# Patient Record
Sex: FEMALE | Race: WHITE | NOT HISPANIC OR LATINO | Employment: OTHER | URBAN - METROPOLITAN AREA
[De-identification: names, ages, dates, MRNs, and addresses within clinical notes are randomized per-mention and may not be internally consistent; named-entity substitution may affect disease eponyms.]

---

## 2017-01-03 ENCOUNTER — TELEPHONE (OUTPATIENT)
Dept: NEUROLOGY | Facility: CLINIC | Age: 72
End: 2017-01-03

## 2017-01-03 NOTE — TELEPHONE ENCOUNTER
Patient notified that the Botox was approved.  Given number for billing to find out her out of pocket.

## 2017-01-03 NOTE — TELEPHONE ENCOUNTER
----- Message from Taylor Munoz sent at 1/3/2017  9:49 AM CST -----  Contact: pt [phone]  Patient called and asked if you have approval for the Botox and the cost.

## 2017-01-05 ENCOUNTER — PROCEDURE VISIT (OUTPATIENT)
Dept: NEUROLOGY | Facility: CLINIC | Age: 72
End: 2017-01-05
Payer: MEDICARE

## 2017-01-05 ENCOUNTER — TELEPHONE (OUTPATIENT)
Dept: NEUROLOGY | Facility: CLINIC | Age: 72
End: 2017-01-05

## 2017-01-05 VITALS
BODY MASS INDEX: 25.47 KG/M2 | RESPIRATION RATE: 18 BRPM | SYSTOLIC BLOOD PRESSURE: 130 MMHG | WEIGHT: 171.94 LBS | TEMPERATURE: 98 F | DIASTOLIC BLOOD PRESSURE: 70 MMHG | HEART RATE: 70 BPM | HEIGHT: 69 IN

## 2017-01-05 DIAGNOSIS — G24.3 CERVICAL DYSTONIA: Primary | ICD-10-CM

## 2017-01-05 DIAGNOSIS — G24.5 BLEPHAROSPASM: ICD-10-CM

## 2017-01-05 PROCEDURE — 64615 CHEMODENERV MUSC MIGRAINE: CPT | Mod: S$GLB,,, | Performed by: PSYCHIATRY & NEUROLOGY

## 2017-01-05 NOTE — TELEPHONE ENCOUNTER
----- Message from Newton FÁTIMA Frisard sent at 1/5/2017 11:26 AM CST -----  Contact: same  Patient called in and stated she needs to reschedule the future appt on 4/5/17 at 11am to a Tuesday or Thursday as she is off of work on those days.    Patient call back number is [phone]

## 2017-01-05 NOTE — MR AVS SNAPSHOT
[facility]  [address]  [place] [address]  Phone: [phone]  Fax: [phone]                  Marleen Samano   2017 10:45 AM   Procedure visit    Description:  Female : 1945   Provider:  Nancy Marino MD   Department:  [facility]           Reason for Visit     Botulinum Toxin Injection           Diagnoses this Visit        Comments    Cervical dystonia    -  Primary     Blepharospasm                To Do List           Future Appointments        Provider Department Dept Phone    2017 11:00 AM Nancy Marino MD [facility] [phone]      Goals (5 Years of Data)     None      [facility]     [facility] Care [facility] -  Assistance  Registered nurses in the [facility] provide clinical advisement, health education, appointment booking, and other advisory services.  Call for this free service at [phone].             Medications           Message regarding Medications     Verify the changes and/or additions to your medication regime listed below are the same as discussed with your clinician today.  If any of these changes or additions are incorrect, please notify your healthcare provider.        These medications were administered today        Dose Freq    onabotulinumtoxina injection 200 Units 200 Units Once    Sig: Inject 200 Units into the muscle once.    Class: Normal    Route: Intramuscular           Verify that the below list of medications is an accurate representation of the medications you are currently taking.  If none reported, the list may be blank. If incorrect, please contact your healthcare provider. Carry this list with you in case of emergency.           Current Medications     baclofen (LIORESAL) 20 MG tablet Take 1 tablet (20 mg total) by mouth 3 (three) times daily.    benazepril (LOTENSIN) 10 MG tablet     gabapentin (NEURONTIN) 600 MG tablet Take 1 tablet (600 mg total) by mouth  "4 (four) times daily. Take 1 tablet PO in AM, noon and then 2 tablets in the evening    magnesium oxide (MAG-OX) 400 mg tablet Take 1 tablet (400 mg total) by mouth 2 (two) times daily.    multivitamin capsule Take 1 capsule by mouth once daily.    omeprazole (PRILOSEC) 20 MG capsule     trazodone (DESYREL) 100 MG tablet Take 100 mg by mouth every evening.      venlafaxine (EFFEXOR-XR) 150 MG 24 hr capsule Take 150 mg by mouth 2 (two) times daily.             Clinical Reference Information           Vital Signs - Last Recorded  Most recent update: 1/5/2017 10:41 AM by Jocelyn Colby MA    BP Pulse Temp Resp Ht Wt    130/70 (BP Location: Left arm, Patient Position: Sitting, BP Method: Automatic) 70 98 °F (36.7 °C) (Oral) 18 5' 9" (1.753 m) 78 kg (171 lb 15.3 oz)    BMI                25.39 kg/m2          Blood Pressure          Most Recent Value    BP  130/70      Allergies as of 1/5/2017     Nancy Marino-1    Adhesive Tape-silicones    Latex, Natural Rubber    Morphine      Immunizations Administered on Date of Encounter - 1/5/2017     None      Administrations This Visit     onabotulinumtoxina injection 200 Units     Admin Date Action Dose Route Administered By             01/05/2017 Given 200 Units Intramuscular Nancy Marino MD                      "

## 2017-01-05 NOTE — PROCEDURES
Procedures   BOTOX PROCEDURE    PROCEDURE PERFORMED: Botulinum toxin injection (73953)    CLINICAL INDICATION: G43.719    A time out was conducted just before the start of the procedure to verify the correct patient and procedure, procedure location, and all relevant critical information.     This is the first Botox injections and I am aiming for at least 50%  improvement in the patient's symptoms. Frequency of treatment is every 3 months unless no response to the treatments, at which time we will discontinue the injections.     DESCRIPTION OF PROCEDURE: After obtaining informed consent and under aseptic technique, a total of 200 units of botulinum toxin type A were injected in the following muscles: Procerus 5 units,  2.5 units bilaterally, frontalis 20 units, temporalis 20 units bilaterally, occipitalis 15 units, upper cervical paraspinals 20 units bilaterally and trapezius 30 units bilaterally. The patient was given a total of 200 units in 33 sites.The patient tolerated the procedure well. There were no complications. The patient was given a prescription for repeat treatment in 3 months.         Trell Marino M.D  Medical Director, Headache and Facial Pain  [facility]

## 2017-01-13 RX ORDER — BACLOFEN 20 MG/1
20 TABLET ORAL 3 TIMES DAILY
Qty: 90 TABLET | Refills: 11 | Status: SHIPPED | OUTPATIENT
Start: 2017-01-13 | End: 2017-01-17 | Stop reason: SDUPTHER

## 2017-01-13 RX ORDER — GABAPENTIN 600 MG/1
600 TABLET ORAL 4 TIMES DAILY
Qty: 120 TABLET | Refills: 11 | Status: SHIPPED | OUTPATIENT
Start: 2017-01-13 | End: 2017-01-17 | Stop reason: SDUPTHER

## 2017-01-13 NOTE — TELEPHONE ENCOUNTER
----- Message from Mariaa Paez sent at 1/13/2017 10:30 AM CST -----  Contact: Patient  Patient needs refills on Gabapentin and Baclofen sent to [facility] on [address]. Any questions call patient at [phone].

## 2017-01-17 NOTE — TELEPHONE ENCOUNTER
----- Message from Nadeen Manzano sent at 1/17/2017  1:41 PM CST -----  Patient needs refill of medications: Gabapentin and Baclofen/ please call into [facility] on [address] in [place]/or if any questions call back at [phone]

## 2017-01-18 RX ORDER — BACLOFEN 20 MG/1
20 TABLET ORAL 3 TIMES DAILY
Qty: 90 TABLET | Refills: 11 | Status: SHIPPED | OUTPATIENT
Start: 2017-01-18 | End: 2017-07-27 | Stop reason: SDUPTHER

## 2017-01-18 RX ORDER — GABAPENTIN 600 MG/1
TABLET ORAL
Qty: 120 TABLET | Refills: 11 | Status: ON HOLD | OUTPATIENT
Start: 2017-01-18 | End: 2020-02-09 | Stop reason: HOSPADM

## 2017-03-03 ENCOUNTER — TELEPHONE (OUTPATIENT)
Dept: NEUROLOGY | Facility: CLINIC | Age: 72
End: 2017-03-03

## 2017-03-10 ENCOUNTER — HOSPITAL ENCOUNTER (OUTPATIENT)
Dept: RADIOLOGY | Facility: HOSPITAL | Age: 72
Discharge: HOME | End: 2017-03-10
Attending: INTERNAL MEDICINE
Payer: MEDICARE

## 2017-03-10 DIAGNOSIS — R05.9 COUGH: ICD-10-CM

## 2017-03-10 DIAGNOSIS — R06.02 SHORTNESS OF BREATH: ICD-10-CM

## 2017-03-10 PROCEDURE — 71020 XR CHEST PA AND LATERAL: CPT | Mod: 26,,, | Performed by: RADIOLOGY

## 2017-03-10 PROCEDURE — 71020 XR CHEST PA AND LATERAL: CPT | Mod: TC

## 2017-03-12 ENCOUNTER — HOSPITAL ENCOUNTER (EMERGENCY)
Facility: HOSPITAL | Age: 72
Discharge: HOME | End: 2017-03-12
Attending: EMERGENCY MEDICINE
Payer: MEDICARE

## 2017-03-12 VITALS
HEIGHT: 69 IN | TEMPERATURE: 98 F | WEIGHT: 171 LBS | HEART RATE: 86 BPM | BODY MASS INDEX: 25.33 KG/M2 | OXYGEN SATURATION: 95 % | DIASTOLIC BLOOD PRESSURE: 52 MMHG | RESPIRATION RATE: 16 BRPM | SYSTOLIC BLOOD PRESSURE: 112 MMHG

## 2017-03-12 DIAGNOSIS — J18.9 PNEUMONIA: ICD-10-CM

## 2017-03-12 LAB
ALBUMIN SERPL BCP-MCNC: 3.4 G/DL
ALP SERPL-CCNC: 119 U/L
ALT SERPL W/O P-5'-P-CCNC: 26 U/L
ANION GAP SERPL CALC-SCNC: 10 MMOL/L
AST SERPL-CCNC: 25 U/L
BASOPHILS # BLD AUTO: 0 K/UL
BASOPHILS NFR BLD: 0.2 %
BILIRUB SERPL-MCNC: 0.4 MG/DL
BUN SERPL-MCNC: 29 MG/DL
CALCIUM SERPL-MCNC: 9.3 MG/DL
CHLORIDE SERPL-SCNC: 100 MMOL/L
CO2 SERPL-SCNC: 26 MMOL/L
CREAT SERPL-MCNC: 1.1 MG/DL
DIFFERENTIAL METHOD: ABNORMAL
EOSINOPHIL # BLD AUTO: 0.1 K/UL
EOSINOPHIL NFR BLD: 0.4 %
ERYTHROCYTE [DISTWIDTH] IN BLOOD BY AUTOMATED COUNT: 14.6 %
EST. GFR  (AFRICAN AMERICAN): 58 ML/MIN/1.73 M^2
EST. GFR  (NON AFRICAN AMERICAN): 50 ML/MIN/1.73 M^2
FLUAV AG SPEC QL IA: NEGATIVE
FLUBV AG SPEC QL IA: NEGATIVE
GLUCOSE SERPL-MCNC: 132 MG/DL
HCT VFR BLD AUTO: 34.4 %
HGB BLD-MCNC: 11.3 G/DL
LYMPHOCYTES # BLD AUTO: 1.1 K/UL
LYMPHOCYTES NFR BLD: 8 %
MCH RBC QN AUTO: 28.9 PG
MCHC RBC AUTO-ENTMCNC: 33 %
MCV RBC AUTO: 88 FL
MONOCYTES # BLD AUTO: 1.2 K/UL
MONOCYTES NFR BLD: 9 %
NEUTROPHILS # BLD AUTO: 11 K/UL
NEUTROPHILS NFR BLD: 82.4 %
PLATELET # BLD AUTO: 240 K/UL
PMV BLD AUTO: 7.9 FL
POTASSIUM SERPL-SCNC: 5.1 MMOL/L
PROT SERPL-MCNC: 7.5 G/DL
RBC # BLD AUTO: 3.92 M/UL
SODIUM SERPL-SCNC: 136 MMOL/L
SPECIMEN SOURCE: NORMAL
WBC # BLD AUTO: 13.3 K/UL

## 2017-03-12 PROCEDURE — 87205 SMEAR GRAM STAIN: CPT

## 2017-03-12 PROCEDURE — 99284 EMERGENCY DEPT VISIT MOD MDM: CPT | Mod: 25

## 2017-03-12 PROCEDURE — 80053 COMPREHEN METABOLIC PANEL: CPT

## 2017-03-12 PROCEDURE — 36415 COLL VENOUS BLD VENIPUNCTURE: CPT

## 2017-03-12 PROCEDURE — 87070 CULTURE OTHR SPECIMN AEROBIC: CPT

## 2017-03-12 PROCEDURE — 87400 INFLUENZA A/B EACH AG IA: CPT

## 2017-03-12 PROCEDURE — 25000003 PHARM REV CODE 250: Performed by: EMERGENCY MEDICINE

## 2017-03-12 PROCEDURE — 63600175 PHARM REV CODE 636 W HCPCS: Performed by: EMERGENCY MEDICINE

## 2017-03-12 PROCEDURE — 25000242 PHARM REV CODE 250 ALT 637 W/ HCPCS: Performed by: EMERGENCY MEDICINE

## 2017-03-12 PROCEDURE — 96361 HYDRATE IV INFUSION ADD-ON: CPT

## 2017-03-12 PROCEDURE — 94640 AIRWAY INHALATION TREATMENT: CPT

## 2017-03-12 PROCEDURE — 96365 THER/PROPH/DIAG IV INF INIT: CPT

## 2017-03-12 PROCEDURE — 85025 COMPLETE CBC W/AUTO DIFF WBC: CPT

## 2017-03-12 RX ORDER — ALBUTEROL SULFATE 2.5 MG/.5ML
2.5 SOLUTION RESPIRATORY (INHALATION)
Status: COMPLETED | OUTPATIENT
Start: 2017-03-12 | End: 2017-03-12

## 2017-03-12 RX ORDER — ALBUTEROL SULFATE 90 UG/1
1-2 AEROSOL, METERED RESPIRATORY (INHALATION) EVERY 6 HOURS PRN
Qty: 1 INHALER | Refills: 0 | Status: SHIPPED | OUTPATIENT
Start: 2017-03-12 | End: 2019-07-09 | Stop reason: SDUPTHER

## 2017-03-12 RX ORDER — ACETAMINOPHEN 500 MG
1000 TABLET ORAL
Status: COMPLETED | OUTPATIENT
Start: 2017-03-12 | End: 2017-03-12

## 2017-03-12 RX ORDER — LEVOFLOXACIN 750 MG/1
750 TABLET ORAL DAILY
Qty: 7 TABLET | Refills: 0 | Status: SHIPPED | OUTPATIENT
Start: 2017-03-12 | End: 2017-03-19

## 2017-03-12 RX ORDER — MOXIFLOXACIN HYDROCHLORIDE 400 MG/250ML
400 INJECTION, SOLUTION INTRAVENOUS
Status: COMPLETED | OUTPATIENT
Start: 2017-03-12 | End: 2017-03-12

## 2017-03-12 RX ADMIN — ACETAMINOPHEN 1000 MG: 500 TABLET, FILM COATED ORAL at 04:03

## 2017-03-12 RX ADMIN — SODIUM CHLORIDE 500 ML: 0.9 INJECTION, SOLUTION INTRAVENOUS at 04:03

## 2017-03-12 RX ADMIN — MOXIFLOXACIN HYDROCHLORIDE 400 MG: 400 INJECTION, SOLUTION INTRAVENOUS at 03:03

## 2017-03-12 RX ADMIN — SODIUM CHLORIDE 500 ML: 0.9 INJECTION, SOLUTION INTRAVENOUS at 01:03

## 2017-03-12 RX ADMIN — ALBUTEROL SULFATE 2.5 MG: 2.5 SOLUTION RESPIRATORY (INHALATION) at 02:03

## 2017-03-12 NOTE — ED NOTES
Presents to the ER with c/o productive cough with green sputum and otalgia to right ear. Patient reports that her cough has been persistent since March, but has been constant for the past 8 days. Associated complaints are wheezing and bilateral rib pain when coughing excessively.Mucous membranes are pink and moist. Skin is warm, dry and intact. Rales to left lung, respirations are regular and unlabored. Denies congestion, rhinorrhea or SOB. BS active x4, no tenderness with palpation, abd is soft and not distended. Denies any appetite or activity change. S1S2, capillary refill is < 2 seconds. Denies dysuria, difficulty urinating, frequency, numbness, tingling or weakness. OSCAR CHURCHILL

## 2017-03-12 NOTE — ED AVS SNAPSHOT
[facility] [address]  [place] [address]               Marleen Samano   3/12/2017 12:52 PM   ED    Description:  Female : 1945   Department:  [facility]           Your Care was Coordinated By:     Provider Role From To    Stefan Samuel MD Attending Provider 17 1300 --      Reason for Visit     Cough     Headache     Otalgia           Diagnoses this Visit        Comments    Pneumonia           ED Disposition     None           To Do List           Follow-up Information     Follow up with De Gomes MD. Call in 1 day.    Specialty:  Internal Medicine    Contact information:    [address] [facility] [address]  [place] [address]  [phone]         These Medications        Disp Refills Start End    albuterol 90 mcg/actuation inhaler 1 Inhaler 0 3/12/2017     Inhale 1-2 puffs into the lungs every 6 (six) hours as needed for Wheezing. Rescue - Inhalation    Pharmacy: [id number] - [place], [place] - [address] [facility] Ph #: [phone]       levoFLOXacin (LEVAQUIN) 750 MG tablet 7 tablet 0 3/12/2017 3/19/2017    Take 1 tablet (750 mg total) by mouth once daily. - Oral    Pharmacy: [id number] - [place] - [address] [facility] Ph #: [phone]         [facility]     [facility] Nurse Care [facility] - 24/7 Assistance  Registered nurses in the [facility] provide clinical advisement, health education, appointment booking, and other advisory services.  Call for this free service at [phone].             Medications           Message regarding Medications     Verify the changes and/or additions to your medication regime listed below are the same as discussed with your clinician today.  If any of these changes or additions are incorrect, please notify your healthcare provider.        START taking these NEW medications        Refills    albuterol 90 mcg/actuation inhaler 0    Sig: Inhale 1-2 puffs into the lungs  every 6 (six) hours as needed for Wheezing. Rescue    Class: Print    Route: Inhalation    levoFLOXacin (LEVAQUIN) 750 MG tablet 0    Sig: Take 1 tablet (750 mg total) by mouth once daily.    Class: Print    Route: Oral      These medications were administered today        Dose Freq    sodium chloride 0.9% bolus 500 mL 500 mL ED 1 Time    Sig: Inject 500 mLs into the vein ED 1 Time.    Class: Normal    Route: Intravenous    albuterol sulfate nebulizer solution 2.5 mg 2.5 mg ED 1 Time    Sig: Take 2.5 mg by nebulization ED 1 Time.    Class: Normal    Route: Nebulization    moxifloxacin 400 mg/250 mL IVPB 400 mg 400 mg ED 1 Time    Sig: Inject 250 mLs (400 mg total) into the vein ED 1 Time.    Class: Normal    Route: Intravenous    sodium chloride 0.9% bolus 500 mL 500 mL ED 1 Time    Sig: Inject 500 mLs into the vein ED 1 Time.    Class: Normal    Route: Intravenous    acetaminophen tablet 1,000 mg 1,000 mg ED 1 Time    Sig: Take 2 tablets (1,000 mg total) by mouth ED 1 Time.    Class: Normal    Route: Oral           Verify that the below list of medications is an accurate representation of the medications you are currently taking.  If none reported, the list may be blank. If incorrect, please contact your healthcare provider. Carry this list with you in case of emergency.           Current Medications     acetaminophen tablet 1,000 mg Take 2 tablets (1,000 mg total) by mouth ED 1 Time.    albuterol 90 mcg/actuation inhaler Inhale 1-2 puffs into the lungs every 6 (six) hours as needed for Wheezing. Rescue    albuterol sulfate nebulizer solution 2.5 mg Take 2.5 mg by nebulization ED 1 Time.    baclofen (LIORESAL) 20 MG tablet Take 1 tablet (20 mg total) by mouth 3 (three) times daily.    benazepril (LOTENSIN) 10 MG tablet     gabapentin (NEURONTIN) 600 MG tablet Take 1 tablet PO in AM, noon and then 2 tablets in the evening    levoFLOXacin (LEVAQUIN) 750 MG tablet Take 1 tablet (750 mg total) by mouth once daily.     "magnesium oxide (MAG-OX) 400 mg tablet Take 1 tablet (400 mg total) by mouth 2 (two) times daily.    multivitamin capsule Take 1 capsule by mouth once daily.    omeprazole (PRILOSEC) 20 MG capsule     trazodone (DESYREL) 100 MG tablet Take 100 mg by mouth every evening.      venlafaxine (EFFEXOR-XR) 150 MG 24 hr capsule Take 150 mg by mouth 2 (two) times daily.             Clinical Reference Information           Your Vitals Were     BP Pulse Temp Resp Height Weight    109/55 96 98.1 °F (36.7 °C) (Oral) 16 5' 9" (1.753 m) 77.6 kg (171 lb)    SpO2 BMI             95% 25.25 kg/m2         Allergies as of 3/12/2017        Reactions    Artane     Swelling    Ned-1 Swelling    Oragel caused tongue to swell    Adhesive Tape-silicones Rash    Blisters after on for several hours    Latex, Natural Rubber Hives, Rash    Localized    Morphine Itching      Immunizations Administered on Date of Encounter - 3/12/2017     None      ED Micro, Lab, POCT     Start Ordered       Status Ordering Provider    03/12/17 1321 03/12/17 1320  Culture, Respiratory  STAT      In process     03/12/17 1319 03/12/17 1319  Comprehensive metabolic panel  STAT      Final result     03/12/17 1319 03/12/17 1319  Influenza antigen Nasopharyngeal Swab  STAT      Final result     03/12/17 1318 03/12/17 1319  CBC auto differential  STAT      Final result       ED Imaging Orders     Start Ordered       Status Ordering Provider    03/12/17 1319 03/12/17 1319  X-Ray Chest PA And Lateral  1 time imaging      Final result         Discharge Instructions         Pneumonia (Adult)  Pneumonia is an infection deep within the lungs. It is in the small air sacs (alveoli). Pneumonia may be caused by a virus or bacteria. Pneumonia caused by bacteria is usually treated with an antibiotic. Severe cases may need to be treated in the hospital. Milder cases can be treated at home. Symptoms usually start to get better during the first 2 days of treatment.    Home " care  Follow these guidelines when caring for yourself at home:  · Rest at home for the first 2 to 3 days, or until you feel stronger. Dont let yourself get overly tired when you go back to your activities.  · Stay away from cigarette smoke - yours or other peoples.  · You may use acetaminophen or ibuprofen to control fever or pain, unless another medicine was prescribed. If you have chronic liver or kidney disease, talk with your health care provider before using these medicines. Also talk with your provider if youve had a stomach ulcer or GI bleeding. Dont give aspirin to anyone younger than 18 years of age who is ill with a fever. It may cause severe liver damage.  · Your appetite may be poor, so a light diet is fine.  · Drink 6 to 8 glasses of fluids every day to make sure you are getting enough fluids. Beverages can include water, sport drinks, sodas without caffeine, juices, tea, or soup. Fluids will help loosen secretions in the lung. This will make it easier for you to cough up the phlegm (sputum). If you also have heart or kidney disease, check with your health care provider before you drink extra fluids.  · Take antibiotic medicine prescribed until it is all gone, even if you are feeling better after a few days.  Follow-up care  Follow up with your health care provider in the next 2 to 3 days, or as advised. This is to be sure the medicine is helping you get better.  If you are 65 or older, you should get a pneumococcal vaccine and a yearly flu (influenza) shot. You should also get these vaccines if you have chronic lung disease like asthma, emphysema, or COPD. Ask your provider about this.  When to seek medical advice  Call your health care provider right away if any of these occur:  · You dont get better within the first 48 hours of treatment  · Shortness of breath gets worse  · Rapid breathing (more than 25 breaths per minute)  · Coughing up blood  · Chest pain gets worse with breathing  · Fever  of 102°F (38°C) or higher that doesnt get better with fever medicine  · Weakness, dizziness, or fainting that gets worse  · Thirst or dry mouth that gets worse  · Sinus pain, headache, or a stiff neck  · Chest pain not caused by coughing  Date Last Reviewed: 12/23/2014  © 0938-0590 [employer]. [address] [place] [address], [place] [address]. All rights reserved. This information is not intended as a substitute for professional medical care. Always follow your healthcare professional's instructions.          Your Scheduled Appointments     Mar 30, 2017  1:00 PM CDT   Botox Injection with Nancy Marino MD   [facility]    [address] [facility] [place] [address]   [phone]               [facility] complies with applicable Federal civil rights laws and does not discriminate on the basis of race, color, national origin, age, disability, or sex.        Language Assistance Services     ATTENTION: Language assistance services are available, free of charge. Please call [phone].      ATENCIÓN: Si habla español, tiene a starks disposición servicios gratuitos de asistencia lingüística. Llame al [phone].     ZAY Ý: N?u b?n nói Ti?ng Vi?t, có các d?ch v? h? tr? ngôn ng? mi?n phí dành cho b?n. G?i s? [phone].

## 2017-03-12 NOTE — ED NOTES
Upon discharge, patient is AAOx4, no cardiac or respiratory complications. Follow up care and  Medications have been reviewed with patient and has been instructed to return to the ER if needed. Patient verbalized understanding and ambulated to the lobby without difficulty. ZHAO MOORE.

## 2017-03-12 NOTE — ED PROVIDER NOTES
Encounter Date: 3/12/2017    SCRIBE #1 NOTE: Radha ALBARRAN, eveline scribing for, and in the presence of, Dr. Samuel.       History     Chief Complaint   Patient presents with    Cough    Headache    Otalgia     Review of patient's allergies indicates:   Allergen Reactions    Artane      Swelling    Ned-1 Swelling     Oragel caused tongue to swell    Adhesive tape-silicones Rash     Blisters after on for several hours    Latex, natural rubber Hives and Rash     Localized    Morphine Itching     HPI Comments: 3/12/2017  1:04 PM     Chief Complaint: Cough    The patient is a 71 y.o. female who presents with gradual onset of productive cough with green sputum that has been constant for 8 days. Associated with wheezing and tightness to bilateral ribs during coughing spells. No hemoptysis, sob, fevers, chills or leg swelling. The patient had a CXR 2 days ago which resulted in pneumonia to the left medial lung base and pulmonary emphysema. Patient also complains of mild headache to the back of the head and intermittent right ear pain for the last few days. No dysuria. Pt did receive pneumonia shot. 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 Not a smoker.    The history is provided by the patient.     Past Medical History:   Diagnosis Date    Arthritis     Bell's palsy     Bladder incontinence     Blepharospasm     Cervical dystonia     Depression     Hormone deficiency     Myofacial pain syndrome      Past Surgical History:   Procedure Laterality Date    APPENDECTOMY      BREAST LUMPECTOMY      EYE SURGERY      HYSTERECTOMY      NECK SURGERY      TONSILLECTOMY       Family History   Problem Relation Age of Onset    Diabetes Neg Hx      Social History   Substance Use Topics    Smoking status: Never Smoker    Smokeless tobacco: Never Used    Alcohol use No     Review of Systems   Constitutional: Negative for appetite  change, chills and fever.   HENT: Positive for ear pain. Negative for congestion, rhinorrhea and sore throat.    Respiratory: Positive for cough and wheezing. Negative for shortness of breath.         + Rib pain    No Hemoptysis.   Cardiovascular: Negative for chest pain.   Gastrointestinal: Negative for abdominal pain, diarrhea, nausea and vomiting.   Genitourinary: Negative for dysuria.   Musculoskeletal: Negative for back pain and myalgias.   Skin: Negative for rash.   Neurological: Positive for headaches. Negative for weakness and numbness.   Hematological: Does not bruise/bleed easily.   All other systems reviewed and are negative.      Physical Exam   Initial Vitals   BP Pulse Resp Temp SpO2   03/12/17 1250 03/12/17 1250 03/12/17 1250 03/12/17 1250 03/12/17 1250   117/63 100 18 98.1 °F (36.7 °C) 95 %     Physical Exam    Nursing note and vitals reviewed.  Constitutional: No distress.   HENT:   Head: Normocephalic and atraumatic.   Right Ear: External ear normal.   Left Ear: Hearing, tympanic membrane, external ear and ear canal normal.   Nose: Nose normal.   Mouth/Throat: Oropharynx is clear and moist and mucous membranes are normal.   Mild serous effusion to the right TM. No erythema to the TM.    Aphthous ulcers to the bottom of the lip.   Eyes: EOM are normal. Pupils are equal, round, and reactive to light.   Neck: Normal range of motion. No JVD present.   Cardiovascular: Normal rate, regular rhythm, normal heart sounds and intact distal pulses. Exam reveals no gallop and no friction rub.    No murmur heard.  Pulmonary/Chest: She has rales (to the left posterior lobe and left anterior lower lobe).   Abdominal: Soft. She exhibits no distension. There is no tenderness.   Musculoskeletal: Normal range of motion. She exhibits no edema or tenderness.   Neurological: She is alert and oriented to person, place, and time.   Skin: No rash noted. No erythema.   Psychiatric: She has a normal mood and affect.          ED Course   Procedures  Labs Reviewed - No data to display          Medical Decision Making:   Patient appears to have a left lower lobe pneumonia.  Her curb 65 score is 2 secondary to her age and her BUN.  She was given fluids and we had her walk around the emergency department twice without oxygen her sats stayed between 93 and 95%.  I feel that she is stable for discharge and will prescribe her a course of Avelox with albuterol and give her very specific return precautions.  She also needs to follow-up with Dr. Summerlin.  Sputum cultures have been ordered.            Scribe Attestation:   Scribe #1: I performed the above scribed service and the documentation accurately describes the services I performed. I attest to the accuracy of the note.    Attending Attestation:           Physician Attestation for Scribe:  Physician Attestation Statement for Scribe #1: I, Dr. Samuel, reviewed documentation, as scribed by Radha Cameron in my presence, and it is both accurate and complete.                 ED Course     Clinical Impression:   The encounter diagnosis was Pneumonia.          Stefan Samuel MD  03/12/17 0537

## 2017-03-12 NOTE — DISCHARGE INSTRUCTIONS
Pneumonia (Adult)  Pneumonia is an infection deep within the lungs. It is in the small air sacs (alveoli). Pneumonia may be caused by a virus or bacteria. Pneumonia caused by bacteria is usually treated with an antibiotic. Severe cases may need to be treated in the hospital. Milder cases can be treated at home. Symptoms usually start to get better during the first 2 days of treatment.    Home care  Follow these guidelines when caring for yourself at home:  · Rest at home for the first 2 to 3 days, or until you feel stronger. Dont let yourself get overly tired when you go back to your activities.  · Stay away from cigarette smoke - yours or other peoples.  · You may use acetaminophen or ibuprofen to control fever or pain, unless another medicine was prescribed. If you have chronic liver or kidney disease, talk with your health care provider before using these medicines. Also talk with your provider if youve had a stomach ulcer or GI bleeding. Dont give aspirin to anyone younger than 18 years of age who is ill with a fever. It may cause severe liver damage.  · Your appetite may be poor, so a light diet is fine.  · Drink 6 to 8 glasses of fluids every day to make sure you are getting enough fluids. Beverages can include water, sport drinks, sodas without caffeine, juices, tea, or soup. Fluids will help loosen secretions in the lung. This will make it easier for you to cough up the phlegm (sputum). If you also have heart or kidney disease, check with your health care provider before you drink extra fluids.  · Take antibiotic medicine prescribed until it is all gone, even if you are feeling better after a few days.  Follow-up care  Follow up with your health care provider in the next 2 to 3 days, or as advised. This is to be sure the medicine is helping you get better.  If you are 65 or older, you should get a pneumococcal vaccine and a yearly flu (influenza) shot. You should also get these vaccines if you have  chronic lung disease like asthma, emphysema, or COPD. Ask your provider about this.  When to seek medical advice  Call your health care provider right away if any of these occur:  · You dont get better within the first 48 hours of treatment  · Shortness of breath gets worse  · Rapid breathing (more than 25 breaths per minute)  · Coughing up blood  · Chest pain gets worse with breathing  · Fever of 102°F (38°C) or higher that doesnt get better with fever medicine  · Weakness, dizziness, or fainting that gets worse  · Thirst or dry mouth that gets worse  · Sinus pain, headache, or a stiff neck  · Chest pain not caused by coughing  Date Last Reviewed: 12/23/2014  © 4340-3449 [employer]. [address] [place] [address], [place] [address]. All rights reserved. This information is not intended as a substitute for professional medical care. Always follow your healthcare professional's instructions.

## 2017-03-14 ENCOUNTER — TELEPHONE (OUTPATIENT)
Dept: NEUROLOGY | Facility: CLINIC | Age: 72
End: 2017-03-14

## 2017-03-14 NOTE — TELEPHONE ENCOUNTER
Patient states she has the flu and pneumonia. She would like to cancel her upcoming Botox appointment at this time. She will call and reschedule when she feels better. Appointment canceled.

## 2017-03-14 NOTE — TELEPHONE ENCOUNTER
----- Message from July Warner sent at 3/14/2017 12:59 PM CDT -----  Contact: self  Needs to discuss Botox shots.  Please call back at .

## 2017-03-15 LAB
BACTERIA SPEC AEROBE CULT: NORMAL
GRAM STN SPEC: NORMAL

## 2017-03-27 ENCOUNTER — TELEPHONE (OUTPATIENT)
Dept: NEUROLOGY | Facility: CLINIC | Age: 72
End: 2017-03-27

## 2017-03-27 NOTE — TELEPHONE ENCOUNTER
Patient would like to speak with Dr. Marino about Botox before rescheduling appointment. She states last time one eye and eyebrow was raised way up and was black and blue. The other eye was almost shut. She had a headache in the back of her head for several weeks after. She wants Dr. Marino's input on if she should try it again before she comes in and pays. Message sent to Dr. Marino.

## 2017-03-27 NOTE — TELEPHONE ENCOUNTER
----- Message from Rolo Adams sent at 3/27/2017 11:48 AM CDT -----  Contact: Marleen   Please call back regarding Botox. Call on [phone]. Thank you!

## 2017-03-28 NOTE — TELEPHONE ENCOUNTER
Informed patient that Dr. Marino would avoid the forehead but could complete the rest of the protocol to give her some relief since the Botox seems to be helping. Patient verbalized understanding. Appointment scheduled.

## 2017-03-29 ENCOUNTER — HOSPITAL ENCOUNTER (OUTPATIENT)
Dept: RADIOLOGY | Facility: HOSPITAL | Age: 72
Discharge: HOME | End: 2017-03-29
Attending: INTERNAL MEDICINE
Payer: MEDICARE

## 2017-03-29 DIAGNOSIS — J18.8 OTHER PNEUMONIA, UNSPECIFIED ORGANISM: ICD-10-CM

## 2017-03-29 PROCEDURE — 71020 XR CHEST PA AND LATERAL: CPT | Mod: 26,,, | Performed by: RADIOLOGY

## 2017-03-29 PROCEDURE — 71020 XR CHEST PA AND LATERAL: CPT | Mod: TC

## 2017-04-24 ENCOUNTER — TELEPHONE (OUTPATIENT)
Dept: NEUROLOGY | Facility: CLINIC | Age: 72
End: 2017-04-24

## 2017-04-24 NOTE — TELEPHONE ENCOUNTER
----- Message from Nadeen Manzano sent at 4/24/2017  7:59 AM CDT -----  Patient requesting to schedule Botox appointment/please call patient back at [phone] to schedule or advise.

## 2017-05-09 ENCOUNTER — PROCEDURE VISIT (OUTPATIENT)
Dept: NEUROLOGY | Facility: CLINIC | Age: 72
End: 2017-05-09
Payer: MEDICARE

## 2017-05-09 VITALS
TEMPERATURE: 97 F | RESPIRATION RATE: 18 BRPM | SYSTOLIC BLOOD PRESSURE: 140 MMHG | HEIGHT: 69 IN | BODY MASS INDEX: 25.47 KG/M2 | WEIGHT: 171.94 LBS | DIASTOLIC BLOOD PRESSURE: 86 MMHG | HEART RATE: 86 BPM

## 2017-05-09 DIAGNOSIS — G43.719 CHRONIC MIGRAINE WITHOUT AURA, WITH INTRACTABLE MIGRAINE, SO STATED, WITHOUT MENTION OF STATUS MIGRAINOSUS: Primary | ICD-10-CM

## 2017-05-09 PROCEDURE — 64615 CHEMODENERV MUSC MIGRAINE: CPT | Mod: S$GLB,,, | Performed by: PSYCHIATRY & NEUROLOGY

## 2017-05-09 NOTE — MR AVS SNAPSHOT
[facility]  [address] [facility] [address]  [place] [address]  Phone: [phone]  Fax: [phone]                  Marleen Samano   2017 11:45 AM   Appointment    Description:  Female : 1945   Provider:  Nancy Marino MD   Department:  [facility]                To Do List           Future Appointments        Provider Department Dept Phone    2017 11:45 AM Nancy Marino MD [facility] [phone]      Goals (5 Years of Data)     None      [facility]     [facility] -  Assistance  Unless otherwise directed by your provider, please contact [facility], our nurse care line that is available for  assistance.     Registered nurses in the [facility] Center provide: appointment scheduling, clinical advisement, health education, and other advisory services.  Call: [phone] (toll free)               Medications           Message regarding Medications     Verify the changes and/or additions to your medication regime listed below are the same as discussed with your clinician today.  If any of these changes or additions are incorrect, please notify your healthcare provider.             Verify that the below list of medications is an accurate representation of the medications you are currently taking.  If none reported, the list may be blank. If incorrect, please contact your healthcare provider. Carry this list with you in case of emergency.           Current Medications     albuterol 90 mcg/actuation inhaler Inhale 1-2 puffs into the lungs every 6 (six) hours as needed for Wheezing. Rescue    baclofen (LIORESAL) 20 MG tablet Take 1 tablet (20 mg total) by mouth 3 (three) times daily.    benazepril (LOTENSIN) 10 MG tablet     gabapentin (NEURONTIN) 600 MG tablet Take 1 tablet PO in AM, noon and then 2 tablets in the evening    magnesium oxide (MAG-OX) 400 mg tablet Take 1 tablet (400 mg total) by mouth 2 (two) times  daily.    multivitamin capsule Take 1 capsule by mouth once daily.    omeprazole (PRILOSEC) 20 MG capsule     trazodone (DESYREL) 100 MG tablet Take 100 mg by mouth every evening.      venlafaxine (EFFEXOR-XR) 150 MG 24 hr capsule Take 150 mg by mouth 2 (two) times daily.             Clinical Reference Information           Allergies as of 5/9/2017     Nancy Marino-1    Adhesive Tape-silicones    Latex, Natural Rubber    Morphine      Immunizations Administered on Date of Encounter - 5/9/2017     None      Language Assistance Services     ATTENTION: Language assistance services are available, free of charge. Please call [phone].      ATENCIÓN: Si marikala pilo, tiene a starks disposición servicios gratuitos de asistencia lingüística. Llame al [phone].     ZAY Ý: N?u b?n nói Ti?ng Vi?t, có các d?ch v? h? tr? ngôn ng? mi?n phí dành cho b?n. G?i s? [phone].         [facility] complies with applicable Federal civil rights laws and does not discriminate on the basis of race, color, national origin, age, disability, or sex.

## 2017-05-09 NOTE — PROCEDURES
Procedures     BOTOX PROCEDURE    PROCEDURE PERFORMED: Botulinum toxin injection (78828)    CLINICAL INDICATION: G43.719    A time out was conducted just before the start of the procedure to verify the correct patient and procedure, procedure location, and all relevant critical information.     This is the second Botox injections and I am aiming for at least 50%  improvement in the patient's symptoms. Frequency of treatment is every 3 months unless no response to the treatments, at which time we will discontinue the injections.     DESCRIPTION OF PROCEDURE: After obtaining informed consent and under aseptic technique, a total of 200 units of botulinum toxin type A were injected in the following muscles: Procerus 5 units,  2.5 units bilaterally, frontalis 20 units, temporalis 20 units bilaterally, occipitalis 15 units, upper cervical paraspinals 20 units bilaterally and trapezius 30 units bilaterally. The patient was given a total of 200 units in 33 sites.The patient tolerated the procedure well. There were no complications. The patient was given a prescription for repeat treatment in 3 months.         Trell Marino M.D  Medical Director, Headache and Facial Pain  [facility]

## 2017-05-12 ENCOUNTER — TELEPHONE (OUTPATIENT)
Dept: NEUROLOGY | Facility: CLINIC | Age: 72
End: 2017-05-12

## 2017-05-12 NOTE — TELEPHONE ENCOUNTER
----- Message from Dolores Garica sent at 5/12/2017 12:40 PM CDT -----  Contact: self @ [phone]  Pt has a referral in the system from dr karol amaya for cervical dystonia.  Would like to schedule a cons at the [place] office.  pls call pt with an appt.

## 2017-05-16 ENCOUNTER — TELEPHONE (OUTPATIENT)
Dept: NEUROLOGY | Facility: CLINIC | Age: 72
End: 2017-05-16

## 2017-05-16 NOTE — TELEPHONE ENCOUNTER
----- Message from Janna Serrano sent at 5/16/2017 11:48 AM CDT -----  Contact: Patient [phone]  Patient is calling to get a new patient appt at the [facility] for a second opinion on spams per  Dr. Marino.

## 2017-05-22 ENCOUNTER — TELEPHONE (OUTPATIENT)
Dept: NEUROLOGY | Facility: CLINIC | Age: 72
End: 2017-05-22

## 2017-05-22 NOTE — TELEPHONE ENCOUNTER
----- Message from Fidelina Bautista sent at 5/19/2017  9:30 AM CDT -----  Contact: Patient  Marleen, patient [phone] or [phone] cell, Calling about, Dr durán , who does not have a return date for the [place] office, yet. How did you want to the patient to handle that with a conference between the doctors, if patient goes to [facility] [place]? And what days would be the best to guarantee both doctors will be available?  Please advise. Thanks.

## 2017-06-01 ENCOUNTER — TELEPHONE (OUTPATIENT)
Dept: NEUROLOGY | Facility: CLINIC | Age: 72
End: 2017-06-01

## 2017-06-01 NOTE — TELEPHONE ENCOUNTER
----- Message from Dolores Garcia sent at 6/1/2017 11:21 AM CDT -----  Contact: self @ [phone]  Pt is being referred to dr durán by dr karol amaya for a 2 nd opinion for dystonia.  Pt would like to schedule on the [facility] but depending on the appt date may be willing to travel to the [facility].  Pt say she is off on Tuesday's and would like to come in before 2:00 due to her eye issue.  Pt is requesting to speak with rafy.

## 2017-06-05 ENCOUNTER — TELEPHONE (OUTPATIENT)
Dept: NEUROLOGY | Facility: CLINIC | Age: 72
End: 2017-06-05

## 2017-06-05 NOTE — TELEPHONE ENCOUNTER
----- Message from Kiko Schaeffer sent at 6/5/2017  4:29 PM CDT -----  Contact: pt  Pt is requesting a callback(pt has pain in head and neck that she cannot get rid of)  Call Back#[phone]  Thanks

## 2017-06-05 NOTE — TELEPHONE ENCOUNTER
----- Message from Chad Delgadillo sent at 6/5/2017  8:18 AM CDT -----  Contact: Self [phone]  Patient is calling schedule a NP appt with Dr Dee, she prefers the [facility] but if not she's available for [facility] [place],  pls call

## 2017-06-05 NOTE — TELEPHONE ENCOUNTER
Spoke with patient. She stated that her neck and right shoulder is hurting for about 4 days now. She is really stiff. She has tried the TENS unit, Voltaren gel, and 6 low dose ASA with no relief.     She is wanting to know if there is anything you can recommend her doing or a test that she can do?

## 2017-06-06 ENCOUNTER — HOSPITAL ENCOUNTER (OUTPATIENT)
Dept: RADIOLOGY | Facility: HOSPITAL | Age: 72
Discharge: HOME | End: 2017-06-06
Attending: INTERNAL MEDICINE
Payer: MEDICARE

## 2017-06-06 DIAGNOSIS — J44.0 OBSTRUCTIVE CHRONIC BRONCHITIS WITH ACUTE BRONCHITIS: Primary | ICD-10-CM

## 2017-06-06 DIAGNOSIS — J20.9 OBSTRUCTIVE CHRONIC BRONCHITIS WITH ACUTE BRONCHITIS: Primary | ICD-10-CM

## 2017-06-06 DIAGNOSIS — J20.9 OBSTRUCTIVE CHRONIC BRONCHITIS WITH ACUTE BRONCHITIS: ICD-10-CM

## 2017-06-06 DIAGNOSIS — R05.9 COUGH: ICD-10-CM

## 2017-06-06 DIAGNOSIS — J44.0 OBSTRUCTIVE CHRONIC BRONCHITIS WITH ACUTE BRONCHITIS: ICD-10-CM

## 2017-06-06 PROCEDURE — 70220 X-RAY EXAM OF SINUSES: CPT | Mod: TC

## 2017-06-06 PROCEDURE — 71020 XR CHEST PA AND LATERAL: CPT | Mod: TC

## 2017-06-06 PROCEDURE — 71020 XR CHEST PA AND LATERAL: CPT | Mod: 26,,, | Performed by: RADIOLOGY

## 2017-06-06 PROCEDURE — 70220 X-RAY EXAM OF SINUSES: CPT | Mod: 26,,, | Performed by: RADIOLOGY

## 2017-06-08 ENCOUNTER — TELEPHONE (OUTPATIENT)
Dept: NEUROLOGY | Facility: CLINIC | Age: 72
End: 2017-06-08

## 2017-06-08 NOTE — TELEPHONE ENCOUNTER
Spoke with patient. She stated she was calling to let us know that her appointment has been scheduled with Dr. Dee.

## 2017-06-08 NOTE — TELEPHONE ENCOUNTER
----- Message from Nadeen You sent at 6/8/2017  8:44 AM CDT -----  Patient is requesting a return call from Jocelyn, she states this is a follow up call, contact patient at [phone].    Thank you

## 2017-06-09 ENCOUNTER — TELEPHONE (OUTPATIENT)
Dept: NEUROLOGY | Facility: CLINIC | Age: 72
End: 2017-06-09

## 2017-06-09 NOTE — TELEPHONE ENCOUNTER
----- Message from Giulia Tran sent at 6/9/2017  2:39 PM CDT -----  Contact: [phone]  Patient is returning nurse's phone call.  Please call patient back at [phone].

## 2017-06-12 ENCOUNTER — TELEPHONE (OUTPATIENT)
Dept: INTERVENTIONAL RADIOLOGY/VASCULAR | Facility: HOSPITAL | Age: 72
End: 2017-06-12

## 2017-06-12 NOTE — CARE UPDATE
Called Ms. Bedoyaey back at her request to discuss recent radiographic findings.  All her questions were answered and she reported satisfaction.

## 2017-07-19 ENCOUNTER — TELEPHONE (OUTPATIENT)
Dept: NEUROLOGY | Facility: CLINIC | Age: 72
End: 2017-07-19

## 2017-07-20 ENCOUNTER — TELEPHONE (OUTPATIENT)
Dept: NEUROLOGY | Facility: CLINIC | Age: 72
End: 2017-07-20

## 2017-07-20 NOTE — TELEPHONE ENCOUNTER
Spoke with patient. botox appointment rescheduled.     Patient is on baclofen for the muscle spasms which is helping a little bit but not all the way. Patient wants to know if the dosage can be increased? She is currently taking baclofen 20mg TID.

## 2017-07-20 NOTE — TELEPHONE ENCOUNTER
----- Message from Mitra Logan sent at 7/20/2017  1:06 PM CDT -----  Call [phone]... Returning Alisha's call / to reschedule .. Asking to have baclofen increased

## 2017-07-25 ENCOUNTER — TELEPHONE (OUTPATIENT)
Dept: NEUROLOGY | Facility: CLINIC | Age: 72
End: 2017-07-25

## 2017-07-25 NOTE — TELEPHONE ENCOUNTER
Returned call. No answer. Left message asking patient to call back.     Message sent to patient through [facility].

## 2017-07-25 NOTE — TELEPHONE ENCOUNTER
----- Message from Fidelia Gaytan sent at 7/25/2017 11:22 AM CDT -----  Contact: pt  Pt returning phone call from 7/24....[phone]

## 2017-07-27 ENCOUNTER — TELEPHONE (OUTPATIENT)
Dept: NEUROLOGY | Facility: CLINIC | Age: 72
End: 2017-07-27

## 2017-07-27 RX ORDER — BACLOFEN 20 MG/1
20 TABLET ORAL 4 TIMES DAILY
Qty: 120 TABLET | Refills: 11 | Status: SHIPPED | OUTPATIENT
Start: 2017-07-27 | End: 2017-08-31 | Stop reason: SDUPTHER

## 2017-07-27 NOTE — TELEPHONE ENCOUNTER
Patient notified that you do not want to increase the Baclofen.  She is complaining of muscle spasms in both legs that wake her up at night.  She also gets them in her hands.  Please advise.    The patient cannot access her [facility].  Please her cell.

## 2017-07-27 NOTE — TELEPHONE ENCOUNTER
----- Message from Newton Oswald sent at 7/27/2017  8:32 AM CDT -----  Contact: same  Patient called in and requested a message be sent over regarding leaving 2 messages for increasing the Baclofen.  Patient stated that messages were left last week and again this week but has not received any call back.  Patient would like a call back.    Patient call back number is [phone]

## 2017-08-14 ENCOUNTER — OFFICE VISIT (OUTPATIENT)
Dept: NEUROLOGY | Facility: CLINIC | Age: 72
End: 2017-08-14
Payer: MEDICARE

## 2017-08-14 VITALS
RESPIRATION RATE: 20 BRPM | DIASTOLIC BLOOD PRESSURE: 78 MMHG | HEIGHT: 69 IN | WEIGHT: 169.31 LBS | SYSTOLIC BLOOD PRESSURE: 150 MMHG | BODY MASS INDEX: 25.08 KG/M2 | HEART RATE: 78 BPM

## 2017-08-14 DIAGNOSIS — G24.3 CERVICAL DYSTONIA: Primary | ICD-10-CM

## 2017-08-14 DIAGNOSIS — G24.3 CERVICAL DYSTONIA: ICD-10-CM

## 2017-08-14 DIAGNOSIS — G24.5 BLEPHAROSPASM: Primary | ICD-10-CM

## 2017-08-14 PROCEDURE — 3008F BODY MASS INDEX DOCD: CPT | Mod: S$GLB,,, | Performed by: PSYCHIATRY & NEUROLOGY

## 2017-08-14 PROCEDURE — 1125F AMNT PAIN NOTED PAIN PRSNT: CPT | Mod: S$GLB,,, | Performed by: PSYCHIATRY & NEUROLOGY

## 2017-08-14 PROCEDURE — 99213 OFFICE O/P EST LOW 20 MIN: CPT | Mod: 25,S$GLB,, | Performed by: PSYCHIATRY & NEUROLOGY

## 2017-08-14 PROCEDURE — 1159F MED LIST DOCD IN RCRD: CPT | Mod: S$GLB,,, | Performed by: PSYCHIATRY & NEUROLOGY

## 2017-08-14 PROCEDURE — 64612 DESTROY NERVE FACE MUSCLE: CPT | Mod: 50,S$GLB,, | Performed by: PSYCHIATRY & NEUROLOGY

## 2017-08-14 PROCEDURE — 99999 PR PBB SHADOW E&M-EST. PATIENT-LVL III: CPT | Mod: PBBFAC,,, | Performed by: PSYCHIATRY & NEUROLOGY

## 2017-08-14 PROCEDURE — 64616 CHEMODENERV MUSC NECK DYSTON: CPT | Mod: 51,50,S$GLB, | Performed by: PSYCHIATRY & NEUROLOGY

## 2017-08-14 RX ORDER — DICLOFENAC SODIUM 10 MG/G
GEL TOPICAL
COMMUNITY
Start: 2017-05-30 | End: 2019-01-15
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
## 2017-08-14 NOTE — LETTER
August 14, 2017      Nancy Marino MD  [address] [place] [address] [address] [place] [address]           [facility]  [address] [facility] [address] [place] [address]  Phone: [phone]  Fax: [phone]          Patient: Marleen Samano   MR Number: [id number]   YOB: 1945   Date of Visit: 8/14/2017       Dear Dr. Nancy Marino:    Thank you for referring Marleen Samano to me for evaluation. Attached you will find relevant portions of my assessment and plan of care.    If you have questions, please do not hesitate to call me. I look forward to following Marleen Samano along with you.    Sincerely,    Cinthia Dee MD    Enclosure  CC:  No Recipients    If you would like to receive this communication electronically, please contact [email or web address] or [phone] to request more information on [employer] Link access.    For providers and/or their staff who would like to refer a patient to [facility], please contact us through our one-stop-shop provider referral line, [facility], at [phone].    If you feel you have received this communication in error or would no longer like to receive these types of communications, please e-mail [email or web address]

## 2017-08-31 DIAGNOSIS — G43.719 CHRONIC MIGRAINE WITHOUT AURA, WITH INTRACTABLE MIGRAINE, SO STATED, WITHOUT MENTION OF STATUS MIGRAINOSUS: Primary | ICD-10-CM

## 2017-08-31 NOTE — TELEPHONE ENCOUNTER
Pt states she has been taking 3 tablets daily of Baclofen other than one occurrence when she did take 4.  She said she will remain at three times daily at this point.   Rcvd a letter from insurance stating that she could have 90 day supply at no cost.  Can we provide her with the 90 day supply rather than a monthly supply.  Please advise.

## 2017-08-31 NOTE — TELEPHONE ENCOUNTER
----- Message from Lili Hull sent at 8/31/2017  4:10 PM CDT -----  Please call patient in reference to a medication.  Call [phone].

## 2017-09-01 RX ORDER — BACLOFEN 20 MG/1
20 TABLET ORAL 3 TIMES DAILY
Qty: 270 TABLET | Refills: 3 | Status: ON HOLD | OUTPATIENT
Start: 2017-09-01 | End: 2020-02-09 | Stop reason: HOSPADM

## 2017-10-24 ENCOUNTER — TELEPHONE (OUTPATIENT)
Dept: NEUROLOGY | Facility: CLINIC | Age: 72
End: 2017-10-24

## 2017-10-24 NOTE — TELEPHONE ENCOUNTER
Pt explained that from a financial standpoint, she would be unable to attend the 11/17 appt with Dr. Dee due to co-pay and potential cost of Botox co-pay. She is currently on a payment plan with [facility] for her outstanding balance. She stated she would contact our office again when ready to r/s the appt.

## 2017-10-24 NOTE — TELEPHONE ENCOUNTER
----- Message from Bushra Fletcher sent at 10/24/2017 10:53 AM CDT -----  Contact: Self [phone]  Pt is requesting a call back from the nurse to discuss the appt on 11.17.17. Pt has some general questions regarding the appointment.    Pt may be reached at [phone].    Thank you.  LC

## 2018-01-09 ENCOUNTER — HOSPITAL ENCOUNTER (OUTPATIENT)
Dept: RADIOLOGY | Facility: HOSPITAL | Age: 73
Discharge: HOME | End: 2018-01-09
Attending: INTERNAL MEDICINE
Payer: MEDICARE

## 2018-01-09 DIAGNOSIS — J44.0 ACUTE BRONCHITIS WITH COPD: ICD-10-CM

## 2018-01-09 DIAGNOSIS — J20.9 ACUTE BRONCHITIS WITH COPD: ICD-10-CM

## 2018-01-09 DIAGNOSIS — J44.0 ACUTE BRONCHITIS WITH COPD: Primary | ICD-10-CM

## 2018-01-09 DIAGNOSIS — J20.9 ACUTE BRONCHITIS WITH COPD: Primary | ICD-10-CM

## 2018-01-09 PROCEDURE — 71046 X-RAY EXAM CHEST 2 VIEWS: CPT | Mod: 26,,, | Performed by: RADIOLOGY

## 2018-01-09 PROCEDURE — 71046 X-RAY EXAM CHEST 2 VIEWS: CPT | Mod: TC,FY

## 2018-05-11 ENCOUNTER — LAB VISIT (OUTPATIENT)
Dept: LAB | Facility: HOSPITAL | Age: 73
End: 2018-05-11
Attending: INTERNAL MEDICINE
Payer: MEDICARE

## 2018-05-11 DIAGNOSIS — R10.10 UPPER ABDOMINAL PAIN: Primary | ICD-10-CM

## 2018-05-11 LAB
BASOPHILS # BLD AUTO: 0 K/UL
BASOPHILS NFR BLD: 0.3 %
DIFFERENTIAL METHOD: ABNORMAL
EOSINOPHIL # BLD AUTO: 0.1 K/UL
EOSINOPHIL NFR BLD: 2.1 %
ERYTHROCYTE [DISTWIDTH] IN BLOOD BY AUTOMATED COUNT: 13.8 %
HCT VFR BLD AUTO: 31.8 %
HGB BLD-MCNC: 10.5 G/DL
LYMPHOCYTES # BLD AUTO: 1 K/UL
LYMPHOCYTES NFR BLD: 26.1 %
MCH RBC QN AUTO: 27.7 PG
MCHC RBC AUTO-ENTMCNC: 33.1 G/DL
MCV RBC AUTO: 84 FL
MONOCYTES # BLD AUTO: 0.4 K/UL
MONOCYTES NFR BLD: 9 %
NEUTROPHILS # BLD AUTO: 2.5 K/UL
NEUTROPHILS NFR BLD: 62.5 %
PLATELET # BLD AUTO: 222 K/UL
PMV BLD AUTO: 7.9 FL
RBC # BLD AUTO: 3.79 M/UL
WBC # BLD AUTO: 4 K/UL

## 2018-05-11 PROCEDURE — 36415 COLL VENOUS BLD VENIPUNCTURE: CPT

## 2018-05-11 PROCEDURE — 85025 COMPLETE CBC W/AUTO DIFF WBC: CPT

## 2018-11-21 ENCOUNTER — TELEPHONE (OUTPATIENT)
Dept: NEUROLOGY | Facility: CLINIC | Age: 73
End: 2018-11-21

## 2018-11-21 NOTE — TELEPHONE ENCOUNTER
----- Message from Blane Hansen sent at 11/21/2018  8:10 AM CST -----  Contact: pt  The Pt is requesting a call back to discuss if Dr Echols is the right doctor to see for dystonia. Please call to advise.     Call Back#: [phone]  Thanks

## 2018-11-23 ENCOUNTER — HOSPITAL ENCOUNTER (EMERGENCY)
Facility: HOSPITAL | Age: 73
Discharge: HOME | End: 2018-11-24
Attending: EMERGENCY MEDICINE
Payer: MEDICARE

## 2018-11-23 DIAGNOSIS — I10 HYPERTENSION, UNSPECIFIED TYPE: Primary | ICD-10-CM

## 2018-11-23 DIAGNOSIS — R51.9 NONINTRACTABLE HEADACHE, UNSPECIFIED CHRONICITY PATTERN, UNSPECIFIED HEADACHE TYPE: ICD-10-CM

## 2018-11-23 PROCEDURE — 99283 EMERGENCY DEPT VISIT LOW MDM: CPT

## 2018-11-23 PROCEDURE — 25000003 PHARM REV CODE 250: Performed by: PHYSICIAN ASSISTANT

## 2018-11-23 RX ORDER — ACETAMINOPHEN 500 MG
1000 TABLET ORAL
Status: COMPLETED | OUTPATIENT
Start: 2018-11-23 | End: 2018-11-23

## 2018-11-23 RX ADMIN — ACETAMINOPHEN 1000 MG: 500 TABLET ORAL at 11:11

## 2018-11-24 VITALS
HEART RATE: 60 BPM | HEIGHT: 70 IN | RESPIRATION RATE: 16 BRPM | BODY MASS INDEX: 22.9 KG/M2 | WEIGHT: 160 LBS | DIASTOLIC BLOOD PRESSURE: 73 MMHG | SYSTOLIC BLOOD PRESSURE: 151 MMHG | OXYGEN SATURATION: 99 % | TEMPERATURE: 98 F

## 2018-11-24 NOTE — ED NOTES
Pt in room 8 for evaluation of elevated blood pressure.  Pt is awake, alert and oriented. Resp even and unlabored.  Skin warm and dry. Abd soft and non-tender.  Pt reports headache and chronic back pain.  Pt denies chest pain or sob.

## 2018-11-24 NOTE — ED PROVIDER NOTES
"Encounter Date: 11/23/2018       History     Chief Complaint   Patient presents with    Hypertension     Marleen Samano is a 72 year old  female with a PMH of HTN and COPD who presents to the ED complaining of high blood pressure. She states that around 5 pm today she felt a "fluttering" in her head, which she usually experiences when her BP is high. Home BP measurements showed results of 168/104 and 197/115. Patient reports she usually takes Benazapril at night to manage her HTN and states that she has not missed any doses. Endorses a 7/10 "buzzing" headache. Denies CP, SOB, dizziness, syncope, sensory deficits, gait disturbance, weakness, or visual disturbance.          The history is provided by the patient.     Review of patient's allergies indicates:   Allergen Reactions    Artane      Swelling    Ned-1 Swelling     Oragel caused tongue to swell    Adhesive tape-silicones Rash     Blisters after on for several hours    Latex, natural rubber Hives and Rash     Localized    Morphine Itching     Past Medical History:   Diagnosis Date    Arthritis     Bell's palsy     Bladder incontinence     Blepharospasm     Cervical dystonia     Depression     Hormone deficiency     Myofacial pain syndrome      Past Surgical History:   Procedure Laterality Date    APPENDECTOMY      ARTHROPLASTY-KNEE Right 7/15/2014    [facility]formed by [facility] at [facility] OR    BREAST LUMPECTOMY      COLONOSCOPY N/A 8/16/2012    Performed by Abeba Dowling MD at [facility] ENDO    EYE SURGERY      HYSTERECTOMY      NECK SURGERY      TONSILLECTOMY       Family History   Problem Relation Age of Onset    Diabetes Neg Hx      Social History     Tobacco Use    Smoking status: Never Smoker    Smokeless tobacco: Never Used   Substance Use Topics    Alcohol use: No    Drug use: No     Review of Systems   Constitutional: Negative for chills and fever.   HENT: Positive for congestion. Negative for facial swelling " and trouble swallowing.    Eyes: Negative for discharge and visual disturbance.   Respiratory: Positive for cough. Negative for chest tightness, shortness of breath and wheezing.         Hx COPD     Cardiovascular: Negative for chest pain and palpitations.   Gastrointestinal: Negative for abdominal pain, diarrhea, nausea and vomiting.   Genitourinary: Negative for dysuria and hematuria.   Musculoskeletal: Negative for arthralgias, back pain, joint swelling, myalgias, neck pain and neck stiffness.   Skin: Negative for color change, pallor, rash and wound.   Neurological: Positive for headaches. Negative for dizziness, syncope, facial asymmetry, weakness, light-headedness and numbness.   Hematological: Does not bruise/bleed easily.   Psychiatric/Behavioral: The patient is not nervous/anxious.        Physical Exam     Initial Vitals [11/23/18 2213]   BP Pulse Resp Temp SpO2   (!) 191/89 81 16 98 °F (36.7 °C) 99 %      MAP       --         Physical Exam    Nursing note and vitals reviewed.  Constitutional: She appears well-developed and well-nourished. She is not diaphoretic. No distress.   HENT:   Head: Normocephalic and atraumatic.   Mouth/Throat: Oropharynx is clear and moist.   Eyes: Conjunctivae and EOM are normal. Pupils are equal, round, and reactive to light.   Neck: Normal range of motion. Neck supple.   Cardiovascular: Normal rate, regular rhythm, normal heart sounds and intact distal pulses. Exam reveals no gallop and no friction rub.    No murmur heard.  Pulmonary/Chest: Breath sounds normal. No respiratory distress.   Abdominal: Soft. She exhibits no distension and no mass. There is no tenderness.   Musculoskeletal: Normal range of motion. She exhibits no edema or tenderness.   Lymphadenopathy:     She has no cervical adenopathy.   Neurological: She is alert and oriented to person, place, and time. She has normal strength. No cranial nerve deficit or sensory deficit.   No focal neurological deficits noted.   Cranial nerves III-XII grossly intact.  Equal, rapid alternating movements noted to bilateral upper and lower extremities.      Skin: Skin is warm and dry. No rash and no abscess noted. No erythema.   Psychiatric: She has a normal mood and affect.         ED Course   Procedures  Labs Reviewed - No data to display       Imaging Results    None                APC / Resident Notes:   Patient is well-appearing and blood pressure has improved here in the emergency department.  Low suspicion for acute intracranial process or any end-organ damage at this time and no need for imaging or testing today.  Her headache has improved with Tylenol.  We feel comfortable discharging her home to follow up with her primary care provider for re-evaluation in the next couple of days.  She voices understanding and is agreeable to the plan.  She is given specific return precautions.                 Clinical Impression:   The primary encounter diagnosis was Hypertension, unspecified type. A diagnosis of Nonintractable headache, unspecified chronicity pattern, unspecified headache type was also pertinent to this visit.                             Mily Leyva PA-C  11/24/18 0133

## 2018-11-29 ENCOUNTER — HOSPITAL ENCOUNTER (OUTPATIENT)
Dept: RADIOLOGY | Facility: HOSPITAL | Age: 73
Discharge: HOME | End: 2018-11-29
Attending: INTERNAL MEDICINE
Payer: MEDICARE

## 2018-11-29 DIAGNOSIS — R25.2 CRAMP AND SPASM: ICD-10-CM

## 2018-11-29 DIAGNOSIS — R25.2 SPASM: Primary | ICD-10-CM

## 2018-11-29 DIAGNOSIS — R25.2 CRAMP AND SPASM: Primary | ICD-10-CM

## 2018-11-29 DIAGNOSIS — R25.2 SPASM: ICD-10-CM

## 2018-11-29 PROCEDURE — 72070 X-RAY EXAM THORAC SPINE 2VWS: CPT | Mod: 26,,, | Performed by: RADIOLOGY

## 2018-11-29 PROCEDURE — 72070 X-RAY EXAM THORAC SPINE 2VWS: CPT | Mod: TC,FY

## 2018-11-29 PROCEDURE — 72100 X-RAY EXAM L-S SPINE 2/3 VWS: CPT | Mod: 26,,, | Performed by: RADIOLOGY

## 2018-11-29 PROCEDURE — 72100 X-RAY EXAM L-S SPINE 2/3 VWS: CPT | Mod: TC,FY

## 2018-11-30 DIAGNOSIS — G44.52 NEW DAILY PERSISTENT HEADACHE: Primary | ICD-10-CM

## 2018-12-04 ENCOUNTER — HOSPITAL ENCOUNTER (OUTPATIENT)
Dept: RADIOLOGY | Facility: HOSPITAL | Age: 73
Discharge: HOME | End: 2018-12-04
Attending: INTERNAL MEDICINE
Payer: MEDICARE

## 2018-12-04 DIAGNOSIS — G44.52 NEW DAILY PERSISTENT HEADACHE: ICD-10-CM

## 2018-12-04 PROCEDURE — 72141 MRI NECK SPINE W/O DYE: CPT | Mod: 26,,, | Performed by: RADIOLOGY

## 2018-12-04 PROCEDURE — 70551 MRI BRAIN STEM W/O DYE: CPT | Mod: 26,,, | Performed by: RADIOLOGY

## 2018-12-04 PROCEDURE — 70551 MRI BRAIN STEM W/O DYE: CPT | Mod: TC

## 2018-12-04 PROCEDURE — 72141 MRI NECK SPINE W/O DYE: CPT | Mod: TC

## 2019-01-15 ENCOUNTER — OFFICE VISIT (OUTPATIENT)
Dept: FAMILY MEDICINE | Facility: CLINIC | Age: 74
End: 2019-01-15
Payer: MEDICARE

## 2019-01-15 VITALS
BODY MASS INDEX: 22.34 KG/M2 | OXYGEN SATURATION: 99 % | HEIGHT: 70 IN | WEIGHT: 156.06 LBS | TEMPERATURE: 98 F | SYSTOLIC BLOOD PRESSURE: 133 MMHG | HEART RATE: 87 BPM | DIASTOLIC BLOOD PRESSURE: 75 MMHG

## 2019-01-15 DIAGNOSIS — Z12.39 BREAST CANCER SCREENING: ICD-10-CM

## 2019-01-15 DIAGNOSIS — Z53.29 CARE REFUSED BY PATIENT: Primary | ICD-10-CM

## 2019-01-15 DIAGNOSIS — Z78.0 ASYMPTOMATIC MENOPAUSAL STATE: ICD-10-CM

## 2019-01-15 DIAGNOSIS — Z53.20 REFUSED ASSESSMENT OF PHYSICAL HEALTH: ICD-10-CM

## 2019-01-15 PROCEDURE — 3075F SYST BP GE 130 - 139MM HG: CPT | Mod: CPTII,S$GLB,, | Performed by: NURSE PRACTITIONER

## 2019-01-15 PROCEDURE — 99204 PR OFFICE/OUTPT VISIT, NEW, LEVL IV, 45-59 MIN: ICD-10-PCS | Mod: S$GLB,,, | Performed by: NURSE PRACTITIONER

## 2019-01-15 PROCEDURE — 99204 OFFICE O/P NEW MOD 45 MIN: CPT | Mod: S$GLB,,, | Performed by: NURSE PRACTITIONER

## 2019-01-15 PROCEDURE — 99999 PR PBB SHADOW E&M-EST. PATIENT-LVL III: ICD-10-PCS | Mod: PBBFAC,,, | Performed by: NURSE PRACTITIONER

## 2019-01-15 PROCEDURE — 1101F PR PT FALLS ASSESS DOC 0-1 FALLS W/OUT INJ PAST YR: ICD-10-PCS | Mod: CPTII,S$GLB,, | Performed by: NURSE PRACTITIONER

## 2019-01-15 PROCEDURE — 1101F PT FALLS ASSESS-DOCD LE1/YR: CPT | Mod: CPTII,S$GLB,, | Performed by: NURSE PRACTITIONER

## 2019-01-15 PROCEDURE — 3075F PR MOST RECENT SYSTOLIC BLOOD PRESS GE 130-139MM HG: ICD-10-PCS | Mod: CPTII,S$GLB,, | Performed by: NURSE PRACTITIONER

## 2019-01-15 PROCEDURE — 3078F PR MOST RECENT DIASTOLIC BLOOD PRESSURE < 80 MM HG: ICD-10-PCS | Mod: CPTII,S$GLB,, | Performed by: NURSE PRACTITIONER

## 2019-01-15 PROCEDURE — 3078F DIAST BP <80 MM HG: CPT | Mod: CPTII,S$GLB,, | Performed by: NURSE PRACTITIONER

## 2019-01-15 PROCEDURE — 99999 PR PBB SHADOW E&M-EST. PATIENT-LVL III: CPT | Mod: PBBFAC,,, | Performed by: NURSE PRACTITIONER

## 2019-01-15 NOTE — PROGRESS NOTES
"Subjective:       Patient ID: Marleen Samano is a 73 y.o. female.    Chief Complaint: Annual Exam    Patient is a 74 year old female who presents today for an Annual Exam to establish a new PCP.  She report seeing Dr.Pedro Gomes, with [facility] on [facility], since 2010. Patient presents today with Medical Information documents form [employer].that needed to be completed. She show me the same documents that were filled out by  on 12/12/2018, which showed restrictions( No bending,and no Climbing). Patient verbalizes that she want me to lift those restriction so she can return to her position as a  at [employer]. I informed patient that I could not lift  restriction because this is her first time seeing a [facility] provider. I informed her that I would have to request her medical records from . Patient verbalizes " I don't want him knowing I'm visiting other providers". I spent 30 minutes with patient explaining in detail the medical risk and lability involved, if I lift Dr. Gomes restrictions. Patient verbalizes understanding, She refused physical exam, overdue health manintanance and labs.      Review of Systems   Unable to perform ROS: Other (Patient refused)       Objective:      /75 (BP Location: Right arm, Patient Position: Sitting, BP Method: Medium (Automatic))   Pulse 87   Temp 98.3 °F (36.8 °C) (Oral)   Ht 5' 9.5" (1.765 m)   Wt 70.8 kg (156 lb 1.4 oz)   SpO2 99%   BMI 22.72 kg/m²   Physical Exam    Assessment:       1. Care refused by patient    2. Refused assessment of physical health    3. Breast cancer screening    4. Asymptomatic menopausal state        Plan:       Care refused by patient    Refused assessment of physical health    Breast cancer screening    Asymptomatic menopausal state    Other orders  -     Cancel: DXA Bone Density Spine And Hip; Future; Expected date: 07/15/2019  -     Cancel: Mammo Digital Screening " Bilateral With CAD; Future; Expected date: 07/15/2019  -     Cancel: CBC W/ AUTO DIFFERENTIAL; Future; Expected date: 01/15/2019  -     Cancel: Comprehensive metabolic panel; Future; Expected date: 01/15/2019  -     Cancel: Iron and TIBC; Future; Expected date: 01/15/2019  -     Cancel: Ferritin; Future; Expected date: 01/15/2019  -     Cancel: TSH; Future; Expected date: 01/15/2019      Patient readiness: nonacceptance and barriers:readiness    During the course of the visit the patient was educated and counseled about the following:     Hypertension:   Dietary sodium restriction.  Regular aerobic exercise.    Goals: Obesity: Reduce calorie intake and BMI    Did patient meet goals/outcomes: Yes    The following self management tools provided: declined    Patient Instructions (the written plan) was given to the patient/family.     Time spent with patient: 30 minutes    Barriers to medications present (yes )    Adverse reactions to current medications (no)    Over the counter medications reviewed (Yes)

## 2019-03-19 ENCOUNTER — OFFICE VISIT (OUTPATIENT)
Dept: DERMATOLOGY | Facility: CLINIC | Age: 74
End: 2019-03-19
Payer: MEDICARE

## 2019-03-19 DIAGNOSIS — L73.8 SEBACEOUS HYPERPLASIA OF FACE: ICD-10-CM

## 2019-03-19 DIAGNOSIS — L81.4 SOLAR LENTIGO: ICD-10-CM

## 2019-03-19 DIAGNOSIS — Z12.83 SKIN CANCER SCREENING: ICD-10-CM

## 2019-03-19 DIAGNOSIS — L82.0 INFLAMED SEBORRHEIC KERATOSIS: Primary | ICD-10-CM

## 2019-03-19 DIAGNOSIS — L82.1 SEBORRHEIC KERATOSES: ICD-10-CM

## 2019-03-19 PROCEDURE — 17110 PR DESTRUCTION BENIGN LESIONS UP TO 14: ICD-10-PCS | Mod: S$GLB,,, | Performed by: DERMATOLOGY

## 2019-03-19 PROCEDURE — 99999 PR PBB SHADOW E&M-EST. PATIENT-LVL II: CPT | Mod: PBBFAC,,, | Performed by: DERMATOLOGY

## 2019-03-19 PROCEDURE — 99203 OFFICE O/P NEW LOW 30 MIN: CPT | Mod: 25,S$GLB,, | Performed by: DERMATOLOGY

## 2019-03-19 PROCEDURE — 1100F PTFALLS ASSESS-DOCD GE2>/YR: CPT | Mod: CPTII,S$GLB,, | Performed by: DERMATOLOGY

## 2019-03-19 PROCEDURE — 3288F FALL RISK ASSESSMENT DOCD: CPT | Mod: CPTII,S$GLB,, | Performed by: DERMATOLOGY

## 2019-03-19 PROCEDURE — 99999 PR PBB SHADOW E&M-EST. PATIENT-LVL II: ICD-10-PCS | Mod: PBBFAC,,, | Performed by: DERMATOLOGY

## 2019-03-19 PROCEDURE — 99203 PR OFFICE/OUTPT VISIT, NEW, LEVL III, 30-44 MIN: ICD-10-PCS | Mod: 25,S$GLB,, | Performed by: DERMATOLOGY

## 2019-03-19 PROCEDURE — 17110 DESTRUCTION B9 LES UP TO 14: CPT | Mod: S$GLB,,, | Performed by: DERMATOLOGY

## 2019-03-19 PROCEDURE — 3288F PR FALLS RISK ASSESSMENT DOCUMENTED: ICD-10-PCS | Mod: CPTII,S$GLB,, | Performed by: DERMATOLOGY

## 2019-03-19 PROCEDURE — 1100F PR PT FALLS ASSESS DOC 2+ FALLS/FALL W/INJURY/YR: ICD-10-PCS | Mod: CPTII,S$GLB,, | Performed by: DERMATOLOGY

## 2019-03-19 NOTE — PATIENT INSTRUCTIONS

## 2019-03-19 NOTE — PROGRESS NOTES
Subjective:       Patient ID:  Marleen Samano is a 73 y.o. female who presents for   Chief Complaint   Patient presents with    Spot     Forehead    Skin Check     TBSE     Initial visit with me  Last seen by Dr Santiago 12/2014 with SKs, varicoses (h/o vein stripping, Dr cancino) and other benign lesions  No h/o skin cancer  Requests TBSE for cancer screening          Spot  - Initial  Affected locations: face and scalp  Duration: 1 year  Signs / symptoms: itching (Flaky)  Severity: mild  Timing: constant  Aggravated by: nothing  Relieving factors/Treatments tried: nothing  Improvement on treatment: no relief        Review of Systems   Constitutional: Negative for fever, chills and fatigue.   Skin: Positive for daily sunscreen use, activity-related sunscreen use and wears hat.   Hematologic/Lymphatic: Bruises/bleeds easily.        Objective:    Physical Exam   Constitutional: She appears well-developed and well-nourished.   Eyes: Lids are normal.  No conjunctival no injection.   Cardiovascular: There is dependent edema (trace edema BLE R>L).     Neurological: She is alert and oriented to person, place, and time.   Psychiatric: She has a normal mood and affect.   Skin:   Areas Examined (abnormalities noted in diagram):   Head / Face Inspection Performed  Neck Inspection Performed  Chest / Axilla Inspection Performed  Abdomen Inspection Performed  Genitals / Buttocks / Groin Inspection Performed  Back Inspection Performed  RUE Inspected  LUE Inspection Performed  RLE Inspected  LLE Inspection Performed                       Diagram Legend     Erythematous scaling macule/papule c/w actinic keratosis       Vascular papule c/w angioma      Pigmented verrucoid papule/plaque c/w seborrheic keratosis      Yellow umbilicated papule c/w sebaceous hyperplasia      Irregularly shaped tan macule c/w lentigo     1-2 mm smooth white papules consistent with Milia      Movable subcutaneous cyst with punctum c/w epidermal  inclusion cyst      Subcutaneous movable cyst c/w pilar cyst      Firm pink to brown papule c/w dermatofibroma      Pedunculated fleshy papule(s) c/w skin tag(s)      Evenly pigmented macule c/w junctional nevus     Mildly variegated pigmented, slightly irregular-bordered macule c/w mildly atypical nevus      Flesh colored to evenly pigmented papule c/w intradermal nevus       Pink pearly papule/plaque c/w basal cell carcinoma      Erythematous hyperkeratotic cursted plaque c/w SCC      Surgical scar with no sign of skin cancer recurrence      Open and closed comedones      Inflammatory papules and pustules      Verrucoid papule consistent consistent with wart     Erythematous eczematous patches and plaques     Dystrophic onycholytic nail with subungual debris c/w onychomycosis     Umbilicated papule    Erythematous-base heme-crusted tan verrucoid plaque consistent with inflamed seborrheic keratosis     Erythematous Silvery Scaling Plaque c/w Psoriasis     See annotation      Assessment / Plan:        Inflamed seborrheic keratosis, back, lateral chest, itchy, requests Tx  Cryosurgery procedure note:    Verbal consent from the patient is obtained. Liquid nitrogen cryosurgery is applied to 3 lesions to produce a freeze injury. The patient is aware that blisters may form and is instructed on wound care with gentle cleansing and use of vaseline ointment to keep moist until healed. The patient is supplied a handout on cryosurgery and is instructed to call if lesions do not completely resolve.    Seborrheic keratoses  These are benign inherited growths without a malignant potential. Reassurance given to patient. No treatment is necessary.     Solar lentigo  This is a benign hyperpigmented sun induced lesion. Daily sun protection will reduce the number of new lesions. Treatment of these benign lesions are considered cosmetic.    Sebaceous hyperplasia of face  This is a common condition representing benign enlargement of  the sebaceous lobule. It typically occurs in adulthood. Reassurance given to patient.     Skin cancer screening  Total body skin examination performed today including at least 12 points as noted in physical examination. No lesions suspicious for malignancy noted.    Patient instructed in importance in daily sun protection of at least spf 30. Mineral sunscreen ingredients preferred (Zinc +/- Titanium).   Recommend Elta MD for daily use on face and neck.  Patient encouraged to wear hat for all outdoor exposure.   Also discussed sun avoidance and use of protective clothing.           Follow-up in about 1 year (around 3/19/2020), or if symptoms worsen or fail to improve.

## 2019-03-26 ENCOUNTER — LAB VISIT (OUTPATIENT)
Dept: LAB | Facility: HOSPITAL | Age: 74
End: 2019-03-26
Attending: INTERNAL MEDICINE
Payer: MEDICARE

## 2019-03-26 DIAGNOSIS — G44.52 NEW DAILY PERSISTENT HEADACHE: ICD-10-CM

## 2019-03-26 DIAGNOSIS — R73.9 BLOOD GLUCOSE ELEVATED: Primary | ICD-10-CM

## 2019-03-26 LAB
ALBUMIN SERPL BCP-MCNC: 4 G/DL (ref 3.5–5.2)
ALP SERPL-CCNC: 117 U/L (ref 55–135)
ALT SERPL W/O P-5'-P-CCNC: 11 U/L (ref 10–44)
ANION GAP SERPL CALC-SCNC: 7 MMOL/L (ref 8–16)
AST SERPL-CCNC: 17 U/L (ref 10–40)
BILIRUB SERPL-MCNC: 0.3 MG/DL (ref 0.1–1)
BUN SERPL-MCNC: 20 MG/DL (ref 8–23)
CALCIUM SERPL-MCNC: 9.4 MG/DL (ref 8.7–10.5)
CHLORIDE SERPL-SCNC: 102 MMOL/L (ref 95–110)
CO2 SERPL-SCNC: 29 MMOL/L (ref 23–29)
CREAT SERPL-MCNC: 0.8 MG/DL (ref 0.5–1.4)
EST. GFR  (AFRICAN AMERICAN): >60 ML/MIN/1.73 M^2
EST. GFR  (NON AFRICAN AMERICAN): >60 ML/MIN/1.73 M^2
ESTIMATED AVG GLUCOSE: 131 MG/DL (ref 68–131)
GLUCOSE SERPL-MCNC: 96 MG/DL (ref 70–110)
HBA1C MFR BLD HPLC: 6.2 % (ref 4–5.6)
POTASSIUM SERPL-SCNC: 4.1 MMOL/L (ref 3.5–5.1)
PROT SERPL-MCNC: 6.9 G/DL (ref 6–8.4)
SODIUM SERPL-SCNC: 138 MMOL/L (ref 136–145)

## 2019-03-26 PROCEDURE — 83036 HEMOGLOBIN GLYCOSYLATED A1C: CPT

## 2019-03-26 PROCEDURE — 80053 COMPREHEN METABOLIC PANEL: CPT

## 2019-03-26 PROCEDURE — 36415 COLL VENOUS BLD VENIPUNCTURE: CPT

## 2019-05-07 ENCOUNTER — HOSPITAL ENCOUNTER (EMERGENCY)
Facility: HOSPITAL | Age: 74
Discharge: HOME | End: 2019-05-07
Attending: EMERGENCY MEDICINE
Payer: MEDICARE

## 2019-05-07 VITALS
WEIGHT: 156 LBS | HEIGHT: 69 IN | SYSTOLIC BLOOD PRESSURE: 140 MMHG | OXYGEN SATURATION: 98 % | HEART RATE: 72 BPM | TEMPERATURE: 98 F | RESPIRATION RATE: 18 BRPM | DIASTOLIC BLOOD PRESSURE: 69 MMHG | BODY MASS INDEX: 23.11 KG/M2

## 2019-05-07 DIAGNOSIS — R53.83 FATIGUE: Primary | ICD-10-CM

## 2019-05-07 DIAGNOSIS — R06.02 SOB (SHORTNESS OF BREATH): ICD-10-CM

## 2019-05-07 LAB
ALBUMIN SERPL BCP-MCNC: 4.2 G/DL (ref 3.5–5.2)
ALP SERPL-CCNC: 100 U/L (ref 55–135)
ALT SERPL W/O P-5'-P-CCNC: 14 U/L (ref 10–44)
ANION GAP SERPL CALC-SCNC: 8 MMOL/L (ref 8–16)
AST SERPL-CCNC: 17 U/L (ref 10–40)
BASOPHILS # BLD AUTO: 0 K/UL (ref 0–0.2)
BASOPHILS NFR BLD: 0.2 % (ref 0–1.9)
BILIRUB SERPL-MCNC: 0.3 MG/DL (ref 0.1–1)
BILIRUB UR QL STRIP: NEGATIVE
BUN SERPL-MCNC: 25 MG/DL (ref 8–23)
CALCIUM SERPL-MCNC: 9.6 MG/DL (ref 8.7–10.5)
CHLORIDE SERPL-SCNC: 106 MMOL/L (ref 95–110)
CLARITY UR: CLEAR
CO2 SERPL-SCNC: 27 MMOL/L (ref 23–29)
COLOR UR: YELLOW
CREAT SERPL-MCNC: 0.8 MG/DL (ref 0.5–1.4)
DIFFERENTIAL METHOD: ABNORMAL
EOSINOPHIL # BLD AUTO: 0.1 K/UL (ref 0–0.5)
EOSINOPHIL NFR BLD: 1.2 % (ref 0–8)
ERYTHROCYTE [DISTWIDTH] IN BLOOD BY AUTOMATED COUNT: 18.8 % (ref 11.5–14.5)
EST. GFR  (AFRICAN AMERICAN): >60 ML/MIN/1.73 M^2
EST. GFR  (NON AFRICAN AMERICAN): >60 ML/MIN/1.73 M^2
GLUCOSE SERPL-MCNC: 98 MG/DL (ref 70–110)
GLUCOSE UR QL STRIP: NEGATIVE
HCT VFR BLD AUTO: 37 % (ref 37–48.5)
HGB BLD-MCNC: 11.7 G/DL (ref 12–16)
HGB UR QL STRIP: NEGATIVE
KETONES UR QL STRIP: NEGATIVE
LEUKOCYTE ESTERASE UR QL STRIP: NEGATIVE
LIPASE SERPL-CCNC: 36 U/L (ref 4–60)
LYMPHOCYTES # BLD AUTO: 1 K/UL (ref 1–4.8)
LYMPHOCYTES NFR BLD: 20.3 % (ref 18–48)
MCH RBC QN AUTO: 26 PG (ref 27–31)
MCHC RBC AUTO-ENTMCNC: 31.7 G/DL (ref 32–36)
MCV RBC AUTO: 82 FL (ref 82–98)
MONOCYTES # BLD AUTO: 0.4 K/UL (ref 0.3–1)
MONOCYTES NFR BLD: 9.2 % (ref 4–15)
NEUTROPHILS # BLD AUTO: 3.4 K/UL (ref 1.8–7.7)
NEUTROPHILS NFR BLD: 69.1 % (ref 38–73)
NITRITE UR QL STRIP: NEGATIVE
PH UR STRIP: 6 [PH] (ref 5–8)
PLATELET # BLD AUTO: 223 K/UL (ref 150–350)
PMV BLD AUTO: 7.4 FL (ref 9.2–12.9)
POCT GLUCOSE: 102 MG/DL (ref 70–110)
POTASSIUM SERPL-SCNC: 4 MMOL/L (ref 3.5–5.1)
PROT SERPL-MCNC: 7 G/DL (ref 6–8.4)
PROT UR QL STRIP: NEGATIVE
RBC # BLD AUTO: 4.51 M/UL (ref 4–5.4)
SODIUM SERPL-SCNC: 141 MMOL/L (ref 136–145)
SP GR UR STRIP: >=1.03 (ref 1–1.03)
URN SPEC COLLECT METH UR: ABNORMAL
UROBILINOGEN UR STRIP-ACNC: NEGATIVE EU/DL
WBC # BLD AUTO: 4.9 K/UL (ref 3.9–12.7)

## 2019-05-07 PROCEDURE — 83690 ASSAY OF LIPASE: CPT

## 2019-05-07 PROCEDURE — 85025 COMPLETE CBC W/AUTO DIFF WBC: CPT

## 2019-05-07 PROCEDURE — 81003 URINALYSIS AUTO W/O SCOPE: CPT

## 2019-05-07 PROCEDURE — 36415 COLL VENOUS BLD VENIPUNCTURE: CPT

## 2019-05-07 PROCEDURE — 99284 EMERGENCY DEPT VISIT MOD MDM: CPT | Mod: 25

## 2019-05-07 PROCEDURE — 93005 ELECTROCARDIOGRAM TRACING: CPT

## 2019-05-07 PROCEDURE — 80053 COMPREHEN METABOLIC PANEL: CPT

## 2019-05-07 PROCEDURE — 82962 GLUCOSE BLOOD TEST: CPT

## 2019-05-08 ENCOUNTER — LAB VISIT (OUTPATIENT)
Dept: LAB | Facility: HOSPITAL | Age: 74
End: 2019-05-08
Attending: INTERNAL MEDICINE
Payer: MEDICARE

## 2019-05-08 DIAGNOSIS — I10 ESSENTIAL HYPERTENSION, MALIGNANT: ICD-10-CM

## 2019-05-08 DIAGNOSIS — G24.8 DYSTONIA LENTICULARIS: Primary | ICD-10-CM

## 2019-05-08 LAB
ALBUMIN SERPL BCP-MCNC: 4.1 G/DL (ref 3.5–5.2)
ALP SERPL-CCNC: 99 U/L (ref 55–135)
ALT SERPL W/O P-5'-P-CCNC: 12 U/L (ref 10–44)
ANION GAP SERPL CALC-SCNC: 6 MMOL/L (ref 8–16)
AST SERPL-CCNC: 16 U/L (ref 10–40)
BASOPHILS # BLD AUTO: 0 K/UL (ref 0–0.2)
BASOPHILS NFR BLD: 0.2 % (ref 0–1.9)
BILIRUB SERPL-MCNC: 0.3 MG/DL (ref 0.1–1)
BUN SERPL-MCNC: 23 MG/DL (ref 8–23)
CALCIUM SERPL-MCNC: 9.2 MG/DL (ref 8.7–10.5)
CHLORIDE SERPL-SCNC: 107 MMOL/L (ref 95–110)
CO2 SERPL-SCNC: 29 MMOL/L (ref 23–29)
CREAT SERPL-MCNC: 0.8 MG/DL (ref 0.5–1.4)
DIFFERENTIAL METHOD: ABNORMAL
EOSINOPHIL # BLD AUTO: 0.1 K/UL (ref 0–0.5)
EOSINOPHIL NFR BLD: 2.2 % (ref 0–8)
ERYTHROCYTE [DISTWIDTH] IN BLOOD BY AUTOMATED COUNT: 19.1 % (ref 11.5–14.5)
EST. GFR  (AFRICAN AMERICAN): >60 ML/MIN/1.73 M^2
EST. GFR  (NON AFRICAN AMERICAN): >60 ML/MIN/1.73 M^2
GLUCOSE SERPL-MCNC: 108 MG/DL (ref 70–110)
GLUCOSE SERPL-MCNC: 109 MG/DL (ref 70–110)
GLUCOSE SERPL-MCNC: 193 MG/DL
GLUCOSE SERPL-MCNC: 83 MG/DL
GLUCOSE SERPL-MCNC: 85 MG/DL
HCT VFR BLD AUTO: 36 % (ref 37–48.5)
HGB BLD-MCNC: 11.4 G/DL (ref 12–16)
LYMPHOCYTES # BLD AUTO: 1 K/UL (ref 1–4.8)
LYMPHOCYTES NFR BLD: 23.7 % (ref 18–48)
MAGNESIUM SERPL-MCNC: 2.3 MG/DL (ref 1.6–2.6)
MCH RBC QN AUTO: 26 PG (ref 27–31)
MCHC RBC AUTO-ENTMCNC: 31.8 G/DL (ref 32–36)
MCV RBC AUTO: 82 FL (ref 82–98)
MONOCYTES # BLD AUTO: 0.4 K/UL (ref 0.3–1)
MONOCYTES NFR BLD: 8.8 % (ref 4–15)
NEUTROPHILS # BLD AUTO: 2.8 K/UL (ref 1.8–7.7)
NEUTROPHILS NFR BLD: 65.1 % (ref 38–73)
PLATELET # BLD AUTO: 210 K/UL (ref 150–350)
PMV BLD AUTO: 7.9 FL (ref 9.2–12.9)
POTASSIUM SERPL-SCNC: 4 MMOL/L (ref 3.5–5.1)
PROT SERPL-MCNC: 6.7 G/DL (ref 6–8.4)
RBC # BLD AUTO: 4.4 M/UL (ref 4–5.4)
SODIUM SERPL-SCNC: 142 MMOL/L (ref 136–145)
WBC # BLD AUTO: 4.4 K/UL (ref 3.9–12.7)

## 2019-05-08 PROCEDURE — 85025 COMPLETE CBC W/AUTO DIFF WBC: CPT

## 2019-05-08 PROCEDURE — 80053 COMPREHEN METABOLIC PANEL: CPT

## 2019-05-08 PROCEDURE — 83735 ASSAY OF MAGNESIUM: CPT

## 2019-05-08 PROCEDURE — 36415 COLL VENOUS BLD VENIPUNCTURE: CPT

## 2019-05-08 PROCEDURE — 82952 GTT-ADDED SAMPLES: CPT

## 2019-05-08 PROCEDURE — 82951 GLUCOSE TOLERANCE TEST (GTT): CPT

## 2019-05-14 ENCOUNTER — LAB VISIT (OUTPATIENT)
Dept: LAB | Facility: HOSPITAL | Age: 74
End: 2019-05-14
Attending: INTERNAL MEDICINE
Payer: MEDICARE

## 2019-05-14 DIAGNOSIS — R53.83 FATIGUE: Primary | ICD-10-CM

## 2019-05-14 LAB
ERYTHROCYTE [SEDIMENTATION RATE] IN BLOOD BY WESTERGREN METHOD: 5 MM/HR (ref 0–20)
FERRITIN SERPL-MCNC: 26 NG/ML (ref 20–300)
FOLATE SERPL-MCNC: 13.5 NG/ML (ref 4–24)
T4 FREE SERPL-MCNC: 0.83 NG/DL (ref 0.71–1.51)
TSH SERPL DL<=0.005 MIU/L-ACNC: 1.15 UIU/ML (ref 0.4–4)
VIT B12 SERPL-MCNC: >2000 PG/ML (ref 210–950)

## 2019-05-14 PROCEDURE — 85651 RBC SED RATE NONAUTOMATED: CPT

## 2019-05-14 PROCEDURE — 84439 ASSAY OF FREE THYROXINE: CPT

## 2019-05-14 PROCEDURE — 84443 ASSAY THYROID STIM HORMONE: CPT

## 2019-05-14 PROCEDURE — 82728 ASSAY OF FERRITIN: CPT

## 2019-05-14 PROCEDURE — 36415 COLL VENOUS BLD VENIPUNCTURE: CPT

## 2019-05-14 PROCEDURE — 86376 MICROSOMAL ANTIBODY EACH: CPT

## 2019-05-14 PROCEDURE — 84481 FREE ASSAY (FT-3): CPT

## 2019-05-14 PROCEDURE — 82607 VITAMIN B-12: CPT

## 2019-05-14 PROCEDURE — 83540 ASSAY OF IRON: CPT

## 2019-05-14 PROCEDURE — 82746 ASSAY OF FOLIC ACID SERUM: CPT

## 2019-05-15 LAB
IRON SERPL-MCNC: 59 UG/DL (ref 30–160)
SATURATED IRON: 13 % (ref 20–50)
T3FREE SERPL-MCNC: 2.3 PG/ML (ref 2.3–4.2)
THYROPEROXIDASE IGG SERPL-ACNC: <6 IU/ML
TOTAL IRON BINDING CAPACITY: 457 UG/DL (ref 250–450)
TRANSFERRIN SERPL-MCNC: 309 MG/DL (ref 200–375)

## 2019-05-29 ENCOUNTER — TELEPHONE (OUTPATIENT)
Dept: PULMONOLOGY | Facility: CLINIC | Age: 74
End: 2019-05-29

## 2019-05-29 NOTE — TELEPHONE ENCOUNTER
Patient is in need of a mask for the nebulizer . She states that  prescribed her a nebulizer 1 yr ago mariella neb tube and a nebulizer . Frederic driver wants us to send a order or prescription to [facility] on [address] for a cru-a adult mask .

## 2019-06-03 ENCOUNTER — TELEPHONE (OUTPATIENT)
Dept: PLASTIC SURGERY | Facility: CLINIC | Age: 74
End: 2019-06-03

## 2019-06-12 ENCOUNTER — TELEPHONE (OUTPATIENT)
Dept: PULMONOLOGY | Facility: CLINIC | Age: 74
End: 2019-06-12

## 2019-07-09 ENCOUNTER — OFFICE VISIT (OUTPATIENT)
Dept: PULMONOLOGY | Facility: CLINIC | Age: 74
End: 2019-07-09
Payer: MEDICARE

## 2019-07-09 ENCOUNTER — TELEPHONE (OUTPATIENT)
Dept: PULMONOLOGY | Facility: CLINIC | Age: 74
End: 2019-07-09

## 2019-07-09 VITALS
HEIGHT: 69 IN | BODY MASS INDEX: 25.22 KG/M2 | HEART RATE: 84 BPM | SYSTOLIC BLOOD PRESSURE: 150 MMHG | OXYGEN SATURATION: 98 % | DIASTOLIC BLOOD PRESSURE: 73 MMHG | WEIGHT: 170.31 LBS

## 2019-07-09 DIAGNOSIS — J45.30 MILD PERSISTENT ASTHMA WITHOUT COMPLICATION: Primary | ICD-10-CM

## 2019-07-09 DIAGNOSIS — J41.1 BRONCHITIS, CHRONIC, MUCOPURULENT: ICD-10-CM

## 2019-07-09 DIAGNOSIS — J47.9 BRONCHIECTASIS WITHOUT COMPLICATION: ICD-10-CM

## 2019-07-09 PROCEDURE — 3288F FALL RISK ASSESSMENT DOCD: CPT | Mod: CPTII,S$GLB,, | Performed by: INTERNAL MEDICINE

## 2019-07-09 PROCEDURE — 3078F PR MOST RECENT DIASTOLIC BLOOD PRESSURE < 80 MM HG: ICD-10-PCS | Mod: CPTII,S$GLB,, | Performed by: INTERNAL MEDICINE

## 2019-07-09 PROCEDURE — 1100F PR PT FALLS ASSESS DOC 2+ FALLS/FALL W/INJURY/YR: ICD-10-PCS | Mod: CPTII,S$GLB,, | Performed by: INTERNAL MEDICINE

## 2019-07-09 PROCEDURE — 3078F DIAST BP <80 MM HG: CPT | Mod: CPTII,S$GLB,, | Performed by: INTERNAL MEDICINE

## 2019-07-09 PROCEDURE — 99999 PR PBB SHADOW E&M-EST. PATIENT-LVL IV: ICD-10-PCS | Mod: PBBFAC,,, | Performed by: INTERNAL MEDICINE

## 2019-07-09 PROCEDURE — 3077F SYST BP >= 140 MM HG: CPT | Mod: CPTII,S$GLB,, | Performed by: INTERNAL MEDICINE

## 2019-07-09 PROCEDURE — 3288F PR FALLS RISK ASSESSMENT DOCUMENTED: ICD-10-PCS | Mod: CPTII,S$GLB,, | Performed by: INTERNAL MEDICINE

## 2019-07-09 PROCEDURE — 3077F PR MOST RECENT SYSTOLIC BLOOD PRESSURE >= 140 MM HG: ICD-10-PCS | Mod: CPTII,S$GLB,, | Performed by: INTERNAL MEDICINE

## 2019-07-09 PROCEDURE — 99205 PR OFFICE/OUTPT VISIT, NEW, LEVL V, 60-74 MIN: ICD-10-PCS | Mod: S$GLB,,, | Performed by: INTERNAL MEDICINE

## 2019-07-09 PROCEDURE — 99205 OFFICE O/P NEW HI 60 MIN: CPT | Mod: S$GLB,,, | Performed by: INTERNAL MEDICINE

## 2019-07-09 PROCEDURE — 1100F PTFALLS ASSESS-DOCD GE2>/YR: CPT | Mod: CPTII,S$GLB,, | Performed by: INTERNAL MEDICINE

## 2019-07-09 PROCEDURE — 99999 PR PBB SHADOW E&M-EST. PATIENT-LVL IV: CPT | Mod: PBBFAC,,, | Performed by: INTERNAL MEDICINE

## 2019-07-09 RX ORDER — MONTELUKAST SODIUM 10 MG/1
10 TABLET ORAL NIGHTLY
Qty: 30 TABLET | Refills: 11 | Status: SHIPPED | OUTPATIENT
Start: 2019-07-09 | End: 2019-10-08 | Stop reason: SDUPTHER

## 2019-07-09 RX ORDER — AMLODIPINE BESYLATE 2.5 MG/1
TABLET ORAL NIGHTLY
Refills: 4 | COMMUNITY
Start: 2019-05-14 | End: 2021-05-17 | Stop reason: SDUPTHER

## 2019-07-09 RX ORDER — FLUTICASONE FUROATE AND VILANTEROL 100; 25 UG/1; UG/1
1 POWDER RESPIRATORY (INHALATION) DAILY
Qty: 60 EACH | Refills: 11 | Status: SHIPPED | OUTPATIENT
Start: 2019-07-09 | End: 2019-10-08

## 2019-07-09 RX ORDER — AMOXICILLIN AND CLAVULANATE POTASSIUM 875; 125 MG/1; MG/1
1 TABLET, FILM COATED ORAL 2 TIMES DAILY
Qty: 28 TABLET | Refills: 3 | Status: SHIPPED | OUTPATIENT
Start: 2019-07-09 | End: 2020-02-07 | Stop reason: CLARIF

## 2019-07-09 RX ORDER — ALBUTEROL SULFATE 90 UG/1
1-2 AEROSOL, METERED RESPIRATORY (INHALATION) EVERY 6 HOURS PRN
Qty: 1 INHALER | Refills: 0 | Status: SHIPPED | OUTPATIENT
Start: 2019-07-09 | End: 2019-10-08 | Stop reason: SDUPTHER

## 2019-07-09 RX ORDER — PREDNISONE 20 MG/1
TABLET ORAL
Qty: 12 TABLET | Refills: 0 | Status: SHIPPED | OUTPATIENT
Start: 2019-07-09 | End: 2020-02-07 | Stop reason: CLARIF

## 2019-07-09 NOTE — PATIENT INSTRUCTIONS
"Ct chest April 2018 suggest/shows mild bronchiectasis- causes recurrent pneumonia and bronchitis.  Need for albuterol suggest mild asthma - tendency.    Trial preventive therapy for asthma- breo daily, singulair daily-- note if more stable    Use albuterol as needed.    Take augmentin twice daily for 7 to 14 days for flare up  Take prednisone if cough/wheezes    Culture yg mucous if sick    Try to turn in culture for afb if easy/able.    Check immune system - you had pneumonia vaccines in past.      "woody nose" or face mask or "respirator" - may be used.  Hard to recommend?  Preventive therapy may work to keep stable.   "

## 2019-07-09 NOTE — TELEPHONE ENCOUNTER
Gave verbal for 20mg change to 10 mg prednisone to kevin   ----- Message from Blanca Ames sent at 7/9/2019 10:57 AM CDT -----  Contact: [facility]   Call placed to Pod     Pharmacy need to speak to nurse regarding medication predniSONE (DELTASONE) 20 MG tablet     Pharmacy will like to substitute mg from 20mg to 10mg on medication predniSONE (DELTASONE) 20 MG tablet      due to national back order per Bill at [facility]         [facility] [address] [place] [id number] [facility]  [address] [facility] [address]  Phone: [phone] Fax: [phone]
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
## 2019-07-23 ENCOUNTER — TELEPHONE (OUTPATIENT)
Dept: PULMONOLOGY | Facility: CLINIC | Age: 74
End: 2019-07-23

## 2019-07-23 ENCOUNTER — LAB VISIT (OUTPATIENT)
Dept: LAB | Facility: HOSPITAL | Age: 74
End: 2019-07-23
Attending: INTERNAL MEDICINE
Payer: MEDICARE

## 2019-07-23 DIAGNOSIS — J47.9 BRONCHIECTASIS WITHOUT COMPLICATION: ICD-10-CM

## 2019-07-23 DIAGNOSIS — J45.30 MILD PERSISTENT ASTHMA WITHOUT COMPLICATION: ICD-10-CM

## 2019-07-23 LAB
IGE SERPL-ACNC: <35 IU/ML (ref 0–100)
IGG SERPL-MCNC: 860 MG/DL (ref 650–1600)
IGM SERPL-MCNC: 97 MG/DL (ref 50–300)

## 2019-07-23 PROCEDURE — 82785 ASSAY OF IGE: CPT

## 2019-07-23 PROCEDURE — 82784 ASSAY IGA/IGD/IGG/IGM EACH: CPT

## 2019-07-23 PROCEDURE — 86774 TETANUS ANTIBODY: CPT

## 2019-07-23 PROCEDURE — 36415 COLL VENOUS BLD VENIPUNCTURE: CPT

## 2019-07-23 PROCEDURE — 82784 ASSAY IGA/IGD/IGG/IGM EACH: CPT | Mod: 59

## 2019-07-23 NOTE — TELEPHONE ENCOUNTER
Called pt several times and it goes straight to voice mail.   ----- Message from Lima Aiken sent at 7/23/2019  9:05 AM CDT -----    Pt  Is calling to  Speak to the nurse about   Picking  Up  A set  Of  Orders // pt  Needs a  Additional  Copy // would  Like to  Pickup today //[phone]

## 2019-07-24 ENCOUNTER — PATIENT MESSAGE (OUTPATIENT)
Dept: PULMONOLOGY | Facility: CLINIC | Age: 74
End: 2019-07-24

## 2019-08-01 ENCOUNTER — PATIENT MESSAGE (OUTPATIENT)
Dept: PULMONOLOGY | Facility: CLINIC | Age: 74
End: 2019-08-01

## 2019-08-02 LAB
C DIPHTHERIAE AB SER IA-ACNC: 0.06 IU/ML
C TETANI AB SER-ACNC: 3.85 IU/ML
DEPRECATED S PNEUM 1 IGG SER-MCNC: 1.9 MCG/ML
DEPRECATED S PNEUM12 IGG SER-MCNC: 23.7 MCG/ML
DEPRECATED S PNEUM14 IGG SER-MCNC: 57.8 MCG/ML
DEPRECATED S PNEUM19 IGG SER-MCNC: 120.3 MCG/ML
DEPRECATED S PNEUM23 IGG SER-MCNC: 0.6 MCG/ML
DEPRECATED S PNEUM3 IGG SER-MCNC: 6.4 MCG/ML
DEPRECATED S PNEUM4 IGG SER-MCNC: <0.3 MCG/ML
DEPRECATED S PNEUM5 IGG SER-MCNC: 1.2 MCG/ML
DEPRECATED S PNEUM8 IGG SER-MCNC: 2.8 MCG/ML
DEPRECATED S PNEUM9 IGG SER-MCNC: 1.4 MCG/ML
HAEM INFLU B IGG SER-MCNC: 0.73 MCG/ML
S PNEUM DA 18C IGG SER-MCNC: 5.9 MCG/ML
S PNEUM DA 6B IGG SER-MCNC: 0.8 MCG/ML
S PNEUM DA 7F IGG SER-MCNC: 53.9 MCG/ML
S PNEUM DA 9V IGG SER-MCNC: <0.3 MCG/ML

## 2019-08-07 ENCOUNTER — PATIENT MESSAGE (OUTPATIENT)
Dept: PULMONOLOGY | Facility: CLINIC | Age: 74
End: 2019-08-07

## 2019-08-13 ENCOUNTER — HOSPITAL ENCOUNTER (OUTPATIENT)
Dept: RESPIRATORY THERAPY | Facility: HOSPITAL | Age: 74
Discharge: HOME | End: 2019-08-13
Attending: INTERNAL MEDICINE
Payer: MEDICARE

## 2019-08-13 DIAGNOSIS — J47.9 BRONCHIECTASIS WITHOUT COMPLICATION: ICD-10-CM

## 2019-08-13 DIAGNOSIS — J45.30 MILD PERSISTENT ASTHMA WITHOUT COMPLICATION: ICD-10-CM

## 2019-08-13 LAB
BR6MWT: NORMAL
BRPFT: ABNORMAL
DLCO ADJ PRE: 15.4 ML/(MIN*MMHG) (ref 18–29.47)
DLCO SINGLE BREATH LLN: 18
DLCO SINGLE BREATH PRE REF: 64.9 %
DLCO SINGLE BREATH REF: 23.74
DLCOC SBVA LLN: 2.9
DLCOC SBVA PRE REF: 80 %
DLCOC SBVA REF: 4.12
DLCOC SINGLE BREATH LLN: 18
DLCOC SINGLE BREATH PRE REF: 64.9 %
DLCOC SINGLE BREATH REF: 23.74
DLCOVA LLN: 2.9
DLCOVA PRE REF: 80 %
DLCOVA PRE: 3.3 ML/(MIN*MMHG*L) (ref 2.9–5.34)
DLCOVA REF: 4.12
DLVAADJ PRE: 3.3 ML/(MIN*MMHG*L) (ref 2.9–5.34)
ERVN2 LLN: 0.64
ERVN2 PRE REF: 109.1 %
ERVN2 PRE: 0.7 L (ref 0.64–0.64)
ERVN2 REF: 0.64
FEF 25 75 CHG: -20 %
FEF 25 75 LLN: 0.84
FEF 25 75 POST REF: 75.8 %
FEF 25 75 PRE REF: 94.7 %
FEF 25 75 REF: 1.9
FET100 CHG: 43.4 %
FEV1 CHG: -9.3 %
FEV1 FVC CHG: -2.5 %
FEV1 FVC LLN: 63
FEV1 FVC POST REF: 93.7 %
FEV1 FVC PRE REF: 96.1 %
FEV1 FVC REF: 77
FEV1 LLN: 1.73
FEV1 POST REF: 86 %
FEV1 PRE REF: 94.9 %
FEV1 REF: 2.43
FRCN2 LLN: 2.17
FRCN2 PRE REF: 79.7 %
FRCN2 REF: 2.99
FVC CHG: -7 %
FVC LLN: 2.29
FVC POST REF: 90.7 %
FVC PRE REF: 97.5 %
FVC REF: 3.19
IVC PRE: 2.67 L (ref 2.29–4.1)
IVC SINGLE BREATH LLN: 2.29
IVC SINGLE BREATH PRE REF: 83.6 %
IVC SINGLE BREATH REF: 3.19
MVV LLN: 84
MVV PRE REF: 64 %
MVV REF: 99
PEF CHG: -14 %
PEF LLN: 4.13
PEF POST REF: 89.1 %
PEF PRE REF: 103.5 %
PEF REF: 6.11
POST FEF 25 75: 1.44 L/S (ref 0.84–2.96)
POST FET 100: 9.89 SEC
POST FEV1 FVC: 72.14 % (ref 63.15–90.77)
POST FEV1: 2.09 L (ref 1.73–3.12)
POST FVC: 2.89 L (ref 2.29–4.1)
POST PEF: 5.44 L/S (ref 4.13–8.09)
PRE DLCO: 15.4 ML/(MIN*MMHG) (ref 18–29.47)
PRE FEF 25 75: 1.8 L/S (ref 0.84–2.96)
PRE FET 100: 6.9 SEC
PRE FEV1 FVC: 74 % (ref 63.15–90.77)
PRE FEV1: 2.3 L (ref 1.73–3.12)
PRE FRC N2: 2.38 L
PRE FVC: 3.11 L (ref 2.29–4.1)
PRE MVV: 63 L/MIN (ref 83.72–113.28)
PRE PEF: 6.33 L/S (ref 4.13–8.09)
RVN2 LLN: 1.78
RVN2 PRE REF: 66.4 %
RVN2 PRE: 1.56 L (ref 1.78–2.93)
RVN2 REF: 2.35
RVN2TLCN2 LLN: 34.53
RVN2TLCN2 PRE REF: 75.8 %
RVN2TLCN2 PRE: 33.43 % (ref 34.53–53.71)
RVN2TLCN2 REF: 44.12
TLCN2 LLN: 4.77
TLCN2 PRE REF: 81.1 %
TLCN2 PRE: 4.67 L (ref 4.77–6.75)
TLCN2 REF: 5.76
VA PRE: 4.67 L (ref 5.61–5.61)
VA SINGLE BREATH LLN: 5.61
VA SINGLE BREATH PRE REF: 83.3 %
VA SINGLE BREATH REF: 5.61
VCMAXN2 LLN: 2.29
VCMAXN2 PRE REF: 97.5 %
VCMAXN2 PRE: 3.11 L (ref 2.29–4.1)
VCMAXN2 REF: 3.19

## 2019-08-13 PROCEDURE — 94060 PR EVAL OF BRONCHOSPASM: ICD-10-PCS | Mod: 26,59,, | Performed by: INTERNAL MEDICINE

## 2019-08-13 PROCEDURE — 94618 PULMONARY STRESS TESTING: CPT

## 2019-08-13 PROCEDURE — 94060 EVALUATION OF WHEEZING: CPT | Mod: 26,59,, | Performed by: INTERNAL MEDICINE

## 2019-08-13 PROCEDURE — 94727 GAS DIL/WSHOT DETER LNG VOL: CPT | Mod: 26,,, | Performed by: INTERNAL MEDICINE

## 2019-08-13 PROCEDURE — 94060 EVALUATION OF WHEEZING: CPT

## 2019-08-13 PROCEDURE — 94729 DIFFUSING CAPACITY: CPT | Mod: 26,,, | Performed by: INTERNAL MEDICINE

## 2019-08-13 PROCEDURE — 94729 DIFFUSING CAPACITY: CPT

## 2019-08-13 PROCEDURE — 94618 PULMONARY STRESS TESTING: CPT | Mod: 26,,, | Performed by: INTERNAL MEDICINE

## 2019-08-13 PROCEDURE — 94727 GAS DIL/WSHOT DETER LNG VOL: CPT

## 2019-08-13 PROCEDURE — 94729 PR C02/MEMBANE DIFFUSE CAPACITY: ICD-10-PCS | Mod: 26,,, | Performed by: INTERNAL MEDICINE

## 2019-08-13 PROCEDURE — 94727 PR PULM FUNCTION TEST BY GAS: ICD-10-PCS | Mod: 26,,, | Performed by: INTERNAL MEDICINE

## 2019-08-13 PROCEDURE — 94618 PULMONARY STRESS TESTING: ICD-10-PCS | Mod: 26,,, | Performed by: INTERNAL MEDICINE

## 2019-08-14 ENCOUNTER — PATIENT MESSAGE (OUTPATIENT)
Dept: PULMONOLOGY | Facility: CLINIC | Age: 74
End: 2019-08-14

## 2019-08-16 ENCOUNTER — OFFICE VISIT (OUTPATIENT)
Dept: PLASTIC SURGERY | Facility: CLINIC | Age: 74
End: 2019-08-16
Payer: MEDICARE

## 2019-08-16 VITALS
BODY MASS INDEX: 25.17 KG/M2 | HEART RATE: 70 BPM | SYSTOLIC BLOOD PRESSURE: 117 MMHG | DIASTOLIC BLOOD PRESSURE: 71 MMHG | WEIGHT: 170.44 LBS

## 2019-08-16 DIAGNOSIS — G24.5 BLEPHAROSPASM: Primary | ICD-10-CM

## 2019-08-16 PROCEDURE — 3078F PR MOST RECENT DIASTOLIC BLOOD PRESSURE < 80 MM HG: ICD-10-PCS | Mod: CPTII,S$GLB,, | Performed by: SURGERY

## 2019-08-16 PROCEDURE — 3074F PR MOST RECENT SYSTOLIC BLOOD PRESSURE < 130 MM HG: ICD-10-PCS | Mod: CPTII,S$GLB,, | Performed by: SURGERY

## 2019-08-16 PROCEDURE — 1101F PT FALLS ASSESS-DOCD LE1/YR: CPT | Mod: CPTII,S$GLB,, | Performed by: SURGERY

## 2019-08-16 PROCEDURE — 99999 PR PBB SHADOW E&M-EST. PATIENT-LVL III: CPT | Mod: PBBFAC,,, | Performed by: SURGERY

## 2019-08-16 PROCEDURE — 99202 PR OFFICE/OUTPT VISIT, NEW, LEVL II, 15-29 MIN: ICD-10-PCS | Mod: S$GLB,,, | Performed by: SURGERY

## 2019-08-16 PROCEDURE — 3074F SYST BP LT 130 MM HG: CPT | Mod: CPTII,S$GLB,, | Performed by: SURGERY

## 2019-08-16 PROCEDURE — 3078F DIAST BP <80 MM HG: CPT | Mod: CPTII,S$GLB,, | Performed by: SURGERY

## 2019-08-16 PROCEDURE — 99202 OFFICE O/P NEW SF 15 MIN: CPT | Mod: S$GLB,,, | Performed by: SURGERY

## 2019-08-16 PROCEDURE — 1101F PR PT FALLS ASSESS DOC 0-1 FALLS W/OUT INJ PAST YR: ICD-10-PCS | Mod: CPTII,S$GLB,, | Performed by: SURGERY

## 2019-08-16 PROCEDURE — 99999 PR PBB SHADOW E&M-EST. PATIENT-LVL III: ICD-10-PCS | Mod: PBBFAC,,, | Performed by: SURGERY

## 2019-08-16 NOTE — PROGRESS NOTES
[facility] with [facility] history of chronic   blepharospasm.  She states she has been treated by her neurologist with   injections of Botox for many years.  She presents here upon referral for a   possible upper lid blepharoplasty.  She states that the excess skin on her upper   eyelids causes an issue when she has severe blepharospasm makes it difficult   for her to open her eyes.  Physical examination shows she has a fair amount of   excess upper eyelid skin bilaterally.  She has a lack of supratarsal crease.    She will need a complete evaluation by her ophthalmologist prior to any upper   lid blepharoplasty.  She will need visual field examination to see if she has   lost vision secondary to the excess skin in the lateral quadrant.  We will await   the results of these tests and schedule her for an upper lid blepharoplasty.      ELY/JAME  dd: 08/16/2019 11:14:45 (CDT)  td: 08/17/2019 11:17:10 (CDT)  Doc ID   #[id number]  Job ID #[id number]    CC:

## 2019-08-23 DIAGNOSIS — G24.5 BLEPHAROSPASM: Primary | ICD-10-CM

## 2019-09-08 ENCOUNTER — HOSPITAL ENCOUNTER (EMERGENCY)
Facility: HOSPITAL | Age: 74
Discharge: HOME | End: 2019-09-08
Attending: EMERGENCY MEDICINE
Payer: MEDICARE

## 2019-09-08 VITALS
WEIGHT: 170.44 LBS | RESPIRATION RATE: 14 BRPM | BODY MASS INDEX: 25.24 KG/M2 | OXYGEN SATURATION: 98 % | HEART RATE: 82 BPM | DIASTOLIC BLOOD PRESSURE: 62 MMHG | TEMPERATURE: 99 F | HEIGHT: 69 IN | SYSTOLIC BLOOD PRESSURE: 127 MMHG

## 2019-09-08 DIAGNOSIS — E86.0 DEHYDRATION: ICD-10-CM

## 2019-09-08 DIAGNOSIS — R19.7 DIARRHEA, UNSPECIFIED TYPE: Primary | ICD-10-CM

## 2019-09-08 LAB
ALBUMIN SERPL BCP-MCNC: 4.3 G/DL (ref 3.5–5.2)
ALP SERPL-CCNC: 103 U/L (ref 55–135)
ALT SERPL W/O P-5'-P-CCNC: 17 U/L (ref 10–44)
ANION GAP SERPL CALC-SCNC: 12 MMOL/L (ref 8–16)
AST SERPL-CCNC: 22 U/L (ref 10–40)
BASOPHILS # BLD AUTO: 0.01 K/UL (ref 0–0.2)
BASOPHILS NFR BLD: 0.1 % (ref 0–1.9)
BILIRUB SERPL-MCNC: 0.4 MG/DL (ref 0.1–1)
BUN SERPL-MCNC: 54 MG/DL (ref 8–23)
CALCIUM SERPL-MCNC: 9.2 MG/DL (ref 8.7–10.5)
CHLORIDE SERPL-SCNC: 105 MMOL/L (ref 95–110)
CK SERPL-CCNC: 332 U/L (ref 20–180)
CO2 SERPL-SCNC: 23 MMOL/L (ref 23–29)
CREAT SERPL-MCNC: 1.2 MG/DL (ref 0.5–1.4)
DIFFERENTIAL METHOD: ABNORMAL
EOSINOPHIL # BLD AUTO: 0 K/UL (ref 0–0.5)
EOSINOPHIL NFR BLD: 0.3 % (ref 0–8)
ERYTHROCYTE [DISTWIDTH] IN BLOOD BY AUTOMATED COUNT: 13.5 % (ref 11.5–14.5)
EST. GFR  (AFRICAN AMERICAN): 51 ML/MIN/1.73 M^2
EST. GFR  (NON AFRICAN AMERICAN): 45 ML/MIN/1.73 M^2
GLUCOSE SERPL-MCNC: 124 MG/DL (ref 70–110)
HCT VFR BLD AUTO: 34.3 % (ref 37–48.5)
HGB BLD-MCNC: 10.8 G/DL (ref 12–16)
IMM GRANULOCYTES # BLD AUTO: 0.02 K/UL (ref 0–0.04)
LYMPHOCYTES # BLD AUTO: 0.9 K/UL (ref 1–4.8)
LYMPHOCYTES NFR BLD: 8.3 % (ref 18–48)
MCH RBC QN AUTO: 28.3 PG (ref 27–31)
MCHC RBC AUTO-ENTMCNC: 31.5 G/DL (ref 32–36)
MCV RBC AUTO: 90 FL (ref 82–98)
MONOCYTES # BLD AUTO: 1.3 K/UL (ref 0.3–1)
MONOCYTES NFR BLD: 11.9 % (ref 4–15)
NEUTROPHILS # BLD AUTO: 8.3 K/UL (ref 1.8–7.7)
NEUTROPHILS NFR BLD: 79.2 % (ref 38–73)
NRBC BLD-RTO: 0 /100 WBC
PLATELET # BLD AUTO: 196 K/UL (ref 150–350)
PMV BLD AUTO: 10 FL (ref 9.2–12.9)
POTASSIUM SERPL-SCNC: 4.4 MMOL/L (ref 3.5–5.1)
PROT SERPL-MCNC: 7.1 G/DL (ref 6–8.4)
RBC # BLD AUTO: 3.81 M/UL (ref 4–5.4)
SODIUM SERPL-SCNC: 140 MMOL/L (ref 136–145)
WBC # BLD AUTO: 10.51 K/UL (ref 3.9–12.7)

## 2019-09-08 PROCEDURE — 80053 COMPREHEN METABOLIC PANEL: CPT

## 2019-09-08 PROCEDURE — 63600175 PHARM REV CODE 636 W HCPCS: Performed by: EMERGENCY MEDICINE

## 2019-09-08 PROCEDURE — 25000003 PHARM REV CODE 250: Performed by: EMERGENCY MEDICINE

## 2019-09-08 PROCEDURE — 82550 ASSAY OF CK (CPK): CPT

## 2019-09-08 PROCEDURE — 96360 HYDRATION IV INFUSION INIT: CPT

## 2019-09-08 PROCEDURE — 85025 COMPLETE CBC W/AUTO DIFF WBC: CPT

## 2019-09-08 PROCEDURE — 36415 COLL VENOUS BLD VENIPUNCTURE: CPT

## 2019-09-08 PROCEDURE — 96361 HYDRATE IV INFUSION ADD-ON: CPT

## 2019-09-08 PROCEDURE — 99284 EMERGENCY DEPT VISIT MOD MDM: CPT | Mod: 25

## 2019-09-08 RX ORDER — ONDANSETRON 4 MG/1
4 TABLET, ORALLY DISINTEGRATING ORAL EVERY 8 HOURS PRN
Qty: 12 TABLET | Refills: 0 | Status: SHIPPED | OUTPATIENT
Start: 2019-09-08 | End: 2020-02-07 | Stop reason: CLARIF

## 2019-09-08 RX ORDER — DIPHENOXYLATE HYDROCHLORIDE AND ATROPINE SULFATE 2.5; .025 MG/1; MG/1
1 TABLET ORAL 3 TIMES DAILY PRN
Qty: 15 TABLET | Refills: 0 | Status: SHIPPED | OUTPATIENT
Start: 2019-09-08 | End: 2019-09-13

## 2019-09-08 RX ORDER — DIPHENOXYLATE HYDROCHLORIDE AND ATROPINE SULFATE 2.5; .025 MG/1; MG/1
1 TABLET ORAL
Status: COMPLETED | OUTPATIENT
Start: 2019-09-08 | End: 2019-09-08

## 2019-09-08 RX ADMIN — SODIUM CHLORIDE 1000 ML: 0.9 INJECTION, SOLUTION INTRAVENOUS at 09:09

## 2019-09-08 RX ADMIN — SODIUM CHLORIDE 1000 ML: 0.9 INJECTION, SOLUTION INTRAVENOUS at 08:09

## 2019-09-08 RX ADMIN — DIPHENOXYLATE HYDROCHLORIDE AND ATROPINE SULFATE 1 TABLET: 2.5; .025 TABLET ORAL at 09:09

## 2019-09-08 NOTE — ED NOTES
Denies c/o - resting under blankets (rt-side lying); has not yet used provided BSC (no diarrheal stools this visit). Will continue to monitor

## 2019-09-08 NOTE — ED PROVIDER NOTES
Encounter Date: 9/8/2019       History     Chief Complaint   Patient presents with    Diarrhea     Since 8pm yesterday     Patient is a 73 year old female who presents with a chief complaint of diarrhea. Began to feel like she was going to have diarrhea since 8PM yesterday. Sensation lasted until 3PM. She also mentions that she has woken up with sore throat, rhinorrhea and sneezing and has had these symptoms for the last 3 days.  Also endorses some muscle spasms and cramping, as well as some shortness of breath as well this morning. Cramps and muscle pain are believed to be related to gardening activity. Used a rescue inhaler to alleviate the shortness of breath. Medications include baclofen and gabapentin.     The history is provided by the patient.     Review of patient's allergies indicates:   Allergen Reactions    Artane Other (See Comments)     Swelling    Ned-1 Swelling     Oragel caused tongue to swell    Adhesive tape-silicones Rash     Blisters after on for several hours    Latex, natural rubber Hives and Rash     Localized    Latex     Morphine Itching and Hives     Past Medical History:   Diagnosis Date    Arthritis     Bell's palsy     Bladder incontinence     Blepharospasm     Cervical dystonia     Depression     Hormone deficiency     Myofacial pain syndrome      Past Surgical History:   Procedure Laterality Date    APPENDECTOMY      ARTHROPLASTY-KNEE Right 7/15/2014    Performed by [facility]pkins [facility] at [facility] OR    BREAST LUMPECTOMY      COLONOSCOPY N/A 8/16/2012    Performed by Abeba Dowling MD at [facility] ENDO    EYE SURGERY      HYSTERECTOMY      NECK SURGERY      TONSILLECTOMY       Family History   Problem Relation Age of Onset    Diabetes Neg Hx     Melanoma Neg Hx     Psoriasis Neg Hx     Lupus Neg Hx     Eczema Neg Hx      Social History     Tobacco Use    Smoking status: Never Smoker    Smokeless tobacco: Never Used   Substance Use Topics    Alcohol use:  No    Drug use: No     Review of Systems   Constitutional: Negative for chills and fever.   HENT: Positive for congestion, rhinorrhea and sneezing.    Respiratory: Positive for cough (white mucous) and shortness of breath.    Gastrointestinal: Positive for abdominal pain (cramping) and diarrhea.   Genitourinary: Negative for dysuria and hematuria.   Musculoskeletal: Negative for back pain and neck pain.   Neurological: Negative for light-headedness and headaches.   Psychiatric/Behavioral: Negative for behavioral problems and confusion.       Physical Exam     Initial Vitals [09/08/19 0747]   BP Pulse Resp Temp SpO2   132/67 90 14 98.8 °F (37.1 °C) 99 %      MAP       --         Physical Exam    Nursing note and vitals reviewed.  Constitutional: She appears well-developed.   HENT:   Head: Normocephalic and atraumatic.   Tongue is slightly dry.    Eyes: EOM are normal. Pupils are equal, round, and reactive to light.   Neck: Neck supple.   Cardiovascular: Normal rate, regular rhythm, normal heart sounds and intact distal pulses. Exam reveals no gallop and no friction rub.    No murmur heard.  Pulmonary/Chest: Breath sounds normal. No respiratory distress. She has no decreased breath sounds. She has no wheezes. She has no rhonchi. She has no rales.   Abdominal: Soft. Bowel sounds are normal. She exhibits no distension. There is no tenderness. There is no rebound and no guarding.   Musculoskeletal: Normal range of motion.   Neurological: She is alert and oriented to person, place, and time.   Skin: Skin is warm and dry.   Psychiatric: She has a normal mood and affect.         ED Course   Procedures  Labs Reviewed - No data to display       Imaging Results    None          Medical Decision Making:   History:   Old Medical Records: I decided to obtain old medical records.  Clinical Tests:   Lab Tests: Ordered and Reviewed            Scribe Attestation:   Scribe #1: I performed the above scribed service and the  documentation accurately describes the services I performed. I attest to the accuracy of the note.        I, Dr. Stefan Samuel personally performed the services described in this documentation. All medical record entries made by the scribe were at my direction and in my presence.  I have reviewed the chart and agree that the record reflects my personal performance and is accurate and complete. Stefan Samuel MD.  4:34 PM 09/08/2019    DISCLAIMER: This note was prepared with Dragon NaturallySpeaking voice recognition transcription software. Garbled syntax, mangled pronouns, and other bizarre constructions may be attributed to that software system **     ED Course as of Sep 08 1634   Sun Sep 08, 2019   0948 Patient's symptoms are concerning for dehydration.  She has a KI with azotemia.  She feels better after IV fluids.  I believe she had heat related illness with dehydration and complicated by diarrhea.  Her abdomen is soft.  She is not significantly tender.  She has no rebound or guarding. She has no fevers.  I do not think she has acute diverticulitis perforated viscus abscess acute abdomen at this time.  This is likely viral in nature given that she had a runny nose and sore throat for the past few days.  I gave her dose of Lomotil and her symptoms improved.  She can follow up with primary care in the next few days and drink plenty of fluids.  She needs repeat labs in 2-3 days.    [JS]      ED Course User Index  [JS] Stefan Samuel MD     Clinical Impression:       ICD-10-CM ICD-9-CM   1. Diarrhea, unspecified type R19.7 787.91   2. Dehydration E86.0 276.51                                Stefan Samuel MD  09/08/19 6281

## 2019-10-08 ENCOUNTER — OFFICE VISIT (OUTPATIENT)
Dept: PULMONOLOGY | Facility: CLINIC | Age: 74
End: 2019-10-08
Payer: MEDICARE

## 2019-10-08 VITALS
HEIGHT: 69 IN | BODY MASS INDEX: 24.95 KG/M2 | HEART RATE: 80 BPM | WEIGHT: 168.44 LBS | OXYGEN SATURATION: 97 % | DIASTOLIC BLOOD PRESSURE: 68 MMHG | SYSTOLIC BLOOD PRESSURE: 124 MMHG

## 2019-10-08 DIAGNOSIS — J45.30 MILD PERSISTENT ASTHMA WITHOUT COMPLICATION: ICD-10-CM

## 2019-10-08 DIAGNOSIS — T17.908A ASPIRATION INTO AIRWAY, INITIAL ENCOUNTER: Primary | ICD-10-CM

## 2019-10-08 DIAGNOSIS — D84.9 IMMUNE DEFICIENCY DISORDER: ICD-10-CM

## 2019-10-08 DIAGNOSIS — J47.9 BRONCHIECTASIS WITHOUT COMPLICATION: ICD-10-CM

## 2019-10-08 PROCEDURE — 1101F PT FALLS ASSESS-DOCD LE1/YR: CPT | Mod: CPTII,S$GLB,, | Performed by: INTERNAL MEDICINE

## 2019-10-08 PROCEDURE — 3078F PR MOST RECENT DIASTOLIC BLOOD PRESSURE < 80 MM HG: ICD-10-PCS | Mod: CPTII,S$GLB,, | Performed by: INTERNAL MEDICINE

## 2019-10-08 PROCEDURE — 99214 OFFICE O/P EST MOD 30 MIN: CPT | Mod: S$GLB,,, | Performed by: INTERNAL MEDICINE

## 2019-10-08 PROCEDURE — 99999 PR PBB SHADOW E&M-EST. PATIENT-LVL V: CPT | Mod: PBBFAC,,, | Performed by: INTERNAL MEDICINE

## 2019-10-08 PROCEDURE — 3074F SYST BP LT 130 MM HG: CPT | Mod: CPTII,S$GLB,, | Performed by: INTERNAL MEDICINE

## 2019-10-08 PROCEDURE — 1101F PR PT FALLS ASSESS DOC 0-1 FALLS W/OUT INJ PAST YR: ICD-10-PCS | Mod: CPTII,S$GLB,, | Performed by: INTERNAL MEDICINE

## 2019-10-08 PROCEDURE — 3074F PR MOST RECENT SYSTOLIC BLOOD PRESSURE < 130 MM HG: ICD-10-PCS | Mod: CPTII,S$GLB,, | Performed by: INTERNAL MEDICINE

## 2019-10-08 PROCEDURE — 99999 PR PBB SHADOW E&M-EST. PATIENT-LVL V: ICD-10-PCS | Mod: PBBFAC,,, | Performed by: INTERNAL MEDICINE

## 2019-10-08 PROCEDURE — 99214 PR OFFICE/OUTPT VISIT, EST, LEVL IV, 30-39 MIN: ICD-10-PCS | Mod: S$GLB,,, | Performed by: INTERNAL MEDICINE

## 2019-10-08 PROCEDURE — 3078F DIAST BP <80 MM HG: CPT | Mod: CPTII,S$GLB,, | Performed by: INTERNAL MEDICINE

## 2019-10-08 RX ORDER — DICLOFENAC SODIUM 10 MG/G
GEL TOPICAL
COMMUNITY
Start: 2019-08-02 | End: 2022-09-01 | Stop reason: SDUPTHER

## 2019-10-08 RX ORDER — ALBUTEROL SULFATE 0.83 MG/ML
2.5 SOLUTION RESPIRATORY (INHALATION) EVERY 6 HOURS PRN
Qty: 360 EACH | Refills: 3 | Status: SHIPPED | OUTPATIENT
Start: 2019-10-08 | End: 2019-10-14 | Stop reason: ALTCHOICE

## 2019-10-08 RX ORDER — MONTELUKAST SODIUM 10 MG/1
10 TABLET ORAL NIGHTLY
Qty: 90 TABLET | Refills: 3 | Status: SHIPPED | OUTPATIENT
Start: 2019-10-08 | End: 2019-10-24 | Stop reason: SDUPTHER

## 2019-10-08 RX ORDER — BUDESONIDE AND FORMOTEROL FUMARATE DIHYDRATE 160; 4.5 UG/1; UG/1
2 AEROSOL RESPIRATORY (INHALATION) EVERY 12 HOURS
Qty: 3 INHALER | Refills: 3 | Status: SHIPPED | OUTPATIENT
Start: 2019-10-08 | End: 2020-02-07 | Stop reason: CLARIF

## 2019-10-08 RX ORDER — ALBUTEROL SULFATE 90 UG/1
1-2 AEROSOL, METERED RESPIRATORY (INHALATION) EVERY 6 HOURS PRN
Qty: 3 INHALER | Refills: 3 | Status: ON HOLD | OUTPATIENT
Start: 2019-10-08 | End: 2020-02-09 | Stop reason: HOSPADM

## 2019-10-08 NOTE — PATIENT INSTRUCTIONS
marianoonia germ protection only 8 of 14 germs- another pneumonia vaccine would be good if able.  Use Augmentin 7 to 14 days if any infection, may need more so call if sickly?       Try symbicort 2 bedtime - increase to 2 twice daily if any problem-- or use breo once daily    Nebulizer therapy most cost effective - albuterol inhaler same.    Prednisone will stabilize bronchial tubes. Mild problem use one daily for 3 days. Use if albuterol not working nor lasting.    You have aspiration problem after neck operation with  Choking weekly.  Need to see speech.
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
## 2019-10-09 ENCOUNTER — TELEPHONE (OUTPATIENT)
Dept: PULMONOLOGY | Facility: CLINIC | Age: 74
End: 2019-10-09

## 2019-10-09 NOTE — TELEPHONE ENCOUNTER
lvm to return my call. No answer    ----- Message from Keiko Jay sent at 10/9/2019  9:39 AM CDT -----  Contact: self   Patient want to speak with a nurse regarding medications office was refilling from yesterday appointment have some questions please call back at [phone]     Normal rate, regular rhythm.  Heart sounds S1, S2.  No murmurs, rubs or gallops.

## 2019-10-11 ENCOUNTER — PATIENT MESSAGE (OUTPATIENT)
Dept: PULMONOLOGY | Facility: CLINIC | Age: 74
End: 2019-10-11

## 2019-10-11 DIAGNOSIS — J47.9 BRONCHIECTASIS WITHOUT COMPLICATION: Primary | ICD-10-CM

## 2019-10-11 DIAGNOSIS — J45.30 MILD PERSISTENT ASTHMA WITHOUT COMPLICATION: ICD-10-CM

## 2019-10-14 ENCOUNTER — PATIENT MESSAGE (OUTPATIENT)
Dept: PULMONOLOGY | Facility: CLINIC | Age: 74
End: 2019-10-14

## 2019-10-14 RX ORDER — LEVALBUTEROL TARTRATE 45 UG/1
2 AEROSOL, METERED ORAL EVERY 6 HOURS PRN
Qty: 1 INHALER | Refills: 11 | Status: SHIPPED | OUTPATIENT
Start: 2019-10-14 | End: 2021-03-15 | Stop reason: ALTCHOICE

## 2019-10-15 ENCOUNTER — PATIENT MESSAGE (OUTPATIENT)
Dept: PULMONOLOGY | Facility: CLINIC | Age: 74
End: 2019-10-15

## 2019-10-15 ENCOUNTER — TELEPHONE (OUTPATIENT)
Dept: PULMONOLOGY | Facility: CLINIC | Age: 74
End: 2019-10-15

## 2019-10-15 NOTE — TELEPHONE ENCOUNTER
Clarified instructions   ----- Message from Nadeen You sent at 10/15/2019 10:14 AM CDT -----  Type:  Pharmacy Calling to Clarify an RX    Name of Caller: Olivier with [facility]  Pharmacy Name:   [facility] - [place], [place] - [address] AT [facility]  [address]  [place], [place] [address]  Phone: [phone] Fax: [phone]  Prescription Name:  albuterol (PROVENTIL/VENTOLIN HFA) 90 mcg/actuation inhaler  What do they need to clarify?:  Needs additional  Information to fill  Best Call Back Number:  [phone]   Delayed physician  response line  Additional Information:  Call did not know what information is needed, contact delayed physician response line

## 2019-10-16 ENCOUNTER — PATIENT MESSAGE (OUTPATIENT)
Dept: PULMONOLOGY | Facility: CLINIC | Age: 74
End: 2019-10-16

## 2019-10-17 ENCOUNTER — OFFICE VISIT (OUTPATIENT)
Dept: PULMONOLOGY | Facility: CLINIC | Age: 74
End: 2019-10-17
Payer: MEDICARE

## 2019-10-17 ENCOUNTER — PATIENT MESSAGE (OUTPATIENT)
Dept: PULMONOLOGY | Facility: CLINIC | Age: 74
End: 2019-10-17

## 2019-10-17 VITALS
OXYGEN SATURATION: 97 % | SYSTOLIC BLOOD PRESSURE: 128 MMHG | HEART RATE: 86 BPM | WEIGHT: 170.88 LBS | DIASTOLIC BLOOD PRESSURE: 77 MMHG | BODY MASS INDEX: 25.31 KG/M2 | HEIGHT: 69 IN

## 2019-10-17 DIAGNOSIS — J47.9 BRONCHIECTASIS WITHOUT COMPLICATION: Primary | ICD-10-CM

## 2019-10-17 DIAGNOSIS — B37.0 ORAL CANDIDA: ICD-10-CM

## 2019-10-17 PROCEDURE — 1101F PR PT FALLS ASSESS DOC 0-1 FALLS W/OUT INJ PAST YR: ICD-10-PCS | Mod: CPTII,S$GLB,, | Performed by: NURSE PRACTITIONER

## 2019-10-17 PROCEDURE — 3074F SYST BP LT 130 MM HG: CPT | Mod: CPTII,S$GLB,, | Performed by: NURSE PRACTITIONER

## 2019-10-17 PROCEDURE — 99213 PR OFFICE/OUTPT VISIT, EST, LEVL III, 20-29 MIN: ICD-10-PCS | Mod: S$GLB,,, | Performed by: NURSE PRACTITIONER

## 2019-10-17 PROCEDURE — 3074F PR MOST RECENT SYSTOLIC BLOOD PRESSURE < 130 MM HG: ICD-10-PCS | Mod: CPTII,S$GLB,, | Performed by: NURSE PRACTITIONER

## 2019-10-17 PROCEDURE — 99213 OFFICE O/P EST LOW 20 MIN: CPT | Mod: S$GLB,,, | Performed by: NURSE PRACTITIONER

## 2019-10-17 PROCEDURE — 3078F PR MOST RECENT DIASTOLIC BLOOD PRESSURE < 80 MM HG: ICD-10-PCS | Mod: CPTII,S$GLB,, | Performed by: NURSE PRACTITIONER

## 2019-10-17 PROCEDURE — 99999 PR PBB SHADOW E&M-EST. PATIENT-LVL IV: ICD-10-PCS | Mod: PBBFAC,,, | Performed by: NURSE PRACTITIONER

## 2019-10-17 PROCEDURE — 1101F PT FALLS ASSESS-DOCD LE1/YR: CPT | Mod: CPTII,S$GLB,, | Performed by: NURSE PRACTITIONER

## 2019-10-17 PROCEDURE — 3078F DIAST BP <80 MM HG: CPT | Mod: CPTII,S$GLB,, | Performed by: NURSE PRACTITIONER

## 2019-10-17 PROCEDURE — 99999 PR PBB SHADOW E&M-EST. PATIENT-LVL IV: CPT | Mod: PBBFAC,,, | Performed by: NURSE PRACTITIONER

## 2019-10-17 RX ORDER — BENAZEPRIL HYDROCHLORIDE 40 MG/1
40 TABLET ORAL DAILY
Refills: 2 | Status: ON HOLD | COMMUNITY
Start: 2019-10-07 | End: 2020-08-04 | Stop reason: HOSPADM

## 2019-10-17 RX ORDER — NYSTATIN 100000 [USP'U]/ML
4 SUSPENSION ORAL 4 TIMES DAILY
Qty: 160 ML | Refills: 0 | Status: SHIPPED | OUTPATIENT
Start: 2019-10-17 | End: 2019-10-27
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
## 2019-10-23 ENCOUNTER — PATIENT MESSAGE (OUTPATIENT)
Dept: PULMONOLOGY | Facility: CLINIC | Age: 74
End: 2019-10-23

## 2019-10-24 ENCOUNTER — PATIENT MESSAGE (OUTPATIENT)
Dept: PULMONOLOGY | Facility: CLINIC | Age: 74
End: 2019-10-24

## 2019-10-24 DIAGNOSIS — B37.0 CANDIDA INFECTION, ORAL: Primary | ICD-10-CM

## 2019-10-24 DIAGNOSIS — J47.9 BRONCHIECTASIS WITHOUT COMPLICATION: ICD-10-CM

## 2019-10-24 DIAGNOSIS — J45.30 MILD PERSISTENT ASTHMA WITHOUT COMPLICATION: ICD-10-CM

## 2019-10-24 RX ORDER — FLUCONAZOLE 200 MG/1
200 TABLET ORAL DAILY
Qty: 7 TABLET | Refills: 0 | Status: SHIPPED | OUTPATIENT
Start: 2019-10-24 | End: 2019-11-27 | Stop reason: SDUPTHER

## 2019-10-24 RX ORDER — MONTELUKAST SODIUM 10 MG/1
10 TABLET ORAL NIGHTLY
Qty: 90 TABLET | Refills: 3 | Status: SHIPPED | OUTPATIENT
Start: 2019-10-24 | End: 2019-11-01

## 2019-10-31 ENCOUNTER — PATIENT MESSAGE (OUTPATIENT)
Dept: PULMONOLOGY | Facility: CLINIC | Age: 74
End: 2019-10-31

## 2019-11-01 ENCOUNTER — PATIENT MESSAGE (OUTPATIENT)
Dept: PULMONOLOGY | Facility: CLINIC | Age: 74
End: 2019-11-01

## 2019-11-01 DIAGNOSIS — J47.9 BRONCHIECTASIS WITHOUT COMPLICATION: ICD-10-CM

## 2019-11-01 DIAGNOSIS — J45.30 MILD PERSISTENT ASTHMA WITHOUT COMPLICATION: ICD-10-CM

## 2019-11-01 NOTE — TELEPHONE ENCOUNTER
I just spoke with the pharmacist with [facility]. He said it was ready to be filled for a 30day supply. Pt notified. Pt stated it is cheaper to be filled at [facility]. Request sent to NP

## 2019-11-04 RX ORDER — MONTELUKAST SODIUM 10 MG/1
10 TABLET ORAL NIGHTLY
Qty: 90 TABLET | Refills: 3 | Status: SHIPPED | OUTPATIENT
Start: 2019-11-04 | End: 2020-04-09 | Stop reason: SDUPTHER

## 2019-11-06 ENCOUNTER — LAB VISIT (OUTPATIENT)
Dept: LAB | Facility: HOSPITAL | Age: 74
End: 2019-11-06
Attending: INTERNAL MEDICINE
Payer: MEDICARE

## 2019-11-06 DIAGNOSIS — I10 ESSENTIAL HYPERTENSION, MALIGNANT: Primary | ICD-10-CM

## 2019-11-06 DIAGNOSIS — R71.8 OTHER ABNORMALITY OF RED BLOOD CELLS: ICD-10-CM

## 2019-11-06 LAB
ALBUMIN SERPL BCP-MCNC: 3.7 G/DL (ref 3.5–5.2)
ALP SERPL-CCNC: 98 U/L (ref 55–135)
ALT SERPL W/O P-5'-P-CCNC: 15 U/L (ref 10–44)
ANION GAP SERPL CALC-SCNC: 7 MMOL/L (ref 8–16)
AST SERPL-CCNC: 18 U/L (ref 10–40)
BASOPHILS # BLD AUTO: 0.01 K/UL (ref 0–0.2)
BASOPHILS NFR BLD: 0.2 % (ref 0–1.9)
BILIRUB SERPL-MCNC: 0.3 MG/DL (ref 0.1–1)
BILIRUB UR QL STRIP: NEGATIVE
BUN SERPL-MCNC: 19 MG/DL (ref 8–23)
CALCIUM SERPL-MCNC: 9.1 MG/DL (ref 8.7–10.5)
CHLORIDE SERPL-SCNC: 104 MMOL/L (ref 95–110)
CHOLEST SERPL-MCNC: 198 MG/DL (ref 120–199)
CHOLEST/HDLC SERPL: 2.2 {RATIO} (ref 2–5)
CLARITY UR: CLEAR
CO2 SERPL-SCNC: 31 MMOL/L (ref 23–29)
COLOR UR: YELLOW
CREAT SERPL-MCNC: 0.9 MG/DL (ref 0.5–1.4)
DIFFERENTIAL METHOD: ABNORMAL
EOSINOPHIL # BLD AUTO: 0.1 K/UL (ref 0–0.5)
EOSINOPHIL NFR BLD: 3.2 % (ref 0–8)
ERYTHROCYTE [DISTWIDTH] IN BLOOD BY AUTOMATED COUNT: 14.2 % (ref 11.5–14.5)
EST. GFR  (AFRICAN AMERICAN): >60 ML/MIN/1.73 M^2
EST. GFR  (NON AFRICAN AMERICAN): >60 ML/MIN/1.73 M^2
GLUCOSE SERPL-MCNC: 107 MG/DL (ref 70–110)
GLUCOSE UR QL STRIP: NEGATIVE
HCT VFR BLD AUTO: 32.4 % (ref 37–48.5)
HDLC SERPL-MCNC: 91 MG/DL (ref 40–75)
HDLC SERPL: 46 % (ref 20–50)
HGB BLD-MCNC: 9.7 G/DL (ref 12–16)
HGB UR QL STRIP: NEGATIVE
IMM GRANULOCYTES # BLD AUTO: 0.01 K/UL (ref 0–0.04)
KETONES UR QL STRIP: NEGATIVE
LDLC SERPL CALC-MCNC: 100.6 MG/DL (ref 63–159)
LEUKOCYTE ESTERASE UR QL STRIP: NEGATIVE
LYMPHOCYTES # BLD AUTO: 0.8 K/UL (ref 1–4.8)
LYMPHOCYTES NFR BLD: 19.7 % (ref 18–48)
MCH RBC QN AUTO: 26.2 PG (ref 27–31)
MCHC RBC AUTO-ENTMCNC: 29.9 G/DL (ref 32–36)
MCV RBC AUTO: 88 FL (ref 82–98)
MONOCYTES # BLD AUTO: 0.6 K/UL (ref 0.3–1)
MONOCYTES NFR BLD: 14.9 % (ref 4–15)
NEUTROPHILS # BLD AUTO: 2.5 K/UL (ref 1.8–7.7)
NEUTROPHILS NFR BLD: 61.8 % (ref 38–73)
NITRITE UR QL STRIP: NEGATIVE
NONHDLC SERPL-MCNC: 107 MG/DL
NRBC BLD-RTO: 0 /100 WBC
PH UR STRIP: 7 [PH] (ref 5–8)
PLATELET # BLD AUTO: 253 K/UL (ref 150–350)
PMV BLD AUTO: 9.4 FL (ref 9.2–12.9)
POTASSIUM SERPL-SCNC: 4.2 MMOL/L (ref 3.5–5.1)
PROT SERPL-MCNC: 6.9 G/DL (ref 6–8.4)
PROT UR QL STRIP: NEGATIVE
RBC # BLD AUTO: 3.7 M/UL (ref 4–5.4)
SODIUM SERPL-SCNC: 142 MMOL/L (ref 136–145)
SP GR UR STRIP: 1.01 (ref 1–1.03)
TRIGL SERPL-MCNC: 32 MG/DL (ref 30–150)
URN SPEC COLLECT METH UR: NORMAL
UROBILINOGEN UR STRIP-ACNC: NEGATIVE EU/DL
WBC # BLD AUTO: 4.02 K/UL (ref 3.9–12.7)

## 2019-11-06 PROCEDURE — 81003 URINALYSIS AUTO W/O SCOPE: CPT

## 2019-11-06 PROCEDURE — 80061 LIPID PANEL: CPT

## 2019-11-06 PROCEDURE — 85025 COMPLETE CBC W/AUTO DIFF WBC: CPT

## 2019-11-06 PROCEDURE — 36415 COLL VENOUS BLD VENIPUNCTURE: CPT

## 2019-11-06 PROCEDURE — 80053 COMPREHEN METABOLIC PANEL: CPT

## 2019-11-07 ENCOUNTER — PATIENT MESSAGE (OUTPATIENT)
Dept: PULMONOLOGY | Facility: CLINIC | Age: 74
End: 2019-11-07

## 2019-11-26 ENCOUNTER — TELEPHONE (OUTPATIENT)
Dept: HEMATOLOGY/ONCOLOGY | Facility: CLINIC | Age: 74
End: 2019-11-26

## 2019-11-26 NOTE — TELEPHONE ENCOUNTER
Called patient to schedule a new patient appointment per referral from Dr. Gomes (former pt of Dr. Mart LOV 06/25/2013) and she informed me that at this time she did not know if the appointment was necessary until she has and upper GI and lower GI scope to find out if she has any bleeding. If she does need to see Dr. Mart after those tests she will call the office to schedule an appointment. AE

## 2019-11-27 DIAGNOSIS — B37.0 CANDIDA INFECTION, ORAL: ICD-10-CM

## 2019-11-27 RX ORDER — FLUCONAZOLE 200 MG/1
200 TABLET ORAL DAILY
Qty: 7 TABLET | Refills: 0 | Status: SHIPPED | OUTPATIENT
Start: 2019-11-27 | End: 2019-12-04

## 2019-12-05 DIAGNOSIS — Z12.31 VISIT FOR SCREENING MAMMOGRAM: Primary | ICD-10-CM

## 2019-12-06 DIAGNOSIS — M85.88 MASS OF HARD PALATE: Primary | ICD-10-CM

## 2019-12-06 DIAGNOSIS — M85.88 OTHER SPECIFIED DISORDERS OF BONE DENSITY AND STRUCTURE, OTHER SITE: ICD-10-CM

## 2019-12-16 ENCOUNTER — PATIENT MESSAGE (OUTPATIENT)
Dept: PULMONOLOGY | Facility: CLINIC | Age: 74
End: 2019-12-16

## 2019-12-17 ENCOUNTER — HOSPITAL ENCOUNTER (OUTPATIENT)
Dept: RADIOLOGY | Facility: HOSPITAL | Age: 74
Discharge: HOME | End: 2019-12-17
Attending: OBSTETRICS & GYNECOLOGY
Payer: MEDICARE

## 2019-12-17 DIAGNOSIS — M85.88 OTHER SPECIFIED DISORDERS OF BONE DENSITY AND STRUCTURE, OTHER SITE: ICD-10-CM

## 2019-12-17 DIAGNOSIS — Z12.31 VISIT FOR SCREENING MAMMOGRAM: ICD-10-CM

## 2019-12-17 PROCEDURE — 77080 DXA BONE DENSITY AXIAL: CPT | Mod: 26,,, | Performed by: RADIOLOGY

## 2019-12-17 PROCEDURE — 77080 DXA BONE DENSITY AXIAL: CPT | Mod: TC

## 2019-12-17 PROCEDURE — 77063 BREAST TOMOSYNTHESIS BI: CPT | Mod: 26,,, | Performed by: RADIOLOGY

## 2019-12-17 PROCEDURE — 77067 SCR MAMMO BI INCL CAD: CPT | Mod: 26,,, | Performed by: RADIOLOGY

## 2019-12-17 PROCEDURE — 77067 MAMMO DIGITAL SCREENING BILAT WITH TOMOSYNTHESIS_CAD: ICD-10-PCS | Mod: 26,,, | Performed by: RADIOLOGY

## 2019-12-17 PROCEDURE — 77063 MAMMO DIGITAL SCREENING BILAT WITH TOMOSYNTHESIS_CAD: ICD-10-PCS | Mod: 26,,, | Performed by: RADIOLOGY

## 2019-12-17 PROCEDURE — 77067 SCR MAMMO BI INCL CAD: CPT | Mod: TC

## 2019-12-17 PROCEDURE — 77080 DEXA BONE DENSITY SPINE HIP: ICD-10-PCS | Mod: 26,,, | Performed by: RADIOLOGY

## 2019-12-18 ENCOUNTER — PATIENT MESSAGE (OUTPATIENT)
Dept: PULMONOLOGY | Facility: CLINIC | Age: 74
End: 2019-12-18

## 2020-02-07 ENCOUNTER — HOSPITAL ENCOUNTER (INPATIENT)
Facility: HOSPITAL | Age: 75
LOS: 2 days | Discharge: HOME HEALTH | DRG: 092 | End: 2020-02-09
Attending: EMERGENCY MEDICINE | Admitting: INTERNAL MEDICINE
Payer: MEDICARE

## 2020-02-07 DIAGNOSIS — G24.3 CERVICAL DYSTONIA: ICD-10-CM

## 2020-02-07 DIAGNOSIS — K52.9 ENTEROCOLITIS: ICD-10-CM

## 2020-02-07 DIAGNOSIS — R41.82 ALTERED MENTAL STATUS: ICD-10-CM

## 2020-02-07 DIAGNOSIS — G93.40 ACUTE ENCEPHALOPATHY: Primary | ICD-10-CM

## 2020-02-07 PROBLEM — I10 ESSENTIAL HYPERTENSION: Status: ACTIVE | Noted: 2017-10-18

## 2020-02-07 PROBLEM — J44.9 CHRONIC OBSTRUCTIVE LUNG DISEASE: Status: ACTIVE | Noted: 2017-10-18

## 2020-02-07 PROBLEM — F30.9 BIPOLAR I DISORDER, SINGLE MANIC EPISODE: Status: ACTIVE | Noted: 2020-02-07

## 2020-02-07 PROBLEM — G93.41 ENCEPHALOPATHY, METABOLIC: Status: ACTIVE | Noted: 2020-02-07

## 2020-02-07 PROBLEM — N18.4 CKD (CHRONIC KIDNEY DISEASE), STAGE IV: Status: ACTIVE | Noted: 2020-02-07

## 2020-02-07 PROBLEM — A60.00 GENITAL HERPES SIMPLEX: Status: ACTIVE | Noted: 2020-02-07

## 2020-02-07 PROBLEM — J43.9 PULMONARY EMPHYSEMA: Status: ACTIVE | Noted: 2017-10-18

## 2020-02-07 LAB
ALBUMIN SERPL BCP-MCNC: 4.2 G/DL (ref 3.5–5.2)
ALP SERPL-CCNC: 112 U/L (ref 55–135)
ALT SERPL W/O P-5'-P-CCNC: 19 U/L (ref 10–44)
AMMONIA PLAS-SCNC: 24 UMOL/L (ref 10–50)
AMPHET+METHAMPHET UR QL: NEGATIVE
ANION GAP SERPL CALC-SCNC: 13 MMOL/L (ref 8–16)
APAP SERPL-MCNC: <3 UG/ML (ref 10–20)
AST SERPL-CCNC: 23 U/L (ref 10–40)
BARBITURATES UR QL SCN>200 NG/ML: NEGATIVE
BASOPHILS # BLD AUTO: 0.01 K/UL (ref 0–0.2)
BASOPHILS NFR BLD: 0.1 % (ref 0–1.9)
BENZODIAZ UR QL SCN>200 NG/ML: NEGATIVE
BILIRUB SERPL-MCNC: 0.4 MG/DL (ref 0.1–1)
BILIRUB UR QL STRIP: NEGATIVE
BUN SERPL-MCNC: 34 MG/DL (ref 8–23)
BZE UR QL SCN: NEGATIVE
CALCIUM SERPL-MCNC: 9.4 MG/DL (ref 8.7–10.5)
CANNABINOIDS UR QL SCN: NEGATIVE
CHLORIDE SERPL-SCNC: 106 MMOL/L (ref 95–110)
CK SERPL-CCNC: 396 U/L (ref 20–180)
CLARITY CSF: CLEAR
CLARITY UR: CLEAR
CO2 SERPL-SCNC: 25 MMOL/L (ref 23–29)
COLOR CSF: COLORLESS
COLOR UR: YELLOW
CREAT SERPL-MCNC: 1.5 MG/DL (ref 0.5–1.4)
CREAT UR-MCNC: 60.9 MG/DL (ref 15–325)
DIFFERENTIAL METHOD: ABNORMAL
EOSINOPHIL # BLD AUTO: 0 K/UL (ref 0–0.5)
EOSINOPHIL NFR BLD: 0.2 % (ref 0–8)
ERYTHROCYTE [DISTWIDTH] IN BLOOD BY AUTOMATED COUNT: 16.6 % (ref 11.5–14.5)
EST. GFR  (AFRICAN AMERICAN): 39 ML/MIN/1.73 M^2
EST. GFR  (NON AFRICAN AMERICAN): 34 ML/MIN/1.73 M^2
GLUCOSE CSF-MCNC: 90 MG/DL (ref 40–70)
GLUCOSE SERPL-MCNC: 138 MG/DL (ref 70–110)
GLUCOSE UR QL STRIP: NEGATIVE
HCT VFR BLD AUTO: 38.4 % (ref 37–48.5)
HGB BLD-MCNC: 11.7 G/DL (ref 12–16)
HGB UR QL STRIP: NEGATIVE
IMM GRANULOCYTES # BLD AUTO: 0.05 K/UL (ref 0–0.04)
INR PPP: 1 (ref 0.8–1.2)
KETONES UR QL STRIP: NEGATIVE
LEUKOCYTE ESTERASE UR QL STRIP: NEGATIVE
LYMPHOCYTES # BLD AUTO: 0.7 K/UL (ref 1–4.8)
LYMPHOCYTES NFR BLD: 5.1 % (ref 18–48)
MCH RBC QN AUTO: 25.3 PG (ref 27–31)
MCHC RBC AUTO-ENTMCNC: 30.5 G/DL (ref 32–36)
MCV RBC AUTO: 83 FL (ref 82–98)
METHADONE UR QL SCN>300 NG/ML: NEGATIVE
MONOCYTES # BLD AUTO: 1.5 K/UL (ref 0.3–1)
MONOCYTES NFR BLD: 10.3 % (ref 4–15)
NEUTROPHILS # BLD AUTO: 12.1 K/UL (ref 1.8–7.7)
NEUTROPHILS NFR BLD: 84 % (ref 38–73)
NITRITE UR QL STRIP: NEGATIVE
NRBC BLD-RTO: 0 /100 WBC
OPIATES UR QL SCN: NEGATIVE
PCP UR QL SCN>25 NG/ML: NEGATIVE
PH UR STRIP: 6 [PH] (ref 5–8)
PLATELET # BLD AUTO: 204 K/UL (ref 150–350)
PMV BLD AUTO: 9.8 FL (ref 9.2–12.9)
POCT GLUCOSE: 112 MG/DL (ref 70–110)
POTASSIUM SERPL-SCNC: 4.4 MMOL/L (ref 3.5–5.1)
PROT CSF-MCNC: 32 MG/DL (ref 15–40)
PROT SERPL-MCNC: 7.4 G/DL (ref 6–8.4)
PROT UR QL STRIP: NEGATIVE
PROTHROMBIN TIME: 10.6 SEC (ref 9–12.5)
RBC # BLD AUTO: 4.63 M/UL (ref 4–5.4)
RBC # CSF: 1.5 /CU MM
SODIUM SERPL-SCNC: 144 MMOL/L (ref 136–145)
SP GR UR STRIP: 1.01 (ref 1–1.03)
SPECIMEN VOL CSF: 5 ML
TOXICOLOGY INFORMATION: NORMAL
URN SPEC COLLECT METH UR: NORMAL
UROBILINOGEN UR STRIP-ACNC: NEGATIVE EU/DL
WBC # BLD AUTO: 14.4 K/UL (ref 3.9–12.7)
WBC # CSF: 0 /CU MM (ref 0–5)

## 2020-02-07 PROCEDURE — 96372 THER/PROPH/DIAG INJ SC/IM: CPT | Mod: 59

## 2020-02-07 PROCEDURE — 82550 ASSAY OF CK (CPK): CPT

## 2020-02-07 PROCEDURE — 80307 DRUG TEST PRSMV CHEM ANLYZR: CPT

## 2020-02-07 PROCEDURE — 80329 ANALGESICS NON-OPIOID 1 OR 2: CPT

## 2020-02-07 PROCEDURE — 63600175 PHARM REV CODE 636 W HCPCS: Performed by: EMERGENCY MEDICINE

## 2020-02-07 PROCEDURE — 93005 ELECTROCARDIOGRAM TRACING: CPT

## 2020-02-07 PROCEDURE — 20000000 HC ICU ROOM

## 2020-02-07 PROCEDURE — 96375 TX/PRO/DX INJ NEW DRUG ADDON: CPT

## 2020-02-07 PROCEDURE — 85610 PROTHROMBIN TIME: CPT

## 2020-02-07 PROCEDURE — 63600175 PHARM REV CODE 636 W HCPCS: Performed by: INTERNAL MEDICINE

## 2020-02-07 PROCEDURE — G0378 HOSPITAL OBSERVATION PER HR: HCPCS

## 2020-02-07 PROCEDURE — 85025 COMPLETE CBC W/AUTO DIFF WBC: CPT

## 2020-02-07 PROCEDURE — 82140 ASSAY OF AMMONIA: CPT

## 2020-02-07 PROCEDURE — 87205 SMEAR GRAM STAIN: CPT

## 2020-02-07 PROCEDURE — 87070 CULTURE OTHR SPECIMN AEROBIC: CPT

## 2020-02-07 PROCEDURE — 87529 HSV DNA AMP PROBE: CPT

## 2020-02-07 PROCEDURE — 99291 CRITICAL CARE FIRST HOUR: CPT | Mod: 25

## 2020-02-07 PROCEDURE — 36415 COLL VENOUS BLD VENIPUNCTURE: CPT

## 2020-02-07 PROCEDURE — 80053 COMPREHEN METABOLIC PANEL: CPT

## 2020-02-07 PROCEDURE — 89051 BODY FLUID CELL COUNT: CPT

## 2020-02-07 PROCEDURE — 99000 SPECIMEN HANDLING OFFICE-LAB: CPT

## 2020-02-07 PROCEDURE — 62270 DX LMBR SPI PNXR: CPT

## 2020-02-07 PROCEDURE — 86618 LYME DISEASE ANTIBODY: CPT

## 2020-02-07 PROCEDURE — 82945 GLUCOSE OTHER FLUID: CPT

## 2020-02-07 PROCEDURE — 84157 ASSAY OF PROTEIN OTHER: CPT

## 2020-02-07 PROCEDURE — 81003 URINALYSIS AUTO W/O SCOPE: CPT | Mod: 59

## 2020-02-07 PROCEDURE — 96374 THER/PROPH/DIAG INJ IV PUSH: CPT

## 2020-02-07 RX ORDER — IBUPROFEN 200 MG
16 TABLET ORAL
Status: DISCONTINUED | OUTPATIENT
Start: 2020-02-07 | End: 2020-02-09 | Stop reason: HOSPADM

## 2020-02-07 RX ORDER — TALC
9 POWDER (GRAM) TOPICAL NIGHTLY PRN
Status: DISCONTINUED | OUTPATIENT
Start: 2020-02-07 | End: 2020-02-09 | Stop reason: HOSPADM

## 2020-02-07 RX ORDER — POTASSIUM CHLORIDE 20 MEQ/15ML
40 SOLUTION ORAL
Status: DISCONTINUED | OUTPATIENT
Start: 2020-02-07 | End: 2020-02-09 | Stop reason: HOSPADM

## 2020-02-07 RX ORDER — HEPARIN SODIUM 5000 [USP'U]/ML
5000 INJECTION, SOLUTION INTRAVENOUS; SUBCUTANEOUS EVERY 8 HOURS
Status: DISCONTINUED | OUTPATIENT
Start: 2020-02-07 | End: 2020-02-09 | Stop reason: HOSPADM

## 2020-02-07 RX ORDER — IPRATROPIUM BROMIDE AND ALBUTEROL SULFATE 2.5; .5 MG/3ML; MG/3ML
3 SOLUTION RESPIRATORY (INHALATION) EVERY 6 HOURS PRN
Status: DISCONTINUED | OUTPATIENT
Start: 2020-02-07 | End: 2020-02-09 | Stop reason: HOSPADM

## 2020-02-07 RX ORDER — SODIUM CHLORIDE 0.9 % (FLUSH) 0.9 %
10 SYRINGE (ML) INJECTION
Status: DISCONTINUED | OUTPATIENT
Start: 2020-02-07 | End: 2020-02-09 | Stop reason: HOSPADM

## 2020-02-07 RX ORDER — ALBUTEROL SULFATE 0.83 MG/ML
2.5 SOLUTION RESPIRATORY (INHALATION) EVERY 6 HOURS PRN
Status: ON HOLD | COMMUNITY
End: 2020-02-09 | Stop reason: HOSPADM

## 2020-02-07 RX ORDER — POTASSIUM CHLORIDE 20 MEQ/15ML
60 SOLUTION ORAL
Status: DISCONTINUED | OUTPATIENT
Start: 2020-02-07 | End: 2020-02-09 | Stop reason: HOSPADM

## 2020-02-07 RX ORDER — ACETAMINOPHEN 500 MG
1000 TABLET ORAL EVERY 6 HOURS PRN
Status: DISCONTINUED | OUTPATIENT
Start: 2020-02-07 | End: 2020-02-09 | Stop reason: HOSPADM

## 2020-02-07 RX ORDER — IBUPROFEN 200 MG
24 TABLET ORAL
Status: DISCONTINUED | OUTPATIENT
Start: 2020-02-07 | End: 2020-02-09 | Stop reason: HOSPADM

## 2020-02-07 RX ORDER — LEVETIRACETAM 10 MG/ML
1000 INJECTION INTRAVASCULAR ONCE
Status: COMPLETED | OUTPATIENT
Start: 2020-02-08 | End: 2020-02-07

## 2020-02-07 RX ORDER — ONDANSETRON 2 MG/ML
8 INJECTION INTRAMUSCULAR; INTRAVENOUS EVERY 8 HOURS PRN
Status: DISCONTINUED | OUTPATIENT
Start: 2020-02-07 | End: 2020-02-09 | Stop reason: HOSPADM

## 2020-02-07 RX ORDER — LORAZEPAM 2 MG/ML
2 INJECTION INTRAMUSCULAR ONCE
Status: DISCONTINUED | OUTPATIENT
Start: 2020-02-07 | End: 2020-02-09 | Stop reason: HOSPADM

## 2020-02-07 RX ORDER — ZINC GLUCONATE 13.3 MG
200 LOZENGE ORAL NIGHTLY
Status: ON HOLD | COMMUNITY
End: 2020-02-09 | Stop reason: HOSPADM

## 2020-02-07 RX ORDER — LORAZEPAM 2 MG/ML
2 INJECTION INTRAMUSCULAR EVERY 10 MIN PRN
Status: DISCONTINUED | OUTPATIENT
Start: 2020-02-07 | End: 2020-02-09 | Stop reason: HOSPADM

## 2020-02-07 RX ORDER — LANOLIN ALCOHOL/MO/W.PET/CERES
800 CREAM (GRAM) TOPICAL
Status: DISCONTINUED | OUTPATIENT
Start: 2020-02-07 | End: 2020-02-09 | Stop reason: HOSPADM

## 2020-02-07 RX ORDER — GLUCAGON 1 MG
1 KIT INJECTION
Status: DISCONTINUED | OUTPATIENT
Start: 2020-02-07 | End: 2020-02-09 | Stop reason: HOSPADM

## 2020-02-07 RX ORDER — AMOXICILLIN 250 MG
1 CAPSULE ORAL 2 TIMES DAILY
Status: DISCONTINUED | OUTPATIENT
Start: 2020-02-07 | End: 2020-02-09 | Stop reason: HOSPADM

## 2020-02-07 RX ORDER — OMEPRAZOLE 40 MG/1
20 CAPSULE, DELAYED RELEASE ORAL
COMMUNITY
End: 2022-01-03 | Stop reason: SDUPTHER

## 2020-02-07 RX ADMIN — LEVETIRACETAM INJECTION 1000 MG: 10 INJECTION INTRAVENOUS at 11:02

## 2020-02-07 RX ADMIN — PIPERACILLIN AND TAZOBACTAM 4.5 G: 4; .5 INJECTION, POWDER, LYOPHILIZED, FOR SOLUTION INTRAVENOUS; PARENTERAL at 10:02

## 2020-02-07 RX ADMIN — HEPARIN SODIUM 5000 UNITS: 5000 INJECTION, SOLUTION INTRAVENOUS; SUBCUTANEOUS at 11:02

## 2020-02-07 RX ADMIN — SODIUM CHLORIDE 1000 ML: 0.9 INJECTION, SOLUTION INTRAVENOUS at 07:02

## 2020-02-07 NOTE — ED PROVIDER NOTES
Encounter Date: 2/7/2020    SCRIBE #1 NOTE: IRadha, am scribing for, and in the presence of, Dr. Sewell.       History     Chief Complaint   Patient presents with    Altered Mental Status     pt found confused on floor - last 2/5/20 at noon     2/7/2020  3:32 PM     The patient is a 74 y.o. female  who presents with alerted mental status. The patient was found confused laying on the floor a few minutes ago PTA. EMS reports patient is responding yes or no but is not talking. She was last seen normal 2 days ago at noon. No PMHx of stroke. No indication of being on blood thinners. Reported hx of COPD and gastritis. SHx of hysterectomy.      The history is provided by the patient.     Review of patient's allergies indicates:   Allergen Reactions    Artane Other (See Comments)     Swelling    Ned-1 Swelling     Oragel caused tongue to swell    Adhesive tape-silicones Rash     Blisters after on for several hours    Latex, natural rubber Hives and Rash     Localized    Latex     Morphine Itching and Hives     Past Medical History:   Diagnosis Date    Arthritis     Bell's palsy     Bladder incontinence     Blepharospasm     Cervical dystonia     Depression     Hormone deficiency     Myofacial pain syndrome      Past Surgical History:   Procedure Laterality Date    APPENDECTOMY      BREAST BIOPSY      BREAST LUMPECTOMY      EYE SURGERY      HYSTERECTOMY      NECK SURGERY      TONSILLECTOMY       Family History   Problem Relation Age of Onset    Diabetes Neg Hx     Melanoma Neg Hx     Psoriasis Neg Hx     Lupus Neg Hx     Eczema Neg Hx      Social History     Tobacco Use    Smoking status: Never Smoker    Smokeless tobacco: Never Used   Substance Use Topics    Alcohol use: No    Drug use: No     Review of Systems   Unable to perform ROS: Mental status change       Physical Exam     Initial Vitals [02/07/20 1535]   BP Pulse Resp Temp SpO2   (!) 184/80 82 18 97.8 °F (36.6 °C) 98 %       MAP       --         Physical Exam    Nursing note and vitals reviewed.  Constitutional: No distress.   HENT:   Head: Normocephalic and atraumatic. Head is without abrasion, without contusion and without laceration.   Mouth/Throat: Mucous membranes are normal.   Eyes:   Eyes open. Pupils are 5 mm and reactive bilaterally.   Neck: Neck rigidity present.   Cardiovascular: Normal rate, regular rhythm, normal heart sounds and intact distal pulses. Exam reveals no gallop and no friction rub.    No murmur heard.  Pulmonary/Chest: Breath sounds normal. She has no wheezes. She has no rhonchi. She has no rales.   Musculoskeletal: She exhibits no edema.   Arms decorticate. Legs extended equally.   Neurological: She is alert.   Non verbal. Does not follow commands.   Skin: Skin is dry and intact. No abrasion, no bruising, no ecchymosis, no laceration and no rash noted. No erythema.         ED Course   Lumbar Puncture  Date/Time: 2/7/2020 5:25 PM  Location procedure was performed: [facility] EMERGENCY DEPARTMENT  Performed by: Linwood Sewell MD  Authorized by: Linwood Sewell MD   Assisting provider: Carly Mitchell RN  Pre-operative diagnosis:  ALERTED MENTAL STATUS CHANGE  Post-operative diagnosis: ALERTED MENTAL STATUS CHANGE  Consent Done: Emergent Situation  Anesthesia: local infiltration    Anesthesia:  Local Anesthetic: lidocaine 1% without epinephrine  Anesthetic total: 3 mL  Patient sedated: no  Description of findings: clear fluid   Preparation: Patient was prepped and draped in the usual sterile fashion.  Lumbar space: L4-L5 interspace  Patient's position: sitting  Needle gauge: 22  Needle type: spinal needle - Quincke tip  Needle length: 3.5 in  Number of attempts: 2  Fluid appearance: clear  Tubes of fluid: 4  Total volume: 4.5 ml  Post-procedure: site cleaned and adhesive bandage applied  Technical procedures used: landmarked guided LP  Significant surgical tasks conducted by the assistant(s): patient  position  Complications: No  Estimated blood loss (mL): 0  Specimens: No  Implants: No  Patient tolerance: Patient tolerated the procedure well with no immediate complications        Labs Reviewed   CBC W/ AUTO DIFFERENTIAL - Abnormal; Notable for the following components:       Result Value    WBC 14.40 (*)     Hemoglobin 11.7 (*)     Mean Corpuscular Hemoglobin 25.3 (*)     Mean Corpuscular Hemoglobin Conc 30.5 (*)     RDW 16.6 (*)     Gran # (ANC) 12.1 (*)     Immature Grans (Abs) 0.05 (*)     Lymph # 0.7 (*)     Mono # 1.5 (*)     Gran% 84.0 (*)     Lymph% 5.1 (*)     All other components within normal limits   COMPREHENSIVE METABOLIC PANEL - Abnormal; Notable for the following components:    Glucose 138 (*)     BUN, Bld 34 (*)     Creatinine 1.5 (*)     eGFR if  39 (*)     eGFR if non  34 (*)     All other components within normal limits   URINALYSIS, REFLEX TO URINE CULTURE    Narrative:     Preferred Collection Type->Urine, Catheterized   PROTIME-INR   AMMONIA   DRUG SCREEN PANEL, URINE EMERGENCY     EKG Readings: (Independently Interpreted)   Normal sinus rhythm with a rate of 74. Normal axis and intervals. No ST or T wave changes. No STEMI.       Imaging Results          CT Head Without Contrast (Final result)  Result time 02/07/20 16:03:11    Final result by Clovis Rashid MD (02/07/20 16:03:11)                 Impression:      No acute intracranial process.      Electronically signed by: Clovis Rashid MD  Date:    02/07/2020  Time:    16:03             Narrative:    EXAMINATION:  CT HEAD WITHOUT CONTRAST    CLINICAL HISTORY:  Confusion/delirium, altered LOC, unexplained;    TECHNIQUE:  Low dose axial images were obtained through the head.  Coronal and sagittal reformations were also performed. Contrast was not administered.    COMPARISON:  None.    FINDINGS:  Gray-white differentiation is well maintained.  There is no intracranial hemorrhage.  There is no mass or  midline shift.  The ventricles are nondilated.  The calvarium is intact.  There is moderate degenerative change of the right temporomandibular joint.                                 Medical Decision Making:   History:   Old Medical Records: I decided to obtain old medical records.  Independently Interpreted Test(s):   I have ordered and independently interpreted EKG Reading(s) - see prior notes  Clinical Tests:   Lab Tests: Reviewed and Ordered  Radiological Study: Reviewed and Ordered  Medical Tests: Reviewed and Ordered  ED Management:  Patient was emergently received and assessed upon arrival.  Her eyes are open but she is not following any commands or moving extremities, however, vital signs are stable and she is maintaining her airway.  Screening CT scan is found to be unremarkable. Additional labs including urinary drug screen, CSF studies, CMP, ammonia, urinalysis do not further delineate causes for atypical presentation.  During the lumbar puncture the patient did begin to speak and was able to stand and move all extremities although she appeared globally weak and with lack of balance.  She appears more delirious as the course of the ED observation period progresses.  She does warrant admission close monitoring in the case was discussed with and accepted by the on-call hospitalist.  Decision was made to obtain an MRA of the head to assess for any evidence of intracranial vascular catastrophe in the setting of mild acute kidney injury with reduced GFR.  Family updated on the plan of care and they are in agreement with this disposition and she will be transported to unit bed in guarded condition.            Scribe Attestation:   Scribe #1: I performed the above scribed service and the documentation accurately describes the services I performed. I attest to the accuracy of the note.    Attending Attestation:         Attending Critical Care:   Critical Care Times:   Direct Patient Care (initial evaluation,  reassessments, and time considering the case)................................................................25 minutes.   Additional History from reviewing old medical records or taking additional history from the family, EMS, PCP, etc.......................5 minutes.   Ordering, Reviewing, and Interpreting Diagnostic Studies...............................................................................................................5 minutes.   Documentation..................................................................................................................................................................................5 minutes.   Consultation with other Physicians. .................................................................................................................................................5 minutes.   Consultation with the patient's family directly relating to the patient's condition, care, and DNR status (when patient unable)......5 minutes.   Other..................................................................................................................................................................................................5 minutes.   ==============================================================  · Total Critical Care Time - exclusive of procedural time: 55 minutes.  ==============================================================  Critical care was necessary to treat or prevent imminent or life-threatening deterioration of the following conditions: metabolic crisis, stroke and renal failure.   The following critical care procedures were done by me (see procedure notes): pulse oximetry.   Critical care was time spent personally by me on the following activities: obtaining history from patient or relative, examination of patient, review of old charts, ordering lab, x-rays, and/or EKG, development of treatment plan with patient or relative, ordering and performing  treatments and interventions, evaluation of patient's response to treatment, discussions with primary provider, interpretation of cardiac measurements and re-evaluation of patient's conition.   Critical Care Condition: potentially life-threatening       I, Dr. Linwood Sewell, personally performed the services described in this documentation. All medical record entries made by the scribe were at my direction and in my presence.  I have reviewed the chart and agree that the record reflects my personal performance and is accurate and complete. Linwood Sewell MD.  6:40 PM 02/07/2020                        Clinical Impression:       ICD-10-CM ICD-9-CM   1. Altered mental status R41.82 780.97         Disposition:   Disposition: Admitted  Condition: Serious                     Linwood Sewell MD  02/07/20 1849

## 2020-02-07 NOTE — ED NOTES
Upon arriving to CT, patient opens her eyes with the sound of my voice and is able to whisper her name.

## 2020-02-07 NOTE — ED NOTES
Patient tolerated lumbar puncture. She stood at the side of the bed for position adjustment after LP.

## 2020-02-08 PROBLEM — G93.40 ACUTE ENCEPHALOPATHY: Status: ACTIVE | Noted: 2020-02-08

## 2020-02-08 PROBLEM — G93.41 ENCEPHALOPATHY, METABOLIC: Status: RESOLVED | Noted: 2020-02-07 | Resolved: 2020-02-08

## 2020-02-08 LAB
ALBUMIN SERPL BCP-MCNC: 3.5 G/DL (ref 3.5–5.2)
ALP SERPL-CCNC: 94 U/L (ref 55–135)
ALT SERPL W/O P-5'-P-CCNC: 21 U/L (ref 10–44)
ANION GAP SERPL CALC-SCNC: 7 MMOL/L (ref 8–16)
AST SERPL-CCNC: 37 U/L (ref 10–40)
BASOPHILS # BLD AUTO: 0.01 K/UL (ref 0–0.2)
BASOPHILS NFR BLD: 0.1 % (ref 0–1.9)
BILIRUB SERPL-MCNC: 0.5 MG/DL (ref 0.1–1)
BUN SERPL-MCNC: 31 MG/DL (ref 8–23)
CALCIUM SERPL-MCNC: 8.7 MG/DL (ref 8.7–10.5)
CHLORIDE SERPL-SCNC: 109 MMOL/L (ref 95–110)
CO2 SERPL-SCNC: 27 MMOL/L (ref 23–29)
CREAT SERPL-MCNC: 1 MG/DL (ref 0.5–1.4)
DIFFERENTIAL METHOD: ABNORMAL
EOSINOPHIL # BLD AUTO: 0 K/UL (ref 0–0.5)
EOSINOPHIL NFR BLD: 0.5 % (ref 0–8)
ERYTHROCYTE [DISTWIDTH] IN BLOOD BY AUTOMATED COUNT: 16.6 % (ref 11.5–14.5)
EST. GFR  (AFRICAN AMERICAN): >60 ML/MIN/1.73 M^2
EST. GFR  (NON AFRICAN AMERICAN): 55 ML/MIN/1.73 M^2
GLUCOSE SERPL-MCNC: 122 MG/DL (ref 70–110)
HCT VFR BLD AUTO: 33.7 % (ref 37–48.5)
HGB BLD-MCNC: 10 G/DL (ref 12–16)
IMM GRANULOCYTES # BLD AUTO: 0.01 K/UL (ref 0–0.04)
LYMPHOCYTES # BLD AUTO: 1 K/UL (ref 1–4.8)
LYMPHOCYTES NFR BLD: 11.8 % (ref 18–48)
MAGNESIUM SERPL-MCNC: 2.4 MG/DL (ref 1.6–2.6)
MCH RBC QN AUTO: 24.7 PG (ref 27–31)
MCHC RBC AUTO-ENTMCNC: 29.7 G/DL (ref 32–36)
MCV RBC AUTO: 83 FL (ref 82–98)
MONOCYTES # BLD AUTO: 0.9 K/UL (ref 0.3–1)
MONOCYTES NFR BLD: 11.1 % (ref 4–15)
NEUTROPHILS # BLD AUTO: 6.2 K/UL (ref 1.8–7.7)
NEUTROPHILS NFR BLD: 76.4 % (ref 38–73)
NRBC BLD-RTO: 0 /100 WBC
PHOSPHATE SERPL-MCNC: 2.9 MG/DL (ref 2.7–4.5)
PLATELET # BLD AUTO: 183 K/UL (ref 150–350)
PMV BLD AUTO: 9.6 FL (ref 9.2–12.9)
POCT GLUCOSE: 106 MG/DL (ref 70–110)
POCT GLUCOSE: 113 MG/DL (ref 70–110)
POCT GLUCOSE: 127 MG/DL (ref 70–110)
POTASSIUM SERPL-SCNC: 4.4 MMOL/L (ref 3.5–5.1)
PROT SERPL-MCNC: 6.3 G/DL (ref 6–8.4)
RBC # BLD AUTO: 4.05 M/UL (ref 4–5.4)
SODIUM SERPL-SCNC: 143 MMOL/L (ref 136–145)
WBC # BLD AUTO: 8.12 K/UL (ref 3.9–12.7)

## 2020-02-08 PROCEDURE — 96372 THER/PROPH/DIAG INJ SC/IM: CPT

## 2020-02-08 PROCEDURE — 25000003 PHARM REV CODE 250: Performed by: INTERNAL MEDICINE

## 2020-02-08 PROCEDURE — 83735 ASSAY OF MAGNESIUM: CPT

## 2020-02-08 PROCEDURE — 36415 COLL VENOUS BLD VENIPUNCTURE: CPT

## 2020-02-08 PROCEDURE — 63600175 PHARM REV CODE 636 W HCPCS: Performed by: PSYCHIATRY & NEUROLOGY

## 2020-02-08 PROCEDURE — 94761 N-INVAS EAR/PLS OXIMETRY MLT: CPT

## 2020-02-08 PROCEDURE — 85025 COMPLETE CBC W/AUTO DIFF WBC: CPT

## 2020-02-08 PROCEDURE — 63600175 PHARM REV CODE 636 W HCPCS: Performed by: INTERNAL MEDICINE

## 2020-02-08 PROCEDURE — 12000002 HC ACUTE/MED SURGE SEMI-PRIVATE ROOM

## 2020-02-08 PROCEDURE — 84100 ASSAY OF PHOSPHORUS: CPT

## 2020-02-08 PROCEDURE — 63600175 PHARM REV CODE 636 W HCPCS: Performed by: NURSE PRACTITIONER

## 2020-02-08 PROCEDURE — 80053 COMPREHEN METABOLIC PANEL: CPT

## 2020-02-08 PROCEDURE — 96376 TX/PRO/DX INJ SAME DRUG ADON: CPT

## 2020-02-08 PROCEDURE — 99900035 HC TECH TIME PER 15 MIN (STAT)

## 2020-02-08 RX ORDER — BACLOFEN 10 MG/1
20 TABLET ORAL 3 TIMES DAILY
Status: DISCONTINUED | OUTPATIENT
Start: 2020-02-08 | End: 2020-02-09

## 2020-02-08 RX ORDER — AMLODIPINE BESYLATE 2.5 MG/1
2.5 TABLET ORAL DAILY
Status: DISCONTINUED | OUTPATIENT
Start: 2020-02-08 | End: 2020-02-09 | Stop reason: HOSPADM

## 2020-02-08 RX ORDER — GABAPENTIN 300 MG/1
600 CAPSULE ORAL 3 TIMES DAILY
Status: DISCONTINUED | OUTPATIENT
Start: 2020-02-08 | End: 2020-02-09

## 2020-02-08 RX ORDER — MONTELUKAST SODIUM 10 MG/1
10 TABLET ORAL NIGHTLY
Status: DISCONTINUED | OUTPATIENT
Start: 2020-02-08 | End: 2020-02-09 | Stop reason: HOSPADM

## 2020-02-08 RX ORDER — TRAZODONE HYDROCHLORIDE 50 MG/1
100 TABLET ORAL NIGHTLY
Status: DISCONTINUED | OUTPATIENT
Start: 2020-02-08 | End: 2020-02-09 | Stop reason: HOSPADM

## 2020-02-08 RX ORDER — BENAZEPRIL HYDROCHLORIDE 10 MG/1
40 TABLET ORAL DAILY
Status: DISCONTINUED | OUTPATIENT
Start: 2020-02-08 | End: 2020-02-09 | Stop reason: HOSPADM

## 2020-02-08 RX ORDER — DICLOFENAC SODIUM 10 MG/G
4 GEL TOPICAL DAILY
Status: DISCONTINUED | OUTPATIENT
Start: 2020-02-08 | End: 2020-02-09 | Stop reason: HOSPADM

## 2020-02-08 RX ADMIN — PIPERACILLIN AND TAZOBACTAM 4.5 G: 4; .5 INJECTION, POWDER, LYOPHILIZED, FOR SOLUTION INTRAVENOUS; PARENTERAL at 06:02

## 2020-02-08 RX ADMIN — GABAPENTIN 600 MG: 300 CAPSULE ORAL at 03:02

## 2020-02-08 RX ADMIN — HEPARIN SODIUM 5000 UNITS: 5000 INJECTION, SOLUTION INTRAVENOUS; SUBCUTANEOUS at 05:02

## 2020-02-08 RX ADMIN — BENAZEPRIL HYDROCHLORIDE 40 MG: 10 TABLET, COATED ORAL at 02:02

## 2020-02-08 RX ADMIN — HEPARIN SODIUM 5000 UNITS: 5000 INJECTION, SOLUTION INTRAVENOUS; SUBCUTANEOUS at 09:02

## 2020-02-08 RX ADMIN — BACLOFEN 20 MG: 10 TABLET ORAL at 09:02

## 2020-02-08 RX ADMIN — GABAPENTIN 600 MG: 300 CAPSULE ORAL at 09:02

## 2020-02-08 RX ADMIN — DEXTROSE 250 MG: 50 INJECTION, SOLUTION INTRAVENOUS at 09:02

## 2020-02-08 RX ADMIN — ACETAMINOPHEN 1000 MG: 500 TABLET ORAL at 05:02

## 2020-02-08 RX ADMIN — MONTELUKAST 10 MG: 10 TABLET, FILM COATED ORAL at 09:02

## 2020-02-08 RX ADMIN — TRAZODONE HYDROCHLORIDE 100 MG: 50 TABLET ORAL at 09:02

## 2020-02-08 RX ADMIN — HEPARIN SODIUM 5000 UNITS: 5000 INJECTION, SOLUTION INTRAVENOUS; SUBCUTANEOUS at 02:02

## 2020-02-08 RX ADMIN — BACLOFEN 20 MG: 10 TABLET ORAL at 03:02

## 2020-02-08 RX ADMIN — SENNOSIDES AND DOCUSATE SODIUM 1 TABLET: 8.6; 5 TABLET ORAL at 09:02

## 2020-02-08 RX ADMIN — PIPERACILLIN AND TAZOBACTAM 4.5 G: 4; .5 INJECTION, POWDER, LYOPHILIZED, FOR SOLUTION INTRAVENOUS; PARENTERAL at 04:02

## 2020-02-08 RX ADMIN — Medication 9 MG: at 09:02

## 2020-02-08 RX ADMIN — PIPERACILLIN AND TAZOBACTAM 4.5 G: 4; .5 INJECTION, POWDER, LYOPHILIZED, FOR SOLUTION INTRAVENOUS; PARENTERAL at 11:02

## 2020-02-08 NOTE — EICU
eICU Note : New Admit :notified by the [facility]: New Admission Serotonin syndrome  Brief HPI:  74-year-old female with past medical history of COPD and gastritis, past history of hysterectomy ,other history significant for depression, bipolar disorder, immune deficiency, osteoarthritis, myofascial pain syndrome bilateral cataract was brought to the emergency room for EMS , per emergency room doctor, patient was not following commands, she was seen normally 2 days ago at noon.  No past medical history of stroke.  Patient is on baclofen 20 mg 3 times a day, Trazodone, Effexor, Venlafaxine    Vital Signs :  Pulse rate is 82/min, blood pressure 130/55, temperature 97.8, O2 saturation 87%.      Camera Assessment : Patient lying in bed staff at bedside in no apparent distress      Data:  WBC 14.4, hemoglobin 11.7, hematocrit 38.4, platelets 204  Sodium 144, potassium 4.4, chloride 106, CO2 25, anion gap 13, BUN 34, creatinine 1.5, EGFR 34, glucose 138, albumin 4.2, ammonia 24 hour cholesterol 198  Urine analysis negative    Impression and recommendations:  1.  Mental status changes: EEG in progress, per notes from admitting hospitalist patient has hyperreflexia and it is suspected that patient has serotonin syndrome..  Currently started on Ativan when necessary  2.  CKD stage IV.  Creatinine clearance 33.9 mL/min based on serum creatinine of 1.5 mg/dL.  3. Enterocolitis: Started on Zosyn  4. h/o  Hypertension: Current blood pressure is normal, on Amlodipine  2.5 mg daily  5. Bipolar disorder with a single manic episode, Venalafaxine  on hold  6.PUD, DVT Prophylaxis:SCD and PPI      Edie Quigley M.D  eICU Physician

## 2020-02-08 NOTE — H&P
[facility] Hospital Medicine  History & Physical    Patient Name: Marleen Samano  MRN: [id number]  Admission Date: 2/7/2020  Attending Physician: Kenan Guillen MD   Primary Care Provider: De Gomes MD         Patient information was obtained from patient and ER records.     Subjective:     Principal Problem:<principal problem not specified>    Chief Complaint:   Chief Complaint   Patient presents with    Altered Mental Status     pt found confused on floor - last 2/5/20 at noon        HPI: The patient is a 74 y.o. female with past medical history of cervical dystonia, depression, bipolar disorder, immune deficiency, osteoarthritis, myofascial pain syndrome, bilateral cataract was brought to the emergency room   for alerted mental status. The patient was found confused laying on the floor by the daughter.  The friend was at bedside the states that the patient spoke to her the night before and was complaining of a difficulty passing stools.  She is not complaining of any headaches or any other neurological deficit at that point.  In addition the friend states that Patient appeared slightly confused over the phone.  Patient is unable to provide any history as she is confused at this point.  As per the EMS and the ER physician report patient was not responding to commands and responding yes or no but was not talking. She was last seen normal 2 days ago at noon.  No recent new neurological deficits or any symptoms as per the family.  No PMHx of stroke. No indication of being on blood thinners. Reported hx of COPD and gastritis. SHx of hysterectomy.        The history is provided by the patient.      Past Medical History:   Diagnosis Date    Arthritis     Bell's palsy     Bladder incontinence     Blepharospasm     Cervical dystonia     Depression     Hormone deficiency     Myofacial pain syndrome        Past Surgical History:   Procedure Laterality Date    APPENDECTOMY      BREAST  BIOPSY      BREAST LUMPECTOMY      EYE SURGERY      HYSTERECTOMY      NECK SURGERY      TONSILLECTOMY         Review of patient's allergies indicates:   Allergen Reactions    Artane Other (See Comments)     Swelling    Ned-1 Swelling     Oragel caused tongue to swell    Adhesive tape-silicones Rash     Blisters after on for several hours    Latex, natural rubber Hives and Rash     Localized    Latex     Morphine Itching and Hives       Current Facility-Administered Medications on File Prior to Encounter   Medication    onabotulinumtoxina injection 200 Units     Current Outpatient Medications on File Prior to Encounter   Medication Sig    albuterol (PROVENTIL) 2.5 mg /3 mL (0.083 %) nebulizer solution Take 2.5 mg by nebulization every 6 (six) hours as needed for Wheezing. Rescue    albuterol (PROVENTIL/VENTOLIN HFA) 90 mcg/actuation inhaler Inhale 1-2 puffs into the lungs every 6 (six) hours as needed for Wheezing. Rescue    baclofen (LIORESAL) 20 MG tablet Take 1 tablet (20 mg total) by mouth 3 (three) times daily. (Patient taking differently: Take 20 mg by mouth every morning. )    benazepril (LOTENSIN) 40 MG tablet Take 40 mg by mouth once daily.    cimetidine (TAGAMET) 200 MG tablet Take 200 mg by mouth nightly.    diclofenac sodium (VOLTAREN) 1 % Gel Apply topically.     gabapentin (NEURONTIN) 600 MG tablet Take 1 tablet PO in AM, noon and then 2 tablets in the evening    levalbuterol (XOPENEX HFA) 45 mcg/actuation inhaler Inhale 2 puffs into the lungs every 6 (six) hours as needed for Wheezing. Rescue    magnesium oxide (MAG-OX) 400 mg tablet Take 1 tablet (400 mg total) by mouth 2 (two) times daily.    montelukast (SINGULAIR) 10 mg tablet Take 1 tablet (10 mg total) by mouth every evening.    omeprazole (PRILOSEC) 40 MG capsule Take 40 mg by mouth 2 (two) times daily before meals.    trazodone (DESYREL) 100 MG tablet Take 100 mg by mouth every evening.      venlafaxine (EFFEXOR-XR)  150 MG 24 hr capsule Take 150 mg by mouth 2 (two) times daily.      amLODIPine (NORVASC) 2.5 MG tablet     [DISCONTINUED] amoxicillin-clavulanate 875-125mg (AUGMENTIN) 875-125 mg per tablet Take 1 tablet by mouth 2 (two) times daily. (Patient not taking: Reported on 10/17/2019)    [DISCONTINUED] benazepril (LOTENSIN) 10 MG tablet Take 40 mg by mouth once daily.     [DISCONTINUED] budesonide-formoterol 160-4.5 mcg (SYMBICORT) 160-4.5 mcg/actuation HFAA Inhale 2 puffs into the lungs every 12 (twelve) hours. Controller (Patient not taking: Reported on 10/17/2019)    [DISCONTINUED] FLUZONE HIGH-DOSE 2019-20, PF, 180 mcg/0.5 mL Syrg PHARMACIST ADMINISTERED IMMUNIZATION ADMINISTERED AT TIME OF DISPENSING    [DISCONTINUED] omeprazole (PRILOSEC) 20 MG capsule     [DISCONTINUED] onabotulinumtoxinA (BOTOX INJ) Botox   eyes, face & neck q 8 weeks    [DISCONTINUED] ondansetron (ZOFRAN-ODT) 4 MG TbDL Take 1 tablet (4 mg total) by mouth every 8 (eight) hours as needed.    [DISCONTINUED] predniSONE (DELTASONE) 20 MG tablet Take one daily for 3 days and may repeat for shortness of breath. (Patient not taking: Reported on 10/17/2019)     Family History     None        Tobacco Use    Smoking status: Never Smoker    Smokeless tobacco: Never Used   Substance and Sexual Activity    Alcohol use: No    Drug use: No    Sexual activity: Not on file     Review of Systems   Unable to perform ROS: Mental status change     Objective:     Vital Signs (Most Recent):  Temp: 97.8 °F (36.6 °C) (02/07/20 1535)  Pulse: 82 (02/07/20 1932)  Resp: 18 (02/07/20 1535)  BP: (!) 130/55 (02/07/20 1932)  SpO2: 97 % (02/07/20 1932) Vital Signs (24h Range):  Temp:  [97.8 °F (36.6 °C)] 97.8 °F (36.6 °C)  Pulse:  [74-89] 82  Resp:  [18] 18  SpO2:  [96 %-99 %] 97 %  BP: (130-184)/(55-80) 130/55     Weight: 77.1 kg (170 lb)  Body mass index is 25.1 kg/m².    Physical Exam   Constitutional: She appears well-developed and well-nourished. She appears  lethargic. No distress.   HENT:   Head: Normocephalic and atraumatic.   Right Ear: External ear normal.   Left Ear: External ear normal.   Nose: Nose normal.   Mouth/Throat: Oropharynx is clear and moist.   Eyes: Pupils are equal, round, and reactive to light. Conjunctivae and EOM are normal. Right eye exhibits no discharge. Left eye exhibits no discharge. No scleral icterus.   Neck: Normal range of motion. Neck supple. No thyromegaly present.   Cardiovascular: Normal rate, regular rhythm, normal heart sounds and intact distal pulses.   No murmur heard.  Pulmonary/Chest: Effort normal and breath sounds normal. No stridor. No respiratory distress. She has no wheezes. She has no rales.   Abdominal: Soft. Bowel sounds are normal. She exhibits no distension. There is tenderness.   Musculoskeletal: She exhibits no edema or tenderness.   Lymphadenopathy:     She has no cervical adenopathy.   Neurological: She appears lethargic. No cranial nerve deficit. She exhibits abnormal muscle tone. She displays seizure activity. Coordination abnormal.   Reflex Scores:       Bicep reflexes are 3+ on the right side and 3+ on the left side.  Difficult to assess neurological status due to patient's current mental status.  Patient patient appears confused not oriented to time place person.  Recognizes family members intermittently but unable to answer their questions or say their names.   Reflexes appear exaggerated.  Babinski was mute   Skin: Skin is warm and dry. Capillary refill takes less than 2 seconds. No rash noted. She is not diaphoretic. No erythema.   Psychiatric: She has a normal mood and affect. Her behavior is normal. Judgment and thought content normal.         CRANIAL NERVES     CN III, IV, VI   Pupils are equal, round, and reactive to light.  Extraocular motions are normal.        Significant Labs:   BMP:   Recent Labs   Lab 02/07/20  1600   *      K 4.4      CO2 25   BUN 34*   CREATININE 1.5*   CALCIUM  9.4     CBC:   Recent Labs   Lab 02/07/20  1600   WBC 14.40*   HGB 11.7*   HCT 38.4          Significant Imaging: I have reviewed all pertinent imaging results/findings within the past 24 hours.    Assessment/Plan:     Encephalopathy, metabolic  Unclear about the cause  Differential at this point is status epilepticus  Discussed with the Neurology patient will need continues EEG  Will follow-up with MRI MRA  Other differential is serotonin syndrome due to the her symptoms and history of venlafaxine  Due to the myoclonus myoclonus, hyperreflexia, and bilateral Babinski sign noted on the exam will consider of serotonin syndrome  Will give the patient Ativan 2 mg p.r.n. Every 10 min still the symptoms resolved  Discussed with Serotonin syndrome phone line to discuss about the possible serotonin.  We states that it is less likely but if the patient has febrile episodes to empirically treat with cypraheptadine    CKD (chronic kidney disease), stage IV  Creatine stable for now. BMP reviewed- noted Estimated Creatinine Clearance: 33.9 mL/min (A) (based on SCr of 1.5 mg/dL (H)). according to latest data. Monitor UOP and serial BMP and adjust therapy as needed. Renally dose meds.            Enterocolitis  Will start the patient on Zosyn  Will continue to monitor      Chronic obstructive lung disease  Will continue albuterol p.r.n. for wheezing and shortness of breath      Essential hypertension  Currently patient NPO we keep up p.r.n. hydralazine for blood pressure over 180      Bipolar I disorder, single manic episode  Will hold venlafaxine due to high suspicion of serotonin syndrome      Immune deficiency disorder  History noted      Cervical dystonia  History noted      S/P total knee arthroplasty  History noted      Osteoarthritis of knee  History noted      Arthritis of knee  History noted will continue Tylenol for pain control        VTE Risk Mitigation (From admission, onward)    None        Critical care time  spent on the evaluation and treatment of severe organ dysfunction, review of pertinent labs and imaging studies, discussions with consulting providers and discussions with patient/family: 60 minutes.     Kenan Guillen MD  Department of Hospital Medicine   [facility]

## 2020-02-08 NOTE — ASSESSMENT & PLAN NOTE
Will hold venlafaxine due to possible serotonin syndrome  Will get the psych inpatient consult evaluated to evaluate and recommendations

## 2020-02-08 NOTE — CONSULTS
"  [facility]  Adult Nutrition  Consult Note    SUMMARY     Recommendations    1. If diet unable to be advanced within 24 hours recommend to initiate TF rec:   -Isosource 1.5 at 10mL/hr and advance as tolerated to a goal rate of 40 mL/hr.   -This will provide 1440 calories, 65 grams of protein, and 746 mL of free water.   2. If free water flush desired recommend 175 mL QID daily.   -Hold TF for n/v/abd discomfort, residuals >500 ml.   3. RD to monitor progress.   4. SLP consult.   Goals: Pt to recieve nutrition by next RD visit.   Nutrition Goal Status: new  Communication of RD Recs: (plan of care)    Reason for Assessment    Reason For Assessment: consult(NPO)  Diagnosis: (AMS)  Relevant Medical History: enterocolitis, metabolic encephalopathy, bladder incontinence, CKD 4, HTN, COPD  General Information Comments: RD assessing remotely. Pt found unconscious on 2/5. Currently NPO and with confusion but tolerating sips of water. SLP evaluation needed. Will provide TF recs. No wt loss found in chart. NFPE not able to be completed.   Nutrition Discharge Planning: Too soon to determine    Nutrition Risk Screen    Nutrition Risk Screen: no indicators present    Nutrition/Diet History    Spiritual, Cultural Beliefs, Yazidism Practices, Values that Affect Care: no  Factors Affecting Nutritional Intake: NPO    Anthropometrics    Temp: 98.3 °F (36.8 °C)  Height Method: Estimated  Height: 5' 9" (175.3 cm)  Height (inches): 69 in  Weight Method: Bed Scale  Weight: 75.6 kg (166 lb 10.7 oz)  Weight (lb): 166.67 lb  Ideal Body Weight (IBW), Female: 145 lb  % Ideal Body Weight, Female (lb): 117.24 %  BMI (Calculated): 24.6  BMI Grade: 18.5-24.9 - normal    Lab/Procedures/Meds    Pertinent Labs Reviewed: reviewed  Pertinent Labs Comments: BUN 31, GFR 55, Glu 122  Pertinent Medications Reviewed: reviewed  Pertinent Medications Comments: heparin, senna    Estimated/Assessed Needs    Weight Used For Calorie " Calculations: 75.6 kg (166 lb 10.7 oz)  Energy Calorie Requirements (kcal): 1643 kcal  Energy Need Method: Brazos-St Jeor(x 1.25)  Protein Requirements: 61-75 gm daily  Weight Used For Protein Calculations: 75.6 kg (166 lb 10.7 oz)(0.8-1.0 gm/kg)  Fluid Requirements (mL): 1 mL per kcal or per MD  Estimated Fluid Requirement Method: RDA Method  RDA Method (mL): 1643    Nutrition Prescription Ordered    Current Diet Order: NPO    Evaluation of Received Nutrient/Fluid Intake    % Intake of Estimated Energy Needs: 0 - 25 %  % Meal Intake: NPO    Nutrition Risk    Level of Risk/Frequency of Follow-up: high(f/u 2x/weekly)     Assessment and Plan    Nutrition Problem  Inadequate energy intake    Related to (etiology):   Altered mental status    Signs and Symptoms (as evidenced by):   NPO with no alternate means of nutrition in place    Interventions:  Collaboration with other providers  Enteral nutrition infusion    Nutrition Diagnosis Status:   New    Monitor and Evaluation    Food and Nutrient Intake: energy intake  Food and Nutrient Adminstration: enteral and parenteral nutrition administration, diet order  Physical Activity and Function: nutrition-related ADLs and IADLs  Anthropometric Measurements: weight change, weight  Biochemical Data, Medical Tests and Procedures: glucose/endocrine profile, inflammatory profile     Malnutrition Assessment     Covering remotely. Unable to be completed.    Nutrition Follow-Up    RD Follow-up?: Yes

## 2020-02-08 NOTE — PROGRESS NOTES
[facility] Hospital Medicine  Progress Note    Patient Name: Marleen Samano  MRN: [id number]  Patient Class: IP- Inpatient   Admission Date: 2/7/2020  Length of Stay: 0 days  Attending Physician: Kenan Guillen MD  Primary Care Provider: De Gomes MD        Subjective:     Principal Problem:Acute encephalopathy        HPI:  The patient is a 74 y.o. female with past medical history of cervical dystonia, depression, bipolar disorder, immune deficiency, osteoarthritis, myofascial pain syndrome, bilateral cataract was brought to the emergency room   for alerted mental status. The patient was found confused laying on the floor by the daughter.  The friend was at bedside the states that the patient spoke to her the night before and was complaining of a difficulty passing stools.  She is not complaining of any headaches or any other neurological deficit at that point.  In addition the friend states that Patient appeared slightly confused over the phone.  Patient is unable to provide any history as she is confused at this point.  As per the EMS and the ER physician report patient was not responding to commands and responding yes or no but was not talking. She was last seen normal 2 days ago at noon.  No recent new neurological deficits or any symptoms as per the family.  No PMHx of stroke. No indication of being on blood thinners. Reported hx of COPD and gastritis. SHx of hysterectomy.        The history is provided by the patient.      Overview/Hospital Course:  No notes on file    Interval History:  Patient is much more alert continues to be slightly confused.  But orientation is improved significantly.  Patient denies any hand symptoms states that she feels much better    Review of Systems   Unable to perform ROS: Mental status change   Constitutional: Negative for appetite change, chills, fatigue and fever.   HENT: Negative for congestion, hearing loss, rhinorrhea, sore throat, trouble  swallowing and voice change.    Respiratory: Negative for cough, chest tightness, shortness of breath and wheezing.    Cardiovascular: Negative for chest pain, palpitations and leg swelling.   Gastrointestinal: Negative for abdominal pain, blood in stool, diarrhea, nausea and vomiting.   Genitourinary: Negative for difficulty urinating, frequency, hematuria and urgency.   Musculoskeletal: Positive for arthralgias. Negative for back pain, joint swelling and neck stiffness.   Skin: Negative for pallor and rash.   Neurological: Negative for tremors, seizures, syncope, speech difficulty, weakness, numbness and headaches.   Hematological: Negative for adenopathy.   Psychiatric/Behavioral: Positive for confusion. Negative for agitation, behavioral problems and sleep disturbance.     Objective:     Vital Signs (Most Recent):  Temp: 97.6 °F (36.4 °C) (02/08/20 1200)  Pulse: 90 (02/08/20 1330)  Resp: (!) 22 (02/08/20 1330)  BP: 133/60 (02/08/20 1330)  SpO2: 97 % (02/08/20 1330) Vital Signs (24h Range):  Temp:  [97.6 °F (36.4 °C)-98.5 °F (36.9 °C)] 97.6 °F (36.4 °C)  Pulse:  [74-94] 90  Resp:  [7-48] 22  SpO2:  [82 %-99 %] 97 %  BP: ()/(47-87) 133/60     Weight: 75.6 kg (166 lb 10.7 oz)  Body mass index is 24.61 kg/m².    Intake/Output Summary (Last 24 hours) at 2/8/2020 1400  Last data filed at 2/8/2020 1200  Gross per 24 hour   Intake 830 ml   Output 1350 ml   Net -520 ml      Physical Exam   Constitutional: She is oriented to person, place, and time. She appears well-developed and well-nourished. No distress.   HENT:   Head: Normocephalic and atraumatic.   Nose: Nose normal.   Mouth/Throat: Oropharynx is clear and moist.   Eyes: Pupils are equal, round, and reactive to light. Conjunctivae and EOM are normal. Right eye exhibits no discharge. Left eye exhibits no discharge. No scleral icterus.   Neck: Normal range of motion. Neck supple. No thyromegaly present.   Cardiovascular: Normal rate, regular rhythm, normal  heart sounds and intact distal pulses.   No murmur heard.  Pulmonary/Chest: Effort normal and breath sounds normal. No stridor. No respiratory distress. She has no wheezes. She has no rales.   Abdominal: Soft. Bowel sounds are normal. She exhibits no distension. There is tenderness.   Musculoskeletal: Normal range of motion. She exhibits no edema or tenderness.   Lymphadenopathy:     She has no cervical adenopathy.   Neurological: She is alert and oriented to person, place, and time. She displays normal reflexes. No cranial nerve deficit. She exhibits normal muscle tone. She displays seizure activity. Coordination normal.   Reflex Scores:       Bicep reflexes are 3+ on the right side and 3+ on the left side.  Skin: Skin is warm and dry. Capillary refill takes less than 2 seconds. No rash noted. She is not diaphoretic. No erythema.   Psychiatric: She has a normal mood and affect. Her behavior is normal. Judgment and thought content normal.   Nursing note and vitals reviewed.      Significant Labs:   BMP:   Recent Labs   Lab 02/08/20  0403   *      K 4.4      CO2 27   BUN 31*   CREATININE 1.0   CALCIUM 8.7   MG 2.4     CBC:   Recent Labs   Lab 02/07/20  1600 02/08/20  0403   WBC 14.40* 8.12   HGB 11.7* 10.0*   HCT 38.4 33.7*    183       Significant Imaging: I have reviewed all pertinent imaging results/findings within the past 24 hours.      Assessment/Plan:      * Acute encephalopathy  Improved  Patient appears slightly confused but currently oriented time place person  Most likely secondary to baclofen withdrawal vs venlafaxine overdose.  Unclear if there is a component of serotonin syndrome  Consulted psych will follow-up with the recommendation  Will restart baclofen and will hold venlafaxine for now        Enterocolitis  Will start the patient on Zosyn  Will continue to monitor      CKD (chronic kidney disease), stage IV  Creatine stable for now. BMP reviewed- noted Estimated Creatinine  Clearance: 50.8 mL/min (based on SCr of 1 mg/dL). according to latest data. Monitor UOP and serial BMP and adjust therapy as needed. Renally dose meds.            Chronic obstructive lung disease  Will continue albuterol p.r.n. for wheezing and shortness of breath      Essential hypertension  Will restart patient's home medications  P.r.n. hydralazine      Bipolar I disorder, single manic episode  Will hold venlafaxine due to possible serotonin syndrome  Will get the psych inpatient consult evaluated to evaluate and recommendations      Bronchiectasis without complication  History noted      Immune deficiency disorder  History noted      Cervical dystonia  History noted      Blepharospasm  History noted      S/P total knee arthroplasty  History noted      Osteoarthritis of knee  History noted      Arthritis of knee  History noted will continue Tylenol for pain control        VTE Risk Mitigation (From admission, onward)         Ordered     heparin (porcine) injection 5,000 Units  Every 8 hours      02/07/20 2239     IP VTE HIGH RISK PATIENT  Once      02/07/20 2213     Place sequential compression device  Until discontinued      02/07/20 2213                Critical care time spent on the evaluation and treatment of severe organ dysfunction, review of pertinent labs and imaging studies, discussions with consulting providers and discussions with patient/family: 60 minutes.      Kenan Guillen MD  Department of Hospital Medicine   [facility]

## 2020-02-08 NOTE — PLAN OF CARE
Recommendations    1. If diet unable to be advanced within 24 hours recommend to initiate TF rec:   -Isosource 1.5 at 10mL/hr and advance as tolerated to a goal rate of 40 mL/hr.   -This will provide 1440 calories, 65 grams of protein, and 746 mL of free water.   2. If free water flush desired recommend 175 mL QID daily.   -Hold TF for n/v/abd discomfort, residuals >500 ml.   3. RD to monitor progress.   4. SLP consult.   Goals: Pt to recieve nutrition by next RD visit.   Nutrition Goal Status: new

## 2020-02-08 NOTE — NURSING
Patient admitted to ICU. Complains of no pain or discomfort at this time. Afebrile. Alert. [place] to self only. Face symmetrical. Moves extremities X4. Arrived to unit per hospital bed. Family in Lobby. Unaware of patient current medications. Daughter Lexis states she will bring medications in AM for verification. Daughter reports that patient lives alone at home. EEG at bedside in progress at this time. Twitching noted to patient extremities X4. Follows commands intermittently. No apparent distress or discomfort at this time.     1007 PM patient lethargic/difficult to arouse. awakens to repeated vigorous stimuli. Note ativan order held at this time related to lethargy. POOL Dan NP made aware. Continuous EEG stopped per Dr. Collazo/EEG tech. Pupils reactive to light. Only able to keep aroused for short periods. No signs of discomfort. Safety intact.

## 2020-02-08 NOTE — SUBJECTIVE & OBJECTIVE
Interval History:  Patient is much more alert continues to be slightly confused.  But orientation is improved significantly.  Patient denies any hand symptoms states that she feels much better    Review of Systems   Unable to perform ROS: Mental status change   Constitutional: Negative for appetite change, chills, fatigue and fever.   HENT: Negative for congestion, hearing loss, rhinorrhea, sore throat, trouble swallowing and voice change.    Respiratory: Negative for cough, chest tightness, shortness of breath and wheezing.    Cardiovascular: Negative for chest pain, palpitations and leg swelling.   Gastrointestinal: Negative for abdominal pain, blood in stool, diarrhea, nausea and vomiting.   Genitourinary: Negative for difficulty urinating, frequency, hematuria and urgency.   Musculoskeletal: Positive for arthralgias. Negative for back pain, joint swelling and neck stiffness.   Skin: Negative for pallor and rash.   Neurological: Negative for tremors, seizures, syncope, speech difficulty, weakness, numbness and headaches.   Hematological: Negative for adenopathy.   Psychiatric/Behavioral: Positive for confusion. Negative for agitation, behavioral problems and sleep disturbance.     Objective:     Vital Signs (Most Recent):  Temp: 97.6 °F (36.4 °C) (02/08/20 1200)  Pulse: 90 (02/08/20 1330)  Resp: (!) 22 (02/08/20 1330)  BP: 133/60 (02/08/20 1330)  SpO2: 97 % (02/08/20 1330) Vital Signs (24h Range):  Temp:  [97.6 °F (36.4 °C)-98.5 °F (36.9 °C)] 97.6 °F (36.4 °C)  Pulse:  [74-94] 90  Resp:  [7-48] 22  SpO2:  [82 %-99 %] 97 %  BP: ()/(47-87) 133/60     Weight: 75.6 kg (166 lb 10.7 oz)  Body mass index is 24.61 kg/m².    Intake/Output Summary (Last 24 hours) at 2/8/2020 1400  Last data filed at 2/8/2020 1200  Gross per 24 hour   Intake 830 ml   Output 1350 ml   Net -520 ml      Physical Exam   Constitutional: She is oriented to person, place, and time. She appears well-developed and well-nourished. No distress.    HENT:   Head: Normocephalic and atraumatic.   Nose: Nose normal.   Mouth/Throat: Oropharynx is clear and moist.   Eyes: Pupils are equal, round, and reactive to light. Conjunctivae and EOM are normal. Right eye exhibits no discharge. Left eye exhibits no discharge. No scleral icterus.   Neck: Normal range of motion. Neck supple. No thyromegaly present.   Cardiovascular: Normal rate, regular rhythm, normal heart sounds and intact distal pulses.   No murmur heard.  Pulmonary/Chest: Effort normal and breath sounds normal. No stridor. No respiratory distress. She has no wheezes. She has no rales.   Abdominal: Soft. Bowel sounds are normal. She exhibits no distension. There is tenderness.   Musculoskeletal: Normal range of motion. She exhibits no edema or tenderness.   Lymphadenopathy:     She has no cervical adenopathy.   Neurological: She is alert and oriented to person, place, and time. She displays normal reflexes. No cranial nerve deficit. She exhibits normal muscle tone. She displays seizure activity. Coordination normal.   Reflex Scores:       Bicep reflexes are 3+ on the right side and 3+ on the left side.  Skin: Skin is warm and dry. Capillary refill takes less than 2 seconds. No rash noted. She is not diaphoretic. No erythema.   Psychiatric: She has a normal mood and affect. Her behavior is normal. Judgment and thought content normal.   Nursing note and vitals reviewed.      Significant Labs:   BMP:   Recent Labs   Lab 02/08/20  0403   *      K 4.4      CO2 27   BUN 31*   CREATININE 1.0   CALCIUM 8.7   MG 2.4     CBC:   Recent Labs   Lab 02/07/20  1600 02/08/20  0403   WBC 14.40* 8.12   HGB 11.7* 10.0*   HCT 38.4 33.7*    183       Significant Imaging: I have reviewed all pertinent imaging results/findings within the past 24 hours.

## 2020-02-08 NOTE — RESPIRATORY THERAPY
02/08/20 0802   Patient Assessment/Suction   Level of Consciousness (AVPU) alert   All Lung Fields Breath Sounds clear   PRE-TX-O2   O2 Device (Oxygen Therapy) room air   Pulse Oximetry Type Continuous   $ Pulse Oximetry - Multiple Charge Pulse Oximetry - Multiple

## 2020-02-08 NOTE — ED NOTES
"Patient is sitting up in bed awake and alert. Patient is able to tell me her name. Patient states the year is "[birth year]."; she is able to tell me that is she is in the hospital, but unable to tell me where or why.   "

## 2020-02-08 NOTE — CONSULTS
[facility]  Neurology  Consult Note    Patient Name: Marleen Samano  MRN: [id number]  Admission Date: 2/7/2020  Hospital Length of Stay: 0 days  Code Status: Full Code   Attending Provider: Kenan Guillen MD   Consulting Provider: Luz Alberto DNP  Primary Care Physician: De Gomes MD  Principal Problem:<principal problem not specified>    Consults  Subjective:     Chief Complaint:      Altered Mental Status       pt found confused on floor - last 2/5/20 at noon          HPI: The patient is a 74 y.o. female with past medical history of cervical dystonia, depression, bipolar disorder, immune deficiency, osteoarthritis, myofascial pain syndrome, bilateral cataract was brought to the emergency room   for alerted mental status. The patient was found confused laying on the floor by the daughter.  The friend was at bedside the states that the patient spoke to her the night before and was complaining of a difficulty passing stools.  She is not complaining of any headaches or any other neurological deficit at that point.  In addition the friend states that Patient appeared slightly confused over the phone.  Patient is unable to provide any history as she is confused at this point.  As per the EMS and the ER physician report patient was not responding to commands and responding yes or no but was not talking. She was last seen normal 2 days ago at noon.  No recent new neurological deficits or any symptoms as per the family.  No PMHx of stroke. No indication of being on blood thinners. Reported hx of COPD and gastritis. SHx of hysterectomy.    INTERVAL HISTORY: Patient seen and examined with Dr. Salvatore Linares. She is awake, alert and oriented. She appears very anxious on exam. She does not have any focal neurological deficits. She states the last thing she remember is talking on the phone with a friend but does not remember falling down onto the bathroom floor. She does not appear to have any  obvious injury from the fall.     CRT: 1.0    Brain imaging   CTH w/o contrast: negative  MRI brain w/o contrast: negative   MRA brain w/o contrast: Unremarkable MRA of the intracranial circulation.    Neck imaging:  MRA neck: No hemodynamically significance of the neck vessels    EEG pending     UDS: negative       Past Medical History:   Diagnosis Date    Arthritis     Bell's palsy     Bladder incontinence     Blepharospasm     Cervical dystonia     Depression     Hormone deficiency     Myofacial pain syndrome        Past Surgical History:   Procedure Laterality Date    APPENDECTOMY      BREAST BIOPSY      BREAST LUMPECTOMY      EYE SURGERY      HYSTERECTOMY      NECK SURGERY      TONSILLECTOMY         Review of patient's allergies indicates:   Allergen Reactions    Artane Other (See Comments)     Swelling    Ned-1 Swelling     Oragel caused tongue to swell    Adhesive tape-silicones Rash     Blisters after on for several hours    Latex, natural rubber Hives and Rash     Localized    Latex     Morphine Itching and Hives       Current Medications:    No current facility-administered medications on file prior to encounter.      Current Outpatient Medications on File Prior to Encounter   Medication Sig    albuterol (PROVENTIL) 2.5 mg /3 mL (0.083 %) nebulizer solution Take 2.5 mg by nebulization every 6 (six) hours as needed for Wheezing. Rescue    albuterol (PROVENTIL/VENTOLIN HFA) 90 mcg/actuation inhaler Inhale 1-2 puffs into the lungs every 6 (six) hours as needed for Wheezing. Rescue    baclofen (LIORESAL) 20 MG tablet Take 1 tablet (20 mg total) by mouth 3 (three) times daily. (Patient taking differently: Take 20 mg by mouth every morning. )    benazepril (LOTENSIN) 40 MG tablet Take 40 mg by mouth once daily.    cimetidine (TAGAMET) 200 MG tablet Take 200 mg by mouth nightly.    diclofenac sodium (VOLTAREN) 1 % Gel Apply topically.     gabapentin (NEURONTIN) 600 MG tablet Take 1  tablet PO in AM, noon and then 2 tablets in the evening    levalbuterol (XOPENEX HFA) 45 mcg/actuation inhaler Inhale 2 puffs into the lungs every 6 (six) hours as needed for Wheezing. Rescue    magnesium oxide (MAG-OX) 400 mg tablet Take 1 tablet (400 mg total) by mouth 2 (two) times daily.    montelukast (SINGULAIR) 10 mg tablet Take 1 tablet (10 mg total) by mouth every evening.    omeprazole (PRILOSEC) 40 MG capsule Take 40 mg by mouth 2 (two) times daily before meals.    trazodone (DESYREL) 100 MG tablet Take 100 mg by mouth every evening.      venlafaxine (EFFEXOR-XR) 150 MG 24 hr capsule Take 150 mg by mouth 2 (two) times daily.      amLODIPine (NORVASC) 2.5 MG tablet       Family History     None        Tobacco Use    Smoking status: Never Smoker    Smokeless tobacco: Never Used   Substance and Sexual Activity    Alcohol use: No    Drug use: No    Sexual activity: Not on file     Review of Systems   Constitutional: Negative for appetite change, chills, fatigue and fever.   HENT: Negative for congestion, hearing loss, rhinorrhea, sore throat, trouble swallowing and voice change.    Respiratory: Negative for cough, chest tightness, shortness of breath and wheezing.    Cardiovascular: Negative for chest pain, palpitations and leg swelling.   Gastrointestinal: Negative for abdominal pain, blood in stool, diarrhea, nausea and vomiting.   Genitourinary: Negative for difficulty urinating, frequency, hematuria and urgency.   Musculoskeletal: Negative for back pain, joint swelling and neck stiffness.   Skin: Negative for pallor and rash.   Neurological: Negative for tremors, seizures, syncope, speech difficulty, weakness, numbness and headaches.   Hematological: Negative for adenopathy.   Psychiatric/Behavioral:  Negative for agitation, behavioral problems and sleep disturbance.   Objective:     Vital Signs (Most Recent):  Temp: 98.3 °F (36.8 °C) (02/08/20 0730)  Pulse: 84 (02/08/20 0730)  Resp: 19  (02/08/20 0730)  BP: (!) 123/59 (02/08/20 0730)  SpO2: 97 % (02/08/20 0730) Vital Signs (24h Range):  Temp:  [97.8 °F (36.6 °C)-98.5 °F (36.9 °C)] 98.3 °F (36.8 °C)  Pulse:  [74-94] 84  Resp:  [7-40] 19  SpO2:  [94 %-99 %] 97 %  BP: ()/(53-80) 123/59     Weight: 75.6 kg (166 lb 10.7 oz)  Body mass index is 24.61 kg/m².    Physical Exam   Constitutional: She is oriented to person, place, and time.   Eyes: Pupils are equal, round, and reactive to light. EOM are normal.   Neurological: She is oriented to person, place, and time. She has an abnormal Finger-Nose-Finger Test.   Reflex Scores:       Tricep reflexes are 2+ on the right side and 2+ on the left side.       Bicep reflexes are 2+ on the right side and 2+ on the left side.       Brachioradialis reflexes are 2+ on the right side and 2+ on the left side.       Patellar reflexes are 2+ on the right side and 2+ on the left side.       Achilles reflexes are 2+ on the right side and 2+ on the left side.  Psychiatric: Her speech is normal.     Constitutional: She appears well-developed and well-nourished.  No distress. She is alert and oriented.   HENT:   Head: Normocephalic and atraumatic.   Right Ear: External ear normal.   Left Ear: External ear normal.   Nose: Nose normal.   Mouth/Throat: Oropharynx is clear and moist.   Eyes: Pupils are equal, round, and reactive to light. Conjunctivae and EOM are normal. Right eye exhibits no discharge. Left eye exhibits no discharge. No scleral icterus.   Neck: Normal range of motion. Neck supple. No thyromegaly present.   Cardiovascular: Normal rate, regular rhythm, normal heart sounds and intact distal pulses.   No murmur heard.  Pulmonary/Chest: Effort normal and breath sounds normal. No stridor. No respiratory distress. She has no wheezes. She has no rales.   Abdominal: Soft. Bowel sounds are normal. She exhibits no distension.    Musculoskeletal: She exhibits no edema or tenderness.   Lymphadenopathy:     She has no  cervical adenopathy.   Neurological:  No cranial nerve deficit.  Reflex Scores:       Bicep reflexes are 3+ on the right side and 3+ on the left side.  Skin: Skin is warm and dry. Capillary refill takes less than 2 seconds. No rash noted. She is not diaphoretic. No erythema.   Psychiatric: She has a normal mood and affect. Her behavior is normal. Judgment and thought content normal.      NEUROLOGICAL EXAMINATION:     MENTAL STATUS   Oriented to person, place, and time.   Follows 2 step commands.   Attention: normal. Concentration: normal.   Speech: speech is normal   Level of consciousness: alert  Knowledge: good.   Able to repeat. Normal comprehension.     CRANIAL NERVES     CN II   Right visual field deficit: none  Left visual field deficit: none     CN III, IV, VI   Pupils are equal, round, and reactive to light.  Extraocular motions are normal.     CN V   Facial sensation intact.     CN VII   Facial expression full, symmetric.     MOTOR EXAM   Muscle bulk: normal  Overall muscle tone: normal       MS 4/5 throughout      REFLEXES     Reflexes   Right brachioradialis: 2+  Left brachioradialis: 2+  Right biceps: 2+  Left biceps: 2+  Right triceps: 2+  Left triceps: 2+  Right patellar: 2+  Left patellar: 2+  Right achilles: 2+  Left achilles: 2+  Right plantar: normal  Left plantar: normal    SENSORY EXAM   Light touch normal.     GAIT AND COORDINATION      Coordination   Finger to nose coordination: abnormal    Tremor   Resting tremor: present      Significant Labs:   CBC:   Recent Labs   Lab 02/07/20  1600 02/08/20  0403   WBC 14.40* 8.12   HGB 11.7* 10.0*   HCT 38.4 33.7*    183     CMP:   Recent Labs   Lab 02/07/20  1600 02/08/20  0403   * 122*    143   K 4.4 4.4    109   CO2 25 27   BUN 34* 31*   CREATININE 1.5* 1.0   CALCIUM 9.4 8.7   MG  --  2.4   PROT 7.4 6.3   ALBUMIN 4.2 3.5   BILITOT 0.4 0.5   ALKPHOS 112 94   AST 23 37   ALT 19 21   ANIONGAP 13 7*   EGFRNONAA 34* 55*     All  pertinent lab results from the past 24 hours have been reviewed.    Significant Imaging: I have reviewed all pertinent imaging results/findings within the past 24 hours.    Assessment and Plan:  1. Acute Encephalopathy of unknown etiology   -Brain imaging negative for acute process   -UA negative for UTI  -EEG: negative   -Start keppra 250 mg BID     2. Enterocolitis   -IM starting Zosyn and managing     3. CKD, stage IV   -CRT: 1.0  -IM managing     4. Hypertension     5. S/p knee arthroplasty     [phone] within 2 weeks from discharge.            Active Diagnoses:    Diagnosis Date Noted POA    Altered mental status [R41.82] 02/07/2020 Yes    Bipolar I disorder, single manic episode [F30.9] 02/07/2020 Yes    Encephalopathy, metabolic [G93.41] 02/07/2020 Yes    Enterocolitis [K52.9] 02/07/2020 Yes    CKD (chronic kidney disease), stage IV [N18.4] 02/07/2020 Unknown    Immune deficiency disorder [D84.9] 10/08/2019 Yes    Bronchiectasis without complication [J47.9] 10/08/2019 Yes    Essential hypertension [I10] 10/18/2017 Yes    Chronic obstructive lung disease [J44.9] 10/18/2017 Yes    Blepharospasm [G24.5] 08/14/2017 Yes    Cervical dystonia [G24.3] 08/14/2017 Yes    S/P total knee arthroplasty [Z96.659] 08/01/2014 Not Applicable    Osteoarthritis of knee [M17.10] 07/15/2014 Yes    Arthritis of knee [M17.10] 05/08/2014 Yes      Problems Resolved During this Admission:       VTE Risk Mitigation (From admission, onward)         Ordered     heparin (porcine) injection 5,000 Units  Every 8 hours      02/07/20 2239     IP VTE HIGH RISK PATIENT  Once      02/07/20 2213     Place sequential compression device  Until discontinued      02/07/20 2213                Thank you for your consult. I will follow-up with patient. Please contact us if you have any additional questions.    Luz Alberto DNP  Neurology  [facility]  Milagros, have personally seen and examined the patient on 2/8 with my advanced provider and agree with above. I personally did a focused exam, and reviewed all necessary clinical information. I discussed my management plan with my NP and agree with above. I reviewed EEG and managed patient urgently. Appears at baseline. Probable seizures. Continue low does keppra.

## 2020-02-08 NOTE — PLAN OF CARE
Pt progressing.  Transferred to 3rd floor.  Still confused and gets hyperfocused on topics.  Dr Clemons at bedside at this time.  Pt tolerating diet.  Urinating with out difficulty.  Afebrile.  Bed alarm in use.  Needs reinforcement with POC and instructions.  Safety maintained.

## 2020-02-08 NOTE — SUBJECTIVE & OBJECTIVE
Past Medical History:   Diagnosis Date    Arthritis     Bell's palsy     Bladder incontinence     Blepharospasm     Cervical dystonia     Depression     Hormone deficiency     Myofacial pain syndrome        Past Surgical History:   Procedure Laterality Date    APPENDECTOMY      BREAST BIOPSY      BREAST LUMPECTOMY      EYE SURGERY      HYSTERECTOMY      NECK SURGERY      TONSILLECTOMY         Review of patient's allergies indicates:   Allergen Reactions    Artane Other (See Comments)     Swelling    Ned-1 Swelling     Oragel caused tongue to swell    Adhesive tape-silicones Rash     Blisters after on for several hours    Latex, natural rubber Hives and Rash     Localized    Latex     Morphine Itching and Hives       Current Facility-Administered Medications on File Prior to Encounter   Medication    onabotulinumtoxina injection 200 Units     Current Outpatient Medications on File Prior to Encounter   Medication Sig    albuterol (PROVENTIL) 2.5 mg /3 mL (0.083 %) nebulizer solution Take 2.5 mg by nebulization every 6 (six) hours as needed for Wheezing. Rescue    albuterol (PROVENTIL/VENTOLIN HFA) 90 mcg/actuation inhaler Inhale 1-2 puffs into the lungs every 6 (six) hours as needed for Wheezing. Rescue    baclofen (LIORESAL) 20 MG tablet Take 1 tablet (20 mg total) by mouth 3 (three) times daily. (Patient taking differently: Take 20 mg by mouth every morning. )    benazepril (LOTENSIN) 40 MG tablet Take 40 mg by mouth once daily.    cimetidine (TAGAMET) 200 MG tablet Take 200 mg by mouth nightly.    diclofenac sodium (VOLTAREN) 1 % Gel Apply topically.     gabapentin (NEURONTIN) 600 MG tablet Take 1 tablet PO in AM, noon and then 2 tablets in the evening    levalbuterol (XOPENEX HFA) 45 mcg/actuation inhaler Inhale 2 puffs into the lungs every 6 (six) hours as needed for Wheezing. Rescue    magnesium oxide (MAG-OX) 400 mg tablet Take 1 tablet (400 mg total) by mouth 2 (two) times  daily.    montelukast (SINGULAIR) 10 mg tablet Take 1 tablet (10 mg total) by mouth every evening.    omeprazole (PRILOSEC) 40 MG capsule Take 40 mg by mouth 2 (two) times daily before meals.    trazodone (DESYREL) 100 MG tablet Take 100 mg by mouth every evening.      venlafaxine (EFFEXOR-XR) 150 MG 24 hr capsule Take 150 mg by mouth 2 (two) times daily.      amLODIPine (NORVASC) 2.5 MG tablet     [DISCONTINUED] amoxicillin-clavulanate 875-125mg (AUGMENTIN) 875-125 mg per tablet Take 1 tablet by mouth 2 (two) times daily. (Patient not taking: Reported on 10/17/2019)    [DISCONTINUED] benazepril (LOTENSIN) 10 MG tablet Take 40 mg by mouth once daily.     [DISCONTINUED] budesonide-formoterol 160-4.5 mcg (SYMBICORT) 160-4.5 mcg/actuation HFAA Inhale 2 puffs into the lungs every 12 (twelve) hours. Controller (Patient not taking: Reported on 10/17/2019)    [DISCONTINUED] FLUZONE HIGH-DOSE 2019-20, PF, 180 mcg/0.5 mL Syrg PHARMACIST ADMINISTERED IMMUNIZATION ADMINISTERED AT TIME OF DISPENSING    [DISCONTINUED] omeprazole (PRILOSEC) 20 MG capsule     [DISCONTINUED] onabotulinumtoxinA (BOTOX INJ) Botox   eyes, face & neck q 8 weeks    [DISCONTINUED] ondansetron (ZOFRAN-ODT) 4 MG TbDL Take 1 tablet (4 mg total) by mouth every 8 (eight) hours as needed.    [DISCONTINUED] predniSONE (DELTASONE) 20 MG tablet Take one daily for 3 days and may repeat for shortness of breath. (Patient not taking: Reported on 10/17/2019)     Family History     None        Tobacco Use    Smoking status: Never Smoker    Smokeless tobacco: Never Used   Substance and Sexual Activity    Alcohol use: No    Drug use: No    Sexual activity: Not on file     Review of Systems   Unable to perform ROS: Mental status change     Objective:     Vital Signs (Most Recent):  Temp: 97.8 °F (36.6 °C) (02/07/20 1535)  Pulse: 82 (02/07/20 1932)  Resp: 18 (02/07/20 1535)  BP: (!) 130/55 (02/07/20 1932)  SpO2: 97 % (02/07/20 1932) Vital Signs (24h  Range):  Temp:  [97.8 °F (36.6 °C)] 97.8 °F (36.6 °C)  Pulse:  [74-89] 82  Resp:  [18] 18  SpO2:  [96 %-99 %] 97 %  BP: (130-184)/(55-80) 130/55     Weight: 77.1 kg (170 lb)  Body mass index is 25.1 kg/m².    Physical Exam   Constitutional: She appears well-developed and well-nourished. She appears lethargic. No distress.   HENT:   Head: Normocephalic and atraumatic.   Right Ear: External ear normal.   Left Ear: External ear normal.   Nose: Nose normal.   Mouth/Throat: Oropharynx is clear and moist.   Eyes: Pupils are equal, round, and reactive to light. Conjunctivae and EOM are normal. Right eye exhibits no discharge. Left eye exhibits no discharge. No scleral icterus.   Neck: Normal range of motion. Neck supple. No thyromegaly present.   Cardiovascular: Normal rate, regular rhythm, normal heart sounds and intact distal pulses.   No murmur heard.  Pulmonary/Chest: Effort normal and breath sounds normal. No stridor. No respiratory distress. She has no wheezes. She has no rales.   Abdominal: Soft. Bowel sounds are normal. She exhibits no distension. There is tenderness.   Musculoskeletal: She exhibits no edema or tenderness.   Lymphadenopathy:     She has no cervical adenopathy.   Neurological: She appears lethargic. No cranial nerve deficit. She exhibits abnormal muscle tone. She displays seizure activity. Coordination abnormal.   Reflex Scores:       Bicep reflexes are 3+ on the right side and 3+ on the left side.  Difficult to assess neurological status due to patient's current mental status.  Patient patient appears confused not oriented to time place person.  Recognizes family members intermittently but unable to answer their questions or say their names.   Reflexes appear exaggerated.  Babinski was mute   Skin: Skin is warm and dry. Capillary refill takes less than 2 seconds. No rash noted. She is not diaphoretic. No erythema.   Psychiatric: She has a normal mood and affect. Her behavior is normal. Judgment and  thought content normal.         CRANIAL NERVES     CN III, IV, VI   Pupils are equal, round, and reactive to light.  Extraocular motions are normal.        Significant Labs:   BMP:   Recent Labs   Lab 02/07/20  1600   *      K 4.4      CO2 25   BUN 34*   CREATININE 1.5*   CALCIUM 9.4     CBC:   Recent Labs   Lab 02/07/20  1600   WBC 14.40*   HGB 11.7*   HCT 38.4          Significant Imaging: I have reviewed all pertinent imaging results/findings within the past 24 hours.

## 2020-02-08 NOTE — ED NOTES
Dr. Guillen is at the beside of patient with family. Patient is awake and alert; answers correctly her name and where she is. ZHAO MOORE

## 2020-02-08 NOTE — PROGRESS NOTES
Pt awakens to verbal stimuli. Able to state her name then repeat Bobbye Bobbye several times. Able to state her daughters name, month and date of birth and she is from [place]. NORMA. Turns self in bed. TRE. Oral care done. Safety measures in place.

## 2020-02-08 NOTE — NURSING
Pt more awake/alert and cooperative this morning.  Answers appropriately.  Still confused.  Follows commands.  Asking for water.  Swallows water with out difficulty.  Needs constant reminding.      Assisted out of bed to BSC then to chair with out difficulty.  Chair alarm in use and daughter at bedside.  Instructed not to get up with out calling for assistance.  Both verbalized understanding.

## 2020-02-08 NOTE — PROGRESS NOTES
"Report received from Elena. Pt opens eyes to sternal rub. Able to state full name and birth date. Able to state her daughters name but then gives  Inappropriate answers to questions asked. NORMA. Upper extremity spasticity when lifting up. Stated she had to "pee" able to lift hips up for bedpan placement voiding qs dave urine. See assessment flow sheet. Bed alarm on. Safety measures in place  "

## 2020-02-08 NOTE — ASSESSMENT & PLAN NOTE
Improved  Patient appears slightly confused but currently oriented time place person  Most likely secondary to baclofen withdrawal vs venlafaxine overdose.  Unclear if there is a component of serotonin syndrome  Consulted psych will follow-up with the recommendation  Will restart baclofen and will hold venlafaxine for now

## 2020-02-08 NOTE — CONSULTS
[facility]  Psychiatry  Consult Note     Patient Name: Marleen Samano  MRN: [id number]   Code Status: Full Code  Admission Date: 2/7/2020  Hospital Length of Stay: 0 days  Attending Physician: Kenan Guillen MD  Primary Care Provider: De Gomes MD    Current Legal Status: N/A    Patient information was obtained from patient and ER records.   Consults  Subjective:     Principal Problem:Acute encephalopathy    Chief Complaint:  Confusion     HPI: 02/8/20  74-year-old white female admitted to [facility] yesterday around 3:30 p.m. with the working diagnosis of altered mental status.   According to patient the last thing she remembered was having had a conversation with 1 of her friends around 9:00 p.m. she went to bed and was shocked that she was in the emergency room of this hospital.  Patient states that she was found unresponsive by her daughter who perhaps came to see her after people from her work and called patient couple of times and having had no response from her, they called her daughter, who came and found patient in a state as described above.  She states that at best she remembers having taken 3 baclopen 20 mg tablets and took between evening and bedtime (3) 600 mg of Neurontin pills.  She has been complaining of having abdominal pain,diarrhea, urinary incontinence, some forgetfulness and having difficulty finding the right word to express what she wants to.  Patient states that she sleeps well her appetite is okay and weight is stable. She denies any crying spells any hallucinations any major mood swings.  However the abdominal pain, diarrhea and back pain is ever present.  Patient denies having any nightmares.  Patient was found on her floor confused responding to yes and no but not talking.  In the emergency room patient was noted to be having clenched teeth intermittent jerking of body and minimal response from substernal rub.    Past psychiatric history   patient has  been under my care for number of years and has been on venlafaxine for over 2 decades there is no previous suicide attempt, no inpatient treatment however all was compliant with the outpatient treatment    Family history   is positive for mood disorder    Social history   patient lives alone. She has 1 daughter.  The relationship between patient and the daughter is not amicable.  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  There is no alcohol drug or tobacco use.     Medical surgical history  GERD Bell's palsy high blood pressure pain disorder    Allergies  Morphine, artane, tape, latex    Medications  The list of medications that patient has been taking at home on intermittent basis also includes Tagamet.  Tagamet is known to increase the blood levels of of both baclofen, Neurontin, Effexor and some more.  And patient has been on Neurontin 1800 mg a day, Effexor 300 mg a day, Baclofen 60 mg a day     Mental status examination  74-year-old white female who recognized me had significant difficulty in providing coherent information. She is somewhat tremulous but without any myoclonic jerks.  Her mood is anxious affect is restricted.  Her thought processes have a tendency to get disorganized with considerable flight of ideations along with poor comprehension.   She is alert and cooperative however is disoriented to day and date and stated that she knows that this is not 2016 but that, seems imprinted on her mind.  She denies any auditory or visual hallucinations, no active delusions noted. Her serial sevens are poor 100-7= 99.  Her recent memory is 1/4 after 1 min and 0/4 after 5 min and remote memories seems fair.   Her pupils are equal and responding to light.  With no nystagmus.  She is hyperreflexic with negative Babinski.  Patient's insight seems okay but judgment seems impaired.     Impression  1.  Bipolar disorder   2.  Cognitive impairment  etiology unclear, however interaction  between Tagamet and Neurontin, baclofen, Effexor deserves to be considered in differential diagnosis   3.  Serotonin syndrome perhaps may be ruled out because of absence of fever, CPK levels, heart rate, no hyperhidrosis and absence of myoclonus.  Hyperreflexia follow be toxic effects of medications stated above.    Recommendations  1.Discontinue baclofen, gabapentin, (Dc tagement at home)  2.Restart Effexor  mg once a day (holding Effexor has high probability of Effexor withdrawal syndrome)  3.Cranberry juice 8 oz 4 times a day  4. Encourage hydration  5.Repeat CPK and Iso-fractionated the enzyme in the AM      Will follow patient on outpatient basis. Patient has follow-up appointment with my office.  This episode does not seem to be related to any self-destructive behavior.    Thank you    Hospital Course: No notes on file         Patient History           Medical as of 2/8/2020     Past Medical History     Diagnosis Date Comments Source    Arthritis -- -- Provider    Bell's palsy -- -- Provider    Bladder incontinence -- -- Provider    Blepharospasm -- -- Provider    Cervical dystonia -- -- Provider    Depression -- -- Provider    Hormone deficiency -- -- Provider    Myofacial pain syndrome -- -- Provider          Pertinent Negatives     Diagnosis Date Noted Comments Source    Basal cell carcinoma 03/19/2019 -- Provider    Melanoma 03/19/2019 -- Provider    Squamous cell carcinoma 03/19/2019 -- Provider                  Surgical as of 2/8/2020     Past Surgical History     Procedure Laterality Date Comments Source    NECK SURGERY -- -- -- Provider    HYSTERECTOMY -- -- -- Provider    APPENDECTOMY -- -- -- Provider    TONSILLECTOMY -- -- -- Provider    EYE SURGERY -- -- -- Provider    BREAST LUMPECTOMY -- -- -- Provider    BREAST BIOPSY -- -- -- Provider                  Family as of 2/8/2020     Problem Relation Name Age of Onset Comments Source    Diabetes Neg Hx -- -- -- Provider    Melanoma Neg Hx -- --  -- Provider    Psoriasis Neg Hx -- -- -- Provider    Lupus Neg Hx -- -- -- Provider    Eczema Neg Hx -- -- -- Provider            Tobacco Use as of 2/8/2020     Smoking Status Smoking Start Date Smoking Quit Date Packs/Day Years Used    Never Smoker -- -- -- --    Types Comments Smokeless Tobacco Status Smokeless Tobacco Quit Date Source    -- -- Never Used -- Provider            Alcohol Use as of 2/8/2020     Alcohol Use Drinks/Week Alcohol/Week Comments Source    No -- -- -- Provider    Frequency Standard Drinks Binge Drinking        -- -- --              Drug Use as of 2/8/2020     Drug Use Types Frequency Comments Source    No -- -- -- Provider            Sexual Activity as of 2/8/2020     Sexually Active Birth Control Partners Comments Source    Not Asked -- -- -- Provider            Activities of Daily Living as of 2/8/2020     Activities of Daily Living Question Response Comments Source    Are you pregnant or think you may be? Not Asked -- Provider    Breast-feeding Not Asked -- Provider            Social Documentation as of 2/8/2020    None           Occupational as of 2/8/2020    None           Socioeconomic as of 2/8/2020     Marital Status Spouse Name Number of Children Years Education Education Level Preferred Language Ethnicity Race Source     -- -- -- -- English /White White Provider    Financial Resource Strain Food Insecurity: Worry Food Insecurity: Inability Transportation Needs: Medical Transportation Needs: Non-medical    -- -- -- -- --            Pertinent History     Question Response Comments    Lives with -- --    Place in Birth Order -- --    Lives in -- --    Number of Siblings -- --    Raised by -- --    Legal Involvement -- --    Childhood Trauma -- --    Criminal History of -- --    Financial Status -- --    Highest Level of Education -- --    Does patient have access to a firearm? -- --     Service -- --    Primary Leisure Activity -- --    Spirituality -- --         Past Medical History:   Diagnosis Date    Arthritis     Bell's palsy     Bladder incontinence     Blepharospasm     Cervical dystonia     Depression     Hormone deficiency     Myofacial pain syndrome      Past Surgical History:   Procedure Laterality Date    APPENDECTOMY      BREAST BIOPSY      BREAST LUMPECTOMY      EYE SURGERY      HYSTERECTOMY      NECK SURGERY      TONSILLECTOMY       Family History     None        Tobacco Use    Smoking status: Never Smoker    Smokeless tobacco: Never Used   Substance and Sexual Activity    Alcohol use: No    Drug use: No    Sexual activity: Not on file     Review of patient's allergies indicates:   Allergen Reactions    Artane Other (See Comments)     Swelling    Ned-1 Swelling     Oragel caused tongue to swell    Adhesive tape-silicones Rash     Blisters after on for several hours    Latex, natural rubber Hives and Rash     Localized    Latex     Morphine Itching and Hives       No current facility-administered medications on file prior to encounter.      Current Outpatient Medications on File Prior to Encounter   Medication Sig    albuterol (PROVENTIL) 2.5 mg /3 mL (0.083 %) nebulizer solution Take 2.5 mg by nebulization every 6 (six) hours as needed for Wheezing. Rescue    albuterol (PROVENTIL/VENTOLIN HFA) 90 mcg/actuation inhaler Inhale 1-2 puffs into the lungs every 6 (six) hours as needed for Wheezing. Rescue    baclofen (LIORESAL) 20 MG tablet Take 1 tablet (20 mg total) by mouth 3 (three) times daily. (Patient taking differently: Take 20 mg by mouth every morning. )    benazepril (LOTENSIN) 40 MG tablet Take 40 mg by mouth once daily.    cimetidine (TAGAMET) 200 MG tablet Take 200 mg by mouth nightly.    diclofenac sodium (VOLTAREN) 1 % Gel Apply topically.     gabapentin (NEURONTIN) 600 MG tablet Take 1 tablet PO in AM, noon and then 2 tablets in the evening    levalbuterol (XOPENEX HFA) 45 mcg/actuation inhaler Inhale 2 puffs  "into the lungs every 6 (six) hours as needed for Wheezing. Rescue    magnesium oxide (MAG-OX) 400 mg tablet Take 1 tablet (400 mg total) by mouth 2 (two) times daily.    montelukast (SINGULAIR) 10 mg tablet Take 1 tablet (10 mg total) by mouth every evening.    omeprazole (PRILOSEC) 40 MG capsule Take 40 mg by mouth 2 (two) times daily before meals.    trazodone (DESYREL) 100 MG tablet Take 100 mg by mouth every evening.      venlafaxine (EFFEXOR-XR) 150 MG 24 hr capsule Take 150 mg by mouth 2 (two) times daily.      amLODIPine (NORVASC) 2.5 MG tablet every evening.      Psychotherapeutics (From admission, onward)    Start     Stop Route Frequency Ordered    02/08/20 2100  traZODone tablet 100 mg      -- Oral Nightly 02/08/20 1116    02/07/20 2130  lorazepam injection 2 mg      -- IV Once 02/07/20 2026 02/07/20 2125  lorazepam injection 2 mg      -- IV Every 10 min PRN 02/07/20 2026        Review of Systems  Strengths and Liabilities: Strength: Patient is motivated for change.    Objective:     Vital Signs (Most Recent):  Temp: 97.6 °F (36.4 °C) (02/08/20 1200)  Pulse: 82 (02/08/20 1507)  Resp: 18 (02/08/20 1507)  BP: (!) 148/75 (02/08/20 1400)  SpO2: 99 % (02/08/20 1507) Vital Signs (24h Range):  Temp:  [97.6 °F (36.4 °C)-98.5 °F (36.9 °C)] 97.6 °F (36.4 °C)  Pulse:  [74-94] 82  Resp:  [7-48] 18  SpO2:  [82 %-99 %] 99 %  BP: ()/(47-87) 148/75     Height: 5' 9" (175.3 cm)  Weight: 75.6 kg (166 lb 10.7 oz)  Body mass index is 24.61 kg/m².      Intake/Output Summary (Last 24 hours) at 2/8/2020 1621  Last data filed at 2/8/2020 1400  Gross per 24 hour   Intake 1030 ml   Output 1350 ml   Net -320 ml       Physical Exam   Psychiatric: Her mood appears anxious. Her affect is labile. Her speech is tangential. She is hyperactive. Cognition and memory are impaired. She expresses inappropriate judgment. She is inattentive.        Significant Labs: All pertinent labs within the past 24 hours have been " reviewed.    Significant Imaging: I have reviewed all pertinent imaging results/findings within the past 24 hours.    Assessment/Plan:     No notes have been filed under this hospital service.  Service: Psychiatry       Total Time:  60 minutes      Edward Cerrato MD   Psychiatry  [facility]

## 2020-02-08 NOTE — SUBJECTIVE & OBJECTIVE
Patient History           Medical as of 2/8/2020     Past Medical History     Diagnosis Date Comments Source    Arthritis -- -- Provider    Bell's palsy -- -- Provider    Bladder incontinence -- -- Provider    Blepharospasm -- -- Provider    Cervical dystonia -- -- Provider    Depression -- -- Provider    Hormone deficiency -- -- Provider    Myofacial pain syndrome -- -- Provider          Pertinent Negatives     Diagnosis Date Noted Comments Source    Basal cell carcinoma 03/19/2019 -- Provider    Melanoma 03/19/2019 -- Provider    Squamous cell carcinoma 03/19/2019 -- Provider                  Surgical as of 2/8/2020     Past Surgical History     Procedure Laterality Date Comments Source    NECK SURGERY -- -- -- Provider    HYSTERECTOMY -- -- -- Provider    APPENDECTOMY -- -- -- Provider    TONSILLECTOMY -- -- -- Provider    EYE SURGERY -- -- -- Provider    BREAST LUMPECTOMY -- -- -- Provider    BREAST BIOPSY -- -- -- Provider                  Family as of 2/8/2020     Problem Relation Name Age of Onset Comments Source    Diabetes Neg Hx -- -- -- Provider    Melanoma Neg Hx -- -- -- Provider    Psoriasis Neg Hx -- -- -- Provider    Lupus Neg Hx -- -- -- Provider    Eczema Neg Hx -- -- -- Provider            Tobacco Use as of 2/8/2020     Smoking Status Smoking Start Date Smoking Quit Date Packs/Day Years Used    Never Smoker -- -- -- --    Types Comments Smokeless Tobacco Status Smokeless Tobacco Quit Date Source    -- -- Never Used -- Provider            Alcohol Use as of 2/8/2020     Alcohol Use Drinks/Week Alcohol/Week Comments Source    No -- -- -- Provider    Frequency Standard Drinks Binge Drinking        -- -- --              Drug Use as of 2/8/2020     Drug Use Types Frequency Comments Source    No -- -- -- Provider            Sexual Activity as of 2/8/2020     Sexually Active Birth Control Partners Comments Source    Not Asked -- -- -- Provider            Activities of Daily Living as of 2/8/2020      Activities of Daily Living Question Response Comments Source    Are you pregnant or think you may be? Not Asked -- Provider    Breast-feeding Not Asked -- Provider            Social Documentation as of 2/8/2020    None           Occupational as of 2/8/2020    None           Socioeconomic as of 2/8/2020     Marital Status Spouse Name Number of Children Years Education Education Level Preferred Language Ethnicity Race Source     -- -- -- -- English /White White Provider    Financial Resource Strain Food Insecurity: Worry Food Insecurity: Inability Transportation Needs: Medical Transportation Needs: Non-medical    -- -- -- -- --            Pertinent History     Question Response Comments    Lives with -- --    Place in Birth Order -- --    Lives in -- --    Number of Siblings -- --    Raised by -- --    Legal Involvement -- --    Childhood Trauma -- --    Criminal History of -- --    Financial Status -- --    Highest Level of Education -- --    Does patient have access to a firearm? -- --     Service -- --    Primary Leisure Activity -- --    Spirituality -- --        Past Medical History:   Diagnosis Date    Arthritis     Bell's palsy     Bladder incontinence     Blepharospasm     Cervical dystonia     Depression     Hormone deficiency     Myofacial pain syndrome      Past Surgical History:   Procedure Laterality Date    APPENDECTOMY      BREAST BIOPSY      BREAST LUMPECTOMY      EYE SURGERY      HYSTERECTOMY      NECK SURGERY      TONSILLECTOMY       Family History     None        Tobacco Use    Smoking status: Never Smoker    Smokeless tobacco: Never Used   Substance and Sexual Activity    Alcohol use: No    Drug use: No    Sexual activity: Not on file     Review of patient's allergies indicates:   Allergen Reactions    Artane Other (See Comments)     Swelling    Ned-1 Swelling     Oragel caused tongue to swell    Adhesive tape-silicones Rash     Blisters after on  for several hours    Latex, natural rubber Hives and Rash     Localized    Latex     Morphine Itching and Hives       No current facility-administered medications on file prior to encounter.      Current Outpatient Medications on File Prior to Encounter   Medication Sig    albuterol (PROVENTIL) 2.5 mg /3 mL (0.083 %) nebulizer solution Take 2.5 mg by nebulization every 6 (six) hours as needed for Wheezing. Rescue    albuterol (PROVENTIL/VENTOLIN HFA) 90 mcg/actuation inhaler Inhale 1-2 puffs into the lungs every 6 (six) hours as needed for Wheezing. Rescue    baclofen (LIORESAL) 20 MG tablet Take 1 tablet (20 mg total) by mouth 3 (three) times daily. (Patient taking differently: Take 20 mg by mouth every morning. )    benazepril (LOTENSIN) 40 MG tablet Take 40 mg by mouth once daily.    cimetidine (TAGAMET) 200 MG tablet Take 200 mg by mouth nightly.    diclofenac sodium (VOLTAREN) 1 % Gel Apply topically.     gabapentin (NEURONTIN) 600 MG tablet Take 1 tablet PO in AM, noon and then 2 tablets in the evening    levalbuterol (XOPENEX HFA) 45 mcg/actuation inhaler Inhale 2 puffs into the lungs every 6 (six) hours as needed for Wheezing. Rescue    magnesium oxide (MAG-OX) 400 mg tablet Take 1 tablet (400 mg total) by mouth 2 (two) times daily.    montelukast (SINGULAIR) 10 mg tablet Take 1 tablet (10 mg total) by mouth every evening.    omeprazole (PRILOSEC) 40 MG capsule Take 40 mg by mouth 2 (two) times daily before meals.    trazodone (DESYREL) 100 MG tablet Take 100 mg by mouth every evening.      venlafaxine (EFFEXOR-XR) 150 MG 24 hr capsule Take 150 mg by mouth 2 (two) times daily.      amLODIPine (NORVASC) 2.5 MG tablet every evening.      Psychotherapeutics (From admission, onward)    Start     Stop Route Frequency Ordered    02/08/20 2100  traZODone tablet 100 mg      -- Oral Nightly 02/08/20 1116    02/07/20 2130  lorazepam injection 2 mg      -- IV Once 02/07/20 2026 02/07/20 2125   "lorazepam injection 2 mg      -- IV Every 10 min PRN 02/07/20 2026        Review of Systems  Strengths and Liabilities: Strength: Patient is motivated for change.    Objective:     Vital Signs (Most Recent):  Temp: 97.6 °F (36.4 °C) (02/08/20 1200)  Pulse: 82 (02/08/20 1507)  Resp: 18 (02/08/20 1507)  BP: (!) 148/75 (02/08/20 1400)  SpO2: 99 % (02/08/20 1507) Vital Signs (24h Range):  Temp:  [97.6 °F (36.4 °C)-98.5 °F (36.9 °C)] 97.6 °F (36.4 °C)  Pulse:  [74-94] 82  Resp:  [7-48] 18  SpO2:  [82 %-99 %] 99 %  BP: ()/(47-87) 148/75     Height: 5' 9" (175.3 cm)  Weight: 75.6 kg (166 lb 10.7 oz)  Body mass index is 24.61 kg/m².      Intake/Output Summary (Last 24 hours) at 2/8/2020 1621  Last data filed at 2/8/2020 1400  Gross per 24 hour   Intake 1030 ml   Output 1350 ml   Net -320 ml       Physical Exam   Psychiatric: Her mood appears anxious. Her affect is labile. Her speech is tangential. She is hyperactive. Cognition and memory are impaired. She expresses inappropriate judgment. She is inattentive.        Significant Labs: All pertinent labs within the past 24 hours have been reviewed.    Significant Imaging: I have reviewed all pertinent imaging results/findings within the past 24 hours.  "

## 2020-02-08 NOTE — ASSESSMENT & PLAN NOTE
Creatine stable for now. BMP reviewed- noted Estimated Creatinine Clearance: 33.9 mL/min (A) (based on SCr of 1.5 mg/dL (H)). according to latest data. Monitor UOP and serial BMP and adjust therapy as needed. Renally dose meds.

## 2020-02-08 NOTE — ASSESSMENT & PLAN NOTE
Unclear about the cause  Differential at this point is status epilepticus  Discussed with the Neurology patient will need continues EEG  Will follow-up with MRI MRA  Other differential is serotonin syndrome due to the her symptoms and history of venlafaxine  Due to the myoclonus myoclonus, hyperreflexia, and bilateral Babinski sign noted on the exam will consider of serotonin syndrome  Will give the patient Ativan 2 mg p.r.n. Every 10 min still the symptoms resolved  Discussed with Serotonin syndrome phone line to discuss about the possible serotonin.  We states that it is less likely but if the patient has febrile episodes to empirically treat with cypraheptadine

## 2020-02-08 NOTE — ED NOTES
Marleen Samano presents to the ED with c/o altered mental status. Patient was found on the floor by family who reports that patient was seen normal at noon on Wednesday. Patient arrives to ED bed 5 with c collar in place. Patient arouses to sternal rub only; she opens her eyes, closes them immediately and closes them again. Arms are decorticated with bilateral stiff legs. Patient will not unclench teeth and her exhales are snore like. Patient has no history of seizures. Patient has a wet brief in place. No hematomas noted to head, no bruising or skin tears. Patient is a home health nurse. Mucous membranes are pink and moist. Skin is warm, dry and intact. Lungs are clear bilaterally, respirations are regular and unlabored.BS active x4, no tenderness with palpation, abd is soft and not distended. S1S2 capillary refill is < 2 seconds. NAND VSS  Unable to perform ROS secondary to mental status. Pupils are 5 mm, equal, round and reactive.    Patient has intermittent jerking to body. Sternal rub performed with minimal response.

## 2020-02-08 NOTE — PLAN OF CARE
Pt more alert this am. 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 Pt doesn't remember events leading up to this point. Oriented to person, birth date, city, daughters name.  Watching TV this morning. Follows commands. Speech clearer.  Asking for bedpan and voided. SR on monitor. BP stable. On room air. Safety measures in place.

## 2020-02-08 NOTE — ASSESSMENT & PLAN NOTE
Creatine stable for now. BMP reviewed- noted Estimated Creatinine Clearance: 50.8 mL/min (based on SCr of 1 mg/dL). according to latest data. Monitor UOP and serial BMP and adjust therapy as needed. Renally dose meds.

## 2020-02-08 NOTE — HPI
The patient is a 74 y.o. female with past medical history of cervical dystonia, depression, bipolar disorder, immune deficiency, osteoarthritis, myofascial pain syndrome, bilateral cataract was brought to the emergency room   for alerted mental status. The patient was found confused laying on the floor by the daughter.  The friend was at bedside the states that the patient spoke to her the night before and was complaining of a difficulty passing stools.  She is not complaining of any headaches or any other neurological deficit at that point.  In addition the friend states that Patient appeared slightly confused over the phone.  Patient is unable to provide any history as she is confused at this point.  As per the EMS and the ER physician report patient was not responding to commands and responding yes or no but was not talking. She was last seen normal 2 days ago at noon.  No recent new neurological deficits or any symptoms as per the family.  No PMHx of stroke. No indication of being on blood thinners. Reported hx of COPD and gastritis. SHx of hysterectomy.        The history is provided by the patient.

## 2020-02-09 VITALS
TEMPERATURE: 98 F | WEIGHT: 166.69 LBS | HEART RATE: 79 BPM | OXYGEN SATURATION: 96 % | SYSTOLIC BLOOD PRESSURE: 129 MMHG | BODY MASS INDEX: 24.69 KG/M2 | RESPIRATION RATE: 18 BRPM | DIASTOLIC BLOOD PRESSURE: 55 MMHG | HEIGHT: 69 IN

## 2020-02-09 PROBLEM — G93.40 ACUTE ENCEPHALOPATHY: Status: RESOLVED | Noted: 2020-02-08 | Resolved: 2020-02-09

## 2020-02-09 PROBLEM — R41.82 ALTERED MENTAL STATUS: Status: RESOLVED | Noted: 2020-02-07 | Resolved: 2020-02-09

## 2020-02-09 LAB
ALBUMIN SERPL BCP-MCNC: 3.3 G/DL (ref 3.5–5.2)
ALP SERPL-CCNC: 90 U/L (ref 55–135)
ALT SERPL W/O P-5'-P-CCNC: 24 U/L (ref 10–44)
ANION GAP SERPL CALC-SCNC: 6 MMOL/L (ref 8–16)
AST SERPL-CCNC: 34 U/L (ref 10–40)
B BURGDOR AB CSF IA-ACNC: 0.04 LIV
BASOPHILS # BLD AUTO: 0.01 K/UL (ref 0–0.2)
BASOPHILS NFR BLD: 0.2 % (ref 0–1.9)
BILIRUB SERPL-MCNC: 0.4 MG/DL (ref 0.1–1)
BUN SERPL-MCNC: 27 MG/DL (ref 8–23)
CALCIUM SERPL-MCNC: 8.8 MG/DL (ref 8.7–10.5)
CHLORIDE SERPL-SCNC: 109 MMOL/L (ref 95–110)
CO2 SERPL-SCNC: 29 MMOL/L (ref 23–29)
CREAT SERPL-MCNC: 1 MG/DL (ref 0.5–1.4)
DIFFERENTIAL METHOD: ABNORMAL
EOSINOPHIL # BLD AUTO: 0.1 K/UL (ref 0–0.5)
EOSINOPHIL NFR BLD: 2.2 % (ref 0–8)
ERYTHROCYTE [DISTWIDTH] IN BLOOD BY AUTOMATED COUNT: 16.8 % (ref 11.5–14.5)
EST. GFR  (AFRICAN AMERICAN): >60 ML/MIN/1.73 M^2
EST. GFR  (NON AFRICAN AMERICAN): 55 ML/MIN/1.73 M^2
GLUCOSE SERPL-MCNC: 107 MG/DL (ref 70–110)
HCT VFR BLD AUTO: 31.3 % (ref 37–48.5)
HGB BLD-MCNC: 9.2 G/DL (ref 12–16)
IMM GRANULOCYTES # BLD AUTO: 0.01 K/UL (ref 0–0.04)
LYMPHOCYTES # BLD AUTO: 1.1 K/UL (ref 1–4.8)
LYMPHOCYTES NFR BLD: 24.2 % (ref 18–48)
MAGNESIUM SERPL-MCNC: 2.4 MG/DL (ref 1.6–2.6)
MCH RBC QN AUTO: 24.7 PG (ref 27–31)
MCHC RBC AUTO-ENTMCNC: 29.4 G/DL (ref 32–36)
MCV RBC AUTO: 84 FL (ref 82–98)
MONOCYTES # BLD AUTO: 0.4 K/UL (ref 0.3–1)
MONOCYTES NFR BLD: 9.5 % (ref 4–15)
NEUTROPHILS # BLD AUTO: 2.9 K/UL (ref 1.8–7.7)
NEUTROPHILS NFR BLD: 63.7 % (ref 38–73)
NRBC BLD-RTO: 0 /100 WBC
PHOSPHATE SERPL-MCNC: 4 MG/DL (ref 2.7–4.5)
PLATELET # BLD AUTO: 159 K/UL (ref 150–350)
PMV BLD AUTO: 10 FL (ref 9.2–12.9)
POCT GLUCOSE: 102 MG/DL (ref 70–110)
POCT GLUCOSE: 117 MG/DL (ref 70–110)
POTASSIUM SERPL-SCNC: 3.9 MMOL/L (ref 3.5–5.1)
PROT SERPL-MCNC: 5.9 G/DL (ref 6–8.4)
RBC # BLD AUTO: 3.72 M/UL (ref 4–5.4)
SODIUM SERPL-SCNC: 144 MMOL/L (ref 136–145)
WBC # BLD AUTO: 4.51 K/UL (ref 3.9–12.7)

## 2020-02-09 PROCEDURE — 83735 ASSAY OF MAGNESIUM: CPT

## 2020-02-09 PROCEDURE — 63600175 PHARM REV CODE 636 W HCPCS: Performed by: INTERNAL MEDICINE

## 2020-02-09 PROCEDURE — 84100 ASSAY OF PHOSPHORUS: CPT

## 2020-02-09 PROCEDURE — 80053 COMPREHEN METABOLIC PANEL: CPT

## 2020-02-09 PROCEDURE — 85025 COMPLETE CBC W/AUTO DIFF WBC: CPT

## 2020-02-09 PROCEDURE — 97161 PT EVAL LOW COMPLEX 20 MIN: CPT

## 2020-02-09 PROCEDURE — 63600175 PHARM REV CODE 636 W HCPCS: Performed by: PSYCHIATRY & NEUROLOGY

## 2020-02-09 PROCEDURE — 99900035 HC TECH TIME PER 15 MIN (STAT)

## 2020-02-09 PROCEDURE — 97116 GAIT TRAINING THERAPY: CPT

## 2020-02-09 PROCEDURE — 25000003 PHARM REV CODE 250: Performed by: INTERNAL MEDICINE

## 2020-02-09 PROCEDURE — 95819 EEG AWAKE AND ASLEEP: CPT

## 2020-02-09 PROCEDURE — 94761 N-INVAS EAR/PLS OXIMETRY MLT: CPT

## 2020-02-09 PROCEDURE — 36415 COLL VENOUS BLD VENIPUNCTURE: CPT

## 2020-02-09 PROCEDURE — 95816 EEG AWAKE AND DROWSY: CPT | Mod: 26,,, | Performed by: PSYCHIATRY & NEUROLOGY

## 2020-02-09 PROCEDURE — 95816 PR EEG,W/AWAKE & DROWSY RECORD: ICD-10-PCS | Mod: 26,,, | Performed by: PSYCHIATRY & NEUROLOGY

## 2020-02-09 RX ORDER — CIPROFLOXACIN 500 MG/1
500 TABLET ORAL 2 TIMES DAILY
Qty: 8 TABLET | Refills: 0 | Status: SHIPPED | OUTPATIENT
Start: 2020-02-09 | End: 2020-07-08

## 2020-02-09 RX ORDER — VENLAFAXINE HYDROCHLORIDE 150 MG/1
150 CAPSULE, EXTENDED RELEASE ORAL DAILY
Start: 2020-02-09 | End: 2021-04-19 | Stop reason: SDUPTHER

## 2020-02-09 RX ORDER — AMOXICILLIN AND CLAVULANATE POTASSIUM 875; 125 MG/1; MG/1
1 TABLET, FILM COATED ORAL EVERY 12 HOURS
Qty: 8 TABLET | Refills: 0 | Status: SHIPPED | OUTPATIENT
Start: 2020-02-09 | End: 2020-02-09 | Stop reason: HOSPADM

## 2020-02-09 RX ORDER — VENLAFAXINE HYDROCHLORIDE 150 MG/1
150 CAPSULE, EXTENDED RELEASE ORAL 2 TIMES DAILY
Status: DISCONTINUED | OUTPATIENT
Start: 2020-02-09 | End: 2020-02-09 | Stop reason: HOSPADM

## 2020-02-09 RX ADMIN — SENNOSIDES AND DOCUSATE SODIUM 1 TABLET: 8.6; 5 TABLET ORAL at 09:02

## 2020-02-09 RX ADMIN — DICLOFENAC 4 G: 10 GEL TOPICAL at 09:02

## 2020-02-09 RX ADMIN — BENAZEPRIL HYDROCHLORIDE 40 MG: 10 TABLET, COATED ORAL at 09:02

## 2020-02-09 RX ADMIN — AMLODIPINE BESYLATE 2.5 MG: 2.5 TABLET ORAL at 09:02

## 2020-02-09 RX ADMIN — HEPARIN SODIUM 5000 UNITS: 5000 INJECTION, SOLUTION INTRAVENOUS; SUBCUTANEOUS at 06:02

## 2020-02-09 RX ADMIN — DEXTROSE 250 MG: 50 INJECTION, SOLUTION INTRAVENOUS at 09:02

## 2020-02-09 RX ADMIN — PIPERACILLIN AND TAZOBACTAM 4.5 G: 4; .5 INJECTION, POWDER, LYOPHILIZED, FOR SOLUTION INTRAVENOUS; PARENTERAL at 06:02

## 2020-02-09 RX ADMIN — GABAPENTIN 600 MG: 300 CAPSULE ORAL at 09:02

## 2020-02-09 RX ADMIN — BACLOFEN 20 MG: 10 TABLET ORAL at 09:02

## 2020-02-09 NOTE — PT/OT/SLP EVAL
Physical Therapy Evaluation    Patient Name:  Marleen Samano   MRN:  [id number]    Recommendations:     Discharge Recommendations:  home with home health, home health PT   Discharge Equipment Recommendations: none   Barriers to discharge: None    Assessment:     Marleen Samano is a 74 y.o. female admitted with a medical diagnosis of Acute encephalopathy.  She presents with the following impairments/functional limitations:  weakness, impaired functional mobilty, impaired balance, impaired cognition, decreased safety awareness, impaired endurance, gait instability, impaired self care skills, decreased lower extremity function.    Rehab Prognosis: Good; patient would benefit from acute skilled PT services to address these deficits and reach maximum level of function.    Recent Surgery: * No surgery found *      Plan:     During this hospitalization, patient to be seen 6 x/week to address the identified rehab impairments via gait training, therapeutic activities, therapeutic exercises and progress toward the following goals:    · Plan of Care Expires:  02/23/20    Subjective     Chief Complaint: general weakness  Patient/Family Comments/goals: improve overall strength/balance/mobiliy  Pain/Comfort:  · Pain Rating 1: 0/10  · Pain Rating 2: 0/10    Patients cultural, spiritual, Syn[employer] conflicts given the current situation:      Living Environment:  Lives alone in 1-story home (no steps).  Prior to admission, patients level of function was supervision as needed.  Equipment used at home: bath bench, cane, quad, walker, rolling.  DME owned (not currently used): none.  Upon discharge, patient will have assistance from family.    Objective:     Communicated with nurse prior to session.  Patient found supine with peripheral IV  upon PT entry to room.    General Precautions: Standard, contact, fall   Orthopedic Precautions:N/A   Braces: N/A     Exams:  · RLE ROM: WFL  · RLE Strength: Deficits: grossly 4-/5  · LLE ROM:  WFL  · LLE Strength: Deficits: grossly 4-/5    Functional Mobility:  · Bed Mobility:     · Supine to Sit: stand by assistance  · Sit to Supine: stand by assistance  · Transfers:     · Sit to Stand:  contact guard assistance with no AD  · Gait: 150 ft w/ CGA      Therapeutic Activities and Exercises:   n/a    AM-PAC 6 CLICK MOBILITY  Total Score:18     Patient left supine with all lines intact, call button in reach and family present.    GOALS:   Multidisciplinary Problems     Physical Therapy Goals        Problem: Physical Therapy Goal    Goal Priority Disciplines Outcome Goal Variances Interventions   Physical Therapy Goal     PT, PT/OT      Description:  Goals to be met by: 2020     Patient will increase functional independence with mobility by performin. Sit to stand transfer with Stand-by Assistance  2. Gait  x 250 feet with Contact Guard Assistance using Rolling Walker.   3. Lower extremity exercise program x 20 reps, with assistance as needed                      History:     Past Medical History:   Diagnosis Date    Arthritis     Bell's palsy     Bladder incontinence     Blepharospasm     Cervical dystonia     Depression     Hormone deficiency     Myofacial pain syndrome        Past Surgical History:   Procedure Laterality Date    APPENDECTOMY      BREAST BIOPSY      BREAST LUMPECTOMY      EYE SURGERY      HYSTERECTOMY      NECK SURGERY      TONSILLECTOMY         Time Tracking:     PT Received On: 20  PT Start Time: 1105     PT Stop Time: 1130  PT Total Time (min): 25 min     Billable Minutes: Evaluation 15 and Gait Training 10      Scotty Miranda, PT  2020

## 2020-02-09 NOTE — PLAN OF CARE
02/09/20 1506   Final Note   Assessment Type Final Discharge Note   Anticipated Discharge Disposition Home-Health

## 2020-02-09 NOTE — DISCHARGE INSTRUCTIONS
Thank you for choosing [facility] for your medical care. The primary doctor who is taking care of you at the time of your discharge is Kenan Guillen MD.     You were admitted to the hospital with Acute encephalopathy.     Please note your discharge instructions, including diet/activity restrictions, follow-up appointments, and medication changes.  If you have any questions about your medical issues, prescriptions, or any other questions, please feel free to contact the [facility] Medicine Dept at [phone] and we will help.    If you are previously with Home health, outpatient PT/OT or under a therapy program, you are cleared to return to those programs.    Please direct all long term medication refills and follow up to your primary care provider, De Gomes MD. Thank you again for letting us take care of your health care needs.    Please note the following discharge instructions per your discharging physician-    1.Discontinue baclofen, gabapentin, Dc tagement at home  2.Restart Effexor  mg once a day (holding Effexor has high probability of Effexor withdrawal syndrome)  3.Cranberry juice 8 oz 4 times a day  4. Continue hydration  5.Repeat CPK and Iso-fractionated the enzyme in the primary care doctor's office

## 2020-02-09 NOTE — HOSPITAL COURSE
Admitted to [facility]. Working diagnosis of altered mental status. MRI/ MRA brain was done that showed no acute intracranial process. CT abdomen showed signs of colitis and so started on IV antibiotics.     According to patient the last thing she remembered was having had a conversation with 1 of her friends around 9:00 p.m. she went to bed and was shocked that she was in the emergency room of this hospital.  Patient states that she was found unresponsive by her daughter who perhaps came to see her after people from her work and called patient couple of times and having had no response from her, they called her daughter, who came and found patient in a state as described above.  She states that at best she remembers having taken 3 baclopen 20 mg tablets and took between evening and bedtime (3) 600 mg of Neurontin pills.  She has been complaining of having abdominal pain,diarrhea, urinary incontinence, some forgetfulness and having difficulty finding the right word to express what she wants to.  Patient states that she sleeps well her appetite is okay and weight is stable. She denies any crying spells any hallucinations any major mood swings.  However the abdominal pain, diarrhea and back pain is ever present.  Patient denies having any nightmares.  Patient was found on her floor confused responding to yes and no but not talking.  In the emergency room patient was noted to be having clenched teeth intermittent jerking of body and minimal response from substernal rub.     She was evaluated by Psychiatry who suggested that patient's cognitive impairment  may be interaction between Tagamet and Neurontin, baclofen, and Effexor withdrawal. Initially there was suspicion of  Serotonin syndrome but psychiatry  ruled out because of absence of fever, CPK levels, heart rate, no hyperhidrosis and absence of myoclonus.  Hyperreflexia may be  toxic effects of medications stated above. Recommended to discontinue baclofen,  gabapentin, and Dc tagement at home. Recommended restarting Effexor  mg once a day instead of twice daily  Patient improved over the course of hospital stay and cleared for discharge

## 2020-02-09 NOTE — PROGRESS NOTES
[facility]  Neurology  Progress Note    Patient Name: Marleen Samano  MRN: [id number]  Admission Date: 2/7/2020  Hospital Length of Stay: 1 days  Code Status: Full Code   Attending Provider: Kenan Guillen MD  Primary Care Physician: De Gomes MD   Principal Problem:Acute encephalopathy    Subjective:     Interval History: Patient seen and examined in room this morning. She states she is back to baseline. Denies acute events overnight.     CRT: 1.0     Brain imaging   CTH w/o contrast: negative  MRI brain w/o contrast: negative   MRA brain w/o contrast: Unremarkable MRA of the intracranial circulation.     Neck imaging:  MRA neck: No hemodynamically significance of the neck vessels     EEG pending      UDS: negative        Current Medications:     Current Facility-Administered Medications   Medication Dose Route Frequency Provider Last Rate Last Dose    acetaminophen tablet 1,000 mg  1,000 mg Oral Q6H PRN Kenan Guillen MD   1,000 mg at 02/08/20 1712    albuterol-ipratropium 2.5 mg-0.5 mg/3 mL nebulizer solution 3 mL  3 mL Nebulization Q6H PRN Kenan Guillen MD        amLODIPine tablet 2.5 mg  2.5 mg Oral Daily Kenan Guillen MD        baclofen tablet 20 mg  20 mg Oral TID Kenan Guillen MD   20 mg at 02/08/20 2102    benazepriL tablet 40 mg  40 mg Oral Daily Kenan Guillen MD   40 mg at 02/08/20 1413    dextrose 50% injection 12.5 g  12.5 g Intravenous PRN Kenan Guillen MD        dextrose 50% injection 25 g  25 g Intravenous PRN Kenan Guillen MD        diclofenac sodium 1 % gel 4 g  4 g Topical (Top) Daily Kenan Guillen MD        gabapentin capsule 600 mg  600 mg Oral TID Kenan Guillen MD   600 mg at 02/08/20 2101    glucagon (human recombinant) injection 1 mg  1 mg Intramuscular PRN Kenan Guillen MD        glucose chewable tablet 16 g  16 g Oral PRN Kenan Guillen MD        glucose chewable tablet 24 g  24 g Oral  PRN Kenan Guillen MD        heparin (porcine) injection 5,000 Units  5,000 Units Subcutaneous Q8H Kenan Guillen MD   5,000 Units at 02/09/20 0618    levETIRAcetam (KEPPRA) 250 mg in dextrose 5 % 100 mL IVPB  250 mg Intravenous Q12H Dheeraj Linares  mL/hr at 02/08/20 2102 250 mg at 02/08/20 2102    lorazepam injection 2 mg  2 mg Intravenous Q10 Min PRN Kenan Guillen MD        lorazepam injection 2 mg  2 mg Intravenous Once Kenan Guillen MD        magnesium oxide tablet 800 mg  800 mg Oral PRN Kenan Guillen MD        magnesium oxide tablet 800 mg  800 mg Oral PRN Kenan Guillen MD        melatonin tablet 9 mg  9 mg Oral Nightly PRN Kenan Guillen MD   9 mg at 02/08/20 2119    montelukast tablet 10 mg  10 mg Oral QHS Kenan Guillen MD   10 mg at 02/08/20 2101    ondansetron injection 8 mg  8 mg Intravenous Q8H PRN Kenan Guillen MD        piperacillin-tazobactam 4.5 g in dextrose 5 % 100 mL IVPB (ready to mix system)  4.5 g Intravenous Q8H Kenan Guillen MD 25 mL/hr at 02/09/20 0618 4.5 g at 02/09/20 0618    potassium chloride 10% oral solution 40 mEq  40 mEq Oral PRN Kenan Guillen MD        potassium chloride 10% oral solution 60 mEq  60 mEq Oral PRN Kenan Guillen MD        senna-docusate 8.6-50 mg per tablet 1 tablet  1 tablet Oral BID Kenan Guillen MD   1 tablet at 02/08/20 2101    sodium chloride 0.9% flush 10 mL  10 mL Intravenous PRN Kenan Guillen MD        traZODone tablet 100 mg  100 mg Oral QHS Kenan Guillen MD   100 mg at 02/08/20 2101       Review of Systems   As per HPI   Objective:     Vital Signs (Most Recent):  Temp: 97.8 °F (36.6 °C) (02/09/20 0736)  Pulse: (!) 56 (02/09/20 0736)  Resp: 14 (02/09/20 0736)  BP: (!) 110/53 (02/09/20 0736)  SpO2: (!) 93 % (02/09/20 0809) Vital Signs (24h Range):  Temp:  [96.5 °F (35.8 °C)-98.1 °F (36.7 °C)] 97.8 °F (36.6 °C)  Pulse:  [56-93] 56  Resp:  [14-48]  14  SpO2:  [82 %-99 %] 93 %  BP: ()/(47-87) 110/53     Weight: 75.6 kg (166 lb 10.7 oz)  Body mass index is 24.61 kg/m².    Physical Exam  Constitutional: She is oriented to person, place, and time.   Eyes: Pupils are equal, round, and reactive to light. EOM are normal.   Neurological: She is oriented to person, place, and time. She has an abnormal Finger-Nose-Finger Test.   Reflex Scores:       Tricep reflexes are 2+ on the right side and 2+ on the left side.       Bicep reflexes are 2+ on the right side and 2+ on the left side.       Brachioradialis reflexes are 2+ on the right side and 2+ on the left side.       Patellar reflexes are 2+ on the right side and 2+ on the left side.       Achilles reflexes are 2+ on the right side and 2+ on the left side.  Psychiatric: Her speech is normal.      Constitutional: She appears well-developed and well-nourished.  No distress. She is alert and oriented.   HENT:   Head: Normocephalic and atraumatic.   Right Ear: External ear normal.   Left Ear: External ear normal.   Nose: Nose normal.   Mouth/Throat: Oropharynx is clear and moist.   Eyes: Pupils are equal, round, and reactive to light. Conjunctivae and EOM are normal. Right eye exhibits no discharge. Left eye exhibits no discharge. No scleral icterus.   Neck: Normal range of motion. Neck supple. No thyromegaly present.   Cardiovascular: Normal rate, regular rhythm, normal heart sounds and intact distal pulses.   No murmur heard.  Pulmonary/Chest: Effort normal and breath sounds normal. No stridor. No respiratory distress. She has no wheezes. She has no rales.   Abdominal: Soft. Bowel sounds are normal. She exhibits no distension.    Musculoskeletal: She exhibits no edema or tenderness.   Lymphadenopathy:     She has no cervical adenopathy.   Neurological:  No cranial nerve deficit.  Reflex Scores:       Bicep reflexes are 3+ on the right side and 3+ on the left side.  Skin: Skin is warm and dry. Capillary refill  takes less than 2 seconds. No rash noted. She is not diaphoretic. No erythema.   Psychiatric: She has a normal mood and affect. Her behavior is normal. Judgment and thought content normal.      NEUROLOGICAL EXAMINATION:      MENTAL STATUS   Oriented to person, place, and time.   Follows 2 step commands.   Attention: normal. Concentration: normal.   Speech: speech is normal   Level of consciousness: alert  Knowledge: good.   Able to repeat. Normal comprehension.      CRANIAL NERVES      CN II   Right visual field deficit: none  Left visual field deficit: none      CN III, IV, VI   Pupils are equal, round, and reactive to light.  Extraocular motions are normal.      CN V   Facial sensation intact.      CN VII   Facial expression full, symmetric.      MOTOR EXAM   Muscle bulk: normal  Overall muscle tone: normal       MS 4/5 throughout       REFLEXES      Reflexes   Right brachioradialis: 2+  Left brachioradialis: 2+  Right biceps: 2+  Left biceps: 2+  Right triceps: 2+  Left triceps: 2+  Right patellar: 2+  Left patellar: 2+  Right achilles: 2+  Left achilles: 2+  Right plantar: normal  Left plantar: normal     SENSORY EXAM   Light touch normal.      GAIT AND COORDINATION      Coordination   Finger to nose coordination: abnormal     Tremor   Resting tremor: present          Significant Labs:   Hemoglobin A1c: No results for input(s): HGBA1C in the last 720 hours.  CBC:   Recent Labs   Lab 02/07/20  1600 02/08/20  0403 02/09/20  0440   WBC 14.40* 8.12 4.51   HGB 11.7* 10.0* 9.2*   HCT 38.4 33.7* 31.3*    183 159     CMP:   Recent Labs   Lab 02/07/20  1600 02/08/20  0403 02/09/20  0440   * 122* 107    143 144   K 4.4 4.4 3.9    109 109   CO2 25 27 29   BUN 34* 31* 27*   CREATININE 1.5* 1.0 1.0   CALCIUM 9.4 8.7 8.8   MG  --  2.4 2.4   PROT 7.4 6.3 5.9*   ALBUMIN 4.2 3.5 3.3*   BILITOT 0.4 0.5 0.4   ALKPHOS 112 94 90   AST 23 37 34   ALT 19 21 24   ANIONGAP 13 7* 6*   EGFRNONAA 34* 55* 55*      All pertinent lab results from the past 24 hours have been reviewed.    Significant Imaging: I have reviewed all pertinent imaging results/findings within the past 24 hours.    Assessment and Plan:  1. Acute Encephalopathy of unknown etiology   -Brain imaging negative for acute process   -UA negative for UTI  -EEG: negative per Dr. Salvatore Linares        2. Enterocolitis   -IM starting Zosyn and managing      3. CKD, stage IV   -CRT: 1.0  -IM managing      4. Hypertension           Active Diagnoses:    Diagnosis Date Noted POA    PRINCIPAL PROBLEM:  Acute encephalopathy [G93.40] 02/08/2020 Yes    Altered mental status [R41.82] 02/07/2020 Yes    Bipolar I disorder, single manic episode [F30.9] 02/07/2020 Yes    Enterocolitis [K52.9] 02/07/2020 Yes    CKD (chronic kidney disease), stage IV [N18.4] 02/07/2020 Yes    Immune deficiency disorder [D84.9] 10/08/2019 Yes    Bronchiectasis without complication [J47.9] 10/08/2019 Yes    Essential hypertension [I10] 10/18/2017 Yes    Chronic obstructive lung disease [J44.9] 10/18/2017 Yes    Blepharospasm [G24.5] 08/14/2017 Yes    Cervical dystonia [G24.3] 08/14/2017 Yes    S/P total knee arthroplasty [Z96.659] 08/01/2014 Not Applicable    Osteoarthritis of knee [M17.10] 07/15/2014 Yes    Arthritis of knee [M17.10] 05/08/2014 Yes      Problems Resolved During this Admission:    Diagnosis Date Noted Date Resolved POA    Encephalopathy, metabolic [G93.41] 02/07/2020 02/08/2020 Yes       VTE Risk Mitigation (From admission, onward)         Ordered     heparin (porcine) injection 5,000 Units  Every 8 hours      02/07/20 2239     IP VTE HIGH RISK PATIENT  Once      02/07/20 2213     Place sequential compression device  Until discontinued      02/07/20 2213                Luz Alberto DNP  Neurology  [facility]    I, Dr. Salvatore Linares, discussed care with my advanced practitioner and agree with above. I have reviewed patient clinical presentation, work  up, impression and plan. At baseline. Improved. S/E of AEDs explained. No driving.

## 2020-02-09 NOTE — NURSING
Patient to room 307, arrived from ICU via w/c. Patient is awake alert without complaint.Ambulated to bathroom with standby assist. V/S stable, blood sugar 127. Good appetite. Antibiotics IV per schedule.

## 2020-02-09 NOTE — PLAN OF CARE
Patient AAO, VSS. PIV CDI/KVO. Telemetry in place. BG monitored ACHS. Seen by Psychiatry. Tolerating diet. Workup negative for stroke or seizures. Monitor labs. Pt verbalized understanding of POC. Purposeful hourly/q2hr rounding done during shift to promote patient safety. Patient free from falls and injury during shift.  Bed in lowest position, brakes locked, and call light within reach.  Will continue to monitor.

## 2020-02-09 NOTE — PLAN OF CARE
Assigned to [facility].       02/09/20 1640   Post-Acute Status   Post-Acute Authorization Home Health/Hospice   Home Health/Hospice Status Set-up Complete

## 2020-02-09 NOTE — RESPIRATORY THERAPY
02/09/20 0809   PRE-TX-O2   O2 Device (Oxygen Therapy) room air   SpO2 (!) 93 %   Pulse Oximetry Type Intermittent   $ Pulse Oximetry - Multiple Charge Pulse Oximetry - Multiple   Aerosol Therapy   $ Aerosol Therapy Charges PRN treatment not required   Respiratory Treatment Status (SVN) PRN treatment not required

## 2020-02-09 NOTE — PLAN OF CARE
02/09/20 1405   Discharge Assessment   Assessment Type Discharge Planning Assessment   Confirmed/corrected address and phone number on facesheet? Yes   Assessment information obtained from? Patient   Prior to hospitilization cognitive status: Alert/Oriented   Prior to hospitalization functional status: Independent   Current cognitive status: Alert/Oriented   Current Functional Status: Independent   Facility Arrived From: home   Lives With alone   Able to Return to Prior Arrangements yes   Is patient able to care for self after discharge? Yes   Who are your caregiver(s) and their phone number(s)? daughter:  Lexis Hermosillo [phone]   Patient's perception of discharge disposition home or selfcare   Readmission Within the Last 30 Days no previous admission in last 30 days   Patient currently being followed by outpatient case management? No   Patient currently receives any other outside agency services? No   Equipment Currently Used at Home bath bench;shower chair;cane, quad;walker, rolling   Part D Coverage Yes   Is the patient taking medications as prescribed? yes   Does the patient have transportation home? Yes   Transportation Anticipated family or friend will provide   Dialysis Name and Scheduled days N/A   Does the patient receive services at the Coumadin Clinic? No   Discharge Plan A Home   Discharge Plan B Home   DME Needed Upon Discharge  none   Patient/Family in Agreement with Plan yes

## 2020-02-09 NOTE — DISCHARGE SUMMARY
[facility] Hospital Medicine  Discharge Summary      Patient Name: Marleen Samano  MRN: [id number]  Admission Date: 2/7/2020  Hospital Length of Stay: 1 days  Discharge Date and Time:  02/09/2020 1:44 PM  Attending Physician: Kenan Guillen MD   Discharging Provider: Kenan Guillen MD  Primary Care Provider: De Gomes MD      HPI:   The patient is a 74 y.o. female with past medical history of cervical dystonia, depression, bipolar disorder, immune deficiency, osteoarthritis, myofascial pain syndrome, bilateral cataract was brought to the emergency room   for alerted mental status. The patient was found confused laying on the floor by the daughter.  The friend was at bedside the states that the patient spoke to her the night before and was complaining of a difficulty passing stools.  She is not complaining of any headaches or any other neurological deficit at that point.  In addition the friend states that Patient appeared slightly confused over the phone.  Patient is unable to provide any history as she is confused at this point.  As per the EMS and the ER physician report patient was not responding to commands and responding yes or no but was not talking. She was last seen normal 2 days ago at noon.  No recent new neurological deficits or any symptoms as per the family.  No PMHx of stroke. No indication of being on blood thinners. Reported hx of COPD and gastritis. SHx of hysterectomy.        The history is provided by the patient.      * No surgery found *      Hospital Course:   Admitted to [facility]. Working diagnosis of altered mental status. MRI/ MRA brain was done that showed no acute intracranial process. CT abdomen showed signs of colitis and so started on IV antibiotics.     According to patient the last thing she remembered was having had a conversation with 1 of her friends around 9:00 p.m. she went to bed and was shocked that she was in the emergency room of  this [facility].  Patient states that she was found unresponsive by her daughter who perhaps came to see her after people from her work and called patient couple of times and having had no response from her, they called her daughter, who came and found patient in a state as described above.  She states that at best she remembers having taken 3 baclopen 20 mg tablets and took between evening and bedtime (3) 600 mg of Neurontin pills.  She has been complaining of having abdominal pain,diarrhea, urinary incontinence, some forgetfulness and having difficulty finding the right word to express what she wants to.  Patient states that she sleeps well her appetite is okay and weight is stable. She denies any crying spells any hallucinations any major mood swings.  However the abdominal pain, diarrhea and back pain is ever present.  Patient denies having any nightmares.  Patient was found on her floor confused responding to yes and no but not talking.  In the emergency room patient was noted to be having clenched teeth intermittent jerking of body and minimal response from substernal rub.     She was evaluated by Psychiatry who suggested that patient's cognitive impairment   may be interaction between Tagamet and Neurontin, baclofen, and Effexor withdrawal. Initially there was suspicion of  Serotonin syndrome but psychiatry  ruled out because of absence of fever, CPK levels, heart rate, no hyperhidrosis and absence of myoclonus.  Hyperreflexia  may be  toxic effects of medications stated above. Recommended to discontinue baclofen, gabapentin, and Dc tagement at home. Recommended restarting Effexor  mg once a day instead of twice daily  Patient improved over the course of hospital stay and cleared for discharge     Consults:   Consults (From admission, onward)        Status Ordering Provider     Inpatient consult to Psychiatry  Once     Provider:  Edward Cerrato MD    Acknowledged SONIA WREN     Inpatient  consult to Registered Dietitian/Nutritionist  Once     Provider:  (Not yet assigned)    KATIE Schmitt          No new Assessment & Plan notes have been filed under this hospital service since the last note was generated.  Service: Hospital Medicine    Final Active Diagnoses:    Diagnosis Date Noted POA    Enterocolitis [K52.9] 02/07/2020 Yes    Bipolar I disorder, single manic episode [F30.9] 02/07/2020 Yes    CKD (chronic kidney disease), stage IV [N18.4] 02/07/2020 Yes    Immune deficiency disorder [D84.9] 10/08/2019 Yes    Bronchiectasis without complication [J47.9] 10/08/2019 Yes    Essential hypertension [I10] 10/18/2017 Yes    Chronic obstructive lung disease [J44.9] 10/18/2017 Yes    Blepharospasm [G24.5] 08/14/2017 Yes    Cervical dystonia [G24.3] 08/14/2017 Yes    S/P total knee arthroplasty [Z96.659] 08/01/2014 Not Applicable    Osteoarthritis of knee [M17.10] 07/15/2014 Yes    Arthritis of knee [M17.10] 05/08/2014 Yes      Problems Resolved During this Admission:    Diagnosis Date Noted Date Resolved POA    PRINCIPAL PROBLEM:  Acute encephalopathy [G93.40] 02/08/2020 02/09/2020 Yes    Encephalopathy, metabolic [G93.41] 02/07/2020 02/08/2020 Yes    Altered mental status [R41.82] 02/07/2020 02/09/2020 Yes       Discharged Condition: stable    Disposition: Home or Self Care    Follow Up:  Follow-up Information     De Gomes MD In 1 week.    Specialty:  Internal Medicine  Contact information:  [address] [place] [address]  [phone]             Edward Cerrato MD In 1 week.    Specialty:  Psychiatry  Contact information:  [address] [place] [address]  [phone]                 Patient Instructions:      Referral to Home health   Referral Priority: Routine Referral Type: Home Health   Referral Reason: Specialty Services Required   Requested Specialty: Home Health Services   Number of Visits Requested: 1     Notify your health care  provider if you experience any of the following:  temperature >100.4     Notify your health care provider if you experience any of the following:  severe uncontrolled pain     Activity as tolerated       Significant Diagnostic Studies: Labs:   BMP:   Recent Labs   Lab 02/07/20  1600 02/08/20  0403 02/09/20  0440   * 122* 107    143 144   K 4.4 4.4 3.9    109 109   CO2 25 27 29   BUN 34* 31* 27*   CREATININE 1.5* 1.0 1.0   CALCIUM 9.4 8.7 8.8   MG  --  2.4 2.4    and CBC   Recent Labs   Lab 02/07/20  1600 02/08/20  0403 02/09/20  0440   WBC 14.40* 8.12 4.51   HGB 11.7* 10.0* 9.2*   HCT 38.4 33.7* 31.3*    183 159     Microbiology:   Blood Culture No results found for: LABBLOO, Sputum Culture   Lab Results   Component Value Date    GSRESP >10 epithelial cells per low power field 03/12/2017    GSRESP Many WBC's 03/12/2017    GSRESP Many Gram positive cocci 03/12/2017    GSRESP Moderate Gram negative rods 03/12/2017    RESPIRATORYC Normal respiratory jung 03/12/2017    and Urine Culture    Lab Results   Component Value Date    LABURIN NO GROWTH AFTER 48 HOURS. 02/07/2013     Radiology: MRI:   MRI BRAIN WITHOUT CONTRAST    CLINICAL HISTORY:  Neuro deficit(s), subacute;Syncope/fainting;    TECHNIQUE:  Multiplanar multisequence MR imaging of the brain was performed without contrast.    COMPARISON:  CT dated 02/07/2020 and MRI brain dated 12/04/2018    FINDINGS:  The craniocervical junction is within normal limits.  The sellar and parasellar structures are within normal limits.  The midline structures are intact.  The intracranial flow voids are within normal limits.    No diffusion-weighted signal abnormality is present.  There is a stable punctate T2/FLAIR signal hyperintensity in the right frontal white matter.  No additional significant parenchymal signal abnormality is present.  The ventricles and sulci are within normal limits for the patient's age.  There are no extra-axial fluid  collections.  There is no evidence of intracranial hemorrhage.  There is no evidence of mass effect.    There are postoperative changes in the orbits.  The paranasal sinuses are unremarkable.  The mastoid air cells are clear.  The calvarium is intact.   Impression:       No acute intracranial process.       CT scan:  CT ABDOMEN PELVIS WITHOUT CONTRAST:   Results for orders placed or performed during the hospital encounter of 02/07/20   CT Abdomen Pelvis  Without Contrast    Narrative    EXAMINATION:  CT ABDOMEN PELVIS WITHOUT CONTRAST    CLINICAL HISTORY:  Infection, abdomen-pelvis;    TECHNIQUE:  Axial CT scan of the abdomen and pelvis was obtained from the level of the hemidiaphragms to the pubic symphysis without intravenous contrast. Coronal and sagittal reformats were obtained.  There are significant motion limitations to the examination.    COMPARISON:  11/10/2016.    FINDINGS:  There are no pleural effusions.  There is no evidence of a pneumothorax.  No airspace opacity is present.  No discrete pulmonary nodule is identified.    The heart is unremarkable.  There is normal tapering of the abdominal aorta.  There are scattered calcifications along the course of the abdominal aorta and its branch vessels.  There are significant calcifications involving the splenic artery.  There is no evidence of lymphadenopathy in the abdomen or pelvis.    There is unchanged appearance of a large hiatal hernia.  There is soft tissue thickening at the level of the hernia site.  The distal portion of the stomach is unremarkable.  The duodenum is within normal limits.  There is minimal wall thickening involving the small bowel loops.  The appendix is not definitely identified.  There is colonic diverticula without evidence of acute diverticulitis.  There is wall thickening involving the cecum and proximal ascending colon.  There is also wall thickening involving the remainder of the large bowel but to lesser extent.    The liver  is enlarged.  The gallbladder is distended.  The biliary tree appears unremarkable.  The spleen is enlarged.  The pancreas appears grossly unremarkable.    The adrenal glands are unremarkable.  The kidneys appear grossly unremarkable.  The ureters are within normal limits.  There is marked distention of the urinary bladder.  The patient appears status post hysterectomy.    There is no evidence of free fluid in the abdomen or pelvis.  There is no evidence of free air.  There is no evidence of pneumatosis.  No portal venous air is identified.    The psoas margins are unremarkable.  The abdominal wall is within normal limits.  There is associated prominent mild bilateral residual breast tissue.  There are degenerative changes in the osseous structures.  There is levoconvex scoliosis of the thoracolumbar alignment.  There is no evidence of a fracture.      Impression    Unchanged appearance of large hiatal hernia with mild wall thickening at the level of the hernia site.    Mild wall thickening involving the small bowel loops and large bowel loops.  The findings represent some component of enterocolitis.    Nonspecific distention of the urinary bladder.    Hepatosplenomegaly.    Additional findings as above.    Significant motion limitations.  Repeat examination is recommended when the patient is better able to tolerate positioning.      Electronically signed by: Tanmay Maria MD  Date:    02/07/2020  Time:    19:50       Pending Diagnostic Studies:     Procedure Component Value Units Date/Time    Freeze and Hold, [facility] [[id number]] Collected:  02/07/20 1741    Order Status:  Sent Lab Status:  No result     Specimen:  CSF (Spinal Fluid) from Cerebrospinal Fluid     Herpes Simplex (HSV) by PCR, CSF (Tube 3) [[id number]] Collected:  02/07/20 1741    Order Status:  Sent Lab Status:  In process Updated:  02/07/20 1746    Specimen:  CSF (Spinal Fluid) from Cerebrospinal Fluid     Lyme Disease Serology, CSF (Tube 3) [[id number]]  Collected:  02/07/20 1741    Order Status:  Sent Lab Status:  In process Updated:  02/07/20 2158    Specimen:  CSF (Spinal Fluid) from Cerebrospinal Fluid     US Carotid Bilateral [[id number]] Resulted:  02/09/20 1005    Order Status:  Sent Lab Status:  In process Updated:  02/09/20 1005         Medications:  Reconciled Home Medications:      Medication List      START taking these medications    ciprofloxacin HCl 500 MG tablet  Commonly known as:  CIPRO  Take 1 tablet (500 mg total) by mouth 2 (two) times daily.        CHANGE how you take these medications    venlafaxine 150 MG Cp24  Commonly known as:  EFFEXOR-XR  Take 1 capsule (150 mg total) by mouth once daily.  What changed:  when to take this        CONTINUE taking these medications    amLODIPine 2.5 MG tablet  Commonly known as:  NORVASC  every evening.     benazepriL 40 MG tablet  Commonly known as:  LOTENSIN  Take 40 mg by mouth once daily.     diclofenac sodium 1 % Gel  Commonly known as:  VOLTAREN  Apply topically.     levalbuterol 45 mcg/actuation inhaler  Commonly known as:  XOPENEX HFA  Inhale 2 puffs into the lungs every 6 (six) hours as needed for Wheezing. Rescue     magnesium oxide 400 mg (241.3 mg magnesium) tablet  Commonly known as:  MAG-OX  Take 1 tablet (400 mg total) by mouth 2 (two) times daily.     montelukast 10 mg tablet  Commonly known as:  SINGULAIR  Take 1 tablet (10 mg total) by mouth every evening.     omeprazole 40 MG capsule  Commonly known as:  PRILOSEC  Take 40 mg by mouth 2 (two) times daily before meals.     traZODone 100 MG tablet  Commonly known as:  DESYREL  Take 100 mg by mouth every evening.        STOP taking these medications    albuterol 2.5 mg /3 mL (0.083 %) nebulizer solution  Commonly known as:  PROVENTIL     albuterol 90 mcg/actuation inhaler  Commonly known as:  PROVENTIL/VENTOLIN HFA     baclofen 20 MG tablet  Commonly known as:  LIORESAL     cimetidine 200 MG tablet  Commonly known as:  TAGAMET     gabapentin  600 MG tablet  Commonly known as:  NEURONTIN            Indwelling Lines/Drains at time of discharge:   Lines/Drains/Airways     None                 Time spent on the discharge of patient: 60 minutes  Patient was seen and examined on the date of discharge and determined to be suitable for discharge.         Kenan Guillen MD  Department of Hospital Medicine  [facility]

## 2020-02-09 NOTE — NURSING
Discharge instructions given to patient. Patient verbalized understanding. Telemetry monitor removed. PIV removed; pressure and bandage applied. Patient transported off unit via WC.

## 2020-02-09 NOTE — PLAN OF CARE
SW met w/ pt for assessment.  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  Pt has access to DME below from previous knee replacement.  Pt communicated difficulty she has in seeing providers due to the $45 co pay cost.          02/09/20 1405   Discharge Assessment   Assessment Type Discharge Planning Assessment   Confirmed/corrected address and phone number on facesheet? Yes   Assessment information obtained from? Patient   Prior to hospitilization cognitive status: Alert/Oriented   Prior to hospitalization functional status: Independent   Current cognitive status: Alert/Oriented   Current Functional Status: Independent   Facility Arrived From: home   Lives With alone   Able to Return to Prior Arrangements yes   Is patient able to care for self after discharge? Yes   Who are your caregiver(s) and their phone number(s)? daughter:  Lexis Hermosillo [phone]   Patient's perception of discharge disposition home or selfcare   Readmission Within the Last 30 Days no previous admission in last 30 days   Patient currently being followed by outpatient case management? No   Patient currently receives any other outside agency services? No   Equipment Currently Used at Home bath bench;shower chair;cane, quad;walker, rolling   Part D Coverage Yes   Is the patient taking medications as prescribed? yes   Does the patient have transportation home? Yes   Transportation Anticipated family or friend will provide   Dialysis Name and Scheduled days N/A   Does the patient receive services at the Coumadin Clinic? No   Discharge Plan A Home   Discharge Plan B Home   DME Needed Upon Discharge  none   Patient/Family in Agreement with Plan yes

## 2020-02-09 NOTE — CARE UPDATE
02/08/20 2000   Patient Assessment/Suction   Level of Consciousness (AVPU) alert   Respiratory Effort Unlabored   Expansion/Accessory Muscles/Retractions no use of accessory muscles   All Lung Fields Breath Sounds clear   PRE-TX-O2   O2 Device (Oxygen Therapy) room air   SpO2 97 %   Pulse Oximetry Type Intermittent   Pulse 71   Resp 18   Aerosol Therapy   $ Aerosol Therapy Charges PRN treatment not required

## 2020-02-10 ENCOUNTER — TELEPHONE (OUTPATIENT)
Dept: MEDSURG UNIT | Facility: HOSPITAL | Age: 75
End: 2020-02-10

## 2020-02-10 PROBLEM — D64.89 ANEMIA DUE TO MULTIPLE MECHANISMS: Status: ACTIVE | Noted: 2020-02-10

## 2020-02-10 PROBLEM — N18.4 ANEMIA, CHRONIC RENAL FAILURE, STAGE 4 (SEVERE): Status: ACTIVE | Noted: 2020-02-10

## 2020-02-10 PROBLEM — D63.8 ANEMIA, CHRONIC DISEASE: Status: ACTIVE | Noted: 2020-02-10

## 2020-02-10 PROBLEM — D64.9 NORMOCYTIC NORMOCHROMIC ANEMIA: Status: ACTIVE | Noted: 2020-02-10

## 2020-02-10 PROBLEM — D63.1 ANEMIA, CHRONIC RENAL FAILURE, STAGE 4 (SEVERE): Status: ACTIVE | Noted: 2020-02-10

## 2020-02-10 PROBLEM — R79.89 ABNORMAL CBC: Status: ACTIVE | Noted: 2020-02-10

## 2020-02-10 LAB
HSV1, PCR, CSF: NEGATIVE
HSV2, PCR, CSF: NEGATIVE

## 2020-02-11 NOTE — PHYSICIAN QUERY
PT Name: Marleen Samano  MR #: [id number]     Physician Query Form - Etiology of Condition Clarification      CDS/: REGGIE Perea, RN, CCDS               Contact information: [email or web address]  This form is a permanent document in the medical record.     Query Date: February 11, 2020    By submitting this query, we are merely seeking further clarification of documentation.  Please utilize your independent clinical judgment when addressing the question(s) below.     The medical record contains the following:    Findings Supporting Clinical Information Location in Medical Record     Acute Metabolic Encephalopathy   past medical history of cervical dystonia, depression, bipolar disorder, immune deficiency, osteoarthritis, myofascial pain syndrome, bilateral cataract was brought to the emergency room for alerted mental status    In the emergency room patient was noted to be having clenched teeth intermittent jerking of body and minimal response from substernal rub    patient was not responding to commands and responding yes or no but was not talking. She was last seen normal 2 days ago at noon.  No recent new neurological deficits or any symptoms as per the family.  No PMHx of stroke.    MRI/ MRA brain was done that showed no acute intracranial process.     remembers having taken 3 baclopen 20 mg tablets and took between evening and bedtime (3) 600 mg of Neurontin pills.      evaluated by Psychiatry who suggested that patient's cognitive impairment may be interaction between Tagamet and Neurontin, baclofen, and Effexor withdrawal.   Initially there was suspicion of  Serotonin syndrome but psychiatry ruled out     Hyperreflexia  may be  toxic effects of medications stated above. Recommended to discontinue baclofen, gabapentin, and Dc tagement at home    Acute Encephalopathy of unknown etiology---brain imaging negative for acute process   UA negative for infection   EEG negative per Dr. Salvatore Linares      DCS:   Wilmer 2/9                                            Neurology PN: Dr. Salvatore Linares 2/9     Please document your best medical opinion regarding the etiology of _Acute Metabolic Encephalopathy__ for which treatment is/was directed.     Provider use only    [x  ] drug interaction between Tagamet and Neurontin, baclofen, and Effexor withdrawal.   [  ] other: ____________________________________       [  ] Clinically Undetermined     Please document in your progress notes daily for the duration of treatment, until resolved, and include in your discharge summary.

## 2020-02-12 ENCOUNTER — TELEPHONE (OUTPATIENT)
Dept: NEUROLOGY | Facility: CLINIC | Age: 75
End: 2020-02-12

## 2020-02-12 NOTE — PROCEDURES
EEG REPORT      Marleen Samano  [id number]  1945    DATE OF SERVICE: 2/9/2020    ON     METHODOLOGY      Extended electroencephalographic recording is made while the patient is ambulatory and continuing normal daily activities.  Electrodes are placed according to the International 10-20 placement system and included T1 and T2 electrode placement.  Twenty four (24) channels of digital signal (sampling rate of 512/sec) was simultaneously recorded from the scalp including EKG and eye monitors.  Recording band pass was 0.1 to 100 hz and all data was stored digitally on the recorder.  The patient is instructed to press an event button when clinical symptoms occur and write the symptoms into a diary. Activation procedures which include photic stimulation, hyperventilation and instructing patients to perform simple task are done in selected patients.        The EEG is displayed on a monitor screen and can be reformatted into different montages for evaluation.  The entire recoding is submitted for computer assisted analysis to detect spike and electrographic seizure activity.  The entire recording is visually reviewed and the times identified by computer analysis as being spikes or seizures are reviewed again.  Compresses spectral analysis (CSA) is also performed on the activity recorded from each individual channel.  This is displayed as a power display of frequencies from 0 to 30 Hz over time.   The CSA analysis is done and displayed continuously.  This is reviewed for asymmetries in power between homologous areas of the scalp and for presence of changes in power which canbe seen when seizures occur.  Sections of suspected abnormalities on the CSA is then compared with the original EEG recording.  .     [employer] software was also utilized in the review of this study.  This software suite analyzes the EEG recording in multiple domains.  Coherence and rhythmicity is computed to identify EEG sections which may  contain organized seizures.  Each channel undergoes analysis to detect presence of spike and sharp waves which have special and morphological characteristic of epileptic activity.  The routine EEG recording is converted from spacial into frequency domain.  This is then displayed comparing homologous areas to identify areas of significant asymmetry.  Algorithm to identify non-cortically generated artifact is used to separate eye movement, EMG and other artifact from the EEG     Recording Times    A total of 00:33:32 hours of EEG was recorded.      EEG FINDINGS:  Background activity:   The background rhythm was characterized by predominantly theta with some delta and alph with a 5-6Hz posterior dominant alpha rhythm at 30-70 microvolts.  There are occasional brief attenuations.  Symmetry and continuity: the background was continuous and symmetric     Sleep:   There are periods of loss of the posterior dominant rhythm with increased attenuations consistent with state change but no sleep transients are noted.    Activation procedures:   Photic stimulation was performed with no abnormalities seen    Abnormal activity:   No epileptiform discharges, periodic discharges, lateralized rhythmic delta activity or electrographic seizures were seen.    IMPRESSION:   This is an abnormal EEG due to the finding of a mild to moderate, generalized, non-specific cerebral dysfunction.  There are no seizures or indications of seizure tendency.    Ovi King MD  Neurology-Epilepsy.  [facility].

## 2020-02-12 NOTE — TELEPHONE ENCOUNTER
----- Message from Chad Delgadillo sent at 2/12/2020  2:12 PM CST -----  Contact: Patient @ [phone]  Patient calling to schedule a NP appt to consult for ( movement disorder - dystonia ) pls call

## 2020-02-12 NOTE — TELEPHONE ENCOUNTER
Called patient to help her schedule an appointment left a message for the available date and time with Dr. Barba

## 2020-02-13 LAB
BACTERIA CSF CULT: NO GROWTH
GRAM STN SPEC: NORMAL

## 2020-03-17 ENCOUNTER — OFFICE VISIT (OUTPATIENT)
Dept: HEMATOLOGY/ONCOLOGY | Facility: CLINIC | Age: 75
End: 2020-03-17
Payer: MEDICARE

## 2020-03-17 ENCOUNTER — TELEPHONE (OUTPATIENT)
Dept: HEMATOLOGY/ONCOLOGY | Facility: CLINIC | Age: 75
End: 2020-03-17

## 2020-03-17 VITALS
WEIGHT: 165.5 LBS | DIASTOLIC BLOOD PRESSURE: 70 MMHG | BODY MASS INDEX: 27.57 KG/M2 | HEIGHT: 65 IN | HEART RATE: 79 BPM | TEMPERATURE: 99 F | RESPIRATION RATE: 12 BRPM | SYSTOLIC BLOOD PRESSURE: 154 MMHG

## 2020-03-17 DIAGNOSIS — D63.8 ANEMIA, CHRONIC DISEASE: ICD-10-CM

## 2020-03-17 DIAGNOSIS — D64.89 ANEMIA DUE TO MULTIPLE MECHANISMS: ICD-10-CM

## 2020-03-17 DIAGNOSIS — R79.89 ABNORMAL CBC: Primary | ICD-10-CM

## 2020-03-17 DIAGNOSIS — D53.9 NUTRITIONAL ANEMIA, UNSPECIFIED: ICD-10-CM

## 2020-03-17 DIAGNOSIS — D64.9 NORMOCYTIC NORMOCHROMIC ANEMIA: ICD-10-CM

## 2020-03-17 DIAGNOSIS — N18.4 ANEMIA, CHRONIC RENAL FAILURE, STAGE 4 (SEVERE): ICD-10-CM

## 2020-03-17 DIAGNOSIS — D63.1 ANEMIA, CHRONIC RENAL FAILURE, STAGE 4 (SEVERE): ICD-10-CM

## 2020-03-17 PROCEDURE — 1126F AMNT PAIN NOTED NONE PRSNT: CPT | Mod: S$GLB,,, | Performed by: INTERNAL MEDICINE

## 2020-03-17 PROCEDURE — 1101F PR PT FALLS ASSESS DOC 0-1 FALLS W/OUT INJ PAST YR: ICD-10-PCS | Mod: S$GLB,,, | Performed by: INTERNAL MEDICINE

## 2020-03-17 PROCEDURE — 3078F PR MOST RECENT DIASTOLIC BLOOD PRESSURE < 80 MM HG: ICD-10-PCS | Mod: S$GLB,,, | Performed by: INTERNAL MEDICINE

## 2020-03-17 PROCEDURE — 1126F PR PAIN SEVERITY QUANTIFIED, NO PAIN PRESENT: ICD-10-PCS | Mod: S$GLB,,, | Performed by: INTERNAL MEDICINE

## 2020-03-17 PROCEDURE — 1159F PR MEDICATION LIST DOCUMENTED IN MEDICAL RECORD: ICD-10-PCS | Mod: S$GLB,,, | Performed by: INTERNAL MEDICINE

## 2020-03-17 PROCEDURE — 1101F PT FALLS ASSESS-DOCD LE1/YR: CPT | Mod: S$GLB,,, | Performed by: INTERNAL MEDICINE

## 2020-03-17 PROCEDURE — 3077F SYST BP >= 140 MM HG: CPT | Mod: S$GLB,,, | Performed by: INTERNAL MEDICINE

## 2020-03-17 PROCEDURE — 99203 OFFICE O/P NEW LOW 30 MIN: CPT | Mod: S$GLB,,, | Performed by: INTERNAL MEDICINE

## 2020-03-17 PROCEDURE — 99203 PR OFFICE/OUTPT VISIT, NEW, LEVL III, 30-44 MIN: ICD-10-PCS | Mod: S$GLB,,, | Performed by: INTERNAL MEDICINE

## 2020-03-17 PROCEDURE — 3078F DIAST BP <80 MM HG: CPT | Mod: S$GLB,,, | Performed by: INTERNAL MEDICINE

## 2020-03-17 PROCEDURE — 1159F MED LIST DOCD IN RCRD: CPT | Mod: S$GLB,,, | Performed by: INTERNAL MEDICINE

## 2020-03-17 PROCEDURE — 3077F PR MOST RECENT SYSTOLIC BLOOD PRESSURE >= 140 MM HG: ICD-10-PCS | Mod: S$GLB,,, | Performed by: INTERNAL MEDICINE

## 2020-03-17 RX ORDER — FERROUS GLUCONATE 324(37.5)
324 TABLET ORAL 2 TIMES DAILY WITH MEALS
Qty: 60 TABLET | Refills: 6 | Status: SHIPPED | OUTPATIENT
Start: 2020-03-17 | End: 2020-07-08

## 2020-03-17 NOTE — TELEPHONE ENCOUNTER
Called the patient and instructed her that Dr. Mart said that she has to call  Her PCP to get the referral to Dr. Ying.

## 2020-03-17 NOTE — TELEPHONE ENCOUNTER
----- Message from Lexis Jesus, Patient Care Assistant sent at 3/17/2020 10:26 AM CDT -----  Patient called in stating that Dr. Mart was going to give her a coupon for her iron pills and she forgot  to get it  from you, so if you can mail her the  Coupon she will greatly appreciate it . She can be reached at [phone]

## 2020-03-17 NOTE — PROGRESS NOTES
[facility] Hematolgy/Oncology  History & Physical    Subjective:      Patient ID:   NAME: Marleen Samano : 1945     74 y.o. female    Referring Doc: Mireya  Other Physicians: Wendy Fuentes/Kain Gallegos Dugan; Swetha Castañeda (neuro)        Chief Complaint: abnormal cbc/anemia      HPI:  74 y.o. female with diagnosis of abnormal cbc with chronic anemia who has been referred by Dr Gomes for evaluation by medical hematology/oncology. She is here by herself. She is feeling ok overall. She denies any CP, SOB, HA's or N/V. She was hospitalized in 2020 with enterocolitis and acute encephalopathy. She has chronic dystonia issues. She seems to have recovered from the latest hospitalization. She had EGD and colonoscopy with Dr Dowling about 5-6 months ago. No bleeds seen per patient. She denies any bright red blood per rectum or dark stools. No hemoptysis, hematemesis or hematuria. She has been taking OTC iron.               ROS:   GEN: normal without any fever, night sweats or weight loss  HEENT: normal with no HA's, sore throat, stiff neck, changes in vision  CV: normal with no CP, SOB, PND, DE LEÓN or orthopnea  PULM: normal with no SOB, cough, hemoptysis, sputum or pleuritic pain  GI: normal with no abdominal pain, nausea, vomiting, constipation, diarrhea, melanotic stools, BRBPR, or hematemesis  : normal with no hematuria, dysuria  BREAST: normal with no mass, discharge, pain  SKIN: normal with no rash, erythema, bruising, or swelling       Past Medical/Surgical History:  Past Medical History:   Diagnosis Date    Abnormal CBC 2/10/2020    Anemia due to multiple mechanisms 2/10/2020    Anemia, chronic disease 2/10/2020    Anemia, chronic renal failure, stage 4 (severe) 2/10/2020    Arthritis     Bell's palsy     Bladder incontinence     Blepharospasm     Cervical dystonia     Depression     Hormone deficiency     Myofacial pain syndrome     Normocytic normochromic anemia 2/10/2020      Past Surgical History:   Procedure Laterality Date    APPENDECTOMY      BREAST BIOPSY      BREAST LUMPECTOMY      EYE SURGERY      HYSTERECTOMY      NECK SURGERY      TONSILLECTOMY           Allergies:  Review of patient's allergies indicates:   Allergen Reactions    Artane Other (See Comments)     Swelling    Ned-1 Swelling     Oragel caused tongue to swell    Adhesive tape-silicones Rash     Blisters after on for several hours    Latex, natural rubber Hives and Rash     Localized    Latex     Morphine Itching and Hives       Social/Family History:  Social History     Socioeconomic History    Marital status:      Spouse name: Not on file    Number of children: Not on file    Years of education: Not on file    Highest education level: Not on file   Occupational History    Not on file   Social Needs    Financial resource strain: Not on file    Food insecurity:     Worry: Not on file     Inability: Not on file    Transportation needs:     Medical: Not on file     Non-medical: Not on file   Tobacco Use    Smoking status: Never Smoker    Smokeless tobacco: Never Used   Substance and Sexual Activity    Alcohol use: No    Drug use: No    Sexual activity: Not on file   Lifestyle    Physical activity:     Days per week: Not on file     Minutes per session: Not on file    Stress: Not on file   Relationships    Social connections:     Talks on phone: Not on file     Gets together: Not on file     Attends Anabaptist service: Not on file     Active member of club or organization: Not on file     Attends meetings of clubs or organizations: Not on file     Relationship status: Not on file   Other Topics Concern    Are you pregnant or think you may be? Not Asked    Breast-feeding Not Asked   Social History Narrative    Not on file     Family History   Problem Relation Age of Onset    Diabetes Neg Hx     Melanoma Neg Hx     Psoriasis Neg Hx     Lupus Neg Hx     Eczema Neg Hx   "        Medications:    Current Outpatient Medications:     amLODIPine (NORVASC) 2.5 MG tablet, every evening. , Disp: , Rfl: 4    benazepril (LOTENSIN) 40 MG tablet, Take 40 mg by mouth once daily., Disp: , Rfl: 2    diclofenac sodium (VOLTAREN) 1 % Gel, Apply topically. , Disp: , Rfl:     levalbuterol (XOPENEX HFA) 45 mcg/actuation inhaler, Inhale 2 puffs into the lungs every 6 (six) hours as needed for Wheezing. Rescue, Disp: 1 Inhaler, Rfl: 11    magnesium oxide (MAG-OX) 400 mg tablet, Take 1 tablet (400 mg total) by mouth 2 (two) times daily., Disp: 60 tablet, Rfl: 12    omeprazole (PRILOSEC) 40 MG capsule, Take 40 mg by mouth 2 (two) times daily before meals., Disp: , Rfl:     trazodone (DESYREL) 100 MG tablet, Take 100 mg by mouth every evening.  , Disp: , Rfl:     venlafaxine (EFFEXOR-XR) 150 MG Cp24, Take 1 capsule (150 mg total) by mouth once daily., Disp: , Rfl:     ciprofloxacin HCl (CIPRO) 500 MG tablet, Take 1 tablet (500 mg total) by mouth 2 (two) times daily., Disp: 8 tablet, Rfl: 0    montelukast (SINGULAIR) 10 mg tablet, Take 1 tablet (10 mg total) by mouth every evening., Disp: 90 tablet, Rfl: 3    Current Facility-Administered Medications:     onabotulinumtoxina injection 200 Units, 200 Units, Intramuscular, Q90 Days, Cinthia Dee MD      Pathology:  Cancer Staging  No matching staging information was found for the patient.      Objective:   Vitals:  Blood pressure (!) 154/70, pulse 79, temperature 98.6 °F (37 °C), temperature source Oral, resp. rate 12, height 5' 5" (1.651 m), weight 75.1 kg (165 lb 8 oz).    Physical Examination:   GEN: no apparent distress, comfortable; AAOx3  HEAD: atraumatic and normocephalic  EYES: no pallor, no icterus, PERRLA  ENT: OMM, no pharyngeal erythema, external ears WNL; no nasal discharge; no thrush  NECK: no masses, thyroid normal, trachea midline, no LAD/LN's, supple  CV: RRR with no murmur; normal pulse; normal S1 and S2; no pedal edema  CHEST: " Normal respiratory effort; CTAB; normal breath sounds; no wheeze or crackles  ABDOM: nontender and nondistended; soft; normal bowel sounds; no rebound/guarding  MUSC/Skeletal: ROM normal; no crepitus; joints normal; no deformities or arthropathy  EXTREM: no clubbing, cyanosis, inflammation or swelling  SKIN: no rashes, lesions, ulcers, petechiae or subcutaneous nodules; chronic age related skin changes and varicosities in legs; some healing blisters on hands  : no calderon  NEURO: grossly intact; motor/sensory WNL; AAOx3; no tremors  PSYCH: normal mood, affect and behavior  LYMPH: normal cervical, supraclavicular, axillary and groin LN's      Labs:   Lab Results   Component Value Date    WBC 4.51 02/09/2020    HGB 9.2 (L) 02/09/2020    HCT 31.3 (L) 02/09/2020    MCV 84 02/09/2020     02/09/2020    CMP  Sodium   Date Value Ref Range Status   02/09/2020 144 136 - 145 mmol/L Final     Potassium   Date Value Ref Range Status   02/09/2020 3.9 3.5 - 5.1 mmol/L Final     Chloride   Date Value Ref Range Status   02/09/2020 109 95 - 110 mmol/L Final     CO2   Date Value Ref Range Status   02/09/2020 29 23 - 29 mmol/L Final     Glucose   Date Value Ref Range Status   02/09/2020 107 70 - 110 mg/dL Final     BUN, Bld   Date Value Ref Range Status   02/09/2020 27 (H) 8 - 23 mg/dL Final     Creatinine   Date Value Ref Range Status   02/09/2020 1.0 0.5 - 1.4 mg/dL Final     Calcium   Date Value Ref Range Status   02/09/2020 8.8 8.7 - 10.5 mg/dL Final     Total Protein   Date Value Ref Range Status   02/09/2020 5.9 (L) 6.0 - 8.4 g/dL Final     Albumin   Date Value Ref Range Status   02/09/2020 3.3 (L) 3.5 - 5.2 g/dL Final     Total Bilirubin   Date Value Ref Range Status   02/09/2020 0.4 0.1 - 1.0 mg/dL Final     Comment:     For infants and newborns, interpretation of results should be based  on gestational age, weight and in agreement with clinical  observations.  Premature Infant recommended reference ranges:  Up to 24  hours.............<8.0 mg/dL  Up to 48 hours............<12.0 mg/dL  3-5 days..................<15.0 mg/dL  6-29 days.................<15.0 mg/dL       Alkaline Phosphatase   Date Value Ref Range Status   02/09/2020 90 55 - 135 U/L Final     AST   Date Value Ref Range Status   02/09/2020 34 10 - 40 U/L Final     ALT   Date Value Ref Range Status   02/09/2020 24 10 - 44 U/L Final     Anion Gap   Date Value Ref Range Status   02/09/2020 6 (L) 8 - 16 mmol/L Final     eGFR if    Date Value Ref Range Status   02/09/2020 >60 >60 mL/min/1.73 m^2 Final     eGFR if non    Date Value Ref Range Status   02/09/2020 55 (A) >60 mL/min/1.73 m^2 Final     Comment:     Calculation used to obtain the estimated glomerular filtration  rate (eGFR) is the CKD-EPI equation.        5/14/2019  Lab Results   Component Value Date    IRON 59 05/14/2019    TIBC 457 (H) 05/14/2019    FERRITIN 26 05/14/2019     Lab Results   Component Value Date    JQHRBYFF94 >2000 (H) 05/14/2019     Lab Results   Component Value Date    FOLATE 13.5 05/14/2019         Radiology/Diagnostic Studies:          All lab results and imaging results have been reviewed and discussed with the patient    Assessment:   (1) 74 y.o. female with diagnosis of abnormal cbc and chronic anemia issues who has been referred by Dr Gomes  - [facility] parameters; latest hgb at 9.2  - anemia due to multiple mechanisms  - anemia of chronic disease, chronic renal  - total bili is WNL, so I do not suspect any underlying hemolytic process    Labs from May 2019:  - B12 was adequate, folate was adequate  - ferritin was 26, iron was 59; TIBC was 457 and %sats 13    I can not rule out an underlying marrow problem at this point but her wbc and platelet counts are currently WNL.     She has negative EDG and colonoscopy with Dr Dowling about 5 months ago      (2) CRI - stage IV per records but I don't appreciate that on the recent labs    (3) HTN    (4) COPD    (5)  Bipolar/Depression disorder    (6) OA of knee, s/p total knee arthroplasty    (7) Hx/of recent bout of enterocolitis with acute encephalopathy in Feb 2020  - seen by Dr Salvatore Linares with neuro    (8) GERD    (9) Chronic dystonia - followed by Dr Swetha Castañeda (neuro)        VISIT DIAGNOSES:              Abnormal CBC    Anemia due to multiple mechanisms    Anemia, chronic disease    Anemia, chronic renal failure, stage 4 (severe)    Normocytic normochromic anemia            Plan:     PLAN:  1. Check LDH, haptoglobin, SPEP, stool OBS x 3, hgb elecetrophoresis; check repeat iron panel and vit panel; retic  2. Add ferrous gluconate  3. monitor basic labs monthly for now  4. F/u with PCP, Pulm, GI ,etc  5. She may need to see a nephrologist to confirm or clarify the degree of renal insufficiency  RTC in  2 months   Fax note to Wiliam Gomes Dugan, [place]; [place]        I have explained and the patient understands all of  the current recommendation(s). I have answered all of their questions to the best of my ability and to their complete satisfaction.             Thank you for allowing me to participate in this patient's care. Please call with any questions or concerns.    Electronically signed Marc Mart MD

## 2020-03-19 ENCOUNTER — TELEPHONE (OUTPATIENT)
Dept: HEMATOLOGY/ONCOLOGY | Facility: CLINIC | Age: 75
End: 2020-03-19

## 2020-03-19 ENCOUNTER — PATIENT MESSAGE (OUTPATIENT)
Dept: HEMATOLOGY/ONCOLOGY | Facility: CLINIC | Age: 75
End: 2020-03-19

## 2020-03-19 NOTE — TELEPHONE ENCOUNTER
----- Message from Sherice Arceo sent at 3/17/2020  2:37 PM CDT -----  Pt changed her mind on seeing an infectiosus disease doctor with [facility]. She would like to be sent to one with [facility]. Please call the pt to let her know who he decided on.     # [phone]

## 2020-03-20 ENCOUNTER — PATIENT MESSAGE (OUTPATIENT)
Dept: HEMATOLOGY/ONCOLOGY | Facility: CLINIC | Age: 75
End: 2020-03-20

## 2020-03-20 ENCOUNTER — LAB VISIT (OUTPATIENT)
Dept: LAB | Facility: HOSPITAL | Age: 75
End: 2020-03-20
Attending: INTERNAL MEDICINE
Payer: MEDICARE

## 2020-03-20 DIAGNOSIS — D63.1 ANEMIA, CHRONIC RENAL FAILURE, STAGE 4 (SEVERE): ICD-10-CM

## 2020-03-20 DIAGNOSIS — D63.8 ANEMIA, CHRONIC DISEASE: ICD-10-CM

## 2020-03-20 DIAGNOSIS — D53.9 NUTRITIONAL ANEMIA, UNSPECIFIED: ICD-10-CM

## 2020-03-20 DIAGNOSIS — R79.89 ABNORMAL CBC: ICD-10-CM

## 2020-03-20 DIAGNOSIS — N18.4 ANEMIA, CHRONIC RENAL FAILURE, STAGE 4 (SEVERE): ICD-10-CM

## 2020-03-20 DIAGNOSIS — D64.9 NORMOCYTIC NORMOCHROMIC ANEMIA: ICD-10-CM

## 2020-03-20 DIAGNOSIS — D64.89 ANEMIA DUE TO MULTIPLE MECHANISMS: ICD-10-CM

## 2020-03-20 LAB
ALBUMIN SERPL BCP-MCNC: 4 G/DL (ref 3.5–5.2)
ALP SERPL-CCNC: 108 U/L (ref 55–135)
ALT SERPL W/O P-5'-P-CCNC: 17 U/L (ref 10–44)
ANION GAP SERPL CALC-SCNC: 8 MMOL/L (ref 8–16)
AST SERPL-CCNC: 23 U/L (ref 10–40)
BASOPHILS # BLD AUTO: 0.01 K/UL (ref 0–0.2)
BASOPHILS NFR BLD: 0.3 % (ref 0–1.9)
BILIRUB SERPL-MCNC: 0.3 MG/DL (ref 0.1–1)
BUN SERPL-MCNC: 21 MG/DL (ref 8–23)
CALCIUM SERPL-MCNC: 9 MG/DL (ref 8.7–10.5)
CHLORIDE SERPL-SCNC: 107 MMOL/L (ref 95–110)
CO2 SERPL-SCNC: 28 MMOL/L (ref 23–29)
CREAT SERPL-MCNC: 0.8 MG/DL (ref 0.5–1.4)
DIFFERENTIAL METHOD: ABNORMAL
EOSINOPHIL # BLD AUTO: 0.1 K/UL (ref 0–0.5)
EOSINOPHIL NFR BLD: 2.5 % (ref 0–8)
ERYTHROCYTE [DISTWIDTH] IN BLOOD BY AUTOMATED COUNT: 15.6 % (ref 11.5–14.5)
EST. GFR  (AFRICAN AMERICAN): >60 ML/MIN/1.73 M^2
EST. GFR  (NON AFRICAN AMERICAN): >60 ML/MIN/1.73 M^2
FERRITIN SERPL-MCNC: 7 NG/ML (ref 20–300)
FOLATE SERPL-MCNC: 16 NG/ML (ref 4–24)
GLUCOSE SERPL-MCNC: 112 MG/DL (ref 70–110)
HAPTOGLOB SERPL-MCNC: 160 MG/DL (ref 30–250)
HCT VFR BLD AUTO: 33.1 % (ref 37–48.5)
HGB BLD-MCNC: 9.8 G/DL (ref 12–16)
IMM GRANULOCYTES # BLD AUTO: 0.01 K/UL (ref 0–0.04)
IMM GRANULOCYTES NFR BLD AUTO: 0.3 % (ref 0–0.5)
IRON SERPL-MCNC: 30 UG/DL (ref 30–160)
LDH SERPL L TO P-CCNC: 223 U/L (ref 110–260)
LYMPHOCYTES # BLD AUTO: 0.8 K/UL (ref 1–4.8)
LYMPHOCYTES NFR BLD: 21 % (ref 18–48)
MCH RBC QN AUTO: 24.9 PG (ref 27–31)
MCHC RBC AUTO-ENTMCNC: 29.6 G/DL (ref 32–36)
MCV RBC AUTO: 84 FL (ref 82–98)
MONOCYTES # BLD AUTO: 0.4 K/UL (ref 0.3–1)
MONOCYTES NFR BLD: 11.2 % (ref 4–15)
NEUTROPHILS # BLD AUTO: 2.4 K/UL (ref 1.8–7.7)
NEUTROPHILS NFR BLD: 64.7 % (ref 38–73)
NRBC BLD-RTO: 0 /100 WBC
PLATELET # BLD AUTO: 203 K/UL (ref 150–350)
PMV BLD AUTO: 9.9 FL (ref 9.2–12.9)
POTASSIUM SERPL-SCNC: 4.3 MMOL/L (ref 3.5–5.1)
PROT SERPL-MCNC: 6.9 G/DL (ref 6–8.4)
RBC # BLD AUTO: 3.94 M/UL (ref 4–5.4)
RETICS/RBC NFR AUTO: 1 % (ref 0.5–2.5)
SATURATED IRON: 6 % (ref 20–50)
SODIUM SERPL-SCNC: 143 MMOL/L (ref 136–145)
TOTAL IRON BINDING CAPACITY: 465 UG/DL (ref 250–450)
TRANSFERRIN SERPL-MCNC: 314 MG/DL (ref 200–375)
VIT B12 SERPL-MCNC: 600 PG/ML (ref 210–950)
WBC # BLD AUTO: 3.67 K/UL (ref 3.9–12.7)

## 2020-03-20 PROCEDURE — 82668 ASSAY OF ERYTHROPOIETIN: CPT

## 2020-03-20 PROCEDURE — 83540 ASSAY OF IRON: CPT

## 2020-03-20 PROCEDURE — 82728 ASSAY OF FERRITIN: CPT

## 2020-03-20 PROCEDURE — 82607 VITAMIN B-12: CPT

## 2020-03-20 PROCEDURE — 85045 AUTOMATED RETICULOCYTE COUNT: CPT

## 2020-03-20 PROCEDURE — 82746 ASSAY OF FOLIC ACID SERUM: CPT

## 2020-03-20 PROCEDURE — 36415 COLL VENOUS BLD VENIPUNCTURE: CPT

## 2020-03-20 PROCEDURE — 80053 COMPREHEN METABOLIC PANEL: CPT

## 2020-03-20 PROCEDURE — 85025 COMPLETE CBC W/AUTO DIFF WBC: CPT

## 2020-03-20 PROCEDURE — 83615 LACTATE (LD) (LDH) ENZYME: CPT

## 2020-03-20 PROCEDURE — 83010 ASSAY OF HAPTOGLOBIN QUANT: CPT

## 2020-03-20 PROCEDURE — 84165 PATHOLOGIST INTERPRETATION SPE: ICD-10-PCS | Mod: 26,,, | Performed by: PATHOLOGY

## 2020-03-20 PROCEDURE — 83020 HEMOGLOBIN ELECTROPHORESIS: CPT

## 2020-03-20 PROCEDURE — 84165 PROTEIN E-PHORESIS SERUM: CPT

## 2020-03-20 PROCEDURE — 84165 PROTEIN E-PHORESIS SERUM: CPT | Mod: 26,,, | Performed by: PATHOLOGY

## 2020-03-22 ENCOUNTER — PATIENT MESSAGE (OUTPATIENT)
Dept: HEMATOLOGY/ONCOLOGY | Facility: CLINIC | Age: 75
End: 2020-03-22

## 2020-03-23 ENCOUNTER — PATIENT MESSAGE (OUTPATIENT)
Dept: HEMATOLOGY/ONCOLOGY | Facility: CLINIC | Age: 75
End: 2020-03-23

## 2020-03-23 LAB
ALBUMIN SERPL ELPH-MCNC: 3.98 G/DL (ref 3.35–5.55)
ALPHA1 GLOB SERPL ELPH-MCNC: 0.29 G/DL (ref 0.17–0.41)
ALPHA2 GLOB SERPL ELPH-MCNC: 0.72 G/DL (ref 0.43–0.99)
B-GLOBULIN SERPL ELPH-MCNC: 0.73 G/DL (ref 0.5–1.1)
EPO SERPL-ACNC: 38.3 MIU/ML (ref 2.6–18.5)
GAMMA GLOB SERPL ELPH-MCNC: 0.77 G/DL (ref 0.67–1.58)
HGB A2 MFR BLD HPLC: 2.4 % (ref 2.2–3.2)
HGB FRACT BLD ELPH-IMP: NORMAL
HGB FRACT BLD ELPH-IMP: NORMAL
PATHOLOGIST INTERPRETATION SPE: NORMAL
PROT SERPL-MCNC: 6.5 G/DL (ref 6–8.4)

## 2020-03-24 ENCOUNTER — PATIENT MESSAGE (OUTPATIENT)
Dept: HEMATOLOGY/ONCOLOGY | Facility: CLINIC | Age: 75
End: 2020-03-24

## 2020-03-25 ENCOUNTER — TELEPHONE (OUTPATIENT)
Dept: HEMATOLOGY/ONCOLOGY | Facility: CLINIC | Age: 75
End: 2020-03-25

## 2020-03-25 NOTE — TELEPHONE ENCOUNTER
Called the patient and instructed her that ferritin helps the doctor understand how much iron your body stores.   Iron pills should help with this.

## 2020-03-30 ENCOUNTER — TELEPHONE (OUTPATIENT)
Dept: HEMATOLOGY/ONCOLOGY | Facility: CLINIC | Age: 75
End: 2020-03-30

## 2020-03-30 ENCOUNTER — PATIENT MESSAGE (OUTPATIENT)
Dept: HEMATOLOGY/ONCOLOGY | Facility: CLINIC | Age: 75
End: 2020-03-30

## 2020-03-30 NOTE — TELEPHONE ENCOUNTER
Called the patient and instructed her that she can take Imodium OTC for the diarrhea.  She can try Ferrous Sulfate instead of Ferrous Gluconate, or she can try Icar-C.  I instructed her that some insurance companies do not cover th Icar-C.  She will try Ferrous Sulfate.

## 2020-04-08 ENCOUNTER — PATIENT MESSAGE (OUTPATIENT)
Dept: PULMONOLOGY | Facility: CLINIC | Age: 75
End: 2020-04-08

## 2020-04-09 ENCOUNTER — PATIENT MESSAGE (OUTPATIENT)
Dept: PULMONOLOGY | Facility: CLINIC | Age: 75
End: 2020-04-09

## 2020-04-09 DIAGNOSIS — J47.9 BRONCHIECTASIS WITHOUT COMPLICATION: ICD-10-CM

## 2020-04-09 DIAGNOSIS — J45.30 MILD PERSISTENT ASTHMA WITHOUT COMPLICATION: ICD-10-CM

## 2020-04-09 RX ORDER — MONTELUKAST SODIUM 10 MG/1
10 TABLET ORAL NIGHTLY
Qty: 90 TABLET | Refills: 3 | Status: SHIPPED | OUTPATIENT
Start: 2020-04-09 | End: 2020-07-08

## 2020-04-17 ENCOUNTER — TELEPHONE (OUTPATIENT)
Dept: SURGERY | Facility: CLINIC | Age: 75
End: 2020-04-17

## 2020-04-17 NOTE — TELEPHONE ENCOUNTER
----- Message from Rigo Sauceda sent at 4/17/2020  2:51 PM CDT -----  Contact: pt  Type: Needs Medical Advice    Who Called:  pt    Best Call Back Number: [phone]  Additional Information: pt would like to schedule a new pt appointment in clinic. Please call to advise.

## 2020-04-28 ENCOUNTER — PATIENT MESSAGE (OUTPATIENT)
Dept: HEMATOLOGY/ONCOLOGY | Facility: CLINIC | Age: 75
End: 2020-04-28

## 2020-04-28 ENCOUNTER — LAB VISIT (OUTPATIENT)
Dept: LAB | Facility: HOSPITAL | Age: 75
End: 2020-04-28
Attending: INTERNAL MEDICINE
Payer: MEDICARE

## 2020-04-28 DIAGNOSIS — D64.89 ANEMIA DUE TO MULTIPLE MECHANISMS: ICD-10-CM

## 2020-04-28 DIAGNOSIS — D63.8 ANEMIA, CHRONIC DISEASE: ICD-10-CM

## 2020-04-28 DIAGNOSIS — R79.89 ABNORMAL CBC: ICD-10-CM

## 2020-04-28 DIAGNOSIS — D64.9 NORMOCYTIC NORMOCHROMIC ANEMIA: ICD-10-CM

## 2020-04-28 DIAGNOSIS — N18.4 ANEMIA, CHRONIC RENAL FAILURE, STAGE 4 (SEVERE): ICD-10-CM

## 2020-04-28 DIAGNOSIS — D63.1 ANEMIA, CHRONIC RENAL FAILURE, STAGE 4 (SEVERE): ICD-10-CM

## 2020-04-28 LAB
ALBUMIN SERPL BCP-MCNC: 4 G/DL (ref 3.5–5.2)
ALP SERPL-CCNC: 99 U/L (ref 55–135)
ALT SERPL W/O P-5'-P-CCNC: 15 U/L (ref 10–44)
ANION GAP SERPL CALC-SCNC: 8 MMOL/L (ref 8–16)
AST SERPL-CCNC: 16 U/L (ref 10–40)
BASOPHILS # BLD AUTO: 0.01 K/UL (ref 0–0.2)
BASOPHILS NFR BLD: 0.2 % (ref 0–1.9)
BILIRUB SERPL-MCNC: 0.2 MG/DL (ref 0.1–1)
BUN SERPL-MCNC: 20 MG/DL (ref 8–23)
CALCIUM SERPL-MCNC: 9.2 MG/DL (ref 8.7–10.5)
CHLORIDE SERPL-SCNC: 104 MMOL/L (ref 95–110)
CO2 SERPL-SCNC: 30 MMOL/L (ref 23–29)
CREAT SERPL-MCNC: 0.8 MG/DL (ref 0.5–1.4)
DIFFERENTIAL METHOD: ABNORMAL
EOSINOPHIL # BLD AUTO: 0.1 K/UL (ref 0–0.5)
EOSINOPHIL NFR BLD: 1.7 % (ref 0–8)
ERYTHROCYTE [DISTWIDTH] IN BLOOD BY AUTOMATED COUNT: 17.5 % (ref 11.5–14.5)
EST. GFR  (AFRICAN AMERICAN): >60 ML/MIN/1.73 M^2
EST. GFR  (NON AFRICAN AMERICAN): >60 ML/MIN/1.73 M^2
FERRITIN SERPL-MCNC: 25 NG/ML (ref 20–300)
GLUCOSE SERPL-MCNC: 95 MG/DL (ref 70–110)
HCT VFR BLD AUTO: 35.6 % (ref 37–48.5)
HGB BLD-MCNC: 10.4 G/DL (ref 12–16)
IMM GRANULOCYTES # BLD AUTO: 0.02 K/UL (ref 0–0.04)
IMM GRANULOCYTES NFR BLD AUTO: 0.5 % (ref 0–0.5)
IRON SERPL-MCNC: 49 UG/DL (ref 30–160)
LYMPHOCYTES # BLD AUTO: 1.1 K/UL (ref 1–4.8)
LYMPHOCYTES NFR BLD: 26.3 % (ref 18–48)
MCH RBC QN AUTO: 25.5 PG (ref 27–31)
MCHC RBC AUTO-ENTMCNC: 29.2 G/DL (ref 32–36)
MCV RBC AUTO: 87 FL (ref 82–98)
MONOCYTES # BLD AUTO: 0.5 K/UL (ref 0.3–1)
MONOCYTES NFR BLD: 10.9 % (ref 4–15)
NEUTROPHILS # BLD AUTO: 2.6 K/UL (ref 1.8–7.7)
NEUTROPHILS NFR BLD: 60.4 % (ref 38–73)
NRBC BLD-RTO: 0 /100 WBC
PLATELET # BLD AUTO: 184 K/UL (ref 150–350)
PMV BLD AUTO: 9.1 FL (ref 9.2–12.9)
POTASSIUM SERPL-SCNC: 4.1 MMOL/L (ref 3.5–5.1)
PROT SERPL-MCNC: 6.9 G/DL (ref 6–8.4)
RBC # BLD AUTO: 4.08 M/UL (ref 4–5.4)
SATURATED IRON: 11 % (ref 20–50)
SODIUM SERPL-SCNC: 142 MMOL/L (ref 136–145)
TOTAL IRON BINDING CAPACITY: 440 UG/DL (ref 250–450)
TRANSFERRIN SERPL-MCNC: 297 MG/DL (ref 200–375)
WBC # BLD AUTO: 4.22 K/UL (ref 3.9–12.7)

## 2020-04-28 PROCEDURE — 36415 COLL VENOUS BLD VENIPUNCTURE: CPT

## 2020-04-28 PROCEDURE — 82728 ASSAY OF FERRITIN: CPT

## 2020-04-28 PROCEDURE — 83540 ASSAY OF IRON: CPT

## 2020-04-28 PROCEDURE — 85025 COMPLETE CBC W/AUTO DIFF WBC: CPT

## 2020-04-28 PROCEDURE — 80053 COMPREHEN METABOLIC PANEL: CPT

## 2020-04-29 ENCOUNTER — PATIENT MESSAGE (OUTPATIENT)
Dept: HEMATOLOGY/ONCOLOGY | Facility: CLINIC | Age: 75
End: 2020-04-29

## 2020-05-01 ENCOUNTER — TELEPHONE (OUTPATIENT)
Dept: SURGERY | Facility: CLINIC | Age: 75
End: 2020-05-01

## 2020-05-02 ENCOUNTER — PATIENT MESSAGE (OUTPATIENT)
Dept: HEMATOLOGY/ONCOLOGY | Facility: CLINIC | Age: 75
End: 2020-05-02

## 2020-05-15 ENCOUNTER — TELEPHONE (OUTPATIENT)
Dept: HEMATOLOGY/ONCOLOGY | Facility: CLINIC | Age: 75
End: 2020-05-15

## 2020-05-15 NOTE — TELEPHONE ENCOUNTER
----- Message from Lexis Jesus, Patient Care Assistant sent at 5/15/2020  9:46 AM CDT -----  Patient called in stating every time she take the iron tab she gets Diarrhea and she has no control over her bowels and would like to know what to do cause she states she is going to take her self off the iron tabs cause she can not work or function due to the diarrhea. She can be reached at [phone]

## 2020-05-15 NOTE — TELEPHONE ENCOUNTER
Patient states she cannot tolerate the oral iron she is having uncontrolled diarrhea after taking it and asked is there anything else she can take. She is currently taking ferrous sulfate OTC. I notified her she can try ferrous gluconate it is sometimes better tolerated but can still cause diarrhea or constipation or we can do IV iron. Discussed the potential side effects of IV iron and patient is agreeable to try Injectater. Orders placed. She was notified she will get a call for scheduling after it is authorized.

## 2020-05-21 ENCOUNTER — PATIENT MESSAGE (OUTPATIENT)
Dept: ORTHOPEDICS | Facility: CLINIC | Age: 75
End: 2020-05-21

## 2020-05-26 ENCOUNTER — PATIENT MESSAGE (OUTPATIENT)
Dept: DERMATOLOGY | Facility: CLINIC | Age: 75
End: 2020-05-26

## 2020-05-28 DIAGNOSIS — Z03.818 ENCOUNTER FOR OBSERVATION FOR SUSPECTED EXPOSURE TO OTHER BIOLOGICAL AGENTS RULED OUT: Primary | ICD-10-CM

## 2020-05-29 ENCOUNTER — LAB VISIT (OUTPATIENT)
Dept: INFUSION THERAPY | Facility: HOSPITAL | Age: 75
End: 2020-05-29
Attending: NURSE PRACTITIONER
Payer: MEDICARE

## 2020-05-29 DIAGNOSIS — Z03.818 ENCOUNTER FOR OBSERVATION FOR SUSPECTED EXPOSURE TO OTHER BIOLOGICAL AGENTS RULED OUT: ICD-10-CM

## 2020-05-29 PROCEDURE — U0003 INFECTIOUS AGENT DETECTION BY NUCLEIC ACID (DNA OR RNA); SEVERE ACUTE RESPIRATORY SYNDROME CORONAVIRUS 2 (SARS-COV-2) (CORONAVIRUS DISEASE [COVID-19]), AMPLIFIED PROBE TECHNIQUE, MAKING USE OF HIGH THROUGHPUT TECHNOLOGIES AS DESCRIBED BY CMS-2020-01-R: HCPCS

## 2020-05-30 LAB — SARS-COV-2 RNA RESP QL NAA+PROBE: NOT DETECTED

## 2020-06-01 RX ORDER — EPINEPHRINE 0.3 MG/.3ML
0.3 INJECTION SUBCUTANEOUS ONCE AS NEEDED
Status: CANCELLED | OUTPATIENT
Start: 2020-06-01

## 2020-06-01 RX ORDER — DIPHENHYDRAMINE HYDROCHLORIDE 50 MG/ML
50 INJECTION INTRAMUSCULAR; INTRAVENOUS ONCE AS NEEDED
Status: CANCELLED | OUTPATIENT
Start: 2020-06-01

## 2020-06-01 RX ORDER — SODIUM CHLORIDE 0.9 % (FLUSH) 0.9 %
10 SYRINGE (ML) INJECTION
Status: CANCELLED | OUTPATIENT
Start: 2020-06-01

## 2020-06-01 RX ORDER — SODIUM CHLORIDE 9 MG/ML
INJECTION, SOLUTION INTRAVENOUS CONTINUOUS
Status: CANCELLED | OUTPATIENT
Start: 2020-06-01

## 2020-06-01 RX ORDER — HEPARIN 100 UNIT/ML
5 SYRINGE INTRAVENOUS
Status: CANCELLED | OUTPATIENT
Start: 2020-06-01

## 2020-06-01 RX ORDER — METHYLPREDNISOLONE SOD SUCC 125 MG
125 VIAL (EA) INJECTION ONCE AS NEEDED
Status: CANCELLED | OUTPATIENT
Start: 2020-06-01

## 2020-06-02 ENCOUNTER — INFUSION (OUTPATIENT)
Dept: INFUSION THERAPY | Facility: HOSPITAL | Age: 75
End: 2020-06-02
Attending: INTERNAL MEDICINE
Payer: MEDICARE

## 2020-06-02 VITALS
BODY MASS INDEX: 27.38 KG/M2 | DIASTOLIC BLOOD PRESSURE: 55 MMHG | TEMPERATURE: 98 F | HEART RATE: 65 BPM | HEIGHT: 65 IN | SYSTOLIC BLOOD PRESSURE: 105 MMHG | RESPIRATION RATE: 20 BRPM | WEIGHT: 164.31 LBS

## 2020-06-02 DIAGNOSIS — D63.1 ANEMIA, CHRONIC RENAL FAILURE, STAGE 4 (SEVERE): Primary | ICD-10-CM

## 2020-06-02 DIAGNOSIS — D64.89 ANEMIA DUE TO MULTIPLE MECHANISMS: ICD-10-CM

## 2020-06-02 DIAGNOSIS — N18.4 ANEMIA, CHRONIC RENAL FAILURE, STAGE 4 (SEVERE): Primary | ICD-10-CM

## 2020-06-02 PROCEDURE — 25000003 PHARM REV CODE 250: Performed by: NURSE PRACTITIONER

## 2020-06-02 PROCEDURE — 63600175 PHARM REV CODE 636 W HCPCS: Mod: JG | Performed by: NURSE PRACTITIONER

## 2020-06-02 PROCEDURE — 96365 THER/PROPH/DIAG IV INF INIT: CPT

## 2020-06-02 RX ORDER — SODIUM CHLORIDE 0.9 % (FLUSH) 0.9 %
10 SYRINGE (ML) INJECTION
Status: CANCELLED | OUTPATIENT
Start: 2020-06-09

## 2020-06-02 RX ORDER — SODIUM CHLORIDE 0.9 % (FLUSH) 0.9 %
10 SYRINGE (ML) INJECTION
Status: DISCONTINUED | OUTPATIENT
Start: 2020-06-02 | End: 2020-06-02 | Stop reason: HOSPADM

## 2020-06-02 RX ORDER — SODIUM CHLORIDE 9 MG/ML
INJECTION, SOLUTION INTRAVENOUS CONTINUOUS
Status: DISCONTINUED | OUTPATIENT
Start: 2020-06-02 | End: 2020-06-02 | Stop reason: HOSPADM

## 2020-06-02 RX ORDER — SODIUM CHLORIDE 9 MG/ML
INJECTION, SOLUTION INTRAVENOUS CONTINUOUS
Status: CANCELLED | OUTPATIENT
Start: 2020-06-09

## 2020-06-02 RX ORDER — HEPARIN 100 UNIT/ML
5 SYRINGE INTRAVENOUS
Status: CANCELLED | OUTPATIENT
Start: 2020-06-09

## 2020-06-02 RX ORDER — METHYLPREDNISOLONE SOD SUCC 125 MG
125 VIAL (EA) INJECTION ONCE AS NEEDED
Status: CANCELLED | OUTPATIENT
Start: 2020-06-09

## 2020-06-02 RX ORDER — EPINEPHRINE 0.3 MG/.3ML
0.3 INJECTION SUBCUTANEOUS ONCE AS NEEDED
Status: CANCELLED | OUTPATIENT
Start: 2020-06-09

## 2020-06-02 RX ORDER — DIPHENHYDRAMINE HYDROCHLORIDE 50 MG/ML
50 INJECTION INTRAMUSCULAR; INTRAVENOUS ONCE AS NEEDED
Status: CANCELLED | OUTPATIENT
Start: 2020-06-09

## 2020-06-02 RX ORDER — HEPARIN 100 UNIT/ML
5 SYRINGE INTRAVENOUS
Status: DISCONTINUED | OUTPATIENT
Start: 2020-06-02 | End: 2020-06-02 | Stop reason: HOSPADM

## 2020-06-02 RX ADMIN — FERRIC CARBOXYMALTOSE INJECTION 750 MG: 50 INJECTION, SOLUTION INTRAVENOUS at 11:06

## 2020-06-03 DIAGNOSIS — M25.561 PAIN IN BOTH KNEES, UNSPECIFIED CHRONICITY: Primary | ICD-10-CM

## 2020-06-03 DIAGNOSIS — M25.561 RIGHT KNEE PAIN, UNSPECIFIED CHRONICITY: Primary | ICD-10-CM

## 2020-06-03 DIAGNOSIS — M25.562 PAIN IN BOTH KNEES, UNSPECIFIED CHRONICITY: Primary | ICD-10-CM

## 2020-06-04 ENCOUNTER — HOSPITAL ENCOUNTER (OUTPATIENT)
Dept: RADIOLOGY | Facility: HOSPITAL | Age: 75
Discharge: HOME | End: 2020-06-04
Attending: ORTHOPAEDIC SURGERY
Payer: MEDICARE

## 2020-06-04 ENCOUNTER — OFFICE VISIT (OUTPATIENT)
Dept: ORTHOPEDICS | Facility: CLINIC | Age: 75
End: 2020-06-04
Payer: MEDICARE

## 2020-06-04 VITALS — HEIGHT: 65 IN | WEIGHT: 164 LBS | BODY MASS INDEX: 27.32 KG/M2 | TEMPERATURE: 98 F

## 2020-06-04 DIAGNOSIS — T84.038A ASEPTIC LOOSENING OF PROSTHETIC KNEE, INITIAL ENCOUNTER: Primary | ICD-10-CM

## 2020-06-04 DIAGNOSIS — Z96.659 ASEPTIC LOOSENING OF PROSTHETIC KNEE, INITIAL ENCOUNTER: Primary | ICD-10-CM

## 2020-06-04 DIAGNOSIS — M25.561 PAIN IN BOTH KNEES, UNSPECIFIED CHRONICITY: ICD-10-CM

## 2020-06-04 DIAGNOSIS — M25.561 RIGHT KNEE PAIN, UNSPECIFIED CHRONICITY: Primary | ICD-10-CM

## 2020-06-04 DIAGNOSIS — M25.562 PAIN IN BOTH KNEES, UNSPECIFIED CHRONICITY: ICD-10-CM

## 2020-06-04 PROCEDURE — 73564 XR KNEE ORTHO BILAT WITH FLEXION: ICD-10-PCS | Mod: 26,50,, | Performed by: RADIOLOGY

## 2020-06-04 PROCEDURE — 1101F PR PT FALLS ASSESS DOC 0-1 FALLS W/OUT INJ PAST YR: ICD-10-PCS | Mod: CPTII,S$GLB,, | Performed by: ORTHOPAEDIC SURGERY

## 2020-06-04 PROCEDURE — 1101F PT FALLS ASSESS-DOCD LE1/YR: CPT | Mod: CPTII,S$GLB,, | Performed by: ORTHOPAEDIC SURGERY

## 2020-06-04 PROCEDURE — 99203 PR OFFICE/OUTPT VISIT, NEW, LEVL III, 30-44 MIN: ICD-10-PCS | Mod: S$GLB,,, | Performed by: ORTHOPAEDIC SURGERY

## 2020-06-04 PROCEDURE — 1159F MED LIST DOCD IN RCRD: CPT | Mod: S$GLB,,, | Performed by: ORTHOPAEDIC SURGERY

## 2020-06-04 PROCEDURE — 73564 X-RAY EXAM KNEE 4 OR MORE: CPT | Mod: TC,50,PN

## 2020-06-04 PROCEDURE — 99203 OFFICE O/P NEW LOW 30 MIN: CPT | Mod: S$GLB,,, | Performed by: ORTHOPAEDIC SURGERY

## 2020-06-04 PROCEDURE — 73564 X-RAY EXAM KNEE 4 OR MORE: CPT | Mod: 26,50,, | Performed by: RADIOLOGY

## 2020-06-04 PROCEDURE — 99999 PR PBB SHADOW E&M-EST. PATIENT-LVL III: ICD-10-PCS | Mod: PBBFAC,,, | Performed by: ORTHOPAEDIC SURGERY

## 2020-06-04 PROCEDURE — 1159F PR MEDICATION LIST DOCUMENTED IN MEDICAL RECORD: ICD-10-PCS | Mod: S$GLB,,, | Performed by: ORTHOPAEDIC SURGERY

## 2020-06-04 PROCEDURE — 99999 PR PBB SHADOW E&M-EST. PATIENT-LVL III: CPT | Mod: PBBFAC,,, | Performed by: ORTHOPAEDIC SURGERY

## 2020-06-04 NOTE — PROGRESS NOTES
Past Medical History:   Diagnosis Date    Abnormal CBC 2/10/2020    Anemia due to multiple mechanisms 2/10/2020    Anemia, chronic disease 2/10/2020    Anemia, chronic renal failure, stage 4 (severe) 2/10/2020    Arthritis     Bell's palsy     Bladder incontinence     Blepharospasm     Cervical dystonia     Depression     Hormone deficiency     Myofacial pain syndrome     Normocytic normochromic anemia 2/10/2020       Past Surgical History:   Procedure Laterality Date    APPENDECTOMY      BREAST BIOPSY      BREAST LUMPECTOMY      EYE SURGERY      HYSTERECTOMY      NECK SURGERY      TONSILLECTOMY         Current Outpatient Medications   Medication Sig    amLODIPine (NORVASC) 2.5 MG tablet every evening.     benazepril (LOTENSIN) 40 MG tablet Take 40 mg by mouth once daily.    ciprofloxacin HCl (CIPRO) 500 MG tablet Take 1 tablet (500 mg total) by mouth 2 (two) times daily.    diclofenac sodium (VOLTAREN) 1 % Gel Apply topically.     ferrous gluconate 324 mg (37.5 mg iron) Tab tablet Take 1 tablet (324 mg total) by mouth 2 (two) times daily with meals.    levalbuterol (XOPENEX HFA) 45 mcg/actuation inhaler Inhale 2 puffs into the lungs every 6 (six) hours as needed for Wheezing. Rescue    magnesium oxide (MAG-OX) 400 mg tablet Take 1 tablet (400 mg total) by mouth 2 (two) times daily.    montelukast (SINGULAIR) 10 mg tablet Take 1 tablet (10 mg total) by mouth every evening.    omeprazole (PRILOSEC) 40 MG capsule Take 40 mg by mouth 2 (two) times daily before meals.    trazodone (DESYREL) 100 MG tablet Take 100 mg by mouth every evening.      venlafaxine (EFFEXOR-XR) 150 MG Cp24 Take 1 capsule (150 mg total) by mouth once daily.     Current Facility-Administered Medications   Medication    onabotulinumtoxina injection 200 Units       Review of patient's allergies indicates:   Allergen Reactions    Artane Other (See Comments)     Swelling    Ned-1 Swelling     Oragel caused tongue  to swell    Adhesive tape-silicones Rash     Blisters after on for several hours    Latex, natural rubber Hives and Rash     Localized    Latex     Morphine Itching and Hives       Family History   Problem Relation Age of Onset    Diabetes Neg Hx     Melanoma Neg Hx     Psoriasis Neg Hx     Lupus Neg Hx     Eczema Neg Hx        Social History     Socioeconomic History    Marital status:      Spouse name: Not on file    Number of children: Not on file    Years of education: Not on file    Highest education level: Not on file   Occupational History    Not on file   Social Needs    Financial resource strain: Not on file    Food insecurity:     Worry: Not on file     Inability: Not on file    Transportation needs:     Medical: Not on file     Non-medical: Not on file   Tobacco Use    Smoking status: Never Smoker    Smokeless tobacco: Never Used   Substance and Sexual Activity    Alcohol use: No    Drug use: No    Sexual activity: Not on file   Lifestyle    Physical activity:     Days per week: Not on file     Minutes per session: Not on file    Stress: Not on file   Relationships    Social connections:     Talks on phone: Not on file     Gets together: Not on file     Attends Congregational service: Not on file     Active member of club or organization: Not on file     Attends meetings of clubs or organizations: Not on file     Relationship status: Not on file   Other Topics Concern    Are you pregnant or think you may be? Not Asked    Breast-feeding Not Asked   Social History Narrative    Not on file       Chief Complaint:   Chief Complaint   Patient presents with    Knee Pain     bilateral knee pain       Date of surgery:  July 15, 2014 right [employer] vanguard PS total knee arthroplasty    History of present illness:  This is a 74-year-old female who is almost 6 years out from right total knee arthroplasty.  She started having pain in the right knee about 6 months ago.  No injury or  trauma.  Pain is worse when she 1st gets up in the morning or when getting out of a chair.  Pain after being on her feet at work.  Knee just feels like it is loose or needs to be put back into place.  Pain is a 3/10.      Review of Systems:    Musculoskeletal:  See HPI        Physical Examination:    Vital Signs:    Vitals:    06/04/20 1501   Temp: 98.3 °F (36.8 °C)       Body mass index is 27.29 kg/m².    This a well-developed, well nourished patient in no acute distress.  They are alert and oriented and cooperative to examination.  Pt. walks without an antalgic gait.      Examination of the right knee shows no rashes or erythema. There are no masses ecchymosis or effusion.  Healed surgical incision.  Patient has full range of motion from 0-130°. Patient is nontender to palpation over lateral joint line and nontender to palpation over the medial joint line. Patient has a - anterior drawer exam, and - posterior drawer exam.  Knee is stable to varus and valgus stress. 5 out of 5 motor strength. Palpable distal pulses. Intact light touch sensation. Negative Patellofemoral crepitus    Examination of the left knee shows no rashes or erythema. There are no masses ecchymosis or effusion. Patient has full range of motion from 0-130°. Patient is nontender to palpation over lateral joint line and nontender to palpation over the medial joint line. Patient has a - Lachman exam, - anterior drawer exam, and - posterior drawer exam. - Luis Felipe's exam. Knee is stable to varus and valgus stress. 5 out of 5 motor strength. Palpable distal pulses. Intact light touch sensation. Negative Patellofemoral crepitus        X-rays:  X-rays of both knees are ordered and reviewed which show well-aligned right PS total knee arthroplasty without complication or signs of loosening.  Patient has moderate lateral narrowing of the left knee.     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  Her  x-rays look great.  I recommend a bone scan to look for possible aseptic loosening particular within the tibia.  Follow-up after the bone scan is completed.    This note was created using [employer] Modal voice recognition software that occasionally misinterpreted phrases or words.

## 2020-06-09 ENCOUNTER — INFUSION (OUTPATIENT)
Dept: INFUSION THERAPY | Facility: HOSPITAL | Age: 75
End: 2020-06-09
Attending: INTERNAL MEDICINE
Payer: MEDICARE

## 2020-06-09 VITALS
HEART RATE: 77 BPM | RESPIRATION RATE: 18 BRPM | HEIGHT: 65 IN | TEMPERATURE: 98 F | SYSTOLIC BLOOD PRESSURE: 117 MMHG | WEIGHT: 165.69 LBS | DIASTOLIC BLOOD PRESSURE: 64 MMHG | BODY MASS INDEX: 27.61 KG/M2

## 2020-06-09 DIAGNOSIS — D63.1 ANEMIA, CHRONIC RENAL FAILURE, STAGE 4 (SEVERE): Primary | ICD-10-CM

## 2020-06-09 DIAGNOSIS — N18.4 ANEMIA, CHRONIC RENAL FAILURE, STAGE 4 (SEVERE): Primary | ICD-10-CM

## 2020-06-09 DIAGNOSIS — D64.89 ANEMIA DUE TO MULTIPLE MECHANISMS: ICD-10-CM

## 2020-06-09 PROCEDURE — 25000003 PHARM REV CODE 250: Performed by: NURSE PRACTITIONER

## 2020-06-09 PROCEDURE — 63600175 PHARM REV CODE 636 W HCPCS: Mod: JG | Performed by: NURSE PRACTITIONER

## 2020-06-09 PROCEDURE — 96365 THER/PROPH/DIAG IV INF INIT: CPT

## 2020-06-09 RX ORDER — METHYLPREDNISOLONE SOD SUCC 125 MG
125 VIAL (EA) INJECTION ONCE AS NEEDED
Status: CANCELLED | OUTPATIENT
Start: 2020-06-09

## 2020-06-09 RX ORDER — SODIUM CHLORIDE 9 MG/ML
INJECTION, SOLUTION INTRAVENOUS CONTINUOUS
Status: CANCELLED | OUTPATIENT
Start: 2020-06-09

## 2020-06-09 RX ORDER — SODIUM CHLORIDE 0.9 % (FLUSH) 0.9 %
10 SYRINGE (ML) INJECTION
Status: DISCONTINUED | OUTPATIENT
Start: 2020-06-09 | End: 2020-06-09 | Stop reason: HOSPADM

## 2020-06-09 RX ORDER — HEPARIN 100 UNIT/ML
5 SYRINGE INTRAVENOUS
Status: CANCELLED | OUTPATIENT
Start: 2020-06-09

## 2020-06-09 RX ORDER — SODIUM CHLORIDE 9 MG/ML
INJECTION, SOLUTION INTRAVENOUS CONTINUOUS
Status: DISCONTINUED | OUTPATIENT
Start: 2020-06-09 | End: 2020-06-09 | Stop reason: HOSPADM

## 2020-06-09 RX ORDER — SODIUM CHLORIDE 0.9 % (FLUSH) 0.9 %
10 SYRINGE (ML) INJECTION
Status: CANCELLED | OUTPATIENT
Start: 2020-06-09

## 2020-06-09 RX ORDER — DIPHENHYDRAMINE HYDROCHLORIDE 50 MG/ML
50 INJECTION INTRAMUSCULAR; INTRAVENOUS ONCE AS NEEDED
Status: CANCELLED | OUTPATIENT
Start: 2020-06-09

## 2020-06-09 RX ORDER — HEPARIN 100 UNIT/ML
5 SYRINGE INTRAVENOUS
Status: DISCONTINUED | OUTPATIENT
Start: 2020-06-09 | End: 2020-06-09 | Stop reason: HOSPADM

## 2020-06-09 RX ORDER — EPINEPHRINE 0.3 MG/.3ML
0.3 INJECTION SUBCUTANEOUS ONCE AS NEEDED
Status: CANCELLED | OUTPATIENT
Start: 2020-06-09

## 2020-06-09 RX ADMIN — FERRIC CARBOXYMALTOSE INJECTION 750 MG: 50 INJECTION, SOLUTION INTRAVENOUS at 11:06

## 2020-06-09 RX ADMIN — SODIUM CHLORIDE: 0.9 INJECTION, SOLUTION INTRAVENOUS at 11:06

## 2020-06-09 NOTE — PLAN OF CARE
Problem: Anemia  Goal: Anemia Symptom Improvement  Outcome: Ongoing, Progressing  Intervention: Monitor and Manage Anemia  Flowsheets (Taken 6/9/2020 1128)  Oral Nutrition Promotion: nutritional therapy counseling provided; medicated; rest periods promoted  Fatigue Management: fatigue-related activity identified

## 2020-06-11 ENCOUNTER — HOSPITAL ENCOUNTER (OUTPATIENT)
Dept: RADIOLOGY | Facility: HOSPITAL | Age: 75
Discharge: HOME | End: 2020-06-11
Attending: ORTHOPAEDIC SURGERY
Payer: MEDICARE

## 2020-06-11 DIAGNOSIS — M25.561 RIGHT KNEE PAIN, UNSPECIFIED CHRONICITY: ICD-10-CM

## 2020-06-11 PROCEDURE — 73562 XR KNEE ORTHO RIGHT WITH FLEXION: ICD-10-PCS | Mod: 26,LT,, | Performed by: RADIOLOGY

## 2020-06-11 PROCEDURE — 78803 NM BONE SCAN SPECT: ICD-10-PCS | Mod: 26,,, | Performed by: RADIOLOGY

## 2020-06-11 PROCEDURE — 78803 RP LOCLZJ TUM SPECT 1 AREA: CPT | Mod: 26,,, | Performed by: RADIOLOGY

## 2020-06-11 PROCEDURE — 73562 X-RAY EXAM OF KNEE 3: CPT | Mod: TC,FY,LT,59

## 2020-06-11 PROCEDURE — 78803 RP LOCLZJ TUM SPECT 1 AREA: CPT | Mod: TC

## 2020-06-11 PROCEDURE — 73564 X-RAY EXAM KNEE 4 OR MORE: CPT | Mod: 26,RT,, | Performed by: RADIOLOGY

## 2020-06-11 PROCEDURE — 73564 XR KNEE ORTHO RIGHT WITH FLEXION: ICD-10-PCS | Mod: 26,RT,, | Performed by: RADIOLOGY

## 2020-06-11 PROCEDURE — 73562 X-RAY EXAM OF KNEE 3: CPT | Mod: 26,LT,, | Performed by: RADIOLOGY

## 2020-06-11 PROCEDURE — A9503 TC99M MEDRONATE: HCPCS

## 2020-06-15 ENCOUNTER — LAB VISIT (OUTPATIENT)
Dept: LAB | Facility: HOSPITAL | Age: 75
End: 2020-06-15
Attending: ORTHOPAEDIC SURGERY
Payer: MEDICARE

## 2020-06-15 ENCOUNTER — OFFICE VISIT (OUTPATIENT)
Dept: ORTHOPEDICS | Facility: CLINIC | Age: 75
End: 2020-06-15
Payer: MEDICARE

## 2020-06-15 VITALS — BODY MASS INDEX: 27.49 KG/M2 | HEIGHT: 65 IN | RESPIRATION RATE: 16 BRPM | WEIGHT: 165 LBS

## 2020-06-15 DIAGNOSIS — T84.032A LOOSENING OF PROSTHESIS OF RIGHT TOTAL KNEE REPLACEMENT, INITIAL ENCOUNTER: ICD-10-CM

## 2020-06-15 DIAGNOSIS — T84.038A ASEPTIC LOOSENING OF PROSTHETIC KNEE, INITIAL ENCOUNTER: ICD-10-CM

## 2020-06-15 DIAGNOSIS — Z96.651 STATUS POST TOTAL RIGHT KNEE REPLACEMENT: Primary | ICD-10-CM

## 2020-06-15 DIAGNOSIS — T84.038A ASEPTIC LOOSENING OF PROSTHETIC KNEE, INITIAL ENCOUNTER: Primary | ICD-10-CM

## 2020-06-15 DIAGNOSIS — Z96.659 ASEPTIC LOOSENING OF PROSTHETIC KNEE, INITIAL ENCOUNTER: Primary | ICD-10-CM

## 2020-06-15 DIAGNOSIS — M71.22 BAKER CYST, LEFT: ICD-10-CM

## 2020-06-15 DIAGNOSIS — Z96.659 ASEPTIC LOOSENING OF PROSTHETIC KNEE, INITIAL ENCOUNTER: ICD-10-CM

## 2020-06-15 LAB
BASOPHILS # BLD AUTO: 0.01 K/UL (ref 0–0.2)
BASOPHILS NFR BLD: 0.2 % (ref 0–1.9)
CRP SERPL-MCNC: 1.4 MG/L (ref 0–8.2)
DIFFERENTIAL METHOD: ABNORMAL
EOSINOPHIL # BLD AUTO: 0.1 K/UL (ref 0–0.5)
EOSINOPHIL NFR BLD: 1.9 % (ref 0–8)
ERYTHROCYTE [DISTWIDTH] IN BLOOD BY AUTOMATED COUNT: 18.4 % (ref 11.5–14.5)
ERYTHROCYTE [SEDIMENTATION RATE] IN BLOOD BY WESTERGREN METHOD: 6 MM/HR (ref 0–20)
HCT VFR BLD AUTO: 38.6 % (ref 37–48.5)
HGB BLD-MCNC: 11.8 G/DL (ref 12–16)
IMM GRANULOCYTES # BLD AUTO: 0.02 K/UL (ref 0–0.04)
IMM GRANULOCYTES NFR BLD AUTO: 0.5 % (ref 0–0.5)
LYMPHOCYTES # BLD AUTO: 1.1 K/UL (ref 1–4.8)
LYMPHOCYTES NFR BLD: 25 % (ref 18–48)
MCH RBC QN AUTO: 28 PG (ref 27–31)
MCHC RBC AUTO-ENTMCNC: 30.6 G/DL (ref 32–36)
MCV RBC AUTO: 92 FL (ref 82–98)
MONOCYTES # BLD AUTO: 0.4 K/UL (ref 0.3–1)
MONOCYTES NFR BLD: 9 % (ref 4–15)
NEUTROPHILS # BLD AUTO: 2.7 K/UL (ref 1.8–7.7)
NEUTROPHILS NFR BLD: 63.4 % (ref 38–73)
NRBC BLD-RTO: 0 /100 WBC
PLATELET # BLD AUTO: 174 K/UL (ref 150–350)
PMV BLD AUTO: 9.6 FL (ref 9.2–12.9)
RBC # BLD AUTO: 4.21 M/UL (ref 4–5.4)
WBC # BLD AUTO: 4.24 K/UL (ref 3.9–12.7)

## 2020-06-15 PROCEDURE — 1126F PR PAIN SEVERITY QUANTIFIED, NO PAIN PRESENT: ICD-10-PCS | Mod: S$GLB,,, | Performed by: ORTHOPAEDIC SURGERY

## 2020-06-15 PROCEDURE — 36415 COLL VENOUS BLD VENIPUNCTURE: CPT

## 2020-06-15 PROCEDURE — 1101F PR PT FALLS ASSESS DOC 0-1 FALLS W/OUT INJ PAST YR: ICD-10-PCS | Mod: CPTII,S$GLB,, | Performed by: ORTHOPAEDIC SURGERY

## 2020-06-15 PROCEDURE — 85025 COMPLETE CBC W/AUTO DIFF WBC: CPT

## 2020-06-15 PROCEDURE — 1126F AMNT PAIN NOTED NONE PRSNT: CPT | Mod: S$GLB,,, | Performed by: ORTHOPAEDIC SURGERY

## 2020-06-15 PROCEDURE — 99214 PR OFFICE/OUTPT VISIT, EST, LEVL IV, 30-39 MIN: ICD-10-PCS | Mod: 57,S$GLB,, | Performed by: ORTHOPAEDIC SURGERY

## 2020-06-15 PROCEDURE — 1101F PT FALLS ASSESS-DOCD LE1/YR: CPT | Mod: CPTII,S$GLB,, | Performed by: ORTHOPAEDIC SURGERY

## 2020-06-15 PROCEDURE — 99999 PR PBB SHADOW E&M-EST. PATIENT-LVL III: CPT | Mod: PBBFAC,,, | Performed by: ORTHOPAEDIC SURGERY

## 2020-06-15 PROCEDURE — 1159F MED LIST DOCD IN RCRD: CPT | Mod: S$GLB,,, | Performed by: ORTHOPAEDIC SURGERY

## 2020-06-15 PROCEDURE — 85651 RBC SED RATE NONAUTOMATED: CPT

## 2020-06-15 PROCEDURE — 99214 OFFICE O/P EST MOD 30 MIN: CPT | Mod: 57,S$GLB,, | Performed by: ORTHOPAEDIC SURGERY

## 2020-06-15 PROCEDURE — 86140 C-REACTIVE PROTEIN: CPT

## 2020-06-15 PROCEDURE — 1159F PR MEDICATION LIST DOCUMENTED IN MEDICAL RECORD: ICD-10-PCS | Mod: S$GLB,,, | Performed by: ORTHOPAEDIC SURGERY

## 2020-06-15 PROCEDURE — 99999 PR PBB SHADOW E&M-EST. PATIENT-LVL III: ICD-10-PCS | Mod: PBBFAC,,, | Performed by: ORTHOPAEDIC SURGERY

## 2020-06-15 RX ORDER — GABAPENTIN 600 MG/1
TABLET ORAL
COMMUNITY
Start: 2020-06-05 | End: 2021-03-17 | Stop reason: SDUPTHER

## 2020-06-15 RX ORDER — BACLOFEN 20 MG/1
20 TABLET ORAL 2 TIMES DAILY
COMMUNITY
Start: 2020-06-04 | End: 2021-03-17 | Stop reason: SDUPTHER

## 2020-06-15 NOTE — PROGRESS NOTES
Past Medical History:   Diagnosis Date    Abnormal CBC 2/10/2020    Anemia due to multiple mechanisms 2/10/2020    Anemia, chronic disease 2/10/2020    Anemia, chronic renal failure, stage 4 (severe) 2/10/2020    Arthritis     Bell's palsy     Bladder incontinence     Blepharospasm     Cervical dystonia     Depression     Hormone deficiency     Myofacial pain syndrome     Normocytic normochromic anemia 2/10/2020       Past Surgical History:   Procedure Laterality Date    APPENDECTOMY      BREAST BIOPSY      BREAST LUMPECTOMY      EYE SURGERY      HYSTERECTOMY      NECK SURGERY      TONSILLECTOMY         Current Outpatient Medications   Medication Sig    amLODIPine (NORVASC) 2.5 MG tablet every evening.     benazepril (LOTENSIN) 40 MG tablet Take 40 mg by mouth once daily.    diclofenac sodium (VOLTAREN) 1 % Gel Apply topically.     ferrous gluconate 324 mg (37.5 mg iron) Tab tablet Take 1 tablet (324 mg total) by mouth 2 (two) times daily with meals.    levalbuterol (XOPENEX HFA) 45 mcg/actuation inhaler Inhale 2 puffs into the lungs every 6 (six) hours as needed for Wheezing. Rescue    magnesium oxide (MAG-OX) 400 mg tablet Take 1 tablet (400 mg total) by mouth 2 (two) times daily.    montelukast (SINGULAIR) 10 mg tablet Take 1 tablet (10 mg total) by mouth every evening.    omeprazole (PRILOSEC) 40 MG capsule Take 40 mg by mouth 2 (two) times daily before meals.    trazodone (DESYREL) 100 MG tablet Take 100 mg by mouth every evening.      venlafaxine (EFFEXOR-XR) 150 MG Cp24 Take 1 capsule (150 mg total) by mouth once daily.    ciprofloxacin HCl (CIPRO) 500 MG tablet Take 1 tablet (500 mg total) by mouth 2 (two) times daily.     Current Facility-Administered Medications   Medication    onabotulinumtoxina injection 200 Units       Review of patient's allergies indicates:   Allergen Reactions    Artane Other (See Comments)     Swelling    Ned-1 Swelling     Oragel caused tongue  to swell    Adhesive tape-silicones Rash     Blisters after on for several hours    Latex, natural rubber Hives and Rash     Localized    Latex     Morphine Itching and Hives       Family History   Problem Relation Age of Onset    Diabetes Neg Hx     Melanoma Neg Hx     Psoriasis Neg Hx     Lupus Neg Hx     Eczema Neg Hx        Social History     Socioeconomic History    Marital status:      Spouse name: Not on file    Number of children: Not on file    Years of education: Not on file    Highest education level: Not on file   Occupational History    Not on file   Social Needs    Financial resource strain: Not on file    Food insecurity     Worry: Not on file     Inability: Not on file    Transportation needs     Medical: Not on file     Non-medical: Not on file   Tobacco Use    Smoking status: Never Smoker    Smokeless tobacco: Never Used   Substance and Sexual Activity    Alcohol use: No    Drug use: No    Sexual activity: Not on file   Lifestyle    Physical activity     Days per week: Not on file     Minutes per session: Not on file    Stress: Not on file   Relationships    Social connections     Talks on phone: Not on file     Gets together: Not on file     Attends Anabaptist service: Not on file     Active member of club or organization: Not on file     Attends meetings of clubs or organizations: Not on file     Relationship status: Not on file   Other Topics Concern    Are you pregnant or think you may be? Not Asked    Breast-feeding Not Asked   Social History Narrative    Not on file       Chief Complaint:   Chief Complaint   Patient presents with    Knee Pain     right knee-bone scan results        Date of surgery:  July 15, 2014 right Biomet vanbenard PS total knee arthroplasty    History of present illness:  This is a 74-year-old female who is almost 6 years out from right total knee arthroplasty.  She started having pain in the right knee about 6 months ago.  No injury or  trauma.  Pain is worse when she 1st gets up in the morning or when getting out of a chair.  Pain after being on her feet at work.  Knee just feels like it is loose or needs to be put back into place.  Pain is a 3/10.  Bone scan showed findings suspicious for possible aseptic loosening.      Review of Systems:    Musculoskeletal:  See HPI        Physical Examination:    Vital Signs:    Vitals:    06/15/20 1006   Resp: 16       Body mass index is 27.46 kg/m².    This a well-developed, well nourished patient in no acute distress.  They are alert and oriented and cooperative to examination.  Pt. walks without an antalgic gait.      Examination of the right knee shows no rashes or erythema. There are no masses ecchymosis or effusion.  Healed surgical incision.  Patient has full range of motion from 0-130°. Patient is nontender to palpation over lateral joint line and nontender to palpation over the medial joint line. Patient has a - anterior drawer exam, and - posterior drawer exam.  Knee is stable to varus and valgus stress. 5 out of 5 motor strength. Palpable distal pulses. Intact light touch sensation. Negative Patellofemoral crepitus    Examination of the left knee shows no rashes or erythema. There is a Baker cyst palpable. Patient has full range of motion from 0-130°. Patient is nontender to palpation over lateral joint line and nontender to palpation over the medial joint line. Patient has a - Lachman exam, - anterior drawer exam, and - posterior drawer exam. - Luis Felipe's exam. Knee is stable to varus and valgus stress. 5 out of 5 motor strength. Palpable distal pulses. Intact light touch sensation. Negative Patellofemoral crepitus    Heart is regular rate without obvious murmurs   Normal respiratory effort without audible wheezing  Abdomen is soft and nontender         X-rays:  X-rays of both knees are reviewed which show well-aligned right PS total knee arthroplasty without complication or signs of loosening.   Patient has moderate lateral narrowing of the left knee.    Bone scan:Abnormal study suggesting loosening right TKA.  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  We talked about concern for possible aseptic loosening given the time frame in her current symptoms.  We talked about the only real treatment would be for revision total knee arthroplasty.  She states that the pain is bad enough and affects her quality of life enough to justify doing it.  We will check a CRP and sed rate 1st to make sure there is no concern for possible infection.  Plan would be for revision right total knee arthroplasty and left knee Baker cyst aspiration.  Risks, benefits, and alternatives to the procedure were explained to the patient including but not limited to damage to nerves, arteries, blood vessels, bones, tendons, ligaments, stiffness, instability, infection, DVT, PE, as well as general anesthetic complications including seizure, stroke, heart attack and even death. The patient understood these risks and wished to proceed and signed the informed consent.       This note was created using [employer] voice recognition software that occasionally misinterpreted phrases or words.

## 2020-06-16 RX ORDER — CEFAZOLIN SODIUM 2 G/50ML
2 SOLUTION INTRAVENOUS
Status: CANCELLED | OUTPATIENT
Start: 2020-06-16

## 2020-06-16 RX ORDER — MUPIROCIN 20 MG/G
OINTMENT TOPICAL
Status: CANCELLED | OUTPATIENT
Start: 2020-06-16

## 2020-06-18 DIAGNOSIS — M25.551 PAIN OF BOTH HIP JOINTS: Primary | ICD-10-CM

## 2020-06-18 DIAGNOSIS — M25.552 PAIN OF BOTH HIP JOINTS: Primary | ICD-10-CM

## 2020-06-22 ENCOUNTER — CLINICAL SUPPORT (OUTPATIENT)
Dept: REHABILITATION | Facility: HOSPITAL | Age: 75
End: 2020-06-22
Attending: ORTHOPAEDIC SURGERY
Payer: MEDICARE

## 2020-06-22 DIAGNOSIS — M25.552 PAIN OF BOTH HIP JOINTS: ICD-10-CM

## 2020-06-22 DIAGNOSIS — M25.551 PAIN OF BOTH HIP JOINTS: ICD-10-CM

## 2020-06-22 PROCEDURE — 97110 THERAPEUTIC EXERCISES: CPT | Mod: PN

## 2020-06-22 PROCEDURE — 97161 PT EVAL LOW COMPLEX 20 MIN: CPT | Mod: PN

## 2020-06-22 NOTE — PLAN OF CARE
[facility]  Physical Therapy Initial Evaluation    Date: 6/22/2020   Name: Marleen Samano  Clinic Number: [id number]    Therapy Diagnosis:  Hip pains  Encounter Diagnosis   Name Primary?    Pain of both hip joints      Physician: Pedro Tompkins MD    Physician Orders: PT Eval and Treat   Medical Diagnosis from Referral:   Evaluation Date: 6/22/2020  Authorization Period Expiration: Pain both hip  joints  Plan of Care Expiration: 8/22/20  Visit # / Visits authorized: 1/ 12    Time In: 1216  Time Out: 1300  Total Appointment Time (timed & untimed codes): 44 minutes    Precautions: Standard    Subjective   Date of onset: 6/18/20  History of current condition - Marleen reports: pain to the hip and lowback through the years; Had been lying on the left side mostly and wakes up with increased pain. Is scheduled for knee revision in 3 weeks and wants her hip problem resolved before dealing with post Op knee rehab..      Medical History:   Past Medical History:   Diagnosis Date    Abnormal CBC 2/10/2020    Anemia due to multiple mechanisms 2/10/2020    Anemia, chronic disease 2/10/2020    Anemia, chronic renal failure, stage 4 (severe) 2/10/2020    Arthritis     Bell's palsy     Bladder incontinence     Blepharospasm     Cervical dystonia     Depression     Hormone deficiency     Myofacial pain syndrome     Normocytic normochromic anemia 2/10/2020       Surgical History:   Marleen Samano  has a past surgical history that includes Neck surgery; Hysterectomy; Appendectomy; Tonsillectomy; Eye surgery; Breast lumpectomy; and Breast biopsy.    Medications:   Marleen has a current medication list which includes the following prescription(s): amlodipine, baclofen, benazepril, ciprofloxacin hcl, diclofenac sodium, ferrous gluconate, gabapentin, levalbuterol, magnesium oxide, montelukast, omeprazole, trazodone, and venlafaxine, and the following Facility-Administered Medications:  "onabotulinumtoxina.    Allergies:   Review of patient's allergies indicates:   Allergen Reactions    Artane Other (See Comments)     Swelling    Ned-1 Swelling     Oragel caused tongue to swell    Adhesive tape-silicones Rash     Blisters after on for several hours    Latex, natural rubber Hives and Rash     Localized    Latex     Morphine Itching and Hives        Imaging, none available for lumbar or hip  6/4/20  Bone scan  Left knee; there is mild increased tracer localization more so at the lateral compartment appearing degenerative.     There is also increased tracer localization in several joints including shoulders, wrists, hands, feet appearing degenerative and mild increased tracer localization the spine appearing degenerative.  There is a leftward convexity of the upper lumbar spine/thoracolumbar junction.     Both kidneys are visualized without evidence of obstruction     Right knee; there is void of activity consistent with TKA.  There is also increased tracer localization in the region the patella, proximal tibia and to lesser degree distal femur laterally.  In this patient with history of remote surgery 1994 findings suggest loosening     Xray knees 6/03/20:   The patient has undergone a right knee joint replacement with metallic femoral and tibial components in good position.  A fracture is not seen.  Lucency around the prosthesis consistent with osteomyelitis or loosening is not noted.  There is a small joint effusion.  Mild DJD is noted at the left knee.    Prior Therapy: none  Social History: no steps/stairs at home; Lives alone   Occupation: caregiver   Prior Level of Function: independent  Current Level of Function: gets tired a lot;     Pain:  Current 0/10, worst 3/10, best 0/10   Location: left  > right hip  Description: heavy; "like a compression"  Aggravating Factors: Sitting, Standing, Morning and Getting out of bed/chair  Easing Factors: pain medication and liniment    Pts goals: to " rid of pain.    Objective     Posture: rounder sh; anterior head R sh lower  Palpation: no bony tenderness  Sensation: reports of sciatica to both LE's  Range of Motion/Strength:knee flexion R 120; L 130                                                   Ankle DF 5 deg bilat  Hip Right  Left  Pain/Dysfunction with Movement    PROM MMT PROM MMT    flexion    120  WFL   115   WFL  pain end range   extension    10 WFL   10   WFL    abduction     45  WFL    45   WFL  pain discomforts end range   adduction    20  WFL   20  WFL     Internal rotation   20  WFL   20  WFL  pain end range   ER  45  WFL   45  WFL      Thoracic/Lumbar ROM Pain/Dysfunction with Movement:   Flexion     70    Extension     20    Right side bending     25  rigid   Left side bending    25  rigid   Right rotation     50    Left rotation    50      Flexibility: tight heel cords; SLR 70 deg vilat  Gait: Without AD  Analysis: mild initial guarding and apprehension  Bed Mobility:Independent  Transfers: Independent  Special Tests: SLR (-); FABERE (-) with tightness  Other: FOTO 42/100        Limitation/Restriction for FOTO hip Survey    Therapist reviewed FOTO scores for Marleen Samano on 6/22/2020.     Limitation Score: 58%         TREATMENT   Treatment Time In: 1245  Treatment Time Out: 1300  Total Treatment time (time-based codes) separate from Evaluation: 15 minutes    Marleen received therapeutic exercises to develop ROM, posture and core stabilization for 15 minutes including:  PPT  SKTC/DKTC  Bridging  Clamshell  Piriformis stretch  Heel raises  Standing abduction  Mini squats    Home Exercises and Patient Education Provided    Education provided:   -Discussed POC: HEP Instructions    Written Home Exercises Provided: yes.  Exercises were reviewed and Marleen was able to demonstrate them prior to the end of the session.  Marleen demonstrated good  understanding of the education provided.     See EMR under Patient Instructions for exercises provided  6/22/2020.    Assessment   Marleen is a 74 y.o. female referred to outpatient Physical Therapy with a medical diagnosis of pain both hips. Pt presents with rigid trunk mobility/hip flexion; reports of radicular pain both legs; hip and multiple joint pains including neck/shoulders; decreased mobility; decreased tolerance to sitting, standing and walking.    Pt prognosis is Good.   Pt will benefit from skilled outpatient Physical Therapy to address the deficits stated above and in the chart below, provide pt/family education, and to maximize pt's level of independence.     Plan of care discussed with patient: Yes  Pt's spiritual, cultural and educational needs considered and patient is agreeable to the plan of care and goals as stated below:     Anticipated Barriers for therapy: none    Medical Necessity is demonstrated by the following  History  Co-morbidities and personal factors that may impact the plan of care Co-morbidities:   advanced age and anxiety    Personal Factors:   coping style     low   Examination  Body Structures and Functions, activity limitations and participation restrictions that may impact the plan of care Body Regions:   back  lower extremities  trunk    Body Systems:    ROM  strength    Participation Restrictions:   none    Activity limitations:   Learning and applying knowledge  no deficits    General Tasks and Commands  no deficits    Communication  no deficits    Mobility  no deficits    Self care  no deficits    Domestic Life  no deficits    Interactions/Relationships  no deficits    Life Areas  no deficits    Community and Social Life  no deficits         low   Clinical Presentation stable and uncomplicated low   Decision Making/ Complexity Score: low     Goals:  Short Term Goals: 2 weeks   1. Patient will report compliance with HEP.  2. Patient will demonstrate 90 deg SLR.  3. Patient will tolerate Nustep 10' Level 3    Long Term Goals: 6 weeks   1. Patient will report pain-free mobility  to both hips at full ROM>  2. Patient will demonstrate half knee bends with one hand support.  3. No report of recurrent pain 0/10  4. FOTO >50/100    Plan   Plan of care Certification: 6/22/2020 to 8/8/20.    Outpatient Physical Therapy 2 times weekly for 6 weeks to include the following interventions: Cervical/Lumbar Traction, Electrical Stimulation , DN, Manual Therapy, Moist Heat/ Ice, Therapeutic Activites, Therapeutic Exercise and Ultrasound.     Jerry Marcus, PT

## 2020-06-22 NOTE — PATIENT INSTRUCTIONS
HIP: Abduction - Standing        Squeeze glutes. Raise leg out and slightly back.  ___ reps per set, ___ sets per day, ___ days per week Hold onto a support.    Copyright © I. All rights reserved.   Mini-Squats (Standing)        Stand with support. Bend knees slightly. Tighten pelvic floor. Hold for ___ seconds. Return to straight standing. Relax for ___ seconds.  Repeat ___ times. Do ___ times a day.    Copyright © [employer]. All rights reserved.   Heel Raises        Stand with support. Tighten pelvic floor and hold. With knees straight, raise heels off ground. Hold ___ seconds. Relax for ___ seconds.  Repeat ___ times. Do ___ times a day.    Copyright © [employer]. All rights reserved.   Piriformis Stretch, Sitting        Sit, one ankle on opposite knee, same-side hand on crossed knee. Push down on knee, keeping spine straight. Lean torso forward, with flat back, until tension is felt in hamstrings and gluteals of crossed-leg side. Hold ___ seconds.   Repeat ___ times per session. Do ___ sessions per day.    Copyright © [employer]. All rights reserved.   Clam Shell 90 Degrees: Leg Lift        Lying with hips and knees bent 90°, two pillows between knees and ankles. Rotate knee upward then lift leg. Lower foot then knee. Be sure pelvis does not roll backward. Do not arch back.  Do ___ times, each leg, ___ times per day.     [email or web address]     Copyright © [employer]. All rights reserved.   SUPINE: Bridging        Place feet on surface. Tighten glutes. Raise hips up. ___ reps per set, ___ sets per day, ___ days per week      Copyright © I. All rights reserved.   Knee-to-Chest Stretch: Bilateral        With hands behind knees, pull both knees in to chest until a comfortable stretch is felt in lower back and buttocks. Keep back relaxed. Hold ____ seconds.  Repeat ____ times per set. Do ____ sets per session. Do ____ sessions per day.     [email or web address]     Copyright © [employer]. All rights reserved.   Supine Knee to  Chest        Lie on back. Gently pull one knee toward chest. Hold ___ seconds.   Repeat ___ times per session. Do ___ sessions per day.    Copyright © [employer]. All rights reserved.   Pelvic Tilt        Flatten back by tightening stomach muscles and buttocks.  Repeat ____ times per set. Do ____ sets per session. Do ____ sessions per day.     [email or web address]     Copyright © [employer]. All rights reserved.

## 2020-06-24 ENCOUNTER — TELEPHONE (OUTPATIENT)
Dept: HEMATOLOGY/ONCOLOGY | Facility: CLINIC | Age: 75
End: 2020-06-24

## 2020-06-24 NOTE — TELEPHONE ENCOUNTER
----- Message from Lexis Jesus, Patient Care Assistant sent at 6/19/2020 11:23 AM CDT -----  Patient called in stating she sent a message on the patient portal  on 6/18/2020 and would like for someone to look at it and give her a call back concerning this message. She can be reached at  [phone]

## 2020-06-24 NOTE — TELEPHONE ENCOUNTER
Called the patient and she was wanting the results of her iron level since she had 2 treatments.  I instructed her that we don't have any results since April.  I did give her the CBC results.  I instructed the patient to go ahead and get her blood drawn and then we can see how the iron infusions helped or not.

## 2020-06-30 ENCOUNTER — TELEPHONE (OUTPATIENT)
Dept: ORTHOPEDICS | Facility: CLINIC | Age: 75
End: 2020-06-30

## 2020-07-02 ENCOUNTER — HOSPITAL ENCOUNTER (OUTPATIENT)
Dept: RADIOLOGY | Facility: HOSPITAL | Age: 75
Discharge: HOME | End: 2020-07-02
Attending: ORTHOPAEDIC SURGERY
Payer: MEDICARE

## 2020-07-02 ENCOUNTER — HOSPITAL ENCOUNTER (OUTPATIENT)
Dept: PREADMISSION TESTING | Facility: HOSPITAL | Age: 75
Discharge: HOME | End: 2020-07-02
Attending: ORTHOPAEDIC SURGERY
Payer: MEDICARE

## 2020-07-02 VITALS — BODY MASS INDEX: 24.14 KG/M2 | WEIGHT: 163 LBS | HEIGHT: 69 IN

## 2020-07-02 DIAGNOSIS — Z96.651 STATUS POST TOTAL RIGHT KNEE REPLACEMENT: ICD-10-CM

## 2020-07-02 DIAGNOSIS — T84.032A LOOSENING OF PROSTHESIS OF RIGHT TOTAL KNEE REPLACEMENT, INITIAL ENCOUNTER: ICD-10-CM

## 2020-07-02 DIAGNOSIS — Z01.818 PREOP TESTING: Primary | ICD-10-CM

## 2020-07-02 LAB
ABO + RH BLD: NORMAL
ANION GAP SERPL CALC-SCNC: 7 MMOL/L (ref 8–16)
BASOPHILS # BLD AUTO: 0.01 K/UL (ref 0–0.2)
BASOPHILS NFR BLD: 0.3 % (ref 0–1.9)
BLD GP AB SCN CELLS X3 SERPL QL: NORMAL
BUN SERPL-MCNC: 17 MG/DL (ref 8–23)
CALCIUM SERPL-MCNC: 8.9 MG/DL (ref 8.7–10.5)
CHLORIDE SERPL-SCNC: 106 MMOL/L (ref 95–110)
CO2 SERPL-SCNC: 29 MMOL/L (ref 23–29)
CREAT SERPL-MCNC: 0.9 MG/DL (ref 0.5–1.4)
DIFFERENTIAL METHOD: ABNORMAL
EOSINOPHIL # BLD AUTO: 0.1 K/UL (ref 0–0.5)
EOSINOPHIL NFR BLD: 2.5 % (ref 0–8)
ERYTHROCYTE [DISTWIDTH] IN BLOOD BY AUTOMATED COUNT: 17.2 % (ref 11.5–14.5)
EST. GFR  (AFRICAN AMERICAN): >60 ML/MIN/1.73 M^2
EST. GFR  (NON AFRICAN AMERICAN): >60 ML/MIN/1.73 M^2
GLUCOSE SERPL-MCNC: 109 MG/DL (ref 70–110)
HCT VFR BLD AUTO: 39.9 % (ref 37–48.5)
HGB BLD-MCNC: 12.3 G/DL (ref 12–16)
IMM GRANULOCYTES # BLD AUTO: 0.01 K/UL (ref 0–0.04)
IMM GRANULOCYTES NFR BLD AUTO: 0.3 % (ref 0–0.5)
LYMPHOCYTES # BLD AUTO: 1 K/UL (ref 1–4.8)
LYMPHOCYTES NFR BLD: 27 % (ref 18–48)
MCH RBC QN AUTO: 29.1 PG (ref 27–31)
MCHC RBC AUTO-ENTMCNC: 30.8 G/DL (ref 32–36)
MCV RBC AUTO: 95 FL (ref 82–98)
MONOCYTES # BLD AUTO: 0.3 K/UL (ref 0.3–1)
MONOCYTES NFR BLD: 8.5 % (ref 4–15)
NEUTROPHILS # BLD AUTO: 2.2 K/UL (ref 1.8–7.7)
NEUTROPHILS NFR BLD: 61.4 % (ref 38–73)
NRBC BLD-RTO: 0 /100 WBC
PLATELET # BLD AUTO: 132 K/UL (ref 150–350)
PMV BLD AUTO: 9.3 FL (ref 9.2–12.9)
POTASSIUM SERPL-SCNC: 4.3 MMOL/L (ref 3.5–5.1)
RBC # BLD AUTO: 4.22 M/UL (ref 4–5.4)
SODIUM SERPL-SCNC: 142 MMOL/L (ref 136–145)
WBC # BLD AUTO: 3.55 K/UL (ref 3.9–12.7)

## 2020-07-02 PROCEDURE — 36415 COLL VENOUS BLD VENIPUNCTURE: CPT

## 2020-07-02 PROCEDURE — 80048 BASIC METABOLIC PNL TOTAL CA: CPT

## 2020-07-02 PROCEDURE — 87081 CULTURE SCREEN ONLY: CPT

## 2020-07-02 PROCEDURE — 71046 XR CHEST PA AND LATERAL: ICD-10-PCS | Mod: 26,,, | Performed by: RADIOLOGY

## 2020-07-02 PROCEDURE — 85025 COMPLETE CBC W/AUTO DIFF WBC: CPT

## 2020-07-02 PROCEDURE — 99900104 DSU ONLY-NO CHARGE-EA ADD'L HR (STAT)

## 2020-07-02 PROCEDURE — 99900103 DSU ONLY-NO CHARGE-INITIAL HR (STAT)

## 2020-07-02 PROCEDURE — 71046 X-RAY EXAM CHEST 2 VIEWS: CPT | Mod: 26,,, | Performed by: RADIOLOGY

## 2020-07-02 PROCEDURE — 71046 X-RAY EXAM CHEST 2 VIEWS: CPT | Mod: TC,FY

## 2020-07-02 PROCEDURE — 86850 RBC ANTIBODY SCREEN: CPT

## 2020-07-02 RX ORDER — NAPROXEN SODIUM 220 MG
220 TABLET ORAL
COMMUNITY
End: 2020-07-08

## 2020-07-02 NOTE — PRE ADMISSION SCREENING
Patient Name: Marleen Samano  YOB: 1945   MRN: [id number]     [facility]   Basic Mobility Inpatient Short Form 6 Clicks         How much difficulty does the patient currently have  Unable  A Lot  A Little  None      1. Turning over in bed (including adjusting bedclothes, sheets and blankets)?     1 []    2 []    3 []    4 [x]        2. Sitting down on and standing up from a chair with arms (e.g., wheelchair, bedside commode, etc.)     1 []  2 []  3 [x]     4 []      3. Moving from lying on back to sitting on the side of the bed?     1 []  2 []  3 []    4 [x]    How much help from another person does the patient currently need  Total  A Lot  A Little  None      4. Moving to and from a bed to a chair (including a wheelchair)?    1 []  2 []  3 []    4 [x]      5. Need to walk in hospital room?    1 []  2 []  3 []    4 [x]      6. Climbing 3-5 steps with a railing?    1 []  2 []  3 []    4 [x]       Raw Score:  23                CMS 0-100% Score:  11.20          %   Standardized Score:   56.93            CMS Modifier:       CI                                   [facility]   Basic Mobility Inpatient Short Form 6 Clicks Score Conversion Table*         *Use this form to convert -PAC Basic Mobility Inpatient Raw Scores.   -[facility] Inpatient Basic Mobility Short Form Scoring Example   1. Add the number values associated with the response to each item. For example, items totals yield a Raw Score of 21.   2. Match the raw score to the t-Scale scores (t-Scale score = 50.25, SE = 4.69).   3. Find the associated CMS % (CMS % = 28.97%).   4. Locate the correct CMS Functional Modifier Code, or G Code (G code = CJ)     NOTE: Each -PAC Short Form has a separate conversion table. Make sure that you use the correct conversion table.       Instruction Manual - page 45 contains conversion table

## 2020-07-02 NOTE — PRE ADMISSION SCREENING
"               [facility] Risk Assessment Scale    Patient Name: Marleen Samano  YOB: 1945  MRN: [id number]            RIsk Factor Measure Recommendation Patient Data Scale/Score   BMI >40 Reconsider surgery, weight loss   Estimated body mass index is 24.07 kg/m² as calculated from the following:    Height as of this encounter: 5' 9" (1.753 m).    Weight as of this encounter: 73.9 kg (163 lb).   [x] 0 = 1 - 24.9  [] 1 = 25-29.9  [] 2 = 30-34.9  [] 3 = 35-39.9  [] 4 = 40-44.9  [] 5 = 45-99.9   Hemoglobin AIC (if applicable) >9 Delay surgery until DM under control  Refer for:  · Nutrition Therapy  · Exercise   · Medication    Lab Results   Component Value Date    HGBA1C 6.2 (H) 03/26/2019       Lab Results   Component Value Date     07/02/2020      [] 0 = 4.0-5.6  [x] 1 = 5.7-6.4  [] 2 = 6.5-6.9  [] 3 = 7.0-7.9  [] 4 = 8.0-8.9  [] 5 = 9.0-12   Hemoglobin (Anemia) <9 Delay surgery   Correct anemia Lab Results   Component Value Date    HGB 12.3 07/02/2020    [] 20 - <9.0                    Albumin <3 Delay surgery &Workup Lab Results   Component Value Date    ALBUMIN 4.0 04/28/2020    [] 20 - <3.0   Smoking Cessation >4 Weeks Delay Surgery  Refer to OP Cessation Class    N/A [] 20 - current smoker                                _____ PPD                    Hx of MI, PE, Arrhythmia, CVA, DVT <30 Days Delay Surgery    N/A [] 20      Infection Variable Delay surgery and re-evaluate   N/A [] 20 - recent/current infection     Depression (PHQ) >10 out of 27 Delay Surgery and re-evaluate  Medication  Counseling              [x] 0     []1     []2     []3      []4      [] 5                    (1-4)      (5-9)  (10-14)  (15-19)   (20-27)     Memory Impairment & Memory loss (Mini-Cog Screening Tool) Advanced dementia and/or Parkinson's Reconsider surgery     [x] 0     []1     []2     []3     []4     [] 5     Physical Conditioning (Modified AM-PAC Per Physical Therapy at [facility]) Unable to ambulate on day of surgery " Delay surgery and re-evaluate  Pre-Rehabilitation   (PT evaluation)       []  0   [x]4       []8     []12        []16     []20       (<20%)   (<40%)   (<60%)   (<80% )    (>80%)     Home Environment/Caregiver support  (Per /Navigator Interview)    Availability of basic services and/or approprate assistance during post-operative period Delay surgery and re-evaluate  Safe home environment  Health   1 week post-surgery  Transportation  availability  Ability to obtain DME/Medications post-op    [x] 0     []1     []2     []3     []4     [] 5  [x] 0     []1     []2     []3     []4     [] 5  [x] 0     []1     []2     []3     []4     [] 5  [x] 0     []1     []2     []3     []4     [] 5         MD Contact: Turner Comments:  Total Score:  5

## 2020-07-02 NOTE — DISCHARGE INSTRUCTIONS

## 2020-07-02 NOTE — PRE ADMISSION SCREENING
JOINT CAMP ASSESSMENT    Name Marleen Samano   MRN [id number]    Age/Sex 74 y.o. female    Surgeon Dr. Pedro Jesus*   Joint Camp Date 7/2/2020   Surgery Date 07/14/2020   Procedure Right total knee arthroplasty revision   Insurance Payor: [employer] / Plan: Tune 65 / Product Type: Medicare Advantage /    Care Team Patient Care Team:  De Gomes MD as PCP - General (Internal Medicine)    Pharmacy    Pharmacy - [place], [place] - [address] AT Portal to Registered [facility] Sites  [address]  [facility] [address]  Phone: [phone] Fax: [phone]    [facility] [address] [place] [address] [facility]  [address] [facility]  [facility] [address]  Phone: [phone] Fax: [phone]     AM-PAC Score    23   Risk Assessment Score 5     Past Medical History:   Diagnosis Date    Abnormal CBC 2/10/2020    Anemia due to multiple mechanisms 2/10/2020    Anemia, chronic disease 2/10/2020    Anemia, chronic renal failure, stage 4 (severe) 2/10/2020    Arthritis     Bell's palsy     Bladder incontinence     Blepharospasm     Cervical dystonia     Depression     Hormone deficiency     Myofacial pain syndrome     Normocytic normochromic anemia 2/10/2020       Past Surgical History:   Procedure Laterality Date    APPENDECTOMY      BREAST BIOPSY      BREAST LUMPECTOMY      EYE SURGERY      HYSTERECTOMY      NECK SURGERY      TONSILLECTOMY           Home Enviroment     Living Arrangement: Lives alone.. She plans to stay at her daughter's house after surgery.  Daughter's Home Environment: 1-story house/ trailer, number of outside stairs: 0, tub-shower  Home Safety Concerns: unremarkable    DISCHARGE CAREGIVER/SUPPORT SYSTEM     Identified post-op caregiver: Patient has children / family / friends to help, daughter.  Patient's caregiver(s) will be able to provide physical assistance. Patient will have someone to assist  overnight.      Caregiver present at pre-op interview:  No      PRE-OPERATIVE FUNCTIONAL STATUS     Employment: Employed part time as aa caregiver    Pre-op Functional Status: Patient is independent with mobility/ambulation, transfers, ADL's, IADL's.    Use of assistive device for ambulation: none  ADL: self care  ADL Limitations: difficulty with walking  Medical Restrictions: None    POTENTIAL BARRIERS TO DISCHARGE/POTENTIAL POST-OP COMPLICATIONS   Patient has a history of chronic anemia and chronic renal failure, stage 4.        DISCHARGE PLAN     Expected LOS of 2 days or less for joint replacement discussed with patient. We also discussed a discharge path of HH for approximately two weeks with a transition to outpatient PT on the third week given no post-op complications.      Patient in agreement with discharge plan: Yes    Discharge to: Discharge home with home health (PT/OT) x2 weeks with transition to outpatient PT     HH:  [facility]     [facility]: rolling walker and bedside commode     Needed DME at D/C: tub transfer bench and assistive device kit     Rx: Per Dr. Tompkins at discharge     Meds to Beds: N/A  Patient expected to discharge on Aspirin 325mg by mouth twice daily for DVT prophylaxis. Coumadin Clinic Needed: No

## 2020-07-03 LAB — MRSA SPEC QL CULT: NORMAL

## 2020-07-06 ENCOUNTER — CLINICAL SUPPORT (OUTPATIENT)
Dept: REHABILITATION | Facility: HOSPITAL | Age: 75
End: 2020-07-06
Payer: MEDICARE

## 2020-07-06 PROCEDURE — 97110 THERAPEUTIC EXERCISES: CPT | Mod: PN,CQ

## 2020-07-06 NOTE — PROGRESS NOTES
[facility] Hematology/Oncology  PROGRESS NOTE - 2nd Follow-up Visit      Subjective:       Patient ID:   NAME: Marleen Samano : 1945     74 y.o. female    Referring Doc: Mireya  Other Physicians: Wendy Fuentes/Kain Gallegos Dugan; Swetha Castañeda (neuro); Ced    Chief Complaint:  Anemia f/u    History of Present Illness:     Patient returns today for a 2nd regularly scheduled follow-up visit.  The patient is here today to go over the results of the recently ordered labs, tests and studies. She is doing ok with no new issues. She is here by herself. She is going to have right redo knee surgery on 2020 with Dr Powers. No bleeding or BRBPR    - she has had two IV irons since last visit    Discussed covid19 precautions            ROS:   GEN: normal without any fever, night sweats or weight loss  HEENT: normal with no HA's, sore throat, stiff neck, changes in vision  CV: normal with no CP, SOB, PND, DE LEÓN or orthopnea  PULM: normal with no SOB, cough, hemoptysis, sputum or pleuritic pain  GI: normal with no abdominal pain, nausea, vomiting, constipation, diarrhea, melanotic stools, BRBPR, or hematemesis  : normal with no hematuria, dysuria  BREAST: normal with no mass, discharge, pain  SKIN: normal with no rash, erythema, bruising, or swelling    Pain Scale:    Allergies:  Review of patient's allergies indicates:   Allergen Reactions    Artane Other (See Comments)     Swelling    Ned-1 Swelling     Oragel caused tongue to swell    Adhesive tape-silicones Rash     Blisters after on for several hours    Latex, natural rubber Hives and Rash     Localized    Latex     Morphine Itching and Hives       Medications:    Current Outpatient Medications:     amLODIPine (NORVASC) 2.5 MG tablet, every evening. , Disp: , Rfl: 4    baclofen (LIORESAL) 20 MG tablet, Take 20 mg by mouth 2 (two) times daily., Disp: , Rfl:     benazepril (LOTENSIN) 40 MG tablet, Take 40 mg by mouth once daily.,  Disp: , Rfl: 2    diclofenac sodium (VOLTAREN) 1 % Gel, Apply topically. , Disp: , Rfl:     gabapentin (NEURONTIN) 600 MG tablet, TAKE 1 TABLET BY MOUTH IN THE MORNING AT NOON AND THEN 2 TABLETS IN THE EVENING, Disp: , Rfl:     magnesium oxide (MAG-OX) 400 mg tablet, Take 1 tablet (400 mg total) by mouth 2 (two) times daily., Disp: 60 tablet, Rfl: 12    naproxen sodium (ANAPROX) 220 MG tablet, Take 220 mg by mouth., Disp: , Rfl:     omeprazole (PRILOSEC) 40 MG capsule, Take 40 mg by mouth 2 (two) times daily before meals., Disp: , Rfl:     simethicone (GAS-X ORAL), Take by mouth., Disp: , Rfl:     trazodone (DESYREL) 100 MG tablet, Take 100 mg by mouth every evening.  , Disp: , Rfl:     venlafaxine (EFFEXOR-XR) 150 MG Cp24, Take 1 capsule (150 mg total) by mouth once daily., Disp: , Rfl:     ciprofloxacin HCl (CIPRO) 500 MG tablet, Take 1 tablet (500 mg total) by mouth 2 (two) times daily., Disp: 8 tablet, Rfl: 0    ferrous gluconate 324 mg (37.5 mg iron) Tab tablet, Take 1 tablet (324 mg total) by mouth 2 (two) times daily with meals. (Patient not taking: Reported on 6/15/2020), Disp: 60 tablet, Rfl: 6    levalbuterol (XOPENEX HFA) 45 mcg/actuation inhaler, Inhale 2 puffs into the lungs every 6 (six) hours as needed for Wheezing. Rescue, Disp: 1 Inhaler, Rfl: 11    montelukast (SINGULAIR) 10 mg tablet, Take 1 tablet (10 mg total) by mouth every evening. (Patient not taking: Reported on 6/15/2020), Disp: 90 tablet, Rfl: 3    Current Facility-Administered Medications:     onabotulinumtoxina injection 200 Units, 200 Units, Intramuscular, Q90 Days, Cinthia Dee MD    PMHx/PSHx Updates:  See patient's last visit with me on 3/17/2020.  See H&P on 3/17/2020        Pathology:  Cancer Staging  No matching staging information was found for the patient.          Objective:     Vitals:  Blood pressure (!) 147/76, pulse 78, temperature 97.7 °F (36.5 °C), temperature source Oral, resp. rate 12, weight 75.6 kg  (166 lb 11.2 oz).    Physical Examination:   GEN: no apparent distress, comfortable; AAOx3  HEAD: atraumatic and normocephalic  EYES: no pallor, no icterus, PERRLA  ENT: OMM, no pharyngeal erythema, external ears WNL; no nasal discharge; no thrush  NECK: no masses, thyroid normal, trachea midline, no LAD/LN's, supple  CV: RRR with no murmur; normal pulse; normal S1 and S2; no pedal edema  CHEST: Normal respiratory effort; CTAB; normal breath sounds; no wheeze or crackles  ABDOM: nontender and nondistended; soft; normal bowel sounds; no rebound/guarding  MUSC/Skeletal: ROM normal; no crepitus; joints normal; no deformities or arthropathy  EXTREM: no clubbing, cyanosis, inflammation or swelling  SKIN: no rashes, lesions, ulcers, petechiae or subcutaneous nodules; chronic age related skin changes and varicosities in legs; some healing blisters on hands  : no calderon  NEURO: grossly intact; motor/sensory WNL; AAOx3; no tremors  PSYCH: normal mood, affect and behavior  LYMPH: normal cervical, supraclavicular, axillary and groin LN's            Labs:   7/2/2020  Lab Results   Component Value Date    WBC 3.55 (L) 07/02/2020    HGB 12.3 07/02/2020    HCT 39.9 07/02/2020    MCV 95 07/02/2020     (L) 07/02/2020         BMP  Lab Results   Component Value Date     07/02/2020    K 4.3 07/02/2020     07/02/2020    CO2 29 07/02/2020    BUN 17 07/02/2020    CREATININE 0.9 07/02/2020    CALCIUM 8.9 07/02/2020    ANIONGAP 7 (L) 07/02/2020    ESTGFRAFRICA >60 07/02/2020    EGFRNONAA >60 07/02/2020 4/28/2020  Lab Results   Component Value Date    IRON 49 04/28/2020    TIBC 440 04/28/2020    FERRITIN 25 04/28/2020       SPE REVIEWED    Comment: Electronically reviewed and signed by:   Sofi Mcnally MD   Signed on 03/23/20 at 16:35   Normal total protein, normal pattern.            Radiology/Diagnostic Studies:    X-ray Chest Pa And Lateral    Result Date: 7/2/2020  EXAMINATION: XR CHEST PA AND LATERAL  CLINICAL HISTORY: pre op; Presence of right artificial knee joint TECHNIQUE: PA and lateral views of the chest were performed. COMPARISON: CT chest of February 7, 2020 and chest x-ray May 7 2019. FINDINGS: The mediastinal and cardiac size and contour are within normal limits.  A rounded density at the right cardiophrenic angle is probably the patient's known hiatal hernia.  Ill-defined right upper lobe densities correspond to right upper lobe scarring noted on the prior CT scan.  Other intrapulmonary masses or infiltrates are not seen.  No pneumothorax or pleural effusion is noted.     Prominent hiatal hernia.  Ill-defined scarring noted in the right upper lobe, stable.  Otherwise negative chest x-ray. Electronically signed by: Yan Sahu MD Date:    07/02/2020 Time:    10:50    Nm Bone Scan Spect    Result Date: 6/11/2020  EXAMINATION: NM BONE SCAN SPECT CLINICAL HISTORY: right knee pain on total knee replacement;  Pain in right knee TECHNIQUE: 20.2 mCi technetium 99 M MDP was injected intravenously with anterior posterior whole body images obtained and SPECT images of the knees obtained in the short axis, vertical long axis, horizontal long axis projections. COMPARISON: None FINDINGS: Left knee; there is mild increased tracer localization more so at the lateral compartment appearing degenerative. There is also increased tracer localization in several joints including shoulders, wrists, hands, feet appearing degenerative and mild increased tracer localization the spine appearing degenerative.  There is a leftward convexity of the upper lumbar spine/thoracolumbar junction. Both kidneys are visualized without evidence of obstruction Right knee; there is void of activity consistent with TKA.  There is also increased tracer localization in the region the patella, proximal tibia and to lesser degree distal femur laterally.  In this patient with history of remote surgery 1994 findings suggest loosening     Abnormal  study suggesting loosening right TKA. Degenerative change in several joints including lateral compartment left knee Electronically signed by: Yasmin Galloway MD Date:    06/11/2020 Time:    15:26    X-ray Knee Ortho Right With Flexion    Result Date: 6/11/2020  EXAMINATION: XR KNEE ORTHO RIGHT WITH FLEXION CLINICAL HISTORY: Pain in right knee TECHNIQUE: AP standing as well as PA flexion standing and Merchant views of both knees were performed.  A lateral view of the right knee is also performed. COMPARISON: Knee x-rays of June 4, 2020. FINDINGS: The patient has undergone a right knee joint replacement with metallic femoral and tibial components in good position.  A fracture is not seen.  Lucency around the prosthesis consistent with osteomyelitis or loosening is not noted.  There is a small joint effusion.  Mild DJD is noted at the left knee.     Status post right knee joint replacement.  Small right joint effusion.  Mild DJD left knee. Electronically signed by: Yan Sahu MD Date:    06/11/2020 Time:    08:45      I have reviewed all available lab results and radiology reports.    Assessment/Plan:   (1) 74 y.o. female with diagnosis of abnormal cbc and chronic anemia issues who has been referred by Dr Gomes  - [facility] parameters; latest hgb at 12.3 and now WNL  - anemia due to multiple mechanisms  - anemia of chronic disease, chronic renal  - total bili is WNL, so I do not suspect any underlying hemolytic process  - latest iron panel wnl in April 2020 after getting two IV iron infusions     Labs from March 2020:  - B12 was adequate, folate was adequate       I can not rule out an underlying marrow problem at this point but her wbc and platelet counts are currently WNL.      She has negative EDG and colonoscopy with Dr Dowling about 5 months ago        (2) CRI - stage IV per records but I don't appreciate that on the recent labs     (3) HTN     (4) COPD     (5) Bipolar/Depression disorder     (6) OA of knee, s/p  total knee arthroplasty     (7) Hx/of recent bout of enterocolitis with acute encephalopathy in Feb 2020  - seen by Dr Salvatore Linares with neuro     (8) GERD     (9) Chronic dystonia - followed by Dr Swetha Castañeda (neuro)    (10) mild thrombocytopenia on latest labs from 7/2/2020 - platelets are at 132,000 and relatively adequate    (11) mild leucopenia - borderline - will monitor         VISIT DIAGNOSES:      Anemia due to multiple mechanisms    Anemia, chronic disease    Anemia, chronic renal failure, stage 4 (severe)    Normocytic normochromic anemia    Abnormal CBC          PLAN:  1. Check labs monthly for now  2. Check iron panel monthly  3. Patient has knee surgery planned for 7/14  4. f/u with PCP, Pulm, GI, etc  RTC in 3-4 months  Fax note to Wiliam Gomes Dugan, Salvatore Linares; Garry Coughlin    Discussion:     COVID-19 Discussion:    I had long discussion with patient and any applicable family about the COVID-19 coronavirus epidemic and the recommended precautions with regard to cancer and/or hematology patients. I have re-iterated the CDC recommendations for adequate hand washing, use of hand -like products, and coughing into elbow, etc. In addition, especially for our patients who are on chemotherapy and/or our otherwise immunocompromised patients, I have recommended avoidance of crowds, including movie theaters, restaurants, churches, etc. I have recommended avoidance of any sick or symptomatic family members and/or friends. I have also recommended avoidance of any raw and unwashed food products, and general avoidance of food items that have not been prepared by themselves. The patient has been asked to call us immediately with any symptom developments, issues, questions or other general concerns.     I spent over 25 mins of time with the patient. Reviewed results of the recently ordered labs, tests and studies; made directives with regards to the results. Over half of this time was spent couseling  and coordinating care.    I have explained all of the above in detail and the patient understands all of the current recommendation(s). I have answered all of their questions to the best of my ability and to their complete satisfaction.   The patient is to continue with the current management plan.            Electronically signed by Marc Mart MD          Answers for HPI/ROS submitted by the patient on 7/4/2020   appetite change : No  unexpected weight change: No  visual disturbance: No  cough: No  shortness of breath: No  chest pain: No  abdominal pain: No  diarrhea: No  frequency: No  back pain: Yes  rash: No  headaches: Yes  adenopathy: No  nervous/ anxious: No

## 2020-07-06 NOTE — PROGRESS NOTES
Physical Therapy Treatment Note     Name: Marleen SHINE [facility] Number: [id number]    Therapy Diagnosis:  Physician: Pedro Tompkins,*    Visit Date: 7/6/2020    Physician Orders: PT Eval and Treat   Medical Diagnosis from Referral:   Evaluation Date: 6/22/2020  Authorization Period Expiration: 08/22/2020  Plan of Care Expiration: 6/19/2020 to 8/3/20  Visit # / Visits authorized:2 12    PTA visit # 1      Time In: 1005  Time Out: 1045  Total Billable Time: 40 minutes    Precautions: Standard    Subjective     Pt reports: pain in her hips when she is sleeping and in her knee depending on her position..  Pt reports pain in hips with certain positions/movements, especially ER  She was compliant with home exercise program.  Response to previous treatment: no complaints  Functional change: n/a    Pain: Pt unable to rate pain  Location: bilateral hips      Objective     Marleen received therapeutic exercises to develop strength, endurance, ROM, flexibility, posture and core stabilization for 40 minutes including:    nustep x 10 min L-2  HSS seated 3 x 20 sec B LE  Piriformis stretch 3 x 20 sec B LE  GSS 3 x 20 sec 1/2 roll  HR/TR x 20 reps  Mini squats x 20 reps  PPT x 20 reps  Bridging x 20 reps  Ball squeezes x 20 reps  LTR x 20 reps with SB      Home Exercises Provided and Patient Education Provided     Education provided:   - Educated pt on applying ice if delayed muscle soreness occurs.      Written Home Exercises Provided: Patient instructed to cont prior HEP.  Exercises were reviewed and Marleen was able to demonstrate them prior to the end of the session.  Marleen demonstrated good  understanding of the education provided.     See EMR under Patient Instructions for exercises provided prior visit.    Assessment     Discomfort in L flank with LTR  Marleen is progressing well towards her goals.   Pt prognosis is Fair.     Pt will continue to benefit from skilled outpatient physical therapy to address the deficits  listed in the problem list box on initial evaluation, provide pt/family education and to maximize pt's level of independence in the home and community environment.     Pt's spiritual, cultural and educational needs considered and pt agreeable to plan of care and goals.     Anticipated barriers to physical therapy: pain    GOALS: per Jerry Marcus PT  Short Term Goals: 2 weeks   1. Patient will report compliance with HEP.  2. Patient will demonstrate 90 deg SLR.  3. Patient will tolerate Nustep 10' Level 3     Long Term Goals: 6 weeks   1. Patient will report pain-free mobility to both hips at full ROM>  2. Patient will demonstrate half knee bends with one hand support.  3. No report of recurrent pain 0/10  4. FOTO >50/100    Plan     Cont with there ex, stretching as needed to decreased pain, improve ROM/flexibility and functional mobility per ALEJANDRO Coulter, PTA

## 2020-07-07 ENCOUNTER — OFFICE VISIT (OUTPATIENT)
Dept: HEMATOLOGY/ONCOLOGY | Facility: CLINIC | Age: 75
End: 2020-07-07
Payer: MEDICARE

## 2020-07-07 VITALS
RESPIRATION RATE: 12 BRPM | DIASTOLIC BLOOD PRESSURE: 76 MMHG | HEART RATE: 78 BPM | TEMPERATURE: 98 F | WEIGHT: 166.69 LBS | BODY MASS INDEX: 24.62 KG/M2 | SYSTOLIC BLOOD PRESSURE: 147 MMHG

## 2020-07-07 DIAGNOSIS — R79.89 ABNORMAL CBC: ICD-10-CM

## 2020-07-07 DIAGNOSIS — D64.89 ANEMIA DUE TO MULTIPLE MECHANISMS: Primary | ICD-10-CM

## 2020-07-07 DIAGNOSIS — D63.8 ANEMIA, CHRONIC DISEASE: ICD-10-CM

## 2020-07-07 DIAGNOSIS — N18.4 ANEMIA, CHRONIC RENAL FAILURE, STAGE 4 (SEVERE): ICD-10-CM

## 2020-07-07 DIAGNOSIS — D64.9 NORMOCYTIC NORMOCHROMIC ANEMIA: ICD-10-CM

## 2020-07-07 DIAGNOSIS — D53.9 NUTRITIONAL ANEMIA, UNSPECIFIED: ICD-10-CM

## 2020-07-07 DIAGNOSIS — D63.1 ANEMIA, CHRONIC RENAL FAILURE, STAGE 4 (SEVERE): ICD-10-CM

## 2020-07-07 PROCEDURE — 3077F PR MOST RECENT SYSTOLIC BLOOD PRESSURE >= 140 MM HG: ICD-10-PCS | Mod: S$GLB,,, | Performed by: INTERNAL MEDICINE

## 2020-07-07 PROCEDURE — 1126F AMNT PAIN NOTED NONE PRSNT: CPT | Mod: S$GLB,,, | Performed by: INTERNAL MEDICINE

## 2020-07-07 PROCEDURE — 1126F PR PAIN SEVERITY QUANTIFIED, NO PAIN PRESENT: ICD-10-PCS | Mod: S$GLB,,, | Performed by: INTERNAL MEDICINE

## 2020-07-07 PROCEDURE — 99215 PR OFFICE/OUTPT VISIT, EST, LEVL V, 40-54 MIN: ICD-10-PCS | Mod: S$GLB,,, | Performed by: INTERNAL MEDICINE

## 2020-07-07 PROCEDURE — 1101F PT FALLS ASSESS-DOCD LE1/YR: CPT | Mod: S$GLB,,, | Performed by: INTERNAL MEDICINE

## 2020-07-07 PROCEDURE — 1159F MED LIST DOCD IN RCRD: CPT | Mod: S$GLB,,, | Performed by: INTERNAL MEDICINE

## 2020-07-07 PROCEDURE — 1159F PR MEDICATION LIST DOCUMENTED IN MEDICAL RECORD: ICD-10-PCS | Mod: S$GLB,,, | Performed by: INTERNAL MEDICINE

## 2020-07-07 PROCEDURE — 3078F DIAST BP <80 MM HG: CPT | Mod: S$GLB,,, | Performed by: INTERNAL MEDICINE

## 2020-07-07 PROCEDURE — 1101F PR PT FALLS ASSESS DOC 0-1 FALLS W/OUT INJ PAST YR: ICD-10-PCS | Mod: S$GLB,,, | Performed by: INTERNAL MEDICINE

## 2020-07-07 PROCEDURE — 3077F SYST BP >= 140 MM HG: CPT | Mod: S$GLB,,, | Performed by: INTERNAL MEDICINE

## 2020-07-07 PROCEDURE — 3078F PR MOST RECENT DIASTOLIC BLOOD PRESSURE < 80 MM HG: ICD-10-PCS | Mod: S$GLB,,, | Performed by: INTERNAL MEDICINE

## 2020-07-07 PROCEDURE — 99215 OFFICE O/P EST HI 40 MIN: CPT | Mod: S$GLB,,, | Performed by: INTERNAL MEDICINE

## 2020-07-08 ENCOUNTER — OFFICE VISIT (OUTPATIENT)
Dept: FAMILY MEDICINE | Facility: CLINIC | Age: 75
End: 2020-07-08
Payer: MEDICARE

## 2020-07-08 VITALS
DIASTOLIC BLOOD PRESSURE: 74 MMHG | HEART RATE: 80 BPM | BODY MASS INDEX: 25.01 KG/M2 | WEIGHT: 168.88 LBS | TEMPERATURE: 98 F | SYSTOLIC BLOOD PRESSURE: 122 MMHG | OXYGEN SATURATION: 96 % | HEIGHT: 69 IN

## 2020-07-08 DIAGNOSIS — J44.9 CHRONIC OBSTRUCTIVE PULMONARY DISEASE, UNSPECIFIED COPD TYPE: ICD-10-CM

## 2020-07-08 DIAGNOSIS — I10 ESSENTIAL HYPERTENSION: Primary | ICD-10-CM

## 2020-07-08 DIAGNOSIS — D64.9 NORMOCYTIC NORMOCHROMIC ANEMIA: ICD-10-CM

## 2020-07-08 DIAGNOSIS — J43.9 PULMONARY EMPHYSEMA, UNSPECIFIED EMPHYSEMA TYPE: ICD-10-CM

## 2020-07-08 DIAGNOSIS — F32.9 MAJOR DEPRESSION, CHRONIC: ICD-10-CM

## 2020-07-08 PROBLEM — N18.4 CKD (CHRONIC KIDNEY DISEASE), STAGE IV: Status: RESOLVED | Noted: 2020-02-07 | Resolved: 2020-07-08

## 2020-07-08 PROBLEM — F30.9 BIPOLAR I DISORDER, SINGLE MANIC EPISODE: Status: RESOLVED | Noted: 2020-02-07 | Resolved: 2020-07-08

## 2020-07-08 PROCEDURE — 1126F AMNT PAIN NOTED NONE PRSNT: CPT | Mod: S$GLB,,, | Performed by: INTERNAL MEDICINE

## 2020-07-08 PROCEDURE — 3074F PR MOST RECENT SYSTOLIC BLOOD PRESSURE < 130 MM HG: ICD-10-PCS | Mod: CPTII,S$GLB,, | Performed by: INTERNAL MEDICINE

## 2020-07-08 PROCEDURE — 99214 PR OFFICE/OUTPT VISIT, EST, LEVL IV, 30-39 MIN: ICD-10-PCS | Mod: S$GLB,,, | Performed by: INTERNAL MEDICINE

## 2020-07-08 PROCEDURE — 1101F PT FALLS ASSESS-DOCD LE1/YR: CPT | Mod: CPTII,S$GLB,, | Performed by: INTERNAL MEDICINE

## 2020-07-08 PROCEDURE — 3078F PR MOST RECENT DIASTOLIC BLOOD PRESSURE < 80 MM HG: ICD-10-PCS | Mod: CPTII,S$GLB,, | Performed by: INTERNAL MEDICINE

## 2020-07-08 PROCEDURE — 99999 PR PBB SHADOW E&M-EST. PATIENT-LVL III: CPT | Mod: PBBFAC,,, | Performed by: INTERNAL MEDICINE

## 2020-07-08 PROCEDURE — 1101F PR PT FALLS ASSESS DOC 0-1 FALLS W/OUT INJ PAST YR: ICD-10-PCS | Mod: CPTII,S$GLB,, | Performed by: INTERNAL MEDICINE

## 2020-07-08 PROCEDURE — 1126F PR PAIN SEVERITY QUANTIFIED, NO PAIN PRESENT: ICD-10-PCS | Mod: S$GLB,,, | Performed by: INTERNAL MEDICINE

## 2020-07-08 PROCEDURE — 3078F DIAST BP <80 MM HG: CPT | Mod: CPTII,S$GLB,, | Performed by: INTERNAL MEDICINE

## 2020-07-08 PROCEDURE — 1159F MED LIST DOCD IN RCRD: CPT | Mod: S$GLB,,, | Performed by: INTERNAL MEDICINE

## 2020-07-08 PROCEDURE — 3074F SYST BP LT 130 MM HG: CPT | Mod: CPTII,S$GLB,, | Performed by: INTERNAL MEDICINE

## 2020-07-08 PROCEDURE — 99214 OFFICE O/P EST MOD 30 MIN: CPT | Mod: S$GLB,,, | Performed by: INTERNAL MEDICINE

## 2020-07-08 PROCEDURE — 99999 PR PBB SHADOW E&M-EST. PATIENT-LVL III: ICD-10-PCS | Mod: PBBFAC,,, | Performed by: INTERNAL MEDICINE

## 2020-07-08 PROCEDURE — 1159F PR MEDICATION LIST DOCUMENTED IN MEDICAL RECORD: ICD-10-PCS | Mod: S$GLB,,, | Performed by: INTERNAL MEDICINE

## 2020-07-08 NOTE — PROGRESS NOTES
Patient ID: Marleen Samano is a 74 y.o. female.    Chief Complaint: Establish Care      Social Hx:  From [place]. . Has 1 daughter. Lives alone. Retired.     PMH:   1.  Hypertension  2.  COPD  3.  Chronic depression   4.  Stage III CKD  5.  Chronic knee and back pain and muscle spasm    HPI:  Having surgery coming up this Tues. Right knee (hardware loosening). She is doing preop with current PCP. Will be switching to me. PCP is retiring. She did have fainting spell back in feb. She is seeing a neurologist for this. Will be doing 72 hr EEG after surgery.     A/P:  1.  Hypertension: controlled on benazepril and amlodipine.   2.  COPD: controlled on prn levalbuterol.   3.  Chronic depression: controlled on effexor and trazodone  4.  CKD stage 3:  Renal function recently normal.  Monitor  5.  Chronic knee and back pain with muscle spasm: stable on gabapentin and baclofen.   6.  Anemia: s/p iron infusion with H/O. H/H now. Monitor per H/O      PMH:   Past Medical History:   Diagnosis Date    Abnormal CBC 2/10/2020    Anemia due to multiple mechanisms 2/10/2020    Anemia, chronic disease 2/10/2020    Anemia, chronic renal failure, stage 4 (severe) 2/10/2020    Arthritis     Bell's palsy     Bladder incontinence     Blepharospasm     Cervical dystonia     Concussion     Fainting spell     2/7/2020    Hormone deficiency     Migraine     Muscle spasm     Myofacial pain syndrome     Normocytic normochromic anemia 2/10/2020     Surg Hx:   Past Surgical History:   Procedure Laterality Date    APPENDECTOMY      BREAST BIOPSY      BREAST LUMPECTOMY      EYE SURGERY      HYSTERECTOMY      NECK SURGERY      TONSILLECTOMY       Fhx:   Family History   Problem Relation Age of Onset    Diabetes Neg Hx     Melanoma Neg Hx     Psoriasis Neg Hx     Lupus Neg Hx     Eczema Neg Hx      Social Hx:   Social History     Socioeconomic History    Marital status:      Spouse name: Not on file     Number of children: Not on file    Years of education: Not on file    Highest education level: Not on file   Occupational History    Not on file   Social Needs    Financial resource strain: Not on file    Food insecurity     Worry: Not on file     Inability: Not on file    Transportation needs     Medical: Not on file     Non-medical: Not on file   Tobacco Use    Smoking status: Never Smoker    Smokeless tobacco: Never Used   Substance and Sexual Activity    Alcohol use: No    Drug use: No    Sexual activity: Not on file   Lifestyle    Physical activity     Days per week: Not on file     Minutes per session: Not on file    Stress: Not on file   Relationships    Social connections     Talks on phone: Not on file     Gets together: Not on file     Attends Hinduism service: Not on file     Active member of club or organization: Not on file     Attends meetings of clubs or organizations: Not on file     Relationship status: Not on file   Other Topics Concern    Are you pregnant or think you may be? Not Asked    Breast-feeding Not Asked   Social History Narrative    Not on file       Current Outpatient Medications on File Prior to Visit   Medication Sig Dispense Refill    amLODIPine (NORVASC) 2.5 MG tablet every evening.   4    baclofen (LIORESAL) 20 MG tablet Take 20 mg by mouth 2 (two) times daily.      benazepril (LOTENSIN) 40 MG tablet Take 40 mg by mouth once daily.  2    diclofenac sodium (VOLTAREN) 1 % Gel Apply topically.       gabapentin (NEURONTIN) 600 MG tablet TAKE 1 TABLET BY MOUTH IN THE MORNING AT NOON AND THEN 2 TABLETS IN THE EVENING      magnesium oxide (MAG-OX) 400 mg tablet Take 1 tablet (400 mg total) by mouth 2 (two) times daily. 60 tablet 12    omeprazole (PRILOSEC) 40 MG capsule Take 40 mg by mouth 2 (two) times daily before meals.      simethicone (GAS-X ORAL) Take by mouth.      trazodone (DESYREL) 100 MG tablet Take 100 mg by mouth every evening.        venlafaxine  (EFFEXOR-XR) 150 MG Cp24 Take 1 capsule (150 mg total) by mouth once daily.      levalbuterol (XOPENEX HFA) 45 mcg/actuation inhaler Inhale 2 puffs into the lungs every 6 (six) hours as needed for Wheezing. Rescue 1 Inhaler 11    [DISCONTINUED] ciprofloxacin HCl (CIPRO) 500 MG tablet Take 1 tablet (500 mg total) by mouth 2 (two) times daily. 8 tablet 0    [DISCONTINUED] ferrous gluconate 324 mg (37.5 mg iron) Tab tablet Take 1 tablet (324 mg total) by mouth 2 (two) times daily with meals. (Patient not taking: Reported on 6/15/2020) 60 tablet 6    [DISCONTINUED] montelukast (SINGULAIR) 10 mg tablet Take 1 tablet (10 mg total) by mouth every evening. (Patient not taking: Reported on 6/15/2020) 90 tablet 3    [DISCONTINUED] naproxen sodium (ANAPROX) 220 MG tablet Take 220 mg by mouth.       Current Facility-Administered Medications on File Prior to Visit   Medication Dose Route Frequency Provider Last Rate Last Dose    onabotulinumtoxina injection 200 Units  200 Units Intramuscular Q90 Days Cinthia Dee MD         I personally reviewed past medical, family and social history.  Review of Systems   Constitutional: Negative for activity change and fever.   HENT: Negative for sore throat and trouble swallowing.    Eyes: Negative for pain and visual disturbance.   Respiratory: Negative for cough, shortness of breath and wheezing.    Cardiovascular: Negative for chest pain, palpitations and leg swelling.   Gastrointestinal: Negative for abdominal pain, blood in stool, diarrhea, nausea and vomiting.   Endocrine: Negative for cold intolerance and polyuria.   Genitourinary: Negative for decreased urine volume and dysuria.   Musculoskeletal: Negative for gait problem and neck pain.   Skin: Negative for rash.   Neurological: Negative for dizziness, syncope and light-headedness.   Psychiatric/Behavioral: Negative for dysphoric mood. The patient is not nervous/anxious.        Objective:     Vitals:    07/08/20 1039   BP:  122/74   Pulse: 80   Temp: 98.4 °F (36.9 °C)        Physical Exam  Constitutional:       General: She is not in acute distress.     Appearance: She is well-developed.   HENT:      Head: Normocephalic and atraumatic.   Eyes:      Pupils: Pupils are equal, round, and reactive to light.   Neck:      Musculoskeletal: Neck supple.      Thyroid: No thyromegaly.   Cardiovascular:      Rate and Rhythm: Normal rate and regular rhythm.      Heart sounds: Normal heart sounds. No murmur. No friction rub. No gallop.    Pulmonary:      Effort: Pulmonary effort is normal. No respiratory distress.      Breath sounds: Normal breath sounds. No wheezing.   Abdominal:      General: Bowel sounds are normal.      Palpations: Abdomen is soft.      Tenderness: There is no abdominal tenderness.   Skin:     General: Skin is warm.      Findings: No rash.   Neurological:      Mental Status: She is alert and oriented to person, place, and time.      Cranial Nerves: No cranial nerve deficit.   Psychiatric:         Behavior: Behavior normal.           Assessment/Plan   Marleen was seen today for establish care.    Diagnoses and all orders for this visit:    Essential hypertension    Major depression, chronic    Normocytic normochromic anemia    Pulmonary emphysema, unspecified emphysema type    Chronic obstructive pulmonary disease, unspecified COPD type        Follow up in about 6 months (around 1/8/2021) for check up .

## 2020-07-09 ENCOUNTER — PATIENT OUTREACH (OUTPATIENT)
Dept: ADMINISTRATIVE | Facility: HOSPITAL | Age: 75
End: 2020-07-09

## 2020-07-09 NOTE — LETTER
AUTHORIZATION FOR RELEASE OF   CONFIDENTIAL INFORMATION    Abeba Dowling MD    We are seeing Marleen Samano, date of birth 1945, in the clinic at [facility]. Nitin Jackson DO is the patient's PCP. Marleen Samano has an outstanding lab/procedure at the time we reviewed her chart. In order to help keep her health information updated, she has authorized us to request the following medical record(s):      COLONOSCOPY          Please fax records to [facility], Donald J Lemieux, DO, [phone]     If you have any questions, please contact Josy Monroy, Care Coordinator   at [phone].            Patient Name: Marleen Samano  : 1945  Patient Phone #: [phone]

## 2020-07-10 DIAGNOSIS — T84.038A ASEPTIC LOOSENING OF PROSTHETIC KNEE, INITIAL ENCOUNTER: Primary | ICD-10-CM

## 2020-07-10 DIAGNOSIS — Z01.818 PRE-OP TESTING: ICD-10-CM

## 2020-07-10 DIAGNOSIS — Z96.659 ASEPTIC LOOSENING OF PROSTHETIC KNEE, INITIAL ENCOUNTER: Primary | ICD-10-CM

## 2020-07-11 ENCOUNTER — LAB VISIT (OUTPATIENT)
Dept: PRIMARY CARE CLINIC | Facility: CLINIC | Age: 75
End: 2020-07-11
Payer: MEDICARE

## 2020-07-11 DIAGNOSIS — Z01.818 PRE-OP TESTING: ICD-10-CM

## 2020-07-11 PROCEDURE — U0003 INFECTIOUS AGENT DETECTION BY NUCLEIC ACID (DNA OR RNA); SEVERE ACUTE RESPIRATORY SYNDROME CORONAVIRUS 2 (SARS-COV-2) (CORONAVIRUS DISEASE [COVID-19]), AMPLIFIED PROBE TECHNIQUE, MAKING USE OF HIGH THROUGHPUT TECHNOLOGIES AS DESCRIBED BY CMS-2020-01-R: HCPCS

## 2020-07-12 LAB — SARS-COV-2 RNA RESP QL NAA+PROBE: NOT DETECTED

## 2020-07-13 ENCOUNTER — ANESTHESIA EVENT (OUTPATIENT)
Dept: SURGERY | Facility: HOSPITAL | Age: 75
DRG: 467 | End: 2020-07-13
Payer: MEDICARE

## 2020-07-14 ENCOUNTER — ANESTHESIA (OUTPATIENT)
Dept: SURGERY | Facility: HOSPITAL | Age: 75
DRG: 467 | End: 2020-07-14
Payer: MEDICARE

## 2020-07-14 ENCOUNTER — HOSPITAL ENCOUNTER (INPATIENT)
Facility: HOSPITAL | Age: 75
LOS: 2 days | Discharge: HOME HEALTH | DRG: 467 | End: 2020-07-16
Attending: ORTHOPAEDIC SURGERY | Admitting: ORTHOPAEDIC SURGERY
Payer: MEDICARE

## 2020-07-14 DIAGNOSIS — Z96.651 STATUS POST TOTAL RIGHT KNEE REPLACEMENT: ICD-10-CM

## 2020-07-14 DIAGNOSIS — I10 ESSENTIAL HYPERTENSION: ICD-10-CM

## 2020-07-14 DIAGNOSIS — T84.032A LOOSENING OF PROSTHESIS OF RIGHT TOTAL KNEE REPLACEMENT, INITIAL ENCOUNTER: Primary | ICD-10-CM

## 2020-07-14 DIAGNOSIS — I48.91 A-FIB: ICD-10-CM

## 2020-07-14 DIAGNOSIS — Z96.659 ASEPTIC LOOSENING OF PROSTHETIC KNEE, INITIAL ENCOUNTER: ICD-10-CM

## 2020-07-14 DIAGNOSIS — T84.038A ASEPTIC LOOSENING OF PROSTHETIC KNEE, INITIAL ENCOUNTER: ICD-10-CM

## 2020-07-14 DIAGNOSIS — Z41.9 SURGERY, ELECTIVE: ICD-10-CM

## 2020-07-14 PROBLEM — I48.0 PAROXYSMAL ATRIAL FIBRILLATION: Status: ACTIVE | Noted: 2020-07-14

## 2020-07-14 PROCEDURE — 94799 UNLISTED PULMONARY SVC/PX: CPT

## 2020-07-14 PROCEDURE — 25000003 PHARM REV CODE 250: Performed by: ORTHOPAEDIC SURGERY

## 2020-07-14 PROCEDURE — C1769 GUIDE WIRE: HCPCS | Performed by: ORTHOPAEDIC SURGERY

## 2020-07-14 PROCEDURE — 71000033 HC RECOVERY, INTIAL HOUR: Performed by: ORTHOPAEDIC SURGERY

## 2020-07-14 PROCEDURE — 25000003 PHARM REV CODE 250: Performed by: ANESTHESIOLOGY

## 2020-07-14 PROCEDURE — 64447 PR NERVE BLOCK INJ, ANES/STEROID, FEMORAL, INCL IMAG GUIDANCE: ICD-10-PCS | Mod: 59,RT,, | Performed by: ANESTHESIOLOGY

## 2020-07-14 PROCEDURE — 37000008 HC ANESTHESIA 1ST 15 MINUTES: Performed by: ORTHOPAEDIC SURGERY

## 2020-07-14 PROCEDURE — 99900104 DSU ONLY-NO CHARGE-EA ADD'L HR (STAT): Performed by: ORTHOPAEDIC SURGERY

## 2020-07-14 PROCEDURE — 99900103 DSU ONLY-NO CHARGE-INITIAL HR (STAT): Performed by: ORTHOPAEDIC SURGERY

## 2020-07-14 PROCEDURE — 27800903 OPTIME MED/SURG SUP & DEVICES OTHER IMPLANTS: Performed by: ORTHOPAEDIC SURGERY

## 2020-07-14 PROCEDURE — 63600175 PHARM REV CODE 636 W HCPCS: Performed by: ANESTHESIOLOGY

## 2020-07-14 PROCEDURE — C1776 JOINT DEVICE (IMPLANTABLE): HCPCS | Performed by: ORTHOPAEDIC SURGERY

## 2020-07-14 PROCEDURE — D9220A PRA ANESTHESIA: Mod: CRNA,,, | Performed by: NURSE ANESTHETIST, CERTIFIED REGISTERED

## 2020-07-14 PROCEDURE — 36000710: Performed by: ORTHOPAEDIC SURGERY

## 2020-07-14 PROCEDURE — 87075 CULTR BACTERIA EXCEPT BLOOD: CPT

## 2020-07-14 PROCEDURE — 99900035 HC TECH TIME PER 15 MIN (STAT)

## 2020-07-14 PROCEDURE — 93005 ELECTROCARDIOGRAM TRACING: CPT

## 2020-07-14 PROCEDURE — 94761 N-INVAS EAR/PLS OXIMETRY MLT: CPT

## 2020-07-14 PROCEDURE — 27201423 OPTIME MED/SURG SUP & DEVICES STERILE SUPPLY: Performed by: ORTHOPAEDIC SURGERY

## 2020-07-14 PROCEDURE — 25000003 PHARM REV CODE 250: Performed by: NURSE ANESTHETIST, CERTIFIED REGISTERED

## 2020-07-14 PROCEDURE — 27200688 HC TRAY, SPINAL-HYPER/ ISOBARIC: Performed by: ANESTHESIOLOGY

## 2020-07-14 PROCEDURE — 87070 CULTURE OTHR SPECIMN AEROBIC: CPT

## 2020-07-14 PROCEDURE — 76942 ECHO GUIDE FOR BIOPSY: CPT | Mod: 26,,, | Performed by: ANESTHESIOLOGY

## 2020-07-14 PROCEDURE — 27486 PR REVISE KNEE JOINT REPLACE,1 PART: ICD-10-PCS | Mod: RT,,, | Performed by: ORTHOPAEDIC SURGERY

## 2020-07-14 PROCEDURE — 27200750 HC INSULATED NEEDLE/ STIMUPLEX: Performed by: ANESTHESIOLOGY

## 2020-07-14 PROCEDURE — 76942 PR U/S GUIDANCE FOR NEEDLE GUIDANCE: ICD-10-PCS | Mod: 26,,, | Performed by: ANESTHESIOLOGY

## 2020-07-14 PROCEDURE — D9220A PRA ANESTHESIA: ICD-10-PCS | Mod: CRNA,,, | Performed by: NURSE ANESTHETIST, CERTIFIED REGISTERED

## 2020-07-14 PROCEDURE — 64447 NJX AA&/STRD FEMORAL NRV IMG: CPT | Mod: 59,RT,, | Performed by: ANESTHESIOLOGY

## 2020-07-14 PROCEDURE — D9220A PRA ANESTHESIA: Mod: ANES,,, | Performed by: ANESTHESIOLOGY

## 2020-07-14 PROCEDURE — 27486 REVISE/REPLACE KNEE JOINT: CPT | Mod: RT,,, | Performed by: ORTHOPAEDIC SURGERY

## 2020-07-14 PROCEDURE — 97162 PT EVAL MOD COMPLEX 30 MIN: CPT

## 2020-07-14 PROCEDURE — 37000009 HC ANESTHESIA EA ADD 15 MINS: Performed by: ORTHOPAEDIC SURGERY

## 2020-07-14 PROCEDURE — 63600175 PHARM REV CODE 636 W HCPCS: Performed by: ORTHOPAEDIC SURGERY

## 2020-07-14 PROCEDURE — 71000039 HC RECOVERY, EACH ADD'L HOUR: Performed by: ORTHOPAEDIC SURGERY

## 2020-07-14 PROCEDURE — D9220A PRA ANESTHESIA: ICD-10-PCS | Mod: ANES,,, | Performed by: ANESTHESIOLOGY

## 2020-07-14 PROCEDURE — 36000711: Performed by: ORTHOPAEDIC SURGERY

## 2020-07-14 PROCEDURE — 63600175 PHARM REV CODE 636 W HCPCS: Performed by: NURSE ANESTHETIST, CERTIFIED REGISTERED

## 2020-07-14 PROCEDURE — 97116 GAIT TRAINING THERAPY: CPT

## 2020-07-14 PROCEDURE — 97535 SELF CARE MNGMENT TRAINING: CPT

## 2020-07-14 PROCEDURE — C1713 ANCHOR/SCREW BN/BN,TIS/BN: HCPCS | Performed by: ORTHOPAEDIC SURGERY

## 2020-07-14 PROCEDURE — C9290 INJ, BUPIVACAINE LIPOSOME: HCPCS | Performed by: ANESTHESIOLOGY

## 2020-07-14 PROCEDURE — 25000003 PHARM REV CODE 250: Performed by: HOSPITALIST

## 2020-07-14 PROCEDURE — 12000002 HC ACUTE/MED SURGE SEMI-PRIVATE ROOM

## 2020-07-14 DEVICE — IMPLANTABLE DEVICE: Type: IMPLANTABLE DEVICE | Site: KNEE | Status: FUNCTIONAL

## 2020-07-14 DEVICE — BONE CHIP CANC 1.7-10MM 15ML: Type: IMPLANTABLE DEVICE | Site: KNEE | Status: FUNCTIONAL

## 2020-07-14 DEVICE — CEMENT BONE ANTIBIO SIMPLEX P: Type: IMPLANTABLE DEVICE | Site: KNEE | Status: FUNCTIONAL

## 2020-07-14 RX ORDER — FENTANYL CITRATE 50 UG/ML
INJECTION, SOLUTION INTRAMUSCULAR; INTRAVENOUS
Status: DISCONTINUED | OUTPATIENT
Start: 2020-07-14 | End: 2020-07-14

## 2020-07-14 RX ORDER — DOCUSATE SODIUM 100 MG/1
100 CAPSULE, LIQUID FILLED ORAL EVERY 12 HOURS
Status: DISCONTINUED | OUTPATIENT
Start: 2020-07-14 | End: 2020-07-16 | Stop reason: HOSPADM

## 2020-07-14 RX ORDER — SODIUM CHLORIDE, SODIUM LACTATE, POTASSIUM CHLORIDE, CALCIUM CHLORIDE 600; 310; 30; 20 MG/100ML; MG/100ML; MG/100ML; MG/100ML
INJECTION, SOLUTION INTRAVENOUS CONTINUOUS
Status: DISCONTINUED | OUTPATIENT
Start: 2020-07-14 | End: 2020-07-14

## 2020-07-14 RX ORDER — LEVALBUTEROL 1.25 MG/.5ML
1.25 SOLUTION, CONCENTRATE RESPIRATORY (INHALATION) EVERY 8 HOURS PRN
Status: DISCONTINUED | OUTPATIENT
Start: 2020-07-14 | End: 2020-07-16 | Stop reason: HOSPADM

## 2020-07-14 RX ORDER — BENAZEPRIL HYDROCHLORIDE 10 MG/1
40 TABLET ORAL DAILY
Status: DISCONTINUED | OUTPATIENT
Start: 2020-07-15 | End: 2020-07-16 | Stop reason: HOSPADM

## 2020-07-14 RX ORDER — TIZANIDINE 2 MG/1
2 TABLET ORAL EVERY 8 HOURS PRN
Status: DISCONTINUED | OUTPATIENT
Start: 2020-07-14 | End: 2020-07-16 | Stop reason: HOSPADM

## 2020-07-14 RX ORDER — FENTANYL CITRATE 50 UG/ML
25 INJECTION, SOLUTION INTRAMUSCULAR; INTRAVENOUS EVERY 5 MIN PRN
Status: DISCONTINUED | OUTPATIENT
Start: 2020-07-14 | End: 2020-07-14

## 2020-07-14 RX ORDER — GABAPENTIN 300 MG/1
1200 CAPSULE ORAL NIGHTLY
Status: DISCONTINUED | OUTPATIENT
Start: 2020-07-14 | End: 2020-07-16 | Stop reason: HOSPADM

## 2020-07-14 RX ORDER — PREGABALIN 75 MG/1
75 CAPSULE ORAL EVERY 12 HOURS
Status: DISCONTINUED | OUTPATIENT
Start: 2020-07-14 | End: 2020-07-14

## 2020-07-14 RX ORDER — GLYCOPYRROLATE 0.2 MG/ML
INJECTION INTRAMUSCULAR; INTRAVENOUS
Status: DISCONTINUED | OUTPATIENT
Start: 2020-07-14 | End: 2020-07-14

## 2020-07-14 RX ORDER — CELECOXIB 100 MG/1
400 CAPSULE ORAL
Status: DISCONTINUED | OUTPATIENT
Start: 2020-07-14 | End: 2020-07-14

## 2020-07-14 RX ORDER — ONDANSETRON HYDROCHLORIDE 2 MG/ML
INJECTION, SOLUTION INTRAMUSCULAR; INTRAVENOUS
Status: DISCONTINUED | OUTPATIENT
Start: 2020-07-14 | End: 2020-07-14

## 2020-07-14 RX ORDER — HYDROMORPHONE HYDROCHLORIDE 2 MG/ML
0.2 INJECTION, SOLUTION INTRAMUSCULAR; INTRAVENOUS; SUBCUTANEOUS EVERY 5 MIN PRN
Status: DISCONTINUED | OUTPATIENT
Start: 2020-07-14 | End: 2020-07-14

## 2020-07-14 RX ORDER — BACLOFEN 10 MG/1
20 TABLET ORAL 2 TIMES DAILY
Status: DISCONTINUED | OUTPATIENT
Start: 2020-07-14 | End: 2020-07-16 | Stop reason: HOSPADM

## 2020-07-14 RX ORDER — MIDAZOLAM HYDROCHLORIDE 1 MG/ML
2 INJECTION INTRAMUSCULAR; INTRAVENOUS
Status: DISCONTINUED | OUTPATIENT
Start: 2020-07-14 | End: 2020-07-14

## 2020-07-14 RX ORDER — PREGABALIN 75 MG/1
75 CAPSULE ORAL
Status: COMPLETED | OUTPATIENT
Start: 2020-07-14 | End: 2020-07-14

## 2020-07-14 RX ORDER — VENLAFAXINE HYDROCHLORIDE 150 MG/1
150 CAPSULE, EXTENDED RELEASE ORAL DAILY
Status: DISCONTINUED | OUTPATIENT
Start: 2020-07-15 | End: 2020-07-16 | Stop reason: HOSPADM

## 2020-07-14 RX ORDER — TRANEXAMIC ACID 100 MG/ML
INJECTION, SOLUTION INTRAVENOUS
Status: DISCONTINUED | OUTPATIENT
Start: 2020-07-14 | End: 2020-07-14

## 2020-07-14 RX ORDER — ONDANSETRON 2 MG/ML
4 INJECTION INTRAMUSCULAR; INTRAVENOUS ONCE AS NEEDED
Status: DISCONTINUED | OUTPATIENT
Start: 2020-07-14 | End: 2020-07-14

## 2020-07-14 RX ORDER — TRAZODONE HYDROCHLORIDE 50 MG/1
100 TABLET ORAL NIGHTLY
Status: DISCONTINUED | OUTPATIENT
Start: 2020-07-14 | End: 2020-07-16 | Stop reason: HOSPADM

## 2020-07-14 RX ORDER — OXYCODONE HCL 10 MG/1
10 TABLET, FILM COATED, EXTENDED RELEASE ORAL
Status: DISCONTINUED | OUTPATIENT
Start: 2020-07-14 | End: 2020-07-14

## 2020-07-14 RX ORDER — MORPHINE SULFATE 2 MG/ML
2 INJECTION, SOLUTION INTRAMUSCULAR; INTRAVENOUS
Status: DISCONTINUED | OUTPATIENT
Start: 2020-07-14 | End: 2020-07-16 | Stop reason: HOSPADM

## 2020-07-14 RX ORDER — CEFAZOLIN SODIUM 2 G/50ML
2 SOLUTION INTRAVENOUS
Status: COMPLETED | OUTPATIENT
Start: 2020-07-14 | End: 2020-07-15

## 2020-07-14 RX ORDER — ONDANSETRON 2 MG/ML
4 INJECTION INTRAMUSCULAR; INTRAVENOUS EVERY 12 HOURS PRN
Status: DISCONTINUED | OUTPATIENT
Start: 2020-07-14 | End: 2020-07-16 | Stop reason: HOSPADM

## 2020-07-14 RX ORDER — SODIUM CHLORIDE 9 MG/ML
INJECTION, SOLUTION INTRAVENOUS CONTINUOUS
Status: DISCONTINUED | OUTPATIENT
Start: 2020-07-14 | End: 2020-07-16 | Stop reason: HOSPADM

## 2020-07-14 RX ORDER — CEFAZOLIN SODIUM 2 G/50ML
2 SOLUTION INTRAVENOUS
Status: DISCONTINUED | OUTPATIENT
Start: 2020-07-14 | End: 2020-07-14

## 2020-07-14 RX ORDER — LOPERAMIDE HYDROCHLORIDE 2 MG/1
2 CAPSULE ORAL CONTINUOUS PRN
Status: DISCONTINUED | OUTPATIENT
Start: 2020-07-14 | End: 2020-07-16 | Stop reason: HOSPADM

## 2020-07-14 RX ORDER — SODIUM CHLORIDE, SODIUM LACTATE, POTASSIUM CHLORIDE, CALCIUM CHLORIDE 600; 310; 30; 20 MG/100ML; MG/100ML; MG/100ML; MG/100ML
75 INJECTION, SOLUTION INTRAVENOUS CONTINUOUS
Status: DISCONTINUED | OUTPATIENT
Start: 2020-07-14 | End: 2020-07-14

## 2020-07-14 RX ORDER — PANTOPRAZOLE SODIUM 40 MG/1
40 TABLET, DELAYED RELEASE ORAL DAILY
Status: DISCONTINUED | OUTPATIENT
Start: 2020-07-15 | End: 2020-07-16 | Stop reason: HOSPADM

## 2020-07-14 RX ORDER — AMLODIPINE BESYLATE 2.5 MG/1
2.5 TABLET ORAL NIGHTLY
Status: DISCONTINUED | OUTPATIENT
Start: 2020-07-14 | End: 2020-07-16 | Stop reason: HOSPADM

## 2020-07-14 RX ORDER — OXYCODONE HYDROCHLORIDE 5 MG/1
5 TABLET ORAL
Status: DISCONTINUED | OUTPATIENT
Start: 2020-07-14 | End: 2020-07-14

## 2020-07-14 RX ORDER — MUPIROCIN 20 MG/G
OINTMENT TOPICAL
Status: DISCONTINUED | OUTPATIENT
Start: 2020-07-14 | End: 2020-07-14

## 2020-07-14 RX ORDER — OXYCODONE HYDROCHLORIDE 10 MG/1
10 TABLET ORAL
Status: DISCONTINUED | OUTPATIENT
Start: 2020-07-14 | End: 2020-07-16 | Stop reason: HOSPADM

## 2020-07-14 RX ORDER — CEFAZOLIN SODIUM 1 G/3ML
INJECTION, POWDER, FOR SOLUTION INTRAMUSCULAR; INTRAVENOUS
Status: DISCONTINUED | OUTPATIENT
Start: 2020-07-14 | End: 2020-07-14

## 2020-07-14 RX ORDER — SODIUM CHLORIDE 0.9 % (FLUSH) 0.9 %
10 SYRINGE (ML) INJECTION
Status: DISCONTINUED | OUTPATIENT
Start: 2020-07-14 | End: 2020-07-16 | Stop reason: HOSPADM

## 2020-07-14 RX ORDER — SODIUM CHLORIDE AND POTASSIUM CHLORIDE 150; 900 MG/100ML; MG/100ML
INJECTION, SOLUTION INTRAVENOUS CONTINUOUS
Status: CANCELLED | OUTPATIENT
Start: 2020-07-14

## 2020-07-14 RX ORDER — PROPOFOL 10 MG/ML
VIAL (ML) INTRAVENOUS CONTINUOUS PRN
Status: DISCONTINUED | OUTPATIENT
Start: 2020-07-14 | End: 2020-07-14

## 2020-07-14 RX ORDER — OXYCODONE HCL 10 MG/1
10 TABLET, FILM COATED, EXTENDED RELEASE ORAL EVERY 12 HOURS
Status: DISCONTINUED | OUTPATIENT
Start: 2020-07-14 | End: 2020-07-16 | Stop reason: HOSPADM

## 2020-07-14 RX ORDER — MUPIROCIN 20 MG/G
1 OINTMENT TOPICAL 2 TIMES DAILY
Status: DISCONTINUED | OUTPATIENT
Start: 2020-07-14 | End: 2020-07-16 | Stop reason: HOSPADM

## 2020-07-14 RX ORDER — FAMOTIDINE 20 MG/1
20 TABLET, FILM COATED ORAL 2 TIMES DAILY
Status: DISCONTINUED | OUTPATIENT
Start: 2020-07-14 | End: 2020-07-14

## 2020-07-14 RX ORDER — CELECOXIB 100 MG/1
100 CAPSULE ORAL EVERY 12 HOURS
Status: DISCONTINUED | OUTPATIENT
Start: 2020-07-15 | End: 2020-07-16 | Stop reason: HOSPADM

## 2020-07-14 RX ORDER — OXYCODONE HYDROCHLORIDE 5 MG/1
5 TABLET ORAL
Status: DISCONTINUED | OUTPATIENT
Start: 2020-07-14 | End: 2020-07-16 | Stop reason: HOSPADM

## 2020-07-14 RX ORDER — GABAPENTIN 300 MG/1
600 CAPSULE ORAL DAILY
Status: DISCONTINUED | OUTPATIENT
Start: 2020-07-15 | End: 2020-07-16 | Stop reason: HOSPADM

## 2020-07-14 RX ORDER — DIPHENHYDRAMINE HYDROCHLORIDE 50 MG/ML
25 INJECTION INTRAMUSCULAR; INTRAVENOUS EVERY 6 HOURS PRN
Status: DISCONTINUED | OUTPATIENT
Start: 2020-07-14 | End: 2020-07-14

## 2020-07-14 RX ORDER — SODIUM CHLORIDE 0.9 % (FLUSH) 0.9 %
3 SYRINGE (ML) INJECTION
Status: DISCONTINUED | OUTPATIENT
Start: 2020-07-14 | End: 2020-07-14

## 2020-07-14 RX ORDER — FENTANYL CITRATE 50 UG/ML
100 INJECTION, SOLUTION INTRAMUSCULAR; INTRAVENOUS SEE ADMIN INSTRUCTIONS
Status: DISCONTINUED | OUTPATIENT
Start: 2020-07-14 | End: 2021-03-03

## 2020-07-14 RX ORDER — PHENYLEPHRINE HYDROCHLORIDE 10 MG/ML
INJECTION INTRAVENOUS
Status: DISCONTINUED | OUTPATIENT
Start: 2020-07-14 | End: 2020-07-14

## 2020-07-14 RX ORDER — ACETAMINOPHEN 10 MG/ML
INJECTION, SOLUTION INTRAVENOUS
Status: DISCONTINUED | OUTPATIENT
Start: 2020-07-14 | End: 2020-07-14

## 2020-07-14 RX ORDER — EPHEDRINE SULFATE 50 MG/ML
INJECTION, SOLUTION INTRAVENOUS
Status: DISCONTINUED | OUTPATIENT
Start: 2020-07-14 | End: 2020-07-14

## 2020-07-14 RX ORDER — LIDOCAINE HYDROCHLORIDE 10 MG/ML
1 INJECTION, SOLUTION EPIDURAL; INFILTRATION; INTRACAUDAL; PERINEURAL ONCE
Status: DISCONTINUED | OUTPATIENT
Start: 2020-07-14 | End: 2020-07-14

## 2020-07-14 RX ADMIN — SODIUM CHLORIDE: 0.9 INJECTION, SOLUTION INTRAVENOUS at 11:07

## 2020-07-14 RX ADMIN — TRAZODONE HYDROCHLORIDE 100 MG: 50 TABLET ORAL at 10:07

## 2020-07-14 RX ADMIN — MUPIROCIN 1 G: 20 OINTMENT TOPICAL at 10:07

## 2020-07-14 RX ADMIN — EPHEDRINE SULFATE 10 MG: 50 INJECTION, SOLUTION INTRAMUSCULAR; INTRAVENOUS; SUBCUTANEOUS at 09:07

## 2020-07-14 RX ADMIN — PROPOFOL 50 MCG/KG/MIN: 10 INJECTION, EMULSION INTRAVENOUS at 08:07

## 2020-07-14 RX ADMIN — TRANEXAMIC ACID 750 MG: 100 INJECTION, SOLUTION INTRAVENOUS at 10:07

## 2020-07-14 RX ADMIN — OXYCODONE HYDROCHLORIDE 10 MG: 10 TABLET ORAL at 10:07

## 2020-07-14 RX ADMIN — FAMOTIDINE 20 MG: 20 TABLET ORAL at 02:07

## 2020-07-14 RX ADMIN — OXYCODONE HYDROCHLORIDE 10 MG: 10 TABLET, FILM COATED, EXTENDED RELEASE ORAL at 06:07

## 2020-07-14 RX ADMIN — CEFAZOLIN 2 G: 1 INJECTION, POWDER, FOR SOLUTION INTRAVENOUS at 08:07

## 2020-07-14 RX ADMIN — MIDAZOLAM HYDROCHLORIDE 2 MG: 1 INJECTION, SOLUTION INTRAMUSCULAR; INTRAVENOUS at 07:07

## 2020-07-14 RX ADMIN — SODIUM CHLORIDE, SODIUM GLUCONATE, SODIUM ACETATE, POTASSIUM CHLORIDE, MAGNESIUM CHLORIDE, SODIUM PHOSPHATE, DIBASIC, AND POTASSIUM PHOSPHATE: .53; .5; .37; .037; .03; .012; .00082 INJECTION, SOLUTION INTRAVENOUS at 06:07

## 2020-07-14 RX ADMIN — SODIUM CHLORIDE, SODIUM GLUCONATE, SODIUM ACETATE, POTASSIUM CHLORIDE, MAGNESIUM CHLORIDE, SODIUM PHOSPHATE, DIBASIC, AND POTASSIUM PHOSPHATE: .53; .5; .37; .037; .03; .012; .00082 INJECTION, SOLUTION INTRAVENOUS at 09:07

## 2020-07-14 RX ADMIN — MUPIROCIN: 20 OINTMENT TOPICAL at 07:07

## 2020-07-14 RX ADMIN — FENTANYL CITRATE 50 MCG: 50 INJECTION, SOLUTION INTRAMUSCULAR; INTRAVENOUS at 08:07

## 2020-07-14 RX ADMIN — OXYCODONE HYDROCHLORIDE 10 MG: 10 TABLET ORAL at 02:07

## 2020-07-14 RX ADMIN — SODIUM CHLORIDE: 0.9 INJECTION, SOLUTION INTRAVENOUS at 10:07

## 2020-07-14 RX ADMIN — ONDANSETRON 4 MG: 2 INJECTION, SOLUTION INTRAMUSCULAR; INTRAVENOUS at 09:07

## 2020-07-14 RX ADMIN — PHENYLEPHRINE HYDROCHLORIDE 200 MCG: 10 INJECTION INTRAVENOUS at 09:07

## 2020-07-14 RX ADMIN — TRANEXAMIC ACID 750 MG: 100 INJECTION, SOLUTION INTRAVENOUS at 08:07

## 2020-07-14 RX ADMIN — SODIUM CHLORIDE, SODIUM GLUCONATE, SODIUM ACETATE, POTASSIUM CHLORIDE, MAGNESIUM CHLORIDE, SODIUM PHOSPHATE, DIBASIC, AND POTASSIUM PHOSPHATE: .53; .5; .37; .037; .03; .012; .00082 INJECTION, SOLUTION INTRAVENOUS at 08:07

## 2020-07-14 RX ADMIN — PHENYLEPHRINE HYDROCHLORIDE 100 MCG: 10 INJECTION INTRAVENOUS at 10:07

## 2020-07-14 RX ADMIN — PREGABALIN 75 MG: 75 CAPSULE ORAL at 07:07

## 2020-07-14 RX ADMIN — AMLODIPINE BESYLATE 2.5 MG: 2.5 TABLET ORAL at 10:07

## 2020-07-14 RX ADMIN — BACLOFEN 20 MG: 10 TABLET ORAL at 10:07

## 2020-07-14 RX ADMIN — TIZANIDINE 2 MG: 2 TABLET ORAL at 10:07

## 2020-07-14 RX ADMIN — BUPIVACAINE 20 ML: 13.3 INJECTION, SUSPENSION, LIPOSOMAL INFILTRATION at 08:07

## 2020-07-14 RX ADMIN — TIZANIDINE 2 MG: 2 TABLET ORAL at 04:07

## 2020-07-14 RX ADMIN — DOCUSATE SODIUM 100 MG: 100 CAPSULE, LIQUID FILLED ORAL at 10:07

## 2020-07-14 RX ADMIN — FENTANYL CITRATE 50 MCG: 50 INJECTION, SOLUTION INTRAMUSCULAR; INTRAVENOUS at 10:07

## 2020-07-14 RX ADMIN — GABAPENTIN 1200 MG: 300 CAPSULE ORAL at 10:07

## 2020-07-14 RX ADMIN — ACETAMINOPHEN 1000 MG: 10 INJECTION, SOLUTION INTRAVENOUS at 09:07

## 2020-07-14 RX ADMIN — CEFAZOLIN SODIUM 2 G: 2 SOLUTION INTRAVENOUS at 04:07

## 2020-07-14 RX ADMIN — GLYCOPYRROLATE 0.2 MG: 0.2 INJECTION, SOLUTION INTRAMUSCULAR; INTRAVENOUS at 09:07

## 2020-07-14 RX ADMIN — FENTANYL CITRATE 50 MCG: 50 INJECTION INTRAMUSCULAR; INTRAVENOUS at 07:07

## 2020-07-14 NOTE — PLAN OF CARE
Arrived ambulatory with daughter dropping her off, plan of care reviewed and warm blanket provided

## 2020-07-14 NOTE — ASSESSMENT & PLAN NOTE
Stable at present.    COPD Medications at home            levalbuterol (XOPENEX HFA) 45 mcg/actuation inhaler Inhale 2 puffs into the lungs every 6 (six) hours as needed for Wheezing. Rescue      Hospital Medications             levalbuterol nebulizer solution 1.25 mg 1.25 mg, Nebulization, Every 8 hours PRN

## 2020-07-14 NOTE — PT/OT/SLP EVAL
Physical Therapy Evaluation    Patient Name:  Marleen Samano   MRN:  [id number]    Recommendations:     Discharge Recommendations:  (SNF vs HHPT)   Discharge Equipment Recommendations: walker, rolling(she reports her RW is from 2014 and rusted)   Barriers to discharge: decreased safety awareness    Assessment:     Marleen Samano is a 74 y.o. female admitted with a medical diagnosis of Aseptic loosening of prosthetic knee, initial encounter.  She presents with the following impairments/functional limitations:  weakness, impaired endurance, impaired self care skills, impaired functional mobilty, gait instability, impaired balance, decreased lower extremity function, decreased safety awareness, pain, decreased ROM, orthopedic precautions. Patient is agreeable to PT evaluation. She reports 7/10 right knee pain and was premedicated for activity. She also notes cramps in her groin that radiate down her legs (not new per patient). She has a medical history of anemia, depression, myofascial pain syndrome, multi-level cervical fusion, and renal failure. She is incontinent of bowel and bladder. She reports she has poor depth perception with a history of falls ~2 a year. She continues to drive. She lives alone. She states she will either stay with her daughter or her daughter will stay with her. No stairs to enter at either house. She has been going to OP PT for her neck pain. 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 RLE is in immobilizer. She is very fidgety constantly looking for something or adjusting gown/linens or pulling at ace wrap. She requires Rebecca for supine to sit transfer with PT supporting RLE. She is agreeable to standing. She requires Rebecca for sit to stand transfer with PT stabilizing the RW. She is able to move her feet in a heel/toe pattern to move ~2 feet towards the HOB with RW and CGA. Patient has poor safety awareness and is very impulsive. She states if her daughter is not home  her 20 year old grandson may be able to help. There are concerns from a PT point of view about patient going home safely with her poor safety awareness and impulsivity. Recommend SNF vs HHPT upon initial evaluation. Will update recommendations based on progress with PT.      Rehab Prognosis: Fair; patient would benefit from acute skilled PT services to address these deficits and reach maximum level of function.    Recent Surgery: Procedure(s) (LRB):  REVISION, ARTHROPLASTY, KNEE (Right) Day of Surgery    Plan:     During this hospitalization, patient to be seen BID to address the identified rehab impairments via gait training, therapeutic activities, therapeutic exercises and progress toward the following goals:    · Plan of Care Expires:  08/14/20    Subjective     Chief Complaint: 7/10 RLE pain and bilateral groin cramps that radiate down LEs  Patient/Family Comments/goals: another pillow to prop leg up   Pain/Comfort:  · Pain Rating 1: 7/10  · Location - Side 1: Right  · Location 1: knee  · Pain Addressed 1: Pre-medicate for activity    Patients cultural, spiritual, Yarsanism conflicts given the current situation:      Living Environment:  Patient lives alone. She states either her daughter will stay with her or she will stay with her daughter upon D/C  Prior to admission, patients level of function was independent per patient report. She does report a history of falls with ~2 a year. 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 She goes to OP PT for her neck pain. Equipment used at home: none.  DME owned (not currently used): bedside commode, transfer tub bench and rollator.  Upon discharge, patient will have assistance from daughter or grandson.    Objective:     Communicated with FRAN Maria prior to session.  Patient found HOB elevated with bed alarm, telemetry, peripheral IV, knee immobilizer, cryotherapy  upon PT entry to room.    General Precautions: Standard, fall   Orthopedic  Precautions:RLE weight bearing as tolerated   Braces: Knee immobilizer     Exams:  · RLE ROM: patient in knee immobilizer  · LLE Strength: WFL    Functional Mobility:  · Bed Mobility:     · Supine to Sit: minimum assistance  · Transfers:     · Sit to Stand:  minimum assistance with rolling walker  · Gait: 2' with RW and patient using a heel/toe method to move feet towards HOB  · Balance: Fair    Therapeutic Activities and Exercises:   Patient was educated on the importance of OOB activity during hospital stay, safe transfers and ambulation, D/C planning    AM-PAC 6 CLICK MOBILITY  Total Score:17     Patient left HOB elevated with all lines intact, call button in reach, bed alarm on and RN notified.    GOALS:   Multidisciplinary Problems     Physical Therapy Goals        Problem: Physical Therapy Goal    Goal Priority Disciplines Outcome Goal Variances Interventions   Physical Therapy Goal     PT, PT/OT      Description: Goals to be met by: 2020     Patient will increase functional independence with mobility by performin. Supine to sit with Supervision  2. Sit to stand transfer with Supervision  3. Bed to chair transfer with Supervision using Rolling Walker  4. Gait  x 250 feet with Supervision using Rolling Walker.                      History:     Past Medical History:   Diagnosis Date    Abnormal CBC 2/10/2020    Anemia due to multiple mechanisms 2/10/2020    Anemia, chronic disease 2/10/2020    Anemia, chronic renal failure, stage 4 (severe) 2/10/2020    Arthritis     Bell's palsy     Bladder incontinence     Blepharospasm     Cervical dystonia     Concussion     COPD (chronic obstructive pulmonary disease)     Fainting spell     2020    Hormone deficiency     Migraine     Muscle spasm     Myofacial pain syndrome     Normocytic normochromic anemia 2/10/2020       Past Surgical History:   Procedure Laterality Date    APPENDECTOMY      BREAST BIOPSY      BREAST LUMPECTOMY       EYE SURGERY      HYSTERECTOMY      NECK SURGERY      TONSILLECTOMY         Time Tracking:     PT Received On: 07/14/20  PT Start Time: 1450     PT Stop Time: 1535  PT Total Time (min): 45 min     Billable Minutes: Evaluation 10 and Gait Training 20 self care 15      Criselda Couch, PT  07/14/2020

## 2020-07-14 NOTE — TRANSFER OF CARE
"Anesthesia Transfer of Care Note    Patient: Marleen Samano    Procedure(s) Performed: Procedure(s) (LRB):  REVISION, ARTHROPLASTY, KNEE (Right)    Patient location: PACU    Anesthesia Type: spinal    Transport from OR: Transported from OR on 2-3 L/min O2 by NC with adequate spontaneous ventilation    Post pain: adequate analgesia    Post assessment: no apparent anesthetic complications    Post vital signs: stable    Level of consciousness: awake, alert and oriented    Nausea/Vomiting: no nausea/vomiting    Complications: none    Transfer of care protocol was followed      Last vitals:   Visit Vitals  /65   Pulse 70   Temp 36.7 °C (98.1 °F)   Resp 12   Ht 5' 9" (1.753 m)   Wt 73.9 kg (163 lb)   SpO2 98%   BMI 24.07 kg/m²     "

## 2020-07-14 NOTE — PLAN OF CARE
Pt is  AAOX4. POC reviewed with pt. Pt verbalized understanding. VSS. Remains afebrile. IVF infusing per order. IV abx infused per order. Pain controlled with PRN medications. Pt c/o muscle spasms, PRN medication obtained. Pt states it feels better. Remains free of injury. SCD/foot pump on. Immobilizer on. Nonskid socks on when out of bed. Bed low, breaks locked, call light in reach. Will monitor.

## 2020-07-14 NOTE — INTERVAL H&P NOTE
The patient has been examined and the H&P has been reviewed:    I concur with the findings and no changes have occurred since H&P was written.    Anesthesia/Surgery risks, benefits and alternative options discussed and understood by patient/family.          Active Hospital Problems    Diagnosis  POA    Aseptic loosening of prosthetic knee, initial encounter [T84.038A, Z96.064]  Not Applicable      Resolved Hospital Problems   No resolved problems to display.

## 2020-07-14 NOTE — ASSESSMENT & PLAN NOTE
Occurred during the cementing of the tibial component intraoperatively.  It lasted about seven minutes and converted to NSR on its own.  Will keep her on cardiac monitor.  Hopefully it won't recur.

## 2020-07-14 NOTE — OR NURSING
Spoke with Dr Barry and informed him of patient and her reported AFib. Patient to be placed on remote tele prior to transfer to her room

## 2020-07-14 NOTE — OP NOTE
[facility]  Orthopedic Surgery  Operative Note    SUMMARY     Date of Procedure: 7/14/2020     Procedure: Procedure(s) (LRB):  REVISION, ARTHROPLASTY, KNEE (Right)   Tibial component    Surgeon(s) and Role:     * Pedro Tompkins MD - Primary    Assistant: Franca Logan    Pre-Operative Diagnosis: Status post total right knee replacement [Z96.651]  Loosening of prosthesis of right total knee replacement, initial encounter [T84.032A]    Post-Operative Diagnosis: Post-Op Diagnosis Codes:     * Status post total right knee replacement [Z96.651]     * Loosening of prosthesis of right total knee replacement, initial encounter [T84.032A]    Anesthesia: Spinal    Complications: New onset afib after cementing tibia  MCL release during exposure    Estimated Blood Loss (EBL): *50ml           Implants:   Implant Name Type Inv. Item Serial No.  Lot No. LRB No. Used Action   BONE CHIP CANC 1.7-10MM 15ML - [id number]  BONE CHIP CANC 1.7-10MM 15ML [id number] MUSCULOSKELETAL TRANSPLANT FND  Right 1 Implanted   CEMENT BONE ANTIBIO SIMPLEX P - [id number]  CEMENT BONE ANTIBIO SIMPLEX P  [employer]. [id number] Right 1 Implanted   TRAY TIBIAL INTERLOCK 75MM - [id number]  TRAY TIBIAL INTERLOCK 75MM  [id number] Right 1 Implanted   JUGGERKNOT SOFT ANCHORS DOUBLE LOADED    BI[employer]ET [id number] Right 1 Implanted   BIOMET KNEE SYSTEM MODULAR FINNED STEM WITH SCREW    BI[employer]ET [id number] Right 1 Implanted   VANGUARD KNEE SYSTEM PS+ TIBIAL BEARING     BIOMET [id number] Right 1 Implanted       Tourniquet time: 65min at 300mmHg    Specimens:   Specimen (12h ago, onward)    None                  Condition: Good    Disposition: PACU - hemodynamically stable.    Attestation: I was present and scrubbed for the entire procedure.    INDICATIONS FOR THE PROCEDURE: A 74 with a history of right total knee done about 7 years ago.  Patient began having pain in her total knee implant.  Workup was negative for  infection and suspicious for possible tibial loosening.  Patient desired to proceed with the procedure listed above.    PROCEDURE IN DETAIL: Risks, benefits and alternatives of the procedure were   explained to the patient including, but not limited to damage to nerves,   arteries or blood vessels. Also explained risk of infection, stiffness, DVT, PE,   polyethylene wear as well as anesthetic complications including seizure, stroke,   heart attack and death, understood this and signed informed consent. The   patient's Right knee was marked prior to coming to the Operating Room. The patient was brought to the operating room, placed on the operating table in a supine position.A  formal timeout was done in which correct patient, procedure and op site were all   correctly identified and confirmed by the entire operating team.  2gm Ancef was given prior to surgical incision. Spinal anesthesia was induced. The patient'sRight  lower extremity was prepped and   draped in normal sterile fashion. TheRight  leg was exsanguinated with an   Esmarch. Tourniquet was inflated up 300 mmHg. Standard anterior approach to   the knee was made. Medial parapatellar arthrotomy was made, leaving about 1/8   inch of tissue for later repair. Proximal medial tibial release was performed,   And there was some release of the MCL off the tibia so that we could properly exposed the posterior medial tibia. We then   everted the patella and reflexed the knee. Fat pad and scar tissue was excised off the patellar tendon. cultures were taken both of aerobic and anaerobic variety.  We continued releasing some of the tissue underneath the patellar tendon between the proximal tibia and the patellar tendon.  A pin was placed in the tibial tubercle to prevent patellar tendon avulsion.  We were then able to expose pretty much the entire anterior proximal tibia.  We used a rongeur to resect some bone around the edge.  We then used a flexible osteotome to  loosen up our cement mantle all the way around the tibia.  We then used stacked osteotome technique to remove tibia.  We were able to remove the tibia in its entirety with a completely intact cement mantle.  There was no bonding of the cement to the tibia at all.  We then proceeded with cement removal.  We then removed a large amount of cement from the proximal tibia as well as within the keel punch.  There was a little of bony loss from removing the keel so we got some bone graft chips so that we could fill the defect.  We then did a skim cut just to make sure there tibia was flat.  We then trialed.  We used a 75 tibial trial again and we were able to trial up to a 14.  Some this gave us the best ability to extend without any hyperextension without being overly tight in flexion as well.  We also decided do a PS Plus just because of the medial laxity.  We then filled the defect with the 15 cubic centimeters of bone graft and impacted these down.    We then checked our tibial tray   with a drop vera again and then marked our rotation and pinned it into place then   drilled, and then punched for our 80 millimeter x 15 millimeter  keel. We then started preparing final   components on the back table.  Bony surfaces   copiously irrigated with Pulsavac and then thoroughly dried. Once the cement   was the appropriate consistency, we then placed cement onto the tibial component and then into the canal in on the proximal tibial surface.  We  removing excess cement. A 14 PS+ trial was placed. The knee   was held in compression. Cement was allowed to   cure. Once the cement was cured, we then removed excess cement. Again trialed   one final time, again seeing a 14PS+. We then used a locking bar to secure the final polyethylene into place. After this was done, we then did our   diluted iodine soak for 3 minutes and then proceeded with closing.  We then repaired our MCL prior to closing the arthrotomy.  I then drilled and placed  a juggerknot into the proximal medial tibia making sure that it was tight.  We then passed the sutures through the MCL and or able to repair it back to the proximal medial tibia tightening it and her medial structures without over constraining it. Arthrotomy was closed using our StrataFix.   Subcutaneous tissue was closed using 2-0 Vicryl and skin was using a running 3-0   StrataFix and Dermabond.Instrument, sponge, and needle counts were correct prior to wound closure and at the conclusion of the case.  Sterile dressing was applied including a Cryo/Cuff.   They were extubated, awakened and transferred from the Operating Room to the   Recovery Room in stable condition.

## 2020-07-14 NOTE — H&P
[facility] Medicine  History & Physical    Patient Name: Marleen Samano  MRN: [id number]  Admission Date: 7/14/2020  Attending Physician: Jose Carlos Barry MD   Primary Care Provider: Nitin Jackson DO         Patient information was obtained from patient, past medical records and anesthesia record.       Subjective:     Principal Problem:Aseptic loosening of prosthetic knee, initial encounter    Chief Complaint: No chief complaint on file.       HPI: Patient is being admitted to the hospital for monitoring after undergoing revision of R TKA.  She had an episode of atrial fibrillation intraoperatively, which occurred after the tibial component was hammered in.  It converted to NSR after roughly 7 minutes.  No mention of afib in her medical history.  Medical history is significant for HTN and COPD.  She has been in her usual state of health lately.    Past Medical History:   Diagnosis Date    Abnormal CBC 2/10/2020    Anemia due to multiple mechanisms 2/10/2020    Anemia, chronic disease 2/10/2020    Anemia, chronic renal failure, stage 4 (severe) 2/10/2020    Arthritis     Bell's palsy     Bladder incontinence     Blepharospasm     Cervical dystonia     Concussion     COPD (chronic obstructive pulmonary disease)     Fainting spell     2/7/2020    Hormone deficiency     Migraine     Muscle spasm     Myofacial pain syndrome     Normocytic normochromic anemia 2/10/2020       Past Surgical History:   Procedure Laterality Date    APPENDECTOMY      BREAST BIOPSY      BREAST LUMPECTOMY      EYE SURGERY      HYSTERECTOMY      NECK SURGERY      TONSILLECTOMY         Review of patient's allergies indicates:   Allergen Reactions    Artane Other (See Comments)     Swelling    Ned-1 Swelling     Oragel caused tongue to swell    Adhesive tape-silicones Rash     Blisters after on for several hours    Latex, natural rubber Hives and Rash     Localized    Latex      Morphine Itching and Hives       No current facility-administered medications on file prior to encounter.      Current Outpatient Medications on File Prior to Encounter   Medication Sig    amLODIPine (NORVASC) 2.5 MG tablet every evening.     baclofen (LIORESAL) 20 MG tablet Take 20 mg by mouth 2 (two) times daily.    benazepril (LOTENSIN) 40 MG tablet Take 40 mg by mouth once daily.    diclofenac sodium (VOLTAREN) 1 % Gel Apply topically.     gabapentin (NEURONTIN) 600 MG tablet TAKE 1 TABLET BY MOUTH IN THE MORNING AT NOON AND THEN 2 TABLETS IN THE EVENING    levalbuterol (XOPENEX HFA) 45 mcg/actuation inhaler Inhale 2 puffs into the lungs every 6 (six) hours as needed for Wheezing. Rescue    magnesium oxide (MAG-OX) 400 mg tablet Take 1 tablet (400 mg total) by mouth 2 (two) times daily.    omeprazole (PRILOSEC) 40 MG capsule Take 40 mg by mouth 2 (two) times daily before meals.    trazodone (DESYREL) 100 MG tablet Take 100 mg by mouth every evening.      venlafaxine (EFFEXOR-XR) 150 MG Cp24 Take 1 capsule (150 mg total) by mouth once daily.     Family History     None        Tobacco Use    Smoking status: Never Smoker    Smokeless tobacco: Never Used   Substance and Sexual Activity    Alcohol use: No    Drug use: No    Sexual activity: Not on file     Review of Systems   Constitutional: Negative for chills and fever.   Respiratory: Positive for shortness of breath (chronic; with exertion). Negative for cough.    Cardiovascular: Negative for chest pain and leg swelling.   Gastrointestinal: Negative for abdominal pain, nausea and vomiting.   Genitourinary: Negative for difficulty urinating and dysuria.   Musculoskeletal: Positive for arthralgias (chronic; knees). Negative for myalgias.   Neurological: Negative for dizziness and headaches.     Objective:     Vital Signs (Most Recent):  Temp: 98 °F (36.7 °C) (07/14/20 1216)  Pulse: 72 (07/14/20 1407)  Resp: 18 (07/14/20 1429)  BP: 118/86 (07/14/20  1216)  SpO2: 95 % (07/14/20 1407) Vital Signs (24h Range):  Temp:  [97.9 °F (36.6 °C)-98.1 °F (36.7 °C)] 98 °F (36.7 °C)  Pulse:  [69-98] 72  Resp:  [8-23] 18  SpO2:  [95 %-100 %] 95 %  BP: (116-140)/(60-86) 118/86     Weight: 73.9 kg (163 lb)  Body mass index is 24.07 kg/m².    Physical Exam  Vitals signs and nursing note reviewed.   Constitutional:       General: She is not in acute distress.     Appearance: Normal appearance. She is normal weight. She is not ill-appearing or toxic-appearing.   HENT:      Head: Normocephalic and atraumatic.   Eyes:      General: No scleral icterus.        Right eye: No discharge.         Left eye: No discharge.   Neck:      Musculoskeletal: Normal range of motion.   Pulmonary:      Effort: Pulmonary effort is normal.   Abdominal:      General: Abdomen is flat. There is no distension.   Musculoskeletal:      Right lower leg: No edema.      Left lower leg: No edema.   Neurological:      Mental Status: Mental status is at baseline.   Psychiatric:         Mood and Affect: Mood normal.         Behavior: Behavior normal.             Significant Labs: None    Significant Imaging: none    Assessment/Plan:     * Aseptic loosening of prosthetic knee, initial encounter  S/p revision of R TKA today.    Paroxysmal atrial fibrillation  Occurred during the cementing of the tibial component intraoperatively.  It lasted about seven minutes and converted to NSR on its own.  Will keep her on cardiac monitor.  Hopefully it won't recur.      Chronic obstructive lung disease  Stable at present.    COPD Medications at home            levalbuterol (XOPENEX HFA) 45 mcg/actuation inhaler Inhale 2 puffs into the lungs every 6 (six) hours as needed for Wheezing. Rescue      Hospital Medications             levalbuterol nebulizer solution 1.25 mg 1.25 mg, Nebulization, Every 8 hours PRN          Essential hypertension  Chronic, controlled.  Latest blood pressure and vitals reviewed-   Temp:  [97.9 °F (36.6  °C)-98.1 °F (36.7 °C)]   Pulse:  [69-98]   Resp:  [8-23]   BP: (116-140)/(60-86)   SpO2:  [95 %-100 %] .   Home meds for hypertension were reviewed and noted below. Hospital anti-hypertensive changes were made as shown below.  Hypertension Medications at home            amLODIPine (NORVASC) 2.5 MG tablet every evening.     benazepril (LOTENSIN) 40 MG tablet Take 40 mg by mouth once daily.      Hospital Medications             amLODIPine tablet 2.5 mg 2.5 mg, Oral, Nightly    benazepriL tablet 40 mg Starting on 7/15/2020. 40 mg, Oral, Daily        Will utilize p.r.n. blood pressure medication only if patient's blood pressure greater than  180/110 and she develops symptoms such as worsening chest pain or shortness of breath.        VTE Risk Mitigation (From admission, onward)         Ordered     IP VTE LOW RISK PATIENT  Once      07/14/20 1219     Place sequential compression device  Until discontinued      07/14/20 1219               VIRTUAL TELENOTE    Patient physical exam and history were performed via - 2 way video televisit.  Physical exam findings were other evidence primarily by visualization through 2 way video or by conversation with the patient's bedside nurse.  I was present throughout the entire tele visit.    The attending portion of this evaluation, treatment, and documentation was performed per Jose Carlos Barry MD via audiovisual The provider was located  in separate facility from the patient. Video software was utilized to document the relevant history and physical exam.         Jose Carlos Barry MD  Department of Hospital Medicine   [facility]

## 2020-07-14 NOTE — SUBJECTIVE & OBJECTIVE
Past Medical History:   Diagnosis Date    Abnormal CBC 2/10/2020    Anemia due to multiple mechanisms 2/10/2020    Anemia, chronic disease 2/10/2020    Anemia, chronic renal failure, stage 4 (severe) 2/10/2020    Arthritis     Bell's palsy     Bladder incontinence     Blepharospasm     Cervical dystonia     Concussion     COPD (chronic obstructive pulmonary disease)     Fainting spell     2/7/2020    Hormone deficiency     Migraine     Muscle spasm     Myofacial pain syndrome     Normocytic normochromic anemia 2/10/2020       Past Surgical History:   Procedure Laterality Date    APPENDECTOMY      BREAST BIOPSY      BREAST LUMPECTOMY      EYE SURGERY      HYSTERECTOMY      NECK SURGERY      TONSILLECTOMY         Review of patient's allergies indicates:   Allergen Reactions    Artane Other (See Comments)     Swelling    Ned-1 Swelling     Oragel caused tongue to swell    Adhesive tape-silicones Rash     Blisters after on for several hours    Latex, natural rubber Hives and Rash     Localized    Latex     Morphine Itching and Hives       No current facility-administered medications on file prior to encounter.      Current Outpatient Medications on File Prior to Encounter   Medication Sig    amLODIPine (NORVASC) 2.5 MG tablet every evening.     baclofen (LIORESAL) 20 MG tablet Take 20 mg by mouth 2 (two) times daily.    benazepril (LOTENSIN) 40 MG tablet Take 40 mg by mouth once daily.    diclofenac sodium (VOLTAREN) 1 % Gel Apply topically.     gabapentin (NEURONTIN) 600 MG tablet TAKE 1 TABLET BY MOUTH IN THE MORNING AT NOON AND THEN 2 TABLETS IN THE EVENING    levalbuterol (XOPENEX HFA) 45 mcg/actuation inhaler Inhale 2 puffs into the lungs every 6 (six) hours as needed for Wheezing. Rescue    magnesium oxide (MAG-OX) 400 mg tablet Take 1 tablet (400 mg total) by mouth 2 (two) times daily.    omeprazole (PRILOSEC) 40 MG capsule Take 40 mg by mouth 2 (two) times daily before  meals.    trazodone (DESYREL) 100 MG tablet Take 100 mg by mouth every evening.      venlafaxine (EFFEXOR-XR) 150 MG Cp24 Take 1 capsule (150 mg total) by mouth once daily.     Family History     None        Tobacco Use    Smoking status: Never Smoker    Smokeless tobacco: Never Used   Substance and Sexual Activity    Alcohol use: No    Drug use: No    Sexual activity: Not on file     Review of Systems   Constitutional: Negative for chills and fever.   Respiratory: Positive for shortness of breath (chronic; with exertion). Negative for cough.    Cardiovascular: Negative for chest pain and leg swelling.   Gastrointestinal: Negative for abdominal pain, nausea and vomiting.   Genitourinary: Negative for difficulty urinating and dysuria.   Musculoskeletal: Positive for arthralgias (chronic; knees). Negative for myalgias.   Neurological: Negative for dizziness and headaches.     Objective:     Vital Signs (Most Recent):  Temp: 98 °F (36.7 °C) (07/14/20 1216)  Pulse: 72 (07/14/20 1407)  Resp: 18 (07/14/20 1429)  BP: 118/86 (07/14/20 1216)  SpO2: 95 % (07/14/20 1407) Vital Signs (24h Range):  Temp:  [97.9 °F (36.6 °C)-98.1 °F (36.7 °C)] 98 °F (36.7 °C)  Pulse:  [69-98] 72  Resp:  [8-23] 18  SpO2:  [95 %-100 %] 95 %  BP: (116-140)/(60-86) 118/86     Weight: 73.9 kg (163 lb)  Body mass index is 24.07 kg/m².    Physical Exam  Vitals signs and nursing note reviewed.   Constitutional:       General: She is not in acute distress.     Appearance: Normal appearance. She is normal weight. She is not ill-appearing or toxic-appearing.   HENT:      Head: Normocephalic and atraumatic.   Eyes:      General: No scleral icterus.        Right eye: No discharge.         Left eye: No discharge.   Neck:      Musculoskeletal: Normal range of motion.   Pulmonary:      Effort: Pulmonary effort is normal.   Abdominal:      General: Abdomen is flat. There is no distension.   Musculoskeletal:      Right lower leg: No edema.      Left lower  leg: No edema.   Neurological:      Mental Status: Mental status is at baseline.   Psychiatric:         Mood and Affect: Mood normal.         Behavior: Behavior normal.             Significant Labs: None    Significant Imaging: none

## 2020-07-14 NOTE — ANESTHESIA POSTPROCEDURE EVALUATION
Anesthesia Post Evaluation    Patient: Marleen Samano    Procedure(s) Performed: Procedure(s) (LRB):  REVISION, ARTHROPLASTY, KNEE (Right)    Final Anesthesia Type: spinal    Patient location during evaluation: PACU  Patient participation: Yes- Able to Participate  Level of consciousness: awake and alert and oriented  Post-procedure vital signs: reviewed and stable  Pain management: adequate  Airway patency: patent    PONV status at discharge: No PONV  Anesthetic complications: no      Cardiovascular status: blood pressure returned to baseline  Respiratory status: unassisted, spontaneous ventilation and room air  Hydration status: euvolemic  Follow-up not needed.          Vitals Value Taken Time   /59 07/14/20 1118   Temp 36.6 °C (97.9 °F) 07/14/20 1055   Pulse 94 07/14/20 1120   Resp 10 07/14/20 1120   SpO2 98 % 07/14/20 1120   Vitals shown include unvalidated device data.      No case tracking events are documented in the log.      Pain/Percy Score: Pain Rating Prior to Med Admin: 0 (7/14/2020 11:00 AM)  Percy Score: 8 (7/14/2020 11:00 AM)

## 2020-07-14 NOTE — ANESTHESIA PROCEDURE NOTES
Peripheral Block    Patient location during procedure: holding area   Block not for primary anesthetic.  Reason for block: at surgeon's request and post-op pain management   Post-op Pain Location: Loosening of prosthesis of right total knee replacement, initial encounter (T84.032A), REVISION RIGHT KNEE, TKA  Start time: 7/14/2020 8:15 AM  Timeout: 7/14/2020 8:15 AM   End time: 7/14/2020 8:20 AM    Staffing  Authorizing Provider: Prakash Delong MD  Performing Provider: Prakash Delong MD    Preanesthetic Checklist  Completed: patient identified, site marked, surgical consent, pre-op evaluation, timeout performed, IV checked, risks and benefits discussed and monitors and equipment checked  Peripheral Block  Patient position: supine  Prep: ChloraPrep  Patient monitoring: cardiac monitor, heart rate, continuous pulse ox, continuous capnometry and frequent blood pressure checks  Block type: adductor canal  Laterality: right  Injection technique: single shot  Needle  Needle type: Stimuplex   Needle length: 4 in  Needle localization: ultrasound guidance   -ultrasound image captured on disc.  Assessment  Injection assessment: negative parasthesia, local visualized surrounding nerve and negative aspiration  Paresthesia pain: none  Heart rate change: no  Slow fractionated injection: yes  Additional Notes  EXPAREL 20 ML MARCAINE 0.5% 10 ML

## 2020-07-14 NOTE — RESPIRATORY THERAPY
07/14/20 1407   Patient Assessment/Suction   Level of Consciousness (AVPU) alert   Respiratory Effort Normal;Unlabored   Expansion/Accessory Muscles/Retractions no use of accessory muscles;no retractions;expansion symmetric   All Lung Fields Breath Sounds clear;diminished;equal bilaterally   Rhythm/Pattern, Respiratory unlabored;pattern regular;depth regular   Cough Frequency infrequent   Cough Type nonproductive;good   PRE-TX-O2   O2 Device (Oxygen Therapy) room air   SpO2 95 %   Pulse Oximetry Type Intermittent   $ Pulse Oximetry - Multiple Charge Pulse Oximetry - Multiple   Pulse 72   Resp 18   Positioning HOB elevated 90 degrees   Incentive Spirometer   $ Incentive Spirometer Charges postop instruction;breath hold utilized   Incentive Spirometer Predicted Level (mL) 2550   Administration (IS) instruction provided, initial;mouthpiece   Number of Repetitions (IS) 10   Level Incentive Spirometer (mL) 1500   Patient Tolerance (IS) good

## 2020-07-14 NOTE — ASSESSMENT & PLAN NOTE
Chronic, controlled.  Latest blood pressure and vitals reviewed-   Temp:  [97.9 °F (36.6 °C)-98.1 °F (36.7 °C)]   Pulse:  [69-98]   Resp:  [8-23]   BP: (116-140)/(60-86)   SpO2:  [95 %-100 %] .   Home meds for hypertension were reviewed and noted below. Hospital anti-hypertensive changes were made as shown below.  Hypertension Medications at home            amLODIPine (NORVASC) 2.5 MG tablet every evening.     benazepril (LOTENSIN) 40 MG tablet Take 40 mg by mouth once daily.      Hospital Medications             amLODIPine tablet 2.5 mg 2.5 mg, Oral, Nightly    benazepriL tablet 40 mg Starting on 7/15/2020. 40 mg, Oral, Daily        Will utilize p.r.n. blood pressure medication only if patient's blood pressure greater than  180/110 and she develops symptoms such as worsening chest pain or shortness of breath.

## 2020-07-14 NOTE — ANESTHESIA PROCEDURE NOTES
Spinal    Diagnosis: Loosening of prosthesis of right total knee replacement, initial encounter (T84.032A), REVISION OF TOTAL KNEE  Patient location during procedure: OR  Start time: 7/14/2020 8:35 AM  Timeout: 7/14/2020 8:35 AM  End time: 7/14/2020 8:40 AM    Staffing  Authorizing Provider: Prakash Delong MD  Performing Provider: Prakash Delong MD    Preanesthetic Checklist  Completed: patient identified, site marked, surgical consent, pre-op evaluation, timeout performed, IV checked, risks and benefits discussed and monitors and equipment checked  Spinal Block  Patient position: sitting  Prep: ChloraPrep  Patient monitoring: heart rate, cardiac monitor, continuous pulse ox, continuous capnometry and frequent blood pressure checks  Approach: midline  Location: L2-3  Injection technique: single shot  CSF Fluid: clear free-flowing CSF  Needle  Needle type: pencil-tip   Needle gauge: 25 G  Needle length: 4 in  Additional Documentation: negative aspiration for heme, incremental injection and no paresthesia on injection  Needle localization: anatomical landmarks  Assessment  Sensory level: T10   Dermatomal levels determined by alcohol wipe  Ease of block: easy  Patient's tolerance of the procedure: comfortable throughout block and no complaints  Additional Notes  marcaine 0.75% 2 ml plain hyperbaric

## 2020-07-14 NOTE — HPI
Patient is being admitted to the hospital for monitoring after undergoing revision of R TKA.  She had an episode of atrial fibrillation intraoperatively, which occurred after the tibial component was hammered in.  It converted to NSR after roughly 7 minutes.  No mention of afib in her medical history.  Medical history is significant for HTN and COPD.  She has been in her usual state of health lately.

## 2020-07-14 NOTE — ANESTHESIA PREPROCEDURE EVALUATION
07/14/2020  Marleen Samano is a 74 y.o., female.    Anesthesia Evaluation    I have reviewed the Patient Summary Reports.    I have reviewed the Nursing Notes. I have reviewed the NPO Status.   I have reviewed the Medications.     Review of Systems  Anesthesia Hx:  No problems with previous Anesthesia Denies Hx of Anesthetic complications    Social:  Non-Smoker    Cardiovascular:   Hypertension Denies MI.  Denies CAD.    Denies CABG/stent.   Denies Angina.    Pulmonary:   COPD Denies Asthma.  Denies Recent URI.    Renal/:   Chronic Renal Disease    Hepatic/GI:   Denies GERD. Denies Liver Disease.    Musculoskeletal:   Arthritis     Neurological:   Denies TIA. Denies CVA. Neuromuscular Disease,  Headaches Denies Seizures.    Endocrine:   Denies Diabetes. Denies Hypothyroidism.    Psych:   Psychiatric History          Physical Exam  General:  Obesity    Airway/Jaw/Neck:  Airway Findings: Mouth Opening: Normal Tongue: Normal  General Airway Assessment: Adult, Good  Mallampati: II  Improves to II with phonation.  TM Distance: 4-6 cm      Dental:  Dental Findings: In tact   Chest/Lungs:  Chest/Lungs Findings: Clear to auscultation, Normal Respiratory Rate     Heart/Vascular:  Heart Findings: Rate: Normal  Rhythm: Regular Rhythm  Sounds: Normal  Heart murmur: negative       Mental Status:  Mental Status Findings:  Cooperative, Alert and Oriented         Anesthesia Plan  Type of Anesthesia, risks & benefits discussed:  Anesthesia Type:  general  Patient's Preference:   Intra-op Monitoring Plan: standard ASA monitors  Intra-op Monitoring Plan Comments:   Post Op Pain Control Plan:   Post Op Pain Control Plan Comments:   Induction:   IV  Beta Blocker:  Patient is not currently on a Beta-Blocker (No further documentation required).       Informed Consent: Patient understands risks and agrees with Anesthesia plan.   Questions answered. Anesthesia consent signed with patient.  ASA Score: 2     Day of Surgery Review of History & Physical: I have interviewed and examined the patient. I have reviewed the patient's H&P dated:  There are no significant changes.  H&P update referred to the surgeon.  H&P completed by Anesthesiologist.       Ready For Surgery From Anesthesia Perspective.

## 2020-07-15 LAB
ANION GAP SERPL CALC-SCNC: 6 MMOL/L (ref 8–16)
BASOPHILS # BLD AUTO: 0.01 K/UL (ref 0–0.2)
BASOPHILS NFR BLD: 0.1 % (ref 0–1.9)
BUN SERPL-MCNC: 14 MG/DL (ref 8–23)
CALCIUM SERPL-MCNC: 8.2 MG/DL (ref 8.7–10.5)
CHLORIDE SERPL-SCNC: 104 MMOL/L (ref 95–110)
CO2 SERPL-SCNC: 28 MMOL/L (ref 23–29)
CREAT SERPL-MCNC: 0.8 MG/DL (ref 0.5–1.4)
DIFFERENTIAL METHOD: ABNORMAL
EOSINOPHIL # BLD AUTO: 0 K/UL (ref 0–0.5)
EOSINOPHIL NFR BLD: 0.4 % (ref 0–8)
ERYTHROCYTE [DISTWIDTH] IN BLOOD BY AUTOMATED COUNT: 17.2 % (ref 11.5–14.5)
EST. GFR  (AFRICAN AMERICAN): >60 ML/MIN/1.73 M^2
EST. GFR  (NON AFRICAN AMERICAN): >60 ML/MIN/1.73 M^2
GLUCOSE SERPL-MCNC: 132 MG/DL (ref 70–110)
HCT VFR BLD AUTO: 38.9 % (ref 37–48.5)
HGB BLD-MCNC: 11.9 G/DL (ref 12–16)
IMM GRANULOCYTES # BLD AUTO: 0.02 K/UL (ref 0–0.04)
IMM GRANULOCYTES NFR BLD AUTO: 0.3 % (ref 0–0.5)
LYMPHOCYTES # BLD AUTO: 0.7 K/UL (ref 1–4.8)
LYMPHOCYTES NFR BLD: 9.9 % (ref 18–48)
MCH RBC QN AUTO: 29.5 PG (ref 27–31)
MCHC RBC AUTO-ENTMCNC: 30.6 G/DL (ref 32–36)
MCV RBC AUTO: 97 FL (ref 82–98)
MONOCYTES # BLD AUTO: 0.9 K/UL (ref 0.3–1)
MONOCYTES NFR BLD: 12.6 % (ref 4–15)
NEUTROPHILS # BLD AUTO: 5.4 K/UL (ref 1.8–7.7)
NEUTROPHILS NFR BLD: 76.7 % (ref 38–73)
NRBC BLD-RTO: 0 /100 WBC
PLATELET # BLD AUTO: 152 K/UL (ref 150–350)
PMV BLD AUTO: 9.9 FL (ref 9.2–12.9)
POTASSIUM SERPL-SCNC: 4.4 MMOL/L (ref 3.5–5.1)
RBC # BLD AUTO: 4.03 M/UL (ref 4–5.4)
SODIUM SERPL-SCNC: 138 MMOL/L (ref 136–145)
WBC # BLD AUTO: 7.09 K/UL (ref 3.9–12.7)

## 2020-07-15 PROCEDURE — 94761 N-INVAS EAR/PLS OXIMETRY MLT: CPT

## 2020-07-15 PROCEDURE — 85025 COMPLETE CBC W/AUTO DIFF WBC: CPT

## 2020-07-15 PROCEDURE — 27000221 HC OXYGEN, UP TO 24 HOURS

## 2020-07-15 PROCEDURE — 12000002 HC ACUTE/MED SURGE SEMI-PRIVATE ROOM

## 2020-07-15 PROCEDURE — 99900035 HC TECH TIME PER 15 MIN (STAT)

## 2020-07-15 PROCEDURE — 97530 THERAPEUTIC ACTIVITIES: CPT

## 2020-07-15 PROCEDURE — 63600175 PHARM REV CODE 636 W HCPCS: Performed by: ORTHOPAEDIC SURGERY

## 2020-07-15 PROCEDURE — 97110 THERAPEUTIC EXERCISES: CPT

## 2020-07-15 PROCEDURE — 97166 OT EVAL MOD COMPLEX 45 MIN: CPT

## 2020-07-15 PROCEDURE — 25000003 PHARM REV CODE 250: Performed by: ANESTHESIOLOGY

## 2020-07-15 PROCEDURE — 94799 UNLISTED PULMONARY SVC/PX: CPT

## 2020-07-15 PROCEDURE — 97116 GAIT TRAINING THERAPY: CPT

## 2020-07-15 PROCEDURE — 36415 COLL VENOUS BLD VENIPUNCTURE: CPT

## 2020-07-15 PROCEDURE — 25000003 PHARM REV CODE 250: Performed by: HOSPITALIST

## 2020-07-15 PROCEDURE — 80048 BASIC METABOLIC PNL TOTAL CA: CPT

## 2020-07-15 PROCEDURE — 25000003 PHARM REV CODE 250: Performed by: ORTHOPAEDIC SURGERY

## 2020-07-15 PROCEDURE — 97535 SELF CARE MNGMENT TRAINING: CPT

## 2020-07-15 RX ADMIN — BENAZEPRIL HYDROCHLORIDE 40 MG: 10 TABLET, COATED ORAL at 08:07

## 2020-07-15 RX ADMIN — TRAZODONE HYDROCHLORIDE 100 MG: 50 TABLET ORAL at 09:07

## 2020-07-15 RX ADMIN — DOCUSATE SODIUM 100 MG: 100 CAPSULE, LIQUID FILLED ORAL at 08:07

## 2020-07-15 RX ADMIN — PANTOPRAZOLE SODIUM 40 MG: 40 TABLET, DELAYED RELEASE ORAL at 08:07

## 2020-07-15 RX ADMIN — MUPIROCIN 1 G: 20 OINTMENT TOPICAL at 08:07

## 2020-07-15 RX ADMIN — CELECOXIB 100 MG: 100 CAPSULE ORAL at 08:07

## 2020-07-15 RX ADMIN — DOCUSATE SODIUM 100 MG: 100 CAPSULE, LIQUID FILLED ORAL at 09:07

## 2020-07-15 RX ADMIN — OXYCODONE HYDROCHLORIDE 10 MG: 10 TABLET, FILM COATED, EXTENDED RELEASE ORAL at 08:07

## 2020-07-15 RX ADMIN — BACLOFEN 20 MG: 10 TABLET ORAL at 08:07

## 2020-07-15 RX ADMIN — BACLOFEN 20 MG: 10 TABLET ORAL at 09:07

## 2020-07-15 RX ADMIN — VENLAFAXINE HYDROCHLORIDE 150 MG: 150 CAPSULE, EXTENDED RELEASE ORAL at 08:07

## 2020-07-15 RX ADMIN — CEFAZOLIN SODIUM 2 G: 2 SOLUTION INTRAVENOUS at 12:07

## 2020-07-15 RX ADMIN — GABAPENTIN 1200 MG: 300 CAPSULE ORAL at 09:07

## 2020-07-15 RX ADMIN — TIZANIDINE 2 MG: 2 TABLET ORAL at 04:07

## 2020-07-15 RX ADMIN — OXYCODONE HYDROCHLORIDE 10 MG: 10 TABLET, FILM COATED, EXTENDED RELEASE ORAL at 09:07

## 2020-07-15 RX ADMIN — MUPIROCIN 1 G: 20 OINTMENT TOPICAL at 09:07

## 2020-07-15 RX ADMIN — SODIUM CHLORIDE: 0.9 INJECTION, SOLUTION INTRAVENOUS at 09:07

## 2020-07-15 RX ADMIN — CELECOXIB 100 MG: 100 CAPSULE ORAL at 09:07

## 2020-07-15 RX ADMIN — AMLODIPINE BESYLATE 2.5 MG: 2.5 TABLET ORAL at 09:07

## 2020-07-15 RX ADMIN — GABAPENTIN 600 MG: 300 CAPSULE ORAL at 08:07

## 2020-07-15 NOTE — RESPIRATORY THERAPY
07/15/20 0750   PRE-TX-O2   O2 Device (Oxygen Therapy) nasal cannula   $ Is the patient on Low Flow Oxygen? Yes   Flow (L/min) 2   Oxygen Analyzed Concentration (%) 98 %   Pulse Oximetry Type Intermittent   $ Pulse Oximetry - Multiple Charge Pulse Oximetry - Multiple   Incentive Spirometer   $ Incentive Spirometer Charges done with encouragement   Administration (IS) proper technique demonstrated   Number of Repetitions (IS) 10   Level Incentive Spirometer (mL) 1000   Patient Tolerance (IS) fair;good

## 2020-07-15 NOTE — PT/OT/SLP PROGRESS
Physical Therapy Treatment    Patient Name:  Marleen Samano   MRN:  [id number]    Recommendations:     Discharge Recommendations:  (SNF vs HHPT)   Discharge Equipment Recommendations: walker, rolling(she reports her RW is from 2014 and rusted)   Barriers to discharge: patient still requires assist with all mobility so may need admission to facility for rehab prior to DC home    Assessment:     Marleen Samano is a 74 y.o. female admitted with a medical diagnosis of Aseptic loosening of prosthetic knee, initial encounter.  She presents with the following impairments/functional limitations:  weakness, impaired endurance, impaired functional mobilty, gait instability, impaired balance, decreased lower extremity function, pain, decreased ROM, edema, impaired skin .  Patient agreeable to PT but did report max pain in right knee graded 9/10 before treatment. Patient presented supine in bed and required mod assist to transfer supine to sit and mod assist to stand. Patient then ambulated x 8 feet with RW with min assist with right LE brace in place.    Rehab Prognosis: Good; patient would benefit from acute skilled PT services to address these deficits and reach maximum level of function.    Recent Surgery: Procedure(s) (LRB):  REVISION, ARTHROPLASTY, KNEE (Right) 1 Day Post-Op    Plan:     During this hospitalization, patient to be seen BID to address the identified rehab impairments via gait training, therapeutic activities, therapeutic exercises and progress toward the following goals:    · Plan of Care Expires:  08/14/20    Subjective     Chief Complaint: pain  Patient/Family Comments/goals: stop hurting  Pain/Comfort:  · Pain Rating 1: 9/10  · Location - Side 1: Right  · Location - Orientation 1: lower  · Location 1: knee  · Pain Addressed 1: Reposition, Cessation of Activity  · Pain Rating Post-Intervention 1: 8/10      Objective:     Communicated with nurse prior to session.  Patient found supine with bed alarm,  cryotherapy, oxygen, PureWick, peripheral IV, SCD upon PT entry to room.     General Precautions: Standard, fall   Orthopedic Precautions:RLE weight bearing as tolerated   Braces: Hinged knee brace     Functional Mobility:  · Bed Mobility:     · Supine to Sit: moderate assistance  · Sit to Supine: moderate assistance  · Transfers:     · Sit to Stand:  moderate assistance with rolling walker  · Gait: x 8 feet with RW with min assist and with right knee brace in place      AM-PAC 6 CLICK MOBILITY          Therapeutic Activities and Exercises:   exercise to include SLR, quad sets, ankle pumps, and hip abd.  All done bilateral LE 2 x 10 reps as AAROM and with a 3 second hold on isometrics.  Gait training x 8 feet with RW with min assist and max C/O pain in right knee    Patient left supine with call button in reach, bed alarm on and nurse notified..    GOALS:   Multidisciplinary Problems     Physical Therapy Goals        Problem: Physical Therapy Goal    Goal Priority Disciplines Outcome Goal Variances Interventions   Physical Therapy Goal     PT, PT/OT      Description: Goals to be met by: 2020     Patient will increase functional independence with mobility by performin. Supine to sit with Supervision  2. Sit to stand transfer with Supervision  3. Bed to chair transfer with Supervision using Rolling Walker  4. Gait  x 250 feet with Supervision using Rolling Walker.                      Time Tracking:     PT Received On: 07/15/20  PT Start Time: 803     PT Stop Time: 831  PT Total Time (min): 28 min     Billable Minutes: Gait Training 14 and Therapeutic Exercise 14    Treatment Type: Treatment  PT/PTA: PT     PTA Visit Number: 0     Chris Megilligan, PT  07/15/2020

## 2020-07-15 NOTE — SUBJECTIVE & OBJECTIVE
Interval History:  Having lot of pain in operative limb.  No arrhythmia.    Review of Systems   Constitutional: Negative for chills and fever.   Respiratory: Negative for cough and shortness of breath.    Cardiovascular: Negative for chest pain and leg swelling.   Gastrointestinal: Negative for abdominal pain, nausea and vomiting.     Objective:     Vital Signs (Most Recent):  Temp: 97.6 °F (36.4 °C) (07/15/20 1623)  Pulse: 78 (07/15/20 1623)  Resp: 18 (07/15/20 1623)  BP: (!) 121/51 (07/15/20 1623)  SpO2: 95 % (07/15/20 1623) Vital Signs (24h Range):  Temp:  [97.6 °F (36.4 °C)-99.6 °F (37.6 °C)] 97.6 °F (36.4 °C)  Pulse:  [69-78] 78  Resp:  [14-20] 18  SpO2:  [92 %-96 %] 95 %  BP: (120-130)/(51-79) 121/51     Weight: 74 kg (163 lb 3.3 oz)  Body mass index is 24.1 kg/m².  No intake or output data in the 24 hours ending 07/15/20 1815   Physical Exam  Vitals signs and nursing note reviewed.   Constitutional:       General: She is not in acute distress.     Appearance: Normal appearance. She is normal weight. She is not ill-appearing or toxic-appearing.   HENT:      Head: Normocephalic and atraumatic.   Eyes:      General: No scleral icterus.        Right eye: No discharge.         Left eye: No discharge.   Neck:      Musculoskeletal: Normal range of motion.   Pulmonary:      Effort: Pulmonary effort is normal.   Abdominal:      General: Abdomen is flat. There is no distension.   Musculoskeletal:      Right lower leg: No edema.      Left lower leg: No edema.   Neurological:      Mental Status: Mental status is at baseline.   Psychiatric:         Mood and Affect: Mood normal.         Behavior: Behavior normal.         Significant Labs: None    Significant Imaging: none

## 2020-07-15 NOTE — PLAN OF CARE
Patient AAO X 2. With intermittent confusion. Patient free from injury during shift. Pain managed pharmacologically. Provided full relief. Patient free from N/V during shift. IV access has IVF running. Patient placed on cardiac monitoring. NSR.  Remained afebrile during shift. Pt on O2 @ 3 liters via nasal canula. tolerating well. Brief in place for incontinence. Restroom offered. Repositioned as needed. Safety maintained with bed alarm. Bed in lowest position, call light and personal items within reach. Patient verbalized understanding of care. Will continue to monitor with every 2 hour rounding.

## 2020-07-15 NOTE — ASSESSMENT & PLAN NOTE
Chronic, controlled.  Latest blood pressure and vitals reviewed-   Temp:  [97.6 °F (36.4 °C)-99.6 °F (37.6 °C)]   Pulse:  [69-78]   Resp:  [14-20]   BP: (120-130)/(51-79)   SpO2:  [92 %-96 %] .   Home meds for hypertension were reviewed and noted below. Hospital anti-hypertensive changes were made as shown below.  Hypertension Medications at home            amLODIPine (NORVASC) 2.5 MG tablet every evening.     benazepril (LOTENSIN) 40 MG tablet Take 40 mg by mouth once daily.      Hospital Medications             amLODIPine tablet 2.5 mg 2.5 mg, Oral, Nightly    benazepriL tablet 40 mg Starting on 7/15/2020. 40 mg, Oral, Daily        Will utilize p.r.n. blood pressure medication only if patient's blood pressure greater than  180/110 and she develops symptoms such as worsening chest pain or shortness of breath.

## 2020-07-15 NOTE — PLAN OF CARE
Problem: Occupational Therapy Goal  Goal: Occupational Therapy Goal  Description: Goals to be met by: 7/31/2020     Patient will increase functional independence with ADLs by performing:    LE Dressing with Moderate Assistance with AD/AE.  Toileting from bedside commode with Minimal Assistance for hygiene and clothing management.   Toilet transfer to bedside commode with Minimal Assistance with AD.    Outcome: Ongoing, Progressing  OT POC initiated and established in collaboration with patient.

## 2020-07-15 NOTE — RESPIRATORY THERAPY
07/15/20 1154   Patient Assessment/Suction   Level of Consciousness (AVPU)   (asleep)   PRE-TX-O2   O2 Device (Oxygen Therapy) nasal cannula   $ Is the patient on Low Flow Oxygen? Yes   Flow (L/min) 2   SpO2 (!) 93 %   Pulse Oximetry Type Intermittent   $ Pulse Oximetry - Multiple Charge Pulse Oximetry - Multiple   Incentive Spirometer   $ Incentive Spirometer Charges unable to perform  (Pt asleep at this time)

## 2020-07-15 NOTE — PT/OT/SLP PROGRESS
Physical Therapy      Patient Name:  Marleen Samano   MRN:  [id number]    Patient not seen today secondary to Unavailable (Comment)(patient unavailable on 3 attempts due eating late lunch, working with OT and working with nursing.). Will follow-up 07/16/2020.    Chris Megilligan, PT

## 2020-07-15 NOTE — PROGRESS NOTES
[facility] Hospital Medicine  Telemedicine Progress Note    Patient Name: Marleen Samano  MRN: [id number]  Patient Class: IP- Inpatient   Admission Date: 7/14/2020  Length of Stay: 1 days  Attending Physician: Jose Carlos Barry MD  Primary Care Provider: Nitin Jackson DO        Subjective:     Principal Problem:Aseptic loosening of prosthetic knee, initial encounter        HPI:  Patient is being admitted to the hospital for monitoring after undergoing revision of R TKA.  She had an episode of atrial fibrillation intraoperatively, which occurred after the tibial component was hammered in.  It converted to NSR after roughly 7 minutes.  No mention of afib in her medical history.  Medical history is significant for HTN and COPD.  She has been in her usual state of health lately.    Overview/Hospital Course:  No notes on file    Interval History:  Having lot of pain in operative limb.  No arrhythmia.    Review of Systems   Constitutional: Negative for chills and fever.   Respiratory: Negative for cough and shortness of breath.    Cardiovascular: Negative for chest pain and leg swelling.   Gastrointestinal: Negative for abdominal pain, nausea and vomiting.     Objective:     Vital Signs (Most Recent):  Temp: 97.6 °F (36.4 °C) (07/15/20 1623)  Pulse: 78 (07/15/20 1623)  Resp: 18 (07/15/20 1623)  BP: (!) 121/51 (07/15/20 1623)  SpO2: 95 % (07/15/20 1623) Vital Signs (24h Range):  Temp:  [97.6 °F (36.4 °C)-99.6 °F (37.6 °C)] 97.6 °F (36.4 °C)  Pulse:  [69-78] 78  Resp:  [14-20] 18  SpO2:  [92 %-96 %] 95 %  BP: (120-130)/(51-79) 121/51     Weight: 74 kg (163 lb 3.3 oz)  Body mass index is 24.1 kg/m².  No intake or output data in the 24 hours ending 07/15/20 1815   Physical Exam  Vitals signs and nursing note reviewed.   Constitutional:       General: She is not in acute distress.     Appearance: Normal appearance. She is normal weight. She is not ill-appearing or toxic-appearing.   HENT:      Head:  Normocephalic and atraumatic.   Eyes:      General: No scleral icterus.        Right eye: No discharge.         Left eye: No discharge.   Neck:      Musculoskeletal: Normal range of motion.   Pulmonary:      Effort: Pulmonary effort is normal.   Abdominal:      General: Abdomen is flat. There is no distension.   Musculoskeletal:      Right lower leg: No edema.      Left lower leg: No edema.   Neurological:      Mental Status: Mental status is at baseline.   Psychiatric:         Mood and Affect: Mood normal.         Behavior: Behavior normal.         Significant Labs: None    Significant Imaging: none      Assessment/Plan:      * Aseptic loosening of prosthetic knee, initial encounter  S/p revision of R TKA, on POD 1.  Continue opiates for pain.  Continue ambulating with PT.  Samano out.  Hopefully home tomorrow.    Paroxysmal atrial fibrillation  Occurred during the cementing of the tibial component intraoperatively.  It lasted about seven minutes and converted to NSR on its own.  Will keep her on cardiac monitor.  Hopefully it won't recur.      Chronic obstructive lung disease  Stable at present.    COPD Medications at home            levalbuterol (XOPENEX HFA) 45 mcg/actuation inhaler Inhale 2 puffs into the lungs every 6 (six) hours as needed for Wheezing. Rescue      Hospital Medications             levalbuterol nebulizer solution 1.25 mg 1.25 mg, Nebulization, Every 8 hours PRN          Essential hypertension  Chronic, controlled.  Latest blood pressure and vitals reviewed-   Temp:  [97.6 °F (36.4 °C)-99.6 °F (37.6 °C)]   Pulse:  [69-78]   Resp:  [14-20]   BP: (120-130)/(51-79)   SpO2:  [92 %-96 %] .   Home meds for hypertension were reviewed and noted below. Hospital anti-hypertensive changes were made as shown below.  Hypertension Medications at home            amLODIPine (NORVASC) 2.5 MG tablet every evening.     benazepril (LOTENSIN) 40 MG tablet Take 40 mg by mouth once daily.      Hospital Medications              amLODIPine tablet 2.5 mg 2.5 mg, Oral, Nightly    benazepriL tablet 40 mg Starting on 7/15/2020. 40 mg, Oral, Daily        Will utilize p.r.n. blood pressure medication only if patient's blood pressure greater than  180/110 and she develops symptoms such as worsening chest pain or shortness of breath.        VTE Risk Mitigation (From admission, onward)         Ordered     IP VTE LOW RISK PATIENT  Once      07/14/20 1219     Place sequential compression device  Until discontinued      07/14/20 1219                VIRTUAL TELENOTE    Patient physical exam and history were performed via - 2 way video televisit.  Physical exam findings were other evidence primarily by visualization through 2 way video or by conversation with the patient's bedside nurse.  I was present throughout the entire tele visit.    The attending portion of this evaluation, treatment, and documentation was performed per Jose Carlos Barry MD via audiovisual The provider was located  in separate facility from the patient. Video software was utilized to document the relevant history and physical exam.     Jose Carlos Barry MD  Department of Hospital Medicine   [facility]

## 2020-07-15 NOTE — PT/OT/SLP EVAL
"Occupational Therapy   Evaluation    Name: Marleen Samano  MRN: [id number]  Admitting Diagnosis:  Aseptic loosening of prosthetic knee, initial encounter 1 Day Post-Op    Recommendations:     Discharge Recommendations: other (see comments)(HH versus placement - will cont to assess as pt currently requires Max/Mod (A) x 2 ppl to complete safe functional transfers to/from bed, BSC and bedside chair using RW)  Discharge Equipment Recommendations:  bedside commode, walker, rolling  Barriers to discharge:  Decreased caregiver support    Assessment:     Marleen Samano is a 74 y.o. female with a medical diagnosis of Aseptic loosening of prosthetic knee, initial encounter.  She presents with overall Max/Mod (A) x 2 ppl to transfer bed>BSC, BSC>bedside commode and sit<>stand with Max/Mod (A) of 1 with SBA of 2nd person for safety. Patient is incontinent of bowel and bladder (pre-morbid) and requires Max (A) to perform toileting tasks from BSC after pt found with urine saturated bed linens and diaper. Patient has extremely POOR SAFETY awareness, highly impulsive and very distractible with poor attention. Patient is at VERY HIGH RISK OF FALL. Performance deficits affecting function: weakness, impaired endurance, impaired self care skills, impaired functional mobilty, gait instability, impaired balance, decreased lower extremity function, decreased safety awareness, pain, decreased ROM, orthopedic precautions. Will continue to assess discharge recommendation as pt would currently benefit from post acute placement but will re-assess during follow up visit. Pt reports conflicting home environment/assistance upon D/C as pt reports she will be alone at one point and then at another point, she reports she will have her grandson at home with her (and a "friend").    Rehab Prognosis: Fair; patient would benefit from acute skilled OT services to address these deficits and reach maximum level of function.       Plan:     Patient to be seen " 4 x/week to address the above listed problems via self-care/home management, therapeutic activities, therapeutic exercises  · Plan of Care Expires:    · Plan of Care Reviewed with: patient    Subjective     Chief Complaint: Incontinence of bowel/bladder  Patient/Family Comments/goals: To try to go to the bathroom     Occupational Profile:  Living Environment: Lives alone in single story home   Previous level of function: Independent   Equipment Used at Home:  none  Assistance upon Discharge: Pt reports she will have grandson at home with her but then also reports a friend can be there and her daughter.....will continue to monitor for D/C recs as currently, recommending post acute placement as pt requires Mod/Max (A) to perform functional transfers   Pain/Comfort:  · Pain Rating Post-Intervention 1: (unrated)  · Location - Side 2: Right  · Location 2: leg  · Pain Addressed 2: Reposition, Distraction, Cessation of Activity, Nurse notified  · Pain Rating Post-Intervention 2: (no pain reported at end of session)    Patients cultural, spiritual, Mosque conflicts given the current situation: no    Objective:     Communicated with: NurseJn and nurseAnahy prior to/during/post session.  Patient found HOB elevated with lunch tray set up in front of her but untouched and pt sleeping with bed alarm, cryotherapy, peripheral IV, oxygen, telemetry(R LE hinged knee brace) upon OT entry to room.    General Precautions: Standard, fall   Orthopedic Precautions:RLE weight bearing as tolerated   Braces: Hinged knee brace(R LE)     Occupational Performance:    Bed Mobility:    · Patient completed Supine to Sit with moderate assistance    Functional Mobility/Transfers:  · Patient completed Sit <> Stand Transfer with Max (A) of 1 person and Mod/Max (A) of 2nd person with step by step verbal instruction for transfer technique and proper hand placement with use of RW  · Patient completed Toilet Transfer Max (A) of 1 person and  Mod/Min (A) of 2nd person with step by verbal instruction for transfer technique/proper hand placement - bed>[facility] squat pivot with no AD   · Patient completed BS>Bedside commode requiring step, turn around pivot with  Max (A) x 1 and Mod (A) of 2nd person for pt/staff safety - pt requires step byt step verbal instruction for transfer sequence/technique and SAFETY as patient letting go of RW at one point to re-adjust bed linens/reach for bath cloths - pt firmly instructed not to let go of the walker at ANY time during transfers - pt verbalizes understanding but will require reinforcement as pt is highly distractible and demonstrates very poor safety awareness   · Functional Mobility: Patient taking a few steps with Max (A) x 2 using RW to transfer from [facility] to bedside chair     Activities of Daily Living:  · Feeding:  independence after Set-up of tray  · Grooming: independent after setup of items    · Bathing: SBA to Mod (A) after Set-up of items from [facility] level  · Upper Extremity Dressing Supervision from [facility] level  · Lower Body Dressing: Total (A)/ Max (A) to don/doff soiled and clean brief - OTR inserting extra pad (with approval of pt) for added protection  Toileting: Total (A) to Max (A) - upon OTR entry, pt requesting to use toilet; nurseJn present and assisting OTR with transfer to [facility] - pt noted to have significant incontinence of bladder as bed linens completely saturated as well as diaper; pt reports she has had incontinence of B/B for years     Cognitive/Visual Perceptual:  Cognitive/Psychosocial Skills:     -       Oriented to: Person, Place and Situation   -       Follows Commands/attention:Easily distracted and Follows one-step commands  -       Communication: clear/fluent  -       Safety awareness/insight to disability: impaired; pt is VERY IMPULSIVE with poor attention, highly distractible and fidgety  -       Mood/Affect/Coping skills/emotional control: At first pt VERY lethargic, but as  "evaluation progressed, pt becoming more alert and while pleasant, pt is highly distractible and requires frequent re-directing to tasks and safety awareness    Physical Exam:  Balance:    -       \Sit balance is NORMAL; static standing balance is FAIR    AMPAC 6 Click ADL:  AMPAC Total Score: 15    Treatment & Education:  - OTR providing education/instruction regarding OT role/POC, safety awareness/fall prevention with correct transfer sequence/techniques and correct use of RW; OTR providing frequent redirecting to task and frequent verbal instruction/cues for safety awareness; OTR also encouraging pt to slow pace of activity in an effort to decrease impulsivity and poor safety; however, patient did not consistently demonstrate  - OTR initiating D/C planning - currently, recommend post acute placement as pt requires Max (A) to safely perform functional transfers and conflicting reports regarding assistance available upon discharge; will continue to monitor   - NurseAnahy (and Jn) notified and Anahy present to assess pt's matias area as pt reporting "sore" or painful area while performing hygiene during toileting - nurseAnahy instructing pt that despite thorough exam of area, no redness or sore observed (see nursing notes for details)  Education:    Patient left up in bedside chair with all lines intact, call button in reach, chair alarm on and nurse notified    GOALS:   Multidisciplinary Problems     Occupational Therapy Goals        Problem: Occupational Therapy Goal    Goal Priority Disciplines Outcome Interventions   Occupational Therapy Goal     OT, PT/OT Ongoing, Progressing    Description: Goals to be met by: 7/31/2020     Patient will increase functional independence with ADLs by performing:    LE Dressing with Moderate Assistance with AD/AE.  Toileting from bedside commode with Minimal Assistance for hygiene and clothing management.   Toilet transfer to bedside commode with Minimal Assistance with " AD.                     History:     Past Medical History:   Diagnosis Date    Abnormal CBC 2/10/2020    Anemia due to multiple mechanisms 2/10/2020    Anemia, chronic disease 2/10/2020    Anemia, chronic renal failure, stage 4 (severe) 2/10/2020    Arthritis     Bell's palsy     Bladder incontinence     Blepharospasm     Cervical dystonia     Concussion     COPD (chronic obstructive pulmonary disease)     Fainting spell     2/7/2020    Hormone deficiency     Migraine     Muscle spasm     Myofacial pain syndrome     Normocytic normochromic anemia 2/10/2020       Past Surgical History:   Procedure Laterality Date    APPENDECTOMY      BREAST BIOPSY      BREAST LUMPECTOMY      EYE SURGERY      HYSTERECTOMY      NECK SURGERY      TONSILLECTOMY         Time Tracking:     OT Date of Treatment: 07/15/20  OT Start Time: 1301  OT Stop Time: 1347  OT Total Time (min): 46 min    Billable Minutes:Evaluation 15  Self Care/Home Management 16  Therapeutic Activity 15    CHEYANNE Nelson LOTR  7/15/2020

## 2020-07-15 NOTE — PLAN OF CARE
Plan of care reviewed with patient. Patient verbalized complete understanding. Minimal ambulation noted with PT. Up in chair throughout most of shift. PRN Zanaflex administered as needed for muscle spasms. Immobilizer in place. Currently on 2L oxygen. All fall precautions maintained. Bed in lowest position, locked, call light within reach. Side rails up x's 2. Slip resistant socks maintained.

## 2020-07-15 NOTE — PLAN OF CARE
Cm unable to complete the assessment, pt is sleeping. Brian spoke with FRAN Ragsdale, pt received pain medication earlier. Cm will follow-up later.       07/15/20 1204   Discharge Assessment   Assessment Type Discharge Planning Assessment

## 2020-07-15 NOTE — NURSING
Pt O2 sat at 75 to 85, pt unable to sustain sat of greater than 85. O2 at 3 liters via nasal canula. Pt jumped up to 93. Order obtained by NP for continuous O2.

## 2020-07-15 NOTE — PLAN OF CARE
Cm completed the assessment with pt at bedside. Pt lives alone, however her daughter and grandson will assist her on discharge.  PCP is . Insurance verified as PHN.  Pt denies diabetes, dialysis and coumadin.  Disposition:  Pt will discharge to home with [facility]. Pt signed the pt's choice disclosure form.  Pt will need a RW on dischrarge.       07/15/20 1530   Discharge Assessment   Assessment Type Discharge Planning Assessment   Confirmed/corrected address and phone number on facesheet? Yes   Assessment information obtained from? Patient   Expected Length of Stay (days) 2   Communicated expected length of stay with patient/caregiver yes   Prior to hospitilization cognitive status: Alert/Oriented   Prior to hospitalization functional status: Independent   Current cognitive status: Alert/Oriented   Current Functional Status: Independent;Assistive Equipment   Facility Arrived From: home   Lives With alone   Able to Return to Prior Arrangements yes   Is patient able to care for self after discharge? Yes  (family will assist pt at home)   Who are your caregiver(s) and their phone number(s)? daughter- Lexis- [phone]/ grandson Jessica Rg- [phone]   Patient's perception of discharge disposition home or selfcare   Readmission Within the Last 30 Days no previous admission in last 30 days   Patient currently being followed by outpatient case management? Yes   If yes, name of outpatient case management following: insurance company assigned oupatient case management   Patient currently receives any other outside agency services? No   Equipment Currently Used at Home 3-in-1 commode;rollator;grab bar;bath bench;nebulizer  (assistive device kit)   Do you have any problems affording any of your prescribed medications? No   Is the patient taking medications as prescribed? yes   Does the patient have transportation home? Yes   Transportation Anticipated family or friend will provide   Dialysis Name and  Scheduled days na   Does the patient receive services at the [facility]? No   Discharge Plan A Home with family;Home   Discharge Plan B Home with family;Home   DME Needed Upon Discharge  walker, rolling   Patient/Family in Agreement with Plan yes

## 2020-07-15 NOTE — ASSESSMENT & PLAN NOTE
S/p revision of R TKA, on POD 1.  Continue opiates for pain.  Continue ambulating with PT.  Jazmyne out.  Hopefully home tomorrow.

## 2020-07-15 NOTE — PLAN OF CARE
07/14/20 1952   Patient Assessment/Suction   Level of Consciousness (AVPU) alert   Respiratory Effort Normal;Unlabored   Expansion/Accessory Muscles/Retractions no use of accessory muscles   PRE-TX-O2   O2 Device (Oxygen Therapy) room air   SpO2 96 %   Pulse Oximetry Type Intermittent   $ Pulse Oximetry - Multiple Charge Pulse Oximetry - Multiple   Pulse 69   Resp 20   Incentive Spirometer   $ Incentive Spirometer Charges done with encouragement   Administration (IS) instruction provided, follow-up   Number of Repetitions (IS) 10   Level Incentive Spirometer (mL) 1250   Patient Tolerance (IS) good

## 2020-07-16 VITALS
SYSTOLIC BLOOD PRESSURE: 136 MMHG | HEART RATE: 73 BPM | DIASTOLIC BLOOD PRESSURE: 61 MMHG | BODY MASS INDEX: 24.17 KG/M2 | RESPIRATION RATE: 16 BRPM | HEIGHT: 69 IN | OXYGEN SATURATION: 93 % | WEIGHT: 163.19 LBS | TEMPERATURE: 99 F

## 2020-07-16 PROCEDURE — 97535 SELF CARE MNGMENT TRAINING: CPT | Mod: CO

## 2020-07-16 PROCEDURE — 94761 N-INVAS EAR/PLS OXIMETRY MLT: CPT

## 2020-07-16 PROCEDURE — 99900035 HC TECH TIME PER 15 MIN (STAT)

## 2020-07-16 PROCEDURE — 27000221 HC OXYGEN, UP TO 24 HOURS

## 2020-07-16 PROCEDURE — 97116 GAIT TRAINING THERAPY: CPT

## 2020-07-16 PROCEDURE — 25000003 PHARM REV CODE 250: Performed by: ANESTHESIOLOGY

## 2020-07-16 PROCEDURE — 25000003 PHARM REV CODE 250: Performed by: HOSPITALIST

## 2020-07-16 PROCEDURE — 97530 THERAPEUTIC ACTIVITIES: CPT

## 2020-07-16 PROCEDURE — 25000003 PHARM REV CODE 250: Performed by: ORTHOPAEDIC SURGERY

## 2020-07-16 RX ORDER — TIZANIDINE 2 MG/1
2 TABLET ORAL EVERY 8 HOURS PRN
Qty: 20 TABLET | Refills: 0 | Status: ON HOLD | OUTPATIENT
Start: 2020-07-16 | End: 2020-08-04 | Stop reason: HOSPADM

## 2020-07-16 RX ORDER — OXYCODONE AND ACETAMINOPHEN 5; 325 MG/1; MG/1
1 TABLET ORAL EVERY 6 HOURS PRN
Qty: 40 TABLET | Refills: 0 | Status: ON HOLD | OUTPATIENT
Start: 2020-07-16 | End: 2020-08-04 | Stop reason: HOSPADM

## 2020-07-16 RX ORDER — ASPIRIN 81 MG/1
81 TABLET ORAL 2 TIMES DAILY
Qty: 60 TABLET | Refills: 0 | COMMUNITY
Start: 2020-07-16 | End: 2020-12-07

## 2020-07-16 RX ADMIN — OXYCODONE HYDROCHLORIDE 10 MG: 10 TABLET, FILM COATED, EXTENDED RELEASE ORAL at 08:07

## 2020-07-16 RX ADMIN — DOCUSATE SODIUM 100 MG: 100 CAPSULE, LIQUID FILLED ORAL at 08:07

## 2020-07-16 RX ADMIN — BENAZEPRIL HYDROCHLORIDE 40 MG: 10 TABLET, COATED ORAL at 08:07

## 2020-07-16 RX ADMIN — VENLAFAXINE HYDROCHLORIDE 150 MG: 150 CAPSULE, EXTENDED RELEASE ORAL at 08:07

## 2020-07-16 RX ADMIN — PANTOPRAZOLE SODIUM 40 MG: 40 TABLET, DELAYED RELEASE ORAL at 08:07

## 2020-07-16 RX ADMIN — GABAPENTIN 600 MG: 300 CAPSULE ORAL at 08:07

## 2020-07-16 RX ADMIN — CELECOXIB 100 MG: 100 CAPSULE ORAL at 08:07

## 2020-07-16 RX ADMIN — MUPIROCIN 1 G: 20 OINTMENT TOPICAL at 08:07

## 2020-07-16 RX ADMIN — BACLOFEN 20 MG: 10 TABLET ORAL at 08:07

## 2020-07-16 NOTE — PLAN OF CARE
Problem: Physical Therapy Goal  Goal: Physical Therapy Goal  Description: Goals to be met by: 2020     Patient will increase functional independence with mobility by performin. Supine to sit with Supervision  2. Sit to stand transfer with Supervision  3. Bed to chair transfer with Supervision using Rolling Walker  4. Gait  x 250 feet with Supervision using Rolling Walker.     Outcome: Ongoing, Progressing

## 2020-07-16 NOTE — PLAN OF CARE
SW delivered rolling walker to patient's hospital room. Patient signed delivery ticket.       07/16/20 1029   Post-Acute Status   Post-Acute Authorization HME   E Status Set-up Complete

## 2020-07-16 NOTE — PLAN OF CARE
Per rep via main CM office voice message, patient set up with [facility] and [facility].       07/16/20 1310   Post-Acute Status   Post-Acute Authorization Home Health;HME   HME Status Set-up Complete   Home Health Status Set-up Complete

## 2020-07-16 NOTE — PLAN OF CARE
07/15/20 1948   Patient Assessment/Suction   Level of Consciousness (AVPU) responds to voice  (asleep)   Respiratory Effort Normal;Unlabored   PRE-TX-O2   O2 Device (Oxygen Therapy) nasal cannula   $ Is the patient on Low Flow Oxygen? Yes   Flow (L/min) 2   Oxygen Concentration (%) 28   SpO2 96 %   Pulse Oximetry Type Intermittent   $ Pulse Oximetry - Multiple Charge Pulse Oximetry - Multiple   Pulse 74   Resp 18   Incentive Spirometer   $ Incentive Spirometer Charges unable to perform;refused  (pt asleep and did not want to do it.)   Ready to Wean/Extubation Screen   FIO2<=50 (chart decimal) 0.28

## 2020-07-16 NOTE — PLAN OF CARE
Pt is cleared from  for D/C.       07/16/20 1612   Final Note   Assessment Type Final Discharge Note   Anticipated Discharge Disposition Home-Health   Hospital Follow Up  Appt(s) scheduled? Yes

## 2020-07-16 NOTE — SUBJECTIVE & OBJECTIVE
"Principal Problem:Aseptic loosening of prosthetic knee, initial encounter      Interval History: Doing much better. Feeling much better and walking more    Review of patient's allergies indicates:   Allergen Reactions    Artane Other (See Comments)     Swelling    Ned-1 Swelling     Oragel caused tongue to swell    Adhesive tape-silicones Rash     Blisters after on for several hours    Latex, natural rubber Hives and Rash     Localized    Latex     Morphine Itching and Hives       Current Facility-Administered Medications   Medication    0.9%  NaCl infusion    amLODIPine tablet 2.5 mg    baclofen tablet 20 mg    benazepriL tablet 40 mg    celecoxib capsule 100 mg    docusate sodium capsule 100 mg    fentaNYL injection 100 mcg    gabapentin capsule 1,200 mg    gabapentin capsule 600 mg    levalbuterol nebulizer solution 1.25 mg    loperamide capsule 2 mg    morphine injection 2 mg    morphine injection 2 mg    morphine injection 2 mg    mupirocin 2 % ointment 1 g    ondansetron injection 4 mg    oxyCODONE 12 hr tablet 10 mg    oxyCODONE immediate release tablet 15 mg    oxyCODONE immediate release tablet 5 mg    oxyCODONE immediate release tablet Tab 10 mg    pantoprazole EC tablet 40 mg    promethazine (PHENERGAN) 6.25 mg in dextrose 5 % 50 mL IVPB    sodium chloride 0.9% flush 10 mL    tiZANidine tablet 2 mg    traZODone tablet 100 mg    venlafaxine 24 hr capsule 150 mg     Objective:     Vital Signs (Most Recent):  Temp: 99 °F (37.2 °C) (07/16/20 0838)  Pulse: 73 (07/16/20 0840)  Resp: 16 (07/16/20 0840)  BP: 136/61 (07/16/20 0838)  SpO2: (!) 93 % (07/16/20 0840) Vital Signs (24h Range):  Temp:  [97.6 °F (36.4 °C)-100.2 °F (37.9 °C)] 99 °F (37.2 °C)  Pulse:  [72-78] 73  Resp:  [16-18] 16  SpO2:  [88 %-96 %] 93 %  BP: (116-149)/(51-65) 136/61     Weight: 74 kg (163 lb 3.3 oz)  Height: 5' 9" (175.3 cm)  Body mass index is 24.1 kg/m².    No intake or output data in the 24 hours " ending 07/16/20 1224    General    Nursing note and vitals reviewed.  Constitutional: She is oriented to person, place, and time. She appears well-developed and well-nourished. No distress.   HENT:   Head: Normocephalic and atraumatic.   Eyes: EOM are normal.   Cardiovascular: Normal rate.    Pulmonary/Chest: Effort normal.   Abdominal: Soft.   Neurological: She is alert and oriented to person, place, and time.   Psychiatric: Her behavior is normal.           Right Knee Exam     Inspection   Erythema: absent  Swelling: present  Effusion: present    Range of Motion   Extension: 0   Flexion: 100     Other   Sensation: normal    Comments:  Dsg c/d/i    Left Knee Exam   Left knee exam is normal.    Muscle Strength   Right Lower Extremity   Quadriceps:  4/5     Vascular Exam     Right Pulses  Dorsalis Pedis:      2+              Significant Labs:   CBC:   Recent Labs   Lab 07/15/20  0613   WBC 7.09   HGB 11.9*   HCT 38.9        CMP:   Recent Labs   Lab 07/15/20  0612      K 4.4      CO2 28   *   BUN 14   CREATININE 0.8   CALCIUM 8.2*   ANIONGAP 6*   EGFRNONAA >60     All pertinent labs within the past 24 hours have been reviewed.    Significant Imaging: I have reviewed and interpreted all pertinent imaging results/findings.

## 2020-07-16 NOTE — PLAN OF CARE
Ok to pull rolling walker from [facility] DME closet per Blanca with [facility] [phone].   sent patient information to [facility] for authorization and [facility] for acceptance via [facility] for home health services.       07/16/20 1018   Post-Acute Status   Post-Acute Authorization Home Health;HME   HME Status Referrals Sent   Home Health Status Referrals Sent

## 2020-07-16 NOTE — DISCHARGE SUMMARY
[facility] Hospital Medicine  Discharge Summary      Patient Name: Marleen Samano  MRN: [id number]  Admission Date: 7/14/2020  Hospital Length of Stay: 2 days  Discharge Date and Time: 7/16/2020  1:45 PM  Attending Physician: No att. providers found   Discharging Provider: Jose Carlos Barry MD  Primary Care Provider: Nitin Jackson DO      HPI:   Patient is being admitted to the hospital for monitoring after undergoing revision of R TKA.  She had an episode of atrial fibrillation intraoperatively, which occurred after the tibial component was hammered in.  It converted to NSR after roughly 7 minutes.  No mention of afib in her medical history.  Medical history is significant for HTN and COPD.  She has been in her usual state of health lately.    Procedure(s) (LRB):  REVISION, ARTHROPLASTY, KNEE (Right)      Hospital Course:   She had no further instances of atrial fibrillation during the post-op course while admitted.  We had difficulty with pain control at first, but it improved.  She ambulated with therapy.  Tolerated diet.  She was continued on her usual home meds while admitted.  Eventually she was discharged home.    Physical Exam  Vitals signs and nursing note reviewed.   Constitutional:       General: She is not in acute distress.     Appearance: Normal appearance. She is normal weight. She is not ill-appearing or toxic-appearing.   HENT:      Head: Normocephalic and atraumatic.   Eyes:      General: No scleral icterus.        Right eye: No discharge.         Left eye: No discharge.   Neck:      Musculoskeletal: Normal range of motion.   Pulmonary:      Effort: Pulmonary effort is normal.   Abdominal:      General: Abdomen is flat. There is no distension.      Consults:   Consults (From admission, onward)        Status Ordering Provider     Inpatient consult to Hospitalist  Once     Provider:  Jose Carlos Barry MD    Acknowledged JOSE PHELAN     Inpatient virtual consult to  "Hospital Medicine  Once     Provider:  Jose Carlos Bingham MD    Acknowledged JOSE CARLOS BINGHAM.          No new Assessment & Plan notes have been filed under this hospital service since the last note was generated.  Service: Hospital Medicine    Final Active Diagnoses:    Diagnosis Date Noted POA    PRINCIPAL PROBLEM:  Aseptic loosening of prosthetic knee, initial encounter [T84.038A, Z96.659] 07/14/2020 Not Applicable    Paroxysmal atrial fibrillation [I48.0] 07/14/2020 Yes    Essential hypertension [I10] 10/18/2017 Yes    Chronic obstructive lung disease [J44.9] 10/18/2017 Yes      Problems Resolved During this Admission:       Discharged Condition: good    Disposition: Home-Health Care Svc    Follow Up:  Follow-up Information     Pedro Tompkins MD On 7/30/2020.    Specialties: Sports Medicine, Orthopedic Surgery  Why: post-op f/u in avs  Contact information:  [address] [place] [address] [address]  [facility] [address]  [phone]             [facility]    Specialty: DME Provider  Why: DME-rolling walker  Contact information:  [address]  [address]  [facility] [address]  [phone]             [facility].    Why: Home Health  Contact information:  [address]  [facility] [address]  [phone]               Patient Instructions:      WALKER FOR HOME USE     Order Specific Question Answer Comments   Type of Walker: Adult (5'4"-6'6")    With wheels? Yes    Height: 5' 9" (1.753 m)    Weight: 74 kg (163 lb 3.3 oz)    Length of need (1-99 months): 6    Does patient have medical equipment at home? 3-in-1 commode assistive device kit / assistive device kit / assistive device kit / assistive device kit / assistive device kit   Does patient have medical equipment at home? rollator    Does patient have medical equipment at home? grab bar    Does patient have medical equipment at home? bath bench    Does patient have medical equipment at home? nebulizer    Please check " all that apply: Walker will be used for gait training.    Please check all that apply: Patient's condition impairs ambulation.    Vendor: [facility]    Expected Date of Delivery: 7/15/2020      Ambulatory referral/consult to Home Health   Standing Status: Future   Referral Priority: Routine Referral Type: Home Health   Referral Reason: Specialty Services Required   Requested Specialty: Home Health Services   Number of Visits Requested: 1     Diet Adult Regular     Activity as tolerated       Significant Diagnostic Studies:   BMP  Lab Results   Component Value Date     07/15/2020    K 4.4 07/15/2020     07/15/2020    CO2 28 07/15/2020    BUN 14 07/15/2020    CREATININE 0.8 07/15/2020    CALCIUM 8.2 (L) 07/15/2020    ANIONGAP 6 (L) 07/15/2020    ESTGFRAFRICA >60 07/15/2020    EGFRNONAA >60 07/15/2020     Lab Results   Component Value Date    WBC 7.09 07/15/2020    HGB 11.9 (L) 07/15/2020    HCT 38.9 07/15/2020    MCV 97 07/15/2020     07/15/2020           Pending Diagnostic Studies:     Procedure Component Value Units Date/Time    EKG 12-lead [[id number]]     Order Status: Sent Lab Status: No result          Medications:  Reconciled Home Medications:      Medication List      START taking these medications    aspirin 81 MG EC tablet  Commonly known as: ECOTRIN  Take 1 tablet (81 mg total) by mouth 2 (two) times a day. To prevent post-surgical blood clots.     oxyCODONE-acetaminophen 5-325 mg per tablet  Commonly known as: PERCOCET  Take 1 tablet by mouth every 6 (six) hours as needed.     tiZANidine 2 MG tablet  Commonly known as: ZANAFLEX  Take 1 tablet (2 mg total) by mouth every 8 (eight) hours as needed.        CONTINUE taking these medications    amLODIPine 2.5 MG tablet  Commonly known as: NORVASC  every evening.     baclofen 20 MG tablet  Commonly known as: LIORESAL  Take 20 mg by mouth 2 (two) times daily.     benazepriL 40 MG tablet  Commonly known as: LOTENSIN  Take 40 mg by mouth once  daily.     diclofenac sodium 1 % Gel  Commonly known as: VOLTAREN  Apply topically.     gabapentin 600 MG tablet  Commonly known as: NEURONTIN  TAKE 1 TABLET BY MOUTH IN THE MORNING AT NOON AND THEN 2 TABLETS IN THE EVENING     GAS-X ORAL  Take by mouth.     levalbuterol 45 mcg/actuation inhaler  Commonly known as: XOPENEX HFA  Inhale 2 puffs into the lungs every 6 (six) hours as needed for Wheezing. Rescue     magnesium oxide 400 mg (241.3 mg magnesium) tablet  Commonly known as: MAG-OX  Take 1 tablet (400 mg total) by mouth 2 (two) times daily.     omeprazole 40 MG capsule  Commonly known as: PRILOSEC  Take 40 mg by mouth 2 (two) times daily before meals.     traZODone 100 MG tablet  Commonly known as: DESYREL  Take 100 mg by mouth every evening.     venlafaxine 150 MG Cp24  Commonly known as: EFFEXOR-XR  Take 1 capsule (150 mg total) by mouth once daily.            Indwelling Lines/Drains at time of discharge:   Lines/Drains/Airways     None                 Time spent on the discharge of patient: 10 minutes  Patient was seen and examined on the date of discharge and determined to be suitable for discharge.     VIRTUAL TELENOTE    Patient physical exam and history were performed via - 2 way video televisit.  Physical exam findings were other evidence primarily by visualization through 2 way video or by conversation with the patient's bedside nurse.  I was present throughout the entire tele visit.    The attending portion of this evaluation, treatment, and documentation was performed per Jose Carlos Barry MD via audiovisual The provider was located  in separate facility from the patient. Video software was utilized to document the relevant history and physical exam.       Jose Carlos Barry MD  Department of Hospital Medicine  [facility]

## 2020-07-16 NOTE — PT/OT/SLP PROGRESS
Physical Therapy Treatment    Patient Name:  Marleen Samano   MRN:  [id number]    Recommendations:     Discharge Recommendations:  home, home health PT   Discharge Equipment Recommendations: walker, rolling(she reports her RW is from 2014 and rusted)   Barriers to discharge: None    Assessment:     Marleen Samano is a 74 y.o. female admitted with a medical diagnosis of Aseptic loosening of prosthetic knee, initial encounter.  She presents with the following impairments/functional limitations:  weakness, impaired functional mobilty, gait instability, impaired endurance, impaired balance, decreased safety awareness, pain, orthopedic precautions, edema. Pt with slow gait ike and required max vc for safe transfers, but pt definitely able to progress with gait today.    Rehab Prognosis: Fair; patient would benefit from acute skilled PT services to address these deficits and reach maximum level of function.    Recent Surgery: Procedure(s) (LRB):  REVISION, ARTHROPLASTY, KNEE (Right) 2 Days Post-Op    Plan:     During this hospitalization, patient to be seen BID to address the identified rehab impairments via gait training, therapeutic activities, therapeutic exercises and progress toward the following goals:    · Plan of Care Expires:  08/14/20    Subjective     Chief Complaint: Pt asked to use BSC and c/o shoulder/UE fatigue during gait with RW.  Patient/Family Comments/goals: Pt hoping to go home with daughter and grandson to assist.  Pain/Comfort:  · Pain Rating 1: 5/10  · Location - Side 1: Right  · Location 1: knee  · Pain Addressed 1: Pre-medicate for activity, Reposition, Distraction, Cessation of Activity  · Pain Rating Post-Intervention 1: 5/10      Objective:     Communicated with FRAN Bonilla prior to session.  Patient found HOB elevated with bed alarm, peripheral IV, telemetry, SCD upon PT entry to room.     General Precautions: Standard, fall   Orthopedic Precautions:RLE weight bearing as tolerated   Braces:  Hinged knee brace     Functional Mobility:  · Bed Mobility:     · Scooting: contact guard assistance  · Supine to Sit: contact guard assistance  · Sit to Supine: contact guard assistance  · Transfers:     · Sit to Stand:  contact guard assistance and minimum assistance with rolling walker  · Toilet Transfer: contact guard assistance and minimum assistance with  rolling walker  using  Step Transfer with vc for technique and pattern. Pt trying to hold on to RW while sitting.  PT assisted pt with changing brief, but pt able to manage hygiene with set up A.  · Gait: x 100' & x 150' with RW and CGA for safety and vc for step through pattern.      AM-PAC 6 CLICK MOBILITY          Patient left up in chair with all lines intact, call button in reach, chair alarm on and RN notified..    GOALS:   Multidisciplinary Problems     Physical Therapy Goals        Problem: Physical Therapy Goal    Goal Priority Disciplines Outcome Goal Variances Interventions   Physical Therapy Goal     PT, PT/OT Ongoing, Progressing     Description: Goals to be met by: 2020     Patient will increase functional independence with mobility by performin. Supine to sit with Supervision  2. Sit to stand transfer with Supervision  3. Bed to chair transfer with Supervision using Rolling Walker  4. Gait  x 250 feet with Supervision using Rolling Walker.                      Time Tracking:     PT Received On: 20  PT Start Time: 1029     PT Stop Time: 1125  PT Total Time (min): 56 min     Billable Minutes: Gait Training 45 and Therapeutic Activity 11    Treatment Type: Treatment  PT/PTA: PT     PTA Visit Number: 0     Aimee Fischer, PT  2020

## 2020-07-16 NOTE — PT/OT/SLP PROGRESS
Occupational Therapy   Treatment    Name: Marleen Samano  MRN: [id number]  Admitting Diagnosis:  Aseptic loosening of prosthetic knee, initial encounter  2 Days Post-Op    Recommendations:     Discharge Recommendations: home, home health PT  Discharge Equipment Recommendations:  bedside commode, walker, rolling  Barriers to discharge:  None    Assessment:     Marleen Samano is a 74 y.o. female with a medical diagnosis of Aseptic loosening of prosthetic knee, initial encounter.  She presents with increased awareness and ability to bear weight through RLE. Pt is motivated and impulsive, needs to be reminded to slow down (especially with using reacher and clamping it on to clothing). Pt is overall CGA with transfers and maneuvers RW well. She will have support from her daughter at home.Performance deficits affecting function are impaired endurance, impaired self care skills, impaired functional mobilty, gait instability, decreased lower extremity function, decreased ROM, decreased safety awareness.     Rehab Prognosis:  Good; patient would benefit from acute skilled OT services to address these deficits and reach maximum level of function.       Plan:     Patient to be seen 4 x/week to address the above listed problems via self-care/home management, therapeutic activities, therapeutic exercises  · Plan of Care Expires:    · Plan of Care Reviewed with: patient    Subjective     Pain/Comfort:  · Pain Rating 1: 0/10    Objective:     Communicated with: Chad PETERS prior to session.  Patient found up in chair with recliner in use with telemetry, peripheral IV(chair alarm) upon OT entry to room.    General Precautions: Standard, fall   Orthopedic Precautions:RLE weight bearing as tolerated   Braces:       Occupational Performance:     Functional Mobility/Transfers:  · Patient completed Sit <> Stand Transfer with contact guard assistance  with  rolling walker   · Patient completed Toilet Transfer Step Transfer technique with  contact guard assistance with  rolling walker  · Functional Mobility: GOOD; pt requires cues to point R foot forward as she ambulates.    Activities of Daily Living:  · Upper Body Dressing: contact guard assistance while standing with RW  · Lower Body Dressing: minimum assistance  to don sneakers with reacher and long handled shoe horn and SBA to fadia underwear with reacher  · Toileting: contact guard assistance for brief mgmnt while standing. pt performs hygiene.      [facility] 6 Click ADL: 21    Treatment & Education:  -AE training for reacher and long handled shoe horn to assist in LB dressing.    Patient left up in chair with recliner in use with call button in reach, chair alarm on and RNChad notifiedEducation:      GOALS:   Multidisciplinary Problems     Occupational Therapy Goals        Problem: Occupational Therapy Goal    Goal Priority Disciplines Outcome Interventions   Occupational Therapy Goal     OT, PT/OT Ongoing, Progressing    Description: Goals to be met by: 7/31/2020     Patient will increase functional independence with ADLs by performing:    LE Dressing with Moderate Assistance with AD/AE.  Toileting from bedside commode with Minimal Assistance for hygiene and clothing management.   Toilet transfer to bedside commode with Minimal Assistance with AD.                     Time Tracking:     OT Date of Treatment: 07/16/20  OT Start Time: 1232  OT Stop Time: 1314  OT Total Time (min): 42 min    Billable Minutes:Self Care/Home Management 42 min    BRAYAN Wilson  7/16/2020

## 2020-07-16 NOTE — PLAN OF CARE
CINTHIA awaiting home health approval from [employer].       07/16/20 1030   Post-Acute Status   Post-Acute Authorization Home Health   Home Health Status Pending Payor Review

## 2020-07-16 NOTE — PROGRESS NOTES
[facility]  Orthopedics  Progress Note    Patient Name: Marleen Samano  MRN: [id number]  Admission Date: 7/14/2020  Hospital Length of Stay: 2 days  Attending Provider: Jose Carlos Barry MD  Primary Care Provider: Nitin Jackson DO  Follow-up For: Procedure(s) (LRB):  REVISION, ARTHROPLASTY, KNEE (Right)    Post-Operative Day: 2 Days Post-Op  Subjective:     Principal Problem:Aseptic loosening of prosthetic knee, initial encounter      Interval History: Doing much better. Feeling much better and walking more    Review of patient's allergies indicates:   Allergen Reactions    Artane Other (See Comments)     Swelling    Ned-1 Swelling     Oragel caused tongue to swell    Adhesive tape-silicones Rash     Blisters after on for several hours    Latex, natural rubber Hives and Rash     Localized    Latex     Morphine Itching and Hives       Current Facility-Administered Medications   Medication    0.9%  NaCl infusion    amLODIPine tablet 2.5 mg    baclofen tablet 20 mg    benazepriL tablet 40 mg    celecoxib capsule 100 mg    docusate sodium capsule 100 mg    fentaNYL injection 100 mcg    gabapentin capsule 1,200 mg    gabapentin capsule 600 mg    levalbuterol nebulizer solution 1.25 mg    loperamide capsule 2 mg    morphine injection 2 mg    morphine injection 2 mg    morphine injection 2 mg    mupirocin 2 % ointment 1 g    ondansetron injection 4 mg    oxyCODONE 12 hr tablet 10 mg    oxyCODONE immediate release tablet 15 mg    oxyCODONE immediate release tablet 5 mg    oxyCODONE immediate release tablet Tab 10 mg    pantoprazole EC tablet 40 mg    promethazine (PHENERGAN) 6.25 mg in dextrose 5 % 50 mL IVPB    sodium chloride 0.9% flush 10 mL    tiZANidine tablet 2 mg    traZODone tablet 100 mg    venlafaxine 24 hr capsule 150 mg     Objective:     Vital Signs (Most Recent):  Temp: 99 °F (37.2 °C) (07/16/20 0838)  Pulse: 73 (07/16/20 0840)  Resp: 16 (07/16/20  "0840)  BP: 136/61 (07/16/20 0838)  SpO2: (!) 93 % (07/16/20 0840) Vital Signs (24h Range):  Temp:  [97.6 °F (36.4 °C)-100.2 °F (37.9 °C)] 99 °F (37.2 °C)  Pulse:  [72-78] 73  Resp:  [16-18] 16  SpO2:  [88 %-96 %] 93 %  BP: (116-149)/(51-65) 136/61     Weight: 74 kg (163 lb 3.3 oz)  Height: 5' 9" (175.3 cm)  Body mass index is 24.1 kg/m².    No intake or output data in the 24 hours ending 07/16/20 1224    General    Nursing note and vitals reviewed.  Constitutional: She is oriented to person, place, and time. She appears well-developed and well-nourished. No distress.   HENT:   Head: Normocephalic and atraumatic.   Eyes: EOM are normal.   Cardiovascular: Normal rate.    Pulmonary/Chest: Effort normal.   Abdominal: Soft.   Neurological: She is alert and oriented to person, place, and time.   Psychiatric: Her behavior is normal.           Right Knee Exam     Inspection   Erythema: absent  Swelling: present  Effusion: present    Range of Motion   Extension: 0   Flexion: 100     Other   Sensation: normal    Comments:  Dsg c/d/i    Left Knee Exam   Left knee exam is normal.    Muscle Strength   Right Lower Extremity   Quadriceps:  4/5     Vascular Exam     Right Pulses  Dorsalis Pedis:      2+              Significant Labs:   CBC:   Recent Labs   Lab 07/15/20  0613   WBC 7.09   HGB 11.9*   HCT 38.9        CMP:   Recent Labs   Lab 07/15/20  0612      K 4.4      CO2 28   *   BUN 14   CREATININE 0.8   CALCIUM 8.2*   ANIONGAP 6*   EGFRNONAA >60     All pertinent labs within the past 24 hours have been reviewed.    Significant Imaging: I have reviewed and interpreted all pertinent imaging results/findings.    Assessment/Plan:     * Aseptic loosening of prosthetic knee, initial encounter  Did great with PT today.  Okay for discharge home.          Pedro Tompkins MD  Orthopedics  [facility]  "

## 2020-07-16 NOTE — PLAN OF CARE
Patient AAO, VSS. Pt verbalized understanding of POC. Purposeful hourly/q2hr rounding done during shift to promote patient safety. Cryotherapy in place, pain managed with scheduled pain medication. Telemetry monitor on. Dressing to knee remains dry/intact. Patient free from falls and injury during shift.  Bed in lowest position, brakes locked, and call light within reach.  Will continue to monitor.

## 2020-07-16 NOTE — HOSPITAL COURSE
She had no further instances of atrial fibrillation during the post-op course while admitted.  We had difficulty with pain control at first, but it improved.  She ambulated with therapy.  Tolerated diet.  She was continued on her usual home meds while admitted.  Eventually she was discharged home.    Physical Exam  Vitals signs and nursing note reviewed.   Constitutional:       General: She is not in acute distress.     Appearance: Normal appearance. She is normal weight. She is not ill-appearing or toxic-appearing.   HENT:      Head: Normocephalic and atraumatic.   Eyes:      General: No scleral icterus.        Right eye: No discharge.         Left eye: No discharge.   Neck:      Musculoskeletal: Normal range of motion.   Pulmonary:      Effort: Pulmonary effort is normal.   Abdominal:      General: Abdomen is flat. There is no distension.

## 2020-07-17 ENCOUNTER — PATIENT OUTREACH (OUTPATIENT)
Dept: ADMINISTRATIVE | Facility: CLINIC | Age: 75
End: 2020-07-17

## 2020-07-17 PROCEDURE — G0180 PR HOME HEALTH MD CERTIFICATION: ICD-10-PCS | Mod: ,,, | Performed by: ORTHOPAEDIC SURGERY

## 2020-07-17 PROCEDURE — G0180 MD CERTIFICATION HHA PATIENT: HCPCS | Mod: ,,, | Performed by: ORTHOPAEDIC SURGERY

## 2020-07-17 NOTE — PROGRESS NOTES
C3 nurse attempted to contact patient. No answer. The following message was left for the patient to return the call:  Good evening  I am a nurse calling on behalf of [facility] from the [facility].  This is a Transitional Care Call for Marleen. When you have a moment please contact us at [phone] or [phone] Monday through Friday, between the hours of 8 am to 4 pm. We look forward to speaking with you. On behalf of [facility] have a nice day.    The patient has a scheduled HOSFU appointment with Nitin Jackson DO on 1/21/21 @ 10 am. Message sent to Physician staff.

## 2020-07-18 LAB — BACTERIA SPEC AEROBE CULT: NO GROWTH

## 2020-07-20 ENCOUNTER — TELEPHONE (OUTPATIENT)
Dept: MEDSURG UNIT | Facility: HOSPITAL | Age: 75
End: 2020-07-20

## 2020-07-20 ENCOUNTER — TELEPHONE (OUTPATIENT)
Dept: FAMILY MEDICINE | Facility: CLINIC | Age: 75
End: 2020-07-20

## 2020-07-20 NOTE — TELEPHONE ENCOUNTER
Spoke with patient. She recently had knee replacement surgery. Stated she is doing well. Will f/u with us as needed.

## 2020-07-21 LAB — BACTERIA SPEC ANAEROBE CULT: NORMAL

## 2020-07-28 ENCOUNTER — EXTERNAL HOME HEALTH (OUTPATIENT)
Dept: HOME HEALTH SERVICES | Facility: HOSPITAL | Age: 75
End: 2020-07-28
Payer: MEDICARE

## 2020-07-28 DIAGNOSIS — M25.561 RIGHT KNEE PAIN, UNSPECIFIED CHRONICITY: Primary | ICD-10-CM

## 2020-07-30 ENCOUNTER — HOSPITAL ENCOUNTER (OUTPATIENT)
Dept: RADIOLOGY | Facility: HOSPITAL | Age: 75
Discharge: HOME | End: 2020-07-30
Attending: ORTHOPAEDIC SURGERY
Payer: MEDICARE

## 2020-07-30 ENCOUNTER — OFFICE VISIT (OUTPATIENT)
Dept: ORTHOPEDICS | Facility: CLINIC | Age: 75
End: 2020-07-30
Payer: MEDICARE

## 2020-07-30 VITALS — BODY MASS INDEX: 24.14 KG/M2 | RESPIRATION RATE: 16 BRPM | WEIGHT: 163 LBS | TEMPERATURE: 97 F | HEIGHT: 69 IN

## 2020-07-30 DIAGNOSIS — Z96.651 STATUS POST TOTAL RIGHT KNEE REPLACEMENT: Primary | ICD-10-CM

## 2020-07-30 DIAGNOSIS — M25.561 RIGHT KNEE PAIN, UNSPECIFIED CHRONICITY: ICD-10-CM

## 2020-07-30 PROCEDURE — 73560 X-RAY EXAM OF KNEE 1 OR 2: CPT | Mod: 26,RT,, | Performed by: RADIOLOGY

## 2020-07-30 PROCEDURE — 99999 PR PBB SHADOW E&M-EST. PATIENT-LVL III: ICD-10-PCS | Mod: PBBFAC,,, | Performed by: ORTHOPAEDIC SURGERY

## 2020-07-30 PROCEDURE — 99024 POSTOP FOLLOW-UP VISIT: CPT | Mod: S$GLB,,, | Performed by: ORTHOPAEDIC SURGERY

## 2020-07-30 PROCEDURE — 99999 PR PBB SHADOW E&M-EST. PATIENT-LVL III: CPT | Mod: PBBFAC,,, | Performed by: ORTHOPAEDIC SURGERY

## 2020-07-30 PROCEDURE — 73560 X-RAY EXAM OF KNEE 1 OR 2: CPT | Mod: TC,PN,RT

## 2020-07-30 PROCEDURE — 99024 PR POST-OP FOLLOW-UP VISIT: ICD-10-PCS | Mod: S$GLB,,, | Performed by: ORTHOPAEDIC SURGERY

## 2020-07-30 PROCEDURE — 73560 XR KNEE 1 OR 2 VIEW RIGHT: ICD-10-PCS | Mod: 26,RT,, | Performed by: RADIOLOGY

## 2020-07-30 NOTE — PROGRESS NOTES
Past Medical History:   Diagnosis Date    Abnormal CBC 2/10/2020    Anemia due to multiple mechanisms 2/10/2020    Anemia, chronic disease 2/10/2020    Anemia, chronic renal failure, stage 4 (severe) 2/10/2020    Arthritis     Bell's palsy     Bladder incontinence     Blepharospasm     Cervical dystonia     Concussion     COPD (chronic obstructive pulmonary disease)     Fainting spell     2/7/2020    Hormone deficiency     Migraine     Muscle spasm     Myofacial pain syndrome     Normocytic normochromic anemia 2/10/2020       Past Surgical History:   Procedure Laterality Date    APPENDECTOMY      BREAST BIOPSY      BREAST LUMPECTOMY      EYE SURGERY      HYSTERECTOMY      NECK SURGERY      REVISION OF KNEE ARTHROPLASTY Right 7/14/2020    Procedure: REVISION, ARTHROPLASTY, KNEE;  Surgeon: Pedro Tompkins MD;  Location: [facility];  Service: Orthopedics;  Laterality: Right;    TONSILLECTOMY         Current Outpatient Medications   Medication Sig    amLODIPine (NORVASC) 2.5 MG tablet every evening.     aspirin (ECOTRIN) 81 MG EC tablet Take 1 tablet (81 mg total) by mouth 2 (two) times a day. To prevent post-surgical blood clots.    baclofen (LIORESAL) 20 MG tablet Take 20 mg by mouth 2 (two) times daily.    benazepril (LOTENSIN) 40 MG tablet Take 40 mg by mouth once daily.    diclofenac sodium (VOLTAREN) 1 % Gel Apply topically.     gabapentin (NEURONTIN) 600 MG tablet TAKE 1 TABLET BY MOUTH IN THE MORNING AT NOON AND THEN 2 TABLETS IN THE EVENING    levalbuterol (XOPENEX HFA) 45 mcg/actuation inhaler Inhale 2 puffs into the lungs every 6 (six) hours as needed for Wheezing. Rescue    magnesium oxide (MAG-OX) 400 mg tablet Take 1 tablet (400 mg total) by mouth 2 (two) times daily.    omeprazole (PRILOSEC) 40 MG capsule Take 40 mg by mouth 2 (two) times daily before meals.    oxyCODONE-acetaminophen (PERCOCET) 5-325 mg per tablet Take 1 tablet by mouth every 6 (six) hours as  needed.    simethicone (GAS-X ORAL) Take by mouth.    tiZANidine (ZANAFLEX) 2 MG tablet Take 1 tablet (2 mg total) by mouth every 8 (eight) hours as needed.    trazodone (DESYREL) 100 MG tablet Take 100 mg by mouth every evening.      venlafaxine (EFFEXOR-XR) 150 MG Cp24 Take 1 capsule (150 mg total) by mouth once daily.     No current facility-administered medications for this visit.      Facility-Administered Medications Ordered in Other Visits   Medication    fentaNYL injection 100 mcg       Review of patient's allergies indicates:   Allergen Reactions    Artane Other (See Comments)     Swelling    Ned-1 Swelling     Oragel caused tongue to swell    Adhesive tape-silicones Rash     Blisters after on for several hours    Latex, natural rubber Hives and Rash     Localized    Latex     Morphine Itching and Hives       Family History   Problem Relation Age of Onset    Cancer Mother     Diabetes Neg Hx     Melanoma Neg Hx     Psoriasis Neg Hx     Lupus Neg Hx     Eczema Neg Hx        Social History     Socioeconomic History    Marital status:      Spouse name: Not on file    Number of children: Not on file    Years of education: Not on file    Highest education level: Not on file   Occupational History    Not on file   Social Needs    Financial resource strain: Not on file    Food insecurity     Worry: Not on file     Inability: Not on file    Transportation needs     Medical: Not on file     Non-medical: Not on file   Tobacco Use    Smoking status: Never Smoker    Smokeless tobacco: Never Used   Substance and Sexual Activity    Alcohol use: No    Drug use: No    Sexual activity: Not on file   Lifestyle    Physical activity     Days per week: Not on file     Minutes per session: Not on file    Stress: Not on file   Relationships    Social connections     Talks on phone: Not on file     Gets together: Not on file     Attends Christian service: Not on file     Active member of  club or organization: Not on file     Attends meetings of clubs or organizations: Not on file     Relationship status: Not on file   Other Topics Concern    Are you pregnant or think you may be? Not Asked    Breast-feeding Not Asked   Social History Narrative    Not on file       Chief Complaint:   Chief Complaint   Patient presents with    Post-op Evaluation     s/p right knee TKA 7/14/20       Date of surgery:July 14, 2020    History of present illness:  This is a 74-year-old female underwent right knee tibial revision for some aseptic loosening of her tibial component.  Patient is doing well.  Pain is a 5/10.  Has a little soreness and mild swelling but good range of motion.  She is not even using the walker.      Review of Systems:    Musculoskeletal:  See HPI        Physical Examination:    Vital Signs:    Vitals:    07/30/20 1330   Resp: 16   Temp: 97.2 °F (36.2 °C)       Body mass index is 24.07 kg/m².    This a well-developed, well nourished patient in no acute distress.  They are alert and oriented and cooperative to examination.  Pt. walks without an antalgic gait.      Examination of the right knee shows well-healing surgical incision.  No erythema or drainage.  Patient has good range of motion from 5-100 degrees already.  No calf pain.  Negative Homans sign.    X-rays:  Two views of the right knee are ordered and reviewed which show well-aligned total knee arthroplasty     Assessment::  Status post right total knee revision for aseptic loosening    Plan:  Reviewed the findings with her today.  We will start some outpatient physical therapy.  I will see her back in 6 weeks.  No x-ray needed at that time.    This note was created using [employer] voice recognition software that occasionally misinterpreted phrases or words.

## 2020-08-03 ENCOUNTER — HOSPITAL ENCOUNTER (OUTPATIENT)
Facility: HOSPITAL | Age: 75
Discharge: HOME HEALTH | End: 2020-08-04
Attending: EMERGENCY MEDICINE | Admitting: INTERNAL MEDICINE
Payer: MEDICARE

## 2020-08-03 DIAGNOSIS — Z96.651 STATUS POST TOTAL RIGHT KNEE REPLACEMENT: Primary | ICD-10-CM

## 2020-08-03 DIAGNOSIS — N17.9 AKI (ACUTE KIDNEY INJURY): ICD-10-CM

## 2020-08-03 LAB
ALBUMIN SERPL BCP-MCNC: 4.4 G/DL (ref 3.5–5.2)
ALP SERPL-CCNC: 160 U/L (ref 55–135)
ALT SERPL W/O P-5'-P-CCNC: 20 U/L (ref 10–44)
ANION GAP SERPL CALC-SCNC: 15 MMOL/L (ref 8–16)
AST SERPL-CCNC: 26 U/L (ref 10–40)
BASOPHILS # BLD AUTO: 0.02 K/UL (ref 0–0.2)
BASOPHILS NFR BLD: 0.2 % (ref 0–1.9)
BILIRUB SERPL-MCNC: 0.4 MG/DL (ref 0.1–1)
BUN SERPL-MCNC: 40 MG/DL (ref 8–23)
CALCIUM SERPL-MCNC: 9.3 MG/DL (ref 8.7–10.5)
CHLORIDE SERPL-SCNC: 102 MMOL/L (ref 95–110)
CO2 SERPL-SCNC: 20 MMOL/L (ref 23–29)
CREAT SERPL-MCNC: 2.1 MG/DL (ref 0.5–1.4)
DIFFERENTIAL METHOD: ABNORMAL
EOSINOPHIL # BLD AUTO: 0 K/UL (ref 0–0.5)
EOSINOPHIL NFR BLD: 0.1 % (ref 0–8)
ERYTHROCYTE [DISTWIDTH] IN BLOOD BY AUTOMATED COUNT: 15.7 % (ref 11.5–14.5)
EST. GFR  (AFRICAN AMERICAN): 26 ML/MIN/1.73 M^2
EST. GFR  (NON AFRICAN AMERICAN): 23 ML/MIN/1.73 M^2
GLUCOSE SERPL-MCNC: 149 MG/DL (ref 70–110)
HCT VFR BLD AUTO: 38.7 % (ref 37–48.5)
HGB BLD-MCNC: 12.1 G/DL (ref 12–16)
IMM GRANULOCYTES # BLD AUTO: 0.04 K/UL (ref 0–0.04)
IMM GRANULOCYTES NFR BLD AUTO: 0.3 % (ref 0–0.5)
LYMPHOCYTES # BLD AUTO: 0.6 K/UL (ref 1–4.8)
LYMPHOCYTES NFR BLD: 4.9 % (ref 18–48)
MAGNESIUM SERPL-MCNC: 2.8 MG/DL (ref 1.6–2.6)
MCH RBC QN AUTO: 30.1 PG (ref 27–31)
MCHC RBC AUTO-ENTMCNC: 31.3 G/DL (ref 32–36)
MCV RBC AUTO: 96 FL (ref 82–98)
MONOCYTES # BLD AUTO: 0.9 K/UL (ref 0.3–1)
MONOCYTES NFR BLD: 7.6 % (ref 4–15)
NEUTROPHILS # BLD AUTO: 10.5 K/UL (ref 1.8–7.7)
NEUTROPHILS NFR BLD: 86.9 % (ref 38–73)
NRBC BLD-RTO: 0 /100 WBC
PLATELET # BLD AUTO: 248 K/UL (ref 150–350)
PMV BLD AUTO: 9.6 FL (ref 9.2–12.9)
POCT GLUCOSE: 155 MG/DL (ref 70–110)
POTASSIUM SERPL-SCNC: 5.2 MMOL/L (ref 3.5–5.1)
PROT SERPL-MCNC: 7.7 G/DL (ref 6–8.4)
RBC # BLD AUTO: 4.02 M/UL (ref 4–5.4)
SODIUM SERPL-SCNC: 137 MMOL/L (ref 136–145)
WBC # BLD AUTO: 12.04 K/UL (ref 3.9–12.7)

## 2020-08-03 PROCEDURE — 25000003 PHARM REV CODE 250: Performed by: EMERGENCY MEDICINE

## 2020-08-03 PROCEDURE — 82962 GLUCOSE BLOOD TEST: CPT

## 2020-08-03 PROCEDURE — 36415 COLL VENOUS BLD VENIPUNCTURE: CPT

## 2020-08-03 PROCEDURE — 99285 EMERGENCY DEPT VISIT HI MDM: CPT | Mod: 25

## 2020-08-03 PROCEDURE — 85025 COMPLETE CBC W/AUTO DIFF WBC: CPT

## 2020-08-03 PROCEDURE — 83735 ASSAY OF MAGNESIUM: CPT

## 2020-08-03 PROCEDURE — 80053 COMPREHEN METABOLIC PANEL: CPT

## 2020-08-03 RX ADMIN — SODIUM CHLORIDE 1000 ML: 0.9 INJECTION, SOLUTION INTRAVENOUS at 11:08

## 2020-08-04 VITALS
TEMPERATURE: 97 F | BODY MASS INDEX: 22.96 KG/M2 | HEIGHT: 69 IN | SYSTOLIC BLOOD PRESSURE: 147 MMHG | HEART RATE: 83 BPM | DIASTOLIC BLOOD PRESSURE: 65 MMHG | OXYGEN SATURATION: 95 % | WEIGHT: 155 LBS | RESPIRATION RATE: 18 BRPM

## 2020-08-04 PROBLEM — R19.7 DIARRHEA: Status: RESOLVED | Noted: 2020-08-04 | Resolved: 2020-08-04

## 2020-08-04 PROBLEM — E83.51 HYPOCALCEMIA: Status: ACTIVE | Noted: 2020-08-04

## 2020-08-04 PROBLEM — N17.9 AKI (ACUTE KIDNEY INJURY): Status: ACTIVE | Noted: 2020-08-04

## 2020-08-04 PROBLEM — R19.7 DIARRHEA: Status: ACTIVE | Noted: 2020-08-04

## 2020-08-04 PROBLEM — I48.0 PAROXYSMAL ATRIAL FIBRILLATION: Status: RESOLVED | Noted: 2020-07-14 | Resolved: 2020-08-04

## 2020-08-04 PROBLEM — N17.9 AKI (ACUTE KIDNEY INJURY): Status: RESOLVED | Noted: 2020-08-04 | Resolved: 2020-08-04

## 2020-08-04 LAB
ALBUMIN SERPL BCP-MCNC: 3.7 G/DL (ref 3.5–5.2)
ALP SERPL-CCNC: 137 U/L (ref 55–135)
ALT SERPL W/O P-5'-P-CCNC: 15 U/L (ref 10–44)
ANION GAP SERPL CALC-SCNC: 9 MMOL/L (ref 8–16)
AST SERPL-CCNC: 23 U/L (ref 10–40)
BACTERIA #/AREA URNS HPF: ABNORMAL /HPF
BASOPHILS # BLD AUTO: 0.01 K/UL (ref 0–0.2)
BASOPHILS NFR BLD: 0.1 % (ref 0–1.9)
BILIRUB SERPL-MCNC: 0.3 MG/DL (ref 0.1–1)
BILIRUB UR QL STRIP: NEGATIVE
BUN SERPL-MCNC: 34 MG/DL (ref 8–23)
CALCIUM SERPL-MCNC: 8.6 MG/DL (ref 8.7–10.5)
CHLORIDE SERPL-SCNC: 108 MMOL/L (ref 95–110)
CLARITY UR: CLEAR
CO2 SERPL-SCNC: 24 MMOL/L (ref 23–29)
COLOR UR: YELLOW
CREAT SERPL-MCNC: 1.4 MG/DL (ref 0.5–1.4)
DIFFERENTIAL METHOD: ABNORMAL
EOSINOPHIL # BLD AUTO: 0 K/UL (ref 0–0.5)
EOSINOPHIL NFR BLD: 0.2 % (ref 0–8)
ERYTHROCYTE [DISTWIDTH] IN BLOOD BY AUTOMATED COUNT: 15.8 % (ref 11.5–14.5)
EST. GFR  (AFRICAN AMERICAN): 42 ML/MIN/1.73 M^2
EST. GFR  (NON AFRICAN AMERICAN): 37 ML/MIN/1.73 M^2
GLUCOSE SERPL-MCNC: 109 MG/DL (ref 70–110)
GLUCOSE UR QL STRIP: NEGATIVE
HCT VFR BLD AUTO: 35.9 % (ref 37–48.5)
HGB BLD-MCNC: 11.1 G/DL (ref 12–16)
HGB UR QL STRIP: ABNORMAL
HYALINE CASTS #/AREA URNS LPF: 5 /LPF
IMM GRANULOCYTES # BLD AUTO: 0.04 K/UL (ref 0–0.04)
IMM GRANULOCYTES NFR BLD AUTO: 0.5 % (ref 0–0.5)
KETONES UR QL STRIP: NEGATIVE
LEUKOCYTE ESTERASE UR QL STRIP: ABNORMAL
LYMPHOCYTES # BLD AUTO: 1.3 K/UL (ref 1–4.8)
LYMPHOCYTES NFR BLD: 15.1 % (ref 18–48)
MAGNESIUM SERPL-MCNC: 2.9 MG/DL (ref 1.6–2.6)
MCH RBC QN AUTO: 30.1 PG (ref 27–31)
MCHC RBC AUTO-ENTMCNC: 30.9 G/DL (ref 32–36)
MCV RBC AUTO: 97 FL (ref 82–98)
MICROSCOPIC COMMENT: ABNORMAL
MONOCYTES # BLD AUTO: 0.9 K/UL (ref 0.3–1)
MONOCYTES NFR BLD: 10.6 % (ref 4–15)
NEUTROPHILS # BLD AUTO: 6.2 K/UL (ref 1.8–7.7)
NEUTROPHILS NFR BLD: 73.5 % (ref 38–73)
NITRITE UR QL STRIP: NEGATIVE
NRBC BLD-RTO: 0 /100 WBC
PH UR STRIP: 6 [PH] (ref 5–8)
PHOSPHATE SERPL-MCNC: 3.8 MG/DL (ref 2.7–4.5)
PLATELET # BLD AUTO: 219 K/UL (ref 150–350)
PMV BLD AUTO: 9.6 FL (ref 9.2–12.9)
POCT GLUCOSE: 113 MG/DL (ref 70–110)
POTASSIUM SERPL-SCNC: 4.4 MMOL/L (ref 3.5–5.1)
POTASSIUM SERPL-SCNC: 5.5 MMOL/L (ref 3.5–5.1)
PROT SERPL-MCNC: 6.5 G/DL (ref 6–8.4)
PROT UR QL STRIP: ABNORMAL
RBC # BLD AUTO: 3.69 M/UL (ref 4–5.4)
RBC #/AREA URNS HPF: 3 /HPF (ref 0–4)
SARS-COV-2 RDRP RESP QL NAA+PROBE: NEGATIVE
SODIUM SERPL-SCNC: 141 MMOL/L (ref 136–145)
SP GR UR STRIP: 1.01 (ref 1–1.03)
URN SPEC COLLECT METH UR: ABNORMAL
UROBILINOGEN UR STRIP-ACNC: NEGATIVE EU/DL
WBC # BLD AUTO: 8.47 K/UL (ref 3.9–12.7)
WBC #/AREA URNS HPF: 3 /HPF (ref 0–5)

## 2020-08-04 PROCEDURE — 83735 ASSAY OF MAGNESIUM: CPT

## 2020-08-04 PROCEDURE — 97161 PT EVAL LOW COMPLEX 20 MIN: CPT

## 2020-08-04 PROCEDURE — 36415 COLL VENOUS BLD VENIPUNCTURE: CPT

## 2020-08-04 PROCEDURE — G0378 HOSPITAL OBSERVATION PER HR: HCPCS

## 2020-08-04 PROCEDURE — 85025 COMPLETE CBC W/AUTO DIFF WBC: CPT

## 2020-08-04 PROCEDURE — 84100 ASSAY OF PHOSPHORUS: CPT

## 2020-08-04 PROCEDURE — 96374 THER/PROPH/DIAG INJ IV PUSH: CPT

## 2020-08-04 PROCEDURE — 80053 COMPREHEN METABOLIC PANEL: CPT

## 2020-08-04 PROCEDURE — U0002 COVID-19 LAB TEST NON-CDC: HCPCS

## 2020-08-04 PROCEDURE — 25000003 PHARM REV CODE 250: Performed by: NURSE PRACTITIONER

## 2020-08-04 PROCEDURE — 81000 URINALYSIS NONAUTO W/SCOPE: CPT

## 2020-08-04 PROCEDURE — 97116 GAIT TRAINING THERAPY: CPT

## 2020-08-04 PROCEDURE — 84132 ASSAY OF SERUM POTASSIUM: CPT

## 2020-08-04 PROCEDURE — 63600175 PHARM REV CODE 636 W HCPCS: Performed by: NURSE PRACTITIONER

## 2020-08-04 RX ORDER — IBUPROFEN 200 MG
24 TABLET ORAL
Status: DISCONTINUED | OUTPATIENT
Start: 2020-08-04 | End: 2020-08-04 | Stop reason: HOSPADM

## 2020-08-04 RX ORDER — SIMETHICONE 80 MG
2 TABLET,CHEWABLE ORAL 4 TIMES DAILY PRN
Status: DISCONTINUED | OUTPATIENT
Start: 2020-08-04 | End: 2020-08-04 | Stop reason: HOSPADM

## 2020-08-04 RX ORDER — HYDROCODONE BITARTRATE AND ACETAMINOPHEN 5; 325 MG/1; MG/1
1 TABLET ORAL ONCE
Status: DISCONTINUED | OUTPATIENT
Start: 2020-08-04 | End: 2020-08-04

## 2020-08-04 RX ORDER — VENLAFAXINE HYDROCHLORIDE 150 MG/1
150 CAPSULE, EXTENDED RELEASE ORAL DAILY
Status: DISCONTINUED | OUTPATIENT
Start: 2020-08-04 | End: 2020-08-04 | Stop reason: HOSPADM

## 2020-08-04 RX ORDER — ACETAMINOPHEN 325 MG/1
650 TABLET ORAL EVERY 4 HOURS PRN
Status: DISCONTINUED | OUTPATIENT
Start: 2020-08-04 | End: 2020-08-04 | Stop reason: HOSPADM

## 2020-08-04 RX ORDER — ASPIRIN 81 MG/1
81 TABLET ORAL 2 TIMES DAILY
Status: DISCONTINUED | OUTPATIENT
Start: 2020-08-04 | End: 2020-08-04 | Stop reason: HOSPADM

## 2020-08-04 RX ORDER — AMLODIPINE BESYLATE 2.5 MG/1
2.5 TABLET ORAL NIGHTLY
Status: DISCONTINUED | OUTPATIENT
Start: 2020-08-04 | End: 2020-08-04 | Stop reason: HOSPADM

## 2020-08-04 RX ORDER — BACLOFEN 10 MG/1
20 TABLET ORAL 2 TIMES DAILY
Status: DISCONTINUED | OUTPATIENT
Start: 2020-08-04 | End: 2020-08-04 | Stop reason: HOSPADM

## 2020-08-04 RX ORDER — SODIUM CHLORIDE 9 MG/ML
INJECTION, SOLUTION INTRAVENOUS CONTINUOUS
Status: DISCONTINUED | OUTPATIENT
Start: 2020-08-04 | End: 2020-08-04 | Stop reason: HOSPADM

## 2020-08-04 RX ORDER — ENOXAPARIN SODIUM 100 MG/ML
30 INJECTION SUBCUTANEOUS EVERY 24 HOURS
Status: DISCONTINUED | OUTPATIENT
Start: 2020-08-04 | End: 2020-08-04 | Stop reason: HOSPADM

## 2020-08-04 RX ORDER — ONDANSETRON 2 MG/ML
4 INJECTION INTRAMUSCULAR; INTRAVENOUS EVERY 6 HOURS PRN
Status: DISCONTINUED | OUTPATIENT
Start: 2020-08-04 | End: 2020-08-04 | Stop reason: HOSPADM

## 2020-08-04 RX ORDER — PANTOPRAZOLE SODIUM 40 MG/1
40 TABLET, DELAYED RELEASE ORAL DAILY
Status: DISCONTINUED | OUTPATIENT
Start: 2020-08-04 | End: 2020-08-04 | Stop reason: HOSPADM

## 2020-08-04 RX ORDER — GLUCAGON 1 MG
1 KIT INJECTION
Status: DISCONTINUED | OUTPATIENT
Start: 2020-08-04 | End: 2020-08-04 | Stop reason: HOSPADM

## 2020-08-04 RX ORDER — IBUPROFEN 200 MG
16 TABLET ORAL
Status: DISCONTINUED | OUTPATIENT
Start: 2020-08-04 | End: 2020-08-04 | Stop reason: HOSPADM

## 2020-08-04 RX ORDER — SODIUM CHLORIDE 0.9 % (FLUSH) 0.9 %
10 SYRINGE (ML) INJECTION
Status: DISCONTINUED | OUTPATIENT
Start: 2020-08-04 | End: 2020-08-04 | Stop reason: HOSPADM

## 2020-08-04 RX ADMIN — SODIUM CHLORIDE: 0.9 INJECTION, SOLUTION INTRAVENOUS at 02:08

## 2020-08-04 RX ADMIN — ONDANSETRON 4 MG: 2 INJECTION INTRAMUSCULAR; INTRAVENOUS at 12:08

## 2020-08-04 RX ADMIN — PANTOPRAZOLE SODIUM 40 MG: 40 TABLET, DELAYED RELEASE ORAL at 11:08

## 2020-08-04 RX ADMIN — SIMETHICONE CHEW TAB 80 MG 160 MG: 80 TABLET ORAL at 12:08

## 2020-08-04 RX ADMIN — BACLOFEN 20 MG: 10 TABLET ORAL at 11:08

## 2020-08-04 RX ADMIN — ASPIRIN 81 MG: 81 TABLET, COATED ORAL at 11:08

## 2020-08-04 RX ADMIN — VENLAFAXINE HYDROCHLORIDE 150 MG: 150 CAPSULE, EXTENDED RELEASE ORAL at 11:08

## 2020-08-04 NOTE — H&P
[facility] Hospital Medicine  History & Physical    Patient Name: Marleen Samano  MRN: [id number]  Admission Date: 8/3/2020  Attending Physician: Kenan Guillen MD   Primary Care Provider: Nitin Jackson DO         Patient information was obtained from patient, past medical records and ER records.     Subjective:     Principal Problem:SHARITA (acute kidney injury)    Chief Complaint:   Chief Complaint   Patient presents with    Diarrhea     Started 3 days ago; last hospitalized 07/14/20; denies fever        HPI: Marleen Samano is a 74-year-old female with a past medical history significant for anemia, migraine, and COPD who presented to the emergency department with a 3 day history of diarrhea.  Patient was discharged from this facility on 07/16/2020 after undergoing a right TKA revision.  Patient denies fever, chest pain, shortness of breath, abdominal pain, or dysuria.  ED workup is significant for mild hyperkalemia and an SHARITA.  She will be placed in observation for continued monitoring and treatment.    Past Medical History:   Diagnosis Date    Abnormal CBC 2/10/2020    Anemia due to multiple mechanisms 2/10/2020    Anemia, chronic disease 2/10/2020    Anemia, chronic renal failure, stage 4 (severe) 2/10/2020    Arthritis     Bell's palsy     Bladder incontinence     Blepharospasm     Cervical dystonia     Concussion     COPD (chronic obstructive pulmonary disease)     Fainting spell     2/7/2020    Hormone deficiency     Migraine     Muscle spasm     Myofacial pain syndrome     Normocytic normochromic anemia 2/10/2020       Past Surgical History:   Procedure Laterality Date    APPENDECTOMY      BREAST BIOPSY      BREAST LUMPECTOMY      EYE SURGERY      HYSTERECTOMY      NECK SURGERY      REVISION OF KNEE ARTHROPLASTY Right 7/14/2020    Procedure: REVISION, ARTHROPLASTY, KNEE;  Surgeon: Pedro Tompkins MD;  Location: [facility];  Service: Orthopedics;   Laterality: Right;    TONSILLECTOMY         Review of patient's allergies indicates:   Allergen Reactions    Artane Other (See Comments)     Swelling    Ned-1 Swelling     Oragel caused tongue to swell    Adhesive tape-silicones Rash     Blisters after on for several hours    Latex, natural rubber Hives and Rash     Localized    Latex     Morphine Itching and Hives       Current Facility-Administered Medications on File Prior to Encounter   Medication    fentaNYL injection 100 mcg     Current Outpatient Medications on File Prior to Encounter   Medication Sig    amLODIPine (NORVASC) 2.5 MG tablet every evening.     aspirin (ECOTRIN) 81 MG EC tablet Take 1 tablet (81 mg total) by mouth 2 (two) times a day. To prevent post-surgical blood clots.    benazepril (LOTENSIN) 40 MG tablet Take 40 mg by mouth once daily.    gabapentin (NEURONTIN) 600 MG tablet TAKE 1 TABLET BY MOUTH IN THE MORNING AT NOON AND THEN 2 TABLETS IN THE EVENING    magnesium oxide (MAG-OX) 400 mg tablet Take 1 tablet (400 mg total) by mouth 2 (two) times daily.    omeprazole (PRILOSEC) 40 MG capsule Take 40 mg by mouth 2 (two) times daily before meals.    baclofen (LIORESAL) 20 MG tablet Take 20 mg by mouth 2 (two) times daily.    diclofenac sodium (VOLTAREN) 1 % Gel Apply topically.     levalbuterol (XOPENEX HFA) 45 mcg/actuation inhaler Inhale 2 puffs into the lungs every 6 (six) hours as needed for Wheezing. Rescue    oxyCODONE-acetaminophen (PERCOCET) 5-325 mg per tablet Take 1 tablet by mouth every 6 (six) hours as needed.    simethicone (GAS-X ORAL) Take by mouth.    tiZANidine (ZANAFLEX) 2 MG tablet Take 1 tablet (2 mg total) by mouth every 8 (eight) hours as needed.    trazodone (DESYREL) 100 MG tablet Take 100 mg by mouth every evening.      venlafaxine (EFFEXOR-XR) 150 MG Cp24 Take 1 capsule (150 mg total) by mouth once daily.     Family History     Problem Relation (Age of Onset)    Cancer Mother        Tobacco Use     Smoking status: Never Smoker    Smokeless tobacco: Never Used   Substance and Sexual Activity    Alcohol use: No    Drug use: No    Sexual activity: Not on file     Review of Systems   Constitutional: Positive for fatigue. Negative for chills and fever.   HENT: Negative for congestion and rhinorrhea.    Eyes: Negative for photophobia and visual disturbance.   Respiratory: Negative for cough, shortness of breath and wheezing.    Cardiovascular: Negative for chest pain and palpitations.   Gastrointestinal: Positive for diarrhea. Negative for abdominal pain, nausea and vomiting.   Endocrine: Negative for polydipsia and polyuria.   Genitourinary: Negative for dysuria and flank pain.   Musculoskeletal: Negative for back pain and neck pain.   Skin: Negative for rash and wound.   Neurological: Positive for weakness (Generalized). Negative for headaches.   Hematological: Negative for adenopathy.   Psychiatric/Behavioral: Negative for agitation and confusion.   All other systems reviewed and are negative.    Objective:     Vital Signs (Most Recent):  Temp: 99 °F (37.2 °C) (08/03/20 2151)  Pulse: 65 (08/04/20 0006)  Resp: 20 (08/03/20 2151)  BP: (!) 129/59 (08/04/20 0006)  SpO2: 97 % (08/04/20 0006) Vital Signs (24h Range):  Temp:  [99 °F (37.2 °C)] 99 °F (37.2 °C)  Pulse:  [65-87] 65  Resp:  [20] 20  SpO2:  [95 %-100 %] 97 %  BP: (125-137)/(58-65) 129/59     Weight: 70.3 kg (155 lb)  Body mass index is 22.89 kg/m².    Physical Exam  Vitals signs and nursing note reviewed.   Constitutional:       Appearance: She is well-developed and normal weight. She is not ill-appearing.   HENT:      Head: Normocephalic and atraumatic.      Mouth/Throat:      Mouth: Mucous membranes are dry.   Eyes:      Conjunctiva/sclera: Conjunctivae normal.      Pupils: Pupils are equal, round, and reactive to light.   Neck:      Musculoskeletal: Normal range of motion and neck supple.   Cardiovascular:      Rate and Rhythm: Normal rate and  regular rhythm.      Pulses: Normal pulses.   Pulmonary:      Effort: Pulmonary effort is normal. No respiratory distress.      Breath sounds: Normal breath sounds.   Abdominal:      General: Bowel sounds are normal. There is no distension.      Palpations: Abdomen is soft.      Tenderness: There is no abdominal tenderness.   Genitourinary:     Comments: Deferred  Musculoskeletal: Normal range of motion.         General: No swelling.   Skin:     General: Skin is warm and dry.      Capillary Refill: Capillary refill takes 2 to 3 seconds.      Findings: No rash.   Neurological:      Mental Status: She is oriented to person, place, and time and easily aroused. She is lethargic.   Psychiatric:         Mood and Affect: Mood normal.         Behavior: Behavior normal.           CRANIAL NERVES     CN III, IV, VI   Pupils are equal, round, and reactive to light.       Significant Labs:   CBC:   Recent Labs   Lab 08/03/20  2322   WBC 12.04   HGB 12.1   HCT 38.7        CMP:   Recent Labs   Lab 08/03/20  2322      K 5.2*      CO2 20*   *   BUN 40*   CREATININE 2.1*   CALCIUM 9.3   PROT 7.7   ALBUMIN 4.4   BILITOT 0.4   ALKPHOS 160*   AST 26   ALT 20   ANIONGAP 15   EGFRNONAA 23*           Assessment/Plan:     * SHARITA (acute kidney injury)  IVVD 2/2 GI losses.  Continue IVF hydration.  Follow renal panel and electrolytes closely.  Check Renal Ultrasound.  Adjust renal dose medications for Estimated Creatinine Clearance: 24.2 mL/min (A) (based on SCr of 2.1 mg/dL (H)).  Avoid NSAIDs, Olmedo-II inhibitors, ACE-I, Angiotensin Receptor Blockers, or Aminoglycosides.  Urine analysis-pending                Diarrhea  Patient with 3 day history of diarrhea.  Discharged from this facility on 07/16/2020 after undergoing revision right TKA.  Will institute C diff precautions and collect stool for C diff testing.      Paroxysmal atrial fibrillation  Patient has a single noted episode of atrial fibrillation briefly while  in the operating room.  Will continue to monitor telemetry.      Major depression, chronic  Chronic.  Stable.  Continue chronic SNRI.      Chronic obstructive lung disease  Patient's COPD is controlled currently.  Patient is currently off COPD Pathway. Continue scheduled inhalers Supplemental oxygen as needed and monitor respiratory status closely.         Essential hypertension  Chronic.  Continue chronic calcium channel blocker.  Hold chronic ACE-inhibitor secondary to SHARITA.  Monitor blood pressure closely and treat as indicated for sustained control resume chronic medications as appropriate.      S/P total knee arthroplasty  Patient underwent revision of right total knee arthroplasty on 07/14/2020.  Will hold prescribed pain medications at this time due to lethargy.        VTE Risk Mitigation (From admission, onward)         Ordered     enoxaparin injection 30 mg  Every 24 hours      08/04/20 0222     IP VTE HIGH RISK PATIENT  Once      08/04/20 0222     Place sequential compression device  Until discontinued      08/04/20 0222                   RENE Roberts  Department of Hospital Medicine   [facility]

## 2020-08-04 NOTE — NURSING
"Patient arrived to floor at this time via wheelchair. Nurse at side. Vital signs stable. Oriented to room. Personal belongings and ED nurse at bedside. IV access patent.  Pt verbalized understanding for reason of admission and plan of care. Assumed care at this time. Pt extremely confused and answers questions inappropriately. I asked pt her birthday and she replied "yes I know but where was I yesterday". Asked pt if she knew where she was and she replied "yes I am trying to remember why I was just in here last time" when I reminded pt she just had a knee surgery done a few weeks ago, she replied "yes I knew that". Pt is hyperactive and continually moves arms and legs. Pt able to tell me name, birthdate and location after repeatedly asking.   "

## 2020-08-04 NOTE — ASSESSMENT & PLAN NOTE
Patient has a single noted episode of atrial fibrillation briefly while in the operating room.  Will continue to monitor telemetry.

## 2020-08-04 NOTE — HOSPITAL COURSE
Patient is 73 yo female with recent history of RT Knee TKA revision at this [facility] who gor admtted with 3 days history of diarrhea. Patient had Acute kidney injury secondary to dehydration. Kidney function is better with hydration and patient is tolerating orally . Diarrhea has resolved, no bowel movements since admission. Will hold lisinopril due to hyperkalemia and SHARITA on admission. Blood pressure is controlled with amlodipine. Her Rt knee TKA scar is healing , no signs of infection, has a folloow up with her orthopedics.PAtient will be discharged to home with home health.

## 2020-08-04 NOTE — NURSING
Discharge instructions given to patient, verbalized understanding. Peripheral IV removed. Cardiac monitor removed. Left floor via wheelchair to personal car. Ok to drive home per Dr. Reed.

## 2020-08-04 NOTE — PLAN OF CARE
Pt is cleared from  for D/C.       08/04/20 2662   Final Note   Assessment Type Final Discharge Note   Anticipated Discharge Disposition Home-Health   Hospital Follow Up  Appt(s) scheduled? Yes

## 2020-08-04 NOTE — ASSESSMENT & PLAN NOTE
Patient with 3 day history of diarrhea.  Discharged from this facility on 07/16/2020 after undergoing revision right TKA.  Will institute C diff precautions and collect stool for C diff testing.

## 2020-08-04 NOTE — ASSESSMENT & PLAN NOTE
Patient underwent revision of right total knee arthroplasty on 07/14/2020.  Will hold prescribed pain medications at this time due to lethargy.

## 2020-08-04 NOTE — SUBJECTIVE & OBJECTIVE
Past Medical History:   Diagnosis Date    Abnormal CBC 2/10/2020    Anemia due to multiple mechanisms 2/10/2020    Anemia, chronic disease 2/10/2020    Anemia, chronic renal failure, stage 4 (severe) 2/10/2020    Arthritis     Bell's palsy     Bladder incontinence     Blepharospasm     Cervical dystonia     Concussion     COPD (chronic obstructive pulmonary disease)     Fainting spell     2/7/2020    Hormone deficiency     Migraine     Muscle spasm     Myofacial pain syndrome     Normocytic normochromic anemia 2/10/2020       Past Surgical History:   Procedure Laterality Date    APPENDECTOMY      BREAST BIOPSY      BREAST LUMPECTOMY      EYE SURGERY      HYSTERECTOMY      NECK SURGERY      REVISION OF KNEE ARTHROPLASTY Right 7/14/2020    Procedure: REVISION, ARTHROPLASTY, KNEE;  Surgeon: Pedro Tompkins MD;  Location: [facility];  Service: Orthopedics;  Laterality: Right;    TONSILLECTOMY         Review of patient's allergies indicates:   Allergen Reactions    Artane Other (See Comments)     Swelling    Ned-1 Swelling     Oragel caused tongue to swell    Adhesive tape-silicones Rash     Blisters after on for several hours    Latex, natural rubber Hives and Rash     Localized    Latex     Morphine Itching and Hives       Current Facility-Administered Medications on File Prior to Encounter   Medication    fentaNYL injection 100 mcg     Current Outpatient Medications on File Prior to Encounter   Medication Sig    amLODIPine (NORVASC) 2.5 MG tablet every evening.     aspirin (ECOTRIN) 81 MG EC tablet Take 1 tablet (81 mg total) by mouth 2 (two) times a day. To prevent post-surgical blood clots.    benazepril (LOTENSIN) 40 MG tablet Take 40 mg by mouth once daily.    gabapentin (NEURONTIN) 600 MG tablet TAKE 1 TABLET BY MOUTH IN THE MORNING AT NOON AND THEN 2 TABLETS IN THE EVENING    magnesium oxide (MAG-OX) 400 mg tablet Take 1 tablet (400 mg total) by mouth 2 (two) times  daily.    omeprazole (PRILOSEC) 40 MG capsule Take 40 mg by mouth 2 (two) times daily before meals.    baclofen (LIORESAL) 20 MG tablet Take 20 mg by mouth 2 (two) times daily.    diclofenac sodium (VOLTAREN) 1 % Gel Apply topically.     levalbuterol (XOPENEX HFA) 45 mcg/actuation inhaler Inhale 2 puffs into the lungs every 6 (six) hours as needed for Wheezing. Rescue    oxyCODONE-acetaminophen (PERCOCET) 5-325 mg per tablet Take 1 tablet by mouth every 6 (six) hours as needed.    simethicone (GAS-X ORAL) Take by mouth.    tiZANidine (ZANAFLEX) 2 MG tablet Take 1 tablet (2 mg total) by mouth every 8 (eight) hours as needed.    trazodone (DESYREL) 100 MG tablet Take 100 mg by mouth every evening.      venlafaxine (EFFEXOR-XR) 150 MG Cp24 Take 1 capsule (150 mg total) by mouth once daily.     Family History     Problem Relation (Age of Onset)    Cancer Mother        Tobacco Use    Smoking status: Never Smoker    Smokeless tobacco: Never Used   Substance and Sexual Activity    Alcohol use: No    Drug use: No    Sexual activity: Not on file     Review of Systems   Constitutional: Positive for fatigue. Negative for chills and fever.   HENT: Negative for congestion and rhinorrhea.    Eyes: Negative for photophobia and visual disturbance.   Respiratory: Negative for cough, shortness of breath and wheezing.    Cardiovascular: Negative for chest pain and palpitations.   Gastrointestinal: Positive for diarrhea. Negative for abdominal pain, nausea and vomiting.   Endocrine: Negative for polydipsia and polyuria.   Genitourinary: Negative for dysuria and flank pain.   Musculoskeletal: Negative for back pain and neck pain.   Skin: Negative for rash and wound.   Neurological: Positive for weakness (Generalized). Negative for headaches.   Hematological: Negative for adenopathy.   Psychiatric/Behavioral: Negative for agitation and confusion.   All other systems reviewed and are negative.    Objective:     Vital Signs  (Most Recent):  Temp: 99 °F (37.2 °C) (08/03/20 2151)  Pulse: 65 (08/04/20 0006)  Resp: 20 (08/03/20 2151)  BP: (!) 129/59 (08/04/20 0006)  SpO2: 97 % (08/04/20 0006) Vital Signs (24h Range):  Temp:  [99 °F (37.2 °C)] 99 °F (37.2 °C)  Pulse:  [65-87] 65  Resp:  [20] 20  SpO2:  [95 %-100 %] 97 %  BP: (125-137)/(58-65) 129/59     Weight: 70.3 kg (155 lb)  Body mass index is 22.89 kg/m².    Physical Exam  Vitals signs and nursing note reviewed.   Constitutional:       Appearance: She is well-developed and normal weight. She is not ill-appearing.   HENT:      Head: Normocephalic and atraumatic.      Mouth/Throat:      Mouth: Mucous membranes are dry.   Eyes:      Conjunctiva/sclera: Conjunctivae normal.      Pupils: Pupils are equal, round, and reactive to light.   Neck:      Musculoskeletal: Normal range of motion and neck supple.   Cardiovascular:      Rate and Rhythm: Normal rate and regular rhythm.      Pulses: Normal pulses.   Pulmonary:      Effort: Pulmonary effort is normal. No respiratory distress.      Breath sounds: Normal breath sounds.   Abdominal:      General: Bowel sounds are normal. There is no distension.      Palpations: Abdomen is soft.      Tenderness: There is no abdominal tenderness.   Genitourinary:     Comments: Deferred  Musculoskeletal: Normal range of motion.         General: No swelling.   Skin:     General: Skin is warm and dry.      Capillary Refill: Capillary refill takes 2 to 3 seconds.      Findings: No rash.   Neurological:      Mental Status: She is oriented to person, place, and time and easily aroused. She is lethargic.   Psychiatric:         Mood and Affect: Mood normal.         Behavior: Behavior normal.           CRANIAL NERVES     CN III, IV, VI   Pupils are equal, round, and reactive to light.       Significant Labs:   CBC:   Recent Labs   Lab 08/03/20  2322   WBC 12.04   HGB 12.1   HCT 38.7        CMP:   Recent Labs   Lab 08/03/20  2322      K 5.2*      CO2  20*   *   BUN 40*   CREATININE 2.1*   CALCIUM 9.3   PROT 7.7   ALBUMIN 4.4   BILITOT 0.4   ALKPHOS 160*   AST 26   ALT 20   ANIONGAP 15   EGFRNONAA 23*

## 2020-08-04 NOTE — DISCHARGE SUMMARY
[facility] Hospital Medicine  Discharge Summary      Patient Name: Marleen Samano  MRN: [id number]  Admission Date: 8/3/2020  Hospital Length of Stay: 0 days  Discharge Date and Time:  08/04/2020 1:55 PM  Attending Physician: Melissa Reed MD   Discharging Provider: Melissa Reed MD  Primary Care Provider: Nitin Jackson DO      HPI:   Marleen Samano is a 74-year-old female with a past medical history significant for anemia, migraine, and COPD who presented to the emergency department with a 3 day history of diarrhea.  Patient was discharged from this facility on 07/16/2020 after undergoing a right TKA revision.  Patient denies fever, chest pain, shortness of breath, abdominal pain, or dysuria.  ED workup is significant for mild hyperkalemia and an SHARITA.  She will be placed in observation for continued monitoring and treatment.    * No surgery found *      Hospital Course:   Patient is 73 yo female with recent history of RT Knee TKA revision at this [facility] who gor admtted with 3 days history of diarrhea. Patient had Acute kidney injury secondary to dehydration. Kidney function is better with hydration and patient is tolerating orally . Diarrhea has resolved, no bowel movements since admission. Will hold lisinopril due to hyperkalemia and SHARITA on admission. Blood pressure is controlled with amlodipine. Her Rt knee TKA scar is healing , no signs of infection, has a follow up with her orthopedics.PAtient will be discharged to home with home health.      Consults:   Consults (From admission, onward)        Status Ordering Provider     Case Management/  Once     Provider:  (Not yet assigned)    Acknowledged ANTOINETTE CAPONE          No new Assessment & Plan notes have been filed under this hospital service since the last note was generated.  Service: Hospital Medicine    Final Active Diagnoses:    Diagnosis Date Noted POA    Diarrhea [R19.7] 08/04/2020 Yes    Paroxysmal atrial  fibrillation [I48.0] 07/14/2020 Yes    Major depression, chronic [F32.9] 07/08/2020 Yes    Essential hypertension [I10] 10/18/2017 Yes    Chronic obstructive lung disease [J44.9] 10/18/2017 Yes    S/P total knee arthroplasty [Z96.659] 08/01/2014 Not Applicable      Problems Resolved During this Admission:    Diagnosis Date Noted Date Resolved POA    PRINCIPAL PROBLEM:  SHARITA (acute kidney injury) [N17.9] 08/04/2020 08/04/2020 Yes       Discharged Condition: stable    Disposition:     Follow Up:    Patient Instructions:      HOME HEALTH ORDERS   Order Comments: Subsequent Home Health Orders    Current Medications:  Current Facility-Administered Medications:  0.9%  NaCl infusion, , Intravenous, Continuous, RENE Chirinos, Last Rate: 125 mL/hr at 08/04/20 0224  acetaminophen tablet 650 mg, 650 mg, Oral, Q4H PRN, RENE Chirinos  amLODIPine tablet 2.5 mg, 2.5 mg, Oral, QHS, RENE Chirinos  aspirin EC tablet 81 mg, 81 mg, Oral, BID, RENE Chirinos, 81 mg at 08/04/20 1158  baclofen tablet 20 mg, 20 mg, Oral, BID, RENE Chirinos, 20 mg at 08/04/20 1157  dextrose 50% injection 12.5 g, 12.5 g, Intravenous, PRN, RENE Chirinos  dextrose 50% injection 25 g, 25 g, Intravenous, PRN, RENE Chirinos  enoxaparin injection 30 mg, 30 mg, Subcutaneous, Q24H, RENE Chirinos  glucagon (human recombinant) injection 1 mg, 1 mg, Intramuscular, PRN, RENE Chirinos  glucose chewable tablet 16 g, 16 g, Oral, PRN, RENE Chirinos  glucose chewable tablet 24 g, 24 g, Oral, PRN, RENE Chirinos  ondansetron injection 4 mg, 4 mg, Intravenous, Q6H PRN, RENE Chirinos, 4 mg at 08/04/20 1220  pantoprazole EC tablet 40 mg, 40 mg, Oral, Daily, RENE Chirinos, 40 mg at 08/04/20 1158  simethicone chewable tablet 160 mg, 2 tablet, Oral, QID PRN, RENE Chirinos, 160 mg at 08/04/20 1220  sodium chloride  0.9% flush 10 mL, 10 mL, Intravenous, PRN, RENE Chirinos  venlafaxine 24 hr capsule 150 mg, 150 mg, Oral, Daily, RENE Chirinos, 150 mg at 08/04/20 1158    Facility-Administered Medications Ordered in Other Encounters:  fentaNYL injection 100 mcg, 100 mcg, Intravenous, See admin instructions, Pedro Tompkins MD, 50 mcg at 07/14/20 0727        Nursing:   Wound Care Orders:  n/a    Diet:   regular diet    Activities:   activity as tolerated    Labs:  Report Lab results to PCP.    Referrals/ Consults  Physical Therapy to evaluate and treat. Evaluate for home safety and equipment needs; Establish/upgrade home exercise program. Perform / instruct on therapeutic exercises, gait training, transfer training, and Range of Motion.    Home Health Aide:  Physical Therapy Weekly     Order Specific Question Answer Comments   What Home Health Agency is the patient currently using? [facility]        Significant Diagnostic Studies: Labs:   BMP:   Recent Labs   Lab 08/03/20  2322 08/04/20  0604 08/04/20  1048   * 109  --     141  --    K 5.2* 5.5* 4.4    108  --    CO2 20* 24  --    BUN 40* 34*  --    CREATININE 2.1* 1.4  --    CALCIUM 9.3 8.6*  --    MG 2.8* 2.9*  --     and All labs within the past 24 hours have been reviewed    Pending Diagnostic Studies:     None         Medications:  Reconciled Home Medications:      Medication List      CONTINUE taking these medications    amLODIPine 2.5 MG tablet  Commonly known as: NORVASC  every evening.     aspirin 81 MG EC tablet  Commonly known as: ECOTRIN  Take 1 tablet (81 mg total) by mouth 2 (two) times a day. To prevent post-surgical blood clots.     baclofen 20 MG tablet  Commonly known as: LIORESAL  Take 20 mg by mouth 2 (two) times daily.     diclofenac sodium 1 % Gel  Commonly known as: VOLTAREN  Apply topically.     gabapentin 600 MG tablet  Commonly known as: NEURONTIN  TAKE 1 TABLET BY MOUTH IN THE MORNING AT NOON AND  THEN 2 TABLETS IN THE EVENING     GAS-X ORAL  Take by mouth.     levalbuterol 45 mcg/actuation inhaler  Commonly known as: XOPENEX HFA  Inhale 2 puffs into the lungs every 6 (six) hours as needed for Wheezing. Rescue     magnesium oxide 400 mg (241.3 mg magnesium) tablet  Commonly known as: MAG-OX  Take 1 tablet (400 mg total) by mouth 2 (two) times daily.     omeprazole 40 MG capsule  Commonly known as: PRILOSEC  Take 40 mg by mouth 2 (two) times daily before meals.     traZODone 100 MG tablet  Commonly known as: DESYREL  Take 100 mg by mouth every evening.     venlafaxine 150 MG Cp24  Commonly known as: EFFEXOR-XR  Take 1 capsule (150 mg total) by mouth once daily.        STOP taking these medications    benazepriL 40 MG tablet  Commonly known as: LOTENSIN  Notes to patient: Will stop benazepril until seen PCP and decided to restart     oxyCODONE-acetaminophen 5-325 mg per tablet  Commonly known as: PERCOCET     tiZANidine 2 MG tablet  Commonly known as: ZANAFLEX            Indwelling Lines/Drains at time of discharge:   Lines/Drains/Airways     None                 Time spent on the discharge of patient: 30 minutes  Patient was seen and examined on the date of discharge and determined to be suitable for discharge.         Melissa Reed MD  Department of Hospital Medicine  [facility]

## 2020-08-04 NOTE — ASSESSMENT & PLAN NOTE
IVVD 2/2 GI losses.  Continue IVF hydration.  Follow renal panel and electrolytes closely.  Check Renal Ultrasound.  Adjust renal dose medications for Estimated Creatinine Clearance: 24.2 mL/min (A) (based on SCr of 2.1 mg/dL (H)).  Avoid NSAIDs, Olmedo-II inhibitors, ACE-I, Angiotensin Receptor Blockers, or Aminoglycosides.  Urine analysis-pending

## 2020-08-04 NOTE — ED PROVIDER NOTES
Encounter Date: 8/3/2020    SCRIBE #1 NOTE: IYas am scribing for, and in the presence of, Linwood Sewell MD.       History     Chief Complaint   Patient presents with    Diarrhea     Started 3 days ago; last hospitalized 07/14/20; denies fever     Time seen by provider: 11:10 PM on 08/03/2020    Marleen Samano is a 74 y.o. female with PMHx of chronic anemia who presents to the ED with an onset of diarrhea for 3 days. The patient denies fever or any other symptoms at this time. Pertinent PSHx includes knee revision surgery 19 days ago. Known drug allergies include artane, ned, morphine, latex, and adhesive.      The history is provided by the patient.     Review of patient's allergies indicates:   Allergen Reactions    Artane Other (See Comments)     Swelling    Ned-1 Swelling     Oragel caused tongue to swell    Adhesive tape-silicones Rash     Blisters after on for several hours    Latex, natural rubber Hives and Rash     Localized    Latex     Morphine Itching and Hives     Past Medical History:   Diagnosis Date    Abnormal CBC 2/10/2020    Anemia due to multiple mechanisms 2/10/2020    Anemia, chronic disease 2/10/2020    Anemia, chronic renal failure, stage 4 (severe) 2/10/2020    Arthritis     Bell's palsy     Bladder incontinence     Blepharospasm     Cervical dystonia     Concussion     COPD (chronic obstructive pulmonary disease)     Fainting spell     2/7/2020    Hormone deficiency     Migraine     Muscle spasm     Myofacial pain syndrome     Normocytic normochromic anemia 2/10/2020     Past Surgical History:   Procedure Laterality Date    APPENDECTOMY      BREAST BIOPSY      BREAST LUMPECTOMY      EYE SURGERY      HYSTERECTOMY      NECK SURGERY      REVISION OF KNEE ARTHROPLASTY Right 7/14/2020    Procedure: REVISION, ARTHROPLASTY, KNEE;  Surgeon: Pedro Tompkins MD;  Location: [facility];  Service: Orthopedics;  Laterality: Right;    TONSILLECTOMY        Family History   Problem Relation Age of Onset    Cancer Mother     Diabetes Neg Hx     Melanoma Neg Hx     Psoriasis Neg Hx     Lupus Neg Hx     Eczema Neg Hx      Social History     Tobacco Use    Smoking status: Never Smoker    Smokeless tobacco: Never Used   Substance Use Topics    Alcohol use: No    Drug use: No     Review of Systems   Constitutional: Negative for fever.   HENT: Negative for congestion.    Eyes: Negative for visual disturbance.   Respiratory: Negative for wheezing.    Cardiovascular: Negative for chest pain.   Gastrointestinal: Positive for diarrhea. Negative for abdominal pain.   Genitourinary: Negative for dysuria.   Musculoskeletal: Negative for joint swelling.   Skin: Negative for rash.   Neurological: Negative for syncope.   Hematological: Does not bruise/bleed easily.   Psychiatric/Behavioral: Negative for confusion.       Physical Exam     Initial Vitals [08/03/20 2151]   BP Pulse Resp Temp SpO2   137/65 87 20 99 °F (37.2 °C) 97 %      MAP       --         Physical Exam    Nursing note and vitals reviewed.  Constitutional: She appears well-nourished.   HENT:   Head: Normocephalic and atraumatic.   Eyes: Conjunctivae and EOM are normal.   Neck: Normal range of motion. Neck supple. No thyroid mass present.   Cardiovascular: Normal rate, regular rhythm and normal heart sounds. Exam reveals no gallop and no friction rub.    No murmur heard.  Pulmonary/Chest: Breath sounds normal. She has no wheezes. She has no rhonchi. She has no rales.   Abdominal: Soft. Normal appearance and bowel sounds are normal. There is no abdominal tenderness.   Neurological: She is alert and oriented to person, place, and time. She has normal strength. No cranial nerve deficit or sensory deficit.   Skin: Skin is warm and dry. No rash noted. No erythema.   Psychiatric: She has a normal mood and affect. Her speech is normal. Cognition and memory are normal.         ED Course   Procedures  Labs Reviewed    POCT GLUCOSE - Abnormal; Notable for the following components:       Result Value    POCT Glucose 155 (*)     All other components within normal limits   CLOSTRIDIUM DIFFICILE   CBC W/ AUTO DIFFERENTIAL   COMPREHENSIVE METABOLIC PANEL   URINALYSIS, REFLEX TO URINE CULTURE   MAGNESIUM          Imaging Results    None          Medical Decision Making:   History:   Old Medical Records: I decided to obtain old medical records.  Clinical Tests:   Lab Tests: Ordered and Reviewed  ED Management:  This patient was emergently received and assessed upon arrival.  Initial vital signs are stable.  She does appear dehydrated on clinical examination.  This is also reflected in laboratory analyses as volume contraction with acute kidney injury.  She is provided bolus hydration.  C diff stool studies are pending.  She does warrant observation further stabilization of renal function.  Case discussed with and accepted by the on-call nurse practitioner.  Patient is in agreement with this disposition and transported to a telemetry bed guarded condition.            Scribe Attestation:   Scribe #1: I performed the above scribed service and the documentation accurately describes the services I performed. I attest to the accuracy of the note.    I, Dr. Linwood Sewell, personally performed the services described in this documentation. All medical record entries made by the scribe were at my direction and in my presence.  I have reviewed the chart and agree that the record reflects my personal performance and is accurate and complete. Linwood Sewell MD.  5:12 AM 08/04/2020                        Clinical Impression:       ICD-10-CM ICD-9-CM   1. SHARITA (acute kidney injury)  N17.9 584.9         Disposition:   Disposition: Placed in Observation  Condition: Kevin Sewell MD  08/04/20 0512

## 2020-08-04 NOTE — PLAN OF CARE
08/04/20 1452   ESPOSITO Message   Medicare Outpatient and Observation Notification regarding financial responsibility Explained to patient/caregiver   Date ESPOSITO was signed 08/04/20   Time ESPOSITO was signed [id number]

## 2020-08-04 NOTE — PT/OT/SLP EVAL
Physical Therapy Evaluation    Patient Name:  Marleen Samano   MRN:  [id number]    Recommendations:     Discharge Recommendations:  home health PT(with 24/7 supervision)   Discharge Equipment Recommendations: none   Barriers to discharge: decreased safety awareness    Assessment:     Marleen Samano is a 74 y.o. female admitted with a medical diagnosis of SHARITA (acute kidney injury).  She presents with the following impairments/functional limitations:  weakness, impaired endurance, impaired self care skills, impaired functional mobilty, gait instability, impaired balance, decreased lower extremity function, decreased safety awareness, decreased ROM, orthopedic precautions. Patient is agreeable to PT evaluation. She is s/p R TKA on 7/14/2020. She states she used the walker for 2 days upon D/C. She was supposed to stay with her daughter upon D/C, but states she decided she would just go to her house. She lives alone. She worked in the yard for 5 hours x2 days this weekend and reports she is now feeling sore. She was discharged from [facility] and has not started OP PT due to difficulty with insurance approval. Brief applied for OOB activity because patient reports incontinence of bowel and bladder. Upon rolling PT discovered patient on bedpan and redness on her bottom. She reports she has been on it a while because a stool sample is needed. She was educated that she should not stay on the bedpan longer than 15 minutes. She verbalizes understanding. She requires SBA for supine to sit and CGA for sit to stand. Patient constantly fidgeting and grabbing at clothing and things on her table. She began crying in the middle of the evaluation stating she is overwhelmed because she has an infection. Educated on PLB and calming techniques. She ambulated 40' with RW and CGA. She returned to bed and requested OxyContin. RN aware. Patient is not safe to be home alone and needs 24/7 supervision upon D/C.     Rehab Prognosis: Fair; patient would  benefit from acute skilled PT services to address these deficits and reach maximum level of function.    Recent Surgery: * No surgery found *      Plan:     During this hospitalization, patient to be seen daily to address the identified rehab impairments via gait training, therapeutic activities, therapeutic exercises and progress toward the following goals:    · Plan of Care Expires:  09/04/20    Subjective     Chief Complaint: overwhelmed  Patient/Family Comments/goals: get OxyContin   Pain/Comfort:  · Pain Rating 1: 0/10    Patients cultural, spiritual, Judaism conflicts given the current situation:      Living Environment:  Patient lives alone with no MARY ANNE  Prior to admission, patients level of function was ambulating with knee immobilizer and not the RW. She denies any falls since surgery. She was working in her yard for 10 hours this weekend.  Equipment used at home: walker, rolling, cane, quad, wheelchair, bath bench, 3-in-1 commode.  DME owned (not currently used): none.  Upon discharge, patient will have assistance from family.    Objective:     Communicated with FRAN Prakash prior to session.  Patient found HOB elevated with bed alarm, peripheral IV, telemetry  upon PT entry to room.    General Precautions: Standard, fall   Orthopedic Precautions:RLE weight bearing as tolerated   Braces: N/A     Exams:  · RLE ROM: ~90 degrees of knee flexion; she reports soreness with ROM after working in yard this weekend  · LLE Strength: WFL    Functional Mobility:  · Bed Mobility:     · Supine to Sit: stand by assistance  · Transfers:     · Sit to Stand:  contact guard assistance with rolling walker  · Gait: 40' RW, CGA, VC for gait pattern  · Balance: Fair    Therapeutic Activities and Exercises:   Patient was educated on the importance of OOB activity during hospital stay, safe transfers and ambulation, D/C planning, home safety    AM-PAC 6 CLICK MOBILITY  Total Score:21     Patient left HOB elevated with all lines  intact, call button in reach, bed alarm on and RN notified.    Patient requested to get back on the bedpan and was educated that too much time on bedpan will cause a sore. Patient practiced bridging to allow her to place the bedpan under herself independently so a stool sample can be obtained     GOALS:   Multidisciplinary Problems     Physical Therapy Goals        Problem: Physical Therapy Goal    Goal Priority Disciplines Outcome Goal Variances Interventions   Physical Therapy Goal     PT, PT/OT      Description: Goals to be met by: 2020     Patient will increase functional independence with mobility by performin. Supine to sit with Supervision  2. Sit to stand transfer with Supervision  3. Bed to chair transfer with Supervision using Rolling Walker  4. Gait  x 250 feet with Supervision using Rolling Walker.   5. Lower extremity exercise program x20 reps per handout, with independence                     History:     Past Medical History:   Diagnosis Date    Abnormal CBC 2/10/2020    Anemia due to multiple mechanisms 2/10/2020    Anemia, chronic disease 2/10/2020    Anemia, chronic renal failure, stage 4 (severe) 2/10/2020    Arthritis     Bell's palsy     Bladder incontinence     Blepharospasm     Cervical dystonia     Concussion     COPD (chronic obstructive pulmonary disease)     Fainting spell     2020    Hormone deficiency     Migraine     Muscle spasm     Myofacial pain syndrome     Normocytic normochromic anemia 2/10/2020       Past Surgical History:   Procedure Laterality Date    APPENDECTOMY      BREAST BIOPSY      BREAST LUMPECTOMY      EYE SURGERY      HYSTERECTOMY      NECK SURGERY      REVISION OF KNEE ARTHROPLASTY Right 2020    Procedure: REVISION, ARTHROPLASTY, KNEE;  Surgeon: Pedro Tompkins MD;  [facility];  Service: Orthopedics;  Laterality: Right;    TONSILLECTOMY         Time Tracking:     PT Received On: 20  PT Start Time: 2956      PT Stop Time: 1540  PT Total Time (min): 24 min     Billable Minutes: Evaluation 10 and Gait Training 14      Criselda Couch, PT  08/04/2020

## 2020-08-04 NOTE — ED TRIAGE NOTES
Diarrhea started 3 days ago, unknown etiology. Pt states she was hospitalized (unrelated knee sx) on 07/14/20 and does not know if they are connected.

## 2020-08-04 NOTE — PLAN OF CARE
Cm completed the assessment with pt at bedside. Pt lives alone and independent in care.  PCP is Dr. Jackson.  Insurance verified as PHN.  Pt denies diabetes, dialysis and coumadin.  Disposition: Pt will discharge to home with family. Pt signed the pt's choice disclosure form, to resume [facility].       08/04/20 1428   Discharge Assessment   Assessment Type Discharge Planning Assessment   Confirmed/corrected address and phone number on facesheet? Yes   Assessment information obtained from? Patient;Caregiver   Expected Length of Stay (days) 2   Communicated expected length of stay with patient/caregiver yes   Prior to hospitilization cognitive status: Alert/Oriented   Prior to hospitalization functional status: Independent;Assistive Equipment   Current cognitive status: Alert/Oriented   Current Functional Status: Independent;Assistive Equipment   Facility Arrived From: home   Lives With alone   Able to Return to Prior Arrangements yes   Is patient able to care for self after discharge? Yes   Who are your caregiver(s) and their phone number(s)? [employer]- [phone]   Patient's perception of discharge disposition home or selfcare;home health   Readmission Within the Last 30 Days no previous admission in last 30 days   Patient currently being followed by outpatient case management? Yes   If yes, name of outpatient case management following: insurance company assigned oupatient case management   Patient currently receives any other outside agency services? Yes   Name and contact number of agency or person providing outside services [facility]   Is it the patient/care giver preference to resume care with the current outside agency? Yes   Equipment Currently Used at Home walker, rolling;cane, quad;wheelchair;bath bench;3-in-1 commode;nebulizer   Do you have any problems affording any of your prescribed medications? No   Is the patient taking medications as prescribed? yes   Does the patient have transportation home? Yes    Transportation Anticipated family or friend will provide   Dialysis Name and Scheduled days na   Does the patient receive services at the Coumadin Clinic? No   Discharge Plan A Home Health;Home with family   Discharge Plan B Home with family;Home Health   DME Needed Upon Discharge  none   Patient/Family in Agreement with Plan yes

## 2020-08-04 NOTE — PLAN OF CARE
Brian sent referral to [facility]  via [facility].  Pending acceptance.       08/04/20 1248   Post-Acute Status   Post-Acute Authorization Home Health   Home Health Status Referrals Sent

## 2020-08-04 NOTE — ASSESSMENT & PLAN NOTE
Chronic.  Continue chronic calcium channel blocker.  Hold chronic ACE-inhibitor secondary to SHARITA.  Monitor blood pressure closely and treat as indicated for sustained control resume chronic medications as appropriate.

## 2020-08-05 ENCOUNTER — TELEPHONE (OUTPATIENT)
Dept: MEDSURG UNIT | Facility: HOSPITAL | Age: 75
End: 2020-08-05

## 2020-08-05 RX ORDER — OXYCODONE AND ACETAMINOPHEN 5; 325 MG/1; MG/1
1 TABLET ORAL EVERY 4 HOURS PRN
Qty: 28 TABLET | Refills: 0 | Status: SHIPPED | OUTPATIENT
Start: 2020-08-05 | End: 2020-12-07

## 2020-08-06 ENCOUNTER — HOSPITAL ENCOUNTER (OUTPATIENT)
Dept: RADIOLOGY | Facility: HOSPITAL | Age: 75
Discharge: HOME | End: 2020-08-06
Attending: ORTHOPAEDIC SURGERY
Payer: MEDICARE

## 2020-08-06 ENCOUNTER — OFFICE VISIT (OUTPATIENT)
Dept: ORTHOPEDICS | Facility: CLINIC | Age: 75
End: 2020-08-06
Payer: MEDICARE

## 2020-08-06 VITALS — RESPIRATION RATE: 16 BRPM | HEIGHT: 69 IN | WEIGHT: 155 LBS | BODY MASS INDEX: 22.96 KG/M2

## 2020-08-06 DIAGNOSIS — M25.561 RIGHT KNEE PAIN, UNSPECIFIED CHRONICITY: ICD-10-CM

## 2020-08-06 DIAGNOSIS — Z96.651 STATUS POST TOTAL RIGHT KNEE REPLACEMENT: Primary | ICD-10-CM

## 2020-08-06 DIAGNOSIS — M25.561 RIGHT KNEE PAIN, UNSPECIFIED CHRONICITY: Primary | ICD-10-CM

## 2020-08-06 PROCEDURE — G0180 MD CERTIFICATION HHA PATIENT: HCPCS | Mod: ,,, | Performed by: ORTHOPAEDIC SURGERY

## 2020-08-06 PROCEDURE — 73560 XR KNEE 1 OR 2 VIEW RIGHT: ICD-10-PCS | Mod: 26,RT,, | Performed by: RADIOLOGY

## 2020-08-06 PROCEDURE — 73560 X-RAY EXAM OF KNEE 1 OR 2: CPT | Mod: TC,PN,RT

## 2020-08-06 PROCEDURE — 99024 POSTOP FOLLOW-UP VISIT: CPT | Mod: S$GLB,,, | Performed by: ORTHOPAEDIC SURGERY

## 2020-08-06 PROCEDURE — 73560 X-RAY EXAM OF KNEE 1 OR 2: CPT | Mod: 26,RT,, | Performed by: RADIOLOGY

## 2020-08-06 PROCEDURE — G0180 PR HOME HEALTH MD CERTIFICATION: ICD-10-PCS | Mod: ,,, | Performed by: ORTHOPAEDIC SURGERY

## 2020-08-06 PROCEDURE — 99999 PR PBB SHADOW E&M-EST. PATIENT-LVL III: CPT | Mod: PBBFAC,,, | Performed by: ORTHOPAEDIC SURGERY

## 2020-08-06 PROCEDURE — 99999 PR PBB SHADOW E&M-EST. PATIENT-LVL III: ICD-10-PCS | Mod: PBBFAC,,, | Performed by: ORTHOPAEDIC SURGERY

## 2020-08-06 PROCEDURE — 99024 PR POST-OP FOLLOW-UP VISIT: ICD-10-PCS | Mod: S$GLB,,, | Performed by: ORTHOPAEDIC SURGERY

## 2020-08-06 NOTE — PROGRESS NOTES
Past Medical History:   Diagnosis Date    Abnormal CBC 2/10/2020    Anemia due to multiple mechanisms 2/10/2020    Anemia, chronic disease 2/10/2020    Anemia, chronic renal failure, stage 4 (severe) 2/10/2020    Arthritis     Bell's palsy     Bladder incontinence     Blepharospasm     Cervical dystonia     Concussion     COPD (chronic obstructive pulmonary disease)     Fainting spell     2/7/2020    Hormone deficiency     Migraine     Muscle spasm     Myofacial pain syndrome     Normocytic normochromic anemia 2/10/2020       Past Surgical History:   Procedure Laterality Date    APPENDECTOMY      BREAST BIOPSY      BREAST LUMPECTOMY      EYE SURGERY      HYSTERECTOMY      NECK SURGERY      REVISION OF KNEE ARTHROPLASTY Right 7/14/2020    Procedure: REVISION, ARTHROPLASTY, KNEE;  Surgeon: Pedro Tompkins MD;  Location: [facility];  Service: Orthopedics;  Laterality: Right;    TONSILLECTOMY         Current Outpatient Medications   Medication Sig    amLODIPine (NORVASC) 2.5 MG tablet every evening.     aspirin (ECOTRIN) 81 MG EC tablet Take 1 tablet (81 mg total) by mouth 2 (two) times a day. To prevent post-surgical blood clots.    baclofen (LIORESAL) 20 MG tablet Take 20 mg by mouth 2 (two) times daily.    diclofenac sodium (VOLTAREN) 1 % Gel Apply topically.     gabapentin (NEURONTIN) 600 MG tablet TAKE 1 TABLET BY MOUTH IN THE MORNING AT NOON AND THEN 2 TABLETS IN THE EVENING    levalbuterol (XOPENEX HFA) 45 mcg/actuation inhaler Inhale 2 puffs into the lungs every 6 (six) hours as needed for Wheezing. Rescue    omeprazole (PRILOSEC) 40 MG capsule Take 40 mg by mouth 2 (two) times daily before meals.    oxyCODONE-acetaminophen (PERCOCET) 5-325 mg per tablet Take 1 tablet by mouth every 4 (four) hours as needed for Pain.    simethicone (GAS-X ORAL) Take by mouth.    trazodone (DESYREL) 100 MG tablet Take 100 mg by mouth every evening.      venlafaxine (EFFEXOR-XR) 150 MG  Cp24 Take 1 capsule (150 mg total) by mouth once daily.     No current facility-administered medications for this visit.      Facility-Administered Medications Ordered in Other Visits   Medication    fentaNYL injection 100 mcg       Review of patient's allergies indicates:   Allergen Reactions    Artane Other (See Comments)     Swelling    Ned-1 Swelling     Oragel caused tongue to swell    Adhesive tape-silicones Rash     Blisters after on for several hours    Latex, natural rubber Hives and Rash     Localized    Latex     Morphine Itching and Hives       Family History   Problem Relation Age of Onset    Cancer Mother     Diabetes Neg Hx     Melanoma Neg Hx     Psoriasis Neg Hx     Lupus Neg Hx     Eczema Neg Hx        Social History     Socioeconomic History    Marital status:      Spouse name: Not on file    Number of children: Not on file    Years of education: Not on file    Highest education level: Not on file   Occupational History    Not on file   Social Needs    Financial resource strain: Not on file    Food insecurity     Worry: Not on file     Inability: Not on file    Transportation needs     Medical: Not on file     Non-medical: Not on file   Tobacco Use    Smoking status: Never Smoker    Smokeless tobacco: Never Used   Substance and Sexual Activity    Alcohol use: No    Drug use: No    Sexual activity: Not on file   Lifestyle    Physical activity     Days per week: Not on file     Minutes per session: Not on file    Stress: Not on file   Relationships    Social connections     Talks on phone: Not on file     Gets together: Not on file     Attends Sabianist service: Not on file     Active member of club or organization: Not on file     Attends meetings of clubs or organizations: Not on file     Relationship status: Not on file   Other Topics Concern    Are you pregnant or think you may be? Not Asked    Breast-feeding Not Asked   Social History Narrative    Not  on file       Chief Complaint:   Chief Complaint   Patient presents with    Right Knee - Post-op Evaluation     s/p right knee TKA 7/14/20       Date of surgery:July 14, 2020 revision right total knee arthroplasty    History of present illness:  This is a 74-year-old female underwent right knee tibial revision for some aseptic loosening of her tibial component.  Patient was doing great and felt so good she decided to do 10 hours of yard work.  She got dehydrated and had to be admitted to the hospital.  Her right knee is more sore and a little more swollen.  Rates the pain is 7/10.  Did not fall.      Review of Systems:    Musculoskeletal:  See HPI        Physical Examination:    Vital Signs:    Vitals:    08/06/20 0932   Resp: 16       Body mass index is 22.89 kg/m².    This a well-developed, well nourished patient in no acute distress.  They are alert and oriented and cooperative to examination.  Pt. walks without an antalgic gait.      Examination of the right knee shows healing surgical incision.  No erythema or drainage.  Patient has good range of motion from 5-100 degrees already.  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  Needs to be smart her with this.  She has only 3 weeks out from a revision total knee arthroplasty.  Needs to continue to use the brace.  Continue to use ice.  She has home health physical therapy coming which should help with her motion.  I will see her back in 6 weeks.  No x-ray needed.    This note was created using M Modal voice recognition software that occasionally misinterpreted phrases or words.

## 2020-08-12 DIAGNOSIS — Z96.651 STATUS POST TOTAL RIGHT KNEE REPLACEMENT: Primary | ICD-10-CM

## 2020-08-18 ENCOUNTER — EXTERNAL HOME HEALTH (OUTPATIENT)
Dept: HOME HEALTH SERVICES | Facility: HOSPITAL | Age: 75
End: 2020-08-18
Payer: MEDICARE

## 2020-08-22 ENCOUNTER — LAB VISIT (OUTPATIENT)
Dept: LAB | Facility: HOSPITAL | Age: 75
End: 2020-08-22
Attending: INTERNAL MEDICINE
Payer: MEDICARE

## 2020-08-22 DIAGNOSIS — D64.89 ANEMIA DUE TO MULTIPLE MECHANISMS: ICD-10-CM

## 2020-08-22 DIAGNOSIS — D53.9 NUTRITIONAL ANEMIA, UNSPECIFIED: ICD-10-CM

## 2020-08-22 DIAGNOSIS — D63.1 ANEMIA, CHRONIC RENAL FAILURE, STAGE 4 (SEVERE): ICD-10-CM

## 2020-08-22 DIAGNOSIS — D64.9 NORMOCYTIC NORMOCHROMIC ANEMIA: ICD-10-CM

## 2020-08-22 DIAGNOSIS — D63.8 ANEMIA, CHRONIC DISEASE: ICD-10-CM

## 2020-08-22 DIAGNOSIS — N18.4 ANEMIA, CHRONIC RENAL FAILURE, STAGE 4 (SEVERE): ICD-10-CM

## 2020-08-22 LAB
ALBUMIN SERPL BCP-MCNC: 4.3 G/DL (ref 3.5–5.2)
ALP SERPL-CCNC: 129 U/L (ref 55–135)
ALT SERPL W/O P-5'-P-CCNC: 13 U/L (ref 10–44)
ANION GAP SERPL CALC-SCNC: 11 MMOL/L (ref 8–16)
AST SERPL-CCNC: 16 U/L (ref 10–40)
BASOPHILS # BLD AUTO: 0.01 K/UL (ref 0–0.2)
BASOPHILS NFR BLD: 0.2 % (ref 0–1.9)
BILIRUB SERPL-MCNC: 0.3 MG/DL (ref 0.1–1)
BUN SERPL-MCNC: 31 MG/DL (ref 8–23)
CALCIUM SERPL-MCNC: 9.8 MG/DL (ref 8.7–10.5)
CHLORIDE SERPL-SCNC: 101 MMOL/L (ref 95–110)
CO2 SERPL-SCNC: 27 MMOL/L (ref 23–29)
CREAT SERPL-MCNC: 1 MG/DL (ref 0.5–1.4)
DIFFERENTIAL METHOD: ABNORMAL
EOSINOPHIL # BLD AUTO: 0.1 K/UL (ref 0–0.5)
EOSINOPHIL NFR BLD: 2.4 % (ref 0–8)
ERYTHROCYTE [DISTWIDTH] IN BLOOD BY AUTOMATED COUNT: 14.5 % (ref 11.5–14.5)
EST. GFR  (AFRICAN AMERICAN): >60 ML/MIN/1.73 M^2
EST. GFR  (NON AFRICAN AMERICAN): 55 ML/MIN/1.73 M^2
FERRITIN SERPL-MCNC: 654 NG/ML (ref 20–300)
FOLATE SERPL-MCNC: 17.8 NG/ML (ref 4–24)
GLUCOSE SERPL-MCNC: 118 MG/DL (ref 70–110)
HCT VFR BLD AUTO: 38.4 % (ref 37–48.5)
HGB BLD-MCNC: 11.9 G/DL (ref 12–16)
IMM GRANULOCYTES # BLD AUTO: 0.03 K/UL (ref 0–0.04)
IMM GRANULOCYTES NFR BLD AUTO: 0.6 % (ref 0–0.5)
IRON SERPL-MCNC: 63 UG/DL (ref 30–160)
LYMPHOCYTES # BLD AUTO: 1 K/UL (ref 1–4.8)
LYMPHOCYTES NFR BLD: 22.3 % (ref 18–48)
MCH RBC QN AUTO: 29.6 PG (ref 27–31)
MCHC RBC AUTO-ENTMCNC: 31 G/DL (ref 32–36)
MCV RBC AUTO: 96 FL (ref 82–98)
MONOCYTES # BLD AUTO: 0.4 K/UL (ref 0.3–1)
MONOCYTES NFR BLD: 8.8 % (ref 4–15)
NEUTROPHILS # BLD AUTO: 3.1 K/UL (ref 1.8–7.7)
NEUTROPHILS NFR BLD: 65.7 % (ref 38–73)
NRBC BLD-RTO: 0 /100 WBC
PLATELET # BLD AUTO: 193 K/UL (ref 150–350)
PMV BLD AUTO: 9.6 FL (ref 9.2–12.9)
POTASSIUM SERPL-SCNC: 5.2 MMOL/L (ref 3.5–5.1)
PROT SERPL-MCNC: 7.4 G/DL (ref 6–8.4)
RBC # BLD AUTO: 4.02 M/UL (ref 4–5.4)
SATURATED IRON: 22 % (ref 20–50)
SODIUM SERPL-SCNC: 139 MMOL/L (ref 136–145)
TOTAL IRON BINDING CAPACITY: 292 UG/DL (ref 250–450)
TRANSFERRIN SERPL-MCNC: 197 MG/DL (ref 200–375)
VIT B12 SERPL-MCNC: 1013 PG/ML (ref 210–950)
WBC # BLD AUTO: 4.67 K/UL (ref 3.9–12.7)

## 2020-08-22 PROCEDURE — 85025 COMPLETE CBC W/AUTO DIFF WBC: CPT

## 2020-08-22 PROCEDURE — 82607 VITAMIN B-12: CPT

## 2020-08-22 PROCEDURE — 80053 COMPREHEN METABOLIC PANEL: CPT

## 2020-08-22 PROCEDURE — 82728 ASSAY OF FERRITIN: CPT

## 2020-08-22 PROCEDURE — 83540 ASSAY OF IRON: CPT

## 2020-08-22 PROCEDURE — 82746 ASSAY OF FOLIC ACID SERUM: CPT

## 2020-08-22 PROCEDURE — 36415 COLL VENOUS BLD VENIPUNCTURE: CPT

## 2020-08-23 DIAGNOSIS — E87.5 HYPERKALEMIA: Primary | ICD-10-CM

## 2020-08-24 ENCOUNTER — TELEPHONE (OUTPATIENT)
Dept: HEMATOLOGY/ONCOLOGY | Facility: CLINIC | Age: 75
End: 2020-08-24

## 2020-08-27 ENCOUNTER — DOCUMENTATION ONLY (OUTPATIENT)
Dept: REHABILITATION | Facility: HOSPITAL | Age: 75
End: 2020-08-27

## 2020-08-27 NOTE — PROGRESS NOTES
Outpatient Therapy Discharge Summary     Name: Marleen Samano  [facility] Number: [id number]    Therapy Diagnosis: Hip pains  Physician: Pedro Tompkins MD    Physician Orders: PT eval and treat  Medical Diagnosis: Pain both hip joints  Evaluation Date: 6/22/20      Date of Last visit: 7/6/20  Total Visits Received: 2  Cancelled Visits: 9  No Show Visits: 0    Assessment    Patient seen x 2 visits only OP PT. Had been admitted to hospital and cancelled scheduled therapy.  All goals were ongoing as treatments was just established.  Goals:   1. Patient will report compliance with HEP.  2. Patient will demonstrate 90 deg SLR.  3. Patient will tolerate Nustep 10' Level 3   4.. Patient will report pain-free mobility to both hips at full ROM>  5. Patient will demonstrate half knee bends with one hand support.  6. No report of recurrent pain 0/10  7.  FOTO >50/100    Discharge reason: Patient has not attended therapy since 7/6/20    Plan   This patient is discharged from Physical Therapy

## 2020-09-07 ENCOUNTER — HOSPITAL ENCOUNTER (EMERGENCY)
Facility: HOSPITAL | Age: 75
Discharge: HOME | End: 2020-09-07
Attending: EMERGENCY MEDICINE
Payer: MEDICARE

## 2020-09-07 VITALS
OXYGEN SATURATION: 98 % | HEIGHT: 69 IN | WEIGHT: 160 LBS | TEMPERATURE: 99 F | HEART RATE: 82 BPM | SYSTOLIC BLOOD PRESSURE: 154 MMHG | DIASTOLIC BLOOD PRESSURE: 73 MMHG | RESPIRATION RATE: 18 BRPM | BODY MASS INDEX: 23.7 KG/M2

## 2020-09-07 DIAGNOSIS — M79.671 RIGHT FOOT PAIN: Primary | ICD-10-CM

## 2020-09-07 PROCEDURE — 99283 EMERGENCY DEPT VISIT LOW MDM: CPT | Mod: 25

## 2020-09-07 PROCEDURE — 25000003 PHARM REV CODE 250: Performed by: EMERGENCY MEDICINE

## 2020-09-07 RX ORDER — IBUPROFEN 400 MG/1
400 TABLET ORAL
Status: COMPLETED | OUTPATIENT
Start: 2020-09-07 | End: 2020-09-07

## 2020-09-07 RX ADMIN — IBUPROFEN 400 MG: 400 TABLET, FILM COATED ORAL at 02:09

## 2020-09-07 NOTE — ED PROVIDER NOTES
Encounter Date: 9/7/2020    SCRIBE #1 NOTE: Erin ALBARRAN, am scribing for, and in the presence of, Salvador Edmonds MD.       History     Chief Complaint   Patient presents with    Ankle Pain     right ankle pain, hurts when walking and begins to swell     Time seen by provider: 12:53 PM on 09/07/2020    Marleen Samano is a 74 y.o. female with a PMHx of arthritis who presents to the ED with an onset of foot pain. The patient complains of pain to her right mid foot when bearing weight. She denies tenderness to palpation. She had knee revision surgery 7/12/2020. The patient denies calf pain, decrease ROM or any other symptoms at this time. PSHx of neck surgery and revision of knee arthroplasty.    The history is provided by the patient.     Review of patient's allergies indicates:   Allergen Reactions    Artane Other (See Comments)     Swelling    Ned-1 Swelling     Oragel caused tongue to swell    Adhesive tape-silicones Rash     Blisters after on for several hours    Latex, natural rubber Hives and Rash     Localized    Latex     Morphine Itching and Hives     Past Medical History:   Diagnosis Date    Abnormal CBC 2/10/2020    Anemia due to multiple mechanisms 2/10/2020    Anemia, chronic disease 2/10/2020    Anemia, chronic renal failure, stage 4 (severe) 2/10/2020    Arthritis     Bell's palsy     Bladder incontinence     Blepharospasm     Cervical dystonia     Concussion     COPD (chronic obstructive pulmonary disease)     Fainting spell     2/7/2020    Hormone deficiency     Migraine     Muscle spasm     Myofacial pain syndrome     Normocytic normochromic anemia 2/10/2020     Past Surgical History:   Procedure Laterality Date    APPENDECTOMY      BREAST BIOPSY      BREAST LUMPECTOMY      EYE SURGERY      HYSTERECTOMY      NECK SURGERY      REVISION OF KNEE ARTHROPLASTY Right 7/14/2020    Procedure: REVISION, ARTHROPLASTY, KNEE;  Surgeon: Pedro Tompkins MD;  Location:  [facility] OR;  Service: Orthopedics;  Laterality: Right;    TONSILLECTOMY       Family History   Problem Relation Age of Onset    Cancer Mother     Diabetes Neg Hx     Melanoma Neg Hx     Psoriasis Neg Hx     Lupus Neg Hx     Eczema Neg Hx      Social History     Tobacco Use    Smoking status: Never Smoker    Smokeless tobacco: Never Used   Substance Use Topics    Alcohol use: No    Drug use: No     Review of Systems   Musculoskeletal: Positive for arthralgias (Foot pain.) and gait problem. Negative for joint swelling.        - decrease ROM.   Skin: Negative for wound.   Neurological: Negative for weakness.       Physical Exam     Initial Vitals [09/07/20 1244]   BP Pulse Resp Temp SpO2   (!) 154/73 82 18 99.1 °F (37.3 °C) 98 %      MAP       --         Physical Exam    Nursing note and vitals reviewed.  Constitutional: She appears well-developed and well-nourished.   HENT:   Head: Normocephalic and atraumatic.   Eyes: EOM are normal.   Neck: Normal range of motion. Neck supple.   Cardiovascular:   Pulses:       Dorsalis pedis pulses are 2+ on the right side and 2+ on the left side.   Pulmonary/Chest: No respiratory distress.   Musculoskeletal: Normal range of motion.      Right knee: She exhibits normal range of motion.        Right ankle: She exhibits normal range of motion. No tenderness.        Legs:         Right foot: No tenderness (no ttp on exam).      Comments: Right ankle and foot non tender. Pain in right mid foot when bearing weight.   Neurological: She is alert and oriented to person, place, and time.   Skin: Skin is warm and dry.   Psychiatric: She has a normal mood and affect. Her behavior is normal. Judgment and thought content normal.         ED Course   Procedures  Labs Reviewed - No data to display       Imaging Results          XR Weight Bearing Foot Complete 3+V Right (Final result)  Result time 09/07/20 14:01:11    Final result by Clovis Crawley MD (09/07/20 14:01:11)                  Impression:      1. Mild hallux valgus deformity with moderate degenerative osteoarthrosis of the 1st MTP joint.  2. Pes planus.  3. Bone demineralization.      Electronically signed by: Clovis Crawley  Date:    09/07/2020  Time:    14:01             Narrative:    EXAMINATION:  Multiple views of the right foot.    CLINICAL HISTORY:  Foot pain.    TECHNIQUE:  AP, lateral and oblique views of the right foot.    COMPARISON:  01/19/2015.    FINDINGS:  Mild hallux valgus deformity with moderate degenerative osteoarthrosis of the 1st MTP joint.  Mild degenerative osteoarthrosis involving the IP joints of the digits.    The tarsal bones are intact with mild degenerative osteoarthrosis.  Normal tarsometatarsal alignment.  Small os peroneum.  Moderate sized os naviculare.    There is loss of the normal plantar arch on the lateral view consistent with pes planus.  Mild bone demineralization.                                 Medical Decision Making:   History:   Old Medical Records: I decided to obtain old medical records.  Clinical Tests:   Radiological Study: Ordered and Reviewed            Scribe Attestation:   Scribe #1: I performed the above scribed service and the documentation accurately describes the services I performed. I attest to the accuracy of the note.    I, Dr. Edmonds, personally performed the services described in this documentation. All medical record entries made by the scribe were at my direction and in my presence.  I have reviewed the chart and agree that the record reflects my personal performance and is accurate and complete.2:40 PM 09/07/2020            ED Course as of Sep 07 1436   Mon Sep 07, 2020   1252 BP(!): 154/73 [EF]   1252 Temp: 99.1 °F (37.3 °C) [EF]   1252 Temp src: Oral [EF]   1252 Pulse: 82 [EF]   1252 Resp: 18 [EF]   1252 SpO2: 98 % [EF]   1407 XR Weight Bearing Foot Complete 3+V Right [EF]   1412 Patient presents to the ER with right midfoot pain only when standing there is no ankle pain  at all.  X-rays are unremarkable she has an orthopedics appointment on Thursday.    [EF]   1430 Patient refuses crutches    [EF]      ED Course User Index  [EF] Salvador Edmonds MD                Clinical Impression:       ICD-10-CM ICD-9-CM   1. Right foot pain  M79.671 729.5         Disposition:   Disposition: Discharged  Condition: Stable     ED Disposition Condition    Discharge Stable        ED Prescriptions     None        Follow-up Information     Follow up With Specialties Details Why Contact Info    ortho as scheduled                                         Salvador Edmonds MD  09/07/20 7165

## 2020-09-10 ENCOUNTER — OFFICE VISIT (OUTPATIENT)
Dept: ORTHOPEDICS | Facility: CLINIC | Age: 75
End: 2020-09-10
Payer: MEDICARE

## 2020-09-10 VITALS — BODY MASS INDEX: 23.7 KG/M2 | HEIGHT: 69 IN | WEIGHT: 160 LBS | RESPIRATION RATE: 16 BRPM

## 2020-09-10 DIAGNOSIS — M76.821 POSTERIOR TIBIAL TENDINITIS OF RIGHT LEG: Primary | ICD-10-CM

## 2020-09-10 PROCEDURE — 1101F PT FALLS ASSESS-DOCD LE1/YR: CPT | Mod: CPTII,S$GLB,, | Performed by: ORTHOPAEDIC SURGERY

## 2020-09-10 PROCEDURE — 97760 ORTHOTIC MGMT&TRAING 1ST ENC: CPT | Mod: GP,S$GLB,, | Performed by: ORTHOPAEDIC SURGERY

## 2020-09-10 PROCEDURE — 99213 PR OFFICE/OUTPT VISIT, EST, LEVL III, 20-29 MIN: ICD-10-PCS | Mod: 24,S$GLB,, | Performed by: ORTHOPAEDIC SURGERY

## 2020-09-10 PROCEDURE — 1159F PR MEDICATION LIST DOCUMENTED IN MEDICAL RECORD: ICD-10-PCS | Mod: S$GLB,,, | Performed by: ORTHOPAEDIC SURGERY

## 2020-09-10 PROCEDURE — 97760 PR ORTHOTIC MGMT&TRAINJ INITIAL ENC EA 15 MINS: ICD-10-PCS | Mod: GP,S$GLB,, | Performed by: ORTHOPAEDIC SURGERY

## 2020-09-10 PROCEDURE — 1101F PR PT FALLS ASSESS DOC 0-1 FALLS W/OUT INJ PAST YR: ICD-10-PCS | Mod: CPTII,S$GLB,, | Performed by: ORTHOPAEDIC SURGERY

## 2020-09-10 PROCEDURE — 99999 PR PBB SHADOW E&M-EST. PATIENT-LVL III: CPT | Mod: PBBFAC,,, | Performed by: ORTHOPAEDIC SURGERY

## 2020-09-10 PROCEDURE — 1159F MED LIST DOCD IN RCRD: CPT | Mod: S$GLB,,, | Performed by: ORTHOPAEDIC SURGERY

## 2020-09-10 PROCEDURE — 1125F PR PAIN SEVERITY QUANTIFIED, PAIN PRESENT: ICD-10-PCS | Mod: S$GLB,,, | Performed by: ORTHOPAEDIC SURGERY

## 2020-09-10 PROCEDURE — 1125F AMNT PAIN NOTED PAIN PRSNT: CPT | Mod: S$GLB,,, | Performed by: ORTHOPAEDIC SURGERY

## 2020-09-10 PROCEDURE — 99213 OFFICE O/P EST LOW 20 MIN: CPT | Mod: 24,S$GLB,, | Performed by: ORTHOPAEDIC SURGERY

## 2020-09-10 PROCEDURE — 99999 PR PBB SHADOW E&M-EST. PATIENT-LVL III: ICD-10-PCS | Mod: PBBFAC,,, | Performed by: ORTHOPAEDIC SURGERY

## 2020-09-10 NOTE — PROGRESS NOTES
Past Medical History:   Diagnosis Date    Abnormal CBC 2/10/2020    Anemia due to multiple mechanisms 2/10/2020    Anemia, chronic disease 2/10/2020    Anemia, chronic renal failure, stage 4 (severe) 2/10/2020    Arthritis     Bell's palsy     Bladder incontinence     Blepharospasm     Cervical dystonia     Concussion     COPD (chronic obstructive pulmonary disease)     Fainting spell     2/7/2020    Hormone deficiency     Migraine     Muscle spasm     Myofacial pain syndrome     Normocytic normochromic anemia 2/10/2020       Past Surgical History:   Procedure Laterality Date    APPENDECTOMY      BREAST BIOPSY      BREAST LUMPECTOMY      EYE SURGERY      HYSTERECTOMY      NECK SURGERY      REVISION OF KNEE ARTHROPLASTY Right 7/14/2020    Procedure: REVISION, ARTHROPLASTY, KNEE;  Surgeon: Pedro Tompkins MD;  [facility]  Service: Orthopedics;  Laterality: Right;    TONSILLECTOMY         Current Outpatient Medications   Medication Sig    amLODIPine (NORVASC) 2.5 MG tablet every evening.     baclofen (LIORESAL) 20 MG tablet Take 20 mg by mouth 2 (two) times daily.    diclofenac sodium (VOLTAREN) 1 % Gel Apply topically.     gabapentin (NEURONTIN) 600 MG tablet TAKE 1 TABLET BY MOUTH IN THE MORNING AT NOON AND THEN 2 TABLETS IN THE EVENING    levalbuterol (XOPENEX HFA) 45 mcg/actuation inhaler Inhale 2 puffs into the lungs every 6 (six) hours as needed for Wheezing. Rescue    omeprazole (PRILOSEC) 40 MG capsule Take 40 mg by mouth 2 (two) times daily before meals.    oxyCODONE-acetaminophen (PERCOCET) 5-325 mg per tablet Take 1 tablet by mouth every 4 (four) hours as needed for Pain.    simethicone (GAS-X ORAL) Take by mouth.    trazodone (DESYREL) 100 MG tablet Take 100 mg by mouth every evening.      venlafaxine (EFFEXOR-XR) 150 MG Cp24 Take 1 capsule (150 mg total) by mouth once daily.    aspirin (ECOTRIN) 81 MG EC tablet Take 1 tablet (81 mg total) by mouth 2  (two) times a day. To prevent post-surgical blood clots.     No current facility-administered medications for this visit.      Facility-Administered Medications Ordered in Other Visits   Medication    fentaNYL injection 100 mcg       Review of patient's allergies indicates:   Allergen Reactions    Artane Other (See Comments)     Swelling    Ned-1 Swelling     Oragel caused tongue to swell    Adhesive tape-silicones Rash     Blisters after on for several hours    Latex, natural rubber Hives and Rash     Localized    Latex     Morphine Itching and Hives       Family History   Problem Relation Age of Onset    Cancer Mother     Diabetes Neg Hx     Melanoma Neg Hx     Psoriasis Neg Hx     Lupus Neg Hx     Eczema Neg Hx        Social History     Socioeconomic History    Marital status:      Spouse name: Not on file    Number of children: Not on file    Years of education: Not on file    Highest education level: Not on file   Occupational History    Not on file   Social Needs    Financial resource strain: Not on file    Food insecurity     Worry: Not on file     Inability: Not on file    Transportation needs     Medical: Not on file     Non-medical: Not on file   Tobacco Use    Smoking status: Never Smoker    Smokeless tobacco: Never Used   Substance and Sexual Activity    Alcohol use: No    Drug use: No    Sexual activity: Not on file   Lifestyle    Physical activity     Days per week: Not on file     Minutes per session: Not on file    Stress: Not on file   Relationships    Social connections     Talks on phone: Not on file     Gets together: Not on file     Attends Advent service: Not on file     Active member of club or organization: Not on file     Attends meetings of clubs or organizations: Not on file     Relationship status: Not on file   Other Topics Concern    Are you pregnant or think you may be? Not Asked    Breast-feeding Not Asked   Social History Narrative    Not  on file       Chief Complaint:   Chief Complaint   Patient presents with    Post-op Evaluation     s/p right knee TKA revision 7/14/20        Date of surgery:July 14, 2020 revision right total knee arthroplasty    History of present illness:  This is a 74-year-old female underwent right knee tibial revision for some aseptic loosening of her tibial component.  Patient comes in with a new complaint of right ankle and foot pain.  Pain along the medial aspect of the ankle and along the medial arch.  Patient notices an increase bump on the right.  Hurts to weightbear.  Pain is constant.  Rates pain is a 5/10.  She had to go the emergency room earlier in the week.      Review of Systems:  Musculoskeletal:  See HPI    Physical Examination:    Vital Signs:    Vitals:    09/10/20 1416   Resp: 16       Body mass index is 23.63 kg/m².    This a well-developed, well nourished patient in no acute distress.  They are alert and oriented and cooperative to examination.  Pt. walks without an antalgic gait.      Examination of the patient's right foot and ankle shows no signs of rashes or erythema. The patient has no ecchymosis or effusion or masses. The patient has a negative anterior drawer and talar tilting exam. The patient has full range of motion of ankle dorsiflexion, plantarflexion, inversion, and eversion. Patient has 5 out of 5 motor strength in all muscle groups. Patient has 2+ dorsalis pedis pulses and intact light touch sensation. The patient is tender along the course of the posterior tibial tendons.  Patient has a very palpable accessory navicular.    X-rays:  Three views of the right foot are ordered and reviewed which show an accessory navicular.  Significant osteopenia noted.  Mild degenerative change     Assessment::  Right posterior tibial tendinitis    Plan:  Reviewed the findings with her today.  Recommended a 3D boot for at least a month.  I will see her back in 2 months to check her knee.    We performed a  custom orthotic/brace fitting, adjusting and training with the patient. The patient demonstrated understanding and proper care. This was performed for 15 minutes.          This note was created using M Modal voice recognition software that occasionally misinterpreted phrases or words.

## 2020-09-17 ENCOUNTER — PATIENT MESSAGE (OUTPATIENT)
Dept: ORTHOPEDICS | Facility: CLINIC | Age: 75
End: 2020-09-17

## 2020-09-17 DIAGNOSIS — Z96.651 STATUS POST TOTAL RIGHT KNEE REPLACEMENT: Primary | ICD-10-CM

## 2020-09-21 ENCOUNTER — CLINICAL SUPPORT (OUTPATIENT)
Dept: REHABILITATION | Facility: HOSPITAL | Age: 75
End: 2020-09-21
Attending: ORTHOPAEDIC SURGERY
Payer: MEDICARE

## 2020-09-21 DIAGNOSIS — Z96.651 STATUS POST TOTAL RIGHT KNEE REPLACEMENT: ICD-10-CM

## 2020-09-21 DIAGNOSIS — R26.9 GAIT ABNORMALITY: ICD-10-CM

## 2020-09-21 PROCEDURE — 97161 PT EVAL LOW COMPLEX 20 MIN: CPT | Mod: PN

## 2020-09-21 NOTE — PLAN OF CARE
[facility]  Physical Therapy Initial Evaluation    Date: 9/21/2020   Name: Marleen Samano  Clinic Number: [id number]    Therapy Diagnosis:   Encounter Diagnoses   Name Primary?    Status post total right knee replacement     Gait abnormality      Physician: Pedro Tompkins,*    Physician Orders: PT Eval and Treat   Medical Diagnosis from Referral: s/p RT TKA  Evaluation Date: 9/21/2020  Authorization Period Expiration: 10/5/20  Plan of Care Expiration: 11/4/20  Visit # / Visits authorized: 1/ 6    Time In: 0845  Time Out: 0925  Total Appointment Time (timed & untimed codes): 40 minutes    Precautions: Standard    Subjective   Date of onset: 7/14/20 DOS  History of current condition - Marleen reports: she underwent rt TKA 7/14/20 a revision of original TKR in 2014.     Medical History:   Past Medical History:   Diagnosis Date    Abnormal CBC 2/10/2020    Anemia due to multiple mechanisms 2/10/2020    Anemia, chronic disease 2/10/2020    Anemia, chronic renal failure, stage 4 (severe) 2/10/2020    Arthritis     Bell's palsy     Bladder incontinence     Blepharospasm     Cervical dystonia     Concussion     COPD (chronic obstructive pulmonary disease)     Fainting spell     2/7/2020    Hormone deficiency     Migraine     Muscle spasm     Myofacial pain syndrome     Normocytic normochromic anemia 2/10/2020       Surgical History:   Marleen Samano  has a past surgical history that includes Neck surgery; Hysterectomy; Appendectomy; Tonsillectomy; Eye surgery; Breast lumpectomy; Breast biopsy; and Revision of knee arthroplasty (Right, 7/14/2020).    Medications:   Marleen has a current medication list which includes the following prescription(s): amlodipine, aspirin, baclofen, diclofenac sodium, gabapentin, levalbuterol, omeprazole, oxycodone-acetaminophen, simethicone, trazodone, and venlafaxine, and the following Facility-Administered Medications: fentanyl.    Allergies:    Review of patient's allergies indicates:   Allergen Reactions    Artane Other (See Comments)     Swelling    Ned-1 Swelling     Oragel caused tongue to swell    Adhesive tape-silicones Rash     Blisters after on for several hours    Latex, natural rubber Hives and Rash     Localized    Latex     Morphine Itching and Hives        Imaging, s/p TKR.:     Prior Therapy: HH  Social History: In [address] lives with their family  Occupation: Not working  Prior Level of Function: Independent  Current Level of Function: Independent.    Pain:  Current 0/10, worst 7/10, best 0/10   Location: right knee    Description: Aching, Dull, Sharp and Variable  Aggravating Factors: Standing, Bending, Walking, Extension, Flexing and Lifting  Easing Factors: relaxation, pain medication and rest    Patients goals: restore previous OMAR.    Objective     Knee  Right   Left  Pain/Dysfunction with Movement    AROM PROM MMT AROM PROM MMT    Flexion    110 125 3    Extension    -10 -3 3-          Limitation/Restriction for FOTO 52 Survey    Therapist reviewed FOTO scores for Marleen Samano on 9/21/2020.   FOTO documents entered into [employer] - see Media section.    Limitation Score: % impaired. LEFS13/80.       Education provided:   - POC    Assessment   Marleen is a 74 y.o. female referred to outpatient Physical Therapy with a medical diagnosis of S/P TKA. Patient presents with ROM and strength deficits,gait abnormality,decreased function due to pain and above listed deficits.    Patient prognosis is Good.   Patientt will benefit from skilled outpatient Physical Therapy to address the deficits stated above and in the chart below, provide patient /family education, and to maximize patientt's level of independence.     Plan of care discussed with patient: Yes  Patient's spiritual, cultural and educational needs considered and patient is agreeable to the plan of care and goals as stated below:     Anticipated Barriers for therapy:  no    Medical Necessity is demonstrated by the following  History  Co-morbidities and personal factors that may impact the plan of care Co-morbidities:   advanced age    Personal Factors:   no deficits     low   Examination  Body Structures and Functions, activity limitations and participation restrictions that may impact the plan of care Body Regions:   lower extremities    Body Systems:    ROM  strength  gait    Participation Restrictions:   no    Activity limitations:   Learning and applying knowledge  no deficits    General Tasks and Commands  no deficits    Communication  no deficits    Mobility  no deficits    Self care  no deficits    Domestic Life  no deficits    Interactions/Relationships  no deficits    Life Areas  no deficits    Community and Social Life  no deficits         low   Clinical Presentation stable and uncomplicated low   Decision Making/ Complexity Score: low     Goals:  Short Term Goals: 3 weeks   1.Decrease pain x 1 grade.  2.Start HEP.    Long Term Goals: 6 weeks   1.Pain 0-4/10.  2.Independent HEP.  3.ROM 0-125.  4.Strength 5/5.   5.LEFS 48/80.  6.Gait WNL.  7.Restore previous OMAR.    Plan   Plan of care Certification: 9/21/2020 to 11/21/20.    Outpatient Physical Therapy 2 times weekly for 6 weeks to include the following interventions: Electrical Stimulation PRN, Gait Training, Manual Therapy, Moist Heat/ Ice, Patient Education, Therapeutic Activites and Therapeutic Exercise.     Marino Benson, PT

## 2020-09-22 ENCOUNTER — HOSPITAL ENCOUNTER (OUTPATIENT)
Dept: RADIOLOGY | Facility: CLINIC | Age: 75
Discharge: HOME | End: 2020-09-22
Attending: PODIATRIST
Payer: MEDICARE

## 2020-09-22 ENCOUNTER — TELEPHONE (OUTPATIENT)
Dept: FAMILY MEDICINE | Facility: CLINIC | Age: 75
End: 2020-09-22

## 2020-09-22 ENCOUNTER — OFFICE VISIT (OUTPATIENT)
Dept: PODIATRY | Facility: CLINIC | Age: 75
End: 2020-09-22
Payer: MEDICARE

## 2020-09-22 VITALS — WEIGHT: 160 LBS | RESPIRATION RATE: 16 BRPM | TEMPERATURE: 98 F | HEIGHT: 69 IN | BODY MASS INDEX: 23.7 KG/M2

## 2020-09-22 DIAGNOSIS — M20.5X9 HALLUX LIMITUS, UNSPECIFIED LATERALITY: ICD-10-CM

## 2020-09-22 DIAGNOSIS — M21.42 PES PLANUS OF BOTH FEET: ICD-10-CM

## 2020-09-22 DIAGNOSIS — M19.072 ARTHRITIS OF MIDTARSAL JOINT OF LEFT FOOT: ICD-10-CM

## 2020-09-22 DIAGNOSIS — S92.501A CLOSED FRACTURE OF PHALANX OF RIGHT FIFTH TOE, INITIAL ENCOUNTER: Primary | ICD-10-CM

## 2020-09-22 DIAGNOSIS — M79.674 TOE PAIN, BILATERAL: ICD-10-CM

## 2020-09-22 DIAGNOSIS — M20.41 HAMMER TOES OF BOTH FEET: ICD-10-CM

## 2020-09-22 DIAGNOSIS — M19.071 ARTHRITIS OF MIDTARSAL JOINT OF RIGHT FOOT: ICD-10-CM

## 2020-09-22 DIAGNOSIS — M21.41 PES PLANUS OF BOTH FEET: ICD-10-CM

## 2020-09-22 DIAGNOSIS — M20.42 HAMMER TOES OF BOTH FEET: ICD-10-CM

## 2020-09-22 DIAGNOSIS — M79.675 TOE PAIN, BILATERAL: ICD-10-CM

## 2020-09-22 PROCEDURE — 1101F PT FALLS ASSESS-DOCD LE1/YR: CPT | Mod: S$GLB,,, | Performed by: PODIATRIST

## 2020-09-22 PROCEDURE — 1101F PR PT FALLS ASSESS DOC 0-1 FALLS W/OUT INJ PAST YR: ICD-10-PCS | Mod: S$GLB,,, | Performed by: PODIATRIST

## 2020-09-22 PROCEDURE — 1125F PR PAIN SEVERITY QUANTIFIED, PAIN PRESENT: ICD-10-PCS | Mod: S$GLB,,, | Performed by: PODIATRIST

## 2020-09-22 PROCEDURE — 73630 XR FOOT COMPLETE 3 VIEW BILATERAL: ICD-10-PCS | Mod: 50,S$GLB,, | Performed by: PODIATRIST

## 2020-09-22 PROCEDURE — 1159F PR MEDICATION LIST DOCUMENTED IN MEDICAL RECORD: ICD-10-PCS | Mod: S$GLB,,, | Performed by: PODIATRIST

## 2020-09-22 PROCEDURE — 73630 X-RAY EXAM OF FOOT: CPT | Mod: 50,S$GLB,, | Performed by: PODIATRIST

## 2020-09-22 PROCEDURE — 99203 OFFICE O/P NEW LOW 30 MIN: CPT | Mod: S$GLB,,, | Performed by: PODIATRIST

## 2020-09-22 PROCEDURE — 99203 PR OFFICE/OUTPT VISIT, NEW, LEVL III, 30-44 MIN: ICD-10-PCS | Mod: S$GLB,,, | Performed by: PODIATRIST

## 2020-09-22 PROCEDURE — 1159F MED LIST DOCD IN RCRD: CPT | Mod: S$GLB,,, | Performed by: PODIATRIST

## 2020-09-22 PROCEDURE — 1125F AMNT PAIN NOTED PAIN PRSNT: CPT | Mod: S$GLB,,, | Performed by: PODIATRIST

## 2020-09-22 NOTE — TELEPHONE ENCOUNTER
lmom that 10/9 appt was cancelled since  has a cme seminar to attend and that she needs to contact the office to reschedule this appt

## 2020-09-22 NOTE — PROGRESS NOTES
[address]  [place] [address]  Phone: [phone]   [phone]    Patient's PCP:Nitin Jackson DO  Referring Provider: No ref. provider found    Subjective:      Chief Complaint:: Toe Pain (bilateral fifth metatarsal pain)    FRANCY Samano is a 74 y.o. female who presents with a complaint of  Bilateral fifth metatarsal pain lasting for several week. Onset of the symptoms was pain in both pinky toes worse in the left foot after stubbing toe over two weeks ago.  Current symptoms include pain, inflammation and red discoloration, burning pain in third toe Pain worse in left foot.  Aggravating factors are walking weight bearing. Symptoms have progressed.Treatment to date have included bunion pads. Patient also states that she was place in boot cast several weeks ago for irration of joint in right foot Patients rates pain 6/10 on pain scale.     Vitals:    09/22/20 1443   Resp: 16   Temp: 98.1 °F (36.7 °C)     Shoe Size: 10    Past Surgical History:   Procedure Laterality Date    APPENDECTOMY      BREAST BIOPSY      BREAST LUMPECTOMY      EYE SURGERY      HYSTERECTOMY      NECK SURGERY      REVISION OF KNEE ARTHROPLASTY Right 7/14/2020    Procedure: REVISION, ARTHROPLASTY, KNEE;  Surgeon: Pedro Tompkins MD;  [facility]: [facility];  Service: Orthopedics;  Laterality: Right;    TONSILLECTOMY       Past Medical History:   Diagnosis Date    Abnormal CBC 2/10/2020    Anemia due to multiple mechanisms 2/10/2020    Anemia, chronic disease 2/10/2020    Anemia, chronic renal failure, stage 4 (severe) 2/10/2020    Arthritis     Bell's palsy     Bladder incontinence     Blepharospasm     Cervical dystonia     Concussion     COPD (chronic obstructive pulmonary disease)     Fainting spell     2/7/2020    Hormone deficiency     Migraine     Muscle spasm     Myofacial pain syndrome     Normocytic normochromic anemia 2/10/2020     Family History   Problem Relation Age of  Onset    Cancer Mother     Diabetes Neg Hx     Melanoma Neg Hx     Psoriasis Neg Hx     Lupus Neg Hx     Eczema Neg Hx         Social History:   Marital Status:   Alcohol History:  reports no history of alcohol use.  Tobacco History:  reports that she has never smoked. She has never used smokeless tobacco.  Drug History:  reports no history of drug use.    Review of patient's allergies indicates:   Allergen Reactions    Artane Other (See Comments)     Swelling    Ned-1 Swelling     Oragel caused tongue to swell    Adhesive tape-silicones Rash     Blisters after on for several hours    Latex, natural rubber Hives and Rash     Localized    Morphine Itching and Hives       Current Outpatient Medications   Medication Sig Dispense Refill    amLODIPine (NORVASC) 2.5 MG tablet every evening.   4    aspirin (ECOTRIN) 81 MG EC tablet Take 1 tablet (81 mg total) by mouth 2 (two) times a day. To prevent post-surgical blood clots. 60 tablet 0    baclofen (LIORESAL) 20 MG tablet Take 20 mg by mouth 2 (two) times daily.      diclofenac sodium (VOLTAREN) 1 % Gel Apply topically.       gabapentin (NEURONTIN) 600 MG tablet TAKE 1 TABLET BY MOUTH IN THE MORNING AT NOON AND THEN 2 TABLETS IN THE EVENING      levalbuterol (XOPENEX HFA) 45 mcg/actuation inhaler Inhale 2 puffs into the lungs every 6 (six) hours as needed for Wheezing. Rescue 1 Inhaler 11    omeprazole (PRILOSEC) 40 MG capsule Take 40 mg by mouth 2 (two) times daily before meals.      oxyCODONE-acetaminophen (PERCOCET) 5-325 mg per tablet Take 1 tablet by mouth every 4 (four) hours as needed for Pain. 28 tablet 0    simethicone (GAS-X ORAL) Take by mouth.      trazodone (DESYREL) 100 MG tablet Take 100 mg by mouth every evening.        venlafaxine (EFFEXOR-XR) 150 MG Cp24 Take 1 capsule (150 mg total) by mouth once daily.       No current facility-administered medications for this visit.      Facility-Administered Medications Ordered in  Other Visits   Medication Dose Route Frequency Provider Last Rate Last Dose    fentaNYL injection 100 mcg  100 mcg Intravenous See admin instructions Pedro Tompkisn MD   50 mcg at 07/14/20 0757       Review of Systems      Objective:        Physical Exam:   Foot Exam    General  General Appearance: appears stated age and healthy   Orientation: alert and oriented to person, place, and time   Affect: appropriate   Gait: antalgic       Right Foot/Ankle     Inspection and Palpation  Ecchymosis: none  Tenderness: none   Swelling: none   Arch: pes planus (Palpable exostosis dorsal Lisfranc joint.  Os tibialis externum present.)  Hammertoes: second toe, third toe, fourth toe and fifth toe  Hallux valgus: no  Hallux limitus: yes  Skin Exam: skin intact; (Atrophy of fat pad plantar foot)    Neurovascular  Dorsalis pedis: 2+  Posterior tibial: 2+  Saphenous nerve sensation: normal  Tibial nerve sensation: normal  Superficial peroneal nerve sensation: normal  Deep peroneal nerve sensation: normal  Sural nerve sensation: normal    Muscle Strength  Ankle dorsiflexion: 5  Ankle plantar flexion: 5  Ankle inversion: 5  Ankle eversion: 5  Great toe extension: 5  Great toe flexion: 5    Range of Motion    Normal right ankle ROM  Passive  Ankle dorsiflexion: 5    Active  Ankle dorsiflexion: 5      Left Foot/Ankle      Inspection and Palpation  Ecchymosis: none  Tenderness: (Middle and distal phalanx 5th toe.  Mild pain 3rd intermetatarsal space)  Swelling: (Mild swelling middle and distal phalanx 5th toe)  Arch: pes planus (Palpable exostosis dorsal Lisfranc joint.  Os tibialis externum present.)  Hammertoes: second toe, third toe, fourth toe and fifth toe  Hallux valgus: no  Hallux limitus: yes  Skin Exam: skin intact; (Atrophy of fat pad plantar foot)    Neurovascular  Dorsalis pedis: 2+  Posterior tibial: 2+  Saphenous nerve sensation: normal  Tibial nerve sensation: normal  Superficial peroneal nerve sensation:  normal  Deep peroneal nerve sensation: normal  Sural nerve sensation: normal    Muscle Strength  Ankle dorsiflexion: 5  Ankle plantar flexion: 5  Ankle inversion: 5  Ankle eversion: 5  Great toe extension: 5  Great toe flexion: 5    Range of Motion    Normal left ankle ROM  Passive  Ankle dorsiflexion: 5    Active  Ankle dorsiflexion: 5          Physical Exam   Cardiovascular:   Pulses:       Dorsalis pedis pulses are 2+ on the right side and 2+ on the left side.        Posterior tibial pulses are 2+ on the right side and 2+ on the left side.   Musculoskeletal:      Right foot: No bunion.      Left foot: No bunion.       Imaging:   AP, lateral, lateral oblique weight-bearing x-rays bilateral:  No acute fractures, no bone tumors, no soft tissue masses seen.  Os tibialis externum bilateral.  Pronated feet bilateral.  Early stage II hallux limitus bilateral.  Hammertoe deformity 2,3,4,5 bilateral.  Possible transverse fracture base of middle phalanx 5th toe left foot with no displacement.         Assessment:       1. Closed fracture of phalanx of right fifth toe, initial encounter    2. Toe pain, bilateral    3. Hammer toes of both feet    4. Arthritis of midtarsal joint of left foot    5. Arthritis of midtarsal joint of right foot    6. Pes planus of both feet    7. Hallux limitus, unspecified laterality      Plan:   Closed fracture of phalanx of right fifth toe, initial encounter    Toe pain, bilateral  -     X-Ray Foot Complete Bilateral    Hammer toes of both feet    Arthritis of midtarsal joint of left foot    Arthritis of midtarsal joint of right foot    Pes planus of both feet    Hallux limitus, unspecified laterality     1.  Today evaluate the patient had a long discussion with the concern of peripheral neuropathy and that I do not believe her feet her the cause of her neuropathy that is probably related to previous back problems and other potential causes.  2.  I described to her the possible incomplete  transverse fracture of the 5th toe and we recommended she buddy splint the 4th and 5th toes wear stiff sole shoes that do not irritate the toe for 4 weeks and see how she does.  3.  I discussed with the arthritis of her foot the flat feet and recommended conservative care with running shoes and over-the-counter inserts.  She will ice the area take anti-inflammatories or use topical anti-inflammatories as needed.  4.  Return as needed.  No follow-ups on file.    Procedures - None    Counseling:     I provided patient education verbally regarding:   Patient diagnosis, treatment options, as well as alternatives, risks, and benefits.     This note was created using Dragon voice recognition software that occasionally misinterpreted phrases or words.

## 2020-09-23 ENCOUNTER — CLINICAL SUPPORT (OUTPATIENT)
Dept: REHABILITATION | Facility: HOSPITAL | Age: 75
End: 2020-09-23
Attending: ORTHOPAEDIC SURGERY
Payer: MEDICARE

## 2020-09-23 DIAGNOSIS — R26.9 GAIT ABNORMALITY: ICD-10-CM

## 2020-09-23 PROCEDURE — 97110 THERAPEUTIC EXERCISES: CPT | Mod: PN,CQ

## 2020-09-23 NOTE — PROGRESS NOTES
"  Physical Therapy Treatment Note     Name: Marleen SHINE [facility] Number: [id number]    Therapy Diagnosis:   Encounter Diagnosis   Name Primary?    Gait abnormality      Physician: Pedro Tompkins,*    Visit Date: 9/23/2020    Physician Orders: PT Eval and Treat   Medical Diagnosis from Referral: s/p RT TKA  Evaluation Date: 9/21/2020  Authorization Period Expiration: 10/5/20  Plan of Care Expiration: 11/4/20  Visit # / Visits authorized: 2/6    Time In: 1100  Time Out: 1145  Total Billable Time: 45 minutes    Precautions: Standard    Subjective     Pt reports: Having R ant and lateral thigh soreness.  She had not received compliant with home exercise program.  Response to previous treatment: no problem  Functional change: none stated    Pain: 5/10  Location: right ant and lateral thigh      Objective     Marleen received therapeutic exercises to develop strength, endurance and ROM for 45 minutes including:    Bike Lv1 10' seat at 11  SAQ sit with stool x30 R/L  LAQ end of mat x30 R/L alternating  Supine:IT band stretch 2x30" with strap R/L   Heel slide x30 R/L alternating   Piriformis stretch 2x30" R/L  Prone HS curl x30 R/L alternating (with pillow under trunk)    Home Exercises Provided and Patient Education Provided     Education provided:   - Rest break between each rep, decreased activity with increased pain.    Written Home Exercises Provided: yes.  Exercises were reviewed and Marleen was able to demonstrate them prior to the end of the session.  Marleen demonstrated good  understanding of the education provided.     See EMR under Media for exercises provided 9/23/2020.    Assessment     Patient tolerated activities with cues for direction and proper form.    Marleen is progressing well towards her goals.   Pt prognosis is Good.     Pt will continue to benefit from skilled outpatient physical therapy to address the deficits listed in the problem list box on initial evaluation, provide pt/family education " and to maximize pt's level of independence in the home and community environment.     Pt's spiritual, cultural and educational needs considered and pt agreeable to plan of care and goals.     Anticipated barriers to physical therapy: none    Goals:     Short Term Goals: 3 weeks   1.Decrease pain x 1 grade.  2.Start HEP. (given today)     Long Term Goals: 6 weeks   1.Pain 0-4/10.   2.Independent HEP.  3.ROM 0-125.  4.Strength 5/5.   5.LEFS 48/80.  6.Gait WNL.  7.Restore previous OMAR.      Plan     Continue with POC to increase activities as patient tolerates.    Mary Ann Alvarez, PTA

## 2020-09-29 ENCOUNTER — PATIENT OUTREACH (OUTPATIENT)
Dept: ADMINISTRATIVE | Facility: OTHER | Age: 75
End: 2020-09-29

## 2020-09-29 ENCOUNTER — CLINICAL SUPPORT (OUTPATIENT)
Dept: REHABILITATION | Facility: HOSPITAL | Age: 75
End: 2020-09-29
Attending: ORTHOPAEDIC SURGERY
Payer: MEDICARE

## 2020-09-29 ENCOUNTER — TELEPHONE (OUTPATIENT)
Dept: PODIATRY | Facility: CLINIC | Age: 75
End: 2020-09-29

## 2020-09-29 DIAGNOSIS — R26.9 GAIT ABNORMALITY: ICD-10-CM

## 2020-09-29 PROCEDURE — 97110 THERAPEUTIC EXERCISES: CPT | Mod: PN,CQ

## 2020-09-29 NOTE — PROGRESS NOTES
"  Physical Therapy Treatment Note     Name: Marleen SHINE [facility] Number: [id number]    Therapy Diagnosis:   Encounter Diagnosis   Name Primary?    Gait abnormality      Physician: Pedro Tompkins,*    Visit Date: 9/29/2020    Physician Orders: PT Eval and Treat   Medical Diagnosis from Referral: s/p RT TKA  Evaluation Date: 9/21/2020  Authorization Period Expiration: 10/5/20  Plan of Care Expiration: 11/4/20  Visit # / Visits authorized: 3/6    Time In: 1000  Time Out: 1045  Total Billable Time: 45 minutes    Precautions: Standard    Subjective     Pt reports: Doing well overall.  She was not compliant with home exercise program 2nd to "losing the paper"  Response to previous treatment: no problem  Functional change: none stated    Pain: 0/10  Location:       Objective     Marleen received therapeutic exercises to develop strength, endurance and ROM for 45 minutes including:    Bike Lv1 10' seat at 10  QS sit with stool 2x30 R/L  LAQ end of mat x30 R/L alternating    Supine:Heel slide x30 R/L alternating    Stand HS curl x30 R/L alternating     ROM R knee: -3 - 122 deg    Patient with complaints of "not feeling well" and "feeling weak", sat in chair and provided with water and wet cloth until felt better.    Home Exercises Provided and Patient Education Provided     Education provided:   - Decreased speed with increased range    Written Home Exercises Provided: yes.  Exercises were reviewed and Marleen was able to demonstrate them prior to the end of the session.  Marleen demonstrated good  understanding of the education provided.     See EMR under Media for exercises provided 9/29/2020.    Assessment     Patient tolerated activities with cues for direction and proper form.    Marleen is progressing well towards her goals.   Pt prognosis is Good.     Pt will continue to benefit from skilled outpatient physical therapy to address the deficits listed in the problem list box on initial evaluation, provide pt/family " education and to maximize pt's level of independence in the home and community environment.     Pt's spiritual, cultural and educational needs considered and pt agreeable to plan of care and goals.     Anticipated barriers to physical therapy: none    Goals:     Short Term Goals: 3 weeks   1.Decrease pain x 1 grade.  2.Start HEP.      Long Term Goals: 6 weeks   1.Pain 0-4/10.   2.Independent HEP (progressing)  3.ROM 0-125. (progressing)  4.Strength 5/5.   5.LEFS 48/80.  6.Gait WNL.  7.Restore previous OMAR.      Plan     Continue with POC top increase activity endurance as patient tolerates.    Mary Ann Alvarez, PTA

## 2020-09-29 NOTE — PROGRESS NOTES
Chart was reviewed for overdue Proactive [facility] Encounters ([facility])  topics  Updates were requested from care everywhere  Health Maintenance has been updated  LINKS immunization registry triggered

## 2020-09-29 NOTE — TELEPHONE ENCOUNTER
Returned to Mrs. Smaano call regarding her appointment last Tuesday and request for visit notes. I informed patient that she would be able to print them through the patient portal. Patient expressed understanding.

## 2020-09-30 ENCOUNTER — PATIENT MESSAGE (OUTPATIENT)
Dept: HEMATOLOGY/ONCOLOGY | Facility: CLINIC | Age: 75
End: 2020-09-30

## 2020-10-01 ENCOUNTER — LAB VISIT (OUTPATIENT)
Dept: LAB | Facility: HOSPITAL | Age: 75
End: 2020-10-01
Attending: INTERNAL MEDICINE
Payer: MEDICARE

## 2020-10-01 DIAGNOSIS — D64.89 ANEMIA DUE TO MULTIPLE MECHANISMS: ICD-10-CM

## 2020-10-01 DIAGNOSIS — D63.8 ANEMIA, CHRONIC DISEASE: ICD-10-CM

## 2020-10-01 DIAGNOSIS — D63.1 ANEMIA, CHRONIC RENAL FAILURE, STAGE 4 (SEVERE): ICD-10-CM

## 2020-10-01 DIAGNOSIS — D53.9 NUTRITIONAL ANEMIA, UNSPECIFIED: ICD-10-CM

## 2020-10-01 DIAGNOSIS — N18.4 ANEMIA, CHRONIC RENAL FAILURE, STAGE 4 (SEVERE): ICD-10-CM

## 2020-10-01 DIAGNOSIS — D64.9 NORMOCYTIC NORMOCHROMIC ANEMIA: ICD-10-CM

## 2020-10-01 LAB
ALBUMIN SERPL BCP-MCNC: 4.1 G/DL (ref 3.5–5.2)
ALP SERPL-CCNC: 110 U/L (ref 55–135)
ALT SERPL W/O P-5'-P-CCNC: 13 U/L (ref 10–44)
ANION GAP SERPL CALC-SCNC: 9 MMOL/L (ref 8–16)
AST SERPL-CCNC: 18 U/L (ref 10–40)
BASOPHILS # BLD AUTO: 0.02 K/UL (ref 0–0.2)
BASOPHILS NFR BLD: 0.4 % (ref 0–1.9)
BILIRUB SERPL-MCNC: 0.4 MG/DL (ref 0.1–1)
BUN SERPL-MCNC: 31 MG/DL (ref 8–23)
CALCIUM SERPL-MCNC: 9.1 MG/DL (ref 8.7–10.5)
CHLORIDE SERPL-SCNC: 102 MMOL/L (ref 95–110)
CO2 SERPL-SCNC: 29 MMOL/L (ref 23–29)
CREAT SERPL-MCNC: 1 MG/DL (ref 0.5–1.4)
DIFFERENTIAL METHOD: ABNORMAL
EOSINOPHIL # BLD AUTO: 0.1 K/UL (ref 0–0.5)
EOSINOPHIL NFR BLD: 2.5 % (ref 0–8)
ERYTHROCYTE [DISTWIDTH] IN BLOOD BY AUTOMATED COUNT: 13 % (ref 11.5–14.5)
EST. GFR  (AFRICAN AMERICAN): >60 ML/MIN/1.73 M^2
EST. GFR  (NON AFRICAN AMERICAN): 55 ML/MIN/1.73 M^2
FERRITIN SERPL-MCNC: 421 NG/ML (ref 20–300)
FOLATE SERPL-MCNC: 12.4 NG/ML (ref 4–24)
GLUCOSE SERPL-MCNC: 84 MG/DL (ref 70–110)
HCT VFR BLD AUTO: 38.8 % (ref 37–48.5)
HGB BLD-MCNC: 12.1 G/DL (ref 12–16)
IMM GRANULOCYTES # BLD AUTO: 0.01 K/UL (ref 0–0.04)
IMM GRANULOCYTES NFR BLD AUTO: 0.2 % (ref 0–0.5)
IRON SERPL-MCNC: 73 UG/DL (ref 30–160)
LYMPHOCYTES # BLD AUTO: 1.3 K/UL (ref 1–4.8)
LYMPHOCYTES NFR BLD: 27.1 % (ref 18–48)
MCH RBC QN AUTO: 30.8 PG (ref 27–31)
MCHC RBC AUTO-ENTMCNC: 31.2 G/DL (ref 32–36)
MCV RBC AUTO: 99 FL (ref 82–98)
MONOCYTES # BLD AUTO: 0.5 K/UL (ref 0.3–1)
MONOCYTES NFR BLD: 9.9 % (ref 4–15)
NEUTROPHILS # BLD AUTO: 2.9 K/UL (ref 1.8–7.7)
NEUTROPHILS NFR BLD: 59.9 % (ref 38–73)
NRBC BLD-RTO: 0 /100 WBC
PLATELET # BLD AUTO: 172 K/UL (ref 150–350)
PMV BLD AUTO: 9.8 FL (ref 9.2–12.9)
POTASSIUM SERPL-SCNC: 4.8 MMOL/L (ref 3.5–5.1)
PROT SERPL-MCNC: 7 G/DL (ref 6–8.4)
RBC # BLD AUTO: 3.93 M/UL (ref 4–5.4)
SATURATED IRON: 27 % (ref 20–50)
SODIUM SERPL-SCNC: 140 MMOL/L (ref 136–145)
TOTAL IRON BINDING CAPACITY: 275 UG/DL (ref 250–450)
TRANSFERRIN SERPL-MCNC: 186 MG/DL (ref 200–375)
VIT B12 SERPL-MCNC: 742 PG/ML (ref 210–950)
WBC # BLD AUTO: 4.83 K/UL (ref 3.9–12.7)

## 2020-10-01 PROCEDURE — 82728 ASSAY OF FERRITIN: CPT

## 2020-10-01 PROCEDURE — 82607 VITAMIN B-12: CPT

## 2020-10-01 PROCEDURE — 80053 COMPREHEN METABOLIC PANEL: CPT

## 2020-10-01 PROCEDURE — 85025 COMPLETE CBC W/AUTO DIFF WBC: CPT

## 2020-10-01 PROCEDURE — 82746 ASSAY OF FOLIC ACID SERUM: CPT

## 2020-10-01 PROCEDURE — 83540 ASSAY OF IRON: CPT

## 2020-10-01 PROCEDURE — 36415 COLL VENOUS BLD VENIPUNCTURE: CPT

## 2020-10-02 ENCOUNTER — CLINICAL SUPPORT (OUTPATIENT)
Dept: REHABILITATION | Facility: HOSPITAL | Age: 75
End: 2020-10-02
Payer: MEDICARE

## 2020-10-02 DIAGNOSIS — R26.9 GAIT ABNORMALITY: ICD-10-CM

## 2020-10-02 PROCEDURE — 97110 THERAPEUTIC EXERCISES: CPT | Mod: PN

## 2020-10-02 NOTE — PROGRESS NOTES
10/02/20 1000   Ankle Exercises   Double Leg Calf Raises Reps/Sets/Weight 30   Standing Dorsiflexion Stretch(Gastroc) Reps/Sets/Hold Time 3X30   Knee Exercises   Frontal Squats Reps/Sets/Weight 30   Tg/Shuttle/Reformer Squats Reps/Sets/Weight/Level 37# 7'   Lumbar Exercises   Hamsting Stretch Reps/Sets/Hold Time 3X30   Additional Exercises   Additional Exercise BIKE 10'   Additional Exercise GAIT TRAINING

## 2020-10-02 NOTE — PROGRESS NOTES
Physical Therapy Treatment Note     Name: Marleen SHINE [facility] Number: [id number]    Therapy Diagnosis:   Encounter Diagnosis   Name Primary?    Gait abnormality      Physician: Pedro Tompkins,*    Visit Date: 10/2/2020    Physician Orders: PT Eval and Treat   Medical Diagnosis from Referral: s/p RT TKA  Evaluation Date: 9/21/2020  Authorization Period Expiration: 10/5/20  Plan of Care Expiration: 11/4/20  Visit # / Visits authorized: 1/ 6     Time In: 0956  Time Out: 108  Total Billable Time: 42 minutes    Precautions: Standard    Subjective     Pt reports: no new c/o.  She was compliant with home exercise program.  Response to previous treatment: GOOD  Functional change: NO    Pain: 5/10  Location: right knee      Objective     Marleen received therapeutic exercises to develop strength, endurance, ROM and flexibility for 40 minutes including:  SEE FLOWSHEET    Education provided:   - gait pattern ed    Assessment     Performed well  Marleen is progressing well towards her goals.   Pt prognosis is Good.     Pt will continue to benefit from skilled outpatient physical therapy to address the deficits listed in the problem list box on initial evaluation, provide pt/family education and to maximize pt's level of independence in the home and community environment.     Pt's spiritual, cultural and educational needs considered and pt agreeable to plan of care and goals.     Anticipated barriers to physical therapy: no    Goals:   Short Term Goals: 3 weeks   1.Decrease pain x 1 grade.  2.Start HEP.     Long Term Goals: 6 weeks   1.Pain 0-4/10.  2.Independent HEP.  3.ROM 0-125.  4.Strength 5/5.   5.LEFS 48/80.  6.Gait WNL.  7.Restore previous OMAR.     Plan     Per POC.    Marino Benson, PT

## 2020-10-13 ENCOUNTER — CLINICAL SUPPORT (OUTPATIENT)
Dept: REHABILITATION | Facility: HOSPITAL | Age: 75
End: 2020-10-13
Payer: MEDICARE

## 2020-10-13 DIAGNOSIS — R26.9 GAIT ABNORMALITY: ICD-10-CM

## 2020-10-13 PROCEDURE — 97110 THERAPEUTIC EXERCISES: CPT | Mod: PN,CQ

## 2020-10-13 NOTE — PROGRESS NOTES
"  Physical Therapy Treatment Note     Name: Marleen SHINE [facility] Number: [id number]    Therapy Diagnosis:   Encounter Diagnosis   Name Primary?    Gait abnormality      Physician: Pedro Tompkins,*    Visit Date: 10/13/2020    Physician Orders: PT Eval and Treat   Medical Diagnosis from Referral: s/p RT TKA  Evaluation Date: 9/21/2020  Authorization Period Expiration: 10/5/20  Plan of Care Expiration: 11/4/20  Visit # / Visits authorized: 5/6      Time In: 1004  Time Out: 1050  Total Billable Time: 46 minutes    Precautions: Standard    Subjective     Pt reports: Was ill last week and stayed in bed mostly.  She was compliant with home exercise program.  Response to previous treatment: no problem  Functional change: none stated    Pain: 6/10  Location: right knee      Objective       Marleen received therapeutic exercises to R LE to develop strength, endurance and ROM for 46 minutes including:     Bike Lv1 10'  Hamstring stretch 3x30" sit edge of chair R/L  Sit<>Stand x4 with cues to increase B LE WB  // bar: HR/TR x30   Mini squats x30   Sup/pronation B feet on 1/2 roll x20    Step Up 6" box x20    Home Exercises Provided and Patient Education Provided     Education provided:   - Increased R LE WB with stand activities and decreased R hip adduction    Written Home Exercises Provided: Patient instructed to cont prior HEP.  Exercises were reviewed and Marleen was able to demonstrate them prior to the end of the session.  Marleen demonstrated good  understanding of the education provided.     See EMR under Patient Instructions for exercises provided prior visit.    Assessment     Patient tolerated activities with cues for proper form.    Marleen is progressing towards her goals.   Pt prognosis is Good.     Pt will continue to benefit from skilled outpatient physical therapy to address the deficits listed in the problem list box on initial evaluation, provide pt/family education and to maximize pt's level of " independence in the home and community environment.     Pt's spiritual, cultural and educational needs considered and pt agreeable to plan of care and goals.     Anticipated barriers to physical therapy: none    Goals:     Short Term Goals: 3 weeks   1.Decrease pain x 1 grade.  2.Start HEP.      Long Term Goals: 6 weeks   1.Pain 0-4/10.   2.Independent HEP (progressing)  3.ROM 0-125. (progressing)  4.Strength 5/5.   5.LEFS 48/80.  6.Gait WNL.  7.Restore previous OMAR.      Plan     Continue with POC to increase activity endurance as patient tolerance while monitoring form.    Mary Ann Alvarez, PTA

## 2020-10-20 ENCOUNTER — CLINICAL SUPPORT (OUTPATIENT)
Dept: REHABILITATION | Facility: HOSPITAL | Age: 75
End: 2020-10-20
Payer: MEDICARE

## 2020-10-20 ENCOUNTER — TELEPHONE (OUTPATIENT)
Dept: OPTOMETRY | Facility: CLINIC | Age: 75
End: 2020-10-20

## 2020-10-20 DIAGNOSIS — R26.9 GAIT ABNORMALITY: ICD-10-CM

## 2020-10-20 PROCEDURE — 97110 THERAPEUTIC EXERCISES: CPT | Mod: PN,CQ

## 2020-10-20 NOTE — TELEPHONE ENCOUNTER
LVM for pt that eye appt on 11-16-20 with Dr. Mcfarland is cancelled due to change in doctor schedule and will need to be rescheduled.  Pt to call back.

## 2020-10-20 NOTE — TELEPHONE ENCOUNTER
----- Message from Marianna Boswell sent at 10/20/2020  9:56 AM CDT -----  Type: Needs Medical Advice  Who Called:  Patient  Best Call Back Number: [phone] (home)   Additional Information: the patient said she was told to call to change her appt until Oct 28th but that day is not avail  please call patient to resched appt the caller was Alyce MORRISSEY

## 2020-10-20 NOTE — PROGRESS NOTES
Physical Therapy Treatment Note     Name: Marleen SHINE [facility] Number: [id number]    Therapy Diagnosis:   Encounter Diagnosis   Name Primary?    Gait abnormality      Physician: Pedro Tompkins,*    Visit Date: 10/20/2020    Physician Orders: PT Eval and Treat   Medical Diagnosis from Referral: s/p RT TKA  Evaluation Date: 9/21/2020  Authorization Period Expiration: 10/5/20  Plan of Care Expiration: 11/4/20  Visit # / Visits authorized: 6/12     Time In: 1006  Time Out: 1045  Total Billable Time: 39 minutes    Precautions: Standard    Subjective     Pt reports: Having R ankle pain, reports staying in bed Sunday with ice and elevation to R LE.  She was compliant with home exercise program.  Response to previous treatment: no problem  Functional change: none stated    Pain: 4/10  Location: right ankles     Objective       Marleen received therapeutic exercises to R LE to develop strength, endurance and ROM for 39 minutes including:     Bike Lv2 12' (with cues decrease R foot eversion)  // bar:   HR/TR Airex x30              Mini squats Airex x30   March Airex x30 alt  Sit: Ankle ROM x30 long sit: PF/DF, inv/ever, cw, ccw   Quad sets x30 long sit   LAQ 3x10    Home Exercises Provided and Patient Education Provided     Education provided:   - Decrease speed with all activities, increase attention to R foot with all exercises and with ambulation.    Written Home Exercises Provided: Patient instructed to cont prior HEP.  Exercises were reviewed and Marleen was able to demonstrate them prior to the end of the session.  Marleen demonstrated good  understanding of the education provided.     See EMR under Patient Instructions for exercises provided prior visit.    Assessment     Patient tolerated activities with rest breaks, cueing needed to decrease speed with exercises and increase range.    Marleen is progressing towards her goals.   Pt prognosis is Good.     Pt will continue to benefit from skilled outpatient  physical therapy to address the deficits listed in the problem list box on initial evaluation, provide pt/family education and to maximize pt's level of independence in the home and community environment.     Pt's spiritual, cultural and educational needs considered and pt agreeable to plan of care and goals.     Anticipated barriers to physical therapy: none    Goals:     Short Term Goals: 3 weeks   1.Decrease pain x 1 grade.  2.Start HEP.      Long Term Goals: 6 weeks   1.Pain 0-4/10.   2.Independent HEP (progressing)  3.ROM 0-125. (progressing)  4.Strength 5/5.   5.LEFS 48/80.  6.Gait WNL.  7.Restore previous OMAR.      Plan     Continue with POC to increase activity endurance as patient tolerates.    Mary Ann Alvarez, PTA

## 2020-10-22 ENCOUNTER — CLINICAL SUPPORT (OUTPATIENT)
Dept: REHABILITATION | Facility: HOSPITAL | Age: 75
End: 2020-10-22
Payer: MEDICARE

## 2020-10-22 ENCOUNTER — OFFICE VISIT (OUTPATIENT)
Dept: ORTHOPEDICS | Facility: CLINIC | Age: 75
End: 2020-10-22
Payer: MEDICARE

## 2020-10-22 VITALS — BODY MASS INDEX: 23.7 KG/M2 | WEIGHT: 160 LBS | HEIGHT: 69 IN

## 2020-10-22 DIAGNOSIS — M25.562 LEFT KNEE PAIN, UNSPECIFIED CHRONICITY: ICD-10-CM

## 2020-10-22 DIAGNOSIS — Z96.651 STATUS POST TOTAL RIGHT KNEE REPLACEMENT: Primary | ICD-10-CM

## 2020-10-22 DIAGNOSIS — M76.821 POSTERIOR TIBIAL TENDINITIS OF RIGHT LEG: ICD-10-CM

## 2020-10-22 DIAGNOSIS — R26.9 GAIT ABNORMALITY: ICD-10-CM

## 2020-10-22 PROCEDURE — 1101F PR PT FALLS ASSESS DOC 0-1 FALLS W/OUT INJ PAST YR: ICD-10-PCS | Mod: CPTII,S$GLB,, | Performed by: ORTHOPAEDIC SURGERY

## 2020-10-22 PROCEDURE — 97110 THERAPEUTIC EXERCISES: CPT | Mod: PN,CQ

## 2020-10-22 PROCEDURE — 1159F MED LIST DOCD IN RCRD: CPT | Mod: S$GLB,,, | Performed by: ORTHOPAEDIC SURGERY

## 2020-10-22 PROCEDURE — 99999 PR PBB SHADOW E&M-EST. PATIENT-LVL III: ICD-10-PCS | Mod: PBBFAC,,, | Performed by: ORTHOPAEDIC SURGERY

## 2020-10-22 PROCEDURE — 1101F PT FALLS ASSESS-DOCD LE1/YR: CPT | Mod: CPTII,S$GLB,, | Performed by: ORTHOPAEDIC SURGERY

## 2020-10-22 PROCEDURE — 1125F PR PAIN SEVERITY QUANTIFIED, PAIN PRESENT: ICD-10-PCS | Mod: S$GLB,,, | Performed by: ORTHOPAEDIC SURGERY

## 2020-10-22 PROCEDURE — 1125F AMNT PAIN NOTED PAIN PRSNT: CPT | Mod: S$GLB,,, | Performed by: ORTHOPAEDIC SURGERY

## 2020-10-22 PROCEDURE — 99999 PR PBB SHADOW E&M-EST. PATIENT-LVL III: CPT | Mod: PBBFAC,,, | Performed by: ORTHOPAEDIC SURGERY

## 2020-10-22 PROCEDURE — 1159F PR MEDICATION LIST DOCUMENTED IN MEDICAL RECORD: ICD-10-PCS | Mod: S$GLB,,, | Performed by: ORTHOPAEDIC SURGERY

## 2020-10-22 PROCEDURE — 99213 PR OFFICE/OUTPT VISIT, EST, LEVL III, 20-29 MIN: ICD-10-PCS | Mod: S$GLB,,, | Performed by: ORTHOPAEDIC SURGERY

## 2020-10-22 PROCEDURE — 99213 OFFICE O/P EST LOW 20 MIN: CPT | Mod: S$GLB,,, | Performed by: ORTHOPAEDIC SURGERY

## 2020-10-22 NOTE — PROGRESS NOTES
Past Medical History:   Diagnosis Date    Abnormal CBC 2/10/2020    Anemia due to multiple mechanisms 2/10/2020    Anemia, chronic disease 2/10/2020    Anemia, chronic renal failure, stage 4 (severe) 2/10/2020    Arthritis     Bell's palsy     Bladder incontinence     Blepharospasm     Cervical dystonia     Concussion     COPD (chronic obstructive pulmonary disease)     Fainting spell     2/7/2020    Hormone deficiency     Migraine     Muscle spasm     Myofacial pain syndrome     Normocytic normochromic anemia 2/10/2020       Past Surgical History:   Procedure Laterality Date    APPENDECTOMY      BREAST BIOPSY      BREAST LUMPECTOMY      EYE SURGERY      HYSTERECTOMY      NECK SURGERY      REVISION OF KNEE ARTHROPLASTY Right 7/14/2020    Procedure: REVISION, ARTHROPLASTY, KNEE;  Surgeon: Pedro Tompkins MD;  Location: [facility];  Service: Orthopedics;  Laterality: Right;    TONSILLECTOMY         Current Outpatient Medications   Medication Sig    amLODIPine (NORVASC) 2.5 MG tablet every evening.     aspirin (ECOTRIN) 81 MG EC tablet Take 1 tablet (81 mg total) by mouth 2 (two) times a day. To prevent post-surgical blood clots.    baclofen (LIORESAL) 20 MG tablet Take 20 mg by mouth 2 (two) times daily.    diclofenac sodium (VOLTAREN) 1 % Gel Apply topically.     gabapentin (NEURONTIN) 600 MG tablet TAKE 1 TABLET BY MOUTH IN THE MORNING AT NOON AND THEN 2 TABLETS IN THE EVENING    levalbuterol (XOPENEX HFA) 45 mcg/actuation inhaler Inhale 2 puffs into the lungs every 6 (six) hours as needed for Wheezing. Rescue    omeprazole (PRILOSEC) 40 MG capsule Take 40 mg by mouth 2 (two) times daily before meals.    oxyCODONE-acetaminophen (PERCOCET) 5-325 mg per tablet Take 1 tablet by mouth every 4 (four) hours as needed for Pain.    simethicone (GAS-X ORAL) Take by mouth.    trazodone (DESYREL) 100 MG tablet Take 100 mg by mouth every evening.      venlafaxine (EFFEXOR-XR) 150 MG  Cp24 Take 1 capsule (150 mg total) by mouth once daily.     No current facility-administered medications for this visit.      Facility-Administered Medications Ordered in Other Visits   Medication    fentaNYL injection 100 mcg       Review of patient's allergies indicates:   Allergen Reactions    Artane Other (See Comments)     Swelling    Ned-1 Swelling     Oragel caused tongue to swell    Adhesive tape-silicones Rash     Blisters after on for several hours    Latex, natural rubber Hives and Rash     Localized    Morphine Itching and Hives       Family History   Problem Relation Age of Onset    Cancer Mother     Diabetes Neg Hx     Melanoma Neg Hx     Psoriasis Neg Hx     Lupus Neg Hx     Eczema Neg Hx        Social History     Socioeconomic History    Marital status:      Spouse name: Not on file    Number of children: Not on file    Years of education: Not on file    Highest education level: Not on file   Occupational History    Not on file   Social Needs    Financial resource strain: Not on file    Food insecurity     Worry: Not on file     Inability: Not on file    Transportation needs     Medical: Not on file     Non-medical: Not on file   Tobacco Use    Smoking status: Never Smoker    Smokeless tobacco: Never Used   Substance and Sexual Activity    Alcohol use: No    Drug use: No    Sexual activity: Not on file   Lifestyle    Physical activity     Days per week: Not on file     Minutes per session: Not on file    Stress: Not on file   Relationships    Social connections     Talks on phone: Not on file     Gets together: Not on file     Attends Rastafarian service: Not on file     Active member of club or organization: Not on file     Attends meetings of clubs or organizations: Not on file     Relationship status: Not on file   Other Topics Concern    Are you pregnant or think you may be? Not Asked    Breast-feeding Not Asked   Social History Narrative    Not on file        Chief Complaint:   Chief Complaint   Patient presents with    Right Knee - Pain     Status post right TKA revision on 07/14/20       Date of surgery:July 14, 2020 revision right total knee arthroplasty    History of present illness:  This is a 74-year-old female underwent right knee tibial revision for some aseptic loosening of her tibial component.  Patient comes in with continued complaint of right ankle and foot pain.  Also complaining of more pain in the left knee.  Pain is a 5/10.    Review of Systems:  Musculoskeletal:  See HPI    Physical Examination:    Vital Signs:    There were no vitals filed for this visit.    Body mass index is 23.63 kg/m².    This a well-developed, well nourished patient in no acute distress.  They are alert and oriented and cooperative to examination.  Pt. walks without an antalgic gait.      Examination of the left knee shows no rashes or erythema. There are no masses ecchymosis or effusion. Patient has full range of motion from 0-130°. Patient is minimally tender to palpation over lateral joint line and nontender to palpation over the medial joint line. Patient has a - Lachman exam, - anterior drawer exam, and - posterior drawer exam. - Luis Felipe's exam. Knee is stable to varus and valgus stress. 5 out of 5 motor strength. Palpable distal pulses. Intact light touch sensation. Negative Patellofemoral crepitus    X-rays:  Three views of the right foot are  reviewed which show an accessory navicular.  Significant osteopenia noted.  Mild degenerative change     Assessment::  Left knee pain  IT band tendinitis  Bilateral leg swelling    Plan:  Reviewed the findings with her today.  We will add her left knee to her current therapy orders.  I recommended wearing some compression socks or stockings to help with ankle swelling and edema.  Continue to work on quad strengthening.  I will see her back in 2 months        This note was created using [employer] voice recognition software that  occasionally misinterpreted phrases or words.

## 2020-10-22 NOTE — PROGRESS NOTES
"  Physical Therapy Treatment Note     Name: Marleen SHINE [facility] Number: [id number]    Therapy Diagnosis:   Encounter Diagnosis   Name Primary?    Gait abnormality      Physician: Pedro Tompkins,*    Visit Date: 10/22/2020    Physician Orders: PT Eval and Treat   Medical Diagnosis from Referral: s/p RT TKA  Evaluation Date: 9/21/2020  Authorization Period Expiration: 10/5/20  Plan of Care Expiration: 11/4/20  Visit # / Visits authorized: 7/12      Time In: 1045  Time Out: 1130  Total Billable Time: 45 minutes    Precautions: Standard    Subjective     Pt reports: Having increased pain B knees and B LE's laterally and anteriorly. Patient reports not been doing much lately but laying in bed 2nd to pain.  She was compliant with home exercise program.  Response to previous treatment: no problem  Functional change: none stated    Pain: 5/10  Location: bilateral knees and upper legs     Objective     Marleen received therapeutic exercises to B LE to develop strength, endurance and ROM for 45 minutes including:     Bike Lv1 5' (decreased tolerance)  Hamstring stretch seated 3x30" R/L  Supine:Hamstring stretch with strap 3x30" R/L (cues for direction>supervision)   IT band stretch with strap 3x30" R/L (cues for direction>supervision)   SLR 2x10 R/L   Heel slide x20 R/L    Home Exercises Provided and Patient Education Provided     Education provided:   - Rest breaks between each rep. Encouraged patient to wear proper shoe wear to gym and to increase activities at home with proper shoe wear.    Written Home Exercises Provided: yes.  Exercises were reviewed and Marleen was able to demonstrate them prior to the end of the session.  Marleen demonstrated fair  understanding of the education provided.     See EMR under Media for exercises provided 10/22/2020.    Assessment     Patient with poor tolerance to activities today.    Marleen is progressing towards her goals.   Pt prognosis is Fair.     Pt will continue to benefit " from skilled outpatient physical therapy to address the deficits listed in the problem list box on initial evaluation, provide pt/family education and to maximize pt's level of independence in the home and community environment.     Pt's spiritual, cultural and educational needs considered and pt agreeable to plan of care and goals.     Anticipated barriers to physical therapy: none    Goals:     Short Term Goals: 3 weeks   1.Decrease pain x 1 grade.  2.Start HEP.      Long Term Goals: 6 weeks   1.Pain 0-4/10.   2.Independent HEP (progressing)  3.ROM 0-125. (progressing)  4.Strength 5/5.   5.LEFS 48/80.  6.Gait WNL.  7.Restore previous OMAR.         Plan     Continue with POC to increase activity endurance as patient tolerates.    Mary Ann Alvarez, PTA

## 2020-11-09 ENCOUNTER — CLINICAL SUPPORT (OUTPATIENT)
Dept: REHABILITATION | Facility: HOSPITAL | Age: 75
End: 2020-11-09
Attending: ORTHOPAEDIC SURGERY
Payer: MEDICARE

## 2020-11-09 DIAGNOSIS — Z96.651 STATUS POST TOTAL RIGHT KNEE REPLACEMENT: ICD-10-CM

## 2020-11-09 DIAGNOSIS — R26.9 GAIT ABNORMALITY: ICD-10-CM

## 2020-11-09 DIAGNOSIS — M25.562 LEFT KNEE PAIN, UNSPECIFIED CHRONICITY: ICD-10-CM

## 2020-11-09 PROCEDURE — 97110 THERAPEUTIC EXERCISES: CPT | Mod: PN

## 2020-11-09 NOTE — PROGRESS NOTES
Physical Therapy Treatment Note     Name: Marleen SHINE [facility] Number: [id number]    Therapy Diagnosis:   Encounter Diagnoses   Name Primary?    Status post total right knee replacement     Left knee pain, unspecified chronicity     Gait abnormality      Physician: Pedro Tompkins,*    Visit Date: 11/9/2020     Physician Orders: PT Eval and Treat   Medical Diagnosis from Referral: s/p RT TKA  Evaluation Date: 9/21/2020  Authorization Period Expiration: 10/5/20  Plan of Care Expiration: 11/4/20  Visit # / Visits authorized: 1/ 6   Time In: 1600  Time Out: 1640  Total Billable Time: 40 minutes    Precautions: Standard    Subjective     Pt reports: pain in lateral aspect of rt knee..  She was compliant with home exercise program.  Response to previous treatment: good  Functional change: no    Pain: 5/10  Location: right knee      Objective     Marleen received therapeutic exercises to develop strength, endurance, ROM and flexibility for 40 minutes including:  See flowsheets    Marleen received the following manual therapy techniques: Joint mobilizations were applied to the: rt knee for 5 minutes,     Marleen participated in gait training to improve functional mobility and safety for 5  minutes,     Home Exercises Provided and Patient Education Provided     Education provided:   - gait pattern ed    Written Home Exercises Provided: yes.  Exercises were reviewed and Marleen was able to demonstrate them prior to the end of the session.  Marleen demonstrated good  understanding of the education provided.       Assessment     Performed well.  Marleen is progressing well towards her goals.   Pt prognosis is Good.     Pt will continue to benefit from skilled outpatient physical therapy to address the deficits listed in the problem list box on initial evaluation, provide pt/family education and to maximize pt's level of independence in the home and community environment.     Pt's spiritual, cultural and educational needs  considered and pt agreeable to plan of care and goals.     Anticipated barriers to physical therapy: no    Goals:    Short Term Goals: 3 weeks   1.Decrease pain x 1 grade.  2.Start HEP.     Long Term Goals: 6 weeks   1.Pain 0-4/10.  2.Independent HEP.  3.ROM 0-125.  4.Strength 5/5.   5.LEFS 48/80.  6.Gait WNL.  7.Restore previous OMAR.     Plan     Progress as able.    Marino Benson, PT

## 2020-11-09 NOTE — PROGRESS NOTES
11/09/20 1600   Ankle Exercises   Double Leg Calf Raises Reps/Sets/Weight 30   Standing Dorsiflexion Stretch(Gastroc) Reps/Sets/Hold Time 3x30   Knee Exercises   Frontal Squats Reps/Sets/Weight 30   Tg/Shuttle/Reformer Squats Reps/Sets/Weight/Level 37# 6'   Lumbar Exercises   Hamsting Stretch Reps/Sets/Hold Time 3x30   Additional Exercises   Additional Exercise BIKE 12'   Additional Exercise GT TRAINING 5'   Additional Exercise MTT 5'

## 2020-11-11 DIAGNOSIS — Z96.651 STATUS POST TOTAL RIGHT KNEE REPLACEMENT: Primary | ICD-10-CM

## 2020-11-12 ENCOUNTER — CLINICAL SUPPORT (OUTPATIENT)
Dept: REHABILITATION | Facility: HOSPITAL | Age: 75
End: 2020-11-12
Attending: ORTHOPAEDIC SURGERY
Payer: MEDICARE

## 2020-11-12 DIAGNOSIS — R26.9 GAIT ABNORMALITY: ICD-10-CM

## 2020-11-12 PROCEDURE — 97110 THERAPEUTIC EXERCISES: CPT | Mod: PN,CQ

## 2020-11-12 NOTE — PROGRESS NOTES
"  Physical Therapy Treatment Note     Name: Marleen SHINE [facility] Number: [id number]    Therapy Diagnosis:   Encounter Diagnosis   Name Primary?    Gait abnormality      Physician: Pedro Tompkins,*    Visit Date: 11/12/2020    Physician Orders: PT Eval and Treat   Medical Diagnosis from Referral: s/p RT TKA  Evaluation Date: 9/21/2020  Authorization Period Expiration: 10/5/20  Plan of Care Expiration: 11/4/20  Visit # / Visits authorized: 7/12      Time In: 0935 (late arrival)  Time Out: 1025  Total Billable Time: 50 minutes    Precautions: Standard    Subjective     Pt reports: Continued R LE pain without much change. Reports doing exercises in bed and staying in bed most of day.  She was compliant with home exercise program.  Response to previous treatment: no problem  Functional change: none stated    Pain: 6/10  Location: right lateral LE      Objective     Marleen received therapeutic exercises to develop strength, endurance, ROM, posture and core stabilization for 50 minutes including:    Bike Lv2 12'  // bar: Gastroc stretch 3x30" 1/2 roll B   HR/TR Airex x30   Mini squats Airex x30   March Airex x30 R/L Alternating   HS Curls Airex x30 R/L alternating   SLS Blue oval 3x30" R/L same side with and without UE support      Home Exercises Provided and Patient Education Provided     Education provided:   - Wear enclosed shoes and do stand exercises daily.    Written Home Exercises Provided: yes.  Exercises were reviewed and Marleen was able to demonstrate them prior to the end of the session.  Marleen demonstrated good  understanding of the education provided.     See EMR under Media for exercises provided 11/12/2020.    Assessment     Patient tolerated activities with cues for proper LE alignment.  Marleen is progressing towards her goals.   Pt prognosis is Fair.     Pt will continue to benefit from skilled outpatient physical therapy to address the deficits listed in the problem list box on initial " evaluation, provide pt/family education and to maximize pt's level of independence in the home and community environment.     Pt's spiritual, cultural and educational needs considered and pt agreeable to plan of care and goals.     Anticipated barriers to physical therapy: none    Goals:     Short Term Goals: 3 weeks   1.Decrease pain x 1 grade.  2.Start HEP.      Long Term Goals: 6 weeks   1.Pain 0-4/10.   2.Independent HEP (progressing)  3.ROM 0-125. (progressing)  4.Strength 5/5.   5.LEFS 48/80.  6.Gait WNL.  7.Restore previous OMAR.      Plan     Continue with POC to increase activities as patient tolerates.    Mary Ann Alvarez, PTA

## 2020-11-15 ENCOUNTER — PATIENT OUTREACH (OUTPATIENT)
Dept: ADMINISTRATIVE | Facility: OTHER | Age: 75
End: 2020-11-15

## 2020-11-15 ENCOUNTER — PATIENT MESSAGE (OUTPATIENT)
Dept: OPTOMETRY | Facility: CLINIC | Age: 75
End: 2020-11-15

## 2020-11-18 ENCOUNTER — TELEPHONE (OUTPATIENT)
Dept: OPTOMETRY | Facility: CLINIC | Age: 75
End: 2020-11-18

## 2020-11-18 ENCOUNTER — LAB VISIT (OUTPATIENT)
Dept: LAB | Facility: HOSPITAL | Age: 75
End: 2020-11-18
Attending: INTERNAL MEDICINE
Payer: MEDICARE

## 2020-11-18 DIAGNOSIS — D64.9 NORMOCYTIC NORMOCHROMIC ANEMIA: ICD-10-CM

## 2020-11-18 DIAGNOSIS — D64.89 ANEMIA DUE TO MULTIPLE MECHANISMS: ICD-10-CM

## 2020-11-18 DIAGNOSIS — D63.8 ANEMIA, CHRONIC DISEASE: ICD-10-CM

## 2020-11-18 DIAGNOSIS — N18.4 ANEMIA, CHRONIC RENAL FAILURE, STAGE 4 (SEVERE): ICD-10-CM

## 2020-11-18 DIAGNOSIS — D63.1 ANEMIA, CHRONIC RENAL FAILURE, STAGE 4 (SEVERE): ICD-10-CM

## 2020-11-18 LAB
ALBUMIN SERPL BCP-MCNC: 4.4 G/DL (ref 3.5–5.2)
ALP SERPL-CCNC: 110 U/L (ref 55–135)
ALT SERPL W/O P-5'-P-CCNC: 14 U/L (ref 10–44)
ANION GAP SERPL CALC-SCNC: 11 MMOL/L (ref 8–16)
AST SERPL-CCNC: 18 U/L (ref 10–40)
BASOPHILS # BLD AUTO: 0.01 K/UL (ref 0–0.2)
BASOPHILS NFR BLD: 0.2 % (ref 0–1.9)
BILIRUB SERPL-MCNC: 0.4 MG/DL (ref 0.1–1)
BUN SERPL-MCNC: 30 MG/DL (ref 8–23)
CALCIUM SERPL-MCNC: 9.7 MG/DL (ref 8.7–10.5)
CHLORIDE SERPL-SCNC: 102 MMOL/L (ref 95–110)
CO2 SERPL-SCNC: 29 MMOL/L (ref 23–29)
CREAT SERPL-MCNC: 1 MG/DL (ref 0.5–1.4)
DIFFERENTIAL METHOD: ABNORMAL
EOSINOPHIL # BLD AUTO: 0.2 K/UL (ref 0–0.5)
EOSINOPHIL NFR BLD: 4.1 % (ref 0–8)
ERYTHROCYTE [DISTWIDTH] IN BLOOD BY AUTOMATED COUNT: 12.2 % (ref 11.5–14.5)
EST. GFR  (AFRICAN AMERICAN): >60 ML/MIN/1.73 M^2
EST. GFR  (NON AFRICAN AMERICAN): 55 ML/MIN/1.73 M^2
FERRITIN SERPL-MCNC: 386 NG/ML (ref 20–300)
GLUCOSE SERPL-MCNC: 119 MG/DL (ref 70–110)
HCT VFR BLD AUTO: 42.4 % (ref 37–48.5)
HGB BLD-MCNC: 13.4 G/DL (ref 12–16)
IMM GRANULOCYTES # BLD AUTO: 0 K/UL (ref 0–0.04)
IMM GRANULOCYTES NFR BLD AUTO: 0 % (ref 0–0.5)
IRON SERPL-MCNC: 91 UG/DL (ref 30–160)
LYMPHOCYTES # BLD AUTO: 1.4 K/UL (ref 1–4.8)
LYMPHOCYTES NFR BLD: 30.6 % (ref 18–48)
MCH RBC QN AUTO: 31.1 PG (ref 27–31)
MCHC RBC AUTO-ENTMCNC: 31.6 G/DL (ref 32–36)
MCV RBC AUTO: 98 FL (ref 82–98)
MONOCYTES # BLD AUTO: 0.4 K/UL (ref 0.3–1)
MONOCYTES NFR BLD: 8.1 % (ref 4–15)
NEUTROPHILS # BLD AUTO: 2.5 K/UL (ref 1.8–7.7)
NEUTROPHILS NFR BLD: 57 % (ref 38–73)
NRBC BLD-RTO: 0 /100 WBC
PLATELET # BLD AUTO: 163 K/UL (ref 150–350)
PMV BLD AUTO: 9.7 FL (ref 9.2–12.9)
POTASSIUM SERPL-SCNC: 4 MMOL/L (ref 3.5–5.1)
PROT SERPL-MCNC: 7.3 G/DL (ref 6–8.4)
RBC # BLD AUTO: 4.31 M/UL (ref 4–5.4)
SATURATED IRON: 30 % (ref 20–50)
SODIUM SERPL-SCNC: 142 MMOL/L (ref 136–145)
TOTAL IRON BINDING CAPACITY: 300 UG/DL (ref 250–450)
TRANSFERRIN SERPL-MCNC: 203 MG/DL (ref 200–375)
WBC # BLD AUTO: 4.44 K/UL (ref 3.9–12.7)

## 2020-11-18 PROCEDURE — 80053 COMPREHEN METABOLIC PANEL: CPT

## 2020-11-18 PROCEDURE — 83540 ASSAY OF IRON: CPT

## 2020-11-18 PROCEDURE — 36415 COLL VENOUS BLD VENIPUNCTURE: CPT

## 2020-11-18 PROCEDURE — 82728 ASSAY OF FERRITIN: CPT

## 2020-11-18 PROCEDURE — 85025 COMPLETE CBC W/AUTO DIFF WBC: CPT

## 2020-11-18 NOTE — TELEPHONE ENCOUNTER
----- Message from Ignacio Silver sent at 11/18/2020  7:29 AM CST -----  Appointment Request From: Marleen Samano    With Provider: Desmond Mcfarland OD [[facility] PA[facility] 2 - Optometry]    Preferred Date Range: 12/15/2020 - 12/16/2020    Preferred Times: Monday Morning, Wednesday Morning    Reason for visit: Office Visit    Comments:  General annual    [phone] (Home)   [phone] (Mobile) #Preferred#

## 2020-11-18 NOTE — TELEPHONE ENCOUNTER
Returned call, no answer.  LVM stating that pt already has an appt scheduled on 12-11-20.  Will be more then happy to reschedule for pt however, next available is not until mid January 2021.   Pt to call back to discuss.

## 2020-11-24 NOTE — PROGRESS NOTES
[facility] Hematology/Oncology  PROGRESS NOTE -  Follow-up Visit      Subjective:       Patient ID:   NAME: Marleen Samano : 1945     74 y.o. female    Referring Doc: Mireya  Other Physicians: Wendy Fuentes/Kain Gallegos Dugan; Swetha Castañeda (neuro); Ced    Chief Complaint:  Anemia f/u    History of Present Illness:     Patient returns today for a regularly scheduled follow-up visit.  The patient is here today to go over the results of the recently ordered labs, tests and studies. She is doing ok with no new issues. She is here by herself.     She had right redo knee surgery on 2020 with Dr Powers and is doing ok with recovery etc.. No bleeding or BRBPR    She has some fatigue issues which are chronic; he had had two IV irons back in May/2020      Discussed covid19 precautions            ROS:   GEN: normal without any fever, night sweats or weight loss  HEENT: normal with no HA's, sore throat, stiff neck, changes in vision  CV: normal with no CP, SOB, PND, DE LEÓN or orthopnea  PULM: normal with no SOB, cough, hemoptysis, sputum or pleuritic pain  GI: normal with no abdominal pain, nausea, vomiting, constipation, diarrhea, melanotic stools, BRBPR, or hematemesis  : normal with no hematuria, dysuria  BREAST: normal with no mass, discharge, pain  SKIN: normal with no rash, erythema, bruising, or swelling    Pain Scale:    Allergies:  Review of patient's allergies indicates:   Allergen Reactions    Artane Other (See Comments)     Swelling    Ned-1 Swelling     Oragel caused tongue to swell    Adhesive tape-silicones Rash     Blisters after on for several hours    Latex, natural rubber Hives and Rash     Localized    Morphine Itching and Hives       Medications:    Current Outpatient Medications:     amLODIPine (NORVASC) 2.5 MG tablet, every evening. , Disp: , Rfl: 4    aspirin (ECOTRIN) 81 MG EC tablet, Take 1 tablet (81 mg total) by mouth 2 (two) times a day. To prevent  post-surgical blood clots., Disp: 60 tablet, Rfl: 0    baclofen (LIORESAL) 20 MG tablet, Take 20 mg by mouth 2 (two) times daily., Disp: , Rfl:     diclofenac sodium (VOLTAREN) 1 % Gel, Apply topically. , Disp: , Rfl:     gabapentin (NEURONTIN) 600 MG tablet, TAKE 1 TABLET BY MOUTH IN THE MORNING AT NOON AND THEN 2 TABLETS IN THE EVENING, Disp: , Rfl:     levalbuterol (XOPENEX HFA) 45 mcg/actuation inhaler, Inhale 2 puffs into the lungs every 6 (six) hours as needed for Wheezing. Rescue, Disp: 1 Inhaler, Rfl: 11    omeprazole (PRILOSEC) 40 MG capsule, Take 40 mg by mouth 2 (two) times daily before meals., Disp: , Rfl:     oxyCODONE-acetaminophen (PERCOCET) 5-325 mg per tablet, Take 1 tablet by mouth every 4 (four) hours as needed for Pain., Disp: 28 tablet, Rfl: 0    simethicone (GAS-X ORAL), Take by mouth., Disp: , Rfl:     trazodone (DESYREL) 100 MG tablet, Take 100 mg by mouth every evening.  , Disp: , Rfl:     venlafaxine (EFFEXOR-XR) 150 MG Cp24, Take 1 capsule (150 mg total) by mouth once daily., Disp: , Rfl:   No current facility-administered medications for this visit.     Facility-Administered Medications Ordered in Other Visits:     fentaNYL injection 100 mcg, 100 mcg, Intravenous, See admin instructions, Pedro Tompkins MD, 50 mcg at 07/14/20 0727    PMHx/PSHx Updates:  See patient's last visit with me on 7/7/2020.  See H&P on 3/17/2020        Pathology:  Cancer Staging  No matching staging information was found for the patient.          Objective:     Vitals:  Blood pressure 138/77, pulse 74, temperature 97.2 °F (36.2 °C), resp. rate 18, weight 75.8 kg (167 lb 3.2 oz).    Physical Examination:   GEN: no apparent distress, comfortable; AAOx3  HEAD: atraumatic and normocephalic  EYES: no pallor, no icterus, PERRLA  ENT: OMM, no pharyngeal erythema, external ears WNL; no nasal discharge; no thrush  NECK: no masses, thyroid normal, trachea midline, no LAD/LN's, supple  CV: RRR with no  murmur; normal pulse; normal S1 and S2; no pedal edema  CHEST: Normal respiratory effort; CTAB; normal breath sounds; no wheeze or crackles  ABDOM: nontender and nondistended; soft; normal bowel sounds; no rebound/guarding  MUSC/Skeletal: ROM normal; no crepitus; joints normal; s/p right knee redo - healing well  EXTREM: no clubbing, cyanosis, inflammation or swelling  SKIN: no rashes, lesions, ulcers, petechiae or subcutaneous nodules; chronic age related skin changes and varicosities in legs; some healing blisters on hands  : no calderon  NEURO: grossly intact; motor/sensory WNL; AAOx3; no tremors  PSYCH: normal mood, affect and behavior  LYMPH: normal cervical, supraclavicular, axillary and groin LN's            Labs:      Lab Results   Component Value Date    WBC 4.44 11/18/2020    HGB 13.4 11/18/2020    HCT 42.4 11/18/2020    MCV 98 11/18/2020     11/18/2020         BMP  Lab Results   Component Value Date     11/18/2020    K 4.0 11/18/2020     11/18/2020    CO2 29 11/18/2020    BUN 30 (H) 11/18/2020    CREATININE 1.0 11/18/2020    CALCIUM 9.7 11/18/2020    ANIONGAP 11 11/18/2020    ESTGFRAFRICA >60 11/18/2020    EGFRNONAA 55 (A) 11/18/2020        Lab Results   Component Value Date    IRON 91 11/18/2020    TIBC 300 11/18/2020    FERRITIN 386 (H) 11/18/2020        Lab Results   Component Value Date    IAPCXLDH88 742 10/01/2020     Lab Results   Component Value Date    FOLATE 12.4 10/01/2020             Radiology/Diagnostic Studies:    X-ray Chest Pa And Lateral    Result Date: 7/2/2020  EXAMINATION: XR CHEST PA AND LATERAL CLINICAL HISTORY: pre op; Presence of right artificial knee joint TECHNIQUE: PA and lateral views of the chest were performed. COMPARISON: CT chest of February 7, 2020 and chest x-ray May 7 2019. FINDINGS: The mediastinal and cardiac size and contour are within normal limits.  A rounded density at the right cardiophrenic angle is probably the patient's known hiatal hernia.   Ill-defined right upper lobe densities correspond to right upper lobe scarring noted on the prior CT scan.  Other intrapulmonary masses or infiltrates are not seen.  No pneumothorax or pleural effusion is noted.     Prominent hiatal hernia.  Ill-defined scarring noted in the right upper lobe, stable.  Otherwise negative chest x-ray. Electronically signed by: Yan Sahu MD Date:    07/02/2020 Time:    10:50    Nm Bone Scan Spect    Result Date: 6/11/2020  EXAMINATION: NM BONE SCAN SPECT CLINICAL HISTORY: right knee pain on total knee replacement;  Pain in right knee TECHNIQUE: 20.2 mCi technetium 99 M MDP was injected intravenously with anterior posterior whole body images obtained and SPECT images of the knees obtained in the short axis, vertical long axis, horizontal long axis projections. COMPARISON: None FINDINGS: Left knee; there is mild increased tracer localization more so at the lateral compartment appearing degenerative. There is also increased tracer localization in several joints including shoulders, wrists, hands, feet appearing degenerative and mild increased tracer localization the spine appearing degenerative.  There is a leftward convexity of the upper lumbar spine/thoracolumbar junction. Both kidneys are visualized without evidence of obstruction Right knee; there is void of activity consistent with TKA.  There is also increased tracer localization in the region the patella, proximal tibia and to lesser degree distal femur laterally.  In this patient with history of remote surgery 1994 findings suggest loosening     Abnormal study suggesting loosening right TKA. Degenerative change in several joints including lateral compartment left knee Electronically signed by: Yasmin Galloway MD Date:    06/11/2020 Time:    15:26    X-ray Knee Ortho Right With Flexion    Result Date: 6/11/2020  EXAMINATION: XR KNEE ORTHO RIGHT WITH FLEXION CLINICAL HISTORY: Pain in right knee TECHNIQUE: AP standing as well  as PA flexion standing and Merchant views of both knees were performed.  A lateral view of the right knee is also performed. COMPARISON: Knee x-rays of June 4, 2020. FINDINGS: The patient has undergone a right knee joint replacement with metallic femoral and tibial components in good position.  A fracture is not seen.  Lucency around the prosthesis consistent with osteomyelitis or loosening is not noted.  There is a small joint effusion.  Mild DJD is noted at the left knee.     Status post right knee joint replacement.  Small right joint effusion.  Mild DJD left knee. Electronically signed by: Yan Sahu MD Date:    06/11/2020 Time:    08:45      I have reviewed all available lab results and radiology reports.    Assessment/Plan:   (1) 74 y.o. female with diagnosis of abnormal cbc and chronic anemia issues who has been referred by Dr Gomes  - NCNC parameters; latest hgb at 12.3 and now WNL  - anemia due to multiple mechanisms  - anemia of chronic disease, chronic renal  - total bili is WNL, so I do not suspect any underlying hemolytic process  - latest iron panel wnl in April 2020 after getting two IV iron infusions     Labs from March 2020:  - B12 was adequate, folate was adequate       I can not rule out an underlying marrow problem at this point but her wbc and platelet counts are currently WNL.      She has negative EDG and colonoscopy with Dr Dowling about 5 months ago      11/25/2020:  - recent CBC is good - hgb 13.4  - no current anemia issues  - iron panel is adequate        (2) CRI - stage IV per records but I don't appreciate that on the recent labs     (3) HTN     (4) COPD     (5) Bipolar/Depression disorder     (6) OA of knee, s/p total knee arthroplasty     (7) Hx/of recent bout of enterocolitis with acute encephalopathy in Feb 2020  - seen by Dr Salvatore Linares with neuro     (8) GERD     (9) Chronic dystonia - followed by Dr Swetha Castañeda (neuro)    (10) no current thrombocytopenia    (11) no current  leucopenia         VISIT DIAGNOSES:      Anemia due to multiple mechanisms    Anemia, chronic disease    Anemia, chronic renal failure, stage 4 (severe)    Normocytic normochromic anemia    Abnormal CBC          PLAN:  1. Check labs monthly for now  2. Check iron panel monthly  3. F/u with ortho as per their directives  4. f/u with PCP, Pulm, GI, etc  RTC in 3-4 months    Fax note to Julia Gomes (new PCP); Josey Swain El Khoury; Garry Coughlin    Discussion:     COVID-19 Discussion:    I had long discussion with patient and any applicable family about the COVID-19 coronavirus epidemic and the recommended precautions with regard to cancer and/or hematology patients. I have re-iterated the CDC recommendations for adequate hand washing, use of hand -like products, and coughing into elbow, etc. In addition, especially for our patients who are on chemotherapy and/or our otherwise immunocompromised patients, I have recommended avoidance of crowds, including movie theaters, restaurants, churches, etc. I have recommended avoidance of any sick or symptomatic family members and/or friends. I have also recommended avoidance of any raw and unwashed food products, and general avoidance of food items that have not been prepared by themselves. The patient has been asked to call us immediately with any symptom developments, issues, questions or other general concerns.     I spent over 25 mins of time with the patient. Reviewed results of the recently ordered labs, tests and studies; made directives with regards to the results. Over half of this time was spent couseling and coordinating care.    I have explained all of the above in detail and the patient understands all of the current recommendation(s). I have answered all of their questions to the best of my ability and to their complete satisfaction.   The patient is to continue with the current management plan.            Electronically signed by Marc KNIGHT  MD Brittny

## 2020-11-25 ENCOUNTER — OFFICE VISIT (OUTPATIENT)
Dept: HEMATOLOGY/ONCOLOGY | Facility: CLINIC | Age: 75
End: 2020-11-25
Payer: MEDICARE

## 2020-11-25 VITALS
HEART RATE: 74 BPM | SYSTOLIC BLOOD PRESSURE: 138 MMHG | DIASTOLIC BLOOD PRESSURE: 77 MMHG | WEIGHT: 167.19 LBS | BODY MASS INDEX: 24.69 KG/M2 | RESPIRATION RATE: 18 BRPM | TEMPERATURE: 97 F

## 2020-11-25 DIAGNOSIS — D63.8 ANEMIA, CHRONIC DISEASE: ICD-10-CM

## 2020-11-25 DIAGNOSIS — D64.9 NORMOCYTIC NORMOCHROMIC ANEMIA: ICD-10-CM

## 2020-11-25 DIAGNOSIS — N18.4 ANEMIA, CHRONIC RENAL FAILURE, STAGE 4 (SEVERE): ICD-10-CM

## 2020-11-25 DIAGNOSIS — D64.89 ANEMIA DUE TO MULTIPLE MECHANISMS: Primary | ICD-10-CM

## 2020-11-25 DIAGNOSIS — D63.1 ANEMIA, CHRONIC RENAL FAILURE, STAGE 4 (SEVERE): ICD-10-CM

## 2020-11-25 DIAGNOSIS — R79.89 ABNORMAL CBC: ICD-10-CM

## 2020-11-25 PROCEDURE — 3288F PR FALLS RISK ASSESSMENT DOCUMENTED: ICD-10-PCS | Mod: S$GLB,,, | Performed by: INTERNAL MEDICINE

## 2020-11-25 PROCEDURE — 1125F PR PAIN SEVERITY QUANTIFIED, PAIN PRESENT: ICD-10-PCS | Mod: S$GLB,,, | Performed by: INTERNAL MEDICINE

## 2020-11-25 PROCEDURE — 1159F MED LIST DOCD IN RCRD: CPT | Mod: S$GLB,,, | Performed by: INTERNAL MEDICINE

## 2020-11-25 PROCEDURE — 1159F PR MEDICATION LIST DOCUMENTED IN MEDICAL RECORD: ICD-10-PCS | Mod: S$GLB,,, | Performed by: INTERNAL MEDICINE

## 2020-11-25 PROCEDURE — 3078F DIAST BP <80 MM HG: CPT | Mod: S$GLB,,, | Performed by: INTERNAL MEDICINE

## 2020-11-25 PROCEDURE — 3075F SYST BP GE 130 - 139MM HG: CPT | Mod: S$GLB,,, | Performed by: INTERNAL MEDICINE

## 2020-11-25 PROCEDURE — 99213 PR OFFICE/OUTPT VISIT, EST, LEVL III, 20-29 MIN: ICD-10-PCS | Mod: S$GLB,,, | Performed by: INTERNAL MEDICINE

## 2020-11-25 PROCEDURE — 3078F PR MOST RECENT DIASTOLIC BLOOD PRESSURE < 80 MM HG: ICD-10-PCS | Mod: S$GLB,,, | Performed by: INTERNAL MEDICINE

## 2020-11-25 PROCEDURE — 1125F AMNT PAIN NOTED PAIN PRSNT: CPT | Mod: S$GLB,,, | Performed by: INTERNAL MEDICINE

## 2020-11-25 PROCEDURE — 1101F PR PT FALLS ASSESS DOC 0-1 FALLS W/OUT INJ PAST YR: ICD-10-PCS | Mod: S$GLB,,, | Performed by: INTERNAL MEDICINE

## 2020-11-25 PROCEDURE — 3288F FALL RISK ASSESSMENT DOCD: CPT | Mod: S$GLB,,, | Performed by: INTERNAL MEDICINE

## 2020-11-25 PROCEDURE — 1101F PT FALLS ASSESS-DOCD LE1/YR: CPT | Mod: S$GLB,,, | Performed by: INTERNAL MEDICINE

## 2020-11-25 PROCEDURE — 99213 OFFICE O/P EST LOW 20 MIN: CPT | Mod: S$GLB,,, | Performed by: INTERNAL MEDICINE

## 2020-11-25 PROCEDURE — 3075F PR MOST RECENT SYSTOLIC BLOOD PRESS GE 130-139MM HG: ICD-10-PCS | Mod: S$GLB,,, | Performed by: INTERNAL MEDICINE

## 2020-11-30 ENCOUNTER — PATIENT MESSAGE (OUTPATIENT)
Dept: ORTHOPEDICS | Facility: CLINIC | Age: 75
End: 2020-11-30

## 2020-12-02 ENCOUNTER — PATIENT MESSAGE (OUTPATIENT)
Dept: HEMATOLOGY/ONCOLOGY | Facility: CLINIC | Age: 75
End: 2020-12-02

## 2020-12-02 ENCOUNTER — PATIENT MESSAGE (OUTPATIENT)
Dept: ORTHOPEDICS | Facility: CLINIC | Age: 75
End: 2020-12-02

## 2020-12-07 ENCOUNTER — OFFICE VISIT (OUTPATIENT)
Dept: FAMILY MEDICINE | Facility: CLINIC | Age: 75
End: 2020-12-07
Payer: MEDICARE

## 2020-12-07 ENCOUNTER — PATIENT MESSAGE (OUTPATIENT)
Dept: HEMATOLOGY/ONCOLOGY | Facility: CLINIC | Age: 75
End: 2020-12-07

## 2020-12-07 VITALS
WEIGHT: 162.5 LBS | TEMPERATURE: 97 F | HEIGHT: 69 IN | OXYGEN SATURATION: 95 % | SYSTOLIC BLOOD PRESSURE: 134 MMHG | DIASTOLIC BLOOD PRESSURE: 76 MMHG | HEART RATE: 82 BPM | BODY MASS INDEX: 24.07 KG/M2

## 2020-12-07 DIAGNOSIS — Z11.59 NEED FOR HEPATITIS C SCREENING TEST: ICD-10-CM

## 2020-12-07 DIAGNOSIS — Z13.6 ENCOUNTER FOR LIPID SCREENING FOR CARDIOVASCULAR DISEASE: ICD-10-CM

## 2020-12-07 DIAGNOSIS — I83.93 VARICOSE VEINS OF BOTH LOWER EXTREMITIES, UNSPECIFIED WHETHER COMPLICATED: Primary | ICD-10-CM

## 2020-12-07 DIAGNOSIS — Z13.220 ENCOUNTER FOR LIPID SCREENING FOR CARDIOVASCULAR DISEASE: ICD-10-CM

## 2020-12-07 PROCEDURE — 99214 PR OFFICE/OUTPT VISIT, EST, LEVL IV, 30-39 MIN: ICD-10-PCS | Mod: S$GLB,,, | Performed by: FAMILY MEDICINE

## 2020-12-07 PROCEDURE — 99999 PR PBB SHADOW E&M-EST. PATIENT-LVL III: CPT | Mod: PBBFAC,,, | Performed by: FAMILY MEDICINE

## 2020-12-07 PROCEDURE — 3078F DIAST BP <80 MM HG: CPT | Mod: CPTII,S$GLB,, | Performed by: FAMILY MEDICINE

## 2020-12-07 PROCEDURE — 3288F PR FALLS RISK ASSESSMENT DOCUMENTED: ICD-10-PCS | Mod: CPTII,S$GLB,, | Performed by: FAMILY MEDICINE

## 2020-12-07 PROCEDURE — 1159F MED LIST DOCD IN RCRD: CPT | Mod: S$GLB,,, | Performed by: FAMILY MEDICINE

## 2020-12-07 PROCEDURE — 1125F PR PAIN SEVERITY QUANTIFIED, PAIN PRESENT: ICD-10-PCS | Mod: S$GLB,,, | Performed by: FAMILY MEDICINE

## 2020-12-07 PROCEDURE — 3078F PR MOST RECENT DIASTOLIC BLOOD PRESSURE < 80 MM HG: ICD-10-PCS | Mod: CPTII,S$GLB,, | Performed by: FAMILY MEDICINE

## 2020-12-07 PROCEDURE — 3075F PR MOST RECENT SYSTOLIC BLOOD PRESS GE 130-139MM HG: ICD-10-PCS | Mod: CPTII,S$GLB,, | Performed by: FAMILY MEDICINE

## 2020-12-07 PROCEDURE — 1159F PR MEDICATION LIST DOCUMENTED IN MEDICAL RECORD: ICD-10-PCS | Mod: S$GLB,,, | Performed by: FAMILY MEDICINE

## 2020-12-07 PROCEDURE — 1101F PR PT FALLS ASSESS DOC 0-1 FALLS W/OUT INJ PAST YR: ICD-10-PCS | Mod: CPTII,S$GLB,, | Performed by: FAMILY MEDICINE

## 2020-12-07 PROCEDURE — 3288F FALL RISK ASSESSMENT DOCD: CPT | Mod: CPTII,S$GLB,, | Performed by: FAMILY MEDICINE

## 2020-12-07 PROCEDURE — 99999 PR PBB SHADOW E&M-EST. PATIENT-LVL III: ICD-10-PCS | Mod: PBBFAC,,, | Performed by: FAMILY MEDICINE

## 2020-12-07 PROCEDURE — 3075F SYST BP GE 130 - 139MM HG: CPT | Mod: CPTII,S$GLB,, | Performed by: FAMILY MEDICINE

## 2020-12-07 PROCEDURE — 99214 OFFICE O/P EST MOD 30 MIN: CPT | Mod: S$GLB,,, | Performed by: FAMILY MEDICINE

## 2020-12-07 PROCEDURE — 1125F AMNT PAIN NOTED PAIN PRSNT: CPT | Mod: S$GLB,,, | Performed by: FAMILY MEDICINE

## 2020-12-07 PROCEDURE — 1101F PT FALLS ASSESS-DOCD LE1/YR: CPT | Mod: CPTII,S$GLB,, | Performed by: FAMILY MEDICINE

## 2020-12-07 RX ORDER — BENAZEPRIL HYDROCHLORIDE 40 MG/1
40 TABLET ORAL DAILY
COMMUNITY
Start: 2020-11-16 | End: 2021-03-01 | Stop reason: SDUPTHER

## 2020-12-07 RX ORDER — SUMATRIPTAN SUCCINATE 100 MG/1
TABLET ORAL
COMMUNITY
Start: 2020-11-13 | End: 2022-05-03

## 2020-12-07 NOTE — PROGRESS NOTES
Subjective:       Patient ID: Marleen Samano is a 75 y.o. female.    Chief Complaint: Establish Care    HPI    Presents to clinic to establish care.  Sees Dr. Mart for anemia.   Has spider veins in her legs.       Past Medical History:   Diagnosis Date    Abnormal CBC 2/10/2020    Anemia due to multiple mechanisms 2/10/2020    Anemia in chronic kidney disease (CKD)     Anemia, chronic disease 2/10/2020    Arthritis     Bell's palsy     Bladder incontinence     Blepharospasm     Cervical dystonia     Concussion     COPD (chronic obstructive pulmonary disease)     Fainting spell     2/7/2020    Hormone deficiency     Migraine     Muscle spasm     Myofacial pain syndrome     Normocytic normochromic anemia 2/10/2020       Past Surgical History:   Procedure Laterality Date    APPENDECTOMY      BREAST BIOPSY      BREAST LUMPECTOMY      EYE SURGERY      HYSTERECTOMY      NECK SURGERY      REVISION OF KNEE ARTHROPLASTY Right 7/14/2020    Procedure: REVISION, ARTHROPLASTY, KNEE;  Surgeon: Pedro Tompkins MD;  Location: [facility];  Service: Orthopedics;  Laterality: Right;    TONSILLECTOMY         Family History   Problem Relation Age of Onset    Cancer Mother     Diabetes Neg Hx     Melanoma Neg Hx     Psoriasis Neg Hx     Lupus Neg Hx     Eczema Neg Hx        Social History     Tobacco Use    Smoking status: Never Smoker    Smokeless tobacco: Never Used   Substance Use Topics    Alcohol use: No    Drug use: No       Social History     Substance and Sexual Activity   Sexual Activity Not on file          Current Outpatient Medications:     amLODIPine (NORVASC) 2.5 MG tablet, every evening. , Disp: , Rfl: 4    baclofen (LIORESAL) 20 MG tablet, Take 20 mg by mouth 2 (two) times daily., Disp: , Rfl:     benazepriL (LOTENSIN) 40 MG tablet, Take 40 mg by mouth once daily., Disp: , Rfl:     diclofenac sodium (VOLTAREN) 1 % Gel, Apply topically. , Disp: , Rfl:     gabapentin  (NEURONTIN) 600 MG tablet, TAKE 1 TABLET BY MOUTH IN THE MORNING AT NOON AND THEN 2 TABLETS IN THE EVENING, Disp: , Rfl:     levalbuterol (XOPENEX HFA) 45 mcg/actuation inhaler, Inhale 2 puffs into the lungs every 6 (six) hours as needed for Wheezing. Rescue, Disp: 1 Inhaler, Rfl: 11    omeprazole (PRILOSEC) 40 MG capsule, Take 40 mg by mouth 2 (two) times daily before meals., Disp: , Rfl:     simethicone (GAS-X ORAL), Take by mouth., Disp: , Rfl:     sumatriptan (IMITREX) 100 MG tablet, TAKE 1 TABLET BY MOUTH AFTER ONSET OF MIGRAINE. MAY REPEAT AFTER 2 HOURS IF HEADACHE RETURNS NOT TO EXCEED 200MG IN 24 HOURS, Disp: , Rfl:     trazodone (DESYREL) 100 MG tablet, Take 100 mg by mouth every evening.  , Disp: , Rfl:     venlafaxine (EFFEXOR-XR) 150 MG Cp24, Take 1 capsule (150 mg total) by mouth once daily., Disp: , Rfl:   No current facility-administered medications for this visit.     Facility-Administered Medications Ordered in Other Visits:     fentaNYL injection 100 mcg, 100 mcg, Intravenous, See admin instructions, Pedro Tompkins MD, 50 mcg at 07/14/20 0727     Review of patient's allergies indicates:   Allergen Reactions    Artane Other (See Comments)     Swelling    Ned-1 Swelling     Oragel caused tongue to swell    Adhesive tape-silicones Rash     Blisters after on for several hours    Latex, natural rubber Hives and Rash     Localized    Morphine Itching and Hives            Review of Systems   Constitutional: Negative for chills and fever.   HENT: Negative for congestion and sore throat.    Eyes: Negative for visual disturbance.   Respiratory: Negative for cough and shortness of breath.    Cardiovascular: Negative for chest pain.   Gastrointestinal: Negative for abdominal pain, constipation, diarrhea, nausea and vomiting.   Genitourinary: Negative for dysuria.   Musculoskeletal: Negative for joint swelling.   Skin: Negative for rash and wound.   Neurological: Negative for  "dizziness and headaches.   Hematological: Does not bruise/bleed easily.           Objective:          Vitals:    12/07/20 1434   BP: 134/76   Pulse: 82   Temp: 97.3 °F (36.3 °C)   SpO2: 95%   Weight: 73.7 kg (162 lb 7.7 oz)   Height: 5' 9" (1.753 m)       Physical Exam  Vitals signs reviewed.   Constitutional:       General: She is not in acute distress.     Appearance: Normal appearance. She is well-developed.   HENT:      Head: Normocephalic and atraumatic.      Right Ear: External ear normal.      Left Ear: External ear normal.   Eyes:      Conjunctiva/sclera: Conjunctivae normal.   Cardiovascular:      Rate and Rhythm: Normal rate and regular rhythm.      Pulses: Normal pulses.      Heart sounds: Normal heart sounds.   Pulmonary:      Effort: Pulmonary effort is normal.      Breath sounds: Normal breath sounds.   Abdominal:      General: Bowel sounds are normal.      Palpations: Abdomen is soft.      Tenderness: There is no abdominal tenderness.   Musculoskeletal: Normal range of motion.      Comments: Varicose veins noted in legs   Skin:     General: Skin is warm.      Findings: No rash.   Neurological:      General: No focal deficit present.      Mental Status: She is alert.   Psychiatric:         Mood and Affect: Mood normal.         Speech: Speech normal.         Behavior: Behavior normal. Behavior is cooperative.                 Assessment/Plan     Marleen was seen today for establish care.    Diagnoses and all orders for this visit:    Varicose veins of both lower extremities, unspecified whether complicated        - Advised to try wearing compression stockings and leg elevations. She will try this.     Need for hepatitis C screening test  -     Hepatitis C Antibody; Future    Encounter for lipid screening for cardiovascular disease  -     Lipid Panel; Future    Patient states she had her ppsv 23. Will find out the date and contact us once she finds out.     Follow up in about 6 months (around 6/7/2021) for " check up.    Future Appointments   Date Time Provider Department Center   12/11/2020 10:30 AM Logan Candace Mcfarland, OD 2C [facility] [place]   12/14/2020  9:15 AM Pedro Tompkins MD [facility] ORTHO [facility]   12/14/2020  1:45 PM Marino Benson, PT [facility]   12/17/2020  9:30 AM LAB, [facility] LAB [place]   1/12/2021 10:00 AM Nitin Jackson DO [facility] MED [place]   1/14/2021 10:15 AM Hilary Finnegan MD [facility] DERM [place]   3/25/2021 10:00 AM Marc Mart MD [facility] HEM ONC [facility] [place]   6/10/2021  9:20 AM Julia Snow MD [facility] MED [place]           Julia Snow MD  [facility]

## 2020-12-10 ENCOUNTER — TELEPHONE (OUTPATIENT)
Dept: VASCULAR SURGERY | Facility: CLINIC | Age: 75
End: 2020-12-10

## 2020-12-10 NOTE — TELEPHONE ENCOUNTER
----- Message from Haylee Marcelino sent at 12/10/2020  1:16 PM CST -----  Contact: Pt  Patient: Marleen Samano  Phone:  [phone]    Pt requesting appt with Dr. William due to vericose veins. Please call back and advise. Thank you

## 2020-12-11 ENCOUNTER — OFFICE VISIT (OUTPATIENT)
Dept: OPTOMETRY | Facility: CLINIC | Age: 75
End: 2020-12-11
Payer: MEDICARE

## 2020-12-11 ENCOUNTER — TELEPHONE (OUTPATIENT)
Dept: VASCULAR SURGERY | Facility: CLINIC | Age: 75
End: 2020-12-11

## 2020-12-11 ENCOUNTER — TELEPHONE (OUTPATIENT)
Dept: HEMATOLOGY/ONCOLOGY | Facility: CLINIC | Age: 75
End: 2020-12-11

## 2020-12-11 DIAGNOSIS — D64.89 ANEMIA DUE TO MULTIPLE MECHANISMS: Primary | ICD-10-CM

## 2020-12-11 DIAGNOSIS — N18.4 ANEMIA, CHRONIC RENAL FAILURE, STAGE 4 (SEVERE): ICD-10-CM

## 2020-12-11 DIAGNOSIS — H52.7 REFRACTIVE ERROR: ICD-10-CM

## 2020-12-11 DIAGNOSIS — I83.893 VARICOSE VEINS OF LEG WITH SWELLING, BILATERAL: Primary | ICD-10-CM

## 2020-12-11 DIAGNOSIS — D64.9 NORMOCYTIC NORMOCHROMIC ANEMIA: ICD-10-CM

## 2020-12-11 DIAGNOSIS — Z96.1 PSEUDOPHAKIA: ICD-10-CM

## 2020-12-11 DIAGNOSIS — D63.8 ANEMIA, CHRONIC DISEASE: ICD-10-CM

## 2020-12-11 DIAGNOSIS — D63.1 ANEMIA, CHRONIC RENAL FAILURE, STAGE 4 (SEVERE): ICD-10-CM

## 2020-12-11 DIAGNOSIS — H04.123 DRY EYE SYNDROME, BILATERAL: Primary | ICD-10-CM

## 2020-12-11 PROCEDURE — 3288F PR FALLS RISK ASSESSMENT DOCUMENTED: ICD-10-PCS | Mod: CPTII,S$GLB,, | Performed by: OPTOMETRIST

## 2020-12-11 PROCEDURE — 1126F AMNT PAIN NOTED NONE PRSNT: CPT | Mod: S$GLB,,, | Performed by: OPTOMETRIST

## 2020-12-11 PROCEDURE — 92004 PR EYE EXAM, NEW PATIENT,COMPREHESV: ICD-10-PCS | Mod: S$GLB,,, | Performed by: OPTOMETRIST

## 2020-12-11 PROCEDURE — 1101F PR PT FALLS ASSESS DOC 0-1 FALLS W/OUT INJ PAST YR: ICD-10-PCS | Mod: CPTII,S$GLB,, | Performed by: OPTOMETRIST

## 2020-12-11 PROCEDURE — 3288F FALL RISK ASSESSMENT DOCD: CPT | Mod: CPTII,S$GLB,, | Performed by: OPTOMETRIST

## 2020-12-11 PROCEDURE — 1126F PR PAIN SEVERITY QUANTIFIED, NO PAIN PRESENT: ICD-10-PCS | Mod: S$GLB,,, | Performed by: OPTOMETRIST

## 2020-12-11 PROCEDURE — 99999 PR PBB SHADOW E&M-EST. PATIENT-LVL III: CPT | Mod: PBBFAC,,, | Performed by: OPTOMETRIST

## 2020-12-11 PROCEDURE — 99999 PR PBB SHADOW E&M-EST. PATIENT-LVL III: ICD-10-PCS | Mod: PBBFAC,,, | Performed by: OPTOMETRIST

## 2020-12-11 PROCEDURE — 92004 COMPRE OPH EXAM NEW PT 1/>: CPT | Mod: S$GLB,,, | Performed by: OPTOMETRIST

## 2020-12-11 PROCEDURE — 1101F PT FALLS ASSESS-DOCD LE1/YR: CPT | Mod: CPTII,S$GLB,, | Performed by: OPTOMETRIST

## 2020-12-11 RX ORDER — PREDNISOLONE ACETATE 10 MG/ML
1 SUSPENSION/ DROPS OPHTHALMIC 4 TIMES DAILY
Qty: 5 ML | Refills: 1 | Status: SHIPPED | OUTPATIENT
Start: 2020-12-11 | End: 2020-12-25

## 2020-12-11 NOTE — PROGRESS NOTES
HPI     74 YO female presents today for an annual eye exam. She states that she   is having trouble with vision both distance and near.     Refresh OU PRN     CE w/IOL OU in 2004 w/Dr. Leyva.     Blurred tv for past year. Never wore glasses after cataract surgery.   Gets mucus in eyes, uses refresh about twice a day, helps the film.     Last edited by Desmond Mcfarland, OD on 12/11/2020 11:11 AM. (History)            Assessment /Plan     For exam results, see Encounter Report.    Dry eye syndrome, bilateral  -     prednisoLONE acetate (PRED FORTE) 1 % DrpS; Place 1 drop into both eyes 4 (four) times daily. for 14 days  Dispense: 5 mL; Refill: 1    Pseudophakia    Refractive error      BRADEN OU. Discussed findings and treatment plan. Start PF 1%: 1 gt qid OU, shake well. Increase otc Refresh to qid OU, 10 minutes after steroid instillation. Return in 2 weeks for f/u, sooner if worsening.     S/p cataract extraction with good results. No spec rx at this time, will refract prn after treating BRADEN.       RTC in 2 weeks for f/u, or sooner prn.

## 2020-12-11 NOTE — PATIENT INSTRUCTIONS
Continue lid scrubs twice daily.     Start Pred Forte 1%: 1 drop four times daily in both eyes, shake bottle well before using.    Start Refresh: 1 drop four times daily in both eyes, 10 minutes after steroid drop.    Return on 12/29/20 at 10:00 for follow up with Dr. Mcfarland

## 2020-12-11 NOTE — TELEPHONE ENCOUNTER
----- Message from Lexis Jesus, Patient Care Assistant sent at 12/11/2020  9:06 AM CST -----  Inna, patient called in stating she would like to speak to you regarding an Appt. With Dr. Yomi Baugh . She can be reached at [phone]

## 2020-12-11 NOTE — TELEPHONE ENCOUNTER
Left message on voicemail requesting informing patient that Dr Alfonso no longer treats varicose veins.

## 2020-12-11 NOTE — TELEPHONE ENCOUNTER
Return shreyas to patient. LVM that attempting to schedule with Dr. Li. Informed she must also have ultrasound and that they may call to schedule apt for u/s, otherwise I can schedule that apt at the same time as scheduling Dr. Li's apt. Left call back number for patient.     ----- Message from Zainab Díaz sent at 12/10/2020  1:43 PM CST -----  Regarding: appt  Contact: pt @ [phone]  Pt calling to speak with someone regarding getting an appt. Please call.

## 2020-12-11 NOTE — TELEPHONE ENCOUNTER
Patient returned my call and wanted to know if we can refer her to Dr. Lobo.  I told her that I will send the referral in.

## 2020-12-11 NOTE — TELEPHONE ENCOUNTER
----- Message from Lexis Jesus, Patient Care Assistant sent at 12/11/2020  9:06 AM CST -----  Inna, patient called in stating she would like to speak to you regarding an Appt. With Dr. Yomi Baugh . She can be reached at [phone]

## 2020-12-14 ENCOUNTER — TELEPHONE (OUTPATIENT)
Dept: FAMILY MEDICINE | Facility: CLINIC | Age: 75
End: 2020-12-14

## 2020-12-14 ENCOUNTER — TELEPHONE (OUTPATIENT)
Dept: SURGERY | Facility: CLINIC | Age: 75
End: 2020-12-14

## 2020-12-14 ENCOUNTER — HOSPITAL ENCOUNTER (OUTPATIENT)
Dept: RADIOLOGY | Facility: HOSPITAL | Age: 75
Discharge: HOME | End: 2020-12-14
Attending: ORTHOPAEDIC SURGERY
Payer: MEDICARE

## 2020-12-14 ENCOUNTER — OFFICE VISIT (OUTPATIENT)
Dept: ORTHOPEDICS | Facility: CLINIC | Age: 75
End: 2020-12-14
Payer: MEDICARE

## 2020-12-14 VITALS — RESPIRATION RATE: 16 BRPM | WEIGHT: 162.5 LBS | BODY MASS INDEX: 24.07 KG/M2 | HEIGHT: 69 IN

## 2020-12-14 DIAGNOSIS — Z96.651 STATUS POST TOTAL RIGHT KNEE REPLACEMENT: ICD-10-CM

## 2020-12-14 DIAGNOSIS — M54.30 SCIATICA, UNSPECIFIED LATERALITY: Primary | ICD-10-CM

## 2020-12-14 DIAGNOSIS — M25.561 RIGHT KNEE PAIN, UNSPECIFIED CHRONICITY: ICD-10-CM

## 2020-12-14 PROCEDURE — 1159F MED LIST DOCD IN RCRD: CPT | Mod: S$GLB,,, | Performed by: ORTHOPAEDIC SURGERY

## 2020-12-14 PROCEDURE — 73562 X-RAY EXAM OF KNEE 3: CPT | Mod: 26,LT,, | Performed by: RADIOLOGY

## 2020-12-14 PROCEDURE — 73562 X-RAY EXAM OF KNEE 3: CPT | Mod: TC,PN,LT,59

## 2020-12-14 PROCEDURE — 73564 XR KNEE ORTHO RIGHT WITH FLEXION: ICD-10-PCS | Mod: 26,RT,, | Performed by: RADIOLOGY

## 2020-12-14 PROCEDURE — 1159F PR MEDICATION LIST DOCUMENTED IN MEDICAL RECORD: ICD-10-PCS | Mod: S$GLB,,, | Performed by: ORTHOPAEDIC SURGERY

## 2020-12-14 PROCEDURE — 99213 PR OFFICE/OUTPT VISIT, EST, LEVL III, 20-29 MIN: ICD-10-PCS | Mod: S$GLB,,, | Performed by: ORTHOPAEDIC SURGERY

## 2020-12-14 PROCEDURE — 73562 XR KNEE ORTHO RIGHT WITH FLEXION: ICD-10-PCS | Mod: 26,LT,, | Performed by: RADIOLOGY

## 2020-12-14 PROCEDURE — 3288F PR FALLS RISK ASSESSMENT DOCUMENTED: ICD-10-PCS | Mod: CPTII,S$GLB,, | Performed by: ORTHOPAEDIC SURGERY

## 2020-12-14 PROCEDURE — 99999 PR PBB SHADOW E&M-EST. PATIENT-LVL IV: ICD-10-PCS | Mod: PBBFAC,,, | Performed by: ORTHOPAEDIC SURGERY

## 2020-12-14 PROCEDURE — 1125F AMNT PAIN NOTED PAIN PRSNT: CPT | Mod: S$GLB,,, | Performed by: ORTHOPAEDIC SURGERY

## 2020-12-14 PROCEDURE — 3288F FALL RISK ASSESSMENT DOCD: CPT | Mod: CPTII,S$GLB,, | Performed by: ORTHOPAEDIC SURGERY

## 2020-12-14 PROCEDURE — 99999 PR PBB SHADOW E&M-EST. PATIENT-LVL IV: CPT | Mod: PBBFAC,,, | Performed by: ORTHOPAEDIC SURGERY

## 2020-12-14 PROCEDURE — 99213 OFFICE O/P EST LOW 20 MIN: CPT | Mod: S$GLB,,, | Performed by: ORTHOPAEDIC SURGERY

## 2020-12-14 PROCEDURE — 1125F PR PAIN SEVERITY QUANTIFIED, PAIN PRESENT: ICD-10-PCS | Mod: S$GLB,,, | Performed by: ORTHOPAEDIC SURGERY

## 2020-12-14 PROCEDURE — 1101F PR PT FALLS ASSESS DOC 0-1 FALLS W/OUT INJ PAST YR: ICD-10-PCS | Mod: CPTII,S$GLB,, | Performed by: ORTHOPAEDIC SURGERY

## 2020-12-14 PROCEDURE — 1101F PT FALLS ASSESS-DOCD LE1/YR: CPT | Mod: CPTII,S$GLB,, | Performed by: ORTHOPAEDIC SURGERY

## 2020-12-14 PROCEDURE — 73564 X-RAY EXAM KNEE 4 OR MORE: CPT | Mod: 26,RT,, | Performed by: RADIOLOGY

## 2020-12-14 NOTE — TELEPHONE ENCOUNTER
I called patient and lmor to inform her that Dr. Hahn doesn't do varicose veins.  I cancelled her appointment on 12/24/20 at 9am.  [place]

## 2020-12-14 NOTE — PROGRESS NOTES
Past Medical History:   Diagnosis Date    Abnormal CBC 2/10/2020    Anemia due to multiple mechanisms 2/10/2020    Anemia in chronic kidney disease (CKD)     Anemia, chronic disease 2/10/2020    Arthritis     Bell's palsy     Bladder incontinence     Blepharospasm     Cervical dystonia     Concussion     COPD (chronic obstructive pulmonary disease)     Fainting spell     2/7/2020    Hormone deficiency     Migraine     Muscle spasm     Myofacial pain syndrome     Normocytic normochromic anemia 2/10/2020       Past Surgical History:   Procedure Laterality Date    APPENDECTOMY      BREAST BIOPSY      BREAST LUMPECTOMY      CATARACT EXTRACTION      EYE SURGERY      HYSTERECTOMY      NECK SURGERY      REVISION OF KNEE ARTHROPLASTY Right 7/14/2020    Procedure: REVISION, ARTHROPLASTY, KNEE;  Surgeon: Pedro Tompkins MD;  Location: [facility];  Service: Orthopedics;  Laterality: Right;    TONSILLECTOMY         Current Outpatient Medications   Medication Sig    amLODIPine (NORVASC) 2.5 MG tablet every evening.     baclofen (LIORESAL) 20 MG tablet Take 20 mg by mouth 2 (two) times daily.    benazepriL (LOTENSIN) 40 MG tablet Take 40 mg by mouth once daily.    diclofenac sodium (VOLTAREN) 1 % Gel Apply topically.     gabapentin (NEURONTIN) 600 MG tablet TAKE 1 TABLET BY MOUTH IN THE MORNING AT NOON AND THEN 2 TABLETS IN THE EVENING    levalbuterol (XOPENEX HFA) 45 mcg/actuation inhaler Inhale 2 puffs into the lungs every 6 (six) hours as needed for Wheezing. Rescue    omeprazole (PRILOSEC) 40 MG capsule Take 40 mg by mouth 2 (two) times daily before meals.    prednisoLONE acetate (PRED FORTE) 1 % DrpS Place 1 drop into both eyes 4 (four) times daily. for 14 days    simethicone (GAS-X ORAL) Take by mouth.    sumatriptan (IMITREX) 100 MG tablet TAKE 1 TABLET BY MOUTH AFTER ONSET OF MIGRAINE. MAY REPEAT AFTER 2 HOURS IF HEADACHE RETURNS NOT TO EXCEED 200MG IN 24 HOURS    trazodone  (DESYREL) 100 MG tablet Take 100 mg by mouth every evening.      venlafaxine (EFFEXOR-XR) 150 MG Cp24 Take 1 capsule (150 mg total) by mouth once daily.     No current facility-administered medications for this visit.      Facility-Administered Medications Ordered in Other Visits   Medication    fentaNYL injection 100 mcg       Review of patient's allergies indicates:   Allergen Reactions    Artane Other (See Comments)     Swelling    Ned-1 Swelling     Oragel caused tongue to swell    Adhesive tape-silicones Rash     Blisters after on for several hours    Latex, natural rubber Hives and Rash     Localized    Morphine Itching and Hives       Family History   Problem Relation Age of Onset    Cancer Mother     Diabetes Neg Hx     Melanoma Neg Hx     Psoriasis Neg Hx     Lupus Neg Hx     Eczema Neg Hx        Social History     Socioeconomic History    Marital status:      Spouse name: Not on file    Number of children: Not on file    Years of education: Not on file    Highest education level: Not on file   Occupational History    Not on file   Social Needs    Financial resource strain: Not on file    Food insecurity     Worry: Not on file     Inability: Not on file    Transportation needs     Medical: Not on file     Non-medical: Not on file   Tobacco Use    Smoking status: Never Smoker    Smokeless tobacco: Never Used   Substance and Sexual Activity    Alcohol use: No    Drug use: No    Sexual activity: Not on file   Lifestyle    Physical activity     Days per week: Not on file     Minutes per session: Not on file    Stress: Not on file   Relationships    Social connections     Talks on phone: Not on file     Gets together: Not on file     Attends [employer] service: Not on file     Active member of club or organization: Not on file     Attends meetings of clubs or organizations: Not on file     Relationship status: Not on file   Other Topics Concern    Are you pregnant or think  you may be? Not Asked    Breast-feeding Not Asked   Social History Narrative    Not on file       Chief Complaint:   Chief Complaint   Patient presents with    Right Knee - Post-op Evaluation     S/p Right TKA Revision on 07/14/20       Date of surgery:July 14, 2020 revision right total knee arthroplasty    History of present illness:  This is a 75-year-old female underwent right knee tibial revision for some aseptic loosening of her tibial component.  Patient comes in with some right IT band pain.  Pain is a 5/10.  Pain after being on her feet for about 4 hours.  Occasional swelling.    Review of Systems:  Musculoskeletal:  See HPI    Physical Examination:    Vital Signs:    Vitals:    12/14/20 0926   Resp: 16       Body mass index is 23.99 kg/m².    This a well-developed, well nourished patient in no acute distress.  They are alert and oriented and cooperative to examination.  Pt. walks without an antalgic gait.      Examination of the right knee shows well-healed surgical excision.  Patient lacks about 3° of full extension.  Great flexion up to about 130°.  Stable to varus and valgus stress.  Pain over the IT band.  Neurovascularly intact.    X-rays:  Four views of the right knee are ordered and reviewed which show well-aligned total knee arthroplasty components without complication.     Assessment::  Left knee pain  IT band tendinitis  Bilateral leg swelling    Plan:  Reviewed the findings with her today.  I reviewed and IT band exercise guide with her today.  Also recommended some foam rolling or a massage stick.  I will see her back in 3 months.  No new x-ray needed.  I did make her appointment with back in spine for some sciatica as well.        This note was created using [employer] voice recognition software that occasionally misinterpreted phrases or words.

## 2020-12-15 ENCOUNTER — TELEPHONE (OUTPATIENT)
Dept: VASCULAR SURGERY | Facility: CLINIC | Age: 75
End: 2020-12-15

## 2020-12-15 NOTE — TELEPHONE ENCOUNTER
----- Message from Michelle Francois sent at 12/15/2020  1:49 PM CST -----  Type: Patient Call Back       What is the request in detail:  pt calling to speak to a nurse regarding coming in to see dr. Pt is a new pt. It would not let me schedule her.     Can the clinic reply by [facility]? No       Would the patient rather a call back or a response via [facility]? Call back       Best call back number: [phone] (home)         Thank you.

## 2020-12-15 NOTE — TELEPHONE ENCOUNTER
Spoke to patient, informed her that no [facility], including Dr Alfonso, are accepting vein patients at this time.  Phone number for [facility] Fausto Mensah given to patient.

## 2020-12-15 NOTE — TELEPHONE ENCOUNTER
----- Message from Nadeen Manzano sent at 12/15/2020 11:09 AM CST -----  Type: Needs appointment or advice    Who Called:  Patient  Best Call Back Number: [phone]  Additional Information:  Patient is being referred by Dr. Baltazar Hahn for Varicose Veins/would like to schedule appointment/please call patient back to schedule or advise.

## 2020-12-18 ENCOUNTER — PATIENT MESSAGE (OUTPATIENT)
Dept: PULMONOLOGY | Facility: CLINIC | Age: 75
End: 2020-12-18

## 2020-12-29 ENCOUNTER — OFFICE VISIT (OUTPATIENT)
Dept: OPTOMETRY | Facility: CLINIC | Age: 75
End: 2020-12-29
Payer: MEDICARE

## 2020-12-29 DIAGNOSIS — H04.123 DRY EYE SYNDROME, BILATERAL: Primary | ICD-10-CM

## 2020-12-29 DIAGNOSIS — H52.7 REFRACTIVE ERROR: ICD-10-CM

## 2020-12-29 DIAGNOSIS — Z96.1 PSEUDOPHAKIA: ICD-10-CM

## 2020-12-29 PROCEDURE — 3288F PR FALLS RISK ASSESSMENT DOCUMENTED: ICD-10-PCS | Mod: CPTII,S$GLB,, | Performed by: OPTOMETRIST

## 2020-12-29 PROCEDURE — 3288F FALL RISK ASSESSMENT DOCD: CPT | Mod: CPTII,S$GLB,, | Performed by: OPTOMETRIST

## 2020-12-29 PROCEDURE — 99999 PR PBB SHADOW E&M-EST. PATIENT-LVL III: ICD-10-PCS | Mod: PBBFAC,,, | Performed by: OPTOMETRIST

## 2020-12-29 PROCEDURE — 1126F AMNT PAIN NOTED NONE PRSNT: CPT | Mod: S$GLB,,, | Performed by: OPTOMETRIST

## 2020-12-29 PROCEDURE — 1126F PR PAIN SEVERITY QUANTIFIED, NO PAIN PRESENT: ICD-10-PCS | Mod: S$GLB,,, | Performed by: OPTOMETRIST

## 2020-12-29 PROCEDURE — 92012 INTRM OPH EXAM EST PATIENT: CPT | Mod: S$GLB,,, | Performed by: OPTOMETRIST

## 2020-12-29 PROCEDURE — 92012 PR EYE EXAM, EST PATIENT,INTERMED: ICD-10-PCS | Mod: S$GLB,,, | Performed by: OPTOMETRIST

## 2020-12-29 PROCEDURE — 1101F PR PT FALLS ASSESS DOC 0-1 FALLS W/OUT INJ PAST YR: ICD-10-PCS | Mod: CPTII,S$GLB,, | Performed by: OPTOMETRIST

## 2020-12-29 PROCEDURE — 1101F PT FALLS ASSESS-DOCD LE1/YR: CPT | Mod: CPTII,S$GLB,, | Performed by: OPTOMETRIST

## 2020-12-29 PROCEDURE — 99999 PR PBB SHADOW E&M-EST. PATIENT-LVL III: CPT | Mod: PBBFAC,,, | Performed by: OPTOMETRIST

## 2020-12-29 NOTE — PROGRESS NOTES
HPI     74 YO female presents today for a F/U visit. Patient notes not much   change since last visit.     PF OU Q4H - stopped on 12/24.  Refresh OU PRN     Last edited by Carolynn Lopez, PCT on 12/29/2020 10:31 AM. (History)            Assessment /Plan     For exam results, see Encounter Report.    Dry eye syndrome, bilateral    Pseudophakia    Refractive error      Improved, not resolved. Pt asymptomatic, d/c PF 1% five days ago. Discussed ocular affects of dry eyes. Continue OTC artificial tears 2-4 times a day in both eyes. Discussed chronicity of BRADEN. RTC if symptoms not alleviated by continued use of artificial tears.     Dispensed updated spectacle Rx. Discussed various spectacle lens options. Discussed adaptation period to new specs.  Demonstrated new spec Rx vs uncorrected vision in phoropter with patient satisfaction.      RTC in 1 year for comprehensive eye exam, or sooner prn.

## 2020-12-30 ENCOUNTER — LAB VISIT (OUTPATIENT)
Dept: LAB | Facility: HOSPITAL | Age: 75
End: 2020-12-30
Attending: INTERNAL MEDICINE
Payer: MEDICARE

## 2020-12-30 ENCOUNTER — PATIENT MESSAGE (OUTPATIENT)
Dept: OPHTHALMOLOGY | Facility: CLINIC | Age: 75
End: 2020-12-30

## 2020-12-30 DIAGNOSIS — D63.8 ANEMIA, CHRONIC DISEASE: ICD-10-CM

## 2020-12-30 DIAGNOSIS — D63.1 ANEMIA, CHRONIC RENAL FAILURE, STAGE 4 (SEVERE): ICD-10-CM

## 2020-12-30 DIAGNOSIS — N18.4 ANEMIA, CHRONIC RENAL FAILURE, STAGE 4 (SEVERE): ICD-10-CM

## 2020-12-30 DIAGNOSIS — D64.89 ANEMIA DUE TO MULTIPLE MECHANISMS: ICD-10-CM

## 2020-12-30 LAB
ALBUMIN SERPL BCP-MCNC: 4.4 G/DL (ref 3.5–5.2)
ALP SERPL-CCNC: 117 U/L (ref 55–135)
ALT SERPL W/O P-5'-P-CCNC: 23 U/L (ref 10–44)
ANION GAP SERPL CALC-SCNC: 9 MMOL/L (ref 8–16)
AST SERPL-CCNC: 22 U/L (ref 10–40)
BASOPHILS # BLD AUTO: 0.01 K/UL (ref 0–0.2)
BASOPHILS NFR BLD: 0.2 % (ref 0–1.9)
BILIRUB SERPL-MCNC: 0.4 MG/DL (ref 0.1–1)
BUN SERPL-MCNC: 19 MG/DL (ref 8–23)
CALCIUM SERPL-MCNC: 9.1 MG/DL (ref 8.7–10.5)
CHLORIDE SERPL-SCNC: 101 MMOL/L (ref 95–110)
CO2 SERPL-SCNC: 30 MMOL/L (ref 23–29)
CREAT SERPL-MCNC: 0.9 MG/DL (ref 0.5–1.4)
DIFFERENTIAL METHOD: ABNORMAL
EOSINOPHIL # BLD AUTO: 0.1 K/UL (ref 0–0.5)
EOSINOPHIL NFR BLD: 1.3 % (ref 0–8)
ERYTHROCYTE [DISTWIDTH] IN BLOOD BY AUTOMATED COUNT: 12.7 % (ref 11.5–14.5)
EST. GFR  (AFRICAN AMERICAN): >60 ML/MIN/1.73 M^2
EST. GFR  (NON AFRICAN AMERICAN): >60 ML/MIN/1.73 M^2
FERRITIN SERPL-MCNC: 356 NG/ML (ref 20–300)
GLUCOSE SERPL-MCNC: 99 MG/DL (ref 70–110)
HCT VFR BLD AUTO: 39.3 % (ref 37–48.5)
HGB BLD-MCNC: 12.4 G/DL (ref 12–16)
IMM GRANULOCYTES # BLD AUTO: 0.02 K/UL (ref 0–0.04)
IMM GRANULOCYTES NFR BLD AUTO: 0.4 % (ref 0–0.5)
IRON SERPL-MCNC: 103 UG/DL (ref 30–160)
LYMPHOCYTES # BLD AUTO: 0.9 K/UL (ref 1–4.8)
LYMPHOCYTES NFR BLD: 18.8 % (ref 18–48)
MCH RBC QN AUTO: 30.6 PG (ref 27–31)
MCHC RBC AUTO-ENTMCNC: 31.6 G/DL (ref 32–36)
MCV RBC AUTO: 97 FL (ref 82–98)
MONOCYTES # BLD AUTO: 0.4 K/UL (ref 0.3–1)
MONOCYTES NFR BLD: 8 % (ref 4–15)
NEUTROPHILS # BLD AUTO: 3.3 K/UL (ref 1.8–7.7)
NEUTROPHILS NFR BLD: 71.3 % (ref 38–73)
NRBC BLD-RTO: 0 /100 WBC
PLATELET # BLD AUTO: 173 K/UL (ref 150–350)
PMV BLD AUTO: 9.3 FL (ref 9.2–12.9)
POTASSIUM SERPL-SCNC: 4.6 MMOL/L (ref 3.5–5.1)
PROT SERPL-MCNC: 7.2 G/DL (ref 6–8.4)
RBC # BLD AUTO: 4.05 M/UL (ref 4–5.4)
SATURATED IRON: 34 % (ref 20–50)
SODIUM SERPL-SCNC: 140 MMOL/L (ref 136–145)
TOTAL IRON BINDING CAPACITY: 299 UG/DL (ref 250–450)
TRANSFERRIN SERPL-MCNC: 202 MG/DL (ref 200–375)
WBC # BLD AUTO: 4.63 K/UL (ref 3.9–12.7)

## 2020-12-30 PROCEDURE — 83540 ASSAY OF IRON: CPT

## 2020-12-30 PROCEDURE — 85025 COMPLETE CBC W/AUTO DIFF WBC: CPT

## 2020-12-30 PROCEDURE — 82728 ASSAY OF FERRITIN: CPT

## 2020-12-30 PROCEDURE — 80053 COMPREHEN METABOLIC PANEL: CPT

## 2020-12-30 PROCEDURE — 36415 COLL VENOUS BLD VENIPUNCTURE: CPT

## 2021-01-12 ENCOUNTER — IMMUNIZATION (OUTPATIENT)
Dept: PRIMARY CARE CLINIC | Facility: CLINIC | Age: 76
End: 2021-01-12
Payer: MEDICARE

## 2021-01-12 DIAGNOSIS — Z23 NEED FOR VACCINATION: ICD-10-CM

## 2021-01-12 PROCEDURE — 0001A COVID-19, MRNA, LNP-S, PF, 30 MCG/0.3 ML DOSE VACCINE: ICD-10-PCS | Mod: S$GLB,,, | Performed by: FAMILY MEDICINE

## 2021-01-12 PROCEDURE — 0001A COVID-19, MRNA, LNP-S, PF, 30 MCG/0.3 ML DOSE VACCINE: CPT | Mod: S$GLB,,, | Performed by: FAMILY MEDICINE

## 2021-01-12 PROCEDURE — 91300 COVID-19, MRNA, LNP-S, PF, 30 MCG/0.3 ML DOSE VACCINE: CPT | Mod: S$GLB,,, | Performed by: FAMILY MEDICINE

## 2021-01-12 PROCEDURE — 91300 COVID-19, MRNA, LNP-S, PF, 30 MCG/0.3 ML DOSE VACCINE: ICD-10-PCS | Mod: S$GLB,,, | Performed by: FAMILY MEDICINE

## 2021-01-14 ENCOUNTER — OFFICE VISIT (OUTPATIENT)
Dept: DERMATOLOGY | Facility: CLINIC | Age: 76
End: 2021-01-14
Payer: MEDICARE

## 2021-01-14 DIAGNOSIS — L82.1 SEBORRHEIC KERATOSES: ICD-10-CM

## 2021-01-14 DIAGNOSIS — D48.5 NEOPLASM OF UNCERTAIN BEHAVIOR OF SKIN: Primary | ICD-10-CM

## 2021-01-14 DIAGNOSIS — D18.01 CHERRY ANGIOMA: ICD-10-CM

## 2021-01-14 DIAGNOSIS — L73.8 SEBACEOUS HYPERPLASIA OF FACE: ICD-10-CM

## 2021-01-14 DIAGNOSIS — L81.4 SOLAR LENTIGO: ICD-10-CM

## 2021-01-14 PROCEDURE — 99999 PR PBB SHADOW E&M-EST. PATIENT-LVL II: ICD-10-PCS | Mod: PBBFAC,,, | Performed by: DERMATOLOGY

## 2021-01-14 PROCEDURE — 99213 OFFICE O/P EST LOW 20 MIN: CPT | Mod: 25,S$GLB,, | Performed by: DERMATOLOGY

## 2021-01-14 PROCEDURE — 88305 TISSUE EXAM BY PATHOLOGIST: CPT | Mod: 26,,, | Performed by: PATHOLOGY

## 2021-01-14 PROCEDURE — 88305 TISSUE EXAM BY PATHOLOGIST: CPT | Performed by: PATHOLOGY

## 2021-01-14 PROCEDURE — 1101F PR PT FALLS ASSESS DOC 0-1 FALLS W/OUT INJ PAST YR: ICD-10-PCS | Mod: CPTII,S$GLB,, | Performed by: DERMATOLOGY

## 2021-01-14 PROCEDURE — 11102 PR TANGENTIAL BIOPSY, SKIN, SINGLE LESION: ICD-10-PCS | Mod: S$GLB,,, | Performed by: DERMATOLOGY

## 2021-01-14 PROCEDURE — 11102 TANGNTL BX SKIN SINGLE LES: CPT | Mod: S$GLB,,, | Performed by: DERMATOLOGY

## 2021-01-14 PROCEDURE — 99213 PR OFFICE/OUTPT VISIT, EST, LEVL III, 20-29 MIN: ICD-10-PCS | Mod: 25,S$GLB,, | Performed by: DERMATOLOGY

## 2021-01-14 PROCEDURE — 99999 PR PBB SHADOW E&M-EST. PATIENT-LVL II: CPT | Mod: PBBFAC,,, | Performed by: DERMATOLOGY

## 2021-01-14 PROCEDURE — 3288F FALL RISK ASSESSMENT DOCD: CPT | Mod: CPTII,S$GLB,, | Performed by: DERMATOLOGY

## 2021-01-14 PROCEDURE — 3288F PR FALLS RISK ASSESSMENT DOCUMENTED: ICD-10-PCS | Mod: CPTII,S$GLB,, | Performed by: DERMATOLOGY

## 2021-01-14 PROCEDURE — 1159F PR MEDICATION LIST DOCUMENTED IN MEDICAL RECORD: ICD-10-PCS | Mod: S$GLB,,, | Performed by: DERMATOLOGY

## 2021-01-14 PROCEDURE — 1159F MED LIST DOCD IN RCRD: CPT | Mod: S$GLB,,, | Performed by: DERMATOLOGY

## 2021-01-14 PROCEDURE — 88305 TISSUE EXAM BY PATHOLOGIST: ICD-10-PCS | Mod: 26,,, | Performed by: PATHOLOGY

## 2021-01-14 PROCEDURE — 1101F PT FALLS ASSESS-DOCD LE1/YR: CPT | Mod: CPTII,S$GLB,, | Performed by: DERMATOLOGY

## 2021-01-15 ENCOUNTER — TELEPHONE (OUTPATIENT)
Dept: OPTOMETRY | Facility: CLINIC | Age: 76
End: 2021-01-15

## 2021-01-19 ENCOUNTER — PATIENT MESSAGE (OUTPATIENT)
Dept: DERMATOLOGY | Facility: CLINIC | Age: 76
End: 2021-01-19

## 2021-01-19 LAB
FINAL PATHOLOGIC DIAGNOSIS: NORMAL
GROSS: NORMAL
MICROSCOPIC EXAM: NORMAL

## 2021-02-01 ENCOUNTER — HOSPITAL ENCOUNTER (EMERGENCY)
Facility: HOSPITAL | Age: 76
Discharge: HOME | End: 2021-02-01
Attending: EMERGENCY MEDICINE
Payer: MEDICARE

## 2021-02-01 ENCOUNTER — TELEPHONE (OUTPATIENT)
Dept: FAMILY MEDICINE | Facility: CLINIC | Age: 76
End: 2021-02-01

## 2021-02-01 VITALS
OXYGEN SATURATION: 97 % | HEIGHT: 70 IN | DIASTOLIC BLOOD PRESSURE: 62 MMHG | BODY MASS INDEX: 22.19 KG/M2 | WEIGHT: 155 LBS | SYSTOLIC BLOOD PRESSURE: 122 MMHG | TEMPERATURE: 98 F | RESPIRATION RATE: 18 BRPM | HEART RATE: 81 BPM

## 2021-02-01 DIAGNOSIS — R06.02 SHORTNESS OF BREATH: Primary | ICD-10-CM

## 2021-02-01 LAB
ALBUMIN SERPL BCP-MCNC: 4.5 G/DL (ref 3.5–5.2)
ALP SERPL-CCNC: 100 U/L (ref 55–135)
ALT SERPL W/O P-5'-P-CCNC: 15 U/L (ref 10–44)
ANION GAP SERPL CALC-SCNC: 11 MMOL/L (ref 8–16)
AST SERPL-CCNC: 16 U/L (ref 10–40)
BASOPHILS # BLD AUTO: 0.01 K/UL (ref 0–0.2)
BASOPHILS NFR BLD: 0.2 % (ref 0–1.9)
BILIRUB SERPL-MCNC: 0.2 MG/DL (ref 0.1–1)
BUN SERPL-MCNC: 26 MG/DL (ref 8–23)
CALCIUM SERPL-MCNC: 9 MG/DL (ref 8.7–10.5)
CHLORIDE SERPL-SCNC: 102 MMOL/L (ref 95–110)
CO2 SERPL-SCNC: 25 MMOL/L (ref 23–29)
CREAT SERPL-MCNC: 1.1 MG/DL (ref 0.5–1.4)
DIFFERENTIAL METHOD: ABNORMAL
EOSINOPHIL # BLD AUTO: 0 K/UL (ref 0–0.5)
EOSINOPHIL NFR BLD: 0.4 % (ref 0–8)
ERYTHROCYTE [DISTWIDTH] IN BLOOD BY AUTOMATED COUNT: 12.7 % (ref 11.5–14.5)
EST. GFR  (AFRICAN AMERICAN): 56 ML/MIN/1.73 M^2
EST. GFR  (NON AFRICAN AMERICAN): 49 ML/MIN/1.73 M^2
GLUCOSE SERPL-MCNC: 106 MG/DL (ref 70–110)
HCT VFR BLD AUTO: 36.3 % (ref 37–48.5)
HGB BLD-MCNC: 12 G/DL (ref 12–16)
IMM GRANULOCYTES # BLD AUTO: 0.01 K/UL (ref 0–0.04)
IMM GRANULOCYTES NFR BLD AUTO: 0.2 % (ref 0–0.5)
LYMPHOCYTES # BLD AUTO: 1 K/UL (ref 1–4.8)
LYMPHOCYTES NFR BLD: 20 % (ref 18–48)
MCH RBC QN AUTO: 31.3 PG (ref 27–31)
MCHC RBC AUTO-ENTMCNC: 33.1 G/DL (ref 32–36)
MCV RBC AUTO: 95 FL (ref 82–98)
MONOCYTES # BLD AUTO: 0.4 K/UL (ref 0.3–1)
MONOCYTES NFR BLD: 8 % (ref 4–15)
NEUTROPHILS # BLD AUTO: 3.5 K/UL (ref 1.8–7.7)
NEUTROPHILS NFR BLD: 71.2 % (ref 38–73)
NRBC BLD-RTO: 0 /100 WBC
PLATELET # BLD AUTO: 179 K/UL (ref 150–350)
PMV BLD AUTO: 9.8 FL (ref 9.2–12.9)
POTASSIUM SERPL-SCNC: 4.1 MMOL/L (ref 3.5–5.1)
PROT SERPL-MCNC: 7 G/DL (ref 6–8.4)
RBC # BLD AUTO: 3.83 M/UL (ref 4–5.4)
SARS-COV-2 RDRP RESP QL NAA+PROBE: NEGATIVE
SODIUM SERPL-SCNC: 138 MMOL/L (ref 136–145)
TROPONIN I SERPL DL<=0.01 NG/ML-MCNC: 0.01 NG/ML (ref 0–0.03)
WBC # BLD AUTO: 4.89 K/UL (ref 3.9–12.7)

## 2021-02-01 PROCEDURE — 99285 EMERGENCY DEPT VISIT HI MDM: CPT | Mod: 25

## 2021-02-01 PROCEDURE — 93010 EKG 12-LEAD: ICD-10-PCS | Mod: ,,, | Performed by: INTERNAL MEDICINE

## 2021-02-01 PROCEDURE — 80053 COMPREHEN METABOLIC PANEL: CPT

## 2021-02-01 PROCEDURE — 85025 COMPLETE CBC W/AUTO DIFF WBC: CPT

## 2021-02-01 PROCEDURE — U0002 COVID-19 LAB TEST NON-CDC: HCPCS

## 2021-02-01 PROCEDURE — 93010 ELECTROCARDIOGRAM REPORT: CPT | Mod: ,,, | Performed by: INTERNAL MEDICINE

## 2021-02-01 PROCEDURE — 36415 COLL VENOUS BLD VENIPUNCTURE: CPT

## 2021-02-01 PROCEDURE — 84484 ASSAY OF TROPONIN QUANT: CPT

## 2021-02-01 PROCEDURE — 93005 ELECTROCARDIOGRAM TRACING: CPT

## 2021-02-02 ENCOUNTER — IMMUNIZATION (OUTPATIENT)
Dept: PRIMARY CARE CLINIC | Facility: CLINIC | Age: 76
End: 2021-02-02
Payer: MEDICARE

## 2021-02-02 ENCOUNTER — LAB VISIT (OUTPATIENT)
Dept: LAB | Facility: HOSPITAL | Age: 76
End: 2021-02-02
Attending: INTERNAL MEDICINE
Payer: MEDICARE

## 2021-02-02 DIAGNOSIS — D63.8 ANEMIA, CHRONIC DISEASE: ICD-10-CM

## 2021-02-02 DIAGNOSIS — N18.4 ANEMIA, CHRONIC RENAL FAILURE, STAGE 4 (SEVERE): ICD-10-CM

## 2021-02-02 DIAGNOSIS — Z23 NEED FOR VACCINATION: Primary | ICD-10-CM

## 2021-02-02 DIAGNOSIS — D63.1 ANEMIA, CHRONIC RENAL FAILURE, STAGE 4 (SEVERE): ICD-10-CM

## 2021-02-02 DIAGNOSIS — D64.89 ANEMIA DUE TO MULTIPLE MECHANISMS: ICD-10-CM

## 2021-02-02 LAB
ALBUMIN SERPL BCP-MCNC: 4.2 G/DL (ref 3.5–5.2)
ALP SERPL-CCNC: 93 U/L (ref 55–135)
ALT SERPL W/O P-5'-P-CCNC: 12 U/L (ref 10–44)
ANION GAP SERPL CALC-SCNC: 8 MMOL/L (ref 8–16)
AST SERPL-CCNC: 15 U/L (ref 10–40)
BASOPHILS # BLD AUTO: 0.02 K/UL (ref 0–0.2)
BASOPHILS NFR BLD: 0.5 % (ref 0–1.9)
BILIRUB SERPL-MCNC: 0.3 MG/DL (ref 0.1–1)
BUN SERPL-MCNC: 29 MG/DL (ref 8–23)
CALCIUM SERPL-MCNC: 8.9 MG/DL (ref 8.7–10.5)
CHLORIDE SERPL-SCNC: 104 MMOL/L (ref 95–110)
CO2 SERPL-SCNC: 28 MMOL/L (ref 23–29)
CREAT SERPL-MCNC: 1 MG/DL (ref 0.5–1.4)
DIFFERENTIAL METHOD: ABNORMAL
EOSINOPHIL # BLD AUTO: 0.1 K/UL (ref 0–0.5)
EOSINOPHIL NFR BLD: 2.1 % (ref 0–8)
ERYTHROCYTE [DISTWIDTH] IN BLOOD BY AUTOMATED COUNT: 12.8 % (ref 11.5–14.5)
EST. GFR  (AFRICAN AMERICAN): >60 ML/MIN/1.73 M^2
EST. GFR  (NON AFRICAN AMERICAN): 55 ML/MIN/1.73 M^2
FERRITIN SERPL-MCNC: 233 NG/ML (ref 20–300)
GLUCOSE SERPL-MCNC: 97 MG/DL (ref 70–110)
HCT VFR BLD AUTO: 36.2 % (ref 37–48.5)
HGB BLD-MCNC: 11.4 G/DL (ref 12–16)
IMM GRANULOCYTES # BLD AUTO: 0.01 K/UL (ref 0–0.04)
IMM GRANULOCYTES NFR BLD AUTO: 0.2 % (ref 0–0.5)
IRON SERPL-MCNC: 65 UG/DL (ref 30–160)
LYMPHOCYTES # BLD AUTO: 1.1 K/UL (ref 1–4.8)
LYMPHOCYTES NFR BLD: 24.9 % (ref 18–48)
MCH RBC QN AUTO: 31.2 PG (ref 27–31)
MCHC RBC AUTO-ENTMCNC: 31.5 G/DL (ref 32–36)
MCV RBC AUTO: 99 FL (ref 82–98)
MONOCYTES # BLD AUTO: 0.5 K/UL (ref 0.3–1)
MONOCYTES NFR BLD: 10.3 % (ref 4–15)
NEUTROPHILS # BLD AUTO: 2.7 K/UL (ref 1.8–7.7)
NEUTROPHILS NFR BLD: 62 % (ref 38–73)
NRBC BLD-RTO: 0 /100 WBC
PLATELET # BLD AUTO: 171 K/UL (ref 150–350)
PMV BLD AUTO: 10 FL (ref 9.2–12.9)
POTASSIUM SERPL-SCNC: 4.7 MMOL/L (ref 3.5–5.1)
PROT SERPL-MCNC: 6.7 G/DL (ref 6–8.4)
RBC # BLD AUTO: 3.65 M/UL (ref 4–5.4)
SATURATED IRON: 24 % (ref 20–50)
SODIUM SERPL-SCNC: 140 MMOL/L (ref 136–145)
TOTAL IRON BINDING CAPACITY: 272 UG/DL (ref 250–450)
TRANSFERRIN SERPL-MCNC: 184 MG/DL (ref 200–375)
WBC # BLD AUTO: 4.38 K/UL (ref 3.9–12.7)

## 2021-02-02 PROCEDURE — 0002A COVID-19, MRNA, LNP-S, PF, 30 MCG/0.3 ML DOSE VACCINE: ICD-10-PCS | Mod: CV19,S$GLB,, | Performed by: FAMILY MEDICINE

## 2021-02-02 PROCEDURE — 82728 ASSAY OF FERRITIN: CPT

## 2021-02-02 PROCEDURE — 91300 COVID-19, MRNA, LNP-S, PF, 30 MCG/0.3 ML DOSE VACCINE: ICD-10-PCS | Mod: S$GLB,,, | Performed by: FAMILY MEDICINE

## 2021-02-02 PROCEDURE — 91300 COVID-19, MRNA, LNP-S, PF, 30 MCG/0.3 ML DOSE VACCINE: CPT | Mod: S$GLB,,, | Performed by: FAMILY MEDICINE

## 2021-02-02 PROCEDURE — 0002A COVID-19, MRNA, LNP-S, PF, 30 MCG/0.3 ML DOSE VACCINE: CPT | Mod: CV19,S$GLB,, | Performed by: FAMILY MEDICINE

## 2021-02-02 PROCEDURE — 83540 ASSAY OF IRON: CPT

## 2021-02-02 PROCEDURE — 80053 COMPREHEN METABOLIC PANEL: CPT

## 2021-02-02 PROCEDURE — 36415 COLL VENOUS BLD VENIPUNCTURE: CPT

## 2021-02-02 PROCEDURE — 85025 COMPLETE CBC W/AUTO DIFF WBC: CPT

## 2021-02-03 ENCOUNTER — PATIENT MESSAGE (OUTPATIENT)
Dept: ORTHOPEDICS | Facility: CLINIC | Age: 76
End: 2021-02-03

## 2021-02-04 ENCOUNTER — TELEPHONE (OUTPATIENT)
Dept: NEUROLOGY | Facility: CLINIC | Age: 76
End: 2021-02-04

## 2021-02-08 RX ORDER — VENLAFAXINE HYDROCHLORIDE 150 MG/1
150 CAPSULE, EXTENDED RELEASE ORAL DAILY
Start: 2021-02-08

## 2021-02-09 ENCOUNTER — OFFICE VISIT (OUTPATIENT)
Dept: NEUROLOGY | Facility: CLINIC | Age: 76
End: 2021-02-09
Payer: MEDICARE

## 2021-02-09 ENCOUNTER — HOSPITAL ENCOUNTER (OUTPATIENT)
Dept: RADIOLOGY | Facility: HOSPITAL | Age: 76
Discharge: HOME | End: 2021-02-09
Attending: NURSE PRACTITIONER
Payer: MEDICARE

## 2021-02-09 VITALS
DIASTOLIC BLOOD PRESSURE: 59 MMHG | BODY MASS INDEX: 24.35 KG/M2 | SYSTOLIC BLOOD PRESSURE: 106 MMHG | HEIGHT: 69 IN | HEART RATE: 80 BPM | TEMPERATURE: 98 F | WEIGHT: 164.38 LBS

## 2021-02-09 DIAGNOSIS — R42 DIZZINESS AND GIDDINESS: ICD-10-CM

## 2021-02-09 DIAGNOSIS — R55 SYNCOPE AND COLLAPSE: ICD-10-CM

## 2021-02-09 DIAGNOSIS — R42 DIZZINESS AND GIDDINESS: Primary | ICD-10-CM

## 2021-02-09 DIAGNOSIS — H92.01 RIGHT EAR PAIN: ICD-10-CM

## 2021-02-09 DIAGNOSIS — H93.11 TINNITUS OF RIGHT EAR: ICD-10-CM

## 2021-02-09 PROCEDURE — 70551 MRI BRAIN WITHOUT CONTRAST: ICD-10-PCS | Mod: 26,,, | Performed by: RADIOLOGY

## 2021-02-09 PROCEDURE — 1126F AMNT PAIN NOTED NONE PRSNT: CPT | Mod: S$GLB,,, | Performed by: NURSE PRACTITIONER

## 2021-02-09 PROCEDURE — 1159F PR MEDICATION LIST DOCUMENTED IN MEDICAL RECORD: ICD-10-PCS | Mod: S$GLB,,, | Performed by: NURSE PRACTITIONER

## 2021-02-09 PROCEDURE — 3074F PR MOST RECENT SYSTOLIC BLOOD PRESSURE < 130 MM HG: ICD-10-PCS | Mod: CPTII,S$GLB,, | Performed by: NURSE PRACTITIONER

## 2021-02-09 PROCEDURE — 1101F PT FALLS ASSESS-DOCD LE1/YR: CPT | Mod: CPTII,S$GLB,, | Performed by: NURSE PRACTITIONER

## 2021-02-09 PROCEDURE — 3288F PR FALLS RISK ASSESSMENT DOCUMENTED: ICD-10-PCS | Mod: CPTII,S$GLB,, | Performed by: NURSE PRACTITIONER

## 2021-02-09 PROCEDURE — 99205 PR OFFICE/OUTPT VISIT, NEW, LEVL V, 60-74 MIN: ICD-10-PCS | Mod: S$GLB,,, | Performed by: NURSE PRACTITIONER

## 2021-02-09 PROCEDURE — 70551 MRI BRAIN STEM W/O DYE: CPT | Mod: 26,,, | Performed by: RADIOLOGY

## 2021-02-09 PROCEDURE — 3288F FALL RISK ASSESSMENT DOCD: CPT | Mod: CPTII,S$GLB,, | Performed by: NURSE PRACTITIONER

## 2021-02-09 PROCEDURE — 3074F SYST BP LT 130 MM HG: CPT | Mod: CPTII,S$GLB,, | Performed by: NURSE PRACTITIONER

## 2021-02-09 PROCEDURE — 99999 PR PBB SHADOW E&M-EST. PATIENT-LVL V: ICD-10-PCS | Mod: PBBFAC,,, | Performed by: NURSE PRACTITIONER

## 2021-02-09 PROCEDURE — 3078F PR MOST RECENT DIASTOLIC BLOOD PRESSURE < 80 MM HG: ICD-10-PCS | Mod: CPTII,S$GLB,, | Performed by: NURSE PRACTITIONER

## 2021-02-09 PROCEDURE — 1126F PR PAIN SEVERITY QUANTIFIED, NO PAIN PRESENT: ICD-10-PCS | Mod: S$GLB,,, | Performed by: NURSE PRACTITIONER

## 2021-02-09 PROCEDURE — 99999 PR PBB SHADOW E&M-EST. PATIENT-LVL V: CPT | Mod: PBBFAC,,, | Performed by: NURSE PRACTITIONER

## 2021-02-09 PROCEDURE — 3078F DIAST BP <80 MM HG: CPT | Mod: CPTII,S$GLB,, | Performed by: NURSE PRACTITIONER

## 2021-02-09 PROCEDURE — 1101F PR PT FALLS ASSESS DOC 0-1 FALLS W/OUT INJ PAST YR: ICD-10-PCS | Mod: CPTII,S$GLB,, | Performed by: NURSE PRACTITIONER

## 2021-02-09 PROCEDURE — 99417 PROLNG OP E/M EACH 15 MIN: CPT | Mod: S$GLB,,, | Performed by: NURSE PRACTITIONER

## 2021-02-09 PROCEDURE — 1159F MED LIST DOCD IN RCRD: CPT | Mod: S$GLB,,, | Performed by: NURSE PRACTITIONER

## 2021-02-09 PROCEDURE — 99417 PR PROLONGED SVC, OUTPT, W/WO DIRECT PT CONTACT,  EA ADDTL 15 MIN: ICD-10-PCS | Mod: S$GLB,,, | Performed by: NURSE PRACTITIONER

## 2021-02-09 PROCEDURE — 70551 MRI BRAIN STEM W/O DYE: CPT | Mod: TC

## 2021-02-09 PROCEDURE — 99205 OFFICE O/P NEW HI 60 MIN: CPT | Mod: S$GLB,,, | Performed by: NURSE PRACTITIONER

## 2021-02-10 ENCOUNTER — PATIENT MESSAGE (OUTPATIENT)
Dept: NEUROLOGY | Facility: CLINIC | Age: 76
End: 2021-02-10

## 2021-02-10 ENCOUNTER — TELEPHONE (OUTPATIENT)
Dept: NEUROLOGY | Facility: CLINIC | Age: 76
End: 2021-02-10

## 2021-02-12 ENCOUNTER — HOSPITAL ENCOUNTER (OUTPATIENT)
Dept: RADIOLOGY | Facility: HOSPITAL | Age: 76
Discharge: HOME | End: 2021-02-12
Attending: NURSE PRACTITIONER
Payer: MEDICARE

## 2021-02-12 DIAGNOSIS — R42 DIZZINESS AND GIDDINESS: ICD-10-CM

## 2021-02-12 DIAGNOSIS — R55 SYNCOPE AND COLLAPSE: ICD-10-CM

## 2021-02-12 PROCEDURE — 70496 CT ANGIOGRAPHY HEAD: CPT | Mod: 26,,, | Performed by: RADIOLOGY

## 2021-02-12 PROCEDURE — 70496 CT ANGIOGRAPHY HEAD: CPT | Mod: TC

## 2021-02-12 PROCEDURE — 70498 CT ANGIOGRAPHY NECK: CPT | Mod: 26,,, | Performed by: RADIOLOGY

## 2021-02-12 PROCEDURE — 25500020 PHARM REV CODE 255

## 2021-02-12 PROCEDURE — 70496 CTA HEAD AND NECK (XPD): ICD-10-PCS | Mod: 26,,, | Performed by: RADIOLOGY

## 2021-02-12 PROCEDURE — 70498 CTA HEAD AND NECK (XPD): ICD-10-PCS | Mod: 26,,, | Performed by: RADIOLOGY

## 2021-02-12 PROCEDURE — 70498 CT ANGIOGRAPHY NECK: CPT | Mod: TC

## 2021-02-12 RX ADMIN — IOHEXOL 75 ML: 350 INJECTION, SOLUTION INTRAVENOUS at 12:02

## 2021-02-17 ENCOUNTER — PATIENT MESSAGE (OUTPATIENT)
Dept: NEUROLOGY | Facility: CLINIC | Age: 76
End: 2021-02-17

## 2021-02-18 ENCOUNTER — PATIENT MESSAGE (OUTPATIENT)
Dept: NEUROLOGY | Facility: CLINIC | Age: 76
End: 2021-02-18

## 2021-02-19 ENCOUNTER — PATIENT MESSAGE (OUTPATIENT)
Dept: CARDIOLOGY | Facility: CLINIC | Age: 76
End: 2021-02-19

## 2021-02-19 ENCOUNTER — PATIENT MESSAGE (OUTPATIENT)
Dept: PULMONOLOGY | Facility: CLINIC | Age: 76
End: 2021-02-19

## 2021-02-22 ENCOUNTER — PATIENT MESSAGE (OUTPATIENT)
Dept: NEUROLOGY | Facility: CLINIC | Age: 76
End: 2021-02-22

## 2021-02-22 ENCOUNTER — TELEPHONE (OUTPATIENT)
Dept: NEUROLOGY | Facility: CLINIC | Age: 76
End: 2021-02-22

## 2021-02-22 ENCOUNTER — PATIENT MESSAGE (OUTPATIENT)
Dept: HEMATOLOGY/ONCOLOGY | Facility: CLINIC | Age: 76
End: 2021-02-22

## 2021-02-22 ENCOUNTER — TELEPHONE (OUTPATIENT)
Dept: CARDIOLOGY | Facility: CLINIC | Age: 76
End: 2021-02-22

## 2021-02-22 ENCOUNTER — HOSPITAL ENCOUNTER (EMERGENCY)
Facility: HOSPITAL | Age: 76
Discharge: HOME | End: 2021-02-22
Attending: EMERGENCY MEDICINE
Payer: MEDICARE

## 2021-02-22 ENCOUNTER — HOSPITAL ENCOUNTER (OUTPATIENT)
Dept: NEUROLOGY | Facility: HOSPITAL | Age: 76
Discharge: HOME | End: 2021-02-22
Attending: NURSE PRACTITIONER
Payer: MEDICARE

## 2021-02-22 ENCOUNTER — PATIENT MESSAGE (OUTPATIENT)
Dept: CARDIOLOGY | Facility: CLINIC | Age: 76
End: 2021-02-22

## 2021-02-22 VITALS
SYSTOLIC BLOOD PRESSURE: 124 MMHG | HEART RATE: 76 BPM | WEIGHT: 164 LBS | DIASTOLIC BLOOD PRESSURE: 71 MMHG | RESPIRATION RATE: 18 BRPM | OXYGEN SATURATION: 97 % | BODY MASS INDEX: 24.22 KG/M2 | TEMPERATURE: 97 F

## 2021-02-22 DIAGNOSIS — R42 LIGHTHEADEDNESS: Primary | ICD-10-CM

## 2021-02-22 DIAGNOSIS — R55 SYNCOPE AND COLLAPSE: ICD-10-CM

## 2021-02-22 DIAGNOSIS — R20.2 PARESTHESIAS: ICD-10-CM

## 2021-02-22 DIAGNOSIS — Z01.818 PRE-OP TESTING: ICD-10-CM

## 2021-02-22 LAB
ALBUMIN SERPL BCP-MCNC: 4.4 G/DL (ref 3.5–5.2)
ALP SERPL-CCNC: 104 U/L (ref 55–135)
ALT SERPL W/O P-5'-P-CCNC: 18 U/L (ref 10–44)
ANION GAP SERPL CALC-SCNC: 11 MMOL/L (ref 8–16)
AST SERPL-CCNC: 20 U/L (ref 10–40)
BASOPHILS # BLD AUTO: 0.02 K/UL (ref 0–0.2)
BASOPHILS NFR BLD: 0.5 % (ref 0–1.9)
BILIRUB SERPL-MCNC: 0.3 MG/DL (ref 0.1–1)
BUN SERPL-MCNC: 28 MG/DL (ref 8–23)
CALCIUM SERPL-MCNC: 9.3 MG/DL (ref 8.7–10.5)
CHLORIDE SERPL-SCNC: 103 MMOL/L (ref 95–110)
CO2 SERPL-SCNC: 26 MMOL/L (ref 23–29)
CREAT SERPL-MCNC: 1 MG/DL (ref 0.5–1.4)
DIFFERENTIAL METHOD: ABNORMAL
EOSINOPHIL # BLD AUTO: 0.2 K/UL (ref 0–0.5)
EOSINOPHIL NFR BLD: 3.5 % (ref 0–8)
ERYTHROCYTE [DISTWIDTH] IN BLOOD BY AUTOMATED COUNT: 12.7 % (ref 11.5–14.5)
EST. GFR  (AFRICAN AMERICAN): >60 ML/MIN/1.73 M^2
EST. GFR  (NON AFRICAN AMERICAN): 55 ML/MIN/1.73 M^2
GLUCOSE SERPL-MCNC: 126 MG/DL (ref 70–110)
HCT VFR BLD AUTO: 42.1 % (ref 37–48.5)
HGB BLD-MCNC: 13 G/DL (ref 12–16)
IMM GRANULOCYTES # BLD AUTO: 0 K/UL (ref 0–0.04)
IMM GRANULOCYTES NFR BLD AUTO: 0 % (ref 0–0.5)
LYMPHOCYTES # BLD AUTO: 1 K/UL (ref 1–4.8)
LYMPHOCYTES NFR BLD: 22.3 % (ref 18–48)
MCH RBC QN AUTO: 30.2 PG (ref 27–31)
MCHC RBC AUTO-ENTMCNC: 30.9 G/DL (ref 32–36)
MCV RBC AUTO: 98 FL (ref 82–98)
MONOCYTES # BLD AUTO: 0.3 K/UL (ref 0.3–1)
MONOCYTES NFR BLD: 7 % (ref 4–15)
NEUTROPHILS # BLD AUTO: 2.9 K/UL (ref 1.8–7.7)
NEUTROPHILS NFR BLD: 66.7 % (ref 38–73)
NRBC BLD-RTO: 0 /100 WBC
PLATELET # BLD AUTO: 202 K/UL (ref 150–350)
PMV BLD AUTO: 10.1 FL (ref 9.2–12.9)
POTASSIUM SERPL-SCNC: 4.7 MMOL/L (ref 3.5–5.1)
PROT SERPL-MCNC: 7.4 G/DL (ref 6–8.4)
RBC # BLD AUTO: 4.3 M/UL (ref 4–5.4)
SODIUM SERPL-SCNC: 140 MMOL/L (ref 136–145)
WBC # BLD AUTO: 4.31 K/UL (ref 3.9–12.7)

## 2021-02-22 PROCEDURE — 36415 COLL VENOUS BLD VENIPUNCTURE: CPT

## 2021-02-22 PROCEDURE — 93010 ELECTROCARDIOGRAM REPORT: CPT | Mod: ,,, | Performed by: SPECIALIST

## 2021-02-22 PROCEDURE — 93005 ELECTROCARDIOGRAM TRACING: CPT

## 2021-02-22 PROCEDURE — 25000003 PHARM REV CODE 250: Performed by: EMERGENCY MEDICINE

## 2021-02-22 PROCEDURE — 93010 EKG 12-LEAD: ICD-10-PCS | Mod: ,,, | Performed by: SPECIALIST

## 2021-02-22 PROCEDURE — 80053 COMPREHEN METABOLIC PANEL: CPT

## 2021-02-22 PROCEDURE — 99284 EMERGENCY DEPT VISIT MOD MDM: CPT | Mod: 25

## 2021-02-22 PROCEDURE — 85025 COMPLETE CBC W/AUTO DIFF WBC: CPT

## 2021-02-22 RX ORDER — ALPRAZOLAM 0.25 MG/1
0.25 TABLET ORAL
Status: COMPLETED | OUTPATIENT
Start: 2021-02-22 | End: 2021-02-22

## 2021-02-22 RX ADMIN — ALPRAZOLAM 0.25 MG: 0.25 TABLET ORAL at 01:02

## 2021-02-23 ENCOUNTER — PATIENT MESSAGE (OUTPATIENT)
Dept: NEUROLOGY | Facility: CLINIC | Age: 76
End: 2021-02-23

## 2021-02-23 ENCOUNTER — TELEPHONE (OUTPATIENT)
Dept: NEUROLOGY | Facility: CLINIC | Age: 76
End: 2021-02-23

## 2021-02-23 DIAGNOSIS — R42 DIZZINESS AND GIDDINESS: ICD-10-CM

## 2021-02-23 DIAGNOSIS — R55 SYNCOPE AND COLLAPSE: Primary | ICD-10-CM

## 2021-03-01 RX ORDER — BENAZEPRIL HYDROCHLORIDE 40 MG/1
40 TABLET ORAL DAILY
Qty: 90 TABLET | Refills: 0 | Status: SHIPPED | OUTPATIENT
Start: 2021-03-01 | End: 2021-06-10

## 2021-03-03 ENCOUNTER — OFFICE VISIT (OUTPATIENT)
Dept: PRIMARY CARE CLINIC | Facility: CLINIC | Age: 76
End: 2021-03-03
Payer: MEDICARE

## 2021-03-03 ENCOUNTER — PATIENT MESSAGE (OUTPATIENT)
Dept: PRIMARY CARE CLINIC | Facility: CLINIC | Age: 76
End: 2021-03-03

## 2021-03-03 VITALS
WEIGHT: 161.81 LBS | RESPIRATION RATE: 16 BRPM | HEIGHT: 69 IN | TEMPERATURE: 97 F | HEART RATE: 84 BPM | BODY MASS INDEX: 23.97 KG/M2 | DIASTOLIC BLOOD PRESSURE: 82 MMHG | SYSTOLIC BLOOD PRESSURE: 128 MMHG | OXYGEN SATURATION: 97 %

## 2021-03-03 DIAGNOSIS — R42 POSTURAL DIZZINESS WITH PRESYNCOPE: Primary | ICD-10-CM

## 2021-03-03 DIAGNOSIS — J44.9 CHRONIC OBSTRUCTIVE PULMONARY DISEASE, UNSPECIFIED COPD TYPE: ICD-10-CM

## 2021-03-03 DIAGNOSIS — D63.8 ANEMIA, CHRONIC DISEASE: ICD-10-CM

## 2021-03-03 DIAGNOSIS — I10 ESSENTIAL HYPERTENSION: ICD-10-CM

## 2021-03-03 DIAGNOSIS — R55 POSTURAL DIZZINESS WITH PRESYNCOPE: Primary | ICD-10-CM

## 2021-03-03 DIAGNOSIS — G24.3 CERVICAL DYSTONIA: ICD-10-CM

## 2021-03-03 DIAGNOSIS — R55 SYNCOPE AND COLLAPSE: ICD-10-CM

## 2021-03-03 DIAGNOSIS — G24.5 BLEPHAROSPASM: ICD-10-CM

## 2021-03-03 DIAGNOSIS — Z59.9 FINANCIAL DIFFICULTIES: ICD-10-CM

## 2021-03-03 DIAGNOSIS — F32.9 MAJOR DEPRESSION, CHRONIC: ICD-10-CM

## 2021-03-03 PROCEDURE — 99499 RISK ADDL DX/OHS AUDIT: ICD-10-PCS | Mod: S$GLB,,, | Performed by: INTERNAL MEDICINE

## 2021-03-03 PROCEDURE — 99499 UNLISTED E&M SERVICE: CPT | Mod: S$GLB,,, | Performed by: INTERNAL MEDICINE

## 2021-03-03 PROCEDURE — 1126F PR PAIN SEVERITY QUANTIFIED, NO PAIN PRESENT: ICD-10-PCS | Mod: S$GLB,,, | Performed by: INTERNAL MEDICINE

## 2021-03-03 PROCEDURE — 1101F PR PT FALLS ASSESS DOC 0-1 FALLS W/OUT INJ PAST YR: ICD-10-PCS | Mod: CPTII,S$GLB,, | Performed by: INTERNAL MEDICINE

## 2021-03-03 PROCEDURE — 3288F FALL RISK ASSESSMENT DOCD: CPT | Mod: CPTII,S$GLB,, | Performed by: INTERNAL MEDICINE

## 2021-03-03 PROCEDURE — 99215 PR OFFICE/OUTPT VISIT, EST, LEVL V, 40-54 MIN: ICD-10-PCS | Mod: S$GLB,,, | Performed by: INTERNAL MEDICINE

## 2021-03-03 PROCEDURE — 1126F AMNT PAIN NOTED NONE PRSNT: CPT | Mod: S$GLB,,, | Performed by: INTERNAL MEDICINE

## 2021-03-03 PROCEDURE — 1101F PT FALLS ASSESS-DOCD LE1/YR: CPT | Mod: CPTII,S$GLB,, | Performed by: INTERNAL MEDICINE

## 2021-03-03 PROCEDURE — 99215 OFFICE O/P EST HI 40 MIN: CPT | Mod: S$GLB,,, | Performed by: INTERNAL MEDICINE

## 2021-03-03 PROCEDURE — 3288F PR FALLS RISK ASSESSMENT DOCUMENTED: ICD-10-PCS | Mod: CPTII,S$GLB,, | Performed by: INTERNAL MEDICINE

## 2021-03-03 RX ORDER — LANOLIN ALCOHOL/MO/W.PET/CERES
1 CREAM (GRAM) TOPICAL 2 TIMES DAILY
COMMUNITY
Start: 2020-12-02 | End: 2021-03-05 | Stop reason: SDUPTHER

## 2021-03-03 SDOH — SOCIAL DETERMINANTS OF HEALTH (SDOH): PROBLEM RELATED TO HOUSING AND ECONOMIC CIRCUMSTANCES, UNSPECIFIED: Z59.9

## 2021-03-05 RX ORDER — LANOLIN ALCOHOL/MO/W.PET/CERES
1 CREAM (GRAM) TOPICAL 2 TIMES DAILY
Qty: 180 TABLET | Refills: 3 | Status: SHIPPED | OUTPATIENT
Start: 2021-03-05 | End: 2022-05-03

## 2021-03-08 ENCOUNTER — HOSPITAL ENCOUNTER (OUTPATIENT)
Dept: NEUROLOGY | Facility: HOSPITAL | Age: 76
Discharge: HOME | End: 2021-03-08
Attending: NURSE PRACTITIONER
Payer: MEDICARE

## 2021-03-08 ENCOUNTER — PATIENT MESSAGE (OUTPATIENT)
Dept: HEMATOLOGY/ONCOLOGY | Facility: CLINIC | Age: 76
End: 2021-03-08

## 2021-03-08 DIAGNOSIS — R42 DIZZINESS AND GIDDINESS: ICD-10-CM

## 2021-03-08 DIAGNOSIS — R55 SYNCOPE AND COLLAPSE: ICD-10-CM

## 2021-03-08 PROBLEM — Z59.9 FINANCIAL DIFFICULTIES: Status: ACTIVE | Noted: 2021-03-08

## 2021-03-08 PROBLEM — H92.01 RIGHT EAR PAIN: Status: RESOLVED | Noted: 2021-02-09 | Resolved: 2021-03-08

## 2021-03-08 PROCEDURE — 95722 EEG PHY/QHP>36<60 HR W/VEEG: CPT

## 2021-03-08 PROCEDURE — 95721 PR EEG, W/O VIDEO, CONT RECORD, CMPLT STDY, I&R, >36<60 HRS: ICD-10-PCS | Mod: ,,, | Performed by: PSYCHIATRY & NEUROLOGY

## 2021-03-08 PROCEDURE — 95721 EEG PHY/QHP>36<60 HR W/O VID: CPT | Mod: ,,, | Performed by: PSYCHIATRY & NEUROLOGY

## 2021-03-10 ENCOUNTER — PATIENT OUTREACH (OUTPATIENT)
Dept: ADMINISTRATIVE | Facility: OTHER | Age: 76
End: 2021-03-10

## 2021-03-14 ENCOUNTER — PATIENT MESSAGE (OUTPATIENT)
Dept: NEUROLOGY | Facility: CLINIC | Age: 76
End: 2021-03-14

## 2021-03-15 ENCOUNTER — OFFICE VISIT (OUTPATIENT)
Dept: PULMONOLOGY | Facility: CLINIC | Age: 76
End: 2021-03-15
Payer: MEDICARE

## 2021-03-15 VITALS
WEIGHT: 163.56 LBS | BODY MASS INDEX: 24.23 KG/M2 | HEART RATE: 84 BPM | DIASTOLIC BLOOD PRESSURE: 75 MMHG | OXYGEN SATURATION: 98 % | SYSTOLIC BLOOD PRESSURE: 128 MMHG | HEIGHT: 69 IN

## 2021-03-15 DIAGNOSIS — R06.02 SHORTNESS OF BREATH: ICD-10-CM

## 2021-03-15 DIAGNOSIS — J45.50 SEVERE PERSISTENT ASTHMA WITHOUT COMPLICATION: Primary | ICD-10-CM

## 2021-03-15 PROCEDURE — 3288F PR FALLS RISK ASSESSMENT DOCUMENTED: ICD-10-PCS | Mod: CPTII,S$GLB,, | Performed by: NURSE PRACTITIONER

## 2021-03-15 PROCEDURE — 3074F SYST BP LT 130 MM HG: CPT | Mod: CPTII,S$GLB,, | Performed by: NURSE PRACTITIONER

## 2021-03-15 PROCEDURE — 1159F PR MEDICATION LIST DOCUMENTED IN MEDICAL RECORD: ICD-10-PCS | Mod: S$GLB,,, | Performed by: NURSE PRACTITIONER

## 2021-03-15 PROCEDURE — 3078F PR MOST RECENT DIASTOLIC BLOOD PRESSURE < 80 MM HG: ICD-10-PCS | Mod: CPTII,S$GLB,, | Performed by: NURSE PRACTITIONER

## 2021-03-15 PROCEDURE — 3074F PR MOST RECENT SYSTOLIC BLOOD PRESSURE < 130 MM HG: ICD-10-PCS | Mod: CPTII,S$GLB,, | Performed by: NURSE PRACTITIONER

## 2021-03-15 PROCEDURE — 3078F DIAST BP <80 MM HG: CPT | Mod: CPTII,S$GLB,, | Performed by: NURSE PRACTITIONER

## 2021-03-15 PROCEDURE — 1126F AMNT PAIN NOTED NONE PRSNT: CPT | Mod: S$GLB,,, | Performed by: NURSE PRACTITIONER

## 2021-03-15 PROCEDURE — 3288F FALL RISK ASSESSMENT DOCD: CPT | Mod: CPTII,S$GLB,, | Performed by: NURSE PRACTITIONER

## 2021-03-15 PROCEDURE — 1159F MED LIST DOCD IN RCRD: CPT | Mod: S$GLB,,, | Performed by: NURSE PRACTITIONER

## 2021-03-15 PROCEDURE — 1101F PR PT FALLS ASSESS DOC 0-1 FALLS W/OUT INJ PAST YR: ICD-10-PCS | Mod: CPTII,S$GLB,, | Performed by: NURSE PRACTITIONER

## 2021-03-15 PROCEDURE — 99999 PR PBB SHADOW E&M-EST. PATIENT-LVL IV: CPT | Mod: PBBFAC,,, | Performed by: NURSE PRACTITIONER

## 2021-03-15 PROCEDURE — 99999 PR PBB SHADOW E&M-EST. PATIENT-LVL IV: ICD-10-PCS | Mod: PBBFAC,,, | Performed by: NURSE PRACTITIONER

## 2021-03-15 PROCEDURE — 1101F PT FALLS ASSESS-DOCD LE1/YR: CPT | Mod: CPTII,S$GLB,, | Performed by: NURSE PRACTITIONER

## 2021-03-15 PROCEDURE — 99214 PR OFFICE/OUTPT VISIT, EST, LEVL IV, 30-39 MIN: ICD-10-PCS | Mod: S$GLB,,, | Performed by: NURSE PRACTITIONER

## 2021-03-15 PROCEDURE — 1126F PR PAIN SEVERITY QUANTIFIED, NO PAIN PRESENT: ICD-10-PCS | Mod: S$GLB,,, | Performed by: NURSE PRACTITIONER

## 2021-03-15 PROCEDURE — 99214 OFFICE O/P EST MOD 30 MIN: CPT | Mod: S$GLB,,, | Performed by: NURSE PRACTITIONER

## 2021-03-15 RX ORDER — ALBUTEROL SULFATE 90 UG/1
2 AEROSOL, METERED RESPIRATORY (INHALATION) EVERY 4 HOURS PRN
Qty: 54 G | Refills: 3 | Status: SHIPPED | OUTPATIENT
Start: 2021-03-15 | End: 2021-12-27 | Stop reason: SDUPTHER

## 2021-03-16 ENCOUNTER — PATIENT MESSAGE (OUTPATIENT)
Dept: PULMONOLOGY | Facility: CLINIC | Age: 76
End: 2021-03-16

## 2021-03-16 ENCOUNTER — OFFICE VISIT (OUTPATIENT)
Dept: CARDIOLOGY | Facility: CLINIC | Age: 76
End: 2021-03-16
Payer: MEDICARE

## 2021-03-16 ENCOUNTER — PATIENT MESSAGE (OUTPATIENT)
Dept: NEUROLOGY | Facility: CLINIC | Age: 76
End: 2021-03-16

## 2021-03-16 VITALS
OXYGEN SATURATION: 95 % | BODY MASS INDEX: 24.14 KG/M2 | SYSTOLIC BLOOD PRESSURE: 140 MMHG | DIASTOLIC BLOOD PRESSURE: 80 MMHG | HEIGHT: 69 IN | HEART RATE: 77 BPM | WEIGHT: 163 LBS

## 2021-03-16 DIAGNOSIS — I10 ESSENTIAL HYPERTENSION: Primary | ICD-10-CM

## 2021-03-16 DIAGNOSIS — R55 SYNCOPE AND COLLAPSE: ICD-10-CM

## 2021-03-16 DIAGNOSIS — I48.0 PAROXYSMAL ATRIAL FIBRILLATION: ICD-10-CM

## 2021-03-16 PROCEDURE — 3077F PR MOST RECENT SYSTOLIC BLOOD PRESSURE >= 140 MM HG: ICD-10-PCS | Mod: CPTII,S$GLB,, | Performed by: SPECIALIST

## 2021-03-16 PROCEDURE — 3079F DIAST BP 80-89 MM HG: CPT | Mod: CPTII,S$GLB,, | Performed by: SPECIALIST

## 2021-03-16 PROCEDURE — 3288F PR FALLS RISK ASSESSMENT DOCUMENTED: ICD-10-PCS | Mod: CPTII,S$GLB,, | Performed by: SPECIALIST

## 2021-03-16 PROCEDURE — 3077F SYST BP >= 140 MM HG: CPT | Mod: CPTII,S$GLB,, | Performed by: SPECIALIST

## 2021-03-16 PROCEDURE — 1159F MED LIST DOCD IN RCRD: CPT | Mod: S$GLB,,, | Performed by: SPECIALIST

## 2021-03-16 PROCEDURE — 1101F PT FALLS ASSESS-DOCD LE1/YR: CPT | Mod: CPTII,S$GLB,, | Performed by: SPECIALIST

## 2021-03-16 PROCEDURE — 99205 OFFICE O/P NEW HI 60 MIN: CPT | Mod: S$GLB,,, | Performed by: SPECIALIST

## 2021-03-16 PROCEDURE — 99499 RISK ADDL DX/OHS AUDIT: ICD-10-PCS | Mod: S$GLB,,, | Performed by: SPECIALIST

## 2021-03-16 PROCEDURE — 1159F PR MEDICATION LIST DOCUMENTED IN MEDICAL RECORD: ICD-10-PCS | Mod: S$GLB,,, | Performed by: SPECIALIST

## 2021-03-16 PROCEDURE — 99499 UNLISTED E&M SERVICE: CPT | Mod: S$GLB,,, | Performed by: SPECIALIST

## 2021-03-16 PROCEDURE — 99205 PR OFFICE/OUTPT VISIT, NEW, LEVL V, 60-74 MIN: ICD-10-PCS | Mod: S$GLB,,, | Performed by: SPECIALIST

## 2021-03-16 PROCEDURE — 3079F PR MOST RECENT DIASTOLIC BLOOD PRESSURE 80-89 MM HG: ICD-10-PCS | Mod: CPTII,S$GLB,, | Performed by: SPECIALIST

## 2021-03-16 PROCEDURE — 3288F FALL RISK ASSESSMENT DOCD: CPT | Mod: CPTII,S$GLB,, | Performed by: SPECIALIST

## 2021-03-16 PROCEDURE — 1101F PR PT FALLS ASSESS DOC 0-1 FALLS W/OUT INJ PAST YR: ICD-10-PCS | Mod: CPTII,S$GLB,, | Performed by: SPECIALIST

## 2021-03-17 ENCOUNTER — PATIENT MESSAGE (OUTPATIENT)
Dept: CARDIOLOGY | Facility: CLINIC | Age: 76
End: 2021-03-17

## 2021-03-17 RX ORDER — BACLOFEN 20 MG/1
20 TABLET ORAL 2 TIMES DAILY
Qty: 180 TABLET | Refills: 3 | Status: SHIPPED | OUTPATIENT
Start: 2021-03-17 | End: 2021-12-30

## 2021-03-17 RX ORDER — GABAPENTIN 600 MG/1
600 TABLET ORAL 3 TIMES DAILY
Qty: 270 TABLET | Refills: 3 | Status: SHIPPED | OUTPATIENT
Start: 2021-03-17 | End: 2021-12-30

## 2021-03-18 ENCOUNTER — PATIENT MESSAGE (OUTPATIENT)
Dept: CARDIOLOGY | Facility: CLINIC | Age: 76
End: 2021-03-18

## 2021-03-18 ENCOUNTER — PATIENT MESSAGE (OUTPATIENT)
Dept: PRIMARY CARE CLINIC | Facility: CLINIC | Age: 76
End: 2021-03-18

## 2021-03-18 ENCOUNTER — PATIENT MESSAGE (OUTPATIENT)
Dept: HEMATOLOGY/ONCOLOGY | Facility: CLINIC | Age: 76
End: 2021-03-18

## 2021-03-18 ENCOUNTER — PATIENT MESSAGE (OUTPATIENT)
Dept: ORTHOPEDICS | Facility: CLINIC | Age: 76
End: 2021-03-18

## 2021-03-18 ENCOUNTER — OFFICE VISIT (OUTPATIENT)
Dept: OTOLARYNGOLOGY | Facility: CLINIC | Age: 76
End: 2021-03-18
Payer: MEDICARE

## 2021-03-18 ENCOUNTER — TELEPHONE (OUTPATIENT)
Dept: CARDIOLOGY | Facility: CLINIC | Age: 76
End: 2021-03-18

## 2021-03-18 VITALS
DIASTOLIC BLOOD PRESSURE: 74 MMHG | HEIGHT: 69 IN | OXYGEN SATURATION: 97 % | HEART RATE: 86 BPM | BODY MASS INDEX: 24.2 KG/M2 | SYSTOLIC BLOOD PRESSURE: 145 MMHG | WEIGHT: 163.38 LBS

## 2021-03-18 DIAGNOSIS — R42 DIZZINESS AND GIDDINESS: ICD-10-CM

## 2021-03-18 DIAGNOSIS — H93.11 TINNITUS OF RIGHT EAR: Primary | ICD-10-CM

## 2021-03-18 DIAGNOSIS — H92.01 RIGHT EAR PAIN: ICD-10-CM

## 2021-03-18 PROCEDURE — 3078F DIAST BP <80 MM HG: CPT | Mod: CPTII,S$GLB,, | Performed by: OTOLARYNGOLOGY

## 2021-03-18 PROCEDURE — 1159F MED LIST DOCD IN RCRD: CPT | Mod: S$GLB,,, | Performed by: OTOLARYNGOLOGY

## 2021-03-18 PROCEDURE — 1126F AMNT PAIN NOTED NONE PRSNT: CPT | Mod: S$GLB,,, | Performed by: OTOLARYNGOLOGY

## 2021-03-18 PROCEDURE — 1101F PT FALLS ASSESS-DOCD LE1/YR: CPT | Mod: CPTII,S$GLB,, | Performed by: OTOLARYNGOLOGY

## 2021-03-18 PROCEDURE — 99203 OFFICE O/P NEW LOW 30 MIN: CPT | Mod: S$GLB,,, | Performed by: OTOLARYNGOLOGY

## 2021-03-18 PROCEDURE — 3288F FALL RISK ASSESSMENT DOCD: CPT | Mod: CPTII,S$GLB,, | Performed by: OTOLARYNGOLOGY

## 2021-03-18 PROCEDURE — 1126F PR PAIN SEVERITY QUANTIFIED, NO PAIN PRESENT: ICD-10-PCS | Mod: S$GLB,,, | Performed by: OTOLARYNGOLOGY

## 2021-03-18 PROCEDURE — 1159F PR MEDICATION LIST DOCUMENTED IN MEDICAL RECORD: ICD-10-PCS | Mod: S$GLB,,, | Performed by: OTOLARYNGOLOGY

## 2021-03-18 PROCEDURE — 3077F PR MOST RECENT SYSTOLIC BLOOD PRESSURE >= 140 MM HG: ICD-10-PCS | Mod: CPTII,S$GLB,, | Performed by: OTOLARYNGOLOGY

## 2021-03-18 PROCEDURE — 1101F PR PT FALLS ASSESS DOC 0-1 FALLS W/OUT INJ PAST YR: ICD-10-PCS | Mod: CPTII,S$GLB,, | Performed by: OTOLARYNGOLOGY

## 2021-03-18 PROCEDURE — 3288F PR FALLS RISK ASSESSMENT DOCUMENTED: ICD-10-PCS | Mod: CPTII,S$GLB,, | Performed by: OTOLARYNGOLOGY

## 2021-03-18 PROCEDURE — 3077F SYST BP >= 140 MM HG: CPT | Mod: CPTII,S$GLB,, | Performed by: OTOLARYNGOLOGY

## 2021-03-18 PROCEDURE — 99203 PR OFFICE/OUTPT VISIT, NEW, LEVL III, 30-44 MIN: ICD-10-PCS | Mod: S$GLB,,, | Performed by: OTOLARYNGOLOGY

## 2021-03-18 PROCEDURE — 3078F PR MOST RECENT DIASTOLIC BLOOD PRESSURE < 80 MM HG: ICD-10-PCS | Mod: CPTII,S$GLB,, | Performed by: OTOLARYNGOLOGY

## 2021-03-19 ENCOUNTER — TELEPHONE (OUTPATIENT)
Dept: PRIMARY CARE CLINIC | Facility: CLINIC | Age: 76
End: 2021-03-19

## 2021-03-19 ENCOUNTER — TELEPHONE (OUTPATIENT)
Dept: OTOLARYNGOLOGY | Facility: CLINIC | Age: 76
End: 2021-03-19

## 2021-03-19 DIAGNOSIS — I10 ESSENTIAL HYPERTENSION: ICD-10-CM

## 2021-03-19 DIAGNOSIS — R73.9 HYPERGLYCEMIA: Primary | ICD-10-CM

## 2021-03-19 DIAGNOSIS — R16.2 HEPATOSPLENOMEGALY: ICD-10-CM

## 2021-03-27 ENCOUNTER — HOSPITAL ENCOUNTER (OUTPATIENT)
Dept: RADIOLOGY | Facility: HOSPITAL | Age: 76
Discharge: HOME | End: 2021-03-27
Attending: INTERNAL MEDICINE
Payer: MEDICARE

## 2021-03-27 DIAGNOSIS — R16.2 HEPATOSPLENOMEGALY: ICD-10-CM

## 2021-03-27 PROCEDURE — 76705 US ABDOMEN LIMITED: ICD-10-PCS | Mod: 26,,, | Performed by: RADIOLOGY

## 2021-03-27 PROCEDURE — 76705 ECHO EXAM OF ABDOMEN: CPT | Mod: 26,,, | Performed by: RADIOLOGY

## 2021-03-27 PROCEDURE — 76705 ECHO EXAM OF ABDOMEN: CPT | Mod: TC

## 2021-03-30 ENCOUNTER — PATIENT MESSAGE (OUTPATIENT)
Dept: CARDIOLOGY | Facility: CLINIC | Age: 76
End: 2021-03-30

## 2021-04-03 ENCOUNTER — LAB VISIT (OUTPATIENT)
Dept: LAB | Facility: HOSPITAL | Age: 76
End: 2021-04-03
Attending: INTERNAL MEDICINE
Payer: MEDICARE

## 2021-04-03 DIAGNOSIS — D63.1 ANEMIA, CHRONIC RENAL FAILURE, STAGE 4 (SEVERE): ICD-10-CM

## 2021-04-03 DIAGNOSIS — D64.89 ANEMIA DUE TO MULTIPLE MECHANISMS: ICD-10-CM

## 2021-04-03 DIAGNOSIS — D63.8 ANEMIA, CHRONIC DISEASE: ICD-10-CM

## 2021-04-03 DIAGNOSIS — N18.4 ANEMIA, CHRONIC RENAL FAILURE, STAGE 4 (SEVERE): ICD-10-CM

## 2021-04-03 LAB
ALBUMIN SERPL BCP-MCNC: 4.1 G/DL (ref 3.5–5.2)
ALP SERPL-CCNC: 103 U/L (ref 55–135)
ALT SERPL W/O P-5'-P-CCNC: 17 U/L (ref 10–44)
ANION GAP SERPL CALC-SCNC: 11 MMOL/L (ref 8–16)
AST SERPL-CCNC: 17 U/L (ref 10–40)
BASOPHILS # BLD AUTO: 0.01 K/UL (ref 0–0.2)
BASOPHILS NFR BLD: 0.3 % (ref 0–1.9)
BILIRUB SERPL-MCNC: 0.3 MG/DL (ref 0.1–1)
BUN SERPL-MCNC: 25 MG/DL (ref 8–23)
CALCIUM SERPL-MCNC: 9 MG/DL (ref 8.7–10.5)
CHLORIDE SERPL-SCNC: 105 MMOL/L (ref 95–110)
CO2 SERPL-SCNC: 30 MMOL/L (ref 23–29)
CREAT SERPL-MCNC: 0.8 MG/DL (ref 0.5–1.4)
DIFFERENTIAL METHOD: ABNORMAL
EOSINOPHIL # BLD AUTO: 0.1 K/UL (ref 0–0.5)
EOSINOPHIL NFR BLD: 3 % (ref 0–8)
ERYTHROCYTE [DISTWIDTH] IN BLOOD BY AUTOMATED COUNT: 12 % (ref 11.5–14.5)
EST. GFR  (AFRICAN AMERICAN): >60 ML/MIN/1.73 M^2
EST. GFR  (NON AFRICAN AMERICAN): >60 ML/MIN/1.73 M^2
FERRITIN SERPL-MCNC: 146 NG/ML (ref 20–300)
GLUCOSE SERPL-MCNC: 116 MG/DL (ref 70–110)
HCT VFR BLD AUTO: 40 % (ref 37–48.5)
HGB BLD-MCNC: 12.6 G/DL (ref 12–16)
IMM GRANULOCYTES # BLD AUTO: 0.01 K/UL (ref 0–0.04)
IMM GRANULOCYTES NFR BLD AUTO: 0.3 % (ref 0–0.5)
IRON SERPL-MCNC: 86 UG/DL (ref 30–160)
LYMPHOCYTES # BLD AUTO: 0.8 K/UL (ref 1–4.8)
LYMPHOCYTES NFR BLD: 20.8 % (ref 18–48)
MCH RBC QN AUTO: 30.7 PG (ref 27–31)
MCHC RBC AUTO-ENTMCNC: 31.5 G/DL (ref 32–36)
MCV RBC AUTO: 98 FL (ref 82–98)
MONOCYTES # BLD AUTO: 0.3 K/UL (ref 0.3–1)
MONOCYTES NFR BLD: 7.5 % (ref 4–15)
NEUTROPHILS # BLD AUTO: 2.7 K/UL (ref 1.8–7.7)
NEUTROPHILS NFR BLD: 68.1 % (ref 38–73)
NRBC BLD-RTO: 0 /100 WBC
PLATELET # BLD AUTO: 163 K/UL (ref 150–450)
PMV BLD AUTO: 10.1 FL (ref 9.2–12.9)
POTASSIUM SERPL-SCNC: 4.4 MMOL/L (ref 3.5–5.1)
PROT SERPL-MCNC: 6.8 G/DL (ref 6–8.4)
RBC # BLD AUTO: 4.1 M/UL (ref 4–5.4)
SATURATED IRON: 27 % (ref 20–50)
SODIUM SERPL-SCNC: 146 MMOL/L (ref 136–145)
TOTAL IRON BINDING CAPACITY: 317 UG/DL (ref 250–450)
TRANSFERRIN SERPL-MCNC: 214 MG/DL (ref 200–375)
WBC # BLD AUTO: 3.99 K/UL (ref 3.9–12.7)

## 2021-04-03 PROCEDURE — 36415 COLL VENOUS BLD VENIPUNCTURE: CPT | Performed by: INTERNAL MEDICINE

## 2021-04-03 PROCEDURE — 82728 ASSAY OF FERRITIN: CPT | Performed by: INTERNAL MEDICINE

## 2021-04-03 PROCEDURE — 80053 COMPREHEN METABOLIC PANEL: CPT | Performed by: INTERNAL MEDICINE

## 2021-04-03 PROCEDURE — 85025 COMPLETE CBC W/AUTO DIFF WBC: CPT | Performed by: INTERNAL MEDICINE

## 2021-04-03 PROCEDURE — 83540 ASSAY OF IRON: CPT | Performed by: INTERNAL MEDICINE

## 2021-04-09 ENCOUNTER — PATIENT MESSAGE (OUTPATIENT)
Dept: CARDIOLOGY | Facility: CLINIC | Age: 76
End: 2021-04-09

## 2021-04-16 ENCOUNTER — OFFICE VISIT (OUTPATIENT)
Dept: PRIMARY CARE CLINIC | Facility: CLINIC | Age: 76
End: 2021-04-16
Payer: MEDICARE

## 2021-04-16 ENCOUNTER — OUTPATIENT CASE MANAGEMENT (OUTPATIENT)
Dept: ADMINISTRATIVE | Facility: OTHER | Age: 76
End: 2021-04-16

## 2021-04-16 ENCOUNTER — HOSPITAL ENCOUNTER (OUTPATIENT)
Dept: CARDIOLOGY | Facility: CLINIC | Age: 76
Discharge: HOME | End: 2021-04-16
Attending: SPECIALIST
Payer: MEDICARE

## 2021-04-16 VITALS
BODY MASS INDEX: 24.52 KG/M2 | OXYGEN SATURATION: 96 % | HEART RATE: 80 BPM | RESPIRATION RATE: 16 BRPM | SYSTOLIC BLOOD PRESSURE: 130 MMHG | WEIGHT: 165.56 LBS | HEIGHT: 69 IN | TEMPERATURE: 99 F | DIASTOLIC BLOOD PRESSURE: 78 MMHG

## 2021-04-16 VITALS — BODY MASS INDEX: 24.73 KG/M2 | WEIGHT: 167 LBS | HEIGHT: 69 IN

## 2021-04-16 DIAGNOSIS — J44.9 CHRONIC OBSTRUCTIVE PULMONARY DISEASE, UNSPECIFIED COPD TYPE: ICD-10-CM

## 2021-04-16 DIAGNOSIS — I48.0 PAROXYSMAL ATRIAL FIBRILLATION: ICD-10-CM

## 2021-04-16 DIAGNOSIS — Z59.9 FINANCIAL DIFFICULTIES: ICD-10-CM

## 2021-04-16 DIAGNOSIS — R73.03 PREDIABETES: Primary | ICD-10-CM

## 2021-04-16 DIAGNOSIS — F32.9 MAJOR DEPRESSION, CHRONIC: ICD-10-CM

## 2021-04-16 DIAGNOSIS — H93.11 TINNITUS OF RIGHT EAR: ICD-10-CM

## 2021-04-16 DIAGNOSIS — I10 ESSENTIAL HYPERTENSION: ICD-10-CM

## 2021-04-16 DIAGNOSIS — R55 SYNCOPE AND COLLAPSE: ICD-10-CM

## 2021-04-16 DIAGNOSIS — I48.0 PAROXYSMAL ATRIAL FIBRILLATION: Primary | ICD-10-CM

## 2021-04-16 DIAGNOSIS — D84.9 IMMUNE DEFICIENCY DISORDER: ICD-10-CM

## 2021-04-16 LAB
AORTIC ROOT ANNULUS: 2.5 CM
AORTIC VALVE CUSP SEPERATION: 1.9 CM
AV INDEX (PROSTH): 0.63
AV MEAN GRADIENT: 9 MMHG
AV PEAK GRADIENT: 14 MMHG
AV VALVE AREA: 2.18 CM2
AV VELOCITY RATIO: 0.61
BSA FOR ECHO PROCEDURE: 1.92 M2
CV ECHO LV RWT: 0.57 CM
DOP CALC AO PEAK VEL: 1.84 M/S
DOP CALC AO VTI: 45.2 CM
DOP CALC LVOT AREA: 3.5 CM2
DOP CALC LVOT DIAMETER: 2.1 CM
DOP CALC LVOT PEAK VEL: 1.12 M/S
DOP CALC LVOT STROKE VOLUME: 98.66 CM3
DOP CALCLVOT PEAK VEL VTI: 28.5 CM
E WAVE DECELERATION TIME: 148 MS
E/A RATIO: 1.22
E/E' RATIO: 9.63 M/S
ECHO LV POSTERIOR WALL: 1.04 CM (ref 0.6–1.1)
EJECTION FRACTION: 67 %
FRACTIONAL SHORTENING: 37 % (ref 28–44)
INTERVENTRICULAR SEPTUM: 0.91 CM (ref 0.6–1.1)
IVRT: 95 MS
LEFT ATRIUM SIZE: 4.1 CM
LEFT INTERNAL DIMENSION IN SYSTOLE: 2.31 CM (ref 2.1–4)
LEFT VENTRICLE MASS INDEX: 56 G/M2
LEFT VENTRICULAR INTERNAL DIMENSION IN DIASTOLE: 3.66 CM (ref 3.5–6)
LEFT VENTRICULAR MASS: 106.71 G
LV LATERAL E/E' RATIO: 8.56 M/S
LV SEPTAL E/E' RATIO: 11 M/S
MV PEAK A VEL: 0.63 M/S
MV PEAK E VEL: 0.77 M/S
PISA TR MAX VEL: 3.37 M/S
RA PRESSURE: 3 MMHG
RIGHT VENTRICULAR END-DIASTOLIC DIMENSION: 1.91 CM
TDI LATERAL: 0.09 M/S
TDI SEPTAL: 0.07 M/S
TDI: 0.08 M/S
TR MAX PG: 45 MMHG
TV REST PULMONARY ARTERY PRESSURE: 48 MMHG

## 2021-04-16 PROCEDURE — 99499 RISK ADDL DX/OHS AUDIT: ICD-10-PCS | Mod: S$GLB,,, | Performed by: INTERNAL MEDICINE

## 2021-04-16 PROCEDURE — 99215 PR OFFICE/OUTPT VISIT, EST, LEVL V, 40-54 MIN: ICD-10-PCS | Mod: S$GLB,,, | Performed by: INTERNAL MEDICINE

## 2021-04-16 PROCEDURE — 93224 HOLTER MONITOR - 48 HOUR (CUPID ONLY): ICD-10-PCS | Mod: S$GLB,,, | Performed by: SPECIALIST

## 2021-04-16 PROCEDURE — 1125F PR PAIN SEVERITY QUANTIFIED, PAIN PRESENT: ICD-10-PCS | Mod: S$GLB,,, | Performed by: INTERNAL MEDICINE

## 2021-04-16 PROCEDURE — 99215 OFFICE O/P EST HI 40 MIN: CPT | Mod: S$GLB,,, | Performed by: INTERNAL MEDICINE

## 2021-04-16 PROCEDURE — 93224 XTRNL ECG REC UP TO 48 HRS: CPT | Mod: S$GLB,,, | Performed by: SPECIALIST

## 2021-04-16 PROCEDURE — 93306 TTE W/DOPPLER COMPLETE: CPT | Mod: S$GLB,,, | Performed by: SPECIALIST

## 2021-04-16 PROCEDURE — 1125F AMNT PAIN NOTED PAIN PRSNT: CPT | Mod: S$GLB,,, | Performed by: INTERNAL MEDICINE

## 2021-04-16 PROCEDURE — 93306 ECHO (CUPID ONLY): ICD-10-PCS | Mod: S$GLB,,, | Performed by: SPECIALIST

## 2021-04-16 PROCEDURE — 99499 UNLISTED E&M SERVICE: CPT | Mod: S$GLB,,, | Performed by: INTERNAL MEDICINE

## 2021-04-16 PROCEDURE — 3288F PR FALLS RISK ASSESSMENT DOCUMENTED: ICD-10-PCS | Mod: CPTII,S$GLB,, | Performed by: INTERNAL MEDICINE

## 2021-04-16 PROCEDURE — 3288F FALL RISK ASSESSMENT DOCD: CPT | Mod: CPTII,S$GLB,, | Performed by: INTERNAL MEDICINE

## 2021-04-16 PROCEDURE — 1101F PR PT FALLS ASSESS DOC 0-1 FALLS W/OUT INJ PAST YR: ICD-10-PCS | Mod: CPTII,S$GLB,, | Performed by: INTERNAL MEDICINE

## 2021-04-16 PROCEDURE — 1101F PT FALLS ASSESS-DOCD LE1/YR: CPT | Mod: CPTII,S$GLB,, | Performed by: INTERNAL MEDICINE

## 2021-04-16 RX ORDER — IPRATROPIUM BROMIDE AND ALBUTEROL SULFATE 2.5; .5 MG/3ML; MG/3ML
3 SOLUTION RESPIRATORY (INHALATION) EVERY 6 HOURS PRN
Qty: 1 BOX | Refills: 3 | Status: SHIPPED | OUTPATIENT
Start: 2021-04-16 | End: 2021-12-30

## 2021-04-16 SDOH — SOCIAL DETERMINANTS OF HEALTH (SDOH): PROBLEM RELATED TO HOUSING AND ECONOMIC CIRCUMSTANCES, UNSPECIFIED: Z59.9

## 2021-04-19 ENCOUNTER — HOSPITAL ENCOUNTER (EMERGENCY)
Facility: HOSPITAL | Age: 76
Discharge: HOME | End: 2021-04-19
Attending: EMERGENCY MEDICINE
Payer: MEDICARE

## 2021-04-19 VITALS
TEMPERATURE: 99 F | SYSTOLIC BLOOD PRESSURE: 129 MMHG | HEIGHT: 69 IN | DIASTOLIC BLOOD PRESSURE: 75 MMHG | WEIGHT: 164.38 LBS | HEART RATE: 86 BPM | OXYGEN SATURATION: 96 % | RESPIRATION RATE: 24 BRPM | BODY MASS INDEX: 24.35 KG/M2

## 2021-04-19 DIAGNOSIS — Z76.0 MEDICATION REFILL: Primary | ICD-10-CM

## 2021-04-19 PROCEDURE — 99283 EMERGENCY DEPT VISIT LOW MDM: CPT

## 2021-04-19 RX ORDER — VENLAFAXINE HYDROCHLORIDE 150 MG/1
150 CAPSULE, EXTENDED RELEASE ORAL DAILY
Qty: 14 CAPSULE | Refills: 0 | Status: SHIPPED | OUTPATIENT
Start: 2021-04-19 | End: 2021-06-23

## 2021-04-20 ENCOUNTER — PATIENT MESSAGE (OUTPATIENT)
Dept: CARDIOLOGY | Facility: CLINIC | Age: 76
End: 2021-04-20

## 2021-04-20 ENCOUNTER — TELEPHONE (OUTPATIENT)
Dept: PRIMARY CARE CLINIC | Facility: CLINIC | Age: 76
End: 2021-04-20

## 2021-04-20 PROBLEM — R73.03 PREDIABETES: Status: ACTIVE | Noted: 2021-04-20

## 2021-04-20 LAB
OHS CV EVENT MONITOR DAY: 0
OHS CV HOLTER LENGTH DECIMAL HOURS: 48
OHS CV HOLTER LENGTH HOURS: 48
OHS CV HOLTER LENGTH MINUTES: 0

## 2021-04-21 ENCOUNTER — PATIENT MESSAGE (OUTPATIENT)
Dept: PRIMARY CARE CLINIC | Facility: CLINIC | Age: 76
End: 2021-04-21

## 2021-04-23 ENCOUNTER — PATIENT MESSAGE (OUTPATIENT)
Dept: PRIMARY CARE CLINIC | Facility: CLINIC | Age: 76
End: 2021-04-23

## 2021-04-23 RX ORDER — INSULIN PUMP SYRINGE, 3 ML
EACH MISCELLANEOUS
Qty: 1 EACH | Refills: 0 | Status: SHIPPED | OUTPATIENT
Start: 2021-04-23 | End: 2021-04-23 | Stop reason: SDUPTHER

## 2021-04-23 RX ORDER — LANCETS
EACH MISCELLANEOUS
Qty: 100 EACH | Refills: 0 | Status: SHIPPED | OUTPATIENT
Start: 2021-04-23 | End: 2021-04-23 | Stop reason: SDUPTHER

## 2021-04-23 RX ORDER — INSULIN PUMP SYRINGE, 3 ML
EACH MISCELLANEOUS
Qty: 1 EACH | Refills: 0 | Status: SHIPPED | OUTPATIENT
Start: 2021-04-23 | End: 2021-04-29 | Stop reason: SDUPTHER

## 2021-04-23 RX ORDER — LANCETS
EACH MISCELLANEOUS
Qty: 100 EACH | Refills: 0 | Status: SHIPPED | OUTPATIENT
Start: 2021-04-23 | End: 2021-04-29 | Stop reason: SDUPTHER

## 2021-04-23 RX ORDER — METFORMIN HYDROCHLORIDE 500 MG/1
500 TABLET ORAL
Qty: 30 TABLET | Refills: 0 | Status: SHIPPED | OUTPATIENT
Start: 2021-04-23 | End: 2021-06-23

## 2021-04-27 ENCOUNTER — TELEPHONE (OUTPATIENT)
Dept: DIABETES | Facility: CLINIC | Age: 76
End: 2021-04-27

## 2021-04-27 ENCOUNTER — OFFICE VISIT (OUTPATIENT)
Dept: CARDIOLOGY | Facility: CLINIC | Age: 76
End: 2021-04-27
Payer: MEDICARE

## 2021-04-27 ENCOUNTER — PATIENT MESSAGE (OUTPATIENT)
Dept: PULMONOLOGY | Facility: CLINIC | Age: 76
End: 2021-04-27

## 2021-04-27 ENCOUNTER — TELEPHONE (OUTPATIENT)
Dept: PRIMARY CARE CLINIC | Facility: CLINIC | Age: 76
End: 2021-04-27

## 2021-04-27 ENCOUNTER — PATIENT MESSAGE (OUTPATIENT)
Dept: NEUROLOGY | Facility: CLINIC | Age: 76
End: 2021-04-27

## 2021-04-27 VITALS
BODY MASS INDEX: 24.59 KG/M2 | SYSTOLIC BLOOD PRESSURE: 120 MMHG | RESPIRATION RATE: 16 BRPM | HEIGHT: 69 IN | HEART RATE: 76 BPM | DIASTOLIC BLOOD PRESSURE: 70 MMHG | OXYGEN SATURATION: 96 % | WEIGHT: 166 LBS

## 2021-04-27 DIAGNOSIS — I27.20 PULMONARY HYPERTENSION: ICD-10-CM

## 2021-04-27 DIAGNOSIS — D63.8 ANEMIA, CHRONIC DISEASE: ICD-10-CM

## 2021-04-27 DIAGNOSIS — R00.2 PALPITATIONS: ICD-10-CM

## 2021-04-27 DIAGNOSIS — J44.9 CHRONIC OBSTRUCTIVE PULMONARY DISEASE, UNSPECIFIED COPD TYPE: ICD-10-CM

## 2021-04-27 DIAGNOSIS — R42 DIZZINESS AND GIDDINESS: ICD-10-CM

## 2021-04-27 DIAGNOSIS — H93.11 TINNITUS OF RIGHT EAR: Primary | ICD-10-CM

## 2021-04-27 DIAGNOSIS — E11.9 TYPE 2 DIABETES MELLITUS WITHOUT COMPLICATION, WITHOUT LONG-TERM CURRENT USE OF INSULIN: ICD-10-CM

## 2021-04-27 DIAGNOSIS — I10 HYPERTENSION, UNSPECIFIED TYPE: ICD-10-CM

## 2021-04-27 DIAGNOSIS — I77.3 FIBROMUSCULAR DYSPLASIA OF BOTH CAROTID ARTERIES: ICD-10-CM

## 2021-04-27 PROCEDURE — 99499 RISK ADDL DX/OHS AUDIT: ICD-10-PCS | Mod: S$GLB,,, | Performed by: SPECIALIST

## 2021-04-27 PROCEDURE — 1101F PT FALLS ASSESS-DOCD LE1/YR: CPT | Mod: CPTII,S$GLB,, | Performed by: SPECIALIST

## 2021-04-27 PROCEDURE — 1125F PR PAIN SEVERITY QUANTIFIED, PAIN PRESENT: ICD-10-PCS | Mod: S$GLB,,, | Performed by: SPECIALIST

## 2021-04-27 PROCEDURE — 1125F AMNT PAIN NOTED PAIN PRSNT: CPT | Mod: S$GLB,,, | Performed by: SPECIALIST

## 2021-04-27 PROCEDURE — 1159F MED LIST DOCD IN RCRD: CPT | Mod: S$GLB,,, | Performed by: SPECIALIST

## 2021-04-27 PROCEDURE — 1159F PR MEDICATION LIST DOCUMENTED IN MEDICAL RECORD: ICD-10-PCS | Mod: S$GLB,,, | Performed by: SPECIALIST

## 2021-04-27 PROCEDURE — 99214 OFFICE O/P EST MOD 30 MIN: CPT | Mod: S$GLB,,, | Performed by: SPECIALIST

## 2021-04-27 PROCEDURE — 3288F PR FALLS RISK ASSESSMENT DOCUMENTED: ICD-10-PCS | Mod: CPTII,S$GLB,, | Performed by: SPECIALIST

## 2021-04-27 PROCEDURE — 3288F FALL RISK ASSESSMENT DOCD: CPT | Mod: CPTII,S$GLB,, | Performed by: SPECIALIST

## 2021-04-27 PROCEDURE — 99499 UNLISTED E&M SERVICE: CPT | Mod: S$GLB,,, | Performed by: SPECIALIST

## 2021-04-27 PROCEDURE — 99214 PR OFFICE/OUTPT VISIT, EST, LEVL IV, 30-39 MIN: ICD-10-PCS | Mod: S$GLB,,, | Performed by: SPECIALIST

## 2021-04-27 PROCEDURE — 1101F PR PT FALLS ASSESS DOC 0-1 FALLS W/OUT INJ PAST YR: ICD-10-PCS | Mod: CPTII,S$GLB,, | Performed by: SPECIALIST

## 2021-04-27 RX ORDER — METOPROLOL SUCCINATE 25 MG/1
25 TABLET, EXTENDED RELEASE ORAL DAILY
Qty: 30 TABLET | Refills: 11 | Status: SHIPPED | OUTPATIENT
Start: 2021-04-27 | End: 2021-05-24 | Stop reason: SDUPTHER

## 2021-04-28 ENCOUNTER — TELEPHONE (OUTPATIENT)
Dept: PRIMARY CARE CLINIC | Facility: CLINIC | Age: 76
End: 2021-04-28

## 2021-04-29 ENCOUNTER — OFFICE VISIT (OUTPATIENT)
Dept: ORTHOPEDICS | Facility: CLINIC | Age: 76
End: 2021-04-29
Payer: MEDICARE

## 2021-04-29 ENCOUNTER — DOCUMENTATION ONLY (OUTPATIENT)
Dept: PRIMARY CARE CLINIC | Facility: CLINIC | Age: 76
End: 2021-04-29

## 2021-04-29 ENCOUNTER — PATIENT MESSAGE (OUTPATIENT)
Dept: CARDIOLOGY | Facility: CLINIC | Age: 76
End: 2021-04-29

## 2021-04-29 VITALS — HEIGHT: 69 IN | WEIGHT: 166 LBS | BODY MASS INDEX: 24.59 KG/M2 | RESPIRATION RATE: 17 BRPM

## 2021-04-29 DIAGNOSIS — M25.562 LEFT KNEE PAIN, UNSPECIFIED CHRONICITY: ICD-10-CM

## 2021-04-29 DIAGNOSIS — Z96.651 STATUS POST TOTAL RIGHT KNEE REPLACEMENT: Primary | ICD-10-CM

## 2021-04-29 PROCEDURE — 99213 OFFICE O/P EST LOW 20 MIN: CPT | Mod: S$GLB,,, | Performed by: ORTHOPAEDIC SURGERY

## 2021-04-29 PROCEDURE — 99999 PR PBB SHADOW E&M-EST. PATIENT-LVL IV: ICD-10-PCS | Mod: PBBFAC,,, | Performed by: ORTHOPAEDIC SURGERY

## 2021-04-29 PROCEDURE — 3288F PR FALLS RISK ASSESSMENT DOCUMENTED: ICD-10-PCS | Mod: CPTII,S$GLB,, | Performed by: ORTHOPAEDIC SURGERY

## 2021-04-29 PROCEDURE — 1125F PR PAIN SEVERITY QUANTIFIED, PAIN PRESENT: ICD-10-PCS | Mod: S$GLB,,, | Performed by: ORTHOPAEDIC SURGERY

## 2021-04-29 PROCEDURE — 1159F MED LIST DOCD IN RCRD: CPT | Mod: S$GLB,,, | Performed by: ORTHOPAEDIC SURGERY

## 2021-04-29 PROCEDURE — 99999 PR PBB SHADOW E&M-EST. PATIENT-LVL IV: CPT | Mod: PBBFAC,,, | Performed by: ORTHOPAEDIC SURGERY

## 2021-04-29 PROCEDURE — 1159F PR MEDICATION LIST DOCUMENTED IN MEDICAL RECORD: ICD-10-PCS | Mod: S$GLB,,, | Performed by: ORTHOPAEDIC SURGERY

## 2021-04-29 PROCEDURE — 1101F PR PT FALLS ASSESS DOC 0-1 FALLS W/OUT INJ PAST YR: ICD-10-PCS | Mod: CPTII,S$GLB,, | Performed by: ORTHOPAEDIC SURGERY

## 2021-04-29 PROCEDURE — 3288F FALL RISK ASSESSMENT DOCD: CPT | Mod: CPTII,S$GLB,, | Performed by: ORTHOPAEDIC SURGERY

## 2021-04-29 PROCEDURE — 1101F PT FALLS ASSESS-DOCD LE1/YR: CPT | Mod: CPTII,S$GLB,, | Performed by: ORTHOPAEDIC SURGERY

## 2021-04-29 PROCEDURE — 1125F AMNT PAIN NOTED PAIN PRSNT: CPT | Mod: S$GLB,,, | Performed by: ORTHOPAEDIC SURGERY

## 2021-04-29 PROCEDURE — 99213 PR OFFICE/OUTPT VISIT, EST, LEVL III, 20-29 MIN: ICD-10-PCS | Mod: S$GLB,,, | Performed by: ORTHOPAEDIC SURGERY

## 2021-04-29 RX ORDER — INSULIN PUMP SYRINGE, 3 ML
EACH MISCELLANEOUS
Qty: 1 EACH | Refills: 0 | Status: SHIPPED | OUTPATIENT
Start: 2021-04-29 | End: 2022-04-29

## 2021-04-29 RX ORDER — LANCETS
EACH MISCELLANEOUS
Qty: 100 EACH | Refills: 0 | Status: SHIPPED | OUTPATIENT
Start: 2021-04-29 | End: 2021-08-23 | Stop reason: SDUPTHER

## 2021-05-04 ENCOUNTER — TELEPHONE (OUTPATIENT)
Dept: CARDIOLOGY | Facility: CLINIC | Age: 76
End: 2021-05-04

## 2021-05-04 ENCOUNTER — PATIENT MESSAGE (OUTPATIENT)
Dept: DIABETES | Facility: CLINIC | Age: 76
End: 2021-05-04

## 2021-05-05 DIAGNOSIS — I50.30 HEART FAILURE WITH PRESERVED EJECTION FRACTION, UNSPECIFIED HF CHRONICITY: Primary | ICD-10-CM

## 2021-05-06 ENCOUNTER — PATIENT MESSAGE (OUTPATIENT)
Dept: CARDIOLOGY | Facility: CLINIC | Age: 76
End: 2021-05-06

## 2021-05-07 ENCOUNTER — PATIENT MESSAGE (OUTPATIENT)
Dept: NEUROLOGY | Facility: CLINIC | Age: 76
End: 2021-05-07

## 2021-05-07 ENCOUNTER — PATIENT MESSAGE (OUTPATIENT)
Dept: PRIMARY CARE CLINIC | Facility: CLINIC | Age: 76
End: 2021-05-07

## 2021-05-07 ENCOUNTER — PATIENT MESSAGE (OUTPATIENT)
Dept: CARDIOLOGY | Facility: CLINIC | Age: 76
End: 2021-05-07

## 2021-05-07 ENCOUNTER — LAB VISIT (OUTPATIENT)
Dept: LAB | Facility: HOSPITAL | Age: 76
End: 2021-05-07
Attending: SPECIALIST
Payer: MEDICARE

## 2021-05-07 ENCOUNTER — TELEPHONE (OUTPATIENT)
Dept: DERMATOLOGY | Facility: CLINIC | Age: 76
End: 2021-05-07

## 2021-05-07 ENCOUNTER — PATIENT MESSAGE (OUTPATIENT)
Dept: DERMATOLOGY | Facility: CLINIC | Age: 76
End: 2021-05-07

## 2021-05-07 DIAGNOSIS — I50.30 HEART FAILURE WITH PRESERVED EJECTION FRACTION, UNSPECIFIED HF CHRONICITY: ICD-10-CM

## 2021-05-07 LAB — BNP SERPL-MCNC: 112 PG/ML (ref 0–99)

## 2021-05-07 PROCEDURE — 36415 COLL VENOUS BLD VENIPUNCTURE: CPT | Performed by: SPECIALIST

## 2021-05-07 PROCEDURE — 83880 ASSAY OF NATRIURETIC PEPTIDE: CPT | Performed by: SPECIALIST

## 2021-05-07 RX ORDER — POTASSIUM CHLORIDE 750 MG/1
10 CAPSULE, EXTENDED RELEASE ORAL DAILY PRN
Qty: 1 CAPSULE | Refills: 0 | Status: SHIPPED | OUTPATIENT
Start: 2021-05-07 | End: 2021-05-10 | Stop reason: SDUPTHER

## 2021-05-07 RX ORDER — FUROSEMIDE 20 MG/1
20 TABLET ORAL DAILY PRN
Qty: 30 TABLET | Refills: 3 | Status: SHIPPED | OUTPATIENT
Start: 2021-05-07 | End: 2021-08-04

## 2021-05-08 ENCOUNTER — PATIENT MESSAGE (OUTPATIENT)
Dept: CARDIOLOGY | Facility: CLINIC | Age: 76
End: 2021-05-08

## 2021-05-09 ENCOUNTER — PATIENT MESSAGE (OUTPATIENT)
Dept: CARDIOLOGY | Facility: CLINIC | Age: 76
End: 2021-05-09

## 2021-05-10 ENCOUNTER — TELEPHONE (OUTPATIENT)
Dept: SURGERY | Facility: CLINIC | Age: 76
End: 2021-05-10

## 2021-05-10 ENCOUNTER — TELEPHONE (OUTPATIENT)
Dept: DERMATOLOGY | Facility: CLINIC | Age: 76
End: 2021-05-10

## 2021-05-10 ENCOUNTER — PATIENT MESSAGE (OUTPATIENT)
Dept: PRIMARY CARE CLINIC | Facility: CLINIC | Age: 76
End: 2021-05-10

## 2021-05-10 RX ORDER — POTASSIUM CHLORIDE 750 MG/1
10 CAPSULE, EXTENDED RELEASE ORAL DAILY PRN
Qty: 30 CAPSULE | Refills: 3 | Status: SHIPPED | OUTPATIENT
Start: 2021-05-10 | End: 2022-05-03

## 2021-05-10 NOTE — TELEPHONE ENCOUNTER
----- Message from Abbie Potts sent at 5/10/2021 10:25 AM CDT -----  Regarding: medication  Aleksandr Buchanan calling regarding General Inquiry for # Pharmacy only received a script for 1 tablet for potassium chloride (MICRO-K) 10 MEQ CpSR if this is not correct send over new one with correct quantity or give a call at [phone]

## 2021-05-12 ENCOUNTER — OFFICE VISIT (OUTPATIENT)
Dept: PULMONOLOGY | Facility: CLINIC | Age: 76
End: 2021-05-12
Payer: MEDICARE

## 2021-05-12 VITALS
WEIGHT: 163.94 LBS | SYSTOLIC BLOOD PRESSURE: 108 MMHG | HEIGHT: 69 IN | BODY MASS INDEX: 24.28 KG/M2 | HEART RATE: 77 BPM | OXYGEN SATURATION: 97 % | DIASTOLIC BLOOD PRESSURE: 62 MMHG

## 2021-05-12 DIAGNOSIS — I50.32 CHRONIC DIASTOLIC HEART FAILURE: ICD-10-CM

## 2021-05-12 DIAGNOSIS — I27.20 PULMONARY HYPERTENSION: ICD-10-CM

## 2021-05-12 DIAGNOSIS — J44.9 CHRONIC OBSTRUCTIVE PULMONARY DISEASE, UNSPECIFIED COPD TYPE: Primary | ICD-10-CM

## 2021-05-12 DIAGNOSIS — J98.4 PULMONARY SCARRING: ICD-10-CM

## 2021-05-12 PROCEDURE — 99213 OFFICE O/P EST LOW 20 MIN: CPT | Mod: S$GLB,,, | Performed by: INTERNAL MEDICINE

## 2021-05-12 PROCEDURE — 99499 UNLISTED E&M SERVICE: CPT | Mod: S$GLB,,, | Performed by: INTERNAL MEDICINE

## 2021-05-12 PROCEDURE — 1126F PR PAIN SEVERITY QUANTIFIED, NO PAIN PRESENT: ICD-10-PCS | Mod: S$GLB,,, | Performed by: INTERNAL MEDICINE

## 2021-05-12 PROCEDURE — 1101F PT FALLS ASSESS-DOCD LE1/YR: CPT | Mod: CPTII,S$GLB,, | Performed by: INTERNAL MEDICINE

## 2021-05-12 PROCEDURE — 99999 PR PBB SHADOW E&M-EST. PATIENT-LVL IV: ICD-10-PCS | Mod: PBBFAC,,, | Performed by: INTERNAL MEDICINE

## 2021-05-12 PROCEDURE — 1159F MED LIST DOCD IN RCRD: CPT | Mod: S$GLB,,, | Performed by: INTERNAL MEDICINE

## 2021-05-12 PROCEDURE — 3288F FALL RISK ASSESSMENT DOCD: CPT | Mod: CPTII,S$GLB,, | Performed by: INTERNAL MEDICINE

## 2021-05-12 PROCEDURE — 99213 PR OFFICE/OUTPT VISIT, EST, LEVL III, 20-29 MIN: ICD-10-PCS | Mod: S$GLB,,, | Performed by: INTERNAL MEDICINE

## 2021-05-12 PROCEDURE — 99999 PR PBB SHADOW E&M-EST. PATIENT-LVL IV: CPT | Mod: PBBFAC,,, | Performed by: INTERNAL MEDICINE

## 2021-05-12 PROCEDURE — 1101F PR PT FALLS ASSESS DOC 0-1 FALLS W/OUT INJ PAST YR: ICD-10-PCS | Mod: CPTII,S$GLB,, | Performed by: INTERNAL MEDICINE

## 2021-05-12 PROCEDURE — 99499 RISK ADDL DX/OHS AUDIT: ICD-10-PCS | Mod: S$GLB,,, | Performed by: INTERNAL MEDICINE

## 2021-05-12 PROCEDURE — 3288F PR FALLS RISK ASSESSMENT DOCUMENTED: ICD-10-PCS | Mod: CPTII,S$GLB,, | Performed by: INTERNAL MEDICINE

## 2021-05-12 PROCEDURE — 1159F PR MEDICATION LIST DOCUMENTED IN MEDICAL RECORD: ICD-10-PCS | Mod: S$GLB,,, | Performed by: INTERNAL MEDICINE

## 2021-05-12 PROCEDURE — 1126F AMNT PAIN NOTED NONE PRSNT: CPT | Mod: S$GLB,,, | Performed by: INTERNAL MEDICINE

## 2021-05-13 ENCOUNTER — TELEPHONE (OUTPATIENT)
Dept: DERMATOLOGY | Facility: CLINIC | Age: 76
End: 2021-05-13

## 2021-05-16 ENCOUNTER — PATIENT MESSAGE (OUTPATIENT)
Dept: PRIMARY CARE CLINIC | Facility: CLINIC | Age: 76
End: 2021-05-16

## 2021-05-17 ENCOUNTER — LAB VISIT (OUTPATIENT)
Dept: LAB | Facility: HOSPITAL | Age: 76
End: 2021-05-17
Attending: INTERNAL MEDICINE
Payer: MEDICARE

## 2021-05-17 DIAGNOSIS — N18.4 ANEMIA, CHRONIC RENAL FAILURE, STAGE 4 (SEVERE): ICD-10-CM

## 2021-05-17 DIAGNOSIS — D64.89 ANEMIA DUE TO MULTIPLE MECHANISMS: ICD-10-CM

## 2021-05-17 DIAGNOSIS — D63.8 ANEMIA, CHRONIC DISEASE: ICD-10-CM

## 2021-05-17 DIAGNOSIS — D63.1 ANEMIA, CHRONIC RENAL FAILURE, STAGE 4 (SEVERE): ICD-10-CM

## 2021-05-17 LAB
ALBUMIN SERPL BCP-MCNC: 3.8 G/DL (ref 3.5–5.2)
ALP SERPL-CCNC: 92 U/L (ref 55–135)
ALT SERPL W/O P-5'-P-CCNC: 11 U/L (ref 10–44)
ANION GAP SERPL CALC-SCNC: 10 MMOL/L (ref 8–16)
AST SERPL-CCNC: 13 U/L (ref 10–40)
BASOPHILS # BLD AUTO: 0.01 K/UL (ref 0–0.2)
BASOPHILS NFR BLD: 0.3 % (ref 0–1.9)
BILIRUB SERPL-MCNC: 0.3 MG/DL (ref 0.1–1)
BUN SERPL-MCNC: 23 MG/DL (ref 8–23)
CALCIUM SERPL-MCNC: 9.3 MG/DL (ref 8.7–10.5)
CHLORIDE SERPL-SCNC: 103 MMOL/L (ref 95–110)
CO2 SERPL-SCNC: 29 MMOL/L (ref 23–29)
CREAT SERPL-MCNC: 0.8 MG/DL (ref 0.5–1.4)
DIFFERENTIAL METHOD: ABNORMAL
EOSINOPHIL # BLD AUTO: 0.2 K/UL (ref 0–0.5)
EOSINOPHIL NFR BLD: 4.5 % (ref 0–8)
ERYTHROCYTE [DISTWIDTH] IN BLOOD BY AUTOMATED COUNT: 12.6 % (ref 11.5–14.5)
EST. GFR  (AFRICAN AMERICAN): >60 ML/MIN/1.73 M^2
EST. GFR  (NON AFRICAN AMERICAN): >60 ML/MIN/1.73 M^2
FERRITIN SERPL-MCNC: 203 NG/ML (ref 20–300)
GLUCOSE SERPL-MCNC: 96 MG/DL (ref 70–110)
HCT VFR BLD AUTO: 38.6 % (ref 37–48.5)
HGB BLD-MCNC: 12.3 G/DL (ref 12–16)
IMM GRANULOCYTES # BLD AUTO: 0.02 K/UL (ref 0–0.04)
IMM GRANULOCYTES NFR BLD AUTO: 0.5 % (ref 0–0.5)
IRON SERPL-MCNC: 97 UG/DL (ref 30–160)
LYMPHOCYTES # BLD AUTO: 1.2 K/UL (ref 1–4.8)
LYMPHOCYTES NFR BLD: 30.9 % (ref 18–48)
MCH RBC QN AUTO: 30.2 PG (ref 27–31)
MCHC RBC AUTO-ENTMCNC: 31.9 G/DL (ref 32–36)
MCV RBC AUTO: 95 FL (ref 82–98)
MONOCYTES # BLD AUTO: 0.3 K/UL (ref 0.3–1)
MONOCYTES NFR BLD: 8.9 % (ref 4–15)
NEUTROPHILS # BLD AUTO: 2.1 K/UL (ref 1.8–7.7)
NEUTROPHILS NFR BLD: 54.9 % (ref 38–73)
NRBC BLD-RTO: 0 /100 WBC
PLATELET # BLD AUTO: 171 K/UL (ref 150–450)
PMV BLD AUTO: 9.9 FL (ref 9.2–12.9)
POTASSIUM SERPL-SCNC: 4.3 MMOL/L (ref 3.5–5.1)
PROT SERPL-MCNC: 6.8 G/DL (ref 6–8.4)
RBC # BLD AUTO: 4.07 M/UL (ref 4–5.4)
SATURATED IRON: 34 % (ref 20–50)
SODIUM SERPL-SCNC: 142 MMOL/L (ref 136–145)
TOTAL IRON BINDING CAPACITY: 286 UG/DL (ref 250–450)
TRANSFERRIN SERPL-MCNC: 193 MG/DL (ref 200–375)
WBC # BLD AUTO: 3.82 K/UL (ref 3.9–12.7)

## 2021-05-17 PROCEDURE — 83540 ASSAY OF IRON: CPT | Performed by: INTERNAL MEDICINE

## 2021-05-17 PROCEDURE — 36415 COLL VENOUS BLD VENIPUNCTURE: CPT | Performed by: INTERNAL MEDICINE

## 2021-05-17 PROCEDURE — 82728 ASSAY OF FERRITIN: CPT | Performed by: INTERNAL MEDICINE

## 2021-05-17 PROCEDURE — 85025 COMPLETE CBC W/AUTO DIFF WBC: CPT | Performed by: INTERNAL MEDICINE

## 2021-05-17 PROCEDURE — 80053 COMPREHEN METABOLIC PANEL: CPT | Performed by: INTERNAL MEDICINE

## 2021-05-17 RX ORDER — AMLODIPINE BESYLATE 2.5 MG/1
2.5 TABLET ORAL DAILY
Qty: 90 TABLET | Refills: 3 | Status: SHIPPED | OUTPATIENT
Start: 2021-05-17 | End: 2022-04-21 | Stop reason: SDUPTHER

## 2021-05-25 RX ORDER — METOPROLOL SUCCINATE 25 MG/1
25 TABLET, EXTENDED RELEASE ORAL DAILY
Qty: 30 TABLET | Refills: 11 | Status: SHIPPED | OUTPATIENT
Start: 2021-05-25 | End: 2022-06-17 | Stop reason: SDUPTHER

## 2021-05-27 ENCOUNTER — PATIENT MESSAGE (OUTPATIENT)
Dept: PRIMARY CARE CLINIC | Facility: CLINIC | Age: 76
End: 2021-05-27

## 2021-05-27 ENCOUNTER — PATIENT MESSAGE (OUTPATIENT)
Dept: NEUROLOGY | Facility: CLINIC | Age: 76
End: 2021-05-27

## 2021-05-27 ENCOUNTER — PATIENT MESSAGE (OUTPATIENT)
Dept: PULMONOLOGY | Facility: CLINIC | Age: 76
End: 2021-05-27

## 2021-05-28 ENCOUNTER — PATIENT MESSAGE (OUTPATIENT)
Dept: DIABETES | Facility: CLINIC | Age: 76
End: 2021-05-28

## 2021-05-28 ENCOUNTER — CLINICAL SUPPORT (OUTPATIENT)
Dept: DIABETES | Facility: CLINIC | Age: 76
End: 2021-05-28
Payer: MEDICARE

## 2021-05-28 VITALS — BODY MASS INDEX: 24.26 KG/M2 | HEIGHT: 69 IN | WEIGHT: 163.81 LBS

## 2021-05-28 DIAGNOSIS — E11.9 TYPE 2 DIABETES MELLITUS WITHOUT COMPLICATION, WITHOUT LONG-TERM CURRENT USE OF INSULIN: ICD-10-CM

## 2021-05-28 PROCEDURE — 99999 PR PBB SHADOW E&M-EST. PATIENT-LVL II: ICD-10-PCS | Mod: PBBFAC,,, | Performed by: DIETITIAN, REGISTERED

## 2021-05-28 PROCEDURE — G0108 PR DIAB MANAGE TRN  PER INDIV: ICD-10-PCS | Mod: S$GLB,,, | Performed by: DIETITIAN, REGISTERED

## 2021-05-28 PROCEDURE — 99999 PR PBB SHADOW E&M-EST. PATIENT-LVL II: CPT | Mod: PBBFAC,,, | Performed by: DIETITIAN, REGISTERED

## 2021-05-28 PROCEDURE — G0108 DIAB MANAGE TRN  PER INDIV: HCPCS | Mod: S$GLB,,, | Performed by: DIETITIAN, REGISTERED

## 2021-06-01 ENCOUNTER — TELEPHONE (OUTPATIENT)
Dept: PRIMARY CARE CLINIC | Facility: CLINIC | Age: 76
End: 2021-06-01

## 2021-06-01 ENCOUNTER — PATIENT MESSAGE (OUTPATIENT)
Dept: PRIMARY CARE CLINIC | Facility: CLINIC | Age: 76
End: 2021-06-01

## 2021-06-01 ENCOUNTER — LAB VISIT (OUTPATIENT)
Dept: LAB | Facility: HOSPITAL | Age: 76
End: 2021-06-01
Attending: NURSE PRACTITIONER
Payer: MEDICARE

## 2021-06-01 DIAGNOSIS — R30.0 DYSURIA: Primary | ICD-10-CM

## 2021-06-01 DIAGNOSIS — R30.0 DYSURIA: ICD-10-CM

## 2021-06-01 LAB
BACTERIA #/AREA URNS HPF: ABNORMAL /HPF
BILIRUB UR QL STRIP: NEGATIVE
CLARITY UR: CLEAR
COLOR UR: YELLOW
GLUCOSE UR QL STRIP: NEGATIVE
HGB UR QL STRIP: ABNORMAL
KETONES UR QL STRIP: NEGATIVE
LEUKOCYTE ESTERASE UR QL STRIP: ABNORMAL
MICROSCOPIC COMMENT: ABNORMAL
NITRITE UR QL STRIP: NEGATIVE
PH UR STRIP: 7 [PH] (ref 5–8)
PROT UR QL STRIP: NEGATIVE
RBC #/AREA URNS HPF: 3 /HPF (ref 0–4)
SP GR UR STRIP: 1.01 (ref 1–1.03)
SQUAMOUS #/AREA URNS HPF: 1 /HPF
URN SPEC COLLECT METH UR: ABNORMAL
UROBILINOGEN UR STRIP-ACNC: NEGATIVE EU/DL
WBC #/AREA URNS HPF: 15 /HPF (ref 0–5)

## 2021-06-01 PROCEDURE — 81000 URINALYSIS NONAUTO W/SCOPE: CPT | Performed by: NURSE PRACTITIONER

## 2021-06-01 PROCEDURE — 87086 URINE CULTURE/COLONY COUNT: CPT | Performed by: NURSE PRACTITIONER

## 2021-06-03 ENCOUNTER — TELEPHONE (OUTPATIENT)
Dept: DERMATOLOGY | Facility: CLINIC | Age: 76
End: 2021-06-03

## 2021-06-03 ENCOUNTER — TELEPHONE (OUTPATIENT)
Dept: PRIMARY CARE CLINIC | Facility: CLINIC | Age: 76
End: 2021-06-03

## 2021-06-03 LAB
BACTERIA UR CULT: NORMAL
BACTERIA UR CULT: NORMAL

## 2021-06-07 ENCOUNTER — PATIENT MESSAGE (OUTPATIENT)
Dept: PRIMARY CARE CLINIC | Facility: CLINIC | Age: 76
End: 2021-06-07

## 2021-06-07 ENCOUNTER — LAB VISIT (OUTPATIENT)
Dept: LAB | Facility: HOSPITAL | Age: 76
End: 2021-06-07
Attending: INTERNAL MEDICINE
Payer: MEDICARE

## 2021-06-07 DIAGNOSIS — R73.03 PREDIABETES: ICD-10-CM

## 2021-06-07 LAB
ESTIMATED AVG GLUCOSE: 123 MG/DL (ref 68–131)
HBA1C MFR BLD: 5.9 % (ref 4–5.6)

## 2021-06-07 PROCEDURE — 83036 HEMOGLOBIN GLYCOSYLATED A1C: CPT | Performed by: INTERNAL MEDICINE

## 2021-06-07 PROCEDURE — 36415 COLL VENOUS BLD VENIPUNCTURE: CPT | Performed by: INTERNAL MEDICINE

## 2021-06-08 ENCOUNTER — PATIENT MESSAGE (OUTPATIENT)
Dept: PRIMARY CARE CLINIC | Facility: CLINIC | Age: 76
End: 2021-06-08

## 2021-06-10 ENCOUNTER — TELEPHONE (OUTPATIENT)
Dept: DERMATOLOGY | Facility: CLINIC | Age: 76
End: 2021-06-10

## 2021-06-10 ENCOUNTER — DOCUMENTATION ONLY (OUTPATIENT)
Dept: PRIMARY CARE CLINIC | Facility: CLINIC | Age: 76
End: 2021-06-10

## 2021-06-16 ENCOUNTER — OFFICE VISIT (OUTPATIENT)
Dept: HEMATOLOGY/ONCOLOGY | Facility: CLINIC | Age: 76
End: 2021-06-16
Payer: MEDICARE

## 2021-06-16 VITALS
SYSTOLIC BLOOD PRESSURE: 135 MMHG | RESPIRATION RATE: 18 BRPM | DIASTOLIC BLOOD PRESSURE: 70 MMHG | WEIGHT: 164 LBS | HEIGHT: 69 IN | HEART RATE: 76 BPM | BODY MASS INDEX: 24.29 KG/M2

## 2021-06-16 DIAGNOSIS — D63.8 ANEMIA, CHRONIC DISEASE: ICD-10-CM

## 2021-06-16 DIAGNOSIS — D53.9 NUTRITIONAL ANEMIA, UNSPECIFIED: ICD-10-CM

## 2021-06-16 DIAGNOSIS — D64.9 NORMOCYTIC NORMOCHROMIC ANEMIA: ICD-10-CM

## 2021-06-16 DIAGNOSIS — R79.89 ABNORMAL CBC: ICD-10-CM

## 2021-06-16 DIAGNOSIS — D64.89 ANEMIA DUE TO MULTIPLE MECHANISMS: Primary | ICD-10-CM

## 2021-06-16 PROCEDURE — 1159F MED LIST DOCD IN RCRD: CPT | Mod: S$GLB,,, | Performed by: INTERNAL MEDICINE

## 2021-06-16 PROCEDURE — 1101F PT FALLS ASSESS-DOCD LE1/YR: CPT | Mod: S$GLB,,, | Performed by: INTERNAL MEDICINE

## 2021-06-16 PROCEDURE — 1125F PR PAIN SEVERITY QUANTIFIED, PAIN PRESENT: ICD-10-PCS | Mod: S$GLB,,, | Performed by: INTERNAL MEDICINE

## 2021-06-16 PROCEDURE — 1125F AMNT PAIN NOTED PAIN PRSNT: CPT | Mod: S$GLB,,, | Performed by: INTERNAL MEDICINE

## 2021-06-16 PROCEDURE — 1159F PR MEDICATION LIST DOCUMENTED IN MEDICAL RECORD: ICD-10-PCS | Mod: S$GLB,,, | Performed by: INTERNAL MEDICINE

## 2021-06-16 PROCEDURE — 3288F PR FALLS RISK ASSESSMENT DOCUMENTED: ICD-10-PCS | Mod: S$GLB,,, | Performed by: INTERNAL MEDICINE

## 2021-06-16 PROCEDURE — 99213 OFFICE O/P EST LOW 20 MIN: CPT | Mod: S$GLB,,, | Performed by: INTERNAL MEDICINE

## 2021-06-16 PROCEDURE — 3288F FALL RISK ASSESSMENT DOCD: CPT | Mod: S$GLB,,, | Performed by: INTERNAL MEDICINE

## 2021-06-16 PROCEDURE — 99213 PR OFFICE/OUTPT VISIT, EST, LEVL III, 20-29 MIN: ICD-10-PCS | Mod: S$GLB,,, | Performed by: INTERNAL MEDICINE

## 2021-06-16 PROCEDURE — 1101F PR PT FALLS ASSESS DOC 0-1 FALLS W/OUT INJ PAST YR: ICD-10-PCS | Mod: S$GLB,,, | Performed by: INTERNAL MEDICINE

## 2021-06-17 ENCOUNTER — OFFICE VISIT (OUTPATIENT)
Dept: DERMATOLOGY | Facility: CLINIC | Age: 76
End: 2021-06-17
Payer: MEDICARE

## 2021-06-17 VITALS
WEIGHT: 165 LBS | HEART RATE: 81 BPM | SYSTOLIC BLOOD PRESSURE: 150 MMHG | DIASTOLIC BLOOD PRESSURE: 82 MMHG | BODY MASS INDEX: 24.37 KG/M2

## 2021-06-17 DIAGNOSIS — D04.39 SQUAMOUS CELL CARCINOMA IN SITU (SCCIS) OF SKIN OF RIGHT CHEEK: Primary | ICD-10-CM

## 2021-06-17 PROCEDURE — 1126F AMNT PAIN NOTED NONE PRSNT: CPT | Mod: S$GLB,,, | Performed by: DERMATOLOGY

## 2021-06-17 PROCEDURE — 99999 PR PBB SHADOW E&M-EST. PATIENT-LVL IV: CPT | Mod: PBBFAC,,, | Performed by: DERMATOLOGY

## 2021-06-17 PROCEDURE — 1159F MED LIST DOCD IN RCRD: CPT | Mod: S$GLB,,, | Performed by: DERMATOLOGY

## 2021-06-17 PROCEDURE — 1159F PR MEDICATION LIST DOCUMENTED IN MEDICAL RECORD: ICD-10-PCS | Mod: S$GLB,,, | Performed by: DERMATOLOGY

## 2021-06-17 PROCEDURE — 1101F PT FALLS ASSESS-DOCD LE1/YR: CPT | Mod: CPTII,S$GLB,, | Performed by: DERMATOLOGY

## 2021-06-17 PROCEDURE — 1126F PR PAIN SEVERITY QUANTIFIED, NO PAIN PRESENT: ICD-10-PCS | Mod: S$GLB,,, | Performed by: DERMATOLOGY

## 2021-06-17 PROCEDURE — 99213 OFFICE O/P EST LOW 20 MIN: CPT | Mod: S$GLB,,, | Performed by: DERMATOLOGY

## 2021-06-17 PROCEDURE — 3288F PR FALLS RISK ASSESSMENT DOCUMENTED: ICD-10-PCS | Mod: CPTII,S$GLB,, | Performed by: DERMATOLOGY

## 2021-06-17 PROCEDURE — 99213 PR OFFICE/OUTPT VISIT, EST, LEVL III, 20-29 MIN: ICD-10-PCS | Mod: S$GLB,,, | Performed by: DERMATOLOGY

## 2021-06-17 PROCEDURE — 3288F FALL RISK ASSESSMENT DOCD: CPT | Mod: CPTII,S$GLB,, | Performed by: DERMATOLOGY

## 2021-06-17 PROCEDURE — 99999 PR PBB SHADOW E&M-EST. PATIENT-LVL IV: ICD-10-PCS | Mod: PBBFAC,,, | Performed by: DERMATOLOGY

## 2021-06-17 PROCEDURE — 1101F PR PT FALLS ASSESS DOC 0-1 FALLS W/OUT INJ PAST YR: ICD-10-PCS | Mod: CPTII,S$GLB,, | Performed by: DERMATOLOGY

## 2021-06-17 RX ORDER — NAPROXEN SODIUM 220 MG/1
81 TABLET, FILM COATED ORAL DAILY
COMMUNITY

## 2021-06-18 ENCOUNTER — PATIENT MESSAGE (OUTPATIENT)
Dept: DERMATOLOGY | Facility: CLINIC | Age: 76
End: 2021-06-18

## 2021-06-18 ENCOUNTER — TELEPHONE (OUTPATIENT)
Dept: DERMATOLOGY | Facility: CLINIC | Age: 76
End: 2021-06-18

## 2021-06-22 ENCOUNTER — PATIENT MESSAGE (OUTPATIENT)
Dept: DERMATOLOGY | Facility: CLINIC | Age: 76
End: 2021-06-22

## 2021-06-22 ENCOUNTER — TELEPHONE (OUTPATIENT)
Dept: DERMATOLOGY | Facility: CLINIC | Age: 76
End: 2021-06-22

## 2021-06-23 ENCOUNTER — OFFICE VISIT (OUTPATIENT)
Dept: PRIMARY CARE CLINIC | Facility: CLINIC | Age: 76
End: 2021-06-23
Payer: MEDICARE

## 2021-06-23 ENCOUNTER — PATIENT MESSAGE (OUTPATIENT)
Dept: PULMONOLOGY | Facility: CLINIC | Age: 76
End: 2021-06-23

## 2021-06-23 DIAGNOSIS — J98.4 PULMONARY SCARRING: ICD-10-CM

## 2021-06-23 DIAGNOSIS — I27.20 PULMONARY HYPERTENSION: ICD-10-CM

## 2021-06-23 DIAGNOSIS — I10 ESSENTIAL HYPERTENSION: ICD-10-CM

## 2021-06-23 DIAGNOSIS — D04.39 SQUAMOUS CELL CARCINOMA IN SITU OF SKIN OF RIGHT CHEEK: ICD-10-CM

## 2021-06-23 DIAGNOSIS — J44.9 CHRONIC OBSTRUCTIVE PULMONARY DISEASE, UNSPECIFIED COPD TYPE: ICD-10-CM

## 2021-06-23 DIAGNOSIS — R73.03 PREDIABETES: ICD-10-CM

## 2021-06-23 DIAGNOSIS — D64.89 ANEMIA DUE TO MULTIPLE MECHANISMS: ICD-10-CM

## 2021-06-23 DIAGNOSIS — S60.321A BLISTER OF RIGHT THUMB, INITIAL ENCOUNTER: ICD-10-CM

## 2021-06-23 DIAGNOSIS — H93.11 TINNITUS OF RIGHT EAR: Primary | ICD-10-CM

## 2021-06-23 PROCEDURE — 3288F PR FALLS RISK ASSESSMENT DOCUMENTED: ICD-10-PCS | Mod: CPTII,S$GLB,, | Performed by: INTERNAL MEDICINE

## 2021-06-23 PROCEDURE — 3288F FALL RISK ASSESSMENT DOCD: CPT | Mod: CPTII,S$GLB,, | Performed by: INTERNAL MEDICINE

## 2021-06-23 PROCEDURE — 99499 UNLISTED E&M SERVICE: CPT | Mod: S$GLB,,, | Performed by: INTERNAL MEDICINE

## 2021-06-23 PROCEDURE — 99499 RISK ADDL DX/OHS AUDIT: ICD-10-PCS | Mod: S$GLB,,, | Performed by: INTERNAL MEDICINE

## 2021-06-23 PROCEDURE — 99215 OFFICE O/P EST HI 40 MIN: CPT | Mod: S$GLB,,, | Performed by: INTERNAL MEDICINE

## 2021-06-23 PROCEDURE — 1126F PR PAIN SEVERITY QUANTIFIED, NO PAIN PRESENT: ICD-10-PCS | Mod: S$GLB,,, | Performed by: INTERNAL MEDICINE

## 2021-06-23 PROCEDURE — 1101F PR PT FALLS ASSESS DOC 0-1 FALLS W/OUT INJ PAST YR: ICD-10-PCS | Mod: CPTII,S$GLB,, | Performed by: INTERNAL MEDICINE

## 2021-06-23 PROCEDURE — 99215 PR OFFICE/OUTPT VISIT, EST, LEVL V, 40-54 MIN: ICD-10-PCS | Mod: S$GLB,,, | Performed by: INTERNAL MEDICINE

## 2021-06-23 PROCEDURE — 1101F PT FALLS ASSESS-DOCD LE1/YR: CPT | Mod: CPTII,S$GLB,, | Performed by: INTERNAL MEDICINE

## 2021-06-23 PROCEDURE — 1126F AMNT PAIN NOTED NONE PRSNT: CPT | Mod: S$GLB,,, | Performed by: INTERNAL MEDICINE

## 2021-06-23 RX ORDER — VENLAFAXINE HYDROCHLORIDE 150 MG/1
150 CAPSULE, EXTENDED RELEASE ORAL 2 TIMES DAILY
Qty: 14 CAPSULE | Refills: 0 | COMMUNITY
Start: 2021-06-23

## 2021-06-24 ENCOUNTER — PATIENT MESSAGE (OUTPATIENT)
Dept: DERMATOLOGY | Facility: CLINIC | Age: 76
End: 2021-06-24

## 2021-06-24 VITALS
DIASTOLIC BLOOD PRESSURE: 68 MMHG | BODY MASS INDEX: 24.74 KG/M2 | WEIGHT: 167.56 LBS | HEART RATE: 80 BPM | TEMPERATURE: 97 F | SYSTOLIC BLOOD PRESSURE: 134 MMHG

## 2021-06-24 PROBLEM — R79.89 ABNORMAL CBC: Status: RESOLVED | Noted: 2020-02-10 | Resolved: 2021-06-24

## 2021-06-24 PROBLEM — T84.032A LOOSE RIGHT TOTAL KNEE ARTHROPLASTY: Status: RESOLVED | Noted: 2020-06-15 | Resolved: 2021-06-24

## 2021-06-24 PROBLEM — D04.39 SQUAMOUS CELL CARCINOMA IN SITU OF SKIN OF RIGHT CHEEK: Status: ACTIVE | Noted: 2021-06-24

## 2021-06-24 PROBLEM — T84.038A: Status: RESOLVED | Noted: 2020-07-14 | Resolved: 2021-06-24

## 2021-06-24 PROBLEM — Z96.659: Status: RESOLVED | Noted: 2020-07-14 | Resolved: 2021-06-24

## 2021-06-24 PROBLEM — R55 SYNCOPE AND COLLAPSE: Status: RESOLVED | Noted: 2021-02-09 | Resolved: 2021-06-24

## 2021-06-24 PROBLEM — J47.9 BRONCHIECTASIS WITHOUT COMPLICATION: Status: RESOLVED | Noted: 2019-10-08 | Resolved: 2021-06-24

## 2021-06-24 PROBLEM — S60.321A: Status: ACTIVE | Noted: 2021-06-24

## 2021-06-24 PROBLEM — D63.1 ANEMIA, CHRONIC RENAL FAILURE, STAGE 4 (SEVERE): Status: RESOLVED | Noted: 2020-02-10 | Resolved: 2021-06-24

## 2021-06-24 PROBLEM — K52.9 ENTEROCOLITIS: Status: RESOLVED | Noted: 2020-02-07 | Resolved: 2021-06-24

## 2021-06-24 PROBLEM — N18.4 ANEMIA, CHRONIC RENAL FAILURE, STAGE 4 (SEVERE): Status: RESOLVED | Noted: 2020-02-10 | Resolved: 2021-06-24

## 2021-06-25 ENCOUNTER — TELEPHONE (OUTPATIENT)
Dept: DERMATOLOGY | Facility: CLINIC | Age: 76
End: 2021-06-25

## 2021-06-28 ENCOUNTER — TELEPHONE (OUTPATIENT)
Dept: DERMATOLOGY | Facility: CLINIC | Age: 76
End: 2021-06-28

## 2021-06-28 ENCOUNTER — TELEPHONE (OUTPATIENT)
Dept: VASCULAR SURGERY | Facility: CLINIC | Age: 76
End: 2021-06-28

## 2021-06-30 ENCOUNTER — PATIENT MESSAGE (OUTPATIENT)
Dept: PRIMARY CARE CLINIC | Facility: CLINIC | Age: 76
End: 2021-06-30

## 2021-08-02 ENCOUNTER — PATIENT OUTREACH (OUTPATIENT)
Dept: ADMINISTRATIVE | Facility: OTHER | Age: 76
End: 2021-08-02

## 2021-08-04 ENCOUNTER — PATIENT MESSAGE (OUTPATIENT)
Dept: PRIMARY CARE CLINIC | Facility: CLINIC | Age: 76
End: 2021-08-04

## 2021-08-04 ENCOUNTER — OFFICE VISIT (OUTPATIENT)
Dept: VASCULAR SURGERY | Facility: CLINIC | Age: 76
End: 2021-08-04
Payer: MEDICARE

## 2021-08-04 VITALS
DIASTOLIC BLOOD PRESSURE: 83 MMHG | HEIGHT: 69 IN | SYSTOLIC BLOOD PRESSURE: 147 MMHG | HEART RATE: 82 BPM | BODY MASS INDEX: 24.74 KG/M2

## 2021-08-04 DIAGNOSIS — I65.23 BILATERAL CAROTID ARTERY STENOSIS: ICD-10-CM

## 2021-08-04 PROCEDURE — 99999 PR PBB SHADOW E&M-EST. PATIENT-LVL IV: CPT | Mod: PBBFAC,,, | Performed by: THORACIC SURGERY (CARDIOTHORACIC VASCULAR SURGERY)

## 2021-08-04 PROCEDURE — 1125F AMNT PAIN NOTED PAIN PRSNT: CPT | Mod: CPTII,S$GLB,, | Performed by: THORACIC SURGERY (CARDIOTHORACIC VASCULAR SURGERY)

## 2021-08-04 PROCEDURE — 1159F MED LIST DOCD IN RCRD: CPT | Mod: CPTII,S$GLB,, | Performed by: THORACIC SURGERY (CARDIOTHORACIC VASCULAR SURGERY)

## 2021-08-04 PROCEDURE — 1125F PR PAIN SEVERITY QUANTIFIED, PAIN PRESENT: ICD-10-PCS | Mod: CPTII,S$GLB,, | Performed by: THORACIC SURGERY (CARDIOTHORACIC VASCULAR SURGERY)

## 2021-08-04 PROCEDURE — 1160F PR REVIEW ALL MEDS BY PRESCRIBER/CLIN PHARMACIST DOCUMENTED: ICD-10-PCS | Mod: CPTII,S$GLB,, | Performed by: THORACIC SURGERY (CARDIOTHORACIC VASCULAR SURGERY)

## 2021-08-04 PROCEDURE — 3288F PR FALLS RISK ASSESSMENT DOCUMENTED: ICD-10-PCS | Mod: CPTII,S$GLB,, | Performed by: THORACIC SURGERY (CARDIOTHORACIC VASCULAR SURGERY)

## 2021-08-04 PROCEDURE — 1101F PR PT FALLS ASSESS DOC 0-1 FALLS W/OUT INJ PAST YR: ICD-10-PCS | Mod: CPTII,S$GLB,, | Performed by: THORACIC SURGERY (CARDIOTHORACIC VASCULAR SURGERY)

## 2021-08-04 PROCEDURE — 99204 PR OFFICE/OUTPT VISIT, NEW, LEVL IV, 45-59 MIN: ICD-10-PCS | Mod: S$GLB,,, | Performed by: THORACIC SURGERY (CARDIOTHORACIC VASCULAR SURGERY)

## 2021-08-04 PROCEDURE — 1101F PT FALLS ASSESS-DOCD LE1/YR: CPT | Mod: CPTII,S$GLB,, | Performed by: THORACIC SURGERY (CARDIOTHORACIC VASCULAR SURGERY)

## 2021-08-04 PROCEDURE — 99204 OFFICE O/P NEW MOD 45 MIN: CPT | Mod: S$GLB,,, | Performed by: THORACIC SURGERY (CARDIOTHORACIC VASCULAR SURGERY)

## 2021-08-04 PROCEDURE — 99499 UNLISTED E&M SERVICE: CPT | Mod: S$GLB,,, | Performed by: THORACIC SURGERY (CARDIOTHORACIC VASCULAR SURGERY)

## 2021-08-04 PROCEDURE — 3077F SYST BP >= 140 MM HG: CPT | Mod: CPTII,S$GLB,, | Performed by: THORACIC SURGERY (CARDIOTHORACIC VASCULAR SURGERY)

## 2021-08-04 PROCEDURE — 99999 PR PBB SHADOW E&M-EST. PATIENT-LVL IV: ICD-10-PCS | Mod: PBBFAC,,, | Performed by: THORACIC SURGERY (CARDIOTHORACIC VASCULAR SURGERY)

## 2021-08-04 PROCEDURE — 3288F FALL RISK ASSESSMENT DOCD: CPT | Mod: CPTII,S$GLB,, | Performed by: THORACIC SURGERY (CARDIOTHORACIC VASCULAR SURGERY)

## 2021-08-04 PROCEDURE — 3079F PR MOST RECENT DIASTOLIC BLOOD PRESSURE 80-89 MM HG: ICD-10-PCS | Mod: CPTII,S$GLB,, | Performed by: THORACIC SURGERY (CARDIOTHORACIC VASCULAR SURGERY)

## 2021-08-04 PROCEDURE — 3079F DIAST BP 80-89 MM HG: CPT | Mod: CPTII,S$GLB,, | Performed by: THORACIC SURGERY (CARDIOTHORACIC VASCULAR SURGERY)

## 2021-08-04 PROCEDURE — 99499 RISK ADDL DX/OHS AUDIT: ICD-10-PCS | Mod: S$GLB,,, | Performed by: THORACIC SURGERY (CARDIOTHORACIC VASCULAR SURGERY)

## 2021-08-04 PROCEDURE — 3077F PR MOST RECENT SYSTOLIC BLOOD PRESSURE >= 140 MM HG: ICD-10-PCS | Mod: CPTII,S$GLB,, | Performed by: THORACIC SURGERY (CARDIOTHORACIC VASCULAR SURGERY)

## 2021-08-04 PROCEDURE — 1160F RVW MEDS BY RX/DR IN RCRD: CPT | Mod: CPTII,S$GLB,, | Performed by: THORACIC SURGERY (CARDIOTHORACIC VASCULAR SURGERY)

## 2021-08-04 PROCEDURE — 1159F PR MEDICATION LIST DOCUMENTED IN MEDICAL RECORD: ICD-10-PCS | Mod: CPTII,S$GLB,, | Performed by: THORACIC SURGERY (CARDIOTHORACIC VASCULAR SURGERY)

## 2021-08-05 ENCOUNTER — LAB VISIT (OUTPATIENT)
Dept: LAB | Facility: HOSPITAL | Age: 76
End: 2021-08-05
Attending: NURSE PRACTITIONER
Payer: MEDICARE

## 2021-08-05 ENCOUNTER — PATIENT MESSAGE (OUTPATIENT)
Dept: VASCULAR SURGERY | Facility: CLINIC | Age: 76
End: 2021-08-05

## 2021-08-05 DIAGNOSIS — R53.83 FATIGUE, UNSPECIFIED TYPE: Primary | ICD-10-CM

## 2021-08-05 DIAGNOSIS — D64.9 ANEMIA, UNSPECIFIED TYPE: ICD-10-CM

## 2021-08-05 DIAGNOSIS — E53.8 VITAMIN B12 DEFICIENCY: ICD-10-CM

## 2021-08-05 DIAGNOSIS — R53.83 FATIGUE, UNSPECIFIED TYPE: ICD-10-CM

## 2021-08-05 DIAGNOSIS — E55.9 VITAMIN D DEFICIENCY: ICD-10-CM

## 2021-08-05 LAB
25(OH)D3+25(OH)D2 SERPL-MCNC: 21 NG/ML (ref 30–96)
T4 FREE SERPL-MCNC: 0.85 NG/DL (ref 0.71–1.51)
TSH SERPL DL<=0.005 MIU/L-ACNC: 0.75 UIU/ML (ref 0.4–4)
VIT B12 SERPL-MCNC: 525 PG/ML (ref 210–950)

## 2021-08-05 PROCEDURE — 82306 VITAMIN D 25 HYDROXY: CPT | Performed by: NURSE PRACTITIONER

## 2021-08-05 PROCEDURE — 84443 ASSAY THYROID STIM HORMONE: CPT | Performed by: NURSE PRACTITIONER

## 2021-08-05 PROCEDURE — 84439 ASSAY OF FREE THYROXINE: CPT | Performed by: NURSE PRACTITIONER

## 2021-08-05 PROCEDURE — 36415 COLL VENOUS BLD VENIPUNCTURE: CPT | Performed by: NURSE PRACTITIONER

## 2021-08-05 PROCEDURE — 82607 VITAMIN B-12: CPT | Performed by: NURSE PRACTITIONER

## 2021-08-06 ENCOUNTER — TELEPHONE (OUTPATIENT)
Dept: VASCULAR SURGERY | Facility: CLINIC | Age: 76
End: 2021-08-06

## 2021-08-06 ENCOUNTER — TELEPHONE (OUTPATIENT)
Dept: FAMILY MEDICINE | Facility: CLINIC | Age: 76
End: 2021-08-06

## 2021-08-06 ENCOUNTER — TELEPHONE (OUTPATIENT)
Dept: PRIMARY CARE CLINIC | Facility: CLINIC | Age: 76
End: 2021-08-06

## 2021-08-06 DIAGNOSIS — R61 HYPERHIDROSIS: Primary | ICD-10-CM

## 2021-08-13 ENCOUNTER — CLINICAL SUPPORT (OUTPATIENT)
Dept: PRIMARY CARE CLINIC | Facility: CLINIC | Age: 76
End: 2021-08-13
Payer: MEDICARE

## 2021-08-13 VITALS
HEART RATE: 71 BPM | BODY MASS INDEX: 25.72 KG/M2 | TEMPERATURE: 97 F | DIASTOLIC BLOOD PRESSURE: 70 MMHG | OXYGEN SATURATION: 96 % | WEIGHT: 174.19 LBS | SYSTOLIC BLOOD PRESSURE: 132 MMHG

## 2021-08-13 DIAGNOSIS — Z23 NEED FOR VACCINATION: Primary | ICD-10-CM

## 2021-08-13 PROCEDURE — 86580 TB INTRADERMAL TEST: CPT | Mod: S$GLB,,, | Performed by: NURSE PRACTITIONER

## 2021-08-13 PROCEDURE — 86580 PR  TB INTRADERMAL TEST: ICD-10-PCS | Mod: S$GLB,,, | Performed by: NURSE PRACTITIONER

## 2021-08-16 ENCOUNTER — CLINICAL SUPPORT (OUTPATIENT)
Dept: PRIMARY CARE CLINIC | Facility: CLINIC | Age: 76
End: 2021-08-16
Payer: MEDICARE

## 2021-08-16 DIAGNOSIS — Z23 NEED FOR VACCINATION: Primary | ICD-10-CM

## 2021-08-16 LAB
TB INDURATION - 48 HR READ: 0 MM
TB INDURATION - 72 HR READ: 0 MM
TB SKIN TEST - 48 HR READ: NEGATIVE
TB SKIN TEST - 72 HR READ: NEGATIVE

## 2021-08-16 PROCEDURE — 86580 POCT TB SKIN TEST: ICD-10-PCS | Mod: S$GLB,,, | Performed by: NURSE PRACTITIONER

## 2021-08-16 PROCEDURE — 86580 TB INTRADERMAL TEST: CPT | Mod: S$GLB,,, | Performed by: NURSE PRACTITIONER

## 2021-08-23 ENCOUNTER — PATIENT MESSAGE (OUTPATIENT)
Dept: VASCULAR SURGERY | Facility: CLINIC | Age: 76
End: 2021-08-23

## 2021-08-23 ENCOUNTER — PATIENT MESSAGE (OUTPATIENT)
Dept: PRIMARY CARE CLINIC | Facility: CLINIC | Age: 76
End: 2021-08-23

## 2021-08-23 ENCOUNTER — DOCUMENTATION ONLY (OUTPATIENT)
Dept: PRIMARY CARE CLINIC | Facility: CLINIC | Age: 76
End: 2021-08-23

## 2021-08-23 ENCOUNTER — PATIENT MESSAGE (OUTPATIENT)
Dept: ENDOCRINOLOGY | Facility: CLINIC | Age: 76
End: 2021-08-23

## 2021-08-23 RX ORDER — LANCETS
EACH MISCELLANEOUS
Qty: 200 EACH | Refills: 3 | Status: SHIPPED | OUTPATIENT
Start: 2021-08-23 | End: 2022-08-23

## 2021-08-24 ENCOUNTER — PATIENT MESSAGE (OUTPATIENT)
Dept: ENDOCRINOLOGY | Facility: CLINIC | Age: 76
End: 2021-08-24

## 2021-08-25 DIAGNOSIS — Z96.651 STATUS POST TOTAL RIGHT KNEE REPLACEMENT: Primary | ICD-10-CM

## 2021-08-25 DIAGNOSIS — M25.562 LEFT KNEE PAIN, UNSPECIFIED CHRONICITY: ICD-10-CM

## 2021-09-03 ENCOUNTER — PATIENT MESSAGE (OUTPATIENT)
Dept: PULMONOLOGY | Facility: CLINIC | Age: 76
End: 2021-09-03

## 2021-09-03 ENCOUNTER — PATIENT MESSAGE (OUTPATIENT)
Dept: ORTHOPEDICS | Facility: CLINIC | Age: 76
End: 2021-09-03

## 2021-09-09 ENCOUNTER — PATIENT MESSAGE (OUTPATIENT)
Dept: ENDOCRINOLOGY | Facility: CLINIC | Age: 76
End: 2021-09-09

## 2021-09-15 ENCOUNTER — PATIENT MESSAGE (OUTPATIENT)
Dept: DERMATOLOGY | Facility: CLINIC | Age: 76
End: 2021-09-15

## 2021-09-21 ENCOUNTER — LAB VISIT (OUTPATIENT)
Dept: LAB | Facility: HOSPITAL | Age: 76
End: 2021-09-21
Attending: INTERNAL MEDICINE
Payer: MEDICARE

## 2021-09-21 ENCOUNTER — OFFICE VISIT (OUTPATIENT)
Dept: ENDOCRINOLOGY | Facility: CLINIC | Age: 76
End: 2021-09-21
Payer: MEDICARE

## 2021-09-21 ENCOUNTER — PATIENT OUTREACH (OUTPATIENT)
Dept: ADMINISTRATIVE | Facility: OTHER | Age: 76
End: 2021-09-21

## 2021-09-21 VITALS
TEMPERATURE: 98 F | SYSTOLIC BLOOD PRESSURE: 130 MMHG | DIASTOLIC BLOOD PRESSURE: 82 MMHG | BODY MASS INDEX: 24.42 KG/M2 | WEIGHT: 164.88 LBS | HEART RATE: 69 BPM | OXYGEN SATURATION: 95 % | HEIGHT: 69 IN

## 2021-09-21 DIAGNOSIS — R00.2 PALPITATIONS: ICD-10-CM

## 2021-09-21 DIAGNOSIS — R61 HYPERHIDROSIS: ICD-10-CM

## 2021-09-21 DIAGNOSIS — R61 HYPERHIDROSIS: Primary | ICD-10-CM

## 2021-09-21 DIAGNOSIS — R73.03 PREDIABETES: ICD-10-CM

## 2021-09-21 PROBLEM — E83.51 HYPOCALCEMIA: Status: RESOLVED | Noted: 2020-08-04 | Resolved: 2021-09-21

## 2021-09-21 PROCEDURE — 80053 COMPREHEN METABOLIC PANEL: CPT | Performed by: INTERNAL MEDICINE

## 2021-09-21 PROCEDURE — 3288F PR FALLS RISK ASSESSMENT DOCUMENTED: ICD-10-PCS | Mod: CPTII,S$GLB,, | Performed by: INTERNAL MEDICINE

## 2021-09-21 PROCEDURE — 3288F FALL RISK ASSESSMENT DOCD: CPT | Mod: CPTII,S$GLB,, | Performed by: INTERNAL MEDICINE

## 2021-09-21 PROCEDURE — 1126F AMNT PAIN NOTED NONE PRSNT: CPT | Mod: CPTII,S$GLB,, | Performed by: INTERNAL MEDICINE

## 2021-09-21 PROCEDURE — 1159F MED LIST DOCD IN RCRD: CPT | Mod: CPTII,S$GLB,, | Performed by: INTERNAL MEDICINE

## 2021-09-21 PROCEDURE — 84443 ASSAY THYROID STIM HORMONE: CPT | Performed by: INTERNAL MEDICINE

## 2021-09-21 PROCEDURE — 99214 OFFICE O/P EST MOD 30 MIN: CPT | Mod: S$GLB,,, | Performed by: INTERNAL MEDICINE

## 2021-09-21 PROCEDURE — 1101F PR PT FALLS ASSESS DOC 0-1 FALLS W/OUT INJ PAST YR: ICD-10-PCS | Mod: CPTII,S$GLB,, | Performed by: INTERNAL MEDICINE

## 2021-09-21 PROCEDURE — 1159F PR MEDICATION LIST DOCUMENTED IN MEDICAL RECORD: ICD-10-PCS | Mod: CPTII,S$GLB,, | Performed by: INTERNAL MEDICINE

## 2021-09-21 PROCEDURE — 1160F PR REVIEW ALL MEDS BY PRESCRIBER/CLIN PHARMACIST DOCUMENTED: ICD-10-PCS | Mod: CPTII,S$GLB,, | Performed by: INTERNAL MEDICINE

## 2021-09-21 PROCEDURE — 3075F SYST BP GE 130 - 139MM HG: CPT | Mod: CPTII,S$GLB,, | Performed by: INTERNAL MEDICINE

## 2021-09-21 PROCEDURE — 84305 ASSAY OF SOMATOMEDIN: CPT | Performed by: INTERNAL MEDICINE

## 2021-09-21 PROCEDURE — 1101F PT FALLS ASSESS-DOCD LE1/YR: CPT | Mod: CPTII,S$GLB,, | Performed by: INTERNAL MEDICINE

## 2021-09-21 PROCEDURE — 99999 PR PBB SHADOW E&M-EST. PATIENT-LVL V: ICD-10-PCS | Mod: PBBFAC,,, | Performed by: INTERNAL MEDICINE

## 2021-09-21 PROCEDURE — 99214 PR OFFICE/OUTPT VISIT, EST, LEVL IV, 30-39 MIN: ICD-10-PCS | Mod: S$GLB,,, | Performed by: INTERNAL MEDICINE

## 2021-09-21 PROCEDURE — 99999 PR PBB SHADOW E&M-EST. PATIENT-LVL V: CPT | Mod: PBBFAC,,, | Performed by: INTERNAL MEDICINE

## 2021-09-21 PROCEDURE — 1160F RVW MEDS BY RX/DR IN RCRD: CPT | Mod: CPTII,S$GLB,, | Performed by: INTERNAL MEDICINE

## 2021-09-21 PROCEDURE — 3075F PR MOST RECENT SYSTOLIC BLOOD PRESS GE 130-139MM HG: ICD-10-PCS | Mod: CPTII,S$GLB,, | Performed by: INTERNAL MEDICINE

## 2021-09-21 PROCEDURE — 3079F DIAST BP 80-89 MM HG: CPT | Mod: CPTII,S$GLB,, | Performed by: INTERNAL MEDICINE

## 2021-09-21 PROCEDURE — 1126F PR PAIN SEVERITY QUANTIFIED, NO PAIN PRESENT: ICD-10-PCS | Mod: CPTII,S$GLB,, | Performed by: INTERNAL MEDICINE

## 2021-09-21 PROCEDURE — 3079F PR MOST RECENT DIASTOLIC BLOOD PRESSURE 80-89 MM HG: ICD-10-PCS | Mod: CPTII,S$GLB,, | Performed by: INTERNAL MEDICINE

## 2021-09-22 ENCOUNTER — PATIENT MESSAGE (OUTPATIENT)
Dept: ENDOCRINOLOGY | Facility: CLINIC | Age: 76
End: 2021-09-22

## 2021-09-22 LAB
ALBUMIN SERPL BCP-MCNC: 4 G/DL (ref 3.5–5.2)
ALP SERPL-CCNC: 107 U/L (ref 55–135)
ALT SERPL W/O P-5'-P-CCNC: 15 U/L (ref 10–44)
ANION GAP SERPL CALC-SCNC: 13 MMOL/L (ref 8–16)
AST SERPL-CCNC: 16 U/L (ref 10–40)
BILIRUB SERPL-MCNC: 0.2 MG/DL (ref 0.1–1)
BUN SERPL-MCNC: 18 MG/DL (ref 8–23)
CALCIUM SERPL-MCNC: 9.2 MG/DL (ref 8.7–10.5)
CHLORIDE SERPL-SCNC: 106 MMOL/L (ref 95–110)
CO2 SERPL-SCNC: 21 MMOL/L (ref 23–29)
CREAT SERPL-MCNC: 0.8 MG/DL (ref 0.5–1.4)
EST. GFR  (AFRICAN AMERICAN): >60 ML/MIN/1.73 M^2
EST. GFR  (NON AFRICAN AMERICAN): >60 ML/MIN/1.73 M^2
GLUCOSE SERPL-MCNC: 113 MG/DL (ref 70–110)
POTASSIUM SERPL-SCNC: 4 MMOL/L (ref 3.5–5.1)
PROT SERPL-MCNC: 7.1 G/DL (ref 6–8.4)
SODIUM SERPL-SCNC: 140 MMOL/L (ref 136–145)
TSH SERPL DL<=0.005 MIU/L-ACNC: 1.9 UIU/ML (ref 0.4–4)

## 2021-09-24 ENCOUNTER — APPOINTMENT (OUTPATIENT)
Dept: LAB | Facility: HOSPITAL | Age: 76
End: 2021-09-24
Attending: INTERNAL MEDICINE
Payer: MEDICARE

## 2021-09-27 ENCOUNTER — PATIENT MESSAGE (OUTPATIENT)
Dept: ENDOCRINOLOGY | Facility: CLINIC | Age: 76
End: 2021-09-27

## 2021-09-27 LAB
IGF-I SERPL-MCNC: 111 NG/ML (ref 34–182)
IGF-I Z-SCORE SERPL: 0.67 SD

## 2021-10-01 ENCOUNTER — PATIENT MESSAGE (OUTPATIENT)
Dept: ENDOCRINOLOGY | Facility: CLINIC | Age: 76
End: 2021-10-01

## 2021-10-08 DIAGNOSIS — M25.562 LEFT KNEE PAIN, UNSPECIFIED CHRONICITY: Primary | ICD-10-CM

## 2021-10-11 ENCOUNTER — OFFICE VISIT (OUTPATIENT)
Dept: ORTHOPEDICS | Facility: CLINIC | Age: 76
End: 2021-10-11
Payer: MEDICARE

## 2021-10-11 ENCOUNTER — HOSPITAL ENCOUNTER (OUTPATIENT)
Dept: RADIOLOGY | Facility: HOSPITAL | Age: 76
Discharge: HOME | End: 2021-10-11
Attending: ORTHOPAEDIC SURGERY
Payer: MEDICARE

## 2021-10-11 VITALS — RESPIRATION RATE: 15 BRPM | BODY MASS INDEX: 24.29 KG/M2 | WEIGHT: 164 LBS | HEIGHT: 69 IN

## 2021-10-11 DIAGNOSIS — M25.562 LEFT KNEE PAIN, UNSPECIFIED CHRONICITY: Primary | ICD-10-CM

## 2021-10-11 DIAGNOSIS — S83.282A ACUTE LATERAL MENISCAL TEAR, LEFT, INITIAL ENCOUNTER: ICD-10-CM

## 2021-10-11 DIAGNOSIS — M25.562 LEFT KNEE PAIN, UNSPECIFIED CHRONICITY: ICD-10-CM

## 2021-10-11 PROCEDURE — 73564 X-RAY EXAM KNEE 4 OR MORE: CPT | Mod: 26,LT,, | Performed by: RADIOLOGY

## 2021-10-11 PROCEDURE — 99999 PR PBB SHADOW E&M-EST. PATIENT-LVL III: ICD-10-PCS | Mod: PBBFAC,,, | Performed by: ORTHOPAEDIC SURGERY

## 2021-10-11 PROCEDURE — 1160F RVW MEDS BY RX/DR IN RCRD: CPT | Mod: CPTII,S$GLB,, | Performed by: ORTHOPAEDIC SURGERY

## 2021-10-11 PROCEDURE — 1160F PR REVIEW ALL MEDS BY PRESCRIBER/CLIN PHARMACIST DOCUMENTED: ICD-10-PCS | Mod: CPTII,S$GLB,, | Performed by: ORTHOPAEDIC SURGERY

## 2021-10-11 PROCEDURE — 1125F PR PAIN SEVERITY QUANTIFIED, PAIN PRESENT: ICD-10-PCS | Mod: CPTII,S$GLB,, | Performed by: ORTHOPAEDIC SURGERY

## 2021-10-11 PROCEDURE — 99213 PR OFFICE/OUTPT VISIT, EST, LEVL III, 20-29 MIN: ICD-10-PCS | Mod: S$GLB,,, | Performed by: ORTHOPAEDIC SURGERY

## 2021-10-11 PROCEDURE — 73562 X-RAY EXAM OF KNEE 3: CPT | Mod: 26,RT,, | Performed by: RADIOLOGY

## 2021-10-11 PROCEDURE — 99999 PR PBB SHADOW E&M-EST. PATIENT-LVL III: CPT | Mod: PBBFAC,,, | Performed by: ORTHOPAEDIC SURGERY

## 2021-10-11 PROCEDURE — 1159F MED LIST DOCD IN RCRD: CPT | Mod: CPTII,S$GLB,, | Performed by: ORTHOPAEDIC SURGERY

## 2021-10-11 PROCEDURE — 1125F AMNT PAIN NOTED PAIN PRSNT: CPT | Mod: CPTII,S$GLB,, | Performed by: ORTHOPAEDIC SURGERY

## 2021-10-11 PROCEDURE — 73564 XR KNEE ORTHO LEFT WITH FLEXION: ICD-10-PCS | Mod: 26,LT,, | Performed by: RADIOLOGY

## 2021-10-11 PROCEDURE — 99213 OFFICE O/P EST LOW 20 MIN: CPT | Mod: S$GLB,,, | Performed by: ORTHOPAEDIC SURGERY

## 2021-10-11 PROCEDURE — 73564 X-RAY EXAM KNEE 4 OR MORE: CPT | Mod: TC,PN,LT

## 2021-10-11 PROCEDURE — 1159F PR MEDICATION LIST DOCUMENTED IN MEDICAL RECORD: ICD-10-PCS | Mod: CPTII,S$GLB,, | Performed by: ORTHOPAEDIC SURGERY

## 2021-10-11 PROCEDURE — 73562 XR KNEE ORTHO LEFT WITH FLEXION: ICD-10-PCS | Mod: 26,RT,, | Performed by: RADIOLOGY

## 2021-10-18 ENCOUNTER — TELEPHONE (OUTPATIENT)
Dept: ORTHOPEDICS | Facility: CLINIC | Age: 76
End: 2021-10-18

## 2021-10-18 ENCOUNTER — TELEPHONE (OUTPATIENT)
Dept: DERMATOLOGY | Facility: CLINIC | Age: 76
End: 2021-10-18

## 2021-10-21 ENCOUNTER — TELEPHONE (OUTPATIENT)
Dept: DERMATOLOGY | Facility: CLINIC | Age: 76
End: 2021-10-21

## 2021-10-27 ENCOUNTER — TELEPHONE (OUTPATIENT)
Dept: DERMATOLOGY | Facility: CLINIC | Age: 76
End: 2021-10-27
Payer: MEDICARE

## 2021-10-28 ENCOUNTER — HOSPITAL ENCOUNTER (OUTPATIENT)
Dept: RADIOLOGY | Facility: HOSPITAL | Age: 76
Discharge: HOME | End: 2021-10-28
Attending: ORTHOPAEDIC SURGERY
Payer: MEDICARE

## 2021-10-28 DIAGNOSIS — M25.562 LEFT KNEE PAIN, UNSPECIFIED CHRONICITY: ICD-10-CM

## 2021-10-28 PROCEDURE — 73721 MRI JNT OF LWR EXTRE W/O DYE: CPT | Mod: 26,LT,, | Performed by: RADIOLOGY

## 2021-10-28 PROCEDURE — 73721 MRI KNEE WITHOUT CONTRAST LEFT: ICD-10-PCS | Mod: 26,LT,, | Performed by: RADIOLOGY

## 2021-10-28 PROCEDURE — 73721 MRI JNT OF LWR EXTRE W/O DYE: CPT | Mod: TC,LT

## 2021-11-03 ENCOUNTER — TELEPHONE (OUTPATIENT)
Dept: DERMATOLOGY | Facility: CLINIC | Age: 76
End: 2021-11-03
Payer: MEDICARE

## 2021-11-04 ENCOUNTER — OFFICE VISIT (OUTPATIENT)
Dept: ORTHOPEDICS | Facility: CLINIC | Age: 76
End: 2021-11-04
Payer: MEDICARE

## 2021-11-04 VITALS — RESPIRATION RATE: 18 BRPM | BODY MASS INDEX: 24.29 KG/M2 | HEIGHT: 69 IN | WEIGHT: 164 LBS

## 2021-11-04 DIAGNOSIS — M17.12 PRIMARY OSTEOARTHRITIS OF LEFT KNEE: Primary | ICD-10-CM

## 2021-11-04 DIAGNOSIS — M17.10 ARTHRITIS OF KNEE: Primary | ICD-10-CM

## 2021-11-04 PROCEDURE — 3288F FALL RISK ASSESSMENT DOCD: CPT | Mod: CPTII,S$GLB,, | Performed by: ORTHOPAEDIC SURGERY

## 2021-11-04 PROCEDURE — 1101F PT FALLS ASSESS-DOCD LE1/YR: CPT | Mod: CPTII,S$GLB,, | Performed by: ORTHOPAEDIC SURGERY

## 2021-11-04 PROCEDURE — 1125F AMNT PAIN NOTED PAIN PRSNT: CPT | Mod: CPTII,S$GLB,, | Performed by: ORTHOPAEDIC SURGERY

## 2021-11-04 PROCEDURE — 1160F RVW MEDS BY RX/DR IN RCRD: CPT | Mod: CPTII,S$GLB,, | Performed by: ORTHOPAEDIC SURGERY

## 2021-11-04 PROCEDURE — 1160F PR REVIEW ALL MEDS BY PRESCRIBER/CLIN PHARMACIST DOCUMENTED: ICD-10-PCS | Mod: CPTII,S$GLB,, | Performed by: ORTHOPAEDIC SURGERY

## 2021-11-04 PROCEDURE — 1159F MED LIST DOCD IN RCRD: CPT | Mod: CPTII,S$GLB,, | Performed by: ORTHOPAEDIC SURGERY

## 2021-11-04 PROCEDURE — 1101F PR PT FALLS ASSESS DOC 0-1 FALLS W/OUT INJ PAST YR: ICD-10-PCS | Mod: CPTII,S$GLB,, | Performed by: ORTHOPAEDIC SURGERY

## 2021-11-04 PROCEDURE — 99213 PR OFFICE/OUTPT VISIT, EST, LEVL III, 20-29 MIN: ICD-10-PCS | Mod: S$GLB,,, | Performed by: ORTHOPAEDIC SURGERY

## 2021-11-04 PROCEDURE — 99999 PR PBB SHADOW E&M-EST. PATIENT-LVL IV: CPT | Mod: PBBFAC,,, | Performed by: ORTHOPAEDIC SURGERY

## 2021-11-04 PROCEDURE — 1159F PR MEDICATION LIST DOCUMENTED IN MEDICAL RECORD: ICD-10-PCS | Mod: CPTII,S$GLB,, | Performed by: ORTHOPAEDIC SURGERY

## 2021-11-04 PROCEDURE — 99999 PR PBB SHADOW E&M-EST. PATIENT-LVL IV: ICD-10-PCS | Mod: PBBFAC,,, | Performed by: ORTHOPAEDIC SURGERY

## 2021-11-04 PROCEDURE — 1125F PR PAIN SEVERITY QUANTIFIED, PAIN PRESENT: ICD-10-PCS | Mod: CPTII,S$GLB,, | Performed by: ORTHOPAEDIC SURGERY

## 2021-11-04 PROCEDURE — 3288F PR FALLS RISK ASSESSMENT DOCUMENTED: ICD-10-PCS | Mod: CPTII,S$GLB,, | Performed by: ORTHOPAEDIC SURGERY

## 2021-11-04 PROCEDURE — 99213 OFFICE O/P EST LOW 20 MIN: CPT | Mod: S$GLB,,, | Performed by: ORTHOPAEDIC SURGERY

## 2021-11-17 ENCOUNTER — PATIENT MESSAGE (OUTPATIENT)
Dept: PRIMARY CARE CLINIC | Facility: CLINIC | Age: 76
End: 2021-11-17
Payer: MEDICARE

## 2021-11-17 ENCOUNTER — HOSPITAL ENCOUNTER (EMERGENCY)
Facility: HOSPITAL | Age: 76
Discharge: HOME | End: 2021-11-17
Attending: EMERGENCY MEDICINE
Payer: MEDICARE

## 2021-11-17 ENCOUNTER — TELEPHONE (OUTPATIENT)
Dept: PRIMARY CARE CLINIC | Facility: CLINIC | Age: 76
End: 2021-11-17
Payer: MEDICARE

## 2021-11-17 VITALS
DIASTOLIC BLOOD PRESSURE: 65 MMHG | HEART RATE: 71 BPM | RESPIRATION RATE: 16 BRPM | BODY MASS INDEX: 24.37 KG/M2 | SYSTOLIC BLOOD PRESSURE: 130 MMHG | WEIGHT: 165 LBS | TEMPERATURE: 99 F | OXYGEN SATURATION: 98 %

## 2021-11-17 DIAGNOSIS — M79.5 FOREIGN BODY (FB) IN SOFT TISSUE: Primary | ICD-10-CM

## 2021-11-17 PROCEDURE — 25000003 PHARM REV CODE 250: Performed by: NURSE PRACTITIONER

## 2021-11-17 PROCEDURE — 10120 INC&RMVL FB SUBQ TISS SMPL: CPT

## 2021-11-17 PROCEDURE — 99284 EMERGENCY DEPT VISIT MOD MDM: CPT | Mod: 25

## 2021-11-17 RX ORDER — CEPHALEXIN 500 MG/1
500 CAPSULE ORAL 4 TIMES DAILY
Qty: 20 CAPSULE | Refills: 0 | Status: SHIPPED | OUTPATIENT
Start: 2021-11-17 | End: 2021-11-22

## 2021-11-17 RX ORDER — MUPIROCIN 20 MG/G
OINTMENT TOPICAL 3 TIMES DAILY
Qty: 1 G | Refills: 0 | Status: SHIPPED | OUTPATIENT
Start: 2021-11-17 | End: 2022-02-03 | Stop reason: SDUPTHER

## 2021-11-17 RX ORDER — LIDOCAINE HYDROCHLORIDE 10 MG/ML
10 INJECTION INFILTRATION; PERINEURAL
Status: COMPLETED | OUTPATIENT
Start: 2021-11-17 | End: 2021-11-17

## 2021-11-17 RX ADMIN — LIDOCAINE HYDROCHLORIDE 10 ML: 10 INJECTION, SOLUTION INFILTRATION; PERINEURAL at 12:11

## 2021-11-18 ENCOUNTER — TELEPHONE (OUTPATIENT)
Dept: PRIMARY CARE CLINIC | Facility: CLINIC | Age: 76
End: 2021-11-18
Payer: MEDICARE

## 2021-11-19 ENCOUNTER — OFFICE VISIT (OUTPATIENT)
Dept: PRIMARY CARE CLINIC | Facility: CLINIC | Age: 76
End: 2021-11-19
Payer: MEDICARE

## 2021-11-19 ENCOUNTER — OFFICE VISIT (OUTPATIENT)
Dept: SURGERY | Facility: CLINIC | Age: 76
End: 2021-11-19
Payer: MEDICARE

## 2021-11-19 VITALS
SYSTOLIC BLOOD PRESSURE: 156 MMHG | DIASTOLIC BLOOD PRESSURE: 76 MMHG | WEIGHT: 171.5 LBS | BODY MASS INDEX: 25.33 KG/M2 | HEART RATE: 80 BPM

## 2021-11-19 VITALS
BODY MASS INDEX: 25.39 KG/M2 | TEMPERATURE: 98 F | OXYGEN SATURATION: 97 % | WEIGHT: 171.94 LBS | SYSTOLIC BLOOD PRESSURE: 140 MMHG | HEART RATE: 88 BPM | DIASTOLIC BLOOD PRESSURE: 90 MMHG

## 2021-11-19 DIAGNOSIS — L98.9 SKIN LESION: Primary | ICD-10-CM

## 2021-11-19 DIAGNOSIS — T07.XXXA ABRASIONS OF MULTIPLE SITES: ICD-10-CM

## 2021-11-19 PROCEDURE — 1101F PT FALLS ASSESS-DOCD LE1/YR: CPT | Mod: CPTII,S$GLB,, | Performed by: SURGERY

## 2021-11-19 PROCEDURE — 99999 PR PBB SHADOW E&M-EST. PATIENT-LVL II: ICD-10-PCS | Mod: PBBFAC,,, | Performed by: SURGERY

## 2021-11-19 PROCEDURE — 99212 PR OFFICE/OUTPT VISIT, EST, LEVL II, 10-19 MIN: ICD-10-PCS | Mod: S$GLB,,, | Performed by: NURSE PRACTITIONER

## 2021-11-19 PROCEDURE — 99203 OFFICE O/P NEW LOW 30 MIN: CPT | Mod: 25,S$GLB,, | Performed by: SURGERY

## 2021-11-19 PROCEDURE — 99203 PR OFFICE/OUTPT VISIT, NEW, LEVL III, 30-44 MIN: ICD-10-PCS | Mod: 25,S$GLB,, | Performed by: SURGERY

## 2021-11-19 PROCEDURE — 1159F PR MEDICATION LIST DOCUMENTED IN MEDICAL RECORD: ICD-10-PCS | Mod: CPTII,S$GLB,, | Performed by: NURSE PRACTITIONER

## 2021-11-19 PROCEDURE — 88305 TISSUE EXAM BY PATHOLOGIST: CPT | Performed by: PATHOLOGY

## 2021-11-19 PROCEDURE — 1126F AMNT PAIN NOTED NONE PRSNT: CPT | Mod: CPTII,S$GLB,, | Performed by: SURGERY

## 2021-11-19 PROCEDURE — 88305 TISSUE EXAM BY PATHOLOGIST: ICD-10-PCS | Mod: 26,,, | Performed by: PATHOLOGY

## 2021-11-19 PROCEDURE — 1125F AMNT PAIN NOTED PAIN PRSNT: CPT | Mod: CPTII,S$GLB,, | Performed by: NURSE PRACTITIONER

## 2021-11-19 PROCEDURE — 1125F PR PAIN SEVERITY QUANTIFIED, PAIN PRESENT: ICD-10-PCS | Mod: CPTII,S$GLB,, | Performed by: NURSE PRACTITIONER

## 2021-11-19 PROCEDURE — 3080F DIAST BP >= 90 MM HG: CPT | Mod: CPTII,S$GLB,, | Performed by: NURSE PRACTITIONER

## 2021-11-19 PROCEDURE — 1160F PR REVIEW ALL MEDS BY PRESCRIBER/CLIN PHARMACIST DOCUMENTED: ICD-10-PCS | Mod: CPTII,S$GLB,, | Performed by: NURSE PRACTITIONER

## 2021-11-19 PROCEDURE — 3078F PR MOST RECENT DIASTOLIC BLOOD PRESSURE < 80 MM HG: ICD-10-PCS | Mod: CPTII,S$GLB,, | Performed by: SURGERY

## 2021-11-19 PROCEDURE — 11400 EXC TR-EXT B9+MARG 0.5 CM<: CPT | Mod: S$GLB,,, | Performed by: SURGERY

## 2021-11-19 PROCEDURE — 3077F PR MOST RECENT SYSTOLIC BLOOD PRESSURE >= 140 MM HG: ICD-10-PCS | Mod: CPTII,S$GLB,, | Performed by: NURSE PRACTITIONER

## 2021-11-19 PROCEDURE — 3078F DIAST BP <80 MM HG: CPT | Mod: CPTII,S$GLB,, | Performed by: SURGERY

## 2021-11-19 PROCEDURE — 3288F PR FALLS RISK ASSESSMENT DOCUMENTED: ICD-10-PCS | Mod: CPTII,S$GLB,, | Performed by: NURSE PRACTITIONER

## 2021-11-19 PROCEDURE — 11400 PR EXC SKIN BENIG <0.5 CM TRUNK,ARM,LEG: ICD-10-PCS | Mod: S$GLB,,, | Performed by: SURGERY

## 2021-11-19 PROCEDURE — 3080F PR MOST RECENT DIASTOLIC BLOOD PRESSURE >= 90 MM HG: ICD-10-PCS | Mod: CPTII,S$GLB,, | Performed by: NURSE PRACTITIONER

## 2021-11-19 PROCEDURE — 3077F SYST BP >= 140 MM HG: CPT | Mod: CPTII,S$GLB,, | Performed by: NURSE PRACTITIONER

## 2021-11-19 PROCEDURE — 88305 TISSUE EXAM BY PATHOLOGIST: CPT | Mod: 26,,, | Performed by: PATHOLOGY

## 2021-11-19 PROCEDURE — 1101F PR PT FALLS ASSESS DOC 0-1 FALLS W/OUT INJ PAST YR: ICD-10-PCS | Mod: CPTII,S$GLB,, | Performed by: NURSE PRACTITIONER

## 2021-11-19 PROCEDURE — 1101F PR PT FALLS ASSESS DOC 0-1 FALLS W/OUT INJ PAST YR: ICD-10-PCS | Mod: CPTII,S$GLB,, | Performed by: SURGERY

## 2021-11-19 PROCEDURE — 3288F FALL RISK ASSESSMENT DOCD: CPT | Mod: CPTII,S$GLB,, | Performed by: NURSE PRACTITIONER

## 2021-11-19 PROCEDURE — 99212 OFFICE O/P EST SF 10 MIN: CPT | Mod: S$GLB,,, | Performed by: NURSE PRACTITIONER

## 2021-11-19 PROCEDURE — 3077F SYST BP >= 140 MM HG: CPT | Mod: CPTII,S$GLB,, | Performed by: SURGERY

## 2021-11-19 PROCEDURE — 3288F FALL RISK ASSESSMENT DOCD: CPT | Mod: CPTII,S$GLB,, | Performed by: SURGERY

## 2021-11-19 PROCEDURE — 99999 PR PBB SHADOW E&M-EST. PATIENT-LVL II: CPT | Mod: PBBFAC,,, | Performed by: SURGERY

## 2021-11-19 PROCEDURE — 1101F PT FALLS ASSESS-DOCD LE1/YR: CPT | Mod: CPTII,S$GLB,, | Performed by: NURSE PRACTITIONER

## 2021-11-19 PROCEDURE — 1126F PR PAIN SEVERITY QUANTIFIED, NO PAIN PRESENT: ICD-10-PCS | Mod: CPTII,S$GLB,, | Performed by: SURGERY

## 2021-11-19 PROCEDURE — 3077F PR MOST RECENT SYSTOLIC BLOOD PRESSURE >= 140 MM HG: ICD-10-PCS | Mod: CPTII,S$GLB,, | Performed by: SURGERY

## 2021-11-19 PROCEDURE — 1160F RVW MEDS BY RX/DR IN RCRD: CPT | Mod: CPTII,S$GLB,, | Performed by: NURSE PRACTITIONER

## 2021-11-19 PROCEDURE — 1159F MED LIST DOCD IN RCRD: CPT | Mod: CPTII,S$GLB,, | Performed by: NURSE PRACTITIONER

## 2021-11-19 PROCEDURE — 3288F PR FALLS RISK ASSESSMENT DOCUMENTED: ICD-10-PCS | Mod: CPTII,S$GLB,, | Performed by: SURGERY

## 2021-11-22 ENCOUNTER — PES CALL (OUTPATIENT)
Dept: ADMINISTRATIVE | Facility: CLINIC | Age: 76
End: 2021-11-22
Payer: MEDICARE

## 2021-11-22 ENCOUNTER — OFFICE VISIT (OUTPATIENT)
Dept: ORTHOPEDICS | Facility: CLINIC | Age: 76
End: 2021-11-22
Payer: MEDICARE

## 2021-11-22 ENCOUNTER — TELEPHONE (OUTPATIENT)
Dept: SURGERY | Facility: CLINIC | Age: 76
End: 2021-11-22
Payer: MEDICARE

## 2021-11-22 ENCOUNTER — OFFICE VISIT (OUTPATIENT)
Dept: SURGERY | Facility: CLINIC | Age: 76
End: 2021-11-22
Payer: MEDICARE

## 2021-11-22 VITALS — TEMPERATURE: 97 F | HEART RATE: 60 BPM | SYSTOLIC BLOOD PRESSURE: 116 MMHG | DIASTOLIC BLOOD PRESSURE: 72 MMHG

## 2021-11-22 VITALS — HEIGHT: 69 IN | WEIGHT: 171 LBS | BODY MASS INDEX: 25.33 KG/M2 | RESPIRATION RATE: 16 BRPM

## 2021-11-22 DIAGNOSIS — L98.9 SKIN LESION: Primary | ICD-10-CM

## 2021-11-22 DIAGNOSIS — M17.12 PRIMARY OSTEOARTHRITIS OF LEFT KNEE: Primary | ICD-10-CM

## 2021-11-22 PROCEDURE — 99999 PR PBB SHADOW E&M-EST. PATIENT-LVL IV: ICD-10-PCS | Mod: PBBFAC,,, | Performed by: ORTHOPAEDIC SURGERY

## 2021-11-22 PROCEDURE — 20610 DRAIN/INJ JOINT/BURSA W/O US: CPT | Mod: LT,S$GLB,, | Performed by: ORTHOPAEDIC SURGERY

## 2021-11-22 PROCEDURE — 99024 PR POST-OP FOLLOW-UP VISIT: ICD-10-PCS | Mod: S$GLB,,, | Performed by: SURGERY

## 2021-11-22 PROCEDURE — 99499 UNLISTED E&M SERVICE: CPT | Mod: S$GLB,,, | Performed by: ORTHOPAEDIC SURGERY

## 2021-11-22 PROCEDURE — 99999 PR PBB SHADOW E&M-EST. PATIENT-LVL IV: CPT | Mod: PBBFAC,,, | Performed by: ORTHOPAEDIC SURGERY

## 2021-11-22 PROCEDURE — 20610 PR DRAIN/INJECT LARGE JOINT/BURSA: ICD-10-PCS | Mod: LT,S$GLB,, | Performed by: ORTHOPAEDIC SURGERY

## 2021-11-22 PROCEDURE — 99499 NO LOS: ICD-10-PCS | Mod: S$GLB,,, | Performed by: ORTHOPAEDIC SURGERY

## 2021-11-22 PROCEDURE — 99024 POSTOP FOLLOW-UP VISIT: CPT | Mod: S$GLB,,, | Performed by: SURGERY

## 2021-11-26 LAB
FINAL PATHOLOGIC DIAGNOSIS: NORMAL
GROSS: NORMAL
Lab: NORMAL

## 2021-12-06 ENCOUNTER — TELEPHONE (OUTPATIENT)
Dept: DERMATOLOGY | Facility: CLINIC | Age: 76
End: 2021-12-06
Payer: MEDICARE

## 2021-12-11 ENCOUNTER — PATIENT MESSAGE (OUTPATIENT)
Dept: HEMATOLOGY/ONCOLOGY | Facility: CLINIC | Age: 76
End: 2021-12-11
Payer: MEDICARE

## 2021-12-15 ENCOUNTER — TELEPHONE (OUTPATIENT)
Dept: DERMATOLOGY | Facility: CLINIC | Age: 76
End: 2021-12-15
Payer: MEDICARE

## 2021-12-16 ENCOUNTER — OFFICE VISIT (OUTPATIENT)
Dept: DERMATOLOGY | Facility: CLINIC | Age: 76
End: 2021-12-16
Payer: MEDICARE

## 2021-12-16 ENCOUNTER — PATIENT MESSAGE (OUTPATIENT)
Dept: DERMATOLOGY | Facility: CLINIC | Age: 76
End: 2021-12-16
Payer: MEDICARE

## 2021-12-16 DIAGNOSIS — D04.39 SQUAMOUS CELL CARCINOMA IN SITU (SCCIS) OF SKIN OF RIGHT CHEEK: Primary | ICD-10-CM

## 2021-12-16 PROCEDURE — 99999 PR PBB SHADOW E&M-EST. PATIENT-LVL III: CPT | Mod: PBBFAC,,, | Performed by: DERMATOLOGY

## 2021-12-16 PROCEDURE — 99212 OFFICE O/P EST SF 10 MIN: CPT | Mod: S$GLB,,, | Performed by: DERMATOLOGY

## 2021-12-16 PROCEDURE — 99212 PR OFFICE/OUTPT VISIT, EST, LEVL II, 10-19 MIN: ICD-10-PCS | Mod: S$GLB,,, | Performed by: DERMATOLOGY

## 2021-12-16 PROCEDURE — 99999 PR PBB SHADOW E&M-EST. PATIENT-LVL III: ICD-10-PCS | Mod: PBBFAC,,, | Performed by: DERMATOLOGY

## 2021-12-17 ENCOUNTER — PATIENT MESSAGE (OUTPATIENT)
Dept: PRIMARY CARE CLINIC | Facility: CLINIC | Age: 76
End: 2021-12-17
Payer: MEDICARE

## 2021-12-17 ENCOUNTER — TELEPHONE (OUTPATIENT)
Dept: PRIMARY CARE CLINIC | Facility: CLINIC | Age: 76
End: 2021-12-17
Payer: MEDICARE

## 2021-12-18 ENCOUNTER — PATIENT MESSAGE (OUTPATIENT)
Dept: PRIMARY CARE CLINIC | Facility: CLINIC | Age: 76
End: 2021-12-18
Payer: MEDICARE

## 2021-12-22 ENCOUNTER — PES CALL (OUTPATIENT)
Dept: ADMINISTRATIVE | Facility: CLINIC | Age: 76
End: 2021-12-22
Payer: MEDICARE

## 2021-12-27 DIAGNOSIS — J45.50 SEVERE PERSISTENT ASTHMA WITHOUT COMPLICATION: ICD-10-CM

## 2021-12-27 DIAGNOSIS — R06.02 SHORTNESS OF BREATH: ICD-10-CM

## 2021-12-27 RX ORDER — ALBUTEROL SULFATE 90 UG/1
2 AEROSOL, METERED RESPIRATORY (INHALATION) EVERY 4 HOURS PRN
Qty: 54 G | Refills: 0 | Status: SHIPPED | OUTPATIENT
Start: 2021-12-27 | End: 2023-01-13

## 2021-12-29 ENCOUNTER — PATIENT MESSAGE (OUTPATIENT)
Dept: PRIMARY CARE CLINIC | Facility: CLINIC | Age: 76
End: 2021-12-29
Payer: MEDICARE

## 2021-12-30 ENCOUNTER — HOSPITAL ENCOUNTER (EMERGENCY)
Facility: HOSPITAL | Age: 76
Discharge: HOME | End: 2021-12-30
Attending: EMERGENCY MEDICINE
Payer: MEDICARE

## 2021-12-30 VITALS
OXYGEN SATURATION: 100 % | SYSTOLIC BLOOD PRESSURE: 154 MMHG | HEIGHT: 69 IN | WEIGHT: 171 LBS | HEART RATE: 88 BPM | RESPIRATION RATE: 18 BRPM | BODY MASS INDEX: 25.33 KG/M2 | DIASTOLIC BLOOD PRESSURE: 84 MMHG | TEMPERATURE: 99 F

## 2021-12-30 DIAGNOSIS — R19.7 DIARRHEA, UNSPECIFIED TYPE: Primary | ICD-10-CM

## 2021-12-30 LAB
ANION GAP SERPL CALC-SCNC: 14 MMOL/L (ref 8–16)
BASOPHILS # BLD AUTO: 0.01 K/UL (ref 0–0.2)
BASOPHILS NFR BLD: 0.2 % (ref 0–1.9)
BILIRUB UR QL STRIP: NEGATIVE
BUN SERPL-MCNC: 20 MG/DL (ref 8–23)
CALCIUM SERPL-MCNC: 10.1 MG/DL (ref 8.7–10.5)
CHLORIDE SERPL-SCNC: 100 MMOL/L (ref 95–110)
CLARITY UR: CLEAR
CO2 SERPL-SCNC: 26 MMOL/L (ref 23–29)
COLOR UR: YELLOW
CREAT SERPL-MCNC: 1.1 MG/DL (ref 0.5–1.4)
DIFFERENTIAL METHOD: NORMAL
EOSINOPHIL # BLD AUTO: 0 K/UL (ref 0–0.5)
EOSINOPHIL NFR BLD: 0.3 % (ref 0–8)
ERYTHROCYTE [DISTWIDTH] IN BLOOD BY AUTOMATED COUNT: 12.9 % (ref 11.5–14.5)
EST. GFR  (AFRICAN AMERICAN): 56 ML/MIN/1.73 M^2
EST. GFR  (NON AFRICAN AMERICAN): 49 ML/MIN/1.73 M^2
GLUCOSE SERPL-MCNC: 142 MG/DL (ref 70–110)
GLUCOSE UR QL STRIP: NEGATIVE
HCT VFR BLD AUTO: 42.9 % (ref 37–48.5)
HGB BLD-MCNC: 13.9 G/DL (ref 12–16)
HGB UR QL STRIP: NEGATIVE
IMM GRANULOCYTES # BLD AUTO: 0.01 K/UL (ref 0–0.04)
IMM GRANULOCYTES NFR BLD AUTO: 0.2 % (ref 0–0.5)
KETONES UR QL STRIP: NEGATIVE
LEUKOCYTE ESTERASE UR QL STRIP: NEGATIVE
LYMPHOCYTES # BLD AUTO: 1.2 K/UL (ref 1–4.8)
LYMPHOCYTES NFR BLD: 18.3 % (ref 18–48)
MCH RBC QN AUTO: 30.4 PG (ref 27–31)
MCHC RBC AUTO-ENTMCNC: 32.4 G/DL (ref 32–36)
MCV RBC AUTO: 94 FL (ref 82–98)
MONOCYTES # BLD AUTO: 0.6 K/UL (ref 0.3–1)
MONOCYTES NFR BLD: 9.1 % (ref 4–15)
NEUTROPHILS # BLD AUTO: 4.7 K/UL (ref 1.8–7.7)
NEUTROPHILS NFR BLD: 71.9 % (ref 38–73)
NITRITE UR QL STRIP: NEGATIVE
NRBC BLD-RTO: 0 /100 WBC
PH UR STRIP: 6 [PH] (ref 5–8)
PLATELET # BLD AUTO: 194 K/UL (ref 150–450)
PMV BLD AUTO: 9.4 FL (ref 9.2–12.9)
POTASSIUM SERPL-SCNC: 4.8 MMOL/L (ref 3.5–5.1)
PROT UR QL STRIP: NEGATIVE
RBC # BLD AUTO: 4.57 M/UL (ref 4–5.4)
SODIUM SERPL-SCNC: 140 MMOL/L (ref 136–145)
SP GR UR STRIP: >=1.03 (ref 1–1.03)
URN SPEC COLLECT METH UR: ABNORMAL
UROBILINOGEN UR STRIP-ACNC: NEGATIVE EU/DL
WBC # BLD AUTO: 6.51 K/UL (ref 3.9–12.7)

## 2021-12-30 PROCEDURE — 96360 HYDRATION IV INFUSION INIT: CPT

## 2021-12-30 PROCEDURE — 85025 COMPLETE CBC W/AUTO DIFF WBC: CPT | Performed by: NURSE PRACTITIONER

## 2021-12-30 PROCEDURE — U0005 INFEC AGEN DETEC AMPLI PROBE: HCPCS | Performed by: NURSE PRACTITIONER

## 2021-12-30 PROCEDURE — 81003 URINALYSIS AUTO W/O SCOPE: CPT | Performed by: NURSE PRACTITIONER

## 2021-12-30 PROCEDURE — 36415 COLL VENOUS BLD VENIPUNCTURE: CPT | Performed by: NURSE PRACTITIONER

## 2021-12-30 PROCEDURE — 80048 BASIC METABOLIC PNL TOTAL CA: CPT | Performed by: NURSE PRACTITIONER

## 2021-12-30 PROCEDURE — 99284 EMERGENCY DEPT VISIT MOD MDM: CPT | Mod: 25

## 2021-12-30 PROCEDURE — U0003 INFECTIOUS AGENT DETECTION BY NUCLEIC ACID (DNA OR RNA); SEVERE ACUTE RESPIRATORY SYNDROME CORONAVIRUS 2 (SARS-COV-2) (CORONAVIRUS DISEASE [COVID-19]), AMPLIFIED PROBE TECHNIQUE, MAKING USE OF HIGH THROUGHPUT TECHNOLOGIES AS DESCRIBED BY CMS-2020-01-R: HCPCS | Performed by: NURSE PRACTITIONER

## 2021-12-30 PROCEDURE — 25000003 PHARM REV CODE 250: Performed by: EMERGENCY MEDICINE

## 2021-12-30 RX ORDER — GABAPENTIN 600 MG/1
TABLET ORAL
Qty: 270 TABLET | Refills: 3 | Status: SHIPPED | OUTPATIENT
Start: 2021-12-30 | End: 2022-12-05 | Stop reason: SDUPTHER

## 2021-12-30 RX ORDER — IPRATROPIUM BROMIDE AND ALBUTEROL SULFATE 2.5; .5 MG/3ML; MG/3ML
SOLUTION RESPIRATORY (INHALATION)
Qty: 180 ML | Refills: 23 | Status: SHIPPED | OUTPATIENT
Start: 2021-12-30 | End: 2023-01-16 | Stop reason: SDUPTHER

## 2021-12-30 RX ORDER — BACLOFEN 20 MG/1
TABLET ORAL
Qty: 180 TABLET | Refills: 3 | Status: SHIPPED | OUTPATIENT
Start: 2021-12-30 | End: 2022-06-01 | Stop reason: SDUPTHER

## 2021-12-30 RX ADMIN — SODIUM CHLORIDE 500 ML: 0.9 INJECTION, SOLUTION INTRAVENOUS at 08:12

## 2021-12-30 NOTE — FIRST PROVIDER EVALUATION
" Emergency Department TeleTriage Encounter Note      CHIEF COMPLAINT    Chief Complaint   Patient presents with    Diarrhea     Intermittent for the past several weeks with chills       VITAL SIGNS   Initial Vitals [12/30/21 1350]   BP Pulse Resp Temp SpO2   (!) 166/87 89 16 98.1 °F (36.7 °C) 97 %      MAP       --            ALLERGIES    Review of patient's allergies indicates:   Allergen Reactions    Adhesive tape-silicones Rash     Blisters after on for several hours    Augmentin [amoxicillin-pot clavulanate]     Ciprofloxacin     Morphine Itching and Hives       PROVIDER TRIAGE NOTE  This is a teletriage evaluation of a 76 y.o. female presenting to the ED complaining of diarrhea for 2.5 weeks.  Last night, unable to control bowels.  Denies abd pain and vomiting.  Today, pt has felt "shaky" and has had chills.     Initial orders will be placed and care will be transferred to an alternate provider when patient is roomed for a full evaluation. Any additional orders and the final disposition will be determined by that provider.           ORDERS  Labs Reviewed   CBC W/ AUTO DIFFERENTIAL   BASIC METABOLIC PANEL   URINALYSIS, REFLEX TO URINE CULTURE       ED Orders (720h ago, onward)    Start Ordered     Status Ordering Provider    12/30/21 1514 12/30/21 1513  Orthostatic blood pressure  Once         Ordered AUREA NORMAN N.    12/30/21 1513 12/30/21 1513  Complete Blood Count (CBC)  STAT         Ordered AUREA NORMAN N.    12/30/21 1513 12/30/21 1513  Basic Metabolic Panel (BMP)  STAT         Ordered AUREA NORMAN N.    12/30/21 1513 12/30/21 1513  Insert Saline lock IV  Once         Ordered AUREA NORMAN N.    12/30/21 1513 12/30/21 1513  Urinalysis, Reflex to Urine Culture Urine, Clean Catch  STAT         Ordered AUREA NORMAN            Virtual Visit Note: The provider triage portion of this emergency department evaluation and documentation was performed via " [employer], a HIPAA-compliant telemedicine application, in concert with a tele-presenter in the room. A face to face patient evaluation with one of my colleagues will occur once the patient is placed in an emergency department room.      DISCLAIMER: This note was prepared with [employer] voice recognition transcription software. Garbled syntax, mangled pronouns, and other bizarre constructions may be attributed to that software system.

## 2021-12-31 NOTE — ED PROVIDER NOTES
Encounter Date: 12/30/2021    SCRIBE #1 NOTE: IAc, am scribing for, and in the presence of, Dre Gabriel MD.       History     Chief Complaint   Patient presents with    Diarrhea     Intermittent for the past several weeks with chills     Time seen by provider: 7:44 PM on 12/30/2021    Marleen Samano is a 76 y.o. female who presents to the ED with episodes of diarrhea. The Pt states that she has had diarrhea twice a week for the last couple of weeks, but says that she had a 2 hour episode this morning. She says she has had chills since. The patient denies abdominal pain, vomiting, fever, cough, congestion, chest pain, bloody diarrhea, or any other symptoms at this time. PMHx of anemia, COPD, and arthritis. PSHx of appendectomy and hysterectomy.      The history is provided by the patient.     Review of patient's allergies indicates:   Allergen Reactions    Adhesive tape-silicones Rash     Blisters after on for several hours    Augmentin [amoxicillin-pot clavulanate]     Ciprofloxacin     Morphine Itching and Hives     Past Medical History:   Diagnosis Date    Abnormal CBC 2/10/2020    Anemia due to multiple mechanisms 2/10/2020    Anemia in chronic kidney disease (CKD)     Anemia, chronic disease 2/10/2020    Arthritis     Aseptic loosening of prosthetic knee, initial encounter 7/14/2020    Bell's palsy     Bladder incontinence     Blepharospasm     Bronchiectasis without complication 10/8/2019    Cervical dystonia     Concussion     COPD (chronic obstructive pulmonary disease)     Enterocolitis 2/7/2020    Fainting spell     2/7/2020    Hormone deficiency     Hypertension     Loose right total knee arthroplasty 6/15/2020    Migraine     Muscle spasm     Myofacial pain syndrome     Normocytic normochromic anemia 2/10/2020    Right ear pain 2/9/2021    Syncope and collapse 2/9/2021     Past Surgical History:   Procedure Laterality Date    APPENDECTOMY      BREAST  BIOPSY      BREAST LUMPECTOMY      CATARACT EXTRACTION      EYE SURGERY      HYSTERECTOMY      NECK SURGERY      REVISION OF KNEE ARTHROPLASTY Right 7/14/2020    Procedure: REVISION, ARTHROPLASTY, KNEE;  Surgeon: Pedro Tompkins MD;  [facility];  Service: Orthopedics;  Laterality: Right;    TONSILLECTOMY       Family History   Problem Relation Age of Onset    Cancer Mother     Diabetes Neg Hx     Melanoma Neg Hx     Psoriasis Neg Hx     Lupus Neg Hx     Eczema Neg Hx      Social History     Tobacco Use    Smoking status: Never Smoker    Smokeless tobacco: Never Used   Substance Use Topics    Alcohol use: No    Drug use: No     Review of Systems   Constitutional: Positive for chills. Negative for fever.   HENT: Negative for congestion and sore throat.    Respiratory: Negative for cough and shortness of breath.    Cardiovascular: Negative for chest pain.   Gastrointestinal: Positive for diarrhea. Negative for abdominal pain, blood in stool, nausea and vomiting.   Genitourinary: Negative for dysuria.   Musculoskeletal: Negative for back pain.   Skin: Negative for rash.   Neurological: Negative for weakness.   Hematological: Does not bruise/bleed easily.       Physical Exam     Initial Vitals [12/30/21 1350]   BP Pulse Resp Temp SpO2   (!) 166/87 89 16 98.1 °F (36.7 °C) 97 %      MAP       --         Physical Exam    Nursing note and vitals reviewed.  Constitutional: She appears well-developed and well-nourished. She is not diaphoretic. No distress.   HENT:   Head: Normocephalic and atraumatic.   Eyes: EOM are normal. Pupils are equal, round, and reactive to light.   Neck: Neck supple.   Normal range of motion.  Cardiovascular: Normal rate, regular rhythm, normal heart sounds and intact distal pulses. Exam reveals no gallop and no friction rub.    No murmur heard.  Pulmonary/Chest: Breath sounds normal. No respiratory distress. She has no wheezes. She has no rhonchi. She has no rales.    Abdominal: Abdomen is soft. Bowel sounds are normal. There is no abdominal tenderness. There is no rebound and no guarding.   Musculoskeletal:         General: Normal range of motion.      Cervical back: Normal range of motion and neck supple.     Neurological: She is alert and oriented to person, place, and time.   Skin: Skin is warm and dry.   Psychiatric: She has a normal mood and affect. Her behavior is normal. Judgment and thought content normal.         ED Course   Procedures  Labs Reviewed   BASIC METABOLIC PANEL - Abnormal; Notable for the following components:       Result Value    Glucose 142 (*)     eGFR if  56 (*)     eGFR if non  49 (*)     All other components within normal limits   URINALYSIS, REFLEX TO URINE CULTURE - Abnormal; Notable for the following components:    Specific Gravity, UA >=1.030 (*)     All other components within normal limits    Narrative:     Specimen Source->Urine   CBC W/ AUTO DIFFERENTIAL   SARS-COV-2 (COVID-19) QUALITATIVE PCR          Imaging Results    None          Medications   sodium chloride 0.9% bolus 500 mL (0 mLs Intravenous Stopped 12/30/21 2111)     Medical Decision Making:   History:   Old Medical Records: I decided to obtain old medical records.  Initial Assessment:   76-year-old female presents with diarrhea.    Differential Diagnosis:   Initial differential diagnosis included but not limited to dehydration, viral illness, and enteritis.  Clinical Tests:   Lab Tests: Ordered and Reviewed  ED Management:  The patient was emergently evaluated in the emergency department, her evaluation was significant for an elderly female with a benign abdominal exam.  The patient's labs were significant for a mild increase in her creatinine.  She was treated with a small IV fluid bolus here in the emergency department.  I am unclear as to the etiology of her diarrhea at this time but it is likely viral versus functional in nature.  She is stable  for discharge to home.  She is to otherwise follow up with her PCP for further care.          Scribe Attestation:   Scribe #1: I performed the above scribed service and the documentation accurately describes the services I performed. I attest to the accuracy of the note.              I, Dr. Dre Gabriel, personally performed the services described in this documentation. All medical record entries made by the scribe were at my direction and in my presence.  I have reviewed the chart and agree that the record reflects my personal performance and is accurate and complete. Dre Gabriel MD.  11:03 PM 12/30/2021      Clinical Impression:   Final diagnoses:  [R19.7] Diarrhea, unspecified type (Primary)          ED Disposition Condition    Discharge Stable        ED Prescriptions     None        Follow-up Information     Follow up With Specialties Details Why Contact Info    Taylor Amador MD Internal Medicine   [address] [place] [address]  [address], [address]  [facility] [address]  [phone]             Dre Gabriel MD  12/30/21 7121

## 2022-01-01 LAB
SARS-COV-2 RNA RESP QL NAA+PROBE: NOT DETECTED
SARS-COV-2- CYCLE NUMBER: NORMAL

## 2022-01-03 ENCOUNTER — PATIENT MESSAGE (OUTPATIENT)
Dept: PRIMARY CARE CLINIC | Facility: CLINIC | Age: 77
End: 2022-01-03
Payer: MEDICARE

## 2022-01-03 NOTE — TELEPHONE ENCOUNTER
No new care gaps identified.  Powered by [employer]. Reference number: [id number].   1/03/2022 4:55:09 PM CST

## 2022-01-04 RX ORDER — OMEPRAZOLE 40 MG/1
40 CAPSULE, DELAYED RELEASE ORAL
Qty: 180 CAPSULE | Refills: 2 | Status: SHIPPED | OUTPATIENT
Start: 2022-01-04 | End: 2022-09-09 | Stop reason: SDUPTHER

## 2022-01-06 ENCOUNTER — PATIENT MESSAGE (OUTPATIENT)
Dept: PRIMARY CARE CLINIC | Facility: CLINIC | Age: 77
End: 2022-01-06
Payer: MEDICARE

## 2022-01-07 DIAGNOSIS — M85.80 OSTEOPENIA AFTER MENOPAUSE: Primary | ICD-10-CM

## 2022-01-07 DIAGNOSIS — Z78.0 OSTEOPENIA AFTER MENOPAUSE: Primary | ICD-10-CM

## 2022-01-14 ENCOUNTER — OFFICE VISIT (OUTPATIENT)
Dept: DERMATOLOGY | Facility: CLINIC | Age: 77
End: 2022-01-14
Payer: MEDICARE

## 2022-01-14 DIAGNOSIS — L82.1 SEBORRHEIC KERATOSES: ICD-10-CM

## 2022-01-14 DIAGNOSIS — L91.0 HYPERTROPHIC SCAR: ICD-10-CM

## 2022-01-14 DIAGNOSIS — L29.9 PRURITUS: ICD-10-CM

## 2022-01-14 DIAGNOSIS — Z85.828 HISTORY OF NONMELANOMA SKIN CANCER: ICD-10-CM

## 2022-01-14 DIAGNOSIS — L81.4 SOLAR LENTIGO: ICD-10-CM

## 2022-01-14 DIAGNOSIS — L82.0 INFLAMED SEBORRHEIC KERATOSIS: Primary | ICD-10-CM

## 2022-01-14 DIAGNOSIS — L90.5 SCAR: ICD-10-CM

## 2022-01-14 PROCEDURE — 1160F RVW MEDS BY RX/DR IN RCRD: CPT | Mod: CPTII,S$GLB,, | Performed by: DERMATOLOGY

## 2022-01-14 PROCEDURE — 99999 PR PBB SHADOW E&M-EST. PATIENT-LVL III: ICD-10-PCS | Mod: PBBFAC,,, | Performed by: DERMATOLOGY

## 2022-01-14 PROCEDURE — 1126F PR PAIN SEVERITY QUANTIFIED, NO PAIN PRESENT: ICD-10-PCS | Mod: CPTII,S$GLB,, | Performed by: DERMATOLOGY

## 2022-01-14 PROCEDURE — 1101F PT FALLS ASSESS-DOCD LE1/YR: CPT | Mod: CPTII,S$GLB,, | Performed by: DERMATOLOGY

## 2022-01-14 PROCEDURE — 99213 OFFICE O/P EST LOW 20 MIN: CPT | Mod: 25,S$GLB,, | Performed by: DERMATOLOGY

## 2022-01-14 PROCEDURE — 17110 DESTRUCTION B9 LES UP TO 14: CPT | Mod: S$GLB,,, | Performed by: DERMATOLOGY

## 2022-01-14 PROCEDURE — 11900 PR INJECTION INTO SKIN LESIONS, UP TO 7: ICD-10-PCS | Mod: XS,S$GLB,, | Performed by: DERMATOLOGY

## 2022-01-14 PROCEDURE — 1160F PR REVIEW ALL MEDS BY PRESCRIBER/CLIN PHARMACIST DOCUMENTED: ICD-10-PCS | Mod: CPTII,S$GLB,, | Performed by: DERMATOLOGY

## 2022-01-14 PROCEDURE — 17110 PR DESTRUCTION BENIGN LESIONS UP TO 14: ICD-10-PCS | Mod: S$GLB,,, | Performed by: DERMATOLOGY

## 2022-01-14 PROCEDURE — 1126F AMNT PAIN NOTED NONE PRSNT: CPT | Mod: CPTII,S$GLB,, | Performed by: DERMATOLOGY

## 2022-01-14 PROCEDURE — 1159F MED LIST DOCD IN RCRD: CPT | Mod: CPTII,S$GLB,, | Performed by: DERMATOLOGY

## 2022-01-14 PROCEDURE — 3288F PR FALLS RISK ASSESSMENT DOCUMENTED: ICD-10-PCS | Mod: CPTII,S$GLB,, | Performed by: DERMATOLOGY

## 2022-01-14 PROCEDURE — 3288F FALL RISK ASSESSMENT DOCD: CPT | Mod: CPTII,S$GLB,, | Performed by: DERMATOLOGY

## 2022-01-14 PROCEDURE — 1101F PR PT FALLS ASSESS DOC 0-1 FALLS W/OUT INJ PAST YR: ICD-10-PCS | Mod: CPTII,S$GLB,, | Performed by: DERMATOLOGY

## 2022-01-14 PROCEDURE — 99999 PR PBB SHADOW E&M-EST. PATIENT-LVL III: CPT | Mod: PBBFAC,,, | Performed by: DERMATOLOGY

## 2022-01-14 PROCEDURE — 11900 INJECT SKIN LESIONS </W 7: CPT | Mod: XS,S$GLB,, | Performed by: DERMATOLOGY

## 2022-01-14 PROCEDURE — 99213 PR OFFICE/OUTPT VISIT, EST, LEVL III, 20-29 MIN: ICD-10-PCS | Mod: 25,S$GLB,, | Performed by: DERMATOLOGY

## 2022-01-14 PROCEDURE — 1159F PR MEDICATION LIST DOCUMENTED IN MEDICAL RECORD: ICD-10-PCS | Mod: CPTII,S$GLB,, | Performed by: DERMATOLOGY

## 2022-01-14 NOTE — PROGRESS NOTES
Subjective:       Patient ID:  Marleen Samano is a 76 y.o. female who presents for   Chief Complaint   Patient presents with    Skin Check     TBSE     LOV 1/14/21 - Neoplasm, sk, lentigo,angioma, sebaceous hyperplasia    Patient here today for skin check TBSE  Patient states she has a spot on her right chest and a mole on her left forehead that has been bleeding    Derm Hx:  SCCIS, right cheek, Dr. kiser  No Fhx of MM    bx by surgeon Dr Feng 11/2021    bx 01/2021 referred to gosia V[facility]  1.  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   - THE TUMOR FOCALLY EXTENDS TO THE DEEP AND LATERAL BIOPSY MARGINS.   SKIN LESION FROM LEFT CHEST:   SEBORRHEIC KERATOSIS      Review of Systems   Constitutional: Negative for fever, chills and fatigue.   Respiratory: Negative for cough and shortness of breath.    Gastrointestinal: Negative for nausea and vomiting.   Skin: Positive for activity-related sunscreen use and wears hat. Negative for daily sunscreen use.   Hematologic/Lymphatic: Bruises/bleeds easily.        Objective:    Physical Exam   Constitutional: She appears well-developed and well-nourished.   Eyes: Lids are normal.  No conjunctival no injection.   Cardiovascular: There is dependent edema (trace edema BLE R>L).     Neurological: She is alert and oriented to person, place, and time.   Psychiatric: She has a normal mood and affect.   Skin:   Areas Examined (abnormalities noted in diagram):   Head / Face Inspection Performed  Neck Inspection Performed  Chest / Axilla Inspection Performed  Abdomen Inspection Performed  Genitals / Buttocks / Groin Inspection Performed  Back Inspection Performed  RUE Inspected  LUE Inspection Performed  RLE Inspected  LLE Inspection Performed                       Diagram Legend     Erythematous scaling macule/papule c/w actinic keratosis       Vascular papule c/w angioma      Pigmented verrucoid papule/plaque c/w seborrheic  keratosis      Yellow umbilicated papule c/w sebaceous hyperplasia      Irregularly shaped tan macule c/w lentigo     1-2 mm smooth white papules consistent with Milia      Movable subcutaneous cyst with punctum c/w epidermal inclusion cyst      Subcutaneous movable cyst c/w pilar cyst      Firm pink to brown papule c/w dermatofibroma      Pedunculated fleshy papule(s) c/w skin tag(s)      Evenly pigmented macule c/w junctional nevus     Mildly variegated pigmented, slightly irregular-bordered macule c/w mildly atypical nevus      Flesh colored to evenly pigmented papule c/w intradermal nevus       Pink pearly papule/plaque c/w basal cell carcinoma      Erythematous hyperkeratotic cursted plaque c/w SCC      Surgical scar with no sign of skin cancer recurrence      Open and closed comedones      Inflammatory papules and pustules      Verrucoid papule consistent consistent with wart     Erythematous eczematous patches and plaques     Dystrophic onycholytic nail with subungual debris c/w onychomycosis     Umbilicated papule    Erythematous-base heme-crusted tan verrucoid plaque consistent with inflamed seborrheic keratosis     Erythematous Silvery Scaling Plaque c/w Psoriasis     See annotation      INITIAL BIOPSY 01/2021- site well healed, NER        Assessment / Plan:        Inflamed seborrheic keratosis  Left forehead, itchy, bleeding  Procedure note for destruction via shave debulking:    Discussed risks of procedure including but not limited to infection, persistence of lesion, recurrence of lesion, and scar. Verbal consent obtained. Area cleaned with alcohol and anesthetized with 1% lidocaine with epinephrine. Lesion(s) shaved with sharp razor then base destroyed with hyfrecation. No complications.    Hypertrophic scar, biopsy of ISK, gen surgeon, pruritic  -     triamcinolone acetonide injection 1 mg  -     FL [id number] ACETONIDE INJ PER 10MG  -     FL INJECTION INTO SKIN LESIONS, UP TO 7  Intralesional  Kenalog 10mg/cc (0.1 cc total) injected into 1 lesions on the left chest today after obtaining verbal consent including risk of surrounding hypopigmentation. Patient tolerated procedure well.  Units: 1  NDC for Kenalog 10mg/cc:  4218-3875-71    Seborrheic keratoses  These are benign inherited growths without a malignant potential. Reassurance given to patient. No treatment is necessary.     Solar lentigo  This is a benign hyperpigmented sun induced lesion. Daily sun protection will reduce the number of new lesions. Treatment of these benign lesions are considered cosmetic.    History of nonmelanoma skin cancer  Scar  Area of previous SCCIS (R cheek) examined. Site well healed with no signs of recurrence.    Total body skin examination performed today including at least 12 points as noted in physical examination. No lesions suspicious for malignancy noted.             Follow up in about 1 year (around 1/14/2023), or if symptoms worsen or fail to improve.

## 2022-01-15 ENCOUNTER — LAB VISIT (OUTPATIENT)
Dept: LAB | Facility: HOSPITAL | Age: 77
End: 2022-01-15
Attending: INTERNAL MEDICINE
Payer: MEDICARE

## 2022-01-15 ENCOUNTER — PATIENT MESSAGE (OUTPATIENT)
Dept: HEMATOLOGY/ONCOLOGY | Facility: CLINIC | Age: 77
End: 2022-01-15
Payer: MEDICARE

## 2022-01-15 DIAGNOSIS — D64.9 NORMOCYTIC NORMOCHROMIC ANEMIA: ICD-10-CM

## 2022-01-15 DIAGNOSIS — D53.9 NUTRITIONAL ANEMIA, UNSPECIFIED: ICD-10-CM

## 2022-01-15 DIAGNOSIS — D63.8 ANEMIA, CHRONIC DISEASE: ICD-10-CM

## 2022-01-15 DIAGNOSIS — D64.89 ANEMIA DUE TO MULTIPLE MECHANISMS: ICD-10-CM

## 2022-01-15 LAB
ALBUMIN SERPL BCP-MCNC: 4.1 G/DL (ref 3.5–5.2)
ALP SERPL-CCNC: 93 U/L (ref 55–135)
ALT SERPL W/O P-5'-P-CCNC: 15 U/L (ref 10–44)
ANION GAP SERPL CALC-SCNC: 10 MMOL/L (ref 8–16)
AST SERPL-CCNC: 16 U/L (ref 10–40)
BASOPHILS # BLD AUTO: 0.02 K/UL (ref 0–0.2)
BASOPHILS NFR BLD: 0.3 % (ref 0–1.9)
BILIRUB SERPL-MCNC: 0.4 MG/DL (ref 0.1–1)
BUN SERPL-MCNC: 24 MG/DL (ref 8–23)
CALCIUM SERPL-MCNC: 9.3 MG/DL (ref 8.7–10.5)
CHLORIDE SERPL-SCNC: 104 MMOL/L (ref 95–110)
CO2 SERPL-SCNC: 27 MMOL/L (ref 23–29)
CREAT SERPL-MCNC: 1 MG/DL (ref 0.5–1.4)
DIFFERENTIAL METHOD: ABNORMAL
EOSINOPHIL # BLD AUTO: 0.1 K/UL (ref 0–0.5)
EOSINOPHIL NFR BLD: 1.5 % (ref 0–8)
ERYTHROCYTE [DISTWIDTH] IN BLOOD BY AUTOMATED COUNT: 13 % (ref 11.5–14.5)
EST. GFR  (AFRICAN AMERICAN): >60 ML/MIN/1.73 M^2
EST. GFR  (NON AFRICAN AMERICAN): 54 ML/MIN/1.73 M^2
FERRITIN SERPL-MCNC: 127 NG/ML (ref 20–300)
FOLATE SERPL-MCNC: 11.8 NG/ML (ref 4–24)
GLUCOSE SERPL-MCNC: 119 MG/DL (ref 70–110)
HCT VFR BLD AUTO: 38.8 % (ref 37–48.5)
HGB BLD-MCNC: 12.4 G/DL (ref 12–16)
IMM GRANULOCYTES # BLD AUTO: 0.02 K/UL (ref 0–0.04)
IMM GRANULOCYTES NFR BLD AUTO: 0.3 % (ref 0–0.5)
IRON SERPL-MCNC: 48 UG/DL (ref 30–160)
LYMPHOCYTES # BLD AUTO: 1.2 K/UL (ref 1–4.8)
LYMPHOCYTES NFR BLD: 16.4 % (ref 18–48)
MCH RBC QN AUTO: 30.4 PG (ref 27–31)
MCHC RBC AUTO-ENTMCNC: 32 G/DL (ref 32–36)
MCV RBC AUTO: 95 FL (ref 82–98)
MONOCYTES # BLD AUTO: 0.7 K/UL (ref 0.3–1)
MONOCYTES NFR BLD: 9 % (ref 4–15)
NEUTROPHILS # BLD AUTO: 5.2 K/UL (ref 1.8–7.7)
NEUTROPHILS NFR BLD: 72.5 % (ref 38–73)
NRBC BLD-RTO: 0 /100 WBC
PLATELET # BLD AUTO: 204 K/UL (ref 150–450)
PMV BLD AUTO: 9.6 FL (ref 9.2–12.9)
POTASSIUM SERPL-SCNC: 3.9 MMOL/L (ref 3.5–5.1)
PROT SERPL-MCNC: 7.1 G/DL (ref 6–8.4)
RBC # BLD AUTO: 4.08 M/UL (ref 4–5.4)
SATURATED IRON: 16 % (ref 20–50)
SODIUM SERPL-SCNC: 141 MMOL/L (ref 136–145)
TOTAL IRON BINDING CAPACITY: 293 UG/DL (ref 250–450)
TRANSFERRIN SERPL-MCNC: 198 MG/DL (ref 200–375)
VIT B12 SERPL-MCNC: 425 PG/ML (ref 210–950)
WBC # BLD AUTO: 7.21 K/UL (ref 3.9–12.7)

## 2022-01-15 PROCEDURE — 36415 COLL VENOUS BLD VENIPUNCTURE: CPT | Performed by: INTERNAL MEDICINE

## 2022-01-15 PROCEDURE — 84466 ASSAY OF TRANSFERRIN: CPT | Performed by: INTERNAL MEDICINE

## 2022-01-15 PROCEDURE — 85025 COMPLETE CBC W/AUTO DIFF WBC: CPT | Performed by: INTERNAL MEDICINE

## 2022-01-15 PROCEDURE — 80053 COMPREHEN METABOLIC PANEL: CPT | Performed by: INTERNAL MEDICINE

## 2022-01-15 PROCEDURE — 82728 ASSAY OF FERRITIN: CPT | Performed by: INTERNAL MEDICINE

## 2022-01-15 PROCEDURE — 82607 VITAMIN B-12: CPT | Performed by: INTERNAL MEDICINE

## 2022-01-15 PROCEDURE — 82746 ASSAY OF FOLIC ACID SERUM: CPT | Performed by: INTERNAL MEDICINE

## 2022-01-16 ENCOUNTER — PATIENT MESSAGE (OUTPATIENT)
Dept: HEMATOLOGY/ONCOLOGY | Facility: CLINIC | Age: 77
End: 2022-01-16
Payer: MEDICARE

## 2022-01-18 ENCOUNTER — HOSPITAL ENCOUNTER (EMERGENCY)
Facility: HOSPITAL | Age: 77
Discharge: HOME | End: 2022-01-18
Attending: EMERGENCY MEDICINE
Payer: MEDICARE

## 2022-01-18 ENCOUNTER — PATIENT MESSAGE (OUTPATIENT)
Dept: CARDIOLOGY | Facility: CLINIC | Age: 77
End: 2022-01-18

## 2022-01-18 ENCOUNTER — HOSPITAL ENCOUNTER (OUTPATIENT)
Dept: RADIOLOGY | Facility: HOSPITAL | Age: 77
Discharge: HOME | End: 2022-01-18
Attending: INTERNAL MEDICINE
Payer: MEDICARE

## 2022-01-18 VITALS
OXYGEN SATURATION: 99 % | HEART RATE: 77 BPM | BODY MASS INDEX: 24.44 KG/M2 | RESPIRATION RATE: 18 BRPM | TEMPERATURE: 98 F | WEIGHT: 165 LBS | DIASTOLIC BLOOD PRESSURE: 71 MMHG | SYSTOLIC BLOOD PRESSURE: 133 MMHG | HEIGHT: 69 IN

## 2022-01-18 DIAGNOSIS — Z78.0 OSTEOPENIA AFTER MENOPAUSE: ICD-10-CM

## 2022-01-18 DIAGNOSIS — M85.80 OSTEOPENIA AFTER MENOPAUSE: ICD-10-CM

## 2022-01-18 DIAGNOSIS — L03.114 CELLULITIS OF LEFT FOREARM: Primary | ICD-10-CM

## 2022-01-18 PROCEDURE — 77080 DXA BONE DENSITY AXIAL: CPT | Mod: TC,PO

## 2022-01-18 PROCEDURE — 99284 EMERGENCY DEPT VISIT MOD MDM: CPT

## 2022-01-18 PROCEDURE — 25000003 PHARM REV CODE 250: Performed by: EMERGENCY MEDICINE

## 2022-01-18 RX ORDER — MUPIROCIN 20 MG/G
1 OINTMENT TOPICAL
Status: COMPLETED | OUTPATIENT
Start: 2022-01-18 | End: 2022-01-18

## 2022-01-18 RX ORDER — MUPIROCIN 20 MG/G
OINTMENT TOPICAL
Status: DISPENSED
Start: 2022-01-18 | End: 2022-01-19

## 2022-01-18 RX ORDER — MUPIROCIN 20 MG/G
OINTMENT TOPICAL 3 TIMES DAILY
Qty: 22 G | Refills: 0 | Status: SHIPPED | OUTPATIENT
Start: 2022-01-18 | End: 2022-01-25

## 2022-01-18 RX ORDER — DOXYCYCLINE 100 MG/1
100 CAPSULE ORAL 2 TIMES DAILY
Qty: 20 CAPSULE | Refills: 0 | Status: SHIPPED | OUTPATIENT
Start: 2022-01-18 | End: 2022-01-20

## 2022-01-18 RX ADMIN — MUPIROCIN 22 G: 20 OINTMENT TOPICAL at 01:01

## 2022-01-18 NOTE — ED PROVIDER NOTES
Encounter Date: 1/18/2022    SCRIBE #1 NOTE: I, Adan Parr, am scribing for, and in the presence of, Stefan Samuel MD.       History     Chief Complaint   Patient presents with    Wound Check     Abrasions to arms 3 months ago      Time seen by provider: 1:32 PM on 01/18/2022    Marleen Samano is a 76 y.o. female who presents to the ED for evaluation of multiple wounds on her left forearm. Patient reports that about 3 months ago she went to her garden to do some work and while working on some prickly bushes she sustained multiple scratches on her left forearm. Patients reports that she believes there are some thorns embedded in her left forearm. She is picking on them trying to get them out. She lives by herself with family members living in town.The patient denies any other symptoms at this time. PMHx of HTN,COPD, squamous cell carcinoma, cervical dystonia. No pertinent PSHx.    The history is provided by the patient.     Review of patient's allergies indicates:   Allergen Reactions    Adhesive tape-silicones Rash     Blisters after on for several hours    Augmentin [amoxicillin-pot clavulanate]     Ciprofloxacin     Morphine Itching and Hives     Past Medical History:   Diagnosis Date    Abnormal CBC 2/10/2020    Anemia due to multiple mechanisms 2/10/2020    Anemia in chronic kidney disease (CKD)     Anemia, chronic disease 2/10/2020    Arthritis     Aseptic loosening of prosthetic knee, initial encounter 7/14/2020    Bell's palsy     Bladder incontinence     Blepharospasm     Bronchiectasis without complication 10/8/2019    Cervical dystonia     Concussion     COPD (chronic obstructive pulmonary disease)     Enterocolitis 2/7/2020    Fainting spell     2/7/2020    Hormone deficiency     Hypertension     Loose right total knee arthroplasty 6/15/2020    Migraine     Muscle spasm     Myofacial pain syndrome     Normocytic normochromic anemia 2/10/2020    Right ear pain 2/9/2021     Squamous cell carcinoma of skin     Syncope and collapse 2/9/2021     Past Surgical History:   Procedure Laterality Date    APPENDECTOMY      BREAST BIOPSY      BREAST LUMPECTOMY      CATARACT EXTRACTION      EYE SURGERY      HYSTERECTOMY      NECK SURGERY      REVISION OF KNEE ARTHROPLASTY Right 7/14/2020    Procedure: REVISION, ARTHROPLASTY, KNEE;  Surgeon: Pedro Tompkins MD;  Location: [facility];  Service: Orthopedics;  Laterality: Right;    TONSILLECTOMY       Family History   Problem Relation Age of Onset    Cancer Mother     Diabetes Neg Hx     Melanoma Neg Hx     Psoriasis Neg Hx     Lupus Neg Hx     Eczema Neg Hx      Social History     Tobacco Use    Smoking status: Never Smoker    Smokeless tobacco: Never Used   Substance Use Topics    Alcohol use: No    Drug use: No     Review of Systems   Constitutional: Negative for fever.   HENT: Negative for sore throat.    Respiratory: Negative for shortness of breath.    Cardiovascular: Negative for chest pain.   Gastrointestinal: Negative for nausea.   Genitourinary: Negative for dysuria.   Musculoskeletal: Negative for back pain.   Skin: Negative for rash.        Left upper arm multiple scratches.   Neurological: Negative for weakness.   Hematological: Does not bruise/bleed easily.       Physical Exam     Initial Vitals [01/18/22 1213]   BP Pulse Resp Temp SpO2   133/61 75 18 97.9 °F (36.6 °C) 97 %      MAP       --         Physical Exam    Nursing note and vitals reviewed.  Constitutional: She appears well-developed.  Non-toxic appearance.   HENT:   Head: Normocephalic and atraumatic.   Eyes: EOM are normal. Pupils are equal, round, and reactive to light.   Neck: Neck supple.   Cardiovascular: Normal rate, regular rhythm, normal heart sounds and intact distal pulses. Exam reveals no gallop and no friction rub.    No murmur heard.  Pulmonary/Chest: Breath sounds normal. No respiratory distress. She has no decreased breath sounds. She has  no wheezes. She has no rhonchi. She has no rales.   Abdominal: Abdomen is soft. Bowel sounds are normal. She exhibits no distension. There is no abdominal tenderness.   Musculoskeletal:         General: Normal range of motion.      Cervical back: Neck supple.     Neurological: She is alert and oriented to person, place, and time.   Skin: Skin is warm and dry.   Left forearm scratching wounds, macerated.   Psychiatric: She has a normal mood and affect.         ED Course   Procedures  Labs Reviewed - No data to display       Imaging Results    None          Medications   mupirocin 2 % ointment 22 g (22 g Topical (Top) Given 1/18/22 1300)     Medical Decision Making:   History:   Old Medical Records: I decided to obtain old medical records.  Patient appears have a cellulitis on her left forearm that she is picking and I will start her on topical antibiotics and oral antibiotics.  Stable for discharge at this time.  Return precautions given.          Scribe Attestation:   Scribe #1: I performed the above scribed service and the documentation accurately describes the services I performed. I attest to the accuracy of the note.               I, Dr. Stefan Samuel personally performed the services described in this documentation. All medical record entries made by the scribe were at my direction and in my presence.  I have reviewed the chart and agree that the record reflects my personal performance and is accurate and complete. Stefan Samuel MD.  11:21 PM 01/18/2022    DISCLAIMER: This note was prepared with Dragon NaturallySpeaking voice recognition transcription software. Garbled syntax, mangled pronouns, and other bizarre constructions may be attributed to that software system   Clinical Impression:   Final diagnoses:  [L03.114] Cellulitis of left forearm (Primary)          ED Disposition Condition    Discharge Stable        ED Prescriptions     Medication Sig Dispense Start Date End Date Auth. Provider     doxycycline (VIBRAMYCIN) 100 MG Cap Take 1 capsule (100 mg total) by mouth 2 (two) times daily. for 10 days 20 capsule 1/18/2022 1/28/2022 Stefan Samuel MD    mupirocin (BACTROBAN) 2 % ointment Apply topically 3 (three) times daily. for 7 days 22 g 1/18/2022 1/25/2022 Stefan Samuel MD        Follow-up Information     Follow up With Specialties Details Why Contact Info    Taylor Amador MD Internal Medicine Schedule an appointment as soon as possible for a visit   [address] [facility]  [address]  [facility] [address]  [phone]             Stefan Samuel MD  01/18/22 7682

## 2022-01-19 ENCOUNTER — PATIENT MESSAGE (OUTPATIENT)
Dept: PRIMARY CARE CLINIC | Facility: CLINIC | Age: 77
End: 2022-01-19
Payer: MEDICARE

## 2022-01-19 ENCOUNTER — TELEPHONE (OUTPATIENT)
Dept: PRIMARY CARE CLINIC | Facility: CLINIC | Age: 77
End: 2022-01-19
Payer: MEDICARE

## 2022-01-19 NOTE — TELEPHONE ENCOUNTER
Returning pt call, pt was seen in the ED for cellulitis of the forearm secondary to multiple scratches from a simona bush a couple of months ago.  Pt placed on Doxy though copay is $45, pt request a tier 2 medication.  Pt had episode of violent uncontrolled diarrhea at about 0300 this AM.

## 2022-01-20 ENCOUNTER — PATIENT MESSAGE (OUTPATIENT)
Dept: PRIMARY CARE CLINIC | Facility: CLINIC | Age: 77
End: 2022-01-20
Payer: MEDICARE

## 2022-01-20 ENCOUNTER — PATIENT MESSAGE (OUTPATIENT)
Dept: HEMATOLOGY/ONCOLOGY | Facility: CLINIC | Age: 77
End: 2022-01-20
Payer: MEDICARE

## 2022-01-20 RX ORDER — SULFAMETHOXAZOLE AND TRIMETHOPRIM 800; 160 MG/1; MG/1
1 TABLET ORAL 2 TIMES DAILY
Qty: 14 TABLET | Refills: 0 | Status: SHIPPED | OUTPATIENT
Start: 2022-01-20 | End: 2022-02-03

## 2022-01-21 ENCOUNTER — PATIENT MESSAGE (OUTPATIENT)
Dept: PRIMARY CARE CLINIC | Facility: CLINIC | Age: 77
End: 2022-01-21
Payer: MEDICARE

## 2022-01-24 ENCOUNTER — PATIENT MESSAGE (OUTPATIENT)
Dept: SURGERY | Facility: CLINIC | Age: 77
End: 2022-01-24
Payer: MEDICARE

## 2022-01-24 ENCOUNTER — PATIENT MESSAGE (OUTPATIENT)
Dept: HEMATOLOGY/ONCOLOGY | Facility: CLINIC | Age: 77
End: 2022-01-24
Payer: MEDICARE

## 2022-01-24 ENCOUNTER — PATIENT MESSAGE (OUTPATIENT)
Dept: PRIMARY CARE CLINIC | Facility: CLINIC | Age: 77
End: 2022-01-24
Payer: MEDICARE

## 2022-01-24 ENCOUNTER — TELEPHONE (OUTPATIENT)
Dept: PRIMARY CARE CLINIC | Facility: CLINIC | Age: 77
End: 2022-01-24
Payer: MEDICARE

## 2022-01-24 NOTE — TELEPHONE ENCOUNTER
----- Message from Nadeen Manzano sent at 1/24/2022  9:20 AM CST -----  Type: Needs appointment    Who Called:  Patient  Best Call Back Number: [phone]  Additional Information:  Patient is requesting an appointment for issue she is having after removing sticker bushes/stated she has some imbedded in her left arm/stated this happened back in September 2021/please call patient back to schedule or advise.

## 2022-01-26 ENCOUNTER — OFFICE VISIT (OUTPATIENT)
Dept: SURGERY | Facility: CLINIC | Age: 77
End: 2022-01-26
Payer: MEDICARE

## 2022-01-26 ENCOUNTER — PATIENT MESSAGE (OUTPATIENT)
Dept: SURGERY | Facility: CLINIC | Age: 77
End: 2022-01-26

## 2022-01-26 ENCOUNTER — HOSPITAL ENCOUNTER (OUTPATIENT)
Dept: RADIOLOGY | Facility: HOSPITAL | Age: 77
Discharge: HOME | End: 2022-01-26
Attending: SURGERY
Payer: MEDICARE

## 2022-01-26 VITALS — DIASTOLIC BLOOD PRESSURE: 78 MMHG | SYSTOLIC BLOOD PRESSURE: 129 MMHG | TEMPERATURE: 97 F | HEART RATE: 76 BPM

## 2022-01-26 DIAGNOSIS — S50.851D: ICD-10-CM

## 2022-01-26 DIAGNOSIS — S50.852D FOREIGN BODY IN LEFT FOREARM, SUBSEQUENT ENCOUNTER: ICD-10-CM

## 2022-01-26 DIAGNOSIS — S50.851D: Primary | ICD-10-CM

## 2022-01-26 PROCEDURE — 1125F PR PAIN SEVERITY QUANTIFIED, PAIN PRESENT: ICD-10-PCS | Mod: CPTII,S$GLB,, | Performed by: SURGERY

## 2022-01-26 PROCEDURE — 99213 PR OFFICE/OUTPT VISIT, EST, LEVL III, 20-29 MIN: ICD-10-PCS | Mod: S$GLB,,, | Performed by: SURGERY

## 2022-01-26 PROCEDURE — 3078F DIAST BP <80 MM HG: CPT | Mod: CPTII,S$GLB,, | Performed by: SURGERY

## 2022-01-26 PROCEDURE — 3074F SYST BP LT 130 MM HG: CPT | Mod: CPTII,S$GLB,, | Performed by: SURGERY

## 2022-01-26 PROCEDURE — 1160F PR REVIEW ALL MEDS BY PRESCRIBER/CLIN PHARMACIST DOCUMENTED: ICD-10-PCS | Mod: CPTII,S$GLB,, | Performed by: SURGERY

## 2022-01-26 PROCEDURE — 1159F PR MEDICATION LIST DOCUMENTED IN MEDICAL RECORD: ICD-10-PCS | Mod: CPTII,S$GLB,, | Performed by: SURGERY

## 2022-01-26 PROCEDURE — 3074F PR MOST RECENT SYSTOLIC BLOOD PRESSURE < 130 MM HG: ICD-10-PCS | Mod: CPTII,S$GLB,, | Performed by: SURGERY

## 2022-01-26 PROCEDURE — 1159F MED LIST DOCD IN RCRD: CPT | Mod: CPTII,S$GLB,, | Performed by: SURGERY

## 2022-01-26 PROCEDURE — 1160F RVW MEDS BY RX/DR IN RCRD: CPT | Mod: CPTII,S$GLB,, | Performed by: SURGERY

## 2022-01-26 PROCEDURE — 99213 OFFICE O/P EST LOW 20 MIN: CPT | Mod: S$GLB,,, | Performed by: SURGERY

## 2022-01-26 PROCEDURE — 1125F AMNT PAIN NOTED PAIN PRSNT: CPT | Mod: CPTII,S$GLB,, | Performed by: SURGERY

## 2022-01-26 PROCEDURE — 3078F PR MOST RECENT DIASTOLIC BLOOD PRESSURE < 80 MM HG: ICD-10-PCS | Mod: CPTII,S$GLB,, | Performed by: SURGERY

## 2022-01-26 PROCEDURE — 73090 X-RAY EXAM OF FOREARM: CPT | Mod: TC,50

## 2022-01-26 NOTE — PROGRESS NOTES
GENERAL SURGERY PROGRESS NOTE    HPI: Marleen Samano is a 76 y.o. female previously seen for possible retained thorns in forearms after working in the [employer] in September.  No discrete foreign bodies were identified on physical exam and patient was told to monitor the area.  She is here today for ongoing foreign body sensation in the arm with multiple superficial eschars from picking and scratching.  Patient reports she is able to remove small thorns from a skin however in clinic it appears she is just peeling her scab.  There is no gross signs of infection though she was recently started on antibiotics per the ER.  No fevers or chills.  Patient has marked with a marker areas that she feels like may have foreign bodies.      VITALS:  Vitals:    01/26/22 0944   BP: 129/78   Pulse: 76   Temp: 97 °F (36.1 °C)       Physical Exam  Constitutional:       Appearance: Normal appearance.   Eyes:      General: No scleral icterus.  Cardiovascular:      Rate and Rhythm: Normal rate and regular rhythm.   Pulmonary:      Effort: Pulmonary effort is normal.      Breath sounds: Normal breath sounds.   Abdominal:      General: There is no distension.      Palpations: Abdomen is soft.      Tenderness: There is no abdominal tenderness.   Musculoskeletal:         General: No swelling or deformity. Normal range of motion.   Skin:     General: Skin is warm and dry.      Findings: Lesion present.   Neurological:      Mental Status: She is alert.   Psychiatric:         Mood and Affect: Mood normal.         Behavior: Behavior normal.      Comments: Uncertain if foreign body sensation is psychological issue at this time                            ASSESSMENT & PLAN:  76 y.o. female with foreign body sensation  - I am uncertain that there is any retained foreign bodies and the patient's symptoms may be secondary to either psychological sensation of foreign bodies or ongoing picking at the eschars which lead to persistence of the wounds  - will  send for bilateral forearm x-rays to evaluate for any obvious foreign bodies  - otherwise I have encouraged patient to avoid scratching and picking at the wounds, if small forms are present they would be very difficult to identify during attempt at surgical excision, rather they should work the way to the surface if they are present at all

## 2022-01-27 ENCOUNTER — PATIENT MESSAGE (OUTPATIENT)
Dept: PRIMARY CARE CLINIC | Facility: CLINIC | Age: 77
End: 2022-01-27
Payer: MEDICARE

## 2022-01-31 ENCOUNTER — TELEPHONE (OUTPATIENT)
Dept: PRIMARY CARE CLINIC | Facility: CLINIC | Age: 77
End: 2022-01-31
Payer: MEDICARE

## 2022-02-01 ENCOUNTER — TELEPHONE (OUTPATIENT)
Dept: SURGERY | Facility: CLINIC | Age: 77
End: 2022-02-01
Payer: MEDICARE

## 2022-02-01 ENCOUNTER — TELEPHONE (OUTPATIENT)
Dept: DERMATOLOGY | Facility: CLINIC | Age: 77
End: 2022-02-01
Payer: MEDICARE

## 2022-02-01 DIAGNOSIS — M25.561 RIGHT KNEE PAIN, UNSPECIFIED CHRONICITY: ICD-10-CM

## 2022-02-01 DIAGNOSIS — M17.12 PRIMARY OSTEOARTHRITIS OF LEFT KNEE: Primary | ICD-10-CM

## 2022-02-01 NOTE — TELEPHONE ENCOUNTER
----- Message from Fanny Carbajal sent at 2/1/2022  4:16 PM CST -----  Type: Needs Medical Advice  Who Called:  Pt  Best Call Back Number: [phone]  Additional Information: Pt requesting to make appt due to running into a sticker bush and she can not find a provider to remover the stickers in her arms--PCP appt 02/02--please advise--thank you

## 2022-02-01 NOTE — TELEPHONE ENCOUNTER
Attempted to contact patient, no answer, lvm for a return call.     ----- Message from Karina Redd sent at 2/1/2022  1:39 PM CST -----  Contact: patient  Type: Needs Medical Advice  Who Called:  patient  Best Call Back Number: [phone] (home)   Additional Information: patient is requesting a call back to inquire about removing stickers from her arm- she was pulling jose and she has stickers in both arms. Please advise

## 2022-02-02 ENCOUNTER — PATIENT MESSAGE (OUTPATIENT)
Dept: PRIMARY CARE CLINIC | Facility: CLINIC | Age: 77
End: 2022-02-02
Payer: MEDICARE

## 2022-02-02 ENCOUNTER — TELEPHONE (OUTPATIENT)
Dept: DERMATOLOGY | Facility: CLINIC | Age: 77
End: 2022-02-02
Payer: MEDICARE

## 2022-02-02 NOTE — PROGRESS NOTES
[facility] Hematology/Oncology  PROGRESS NOTE -  Follow-up Visit      Subjective:       Patient ID:   NAME: Marleen Samano : 1945     76 y.o. female    Referring Doc: Mireya  Other Physicians: Wendy Fuentes/Kain Gallegos Dugan; Swetha Castañeda (neuro); Ced    Chief Complaint:  Anemia f/u    History of Present Illness:     Patient returns today for a regularly scheduled follow-up visit.  The patient is here today to go over the results of the recently ordered labs, tests and studies. She is doing ok with no new issues. She is here by herself.     She ran into some sticker bush back in 2021 and has numerous thorns in the skin of the right and left upper forearms. She had previously gone to ER, saw Dr Gil with GenSurg in 2022, and was just seen by Dr Finnegan with dermatology earlier today. Dr Finnegan did biopsy on the left arm. She is seeing her again in 2 weeks. She has the arms wrapped at this time.        She has some fatigue issues which are chronic; he had had two IV irons back in May/2020    She saw Dr Swain in May 2021; she recently saw Dr Costello with cardiology in 2021      Discussed covid19 precautions - she had her vaccinations            ROS:   GEN: normal without any fever, night sweats or weight loss  HEENT: normal with no HA's, sore throat, stiff neck, changes in vision  CV: normal with no CP, SOB, PND, DE LEÓN or orthopnea  PULM: normal with no SOB, cough, hemoptysis, sputum or pleuritic pain  GI: normal with no abdominal pain, nausea, vomiting, constipation, diarrhea, melanotic stools, BRBPR, or hematemesis  : normal with no hematuria, dysuria  BREAST: normal with no mass, discharge, pain  SKIN: normal with no rash, erythema, bruising, or swelling    Pain Scale:  1-2    Allergies:  Review of patient's allergies indicates:   Allergen Reactions    Adhesive tape-silicones Rash     Blisters after on for several hours    Augmentin [amoxicillin-pot  clavulanate]     Ciprofloxacin     Morphine Itching and Hives       Medications:    Current Outpatient Medications:     albuterol (PROVENTIL/VENTOLIN HFA) 90 mcg/actuation inhaler, Inhale 2 puffs into the lungs every 4 (four) hours as needed for Wheezing or Shortness of Breath. Rescue, Disp: 54 g, Rfl: 0    albuterol-ipratropium (DUO-NEB) 2.5 mg-0.5 mg/3 mL nebulizer solution, USE 1 VIAL VIA NEBULIZER  EVERY 6 HOURS AS NEEDED FOR WHEEZING RESCUE, Disp: 180 mL, Rfl: 23    amLODIPine (NORVASC) 2.5 MG tablet, Take 1 tablet (2.5 mg total) by mouth once daily., Disp: 90 tablet, Rfl: 3    aspirin 81 MG Chew, Take 81 mg by mouth once daily., Disp: , Rfl:     baclofen (LIORESAL) 20 MG tablet, TAKE 1 TABLET BY MOUTH  TWICE DAILY, Disp: 180 tablet, Rfl: 3    benazepriL (LOTENSIN) 40 MG tablet, Take 1 tablet by mouth once daily, Disp: 90 tablet, Rfl: 3    blood sugar diagnostic Strp, To check BG 2 times daily, to use with insurance preferred meter, Disp: 200 strip, Rfl: 3    blood-glucose meter kit, To check BG 1 times daily, to use with insurance preferred meter, Disp: 1 each, Rfl: 0    diclofenac sodium (VOLTAREN) 1 % Gel, Apply topically. , Disp: , Rfl:     gabapentin (NEURONTIN) 600 MG tablet, TAKE 1 TABLET BY MOUTH 3  TIMES DAILY, Disp: 270 tablet, Rfl: 3    lancets Misc, To check BG 2 times daily, to use with insurance preferred meter, Disp: 200 each, Rfl: 3    magnesium oxide (MAG-OX) 400 mg (241.3 mg magnesium) tablet, Take 1 tablet (400 mg total) by mouth 2 (two) times daily., Disp: 180 tablet, Rfl: 3    metoprolol succinate (TOPROL-XL) 25 MG 24 hr tablet, Take 1 tablet (25 mg total) by mouth once daily., Disp: 30 tablet, Rfl: 11    mupirocin (BACTROBAN) 2 % ointment, Apply to forearm and wrap with non stick gauze and coban wrap, Disp: 22 g, Rfl: 1    omeprazole (PRILOSEC) 40 MG capsule, Take 1 capsule (40 mg total) by mouth 2 (two) times daily before meals., Disp: 180 capsule, Rfl: 2    potassium  "chloride (MICRO-K) 10 MEQ CpSR, Take 1 capsule (10 mEq total) by mouth daily as needed (Take one daily when you take furosemide)., Disp: 30 capsule, Rfl: 3    simethicone (GAS-X ORAL), Take by mouth., Disp: , Rfl:     sumatriptan (IMITREX) 100 MG tablet, TAKE 1 TABLET BY MOUTH AFTER ONSET OF MIGRAINE. MAY REPEAT AFTER 2 HOURS IF HEADACHE RETURNS NOT TO EXCEED 200MG IN 24 HOURS, Disp: , Rfl:     trazodone (DESYREL) 100 MG tablet, Take 100 mg by mouth every evening., Disp: , Rfl:     venlafaxine (EFFEXOR-XR) 150 MG Cp24, Take 1 capsule (150 mg total) by mouth 2 (two) times a day., Disp: 14 capsule, Rfl: 0    Current Facility-Administered Medications:     triamcinolone acetonide injection 1 mg, 1 mg, Intradermal, Once, Hilary Finnegan MD    PMHx/PSHx Updates:  See patient's last visit with me on 6/16/2021  See H&P on 3/17/2020        Pathology:  Cancer Staging  No matching staging information was found for the patient.          Objective:     Vitals:  Blood pressure (!) 148/78, pulse 77, temperature 97.2 °F (36.2 °C), resp. rate 18, height 5' 9" (1.753 m), weight 76.2 kg (168 lb).    Physical Examination:   GEN: no apparent distress, comfortable; AAOx3  HEAD: atraumatic and normocephalic  EYES: no pallor, no icterus, PERRLA  ENT: OMM, no pharyngeal erythema, external ears WNL; no nasal discharge; no thrush  NECK: no masses, thyroid normal, trachea midline, no LAD/LN's, supple  CV: RRR with no murmur; normal pulse; normal S1 and S2; no pedal edema  CHEST: Normal respiratory effort; CTAB; normal breath sounds; no wheeze or crackles  ABDOM: nontender and nondistended; soft; normal bowel sounds; no rebound/guarding  MUSC/Skeletal: ROM normal; no crepitus; joints normal; s/p right knee redo - healing well  EXTREM: no clubbing, cyanosis, inflammation or swelling  SKIN: no rashes, lesions, ulcers, petechiae or subcutaneous nodules; chronic age related skin changes and varicosities in legs; multiple excoriasions and " scabs on bilat forearms  : no calderon  NEURO: grossly intact; motor/sensory WNL; AAOx3; no tremors  PSYCH: normal mood, affect and behavior  LYMPH: normal cervical, supraclavicular, axillary and groin LN's            Labs:      Lab Results   Component Value Date    WBC 7.21 01/15/2022    HGB 12.4 01/15/2022    HCT 38.8 01/15/2022    MCV 95 01/15/2022     01/15/2022         BMP  Lab Results   Component Value Date     01/15/2022    K 3.9 01/15/2022     01/15/2022    CO2 27 01/15/2022    BUN 24 (H) 01/15/2022    CREATININE 1.0 01/15/2022    CALCIUM 9.3 01/15/2022    ANIONGAP 10 01/15/2022    ESTGFRAFRICA >60 01/15/2022    EGFRNONAA 54 (A) 01/15/2022        Lab Results   Component Value Date    IRON 48 01/15/2022    TIBC 293 01/15/2022    FERRITIN 127 01/15/2022        Lab Results   Component Value Date    RWVQFAEO34 425 01/15/2022     Lab Results   Component Value Date    FOLATE 11.8 01/15/2022             Radiology/Diagnostic Studies:    X-ray Chest Pa And Lateral    Result Date: 7/2/2020  EXAMINATION: XR CHEST PA AND LATERAL CLINICAL HISTORY: pre op; Presence of right artificial knee joint TECHNIQUE: PA and lateral views of the chest were performed. COMPARISON: CT chest of February 7, 2020 and chest x-ray May 7 2019. FINDINGS: The mediastinal and cardiac size and contour are within normal limits.  A rounded density at the right cardiophrenic angle is probably the patient's known hiatal hernia.  Ill-defined right upper lobe densities correspond to right upper lobe scarring noted on the prior CT scan.  Other intrapulmonary masses or infiltrates are not seen.  No pneumothorax or pleural effusion is noted.     Prominent hiatal hernia.  Ill-defined scarring noted in the right upper lobe, stable.  Otherwise negative chest x-ray. Electronically signed by: Yan Sahu MD Date:    07/02/2020 Time:    10:50    Nm Bone Scan Spect    Result Date: 6/11/2020  EXAMINATION: NM BONE SCAN SPECT CLINICAL  HISTORY: right knee pain on total knee replacement;  Pain in right knee TECHNIQUE: 20.2 mCi technetium 99 M MDP was injected intravenously with anterior posterior whole body images obtained and SPECT images of the knees obtained in the short axis, vertical long axis, horizontal long axis projections. COMPARISON: None FINDINGS: Left knee; there is mild increased tracer localization more so at the lateral compartment appearing degenerative. There is also increased tracer localization in several joints including shoulders, wrists, hands, feet appearing degenerative and mild increased tracer localization the spine appearing degenerative.  There is a leftward convexity of the upper lumbar spine/thoracolumbar junction. Both kidneys are visualized without evidence of obstruction Right knee; there is void of activity consistent with TKA.  There is also increased tracer localization in the region the patella, proximal tibia and to lesser degree distal femur laterally.  In this patient with history of remote surgery 1994 findings suggest loosening     Abnormal study suggesting loosening right TKA. Degenerative change in several joints including lateral compartment left knee Electronically signed by: Yasmin Galloway MD Date:    06/11/2020 Time:    15:26    X-ray Knee Ortho Right With Flexion    Result Date: 6/11/2020  EXAMINATION: XR KNEE ORTHO RIGHT WITH FLEXION CLINICAL HISTORY: Pain in right knee TECHNIQUE: AP standing as well as PA flexion standing and Merchant views of both knees were performed.  A lateral view of the right knee is also performed. COMPARISON: Knee x-rays of June 4, 2020. FINDINGS: The patient has undergone a right knee joint replacement with metallic femoral and tibial components in good position.  A fracture is not seen.  Lucency around the prosthesis consistent with osteomyelitis or loosening is not noted.  There is a small joint effusion.  Mild DJD is noted at the left knee.     Status post right knee  joint replacement.  Small right joint effusion.  Mild DJD left knee. Electronically signed by: Yan Sahu MD Date:    06/11/2020 Time:    08:45      I have reviewed all available lab results and radiology reports.    Assessment/Plan:   (1) 76 y.o. female with diagnosis of abnormal cbc and chronic anemia issues who has been referred by Dr Gomes  - NCNC parameters; latest hgb at 12.3 and now WNL  - anemia due to multiple mechanisms  - anemia of chronic disease, chronic renal  - total bili is WNL, so I do not suspect any underlying hemolytic process  - latest iron panel wnl in April 2020 after getting two IV iron infusions     Labs from March 2020:  - B12 was adequate, folate was adequate       I can not rule out an underlying marrow problem at this point but her wbc and platelet counts are currently WNL.      She has negative EDG and colonoscopy with Dr Dowling about 5 months ago      11/25/2020:  - recent CBC is good - hgb 13.4  - no current anemia issues  - iron panel is adequate    6/16/2021:  - latest CBC and iron panel are adequate    2/3/2022:  - latest labs with no current anemia or iron deficiency issues        (2) CRI - stage IV per records but I don't appreciate that on the recent labs     (3) HTN     (4) COPD     (5) Bipolar/Depression disorder     (6) OA of knee, s/p total knee arthroplasty     (7) Hx/of recent bout of enterocolitis with acute encephalopathy in Feb 2020  - seen by Dr Salvatore Linares with neuro     (8) GERD     (9) Chronic dystonia - followed by Dr Swetha Castañeda (neuro)    (10) Dermatologic issue with foreign body sensation in the arms -   - She ran into some sticker bush back in Sept 2021 and has numerous thorns in the skin of the right and left upper forearms.   - She had previously gone to ER, saw Dr Gil with GenSurg in Jan 2022, and was just seen by Dr Finnegan with dermatology earlier today.   - Dr Finnegan did biopsy on the left arm.   - She is seeing her again in 2 weeks. She  has the arms wrapped at this time.       VISIT DIAGNOSES:      Anemia due to multiple mechanisms    Anemia, chronic disease    Normocytic normochromic anemia          PLAN:  1. Check labs every 3 months  2. Check iron panel every 3 months  3. F/u with ortho as per their directives  4. f/u with PCP, Pulm, GI, Derm etc     RTC in 6 months    Fax note to  Perry (new PCP); Josey Swain El Khoury; Garry ; Agnes Finnegan    Discussion:     COVID-19 Discussion:    I had long discussion with patient and any applicable family about the COVID-19 coronavirus epidemic and the recommended precautions with regard to cancer and/or hematology patients. I have re-iterated the CDC recommendations for adequate hand washing, use of hand -like products, and coughing into elbow, etc. In addition, especially for our patients who are on chemotherapy and/or our otherwise immunocompromised patients, I have recommended avoidance of crowds, including movie theaters, restaurants, churches, etc. I have recommended avoidance of any sick or symptomatic family members and/or friends. I have also recommended avoidance of any raw and unwashed food products, and general avoidance of food items that have not been prepared by themselves. The patient has been asked to call us immediately with any symptom developments, issues, questions or other general concerns.     I spent over 25 mins of time with the patient. Reviewed results of the recently ordered labs, tests and studies; made directives with regards to the results. Over half of this time was spent couseling and coordinating care.    I have explained all of the above in detail and the patient understands all of the current recommendation(s). I have answered all of their questions to the best of my ability and to their complete satisfaction.   The patient is to continue with the current management plan.            Electronically signed by Marc Mart MD

## 2022-02-02 NOTE — TELEPHONE ENCOUNTER
"Spoke with patient who stated she "ran into a sticker bush" and has had stickers in her arm since last September and they are now infected and she cannot get them out.     Patient stated none of her other doctors that she has seen would remove the stickers in her arms.     Scheduled for an appt.   "

## 2022-02-03 ENCOUNTER — OFFICE VISIT (OUTPATIENT)
Dept: DERMATOLOGY | Facility: CLINIC | Age: 77
End: 2022-02-03
Payer: MEDICARE

## 2022-02-03 ENCOUNTER — OFFICE VISIT (OUTPATIENT)
Dept: HEMATOLOGY/ONCOLOGY | Facility: CLINIC | Age: 77
End: 2022-02-03
Payer: MEDICARE

## 2022-02-03 VITALS
HEIGHT: 69 IN | DIASTOLIC BLOOD PRESSURE: 78 MMHG | HEART RATE: 77 BPM | TEMPERATURE: 97 F | SYSTOLIC BLOOD PRESSURE: 148 MMHG | RESPIRATION RATE: 18 BRPM | BODY MASS INDEX: 24.88 KG/M2 | WEIGHT: 168 LBS

## 2022-02-03 VITALS — BODY MASS INDEX: 24.42 KG/M2 | WEIGHT: 164.88 LBS | HEIGHT: 69 IN | RESPIRATION RATE: 18 BRPM

## 2022-02-03 DIAGNOSIS — D63.8 ANEMIA, CHRONIC DISEASE: ICD-10-CM

## 2022-02-03 DIAGNOSIS — L98.9 DISEASE OF SKIN AND SUBCUTANEOUS TISSUE: Primary | ICD-10-CM

## 2022-02-03 DIAGNOSIS — L98.9 DISEASE OF SKIN AND SUBCUTANEOUS TISSUE: ICD-10-CM

## 2022-02-03 DIAGNOSIS — D64.9 NORMOCYTIC NORMOCHROMIC ANEMIA: ICD-10-CM

## 2022-02-03 DIAGNOSIS — D64.89 ANEMIA DUE TO MULTIPLE MECHANISMS: Primary | ICD-10-CM

## 2022-02-03 PROCEDURE — 1159F PR MEDICATION LIST DOCUMENTED IN MEDICAL RECORD: ICD-10-PCS | Mod: S$GLB,,, | Performed by: INTERNAL MEDICINE

## 2022-02-03 PROCEDURE — 3078F DIAST BP <80 MM HG: CPT | Mod: S$GLB,,, | Performed by: INTERNAL MEDICINE

## 2022-02-03 PROCEDURE — 88305 TISSUE EXAM BY PATHOLOGIST: ICD-10-PCS | Mod: 26,,, | Performed by: DERMATOLOGY

## 2022-02-03 PROCEDURE — 1160F RVW MEDS BY RX/DR IN RCRD: CPT | Mod: CPTII,S$GLB,, | Performed by: DERMATOLOGY

## 2022-02-03 PROCEDURE — 1160F PR REVIEW ALL MEDS BY PRESCRIBER/CLIN PHARMACIST DOCUMENTED: ICD-10-PCS | Mod: S$GLB,,, | Performed by: INTERNAL MEDICINE

## 2022-02-03 PROCEDURE — 1160F RVW MEDS BY RX/DR IN RCRD: CPT | Mod: S$GLB,,, | Performed by: INTERNAL MEDICINE

## 2022-02-03 PROCEDURE — 3288F PR FALLS RISK ASSESSMENT DOCUMENTED: ICD-10-PCS | Mod: S$GLB,,, | Performed by: INTERNAL MEDICINE

## 2022-02-03 PROCEDURE — 99999 PR PBB SHADOW E&M-EST. PATIENT-LVL IV: ICD-10-PCS | Mod: PBBFAC,,, | Performed by: DERMATOLOGY

## 2022-02-03 PROCEDURE — 1101F PR PT FALLS ASSESS DOC 0-1 FALLS W/OUT INJ PAST YR: ICD-10-PCS | Mod: CPTII,S$GLB,, | Performed by: DERMATOLOGY

## 2022-02-03 PROCEDURE — 11104 PR PUNCH BIOPSY, SKIN, SINGLE LESION: ICD-10-PCS | Mod: S$GLB,,, | Performed by: DERMATOLOGY

## 2022-02-03 PROCEDURE — 88305 TISSUE EXAM BY PATHOLOGIST: CPT | Mod: 26,,, | Performed by: DERMATOLOGY

## 2022-02-03 PROCEDURE — 99999 PR PBB SHADOW E&M-EST. PATIENT-LVL IV: CPT | Mod: PBBFAC,,, | Performed by: DERMATOLOGY

## 2022-02-03 PROCEDURE — 3078F PR MOST RECENT DIASTOLIC BLOOD PRESSURE < 80 MM HG: ICD-10-PCS | Mod: S$GLB,,, | Performed by: INTERNAL MEDICINE

## 2022-02-03 PROCEDURE — 1101F PR PT FALLS ASSESS DOC 0-1 FALLS W/OUT INJ PAST YR: ICD-10-PCS | Mod: S$GLB,,, | Performed by: INTERNAL MEDICINE

## 2022-02-03 PROCEDURE — 1160F PR REVIEW ALL MEDS BY PRESCRIBER/CLIN PHARMACIST DOCUMENTED: ICD-10-PCS | Mod: CPTII,S$GLB,, | Performed by: DERMATOLOGY

## 2022-02-03 PROCEDURE — 1101F PT FALLS ASSESS-DOCD LE1/YR: CPT | Mod: S$GLB,,, | Performed by: INTERNAL MEDICINE

## 2022-02-03 PROCEDURE — 1126F AMNT PAIN NOTED NONE PRSNT: CPT | Mod: S$GLB,,, | Performed by: INTERNAL MEDICINE

## 2022-02-03 PROCEDURE — 3288F FALL RISK ASSESSMENT DOCD: CPT | Mod: S$GLB,,, | Performed by: INTERNAL MEDICINE

## 2022-02-03 PROCEDURE — 99214 PR OFFICE/OUTPT VISIT, EST, LEVL IV, 30-39 MIN: ICD-10-PCS | Mod: 25,S$GLB,, | Performed by: DERMATOLOGY

## 2022-02-03 PROCEDURE — 99214 OFFICE O/P EST MOD 30 MIN: CPT | Mod: 25,S$GLB,, | Performed by: DERMATOLOGY

## 2022-02-03 PROCEDURE — 99213 PR OFFICE/OUTPT VISIT, EST, LEVL III, 20-29 MIN: ICD-10-PCS | Mod: S$GLB,,, | Performed by: INTERNAL MEDICINE

## 2022-02-03 PROCEDURE — 11104 PUNCH BX SKIN SINGLE LESION: CPT | Mod: S$GLB,,, | Performed by: DERMATOLOGY

## 2022-02-03 PROCEDURE — 88305 TISSUE EXAM BY PATHOLOGIST: CPT | Performed by: DERMATOLOGY

## 2022-02-03 PROCEDURE — 1159F MED LIST DOCD IN RCRD: CPT | Mod: S$GLB,,, | Performed by: INTERNAL MEDICINE

## 2022-02-03 PROCEDURE — 3077F PR MOST RECENT SYSTOLIC BLOOD PRESSURE >= 140 MM HG: ICD-10-PCS | Mod: S$GLB,,, | Performed by: INTERNAL MEDICINE

## 2022-02-03 PROCEDURE — 3077F SYST BP >= 140 MM HG: CPT | Mod: S$GLB,,, | Performed by: INTERNAL MEDICINE

## 2022-02-03 PROCEDURE — 1126F PR PAIN SEVERITY QUANTIFIED, NO PAIN PRESENT: ICD-10-PCS | Mod: S$GLB,,, | Performed by: INTERNAL MEDICINE

## 2022-02-03 PROCEDURE — 99213 OFFICE O/P EST LOW 20 MIN: CPT | Mod: S$GLB,,, | Performed by: INTERNAL MEDICINE

## 2022-02-03 PROCEDURE — 1159F MED LIST DOCD IN RCRD: CPT | Mod: CPTII,S$GLB,, | Performed by: DERMATOLOGY

## 2022-02-03 PROCEDURE — 3288F PR FALLS RISK ASSESSMENT DOCUMENTED: ICD-10-PCS | Mod: CPTII,S$GLB,, | Performed by: DERMATOLOGY

## 2022-02-03 PROCEDURE — 1159F PR MEDICATION LIST DOCUMENTED IN MEDICAL RECORD: ICD-10-PCS | Mod: CPTII,S$GLB,, | Performed by: DERMATOLOGY

## 2022-02-03 PROCEDURE — 1126F AMNT PAIN NOTED NONE PRSNT: CPT | Mod: CPTII,S$GLB,, | Performed by: DERMATOLOGY

## 2022-02-03 PROCEDURE — 1101F PT FALLS ASSESS-DOCD LE1/YR: CPT | Mod: CPTII,S$GLB,, | Performed by: DERMATOLOGY

## 2022-02-03 PROCEDURE — 1126F PR PAIN SEVERITY QUANTIFIED, NO PAIN PRESENT: ICD-10-PCS | Mod: CPTII,S$GLB,, | Performed by: DERMATOLOGY

## 2022-02-03 PROCEDURE — 3288F FALL RISK ASSESSMENT DOCD: CPT | Mod: CPTII,S$GLB,, | Performed by: DERMATOLOGY

## 2022-02-03 RX ORDER — MUPIROCIN 20 MG/G
OINTMENT TOPICAL
Qty: 22 G | Refills: 1 | Status: SHIPPED | OUTPATIENT
Start: 2022-02-03 | End: 2022-02-03 | Stop reason: SDUPTHER

## 2022-02-03 RX ORDER — MUPIROCIN 20 MG/G
OINTMENT TOPICAL
Qty: 22 G | Refills: 1 | Status: SHIPPED | OUTPATIENT
Start: 2022-02-03 | End: 2022-05-03

## 2022-02-03 RX ORDER — MUPIROCIN 20 MG/G
OINTMENT TOPICAL
Qty: 22 G | Refills: 1 | Status: SHIPPED | OUTPATIENT
Start: 2022-02-03 | End: 2022-02-03

## 2022-02-03 NOTE — PATIENT INSTRUCTIONS
Punch Biopsy Wound Care    Your doctor has performed a punch biopsy today.  A band aid and antibiotic ointment has been placed over the site.  This should remain in place for 24 hours.  It is recommended that you keep the area dry for the first 24 hours.  After 24 hours, you may remove the band aid and wash the area with warm soap and water and apply Vaseline jelly.  Many patients prefer to use Neosporin or Bacitracin ointment.  This is acceptable; however know that you can develop an allergy to this medication even if you have used it safely for years.  It is important to keep the area moist.  Letting it dry out and get air slows healing time, will worsen the scar, and make it more difficult to remove the stitches if they were placed.  Band aid is optional after first 24 hours.      If you notice increasing redness, tenderness, pain, or yellow drainage at the biopsy or surgical site, please notify your doctor.  These are signs of an infection.    If your biopsy/surgical site is bleeding, apply firm pressure for 15 minutes straight.  Repeat for another 15 minutes, if it is still bleeding.   If the surgical site continues to bleed, then please contact your doctor.     [facility]  [facility] - DERMATOLOGY  [address] [place] [address], [address] [address]  Dept: [phone]

## 2022-02-03 NOTE — PROGRESS NOTES
"  Subjective:       Patient ID:  Marleen Samano is a 76 y.o. female who presents for   Chief Complaint   Patient presents with    Skin Check     76 y.o female patient present for skin check.     Pt c/o falling in sticker bush and having stickers left in her arms that she has been trying to dig out for months. Pt states this has started 09/2021.   Seen in ED 01/18/2022, was prescribed mupirocin and doxy 100 BID   Admits to "trying to get them out"    Dr Edward Cerrato psych, manages mood meds ([address]), seen 12/2021 and q 3 months    no Phx of NMSC.  no Fhx of melanoma.      Current Outpatient Medications:     albuterol (PROVENTIL/VENTOLIN HFA) 90 mcg/actuation inhaler, Inhale 2 puffs into the lungs every 4 (four) hours as needed for Wheezing or Shortness of Breath. Rescue, Disp: 54 g, Rfl: 0    albuterol-ipratropium (DUO-NEB) 2.5 mg-0.5 mg/3 mL nebulizer solution, USE 1 VIAL VIA NEBULIZER  EVERY 6 HOURS AS NEEDED FOR WHEEZING RESCUE, Disp: 180 mL, Rfl: 23    amLODIPine (NORVASC) 2.5 MG tablet, Take 1 tablet (2.5 mg total) by mouth once daily., Disp: 90 tablet, Rfl: 3    aspirin 81 MG Chew, Take 81 mg by mouth once daily., Disp: , Rfl:     baclofen (LIORESAL) 20 MG tablet, TAKE 1 TABLET BY MOUTH  TWICE DAILY, Disp: 180 tablet, Rfl: 3    benazepriL (LOTENSIN) 40 MG tablet, Take 1 tablet by mouth once daily, Disp: 90 tablet, Rfl: 3    blood sugar diagnostic Strp, To check BG 2 times daily, to use with insurance preferred meter, Disp: 200 strip, Rfl: 3    blood-glucose meter kit, To check BG 1 times daily, to use with insurance preferred meter, Disp: 1 each, Rfl: 0    diclofenac sodium (VOLTAREN) 1 % Gel, Apply topically. , Disp: , Rfl:     gabapentin (NEURONTIN) 600 MG tablet, TAKE 1 TABLET BY MOUTH 3  TIMES DAILY, Disp: 270 tablet, Rfl: 3    lancets Misc, To check BG 2 times daily, to use with insurance preferred meter, Disp: 200 each, Rfl: 3    magnesium oxide (MAG-OX) 400 mg (241.3 mg magnesium) " tablet, Take 1 tablet (400 mg total) by mouth 2 (two) times daily., Disp: 180 tablet, Rfl: 3    metoprolol succinate (TOPROL-XL) 25 MG 24 hr tablet, Take 1 tablet (25 mg total) by mouth once daily., Disp: 30 tablet, Rfl: 11    mupirocin (BACTROBAN) 2 % ointment, Apply topically 3 (three) times daily., Disp: 1 g, Rfl: 0    omeprazole (PRILOSEC) 40 MG capsule, Take 1 capsule (40 mg total) by mouth 2 (two) times daily before meals., Disp: 180 capsule, Rfl: 2    potassium chloride (MICRO-K) 10 MEQ CpSR, Take 1 capsule (10 mEq total) by mouth daily as needed (Take one daily when you take furosemide)., Disp: 30 capsule, Rfl: 3    simethicone (GAS-X ORAL), Take by mouth., Disp: , Rfl:     sulfamethoxazole-trimethoprim 800-160mg (BACTRIM DS) 800-160 mg Tab, Take 1 tablet by mouth 2 (two) times daily., Disp: 14 tablet, Rfl: 0    sumatriptan (IMITREX) 100 MG tablet, TAKE 1 TABLET BY MOUTH AFTER ONSET OF MIGRAINE. MAY REPEAT AFTER 2 HOURS IF HEADACHE RETURNS NOT TO EXCEED 200MG IN 24 HOURS, Disp: , Rfl:     trazodone (DESYREL) 100 MG tablet, Take 100 mg by mouth every evening., Disp: , Rfl:     venlafaxine (EFFEXOR-XR) 150 MG Cp24, Take 1 capsule (150 mg total) by mouth 2 (two) times a day., Disp: 14 capsule, Rfl: 0    Current Facility-Administered Medications:     triamcinolone acetonide injection 1 mg, 1 mg, Intradermal, Once, Hilary Finnegan MD          Review of Systems   Constitutional: Negative for fever, chills and fatigue.   Respiratory: Negative for cough and shortness of breath.    Gastrointestinal: Negative for nausea and vomiting.   Skin: Positive for activity-related sunscreen use and wears hat. Negative for daily sunscreen use.   Hematologic/Lymphatic: Bruises/bleeds easily.        Objective:    Physical Exam   Constitutional: She appears well-developed and well-nourished.   Eyes: Lids are normal.  No conjunctival no injection.   Cardiovascular: There is dependent edema (trace edema BLE R>L).      Neurological: She is alert and oriented to person, place, and time.   Psychiatric: She has a normal mood and affect.   Skin:   Areas Examined (abnormalities noted in diagram):   Head / Face Inspection Performed  Neck Inspection Performed  Chest / Axilla Inspection Performed  Abdomen Inspection Performed  Genitals / Buttocks / Groin Inspection Performed  Back Inspection Performed  RUE Inspected  LUE Inspection Performed  RLE Inspected  LLE Inspection Performed                       Diagram Legend     Erythematous scaling macule/papule c/w actinic keratosis       Vascular papule c/w angioma      Pigmented verrucoid papule/plaque c/w seborrheic keratosis      Yellow umbilicated papule c/w sebaceous hyperplasia      Irregularly shaped tan macule c/w lentigo     1-2 mm smooth white papules consistent with Milia      Movable subcutaneous cyst with punctum c/w epidermal inclusion cyst      Subcutaneous movable cyst c/w pilar cyst      Firm pink to brown papule c/w dermatofibroma      Pedunculated fleshy papule(s) c/w skin tag(s)      Evenly pigmented macule c/w junctional nevus     Mildly variegated pigmented, slightly irregular-bordered macule c/w mildly atypical nevus      Flesh colored to evenly pigmented papule c/w intradermal nevus       Pink pearly papule/plaque c/w basal cell carcinoma      Erythematous hyperkeratotic cursted plaque c/w SCC      Surgical scar with no sign of skin cancer recurrence      Open and closed comedones      Inflammatory papules and pustules      Verrucoid papule consistent consistent with wart     Erythematous eczematous patches and plaques     Dystrophic onycholytic nail with subungual debris c/w onychomycosis     Umbilicated papule    Erythematous-base heme-crusted tan verrucoid plaque consistent with inflamed seborrheic keratosis     Erythematous Silvery Scaling Plaque c/w Psoriasis     See annotation              Assessment / Plan:      Pathology Orders:     Normal Orders This Visit     Specimen to Pathology, Dermatology     Questions:    Procedure Type: Dermatology and skin neoplasms    Number of Specimens: 1    ------------------------: -------------------------    Spec 1 Procedure: Biopsy    Spec 1 Clinical Impression: multiple heme crusted papules and streaks, favor excoriations, examine for foreign body    Spec 1 Source: left forearm    Release to patient:         Disease of skin and subcutaneous tissue  -     Discontinue: mupirocin (BACTROBAN) 2 % ointment; Apply to forearm and wrap with non stick gauze and coban wrap  Dispense: 22 g; Refill: 1  -     Specimen to Pathology, Dermatology  -     mupirocin (BACTROBAN) 2 % ointment; Apply to forearm and wrap with non stick gauze and coban wrap  Dispense: 22 g; Refill: 1    Punch biopsy procedure note:  Punch biopsy performed after verbal consent obtained. Area marked and prepped with alcohol. Approximately 1cc of 1% lidocaine with epinephrine injected. 4 mm disposable punch used to remove lesion. Hemostasis obtained and biopsy site closed with 1 - 2 Prolene sutures. Wound care instructions reviewed with patient and handout given.    Favor neurotic excoriations  Punch to reassure patient as to absence of foreign body  Occlude forearms with vaseline, aquaphore, or mupirocin, non stick gauze and coban wrap. Asked patient to stop instrumenting arms    Lives alone  Under care of psych Dr Cerrato, manages mood meds  Plan to contact him once path available           Follow up in about 2 weeks (around 2/17/2022) for suture removal.

## 2022-02-04 ENCOUNTER — PATIENT MESSAGE (OUTPATIENT)
Dept: SURGERY | Facility: CLINIC | Age: 77
End: 2022-02-04
Payer: MEDICARE

## 2022-02-07 ENCOUNTER — HOSPITAL ENCOUNTER (OUTPATIENT)
Dept: RADIOLOGY | Facility: HOSPITAL | Age: 77
Discharge: HOME | End: 2022-02-07
Attending: ORTHOPAEDIC SURGERY
Payer: MEDICARE

## 2022-02-07 ENCOUNTER — OFFICE VISIT (OUTPATIENT)
Dept: ORTHOPEDICS | Facility: CLINIC | Age: 77
End: 2022-02-07
Payer: MEDICARE

## 2022-02-07 VITALS — RESPIRATION RATE: 18 BRPM | BODY MASS INDEX: 24.88 KG/M2 | WEIGHT: 168 LBS | HEIGHT: 69 IN

## 2022-02-07 DIAGNOSIS — M17.12 PRIMARY OSTEOARTHRITIS OF LEFT KNEE: Primary | ICD-10-CM

## 2022-02-07 DIAGNOSIS — S41.109A WOUND OF UPPER EXTREMITY, UNSPECIFIED LATERALITY, INITIAL ENCOUNTER: ICD-10-CM

## 2022-02-07 DIAGNOSIS — M25.561 RIGHT KNEE PAIN, UNSPECIFIED CHRONICITY: ICD-10-CM

## 2022-02-07 DIAGNOSIS — Z96.651 STATUS POST TOTAL RIGHT KNEE REPLACEMENT: ICD-10-CM

## 2022-02-07 PROCEDURE — 73562 XR KNEE ORTHO RIGHT WITH FLEXION: ICD-10-PCS | Mod: 26,LT,, | Performed by: RADIOLOGY

## 2022-02-07 PROCEDURE — 1101F PR PT FALLS ASSESS DOC 0-1 FALLS W/OUT INJ PAST YR: ICD-10-PCS | Mod: CPTII,S$GLB,, | Performed by: ORTHOPAEDIC SURGERY

## 2022-02-07 PROCEDURE — 73562 X-RAY EXAM OF KNEE 3: CPT | Mod: TC,PN,LT

## 2022-02-07 PROCEDURE — 99213 PR OFFICE/OUTPT VISIT, EST, LEVL III, 20-29 MIN: ICD-10-PCS | Mod: S$GLB,,, | Performed by: ORTHOPAEDIC SURGERY

## 2022-02-07 PROCEDURE — 3288F FALL RISK ASSESSMENT DOCD: CPT | Mod: CPTII,S$GLB,, | Performed by: ORTHOPAEDIC SURGERY

## 2022-02-07 PROCEDURE — 1159F PR MEDICATION LIST DOCUMENTED IN MEDICAL RECORD: ICD-10-PCS | Mod: CPTII,S$GLB,, | Performed by: ORTHOPAEDIC SURGERY

## 2022-02-07 PROCEDURE — 3288F PR FALLS RISK ASSESSMENT DOCUMENTED: ICD-10-PCS | Mod: CPTII,S$GLB,, | Performed by: ORTHOPAEDIC SURGERY

## 2022-02-07 PROCEDURE — 1160F RVW MEDS BY RX/DR IN RCRD: CPT | Mod: CPTII,S$GLB,, | Performed by: ORTHOPAEDIC SURGERY

## 2022-02-07 PROCEDURE — 73564 X-RAY EXAM KNEE 4 OR MORE: CPT | Mod: 26,RT,, | Performed by: RADIOLOGY

## 2022-02-07 PROCEDURE — 99999 PR PBB SHADOW E&M-EST. PATIENT-LVL IV: ICD-10-PCS | Mod: PBBFAC,,, | Performed by: ORTHOPAEDIC SURGERY

## 2022-02-07 PROCEDURE — 73562 X-RAY EXAM OF KNEE 3: CPT | Mod: 26,LT,, | Performed by: RADIOLOGY

## 2022-02-07 PROCEDURE — 1101F PT FALLS ASSESS-DOCD LE1/YR: CPT | Mod: CPTII,S$GLB,, | Performed by: ORTHOPAEDIC SURGERY

## 2022-02-07 PROCEDURE — 99213 OFFICE O/P EST LOW 20 MIN: CPT | Mod: S$GLB,,, | Performed by: ORTHOPAEDIC SURGERY

## 2022-02-07 PROCEDURE — 1160F PR REVIEW ALL MEDS BY PRESCRIBER/CLIN PHARMACIST DOCUMENTED: ICD-10-PCS | Mod: CPTII,S$GLB,, | Performed by: ORTHOPAEDIC SURGERY

## 2022-02-07 PROCEDURE — 1159F MED LIST DOCD IN RCRD: CPT | Mod: CPTII,S$GLB,, | Performed by: ORTHOPAEDIC SURGERY

## 2022-02-07 PROCEDURE — 73564 XR KNEE ORTHO RIGHT WITH FLEXION: ICD-10-PCS | Mod: 26,RT,, | Performed by: RADIOLOGY

## 2022-02-07 PROCEDURE — 99999 PR PBB SHADOW E&M-EST. PATIENT-LVL IV: CPT | Mod: PBBFAC,,, | Performed by: ORTHOPAEDIC SURGERY

## 2022-02-07 NOTE — PROGRESS NOTES
Past Medical History:   Diagnosis Date    Abnormal CBC 2/10/2020    Anemia due to multiple mechanisms 2/10/2020    Anemia in chronic kidney disease (CKD)     Anemia, chronic disease 2/10/2020    Arthritis     Aseptic loosening of prosthetic knee, initial encounter 7/14/2020    Bell's palsy     Bladder incontinence     Blepharospasm     Bronchiectasis without complication 10/8/2019    Cervical dystonia     Concussion     COPD (chronic obstructive pulmonary disease)     Enterocolitis 2/7/2020    Fainting spell     2/7/2020    Hormone deficiency     Hypertension     Loose right total knee arthroplasty 6/15/2020    Migraine     Muscle spasm     Myofacial pain syndrome     Normocytic normochromic anemia 2/10/2020    Right ear pain 2/9/2021    Squamous cell carcinoma of skin     Syncope and collapse 2/9/2021       Past Surgical History:   Procedure Laterality Date    APPENDECTOMY      BREAST BIOPSY      BREAST LUMPECTOMY      CATARACT EXTRACTION      EYE SURGERY      HYSTERECTOMY      NECK SURGERY      REVISION OF KNEE ARTHROPLASTY Right 7/14/2020    Procedure: REVISION, ARTHROPLASTY, KNEE;  Surgeon: Pedro Tompkins MD;  Location: [facility];  Service: Orthopedics;  Laterality: Right;    TONSILLECTOMY         Current Outpatient Medications   Medication Sig    albuterol (PROVENTIL/VENTOLIN HFA) 90 mcg/actuation inhaler Inhale 2 puffs into the lungs every 4 (four) hours as needed for Wheezing or Shortness of Breath. Rescue    albuterol-ipratropium (DUO-NEB) 2.5 mg-0.5 mg/3 mL nebulizer solution USE 1 VIAL VIA NEBULIZER  EVERY 6 HOURS AS NEEDED FOR WHEEZING RESCUE    amLODIPine (NORVASC) 2.5 MG tablet Take 1 tablet (2.5 mg total) by mouth once daily.    aspirin 81 MG Chew Take 81 mg by mouth once daily.    baclofen (LIORESAL) 20 MG tablet TAKE 1 TABLET BY MOUTH  TWICE DAILY    benazepriL (LOTENSIN) 40 MG tablet Take 1 tablet by mouth once daily    blood sugar diagnostic Strp  To check BG 2 times daily, to use with insurance preferred meter    blood-glucose meter kit To check BG 1 times daily, to use with insurance preferred meter    diclofenac sodium (VOLTAREN) 1 % Gel Apply topically.     gabapentin (NEURONTIN) 600 MG tablet TAKE 1 TABLET BY MOUTH 3  TIMES DAILY    lancets Misc To check BG 2 times daily, to use with insurance preferred meter    magnesium oxide (MAG-OX) 400 mg (241.3 mg magnesium) tablet Take 1 tablet (400 mg total) by mouth 2 (two) times daily.    metoprolol succinate (TOPROL-XL) 25 MG 24 hr tablet Take 1 tablet (25 mg total) by mouth once daily.    mupirocin (BACTROBAN) 2 % ointment Apply to forearm and wrap with non stick gauze and coban wrap    omeprazole (PRILOSEC) 40 MG capsule Take 1 capsule (40 mg total) by mouth 2 (two) times daily before meals.    potassium chloride (MICRO-K) 10 MEQ CpSR Take 1 capsule (10 mEq total) by mouth daily as needed (Take one daily when you take furosemide).    simethicone (GAS-X ORAL) Take by mouth.    sumatriptan (IMITREX) 100 MG tablet TAKE 1 TABLET BY MOUTH AFTER ONSET OF MIGRAINE. MAY REPEAT AFTER 2 HOURS IF HEADACHE RETURNS NOT TO EXCEED 200MG IN 24 HOURS    trazodone (DESYREL) 100 MG tablet Take 100 mg by mouth every evening.    venlafaxine (EFFEXOR-XR) 150 MG Cp24 Take 1 capsule (150 mg total) by mouth 2 (two) times a day.     Current Facility-Administered Medications   Medication    triamcinolone acetonide injection 1 mg       Review of patient's allergies indicates:   Allergen Reactions    Adhesive tape-silicones Rash     Blisters after on for several hours    Augmentin [amoxicillin-pot clavulanate]     Ciprofloxacin     Morphine Itching and Hives       Family History   Problem Relation Age of Onset    Cancer Mother     Diabetes Neg Hx     Melanoma Neg Hx     Psoriasis Neg Hx     Lupus Neg Hx     Eczema Neg Hx        Social History     Socioeconomic History    Marital status:    Occupational  History    Occupation: Caregiver   Tobacco Use    Smoking status: Never Smoker    Smokeless tobacco: Never Used   Substance and Sexual Activity    Alcohol use: No    Drug use: No   Social History Narrative    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 Has a good support system of friends, daughter and grandchildren. Keeps herself busy w/ gardening, socializing. Facing some financial issues, supports grandson struggling w/ drug abuse who is staying with her which causes stress. Denies any concern regarding safety at home. Has not had much financial support from her family w/ caring for her grandson.       Chief Complaint:   Chief Complaint   Patient presents with    Right Knee - Pain       Date of surgery:July 14, 2020 revision right total knee arthroplasty    History of present illness:  This is a 76-year-old female underwent right knee tibial revision for some aseptic loosening of her tibial component.  Patient comes in with more left knee pain.  It has been bothering her for about 6 months.  Knee feels like it wants to buckle or give way.  Pain is along the lateral aspect of her knee.  Previous treatments includes a steroid injection.  Hurts when going from sitting to standing.  Rates the pain is a 6/10.  MRI did not show a tear.  We did Monovisc on her left knee.  Did not really help very much.  Right knee started to hurt really bad the last couple months as well.  Pain goes down into the ankle.    Review of Systems:  Musculoskeletal:  See HPI    Physical Examination:    Vital Signs:    Vitals:    02/07/22 1402   Resp: 18       Body mass index is 24.81 kg/m².    This a well-developed, well nourished patient in no acute distress.  They are alert and oriented and cooperative to examination.  Pt. walks without an antalgic gait.      Examination of the right knee shows well-healed surgical excision.  Patient lacks about 3° of full extension.  Great flexion up to about  130°.  Stable to varus and valgus stress.  Pain over the IT band.  Neurovascularly intact.    Examination of the left knee shows no rashes or erythema. There are no masses ecchymosis or effusion. Patient has full range of motion from 0-130°. Patient is moderately tender to palpation over lateral joint line and nontender to palpation over the medial joint line. Patient has a - Lachman exam, - anterior drawer exam, and - posterior drawer exam.  Positive lateral Apley exam. Knee is stable to varus and valgus stress. 5 out of 5 motor strength. Palpable distal pulses. Intact light touch sensation. Negative Patellofemoral crepitus      X-rays:  X-rays of the right knee are ordered and reviewed which show well-aligned revision total knee arthroplasty without complication.  Left knee shows just mild arthritic change    MRI of the left knee is reviewed and interpreted today:Intrasubstance degeneration of the posterior horn of the medial meniscus and anterior and posterior horns of the lateral meniscus without a meniscal tear identified.         Assessment::  Left knee arthritic change  Painful right total knee      Plan:  I reviewed the x-ray with her today.  I do not see anything overly concerning.  We will get her in with the pain to evaluate both her neck as well as possible other modalities to help with the pain in both of her knees.  Also get her in wound care to address the forearm wounds.        This note was created using Coderwall voice recognition software that occasionally misinterpreted phrases or words.

## 2022-02-09 LAB
FINAL PATHOLOGIC DIAGNOSIS: NORMAL
GROSS: NORMAL
Lab: NORMAL
MICROSCOPIC EXAM: NORMAL

## 2022-02-10 ENCOUNTER — PATIENT MESSAGE (OUTPATIENT)
Dept: DERMATOLOGY | Facility: CLINIC | Age: 77
End: 2022-02-10
Payer: MEDICARE

## 2022-02-10 ENCOUNTER — TELEPHONE (OUTPATIENT)
Dept: DERMATOLOGY | Facility: CLINIC | Age: 77
End: 2022-02-10
Payer: MEDICARE

## 2022-02-10 NOTE — TELEPHONE ENCOUNTER
8/2/2019      RE: [facility] Nieto  [address]  [place] [address]       Simulation done under my direction.  Plan to start 3d conformal treatment next week.    Taylor Reich  [phone] pager          Taylor Reich MD       Spoke w/ pt. Informed pt about results and recommendations per provider. pt verbalized understanding.    Results and recommendations have been reviewed with pt. Explained to stop picking at the area and allow wounds to heal. Pt was questioning about additional biopsies. Pt was advised to let the areas heal with the vaseline and wrap prior to trying to do any additional bx as they will most likely continue showing the same results. Pt states understanding and questioned if she should see wound care. Pt was advised that she should not need to see wound care as long as she is using the vaseline and wrap to help heal the area. Pt stated understanding, but was still concerned about stickers in her arm that are still there. Pt was advised that since the bx only showed excoriations we would need the arms to be clear to possibly do an additional bx if recommended during her next visit w/i Derm. Pt states understanding and will follow the recommendations per nurse and provider.    Provider informed of encounter. Information requested below by provider.    Dr. Edward Cerrato MD  Address: [address] [place] [address] [place] [address]  Phone: [phone]

## 2022-02-10 NOTE — TELEPHONE ENCOUNTER
----- Message from Hilary Finnegan MD sent at 2/9/2022  3:57 PM CST -----  No foreign body identified. Please encourage patient to continue to stop manipulating lesions and to keep them covered with vaseline and a coban wrap    Skin, left forearm, shave biopsy:   -SCRATCH PAPULE, see note   Note:  Several levels were examined. There is no evidence of malignancy or   foreign body.

## 2022-02-14 NOTE — TELEPHONE ENCOUNTER
Patient scheduled for SR tomorrow. Will further reassure her as to the absence of pathology on arms and need to stop manipulating to allow healing. Would benefit from input from her treating psychiatrist.

## 2022-02-15 ENCOUNTER — CLINICAL SUPPORT (OUTPATIENT)
Dept: DERMATOLOGY | Facility: CLINIC | Age: 77
End: 2022-02-15
Payer: MEDICARE

## 2022-02-15 ENCOUNTER — TELEPHONE (OUTPATIENT)
Dept: OPHTHALMOLOGY | Facility: CLINIC | Age: 77
End: 2022-02-15
Payer: MEDICARE

## 2022-02-15 DIAGNOSIS — Z48.02 VISIT FOR SUTURE REMOVAL: Primary | ICD-10-CM

## 2022-02-15 PROCEDURE — 99024 PR POST-OP FOLLOW-UP VISIT: ICD-10-PCS | Mod: S$GLB,,, | Performed by: DERMATOLOGY

## 2022-02-15 PROCEDURE — 99024 POSTOP FOLLOW-UP VISIT: CPT | Mod: S$GLB,,, | Performed by: DERMATOLOGY

## 2022-02-15 NOTE — TELEPHONE ENCOUNTER
LVM for pt that eye appt on 3-3-22 is cancelled due to Dr. Mcfarland out on medical leave.   Pt to call back to reschedule per convenience.

## 2022-02-17 ENCOUNTER — TELEPHONE (OUTPATIENT)
Dept: PRIMARY CARE CLINIC | Facility: CLINIC | Age: 77
End: 2022-02-17
Payer: MEDICARE

## 2022-02-17 ENCOUNTER — OFFICE VISIT (OUTPATIENT)
Dept: WOUND CARE | Facility: HOSPITAL | Age: 77
End: 2022-02-17
Attending: FAMILY MEDICINE
Payer: MEDICARE

## 2022-02-17 ENCOUNTER — PATIENT MESSAGE (OUTPATIENT)
Dept: PRIMARY CARE CLINIC | Facility: CLINIC | Age: 77
End: 2022-02-17
Payer: MEDICARE

## 2022-02-17 ENCOUNTER — LAB VISIT (OUTPATIENT)
Dept: LAB | Facility: HOSPITAL | Age: 77
End: 2022-02-17
Attending: FAMILY MEDICINE
Payer: MEDICARE

## 2022-02-17 VITALS
SYSTOLIC BLOOD PRESSURE: 142 MMHG | RESPIRATION RATE: 18 BRPM | HEIGHT: 69 IN | DIASTOLIC BLOOD PRESSURE: 84 MMHG | TEMPERATURE: 99 F | WEIGHT: 164 LBS | HEART RATE: 87 BPM | BODY MASS INDEX: 24.29 KG/M2

## 2022-02-17 DIAGNOSIS — S40.812A ABRASION OF LEFT UPPER EXTREMITY, INITIAL ENCOUNTER: ICD-10-CM

## 2022-02-17 DIAGNOSIS — R73.9 HYPERGLYCEMIA: ICD-10-CM

## 2022-02-17 DIAGNOSIS — S41.111A LACERATION OF MULTIPLE SITES OF RIGHT UPPER EXTREMITY, INITIAL ENCOUNTER: ICD-10-CM

## 2022-02-17 DIAGNOSIS — S40.811A ABRASION OF RIGHT UPPER EXTREMITY, INITIAL ENCOUNTER: ICD-10-CM

## 2022-02-17 DIAGNOSIS — R73.9 HYPERGLYCEMIA: Primary | ICD-10-CM

## 2022-02-17 DIAGNOSIS — S41.112A LACERATION OF MULTIPLE SITES OF LEFT UPPER EXTREMITY, INITIAL ENCOUNTER: ICD-10-CM

## 2022-02-17 DIAGNOSIS — S51.809A: Primary | ICD-10-CM

## 2022-02-17 DIAGNOSIS — T07.XXXA ABRASIONS OF MULTIPLE SITES: ICD-10-CM

## 2022-02-17 LAB
ESTIMATED AVG GLUCOSE: 120 MG/DL (ref 68–131)
HBA1C MFR BLD: 5.8 % (ref 4–5.6)
PREALB SERPL-MCNC: 29 MG/DL (ref 20–43)

## 2022-02-17 PROCEDURE — 1160F PR REVIEW ALL MEDS BY PRESCRIBER/CLIN PHARMACIST DOCUMENTED: ICD-10-PCS | Mod: CPTII,,, | Performed by: FAMILY MEDICINE

## 2022-02-17 PROCEDURE — 3079F PR MOST RECENT DIASTOLIC BLOOD PRESSURE 80-89 MM HG: ICD-10-PCS | Mod: CPTII,,, | Performed by: FAMILY MEDICINE

## 2022-02-17 PROCEDURE — 99202 PR OFFICE/OUTPT VISIT, NEW, LEVL II, 15-29 MIN: ICD-10-PCS | Mod: ,,, | Performed by: FAMILY MEDICINE

## 2022-02-17 PROCEDURE — 83036 HEMOGLOBIN GLYCOSYLATED A1C: CPT | Performed by: INTERNAL MEDICINE

## 2022-02-17 PROCEDURE — 36415 COLL VENOUS BLD VENIPUNCTURE: CPT | Performed by: INTERNAL MEDICINE

## 2022-02-17 PROCEDURE — 3079F DIAST BP 80-89 MM HG: CPT | Mod: CPTII,,, | Performed by: FAMILY MEDICINE

## 2022-02-17 PROCEDURE — 1160F RVW MEDS BY RX/DR IN RCRD: CPT | Mod: CPTII,,, | Performed by: FAMILY MEDICINE

## 2022-02-17 PROCEDURE — 99212 OFFICE O/P EST SF 10 MIN: CPT | Mod: 25 | Performed by: FAMILY MEDICINE

## 2022-02-17 PROCEDURE — 84134 ASSAY OF PREALBUMIN: CPT | Performed by: INTERNAL MEDICINE

## 2022-02-17 PROCEDURE — 1159F PR MEDICATION LIST DOCUMENTED IN MEDICAL RECORD: ICD-10-PCS | Mod: CPTII,,, | Performed by: FAMILY MEDICINE

## 2022-02-17 PROCEDURE — 3077F SYST BP >= 140 MM HG: CPT | Mod: CPTII,,, | Performed by: FAMILY MEDICINE

## 2022-02-17 PROCEDURE — 1159F MED LIST DOCD IN RCRD: CPT | Mod: CPTII,,, | Performed by: FAMILY MEDICINE

## 2022-02-17 PROCEDURE — 99202 OFFICE O/P NEW SF 15 MIN: CPT | Mod: ,,, | Performed by: FAMILY MEDICINE

## 2022-02-17 PROCEDURE — 3077F PR MOST RECENT SYSTOLIC BLOOD PRESSURE >= 140 MM HG: ICD-10-PCS | Mod: CPTII,,, | Performed by: FAMILY MEDICINE

## 2022-02-17 NOTE — PROGRESS NOTES
Chief complaint:  Wound Care and Wound Consult      HPI:  Marleen Samano is a 76 y.o. female presenting with multiple abrasions and lacerations of the BL arms. Pt fell into a thorn bush last year, and states she cannot seem to heal. Pt stated she constantly picks the wounds, and does not keep the dressings that were given by PCP or Dermatology. Pt has no other complaints at this visit.    PMH:  As per HPI and below:  Past Medical History:   Diagnosis Date    Abnormal CBC 2/10/2020    Anemia due to multiple mechanisms 2/10/2020    Anemia in chronic kidney disease (CKD)     Anemia, chronic disease 2/10/2020    Arthritis     Aseptic loosening of prosthetic knee, initial encounter 7/14/2020    Bell's palsy     Bladder incontinence     Blepharospasm     Bronchiectasis without complication 10/8/2019    Cervical dystonia     Concussion     COPD (chronic obstructive pulmonary disease)     Enterocolitis 2/7/2020    Fainting spell     2/7/2020    Hormone deficiency     Hypertension     Loose right total knee arthroplasty 6/15/2020    Migraine     Muscle spasm     Myofacial pain syndrome     Normocytic normochromic anemia 2/10/2020    Right ear pain 2/9/2021    Squamous cell carcinoma of skin     Syncope and collapse 2/9/2021       Social History     Socioeconomic History    Marital status:    Occupational History    Occupation: Caregiver   Tobacco Use    Smoking status: Never Smoker    Smokeless tobacco: Never Used   Substance and Sexual Activity    Alcohol use: No    Drug use: No   Social History Narrative    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 Has a good support system of friends, daughter and grandchildren. Keeps herself busy w/ gardening, socializing. Facing some financial issues, supports grandson struggling w/ drug abuse who is staying with her which causes stress. Denies any concern regarding safety at home. Has not had much  financial support from her family w/ caring for her grandson.       Past Surgical History:   Procedure Laterality Date    APPENDECTOMY      BREAST BIOPSY      BREAST LUMPECTOMY      CATARACT EXTRACTION      EYE SURGERY      HYSTERECTOMY      NECK SURGERY      REVISION OF KNEE ARTHROPLASTY Right 7/14/2020    Procedure: REVISION, ARTHROPLASTY, KNEE;  Surgeon: Pedro Tompkins MD;  Location: [facility];  Service: Orthopedics;  Laterality: Right;    TONSILLECTOMY         Family History   Problem Relation Age of Onset    Cancer Mother     Diabetes Neg Hx     Melanoma Neg Hx     Psoriasis Neg Hx     Lupus Neg Hx     Eczema Neg Hx        Review of patient's allergies indicates:   Allergen Reactions    Adhesive tape-silicones Rash     Blisters after on for several hours    Augmentin [amoxicillin-pot clavulanate]     Ciprofloxacin     Morphine Itching and Hives       Current Outpatient Medications on File Prior to Visit   Medication Sig Dispense Refill    albuterol (PROVENTIL/VENTOLIN HFA) 90 mcg/actuation inhaler Inhale 2 puffs into the lungs every 4 (four) hours as needed for Wheezing or Shortness of Breath. Rescue 54 g 0    albuterol-ipratropium (DUO-NEB) 2.5 mg-0.5 mg/3 mL nebulizer solution USE 1 VIAL VIA NEBULIZER  EVERY 6 HOURS AS NEEDED FOR WHEEZING RESCUE 180 mL 23    amLODIPine (NORVASC) 2.5 MG tablet Take 1 tablet (2.5 mg total) by mouth once daily. 90 tablet 3    aspirin 81 MG Chew Take 81 mg by mouth once daily.      baclofen (LIORESAL) 20 MG tablet TAKE 1 TABLET BY MOUTH  TWICE DAILY 180 tablet 3    benazepriL (LOTENSIN) 40 MG tablet Take 1 tablet by mouth once daily 90 tablet 3    blood sugar diagnostic Strp To check BG 2 times daily, to use with insurance preferred meter 200 strip 3    blood-glucose meter kit To check BG 1 times daily, to use with insurance preferred meter 1 each 0    diclofenac sodium (VOLTAREN) 1 % Gel Apply topically.       gabapentin (NEURONTIN) 600 MG  "tablet TAKE 1 TABLET BY MOUTH 3  TIMES DAILY 270 tablet 3    lancets Misc To check BG 2 times daily, to use with insurance preferred meter 200 each 3    magnesium oxide (MAG-OX) 400 mg (241.3 mg magnesium) tablet Take 1 tablet (400 mg total) by mouth 2 (two) times daily. 180 tablet 3    metoprolol succinate (TOPROL-XL) 25 MG 24 hr tablet Take 1 tablet (25 mg total) by mouth once daily. 30 tablet 11    mupirocin (BACTROBAN) 2 % ointment Apply to forearm and wrap with non stick gauze and coban wrap 22 g 1    omeprazole (PRILOSEC) 40 MG capsule Take 1 capsule (40 mg total) by mouth 2 (two) times daily before meals. 180 capsule 2    potassium chloride (MICRO-K) 10 MEQ CpSR Take 1 capsule (10 mEq total) by mouth daily as needed (Take one daily when you take furosemide). 30 capsule 3    simethicone (GAS-X ORAL) Take by mouth.      sumatriptan (IMITREX) 100 MG tablet TAKE 1 TABLET BY MOUTH AFTER ONSET OF MIGRAINE. MAY REPEAT AFTER 2 HOURS IF HEADACHE RETURNS NOT TO EXCEED 200MG IN 24 HOURS      trazodone (DESYREL) 100 MG tablet Take 100 mg by mouth every evening.      venlafaxine (EFFEXOR-XR) 150 MG Cp24 Take 1 capsule (150 mg total) by mouth 2 (two) times a day. 14 capsule 0     Current Facility-Administered Medications on File Prior to Visit   Medication Dose Route Frequency Provider Last Rate Last Admin    triamcinolone acetonide injection 1 mg  1 mg Intradermal Once Hilary Finnegan MD           ROS: As per HPI and below:  10 point ROS all negative except that noted in the HPI    Physical Exam:     Vitals:    02/17/22 0805   BP: (!) 142/84   BP Location: Left arm   Patient Position: Sitting   Pulse: 87   Resp: 18   Temp: 99 °F (37.2 °C)   TempSrc: Temporal   Weight: 74.4 kg (164 lb)   Height: 5' 9" (1.753 m)           Body mass index is 24.22 kg/m².          General:             Well developed, well nourished, no apparent distress  HEENT:              NCAT, no JVD, mucous membranes moist, EOM " intact  Cardiovascular:  Regular rate and rhythm, normal S1, normal S2, No murmurs, rubs, or gallops  Respiratory:        Normal breath sounds, no wheezes, no rales, no rhonchi  Abdomen:           Bowel sounds present, non tender, non distended, no masses, no hepatojugular reflux  Extremities:        No clubbing, no cyanosis, no edema  Neurological:      No focal deficits  Skin:                   No obvious rashes or erythema, multiple abrasions and lacerations of the BL arms, see wound care assessment documentation in chart review scanned under the media tab    Lab Results   Component Value Date    WBC 7.21 01/15/2022    HGB 12.4 01/15/2022    HCT 38.8 01/15/2022    MCV 95 01/15/2022     01/15/2022     Lab Results   Component Value Date    CHOL 200 (H) 03/27/2021    CHOL 198 11/06/2019     Lab Results   Component Value Date    HDL 75 03/27/2021    HDL 91 (H) 11/06/2019     Lab Results   Component Value Date    LDLCALC 110.8 03/27/2021    LDLCALC 100.6 11/06/2019     Lab Results   Component Value Date    TRIG 71 03/27/2021    TRIG 32 11/06/2019     Lab Results   Component Value Date    CHOLHDL 37.5 03/27/2021    CHOLHDL 46.0 11/06/2019     CMP     Lab Results   Component Value Date    TSH 1.903 09/21/2021             Assessment and Recommendations       Diagnoses:    1. Multiple abrasions and lacerations of the BL arms    Plan:  1. Much of the documentation for this visit was completed in wound docs system. Please see the attached documentation for further details about the patients care. Scanned under the media tab.

## 2022-02-21 ENCOUNTER — TELEPHONE (OUTPATIENT)
Dept: PHYSICAL MEDICINE AND REHAB | Facility: CLINIC | Age: 77
End: 2022-02-21
Payer: MEDICARE

## 2022-02-21 NOTE — TELEPHONE ENCOUNTER
Returned pt phone call. No answer. LVM informing her that she will be able to drive tomorrow and to call back with any other questions.    ----- Message from Angel Yap sent at 2/21/2022 12:15 PM CST -----  Contact: Self  Type: Needs Medical Advice  Who Called:  Patient  Best Call Back Number: [phone]   Additional Information: Called to speak with office regarding 2/22 appt. Would liek to know if she would be able to drive herself afterward.

## 2022-02-22 ENCOUNTER — OFFICE VISIT (OUTPATIENT)
Dept: PHYSICAL MEDICINE AND REHAB | Facility: CLINIC | Age: 77
End: 2022-02-22
Payer: MEDICARE

## 2022-02-22 VITALS — HEIGHT: 69 IN | WEIGHT: 164 LBS | BODY MASS INDEX: 24.29 KG/M2 | RESPIRATION RATE: 16 BRPM

## 2022-02-22 DIAGNOSIS — Z74.09 IMPAIRED FUNCTIONAL MOBILITY AND ACTIVITY TOLERANCE: ICD-10-CM

## 2022-02-22 DIAGNOSIS — M25.561 BILATERAL CHRONIC KNEE PAIN: Primary | ICD-10-CM

## 2022-02-22 DIAGNOSIS — Z96.651 STATUS POST TOTAL RIGHT KNEE REPLACEMENT: ICD-10-CM

## 2022-02-22 DIAGNOSIS — M17.12 PRIMARY OSTEOARTHRITIS OF LEFT KNEE: ICD-10-CM

## 2022-02-22 DIAGNOSIS — M25.562 BILATERAL CHRONIC KNEE PAIN: Primary | ICD-10-CM

## 2022-02-22 DIAGNOSIS — G89.29 BILATERAL CHRONIC KNEE PAIN: Primary | ICD-10-CM

## 2022-02-22 PROCEDURE — 64454 NJX AA&/STRD GNCLR NRV BRNCH: CPT | Mod: 50,S$GLB,, | Performed by: PHYSICAL MEDICINE & REHABILITATION

## 2022-02-22 PROCEDURE — 1101F PR PT FALLS ASSESS DOC 0-1 FALLS W/OUT INJ PAST YR: ICD-10-PCS | Mod: CPTII,S$GLB,, | Performed by: PHYSICAL MEDICINE & REHABILITATION

## 2022-02-22 PROCEDURE — 3288F FALL RISK ASSESSMENT DOCD: CPT | Mod: CPTII,S$GLB,, | Performed by: PHYSICAL MEDICINE & REHABILITATION

## 2022-02-22 PROCEDURE — 1101F PT FALLS ASSESS-DOCD LE1/YR: CPT | Mod: CPTII,S$GLB,, | Performed by: PHYSICAL MEDICINE & REHABILITATION

## 2022-02-22 PROCEDURE — 1125F PR PAIN SEVERITY QUANTIFIED, PAIN PRESENT: ICD-10-PCS | Mod: CPTII,S$GLB,, | Performed by: PHYSICAL MEDICINE & REHABILITATION

## 2022-02-22 PROCEDURE — 64454 PR NERVE BLOCK INJ, ANES/STEROID, GENICULAR NERVE, W/IMG: ICD-10-PCS | Mod: 50,S$GLB,, | Performed by: PHYSICAL MEDICINE & REHABILITATION

## 2022-02-22 PROCEDURE — 99204 OFFICE O/P NEW MOD 45 MIN: CPT | Mod: 25,S$GLB,, | Performed by: PHYSICAL MEDICINE & REHABILITATION

## 2022-02-22 PROCEDURE — 1125F AMNT PAIN NOTED PAIN PRSNT: CPT | Mod: CPTII,S$GLB,, | Performed by: PHYSICAL MEDICINE & REHABILITATION

## 2022-02-22 PROCEDURE — 99204 PR OFFICE/OUTPT VISIT, NEW, LEVL IV, 45-59 MIN: ICD-10-PCS | Mod: 25,S$GLB,, | Performed by: PHYSICAL MEDICINE & REHABILITATION

## 2022-02-22 PROCEDURE — 3288F PR FALLS RISK ASSESSMENT DOCUMENTED: ICD-10-PCS | Mod: CPTII,S$GLB,, | Performed by: PHYSICAL MEDICINE & REHABILITATION

## 2022-02-22 PROCEDURE — 99999 PR PBB SHADOW E&M-EST. PATIENT-LVL IV: CPT | Mod: PBBFAC,,, | Performed by: PHYSICAL MEDICINE & REHABILITATION

## 2022-02-22 PROCEDURE — 99999 PR PBB SHADOW E&M-EST. PATIENT-LVL IV: ICD-10-PCS | Mod: PBBFAC,,, | Performed by: PHYSICAL MEDICINE & REHABILITATION

## 2022-02-23 ENCOUNTER — TELEPHONE (OUTPATIENT)
Dept: PHYSICAL MEDICINE AND REHAB | Facility: CLINIC | Age: 77
End: 2022-02-23
Payer: MEDICARE

## 2022-02-23 NOTE — TELEPHONE ENCOUNTER
Pt had bilateral knee nerve blocks in clinic yesterday. She called to report her level of relief and would like to schedule RFA  R knee 90% relief  L knee 70% relief    Ok to schedule?    ----- Message from Jluissa Rolle, Patient Care Assistant sent at 2/23/2022 11:04 AM CST -----  Regarding: advice  Contact: pt  Type: Needs Medical Advice  Who Called:  pt   Symptoms (please be specific):  left knee pain   How long has patient had these symptoms:  1 day     Best Call Back Number: [phone] (home)     Additional Information: please call pt to advise. Thanks!

## 2022-02-24 ENCOUNTER — OFFICE VISIT (OUTPATIENT)
Dept: WOUND CARE | Facility: HOSPITAL | Age: 77
End: 2022-02-24
Attending: FAMILY MEDICINE
Payer: MEDICARE

## 2022-02-24 VITALS
HEART RATE: 70 BPM | DIASTOLIC BLOOD PRESSURE: 77 MMHG | RESPIRATION RATE: 18 BRPM | SYSTOLIC BLOOD PRESSURE: 114 MMHG | TEMPERATURE: 98 F

## 2022-02-24 DIAGNOSIS — S41.111D LACERATION OF MULTIPLE SITES OF RIGHT UPPER EXTREMITY, SUBSEQUENT ENCOUNTER: ICD-10-CM

## 2022-02-24 DIAGNOSIS — S40.811D ABRASION OF RIGHT UPPER EXTREMITY, SUBSEQUENT ENCOUNTER: ICD-10-CM

## 2022-02-24 DIAGNOSIS — S40.812D ABRASION OF LEFT UPPER EXTREMITY, SUBSEQUENT ENCOUNTER: ICD-10-CM

## 2022-02-24 DIAGNOSIS — S41.112D LACERATION OF MULTIPLE SITES OF LEFT UPPER EXTREMITY, SUBSEQUENT ENCOUNTER: ICD-10-CM

## 2022-02-24 DIAGNOSIS — T07.XXXA ABRASIONS OF MULTIPLE SITES: ICD-10-CM

## 2022-02-24 DIAGNOSIS — S51.809D: Primary | ICD-10-CM

## 2022-02-24 PROCEDURE — 99213 OFFICE O/P EST LOW 20 MIN: CPT | Performed by: FAMILY MEDICINE

## 2022-02-24 PROCEDURE — 99213 OFFICE O/P EST LOW 20 MIN: CPT | Mod: ,,, | Performed by: FAMILY MEDICINE

## 2022-02-24 PROCEDURE — 99213 PR OFFICE/OUTPT VISIT, EST, LEVL III, 20-29 MIN: ICD-10-PCS | Mod: ,,, | Performed by: FAMILY MEDICINE

## 2022-02-24 NOTE — PROGRESS NOTES
Wound Care and Hyperbaric Medicine                Progress Note    Subjective:       Patient ID: Marleen Samano is a 76 y.o. female.    Chief Complaint: Wound Care    HPI  Pt seen in clinic for a FU visit for BL upper extremities open abrasions and lacerations. Pt stated she still thinks there are stickers still buried in her arm, but cannot show us any. She has no new complaints today. Pt has tried many creams this week, none of which we directed her to use. She thought they may work instead of what we offered.    Review of Systems   Skin: Positive for wound.        BL upper extremity lacerations and abrasions   All other systems reviewed and are negative.        Objective:        Physical Exam  Vitals and nursing note reviewed.   Constitutional:       General: She is not in acute distress.     Appearance: Normal appearance. She is not ill-appearing, toxic-appearing or diaphoretic.   Skin:     General: Skin is warm and dry.      Coloration: Skin is not jaundiced or pale.      Findings: Lesion present. No bruising, erythema or rash.      Comments: BL upper extremity open wounds, see wound care assessment documentation in chart review scanned under the media tab   Neurological:      General: No focal deficit present.      Mental Status: She is alert and oriented to person, place, and time.   Psychiatric:         Mood and Affect: Mood normal.         Behavior: Behavior normal.         Thought Content: Thought content normal.         Judgment: Judgment normal.         Vitals:    02/24/22 1526   BP: 114/77   Pulse: 70   Resp: 18   Temp: 97.6 °F (36.4 °C)       Assessment:           ICD-10-CM ICD-9-CM   1. Open wound of forearm, unspecified laterality, subsequent encounter  S51.809D V58.89     881.00   2. Laceration of multiple sites of right upper extremity, subsequent encounter  S41.111D V58.89     884.0   3. Abrasions of multiple sites  T07.XXXA 919.0   4. Abrasion of left upper extremity, subsequent  encounter  S40.812D V58.89   5. Abrasion of right upper extremity, subsequent encounter  S40.811D V58.89   6. Laceration of multiple sites of left upper extremity, subsequent encounter  S41.112D V58.89     884.0                Plan:                  Marleen was seen today for wound care.    Diagnoses and all orders for this visit:    Open wound of forearm, unspecified laterality, subsequent encounter    Laceration of multiple sites of right upper extremity, subsequent encounter    Abrasions of multiple sites    Abrasion of left upper extremity, subsequent encounter    Abrasion of right upper extremity, subsequent encounter    Laceration of multiple sites of left upper extremity, subsequent encounter      Please see wound care instructions which have been provided separately

## 2022-02-28 ENCOUNTER — TELEPHONE (OUTPATIENT)
Dept: PHYSICAL MEDICINE AND REHAB | Facility: CLINIC | Age: 77
End: 2022-02-28
Payer: MEDICARE

## 2022-02-28 NOTE — TELEPHONE ENCOUNTER
----- Message from Nallely Chu MA sent at 2/24/2022  3:06 PM CST -----    ----- Message -----  From: Stacey M Lefort  Sent: 2/24/2022   3:06 PM CST  To: [facility]ine Staff    Pt is returning your call at [phone].  Thank you.

## 2022-02-28 NOTE — TELEPHONE ENCOUNTER
Spoke with patient. Scheduled right knee RFA on 3/16 with pre procedure covid test on 3/13. Scheduled left knee RFA on 4/6 with pre procedure covid test on 4/3. Discussed pre procedure instructions including holding aspirin and NSAIDs before procedure. Pt voiced understanding. Also verified her home address and will mail the pre procedure instructions to her.

## 2022-03-02 ENCOUNTER — PATIENT MESSAGE (OUTPATIENT)
Dept: OPTOMETRY | Facility: CLINIC | Age: 77
End: 2022-03-02
Payer: MEDICARE

## 2022-03-02 ENCOUNTER — TELEPHONE (OUTPATIENT)
Dept: OPHTHALMOLOGY | Facility: CLINIC | Age: 77
End: 2022-03-02
Payer: MEDICARE

## 2022-03-02 ENCOUNTER — PATIENT MESSAGE (OUTPATIENT)
Dept: PRIMARY CARE CLINIC | Facility: CLINIC | Age: 77
End: 2022-03-02
Payer: MEDICARE

## 2022-03-03 ENCOUNTER — TELEPHONE (OUTPATIENT)
Dept: FAMILY MEDICINE | Facility: CLINIC | Age: 77
End: 2022-03-03
Payer: MEDICARE

## 2022-03-03 DIAGNOSIS — Z01.818 PRE-OP TESTING: ICD-10-CM

## 2022-03-03 DIAGNOSIS — G89.29 CHRONIC PAIN OF LEFT KNEE: Primary | ICD-10-CM

## 2022-03-03 DIAGNOSIS — M25.562 CHRONIC PAIN OF LEFT KNEE: Primary | ICD-10-CM

## 2022-03-03 DIAGNOSIS — G89.29 CHRONIC PAIN OF RIGHT KNEE: Primary | ICD-10-CM

## 2022-03-03 DIAGNOSIS — M25.561 CHRONIC PAIN OF RIGHT KNEE: Primary | ICD-10-CM

## 2022-03-03 NOTE — TELEPHONE ENCOUNTER
Pt states has an appt for the stickers  Explained that dr Baugh was not taking any new pts at this time and gave her the names of the drs that were, pt states that she will research them and then call back to schedule an appt

## 2022-03-03 NOTE — TELEPHONE ENCOUNTER
----- Message from Karina Redd sent at 3/3/2022 12:11 PM CST -----  Contact: patient  Type: Needs Medical Advice  Who Called:  patient  Symptoms (please be specific):  stickers in arms  Pharmacy name and phone #:   [facility] - [place], [place] - [address]  [address] [address]  [place] [address]  Phone: [phone] Fax: [phone]  Best Call Back Number: [phone] (home)   Additional Information: patient sent a message this morning- she is requesting a call back soon.

## 2022-03-03 NOTE — TELEPHONE ENCOUNTER
----- Message from Isabelmanuela Mckeonard sent at 3/3/2022 10:39 AM CST -----  Contact: Patient  Type: Patient Call Back         Who called: Patient         What is the request in detail: calling to est care w/ provider; requesting an appt asap; states she has a wound from sticker bush; wants to be seen sooner than 4/28 appt showing avail; est patient w/ department; prefers mornings; also requesting antibiotics for wounds;  please advise            Best call back number: [phone]         Additional Information:   [facility] [address] [place] [address] [facility]  [address] [facility] [address]  Phone: [phone] Fax: [phone]      Prefers generic brands           Thank You

## 2022-03-04 ENCOUNTER — OFFICE VISIT (OUTPATIENT)
Dept: FAMILY MEDICINE | Facility: CLINIC | Age: 77
End: 2022-03-04
Payer: MEDICARE

## 2022-03-04 VITALS
BODY MASS INDEX: 24.78 KG/M2 | DIASTOLIC BLOOD PRESSURE: 80 MMHG | HEART RATE: 80 BPM | OXYGEN SATURATION: 98 % | HEIGHT: 69 IN | SYSTOLIC BLOOD PRESSURE: 116 MMHG | WEIGHT: 167.31 LBS

## 2022-03-04 DIAGNOSIS — L03.90 CELLULITIS, UNSPECIFIED CELLULITIS SITE: Primary | ICD-10-CM

## 2022-03-04 PROCEDURE — 1126F PR PAIN SEVERITY QUANTIFIED, NO PAIN PRESENT: ICD-10-PCS | Mod: CPTII,S$GLB,, | Performed by: FAMILY MEDICINE

## 2022-03-04 PROCEDURE — 3074F SYST BP LT 130 MM HG: CPT | Mod: CPTII,S$GLB,, | Performed by: FAMILY MEDICINE

## 2022-03-04 PROCEDURE — 3074F PR MOST RECENT SYSTOLIC BLOOD PRESSURE < 130 MM HG: ICD-10-PCS | Mod: CPTII,S$GLB,, | Performed by: FAMILY MEDICINE

## 2022-03-04 PROCEDURE — 1126F AMNT PAIN NOTED NONE PRSNT: CPT | Mod: CPTII,S$GLB,, | Performed by: FAMILY MEDICINE

## 2022-03-04 PROCEDURE — 3288F FALL RISK ASSESSMENT DOCD: CPT | Mod: CPTII,S$GLB,, | Performed by: FAMILY MEDICINE

## 2022-03-04 PROCEDURE — 99214 OFFICE O/P EST MOD 30 MIN: CPT | Mod: S$GLB,,, | Performed by: FAMILY MEDICINE

## 2022-03-04 PROCEDURE — 99999 PR PBB SHADOW E&M-EST. PATIENT-LVL III: ICD-10-PCS | Mod: PBBFAC,,, | Performed by: FAMILY MEDICINE

## 2022-03-04 PROCEDURE — 99214 PR OFFICE/OUTPT VISIT, EST, LEVL IV, 30-39 MIN: ICD-10-PCS | Mod: S$GLB,,, | Performed by: FAMILY MEDICINE

## 2022-03-04 PROCEDURE — 1101F PT FALLS ASSESS-DOCD LE1/YR: CPT | Mod: CPTII,S$GLB,, | Performed by: FAMILY MEDICINE

## 2022-03-04 PROCEDURE — 1159F MED LIST DOCD IN RCRD: CPT | Mod: CPTII,S$GLB,, | Performed by: FAMILY MEDICINE

## 2022-03-04 PROCEDURE — 99999 PR PBB SHADOW E&M-EST. PATIENT-LVL III: CPT | Mod: PBBFAC,,, | Performed by: FAMILY MEDICINE

## 2022-03-04 PROCEDURE — 1159F PR MEDICATION LIST DOCUMENTED IN MEDICAL RECORD: ICD-10-PCS | Mod: CPTII,S$GLB,, | Performed by: FAMILY MEDICINE

## 2022-03-04 PROCEDURE — 3288F PR FALLS RISK ASSESSMENT DOCUMENTED: ICD-10-PCS | Mod: CPTII,S$GLB,, | Performed by: FAMILY MEDICINE

## 2022-03-04 PROCEDURE — 1101F PR PT FALLS ASSESS DOC 0-1 FALLS W/OUT INJ PAST YR: ICD-10-PCS | Mod: CPTII,S$GLB,, | Performed by: FAMILY MEDICINE

## 2022-03-04 PROCEDURE — 3079F PR MOST RECENT DIASTOLIC BLOOD PRESSURE 80-89 MM HG: ICD-10-PCS | Mod: CPTII,S$GLB,, | Performed by: FAMILY MEDICINE

## 2022-03-04 PROCEDURE — 3079F DIAST BP 80-89 MM HG: CPT | Mod: CPTII,S$GLB,, | Performed by: FAMILY MEDICINE

## 2022-03-04 RX ORDER — CLINDAMYCIN HYDROCHLORIDE 300 MG/1
300 CAPSULE ORAL 3 TIMES DAILY
Qty: 15 CAPSULE | Refills: 0 | Status: SHIPPED | OUTPATIENT
Start: 2022-03-04 | End: 2022-03-09

## 2022-03-04 RX ORDER — MUPIROCIN 20 MG/G
OINTMENT TOPICAL 3 TIMES DAILY
Qty: 15 G | Refills: 1 | Status: SHIPPED | OUTPATIENT
Start: 2022-03-04 | End: 2022-05-03

## 2022-03-06 NOTE — TELEPHONE ENCOUNTER
I attempted to call Ms Samano to schedule an appointment.  Pt was not available at the time of the call.  I left a detailed VM asking pt to call [facility] office at her convenience.       ----- Message from Geno Quiroz RN sent at 5/31/2019  5:27 PM CDT -----  Contact: pt      ----- Message -----  From: Jorje Viramontes  Sent: 5/31/2019   4:49 PM  To: [facility]    Needs Advice    Reason for call: pt calling to schedule an appt with Dr. Dubose. Pt would like to know if Dr. Dubose goes to a [place] location or a [place] location. Pt states she needs the appt for either August or September.         Communication Preference: [phone]     Additional Information: please contact pt regarding this matter        Nicolle Campbell(PA)

## 2022-03-07 NOTE — H&P (VIEW-ONLY)
Today's Date: 03/07/2022     Referring Provider: Dr. Pedro Jesus*     Chief Complaint:   Chief Complaint   Patient presents with    Knee Pain     Bilateral knee pain       HPI: Marleen Samano is a 76 y.o. female  who presents today for evaluation of chronic bilateral knee pain.  She complains of bilateral right greater than left knee pain.  She has a history of a total knee arthroplasty on the right.  Recent x-ray shows no evidence of hardware loosening.  She complains of pain over the right anterior and on the left lateral joint.  She describes the pain as a dull achy pain on the left and a shocking shooting pain on the right.  She denies any swelling.  She has had injections on the left without any significant lasting improvement in pain or function    Review of Systems   Constitutional: Negative for chills and fever.   HENT: Negative for congestion and tinnitus.    Eyes: Negative for blurred vision and photophobia.   Respiratory: Negative for shortness of breath and wheezing.    Cardiovascular: Negative for chest pain and palpitations.   Gastrointestinal: Negative for nausea and vomiting.   Genitourinary: Negative for dysuria and frequency.   Musculoskeletal: Positive for joint pain. Negative for myalgias.   Skin: Negative for itching and rash.   Neurological: Negative for speech change and focal weakness.   Endo/Heme/Allergies: Negative for environmental allergies. Does not bruise/bleed easily.   Psychiatric/Behavioral: Negative for depression. The patient is not nervous/anxious.         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 Has a good support system of friends,  daughter and grandchildren. Keeps herself busy w/ gardening, socializing. Facing some financial issues, supports grandson struggling w/ drug abuse who is staying with her which causes stress. Denies any concern regarding safety at home. Has not had much financial support from her family w/ caring for her grandson.       Family History   Problem Relation Age of Onset    Cancer Mother     Diabetes Neg Hx     Melanoma Neg Hx     Psoriasis Neg Hx     Lupus Neg Hx     Eczema Neg Hx        Current Outpatient Medications on File Prior to Visit   Medication Sig Dispense Refill    albuterol (PROVENTIL/VENTOLIN HFA) 90 mcg/actuation inhaler Inhale 2 puffs into the lungs every 4 (four) hours as needed for Wheezing or Shortness of Breath. Rescue 54 g 0    albuterol-ipratropium (DUO-NEB) 2.5 mg-0.5 mg/3 mL nebulizer solution USE 1 VIAL VIA NEBULIZER  EVERY 6 HOURS AS NEEDED FOR WHEEZING RESCUE 180 mL 23    amLODIPine (NORVASC) 2.5 MG tablet Take 1 tablet (2.5 mg total) by mouth once daily. 90 tablet 3    aspirin 81 MG Chew Take 81 mg by mouth once daily.      baclofen (LIORESAL) 20 MG tablet TAKE 1 TABLET BY MOUTH  TWICE DAILY 180 tablet 3    benazepriL (LOTENSIN) 40 MG tablet Take 1 tablet by mouth once daily 90 tablet 3    blood sugar diagnostic Strp To check BG 2 times daily, to use with insurance preferred meter 200 strip 3    blood-glucose meter kit To check BG 1 times daily, to use with insurance preferred meter 1 each 0    diclofenac sodium (VOLTAREN) 1 % Gel Apply topically.       gabapentin (NEURONTIN) 600 MG tablet TAKE 1 TABLET BY MOUTH 3  TIMES DAILY 270 tablet 3    lancets Misc To check BG 2 times daily, to use with insurance preferred meter 200 each 3    magnesium oxide (MAG-OX) 400 mg (241.3 mg magnesium) tablet Take 1 tablet (400 mg total) by mouth 2 (two) times daily. 180 tablet 3    metoprolol succinate (TOPROL-XL) 25 MG 24 hr tablet Take 1 tablet (25 mg total) by mouth once daily. 30 tablet  11    mupirocin (BACTROBAN) 2 % ointment Apply to forearm and wrap with non stick gauze and coban wrap 22 g 1    omeprazole (PRILOSEC) 40 MG capsule Take 1 capsule (40 mg total) by mouth 2 (two) times daily before meals. 180 capsule 2    potassium chloride (MICRO-K) 10 MEQ CpSR Take 1 capsule (10 mEq total) by mouth daily as needed (Take one daily when you take furosemide). 30 capsule 3    simethicone (GAS-X ORAL) Take by mouth.      sumatriptan (IMITREX) 100 MG tablet TAKE 1 TABLET BY MOUTH AFTER ONSET OF MIGRAINE. MAY REPEAT AFTER 2 HOURS IF HEADACHE RETURNS NOT TO EXCEED 200MG IN 24 HOURS      trazodone (DESYREL) 100 MG tablet Take 100 mg by mouth every evening.      venlafaxine (EFFEXOR-XR) 150 MG Cp24 Take 1 capsule (150 mg total) by mouth 2 (two) times a day. 14 capsule 0     Current Facility-Administered Medications on File Prior to Visit   Medication Dose Route Frequency Provider Last Rate Last Admin    triamcinolone acetonide injection 1 mg  1 mg Intradermal Once Hilary Finnegan MD            Review of patient's allergies indicates:   Allergen Reactions    Adhesive tape-silicones Rash     Blisters after on for several hours    Augmentin [amoxicillin-pot clavulanate]     Ciprofloxacin     Morphine Itching and Hives       Past Surgical History:   Procedure Laterality Date    APPENDECTOMY      BREAST BIOPSY      BREAST LUMPECTOMY      CATARACT EXTRACTION      EYE SURGERY      HYSTERECTOMY      NECK SURGERY      REVISION OF KNEE ARTHROPLASTY Right 7/14/2020    Procedure: REVISION, ARTHROPLASTY, KNEE;  Surgeon: Pedro Tompkins MD;  [facility];  Service: Orthopedics;  Laterality: Right;    TONSILLECTOMY         Past Medical History:   Diagnosis Date    Abnormal CBC 2/10/2020    Anemia due to multiple mechanisms 2/10/2020    Anemia in chronic kidney disease (CKD)     Anemia, chronic disease 2/10/2020    Arthritis     Aseptic loosening of prosthetic knee, initial  encounter 7/14/2020    Bell's palsy     Bladder incontinence     Blepharospasm     Bronchiectasis without complication 10/8/2019    Cervical dystonia     Concussion     COPD (chronic obstructive pulmonary disease)     Enterocolitis 2/7/2020    Fainting spell     2/7/2020    Hormone deficiency     Hypertension     Loose right total knee arthroplasty 6/15/2020    Migraine     Muscle spasm     Myofacial pain syndrome     Normocytic normochromic anemia 2/10/2020    Right ear pain 2/9/2021    Squamous cell carcinoma of skin     Syncope and collapse 2/9/2021       Vitals:    02/22/22 0945   Resp: 16     Physical Exam  Constitutional:       General: She is not in acute distress.     Appearance: Normal appearance.   HENT:      Head: Normocephalic and atraumatic.      Nose: Nose normal. No congestion.      Mouth/Throat:      Mouth: Mucous membranes are moist.      Pharynx: Oropharynx is clear.   Eyes:      Extraocular Movements: Extraocular movements intact.      Pupils: Pupils are equal, round, and reactive to light.   Neck:      Trachea: Trachea and phonation normal.   Pulmonary:      Effort: Pulmonary effort is normal. No respiratory distress.   Abdominal:      General: Abdomen is flat. There is no distension.   Musculoskeletal:      Right knee: No effusion.      Left knee: No effusion.   Skin:     General: Skin is warm and dry.   Neurological:      General: No focal deficit present.      Mental Status: She is alert and oriented to person, place, and time.      Cranial Nerves: Cranial nerves are intact.      Sensory: Sensation is intact.      Motor: Motor function is intact.      Gait: Gait abnormal.   Psychiatric:         Mood and Affect: Mood and affect normal.         Behavior: Behavior normal.        Right Knee Exam     Tenderness   The patient is experiencing tenderness in the lateral joint line and medial joint line.    Other   Scars: present (Consistent with prior total knee  arthroplasty)  Swelling: mild  Effusion: no effusion present      Left Knee Exam     Tenderness   The patient is experiencing tenderness in the medial joint line and lateral joint line.    Other   Swelling: mild  Effusion: no effusion present             EXAMINATION:  XR KNEE ORTHO RIGHT WITH FLEXION     CLINICAL HISTORY:  Pain in right knee     TECHNIQUE:  AP standing as well as PA flexion standing and Merchant views of both knees were performed.  A lateral view of the right knee is also performed.     COMPARISON:  10/11/2021     FINDINGS:  There has been a trigonal right knee arthroplasty which shows no evidence for loosening, migration, or periprosthetic fracture.  No joint effusion.     Impression:     Right knee arthroplasty without complication.    EXAMINATION:  MRI KNEE WITHOUT CONTRAST LEFT     CLINICAL HISTORY:  left knee;Pain in left knee     TECHNIQUE:  Multiplanar, multisequence images were performed about the knee.     COMPARISON:  Knee x-ray of October 11, 2021     FINDINGS:  A fracture or bone marrow replacement the femur, tibia, fibula or patella is not seen.  Bone marrow edema is not noted.  Significant joint effusion is not seen.  The articular cartilages appear well intact.     The patellotibial ligament is intact.  The medial and lateral collateral ligaments are intact with normal MR signal.  The anterior and posterior cruciate ligaments are intact with normal MR signal.     .  There is mild elevated signal from the anterior and posterior horns of the lateral meniscus consistent with intrasubstance degeneration.  There is mild  elevated signal from the posterior horn of the medial meniscus consistent with intrasubstance degeneration.  A tear of either meniscus is not seen.  No free fragments are noted.     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    Bilateral chronic knee pain    Primary osteoarthritis of left knee  -     Ambulatory referral/consult to Physical Medicine Rehab    Status post total right knee replacement  -     Ambulatory referral/consult to Physical Medicine Rehab    Impaired functional mobility and activity tolerance           PLAN:   Marleen Samano is a 76 y.o. female with chronic bilateral knee pain.  She has a history of a total knee arthroplasty on the right.  She has chronic knee pain on the left.  She has an MRI which shows meniscal tears of the medial and lateral menisci.  She has had intra-articular injections on the left without any significant improvement in lasting pain or function.  She is not interested in a total knee arthroplasty at this time.  At this time, will proceed which an excellent nerve blocks on the right and left.  Should this provide her with greater than 60% reduction in pain and function, we can proceed with radiofrequency ablation to the sensory articular branches of the right and left knees        Abner Huerta D.O.  Physical Medicine and Rehabilitation          CC: Patients PCP: Taylor Amador MD  CC: Referring Provider: Dr. Pedro Jesus*

## 2022-03-07 NOTE — PROCEDURES
Procedures     Procedure: Bilateral knee 1. Superolateral genicular branch from the vastus lateralis2. Superomedial genicular branch from the vastus medialis3. Inferomedial genicular branch from the saphenous nerve block with ultrasound guidance    Preprocedure diagnosis:  Knee osteoarthritis and chronic knee pain    Postprocedure diagnosis:  Same    Anesthetic local    Assistants none    Equipment used:  C4M HS60 with a linear transducer    Pre-procedure pain score: 8    Postprocedure pain score: 1    Procedure in detail:  Risks and benefits were discussed and written informed consent was obtained.  The patient was placed in a prone position on the exam table.  Using ultrasound, the superolateral genicular brach at the superolateral portion of the femoral condyle of the right knee was identified.  A 27 gauge 1.5 in needle was used to anesthetize the skin and track with approximately 1 cc of 1% lidocaine.  This needle was removed and replaced with a 2 in 21 gauge needle which was advanced to os in the area of the superolateral genicular branch and 1 cc of 0.50% bupivacaine was injected under live ultrasound showing excellent spread.  The procedure was then repeated at the superomedial femoral condyle in the area of the superomedial genicular nerve and at the medial tibial condyle in the area of the inferomedial genicular branch in a similar fashion.      The procedure was then repeated on the left side in a similar fashion.

## 2022-03-07 NOTE — PROGRESS NOTES
Today's Date: 03/07/2022     Referring Provider: Dr. Pedro Jesus*     Chief Complaint:   Chief Complaint   Patient presents with    Knee Pain     Bilateral knee pain       HPI: Marleen Samano is a 76 y.o. female  who presents today for evaluation of chronic bilateral knee pain.  She complains of bilateral right greater than left knee pain.  She has a history of a total knee arthroplasty on the right.  Recent x-ray shows no evidence of hardware loosening.  She complains of pain over the right anterior and on the left lateral joint.  She describes the pain as a dull achy pain on the left and a shocking shooting pain on the right.  She denies any swelling.  She has had injections on the left without any significant lasting improvement in pain or function    Review of Systems   Constitutional: Negative for chills and fever.   HENT: Negative for congestion and tinnitus.    Eyes: Negative for blurred vision and photophobia.   Respiratory: Negative for shortness of breath and wheezing.    Cardiovascular: Negative for chest pain and palpitations.   Gastrointestinal: Negative for nausea and vomiting.   Genitourinary: Negative for dysuria and frequency.   Musculoskeletal: Positive for joint pain. Negative for myalgias.   Skin: Negative for itching and rash.   Neurological: Negative for speech change and focal weakness.   Endo/Heme/Allergies: Negative for environmental allergies. Does not bruise/bleed easily.   Psychiatric/Behavioral: Negative for depression. The patient is not nervous/anxious.         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 Has a good support system of friends,  daughter and grandchildren. Keeps herself busy w/ gardening, socializing. Facing some financial issues, supports grandson struggling w/ drug abuse who is staying with her which causes stress. Denies any concern regarding safety at home. Has not had much financial support from her family w/ caring for her grandson.       Family History   Problem Relation Age of Onset    Cancer Mother     Diabetes Neg Hx     Melanoma Neg Hx     Psoriasis Neg Hx     Lupus Neg Hx     Eczema Neg Hx        Current Outpatient Medications on File Prior to Visit   Medication Sig Dispense Refill    albuterol (PROVENTIL/VENTOLIN HFA) 90 mcg/actuation inhaler Inhale 2 puffs into the lungs every 4 (four) hours as needed for Wheezing or Shortness of Breath. Rescue 54 g 0    albuterol-ipratropium (DUO-NEB) 2.5 mg-0.5 mg/3 mL nebulizer solution USE 1 VIAL VIA NEBULIZER  EVERY 6 HOURS AS NEEDED FOR WHEEZING RESCUE 180 mL 23    amLODIPine (NORVASC) 2.5 MG tablet Take 1 tablet (2.5 mg total) by mouth once daily. 90 tablet 3    aspirin 81 MG Chew Take 81 mg by mouth once daily.      baclofen (LIORESAL) 20 MG tablet TAKE 1 TABLET BY MOUTH  TWICE DAILY 180 tablet 3    benazepriL (LOTENSIN) 40 MG tablet Take 1 tablet by mouth once daily 90 tablet 3    blood sugar diagnostic Strp To check BG 2 times daily, to use with insurance preferred meter 200 strip 3    blood-glucose meter kit To check BG 1 times daily, to use with insurance preferred meter 1 each 0    diclofenac sodium (VOLTAREN) 1 % Gel Apply topically.       gabapentin (NEURONTIN) 600 MG tablet TAKE 1 TABLET BY MOUTH 3  TIMES DAILY 270 tablet 3    lancets Misc To check BG 2 times daily, to use with insurance preferred meter 200 each 3    magnesium oxide (MAG-OX) 400 mg (241.3 mg magnesium) tablet Take 1 tablet (400 mg total) by mouth 2 (two) times daily. 180 tablet 3    metoprolol succinate (TOPROL-XL) 25 MG 24 hr tablet Take 1 tablet (25 mg total) by mouth once daily. 30 tablet  11    mupirocin (BACTROBAN) 2 % ointment Apply to forearm and wrap with non stick gauze and coban wrap 22 g 1    omeprazole (PRILOSEC) 40 MG capsule Take 1 capsule (40 mg total) by mouth 2 (two) times daily before meals. 180 capsule 2    potassium chloride (MICRO-K) 10 MEQ CpSR Take 1 capsule (10 mEq total) by mouth daily as needed (Take one daily when you take furosemide). 30 capsule 3    simethicone (GAS-X ORAL) Take by mouth.      sumatriptan (IMITREX) 100 MG tablet TAKE 1 TABLET BY MOUTH AFTER ONSET OF MIGRAINE. MAY REPEAT AFTER 2 HOURS IF HEADACHE RETURNS NOT TO EXCEED 200MG IN 24 HOURS      trazodone (DESYREL) 100 MG tablet Take 100 mg by mouth every evening.      venlafaxine (EFFEXOR-XR) 150 MG Cp24 Take 1 capsule (150 mg total) by mouth 2 (two) times a day. 14 capsule 0     Current Facility-Administered Medications on File Prior to Visit   Medication Dose Route Frequency Provider Last Rate Last Admin    triamcinolone acetonide injection 1 mg  1 mg Intradermal Once Hilary Finnegan MD            Review of patient's allergies indicates:   Allergen Reactions    Adhesive tape-silicones Rash     Blisters after on for several hours    Augmentin [amoxicillin-pot clavulanate]     Ciprofloxacin     Morphine Itching and Hives       Past Surgical History:   Procedure Laterality Date    APPENDECTOMY      BREAST BIOPSY      BREAST LUMPECTOMY      CATARACT EXTRACTION      EYE SURGERY      HYSTERECTOMY      NECK SURGERY      REVISION OF KNEE ARTHROPLASTY Right 7/14/2020    Procedure: REVISION, ARTHROPLASTY, KNEE;  Surgeon: Pedro Tompkins MD;  Location: [facility];  Service: Orthopedics;  Laterality: Right;    TONSILLECTOMY         Past Medical History:   Diagnosis Date    Abnormal CBC 2/10/2020    Anemia due to multiple mechanisms 2/10/2020    Anemia in chronic kidney disease (CKD)     Anemia, chronic disease 2/10/2020    Arthritis     Aseptic loosening of prosthetic knee, initial  encounter 7/14/2020    Bell's palsy     Bladder incontinence     Blepharospasm     Bronchiectasis without complication 10/8/2019    Cervical dystonia     Concussion     COPD (chronic obstructive pulmonary disease)     Enterocolitis 2/7/2020    Fainting spell     2/7/2020    Hormone deficiency     Hypertension     Loose right total knee arthroplasty 6/15/2020    Migraine     Muscle spasm     Myofacial pain syndrome     Normocytic normochromic anemia 2/10/2020    Right ear pain 2/9/2021    Squamous cell carcinoma of skin     Syncope and collapse 2/9/2021       Vitals:    02/22/22 0945   Resp: 16     Physical Exam  Constitutional:       General: She is not in acute distress.     Appearance: Normal appearance.   HENT:      Head: Normocephalic and atraumatic.      Nose: Nose normal. No congestion.      Mouth/Throat:      Mouth: Mucous membranes are moist.      Pharynx: Oropharynx is clear.   Eyes:      Extraocular Movements: Extraocular movements intact.      Pupils: Pupils are equal, round, and reactive to light.   Neck:      Trachea: Trachea and phonation normal.   Pulmonary:      Effort: Pulmonary effort is normal. No respiratory distress.   Abdominal:      General: Abdomen is flat. There is no distension.   Musculoskeletal:      Right knee: No effusion.      Left knee: No effusion.   Skin:     General: Skin is warm and dry.   Neurological:      General: No focal deficit present.      Mental Status: She is alert and oriented to person, place, and time.      Cranial Nerves: Cranial nerves are intact.      Sensory: Sensation is intact.      Motor: Motor function is intact.      Gait: Gait abnormal.   Psychiatric:         Mood and Affect: Mood and affect normal.         Behavior: Behavior normal.        Right Knee Exam     Tenderness   The patient is experiencing tenderness in the lateral joint line and medial joint line.    Other   Scars: present (Consistent with prior total knee  arthroplasty)  Swelling: mild  Effusion: no effusion present      Left Knee Exam     Tenderness   The patient is experiencing tenderness in the medial joint line and lateral joint line.    Other   Swelling: mild  Effusion: no effusion present             EXAMINATION:  XR KNEE ORTHO RIGHT WITH FLEXION     CLINICAL HISTORY:  Pain in right knee     TECHNIQUE:  AP standing as well as PA flexion standing and Merchant views of both knees were performed.  A lateral view of the right knee is also performed.     COMPARISON:  10/11/2021     FINDINGS:  There has been a trigonal right knee arthroplasty which shows no evidence for loosening, migration, or periprosthetic fracture.  No joint effusion.     Impression:     Right knee arthroplasty without complication.    EXAMINATION:  MRI KNEE WITHOUT CONTRAST LEFT     CLINICAL HISTORY:  left knee;Pain in left knee     TECHNIQUE:  Multiplanar, multisequence images were performed about the knee.     COMPARISON:  Knee x-ray of October 11, 2021     FINDINGS:  A fracture or bone marrow replacement the femur, tibia, fibula or patella is not seen.  Bone marrow edema is not noted.  Significant joint effusion is not seen.  The articular cartilages appear well intact.     The patellotibial ligament is intact.  The medial and lateral collateral ligaments are intact with normal MR signal.  The anterior and posterior cruciate ligaments are intact with normal MR signal.     .  There is mild elevated signal from the anterior and posterior horns of the lateral meniscus consistent with intrasubstance degeneration.  There is mild  elevated signal from the posterior horn of the medial meniscus consistent with intrasubstance degeneration.  A tear of either meniscus is not seen.  No free fragments are noted.     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    Bilateral chronic knee pain    Primary osteoarthritis of left knee  -     Ambulatory referral/consult to Physical Medicine Rehab    Status post total right knee replacement  -     Ambulatory referral/consult to Physical Medicine Rehab    Impaired functional mobility and activity tolerance           PLAN:   Marleen Samano is a 76 y.o. female with chronic bilateral knee pain.  She has a history of a total knee arthroplasty on the right.  She has chronic knee pain on the left.  She has an MRI which shows meniscal tears of the medial and lateral menisci.  She has had intra-articular injections on the left without any significant improvement in lasting pain or function.  She is not interested in a total knee arthroplasty at this time.  At this time, will proceed which an excellent nerve blocks on the right and left.  Should this provide her with greater than 60% reduction in pain and function, we can proceed with radiofrequency ablation to the sensory articular branches of the right and left knees        Abner Huerta D.O.  Physical Medicine and Rehabilitation          CC: Patients PCP: Taylor Amador MD  CC: Referring Provider: Dr. Pedro Jesus*

## 2022-03-08 NOTE — PROGRESS NOTES
"    Subjective:   Patient ID: Marleen Samano is a 76 y.o. female     Chief Complaint:Abrasion (Abrasions on arms)      Pt with rash for past few weeks. Notes getting into contat with "stickers" in back yard and since then has been picking them out of her skin. Denies any fever.    Review of Systems   Respiratory: Negative for shortness of breath.    Cardiovascular: Negative for chest pain.   Gastrointestinal: Negative for abdominal pain.   Genitourinary: Negative for dysuria.   Skin: Positive for rash.     Past Medical History:   Diagnosis Date    Abnormal CBC 2/10/2020    Anemia due to multiple mechanisms 2/10/2020    Anemia in chronic kidney disease (CKD)     Anemia, chronic disease 2/10/2020    Arthritis     Aseptic loosening of prosthetic knee, initial encounter 7/14/2020    Bell's palsy     Bladder incontinence     Blepharospasm     Bronchiectasis without complication 10/8/2019    Cervical dystonia     Concussion     COPD (chronic obstructive pulmonary disease)     Enterocolitis 2/7/2020    Fainting spell     2/7/2020    Hormone deficiency     Hypertension     Loose right total knee arthroplasty 6/15/2020    Migraine     Muscle spasm     Myofacial pain syndrome     Normocytic normochromic anemia 2/10/2020    Right ear pain 2/9/2021    Squamous cell carcinoma of skin     Syncope and collapse 2/9/2021     Past Surgical History:   Procedure Laterality Date    APPENDECTOMY      BREAST BIOPSY      BREAST LUMPECTOMY      CATARACT EXTRACTION      EYE SURGERY      HYSTERECTOMY      NECK SURGERY      REVISION OF KNEE ARTHROPLASTY Right 7/14/2020    Procedure: REVISION, ARTHROPLASTY, KNEE;  Surgeon: Pedro Tompkins MD;  [facility]: [facility];  Service: Orthopedics;  Laterality: Right;    TONSILLECTOMY       Objective:     Vitals:    03/04/22 0937   BP: 116/80   Pulse: 80     Body mass index is 24.71 kg/m².  Physical Exam  Vitals and nursing note reviewed.   Constitutional:       " "Appearance: She is well-developed.   HENT:      Head: Normocephalic and atraumatic.   Eyes:      General: No scleral icterus.     Conjunctiva/sclera: Conjunctivae normal.   Pulmonary:      Effort: Pulmonary effort is normal. No respiratory distress.   Musculoskeletal:         General: No deformity. Normal range of motion.      Cervical back: Normal range of motion and neck supple.   Skin:     Coloration: Skin is not pale.      Findings: No rash.   Neurological:      Mental Status: She is alert and oriented to person, place, and time.   Psychiatric:         Behavior: Behavior normal.         Thought Content: Thought content normal.         Judgment: Judgment normal.       Assessment:     1. Cellulitis, unspecified cellulitis site      Plan:   Cellulitis, unspecified cellulitis site  -     mupirocin (BACTROBAN) 2 % ointment; Apply topically 3 (three) times daily.  Dispense: 15 g; Refill: 1  -     clindamycin (CLEOCIN) 300 MG capsule; Take 1 capsule (300 mg total) by mouth 3 (three) times daily. for 5 days  Dispense: 15 capsule; Refill: 0  Advised pt to stop attempting to "pick stickers" out of her arm as it does not appear there is any foreign body at this time. Advised wash with soap and water and fu if failure to improve          Total time spent of Less than 30 minutes minutes on the day of the visit.This includes face to face time and preparing to see the patient, obtaining and reviewing separately obtained history, documenting clinical information in the electronic or other health record, independently interpreting results and communicating results to the patient/family/caregiver, or care coordinator.    Yomi Baugh MD  03/08/2022    Portions of this note have been dictated with M Modal.        "

## 2022-03-10 ENCOUNTER — PATIENT MESSAGE (OUTPATIENT)
Dept: WOUND CARE | Facility: HOSPITAL | Age: 77
End: 2022-03-10
Payer: MEDICARE

## 2022-03-10 ENCOUNTER — OFFICE VISIT (OUTPATIENT)
Dept: WOUND CARE | Facility: HOSPITAL | Age: 77
End: 2022-03-10
Attending: FAMILY MEDICINE
Payer: MEDICARE

## 2022-03-10 VITALS
HEART RATE: 68 BPM | DIASTOLIC BLOOD PRESSURE: 76 MMHG | RESPIRATION RATE: 18 BRPM | SYSTOLIC BLOOD PRESSURE: 136 MMHG | TEMPERATURE: 98 F

## 2022-03-10 DIAGNOSIS — S51.809D: Primary | ICD-10-CM

## 2022-03-10 DIAGNOSIS — S40.812D ABRASION OF LEFT UPPER EXTREMITY, SUBSEQUENT ENCOUNTER: ICD-10-CM

## 2022-03-10 DIAGNOSIS — T07.XXXA ABRASIONS OF MULTIPLE SITES: ICD-10-CM

## 2022-03-10 DIAGNOSIS — S41.112D LACERATION OF MULTIPLE SITES OF LEFT UPPER EXTREMITY, SUBSEQUENT ENCOUNTER: ICD-10-CM

## 2022-03-10 DIAGNOSIS — S40.811D ABRASION OF RIGHT UPPER EXTREMITY, SUBSEQUENT ENCOUNTER: ICD-10-CM

## 2022-03-10 DIAGNOSIS — S41.111D LACERATION OF MULTIPLE SITES OF RIGHT UPPER EXTREMITY, SUBSEQUENT ENCOUNTER: ICD-10-CM

## 2022-03-10 PROCEDURE — 1160F RVW MEDS BY RX/DR IN RCRD: CPT | Mod: CPTII,,, | Performed by: FAMILY MEDICINE

## 2022-03-10 PROCEDURE — 3078F PR MOST RECENT DIASTOLIC BLOOD PRESSURE < 80 MM HG: ICD-10-PCS | Mod: CPTII,,, | Performed by: FAMILY MEDICINE

## 2022-03-10 PROCEDURE — 1159F MED LIST DOCD IN RCRD: CPT | Mod: CPTII,,, | Performed by: FAMILY MEDICINE

## 2022-03-10 PROCEDURE — 99213 OFFICE O/P EST LOW 20 MIN: CPT | Mod: ,,, | Performed by: FAMILY MEDICINE

## 2022-03-10 PROCEDURE — 1160F PR REVIEW ALL MEDS BY PRESCRIBER/CLIN PHARMACIST DOCUMENTED: ICD-10-PCS | Mod: CPTII,,, | Performed by: FAMILY MEDICINE

## 2022-03-10 PROCEDURE — 3078F DIAST BP <80 MM HG: CPT | Mod: CPTII,,, | Performed by: FAMILY MEDICINE

## 2022-03-10 PROCEDURE — 99214 OFFICE O/P EST MOD 30 MIN: CPT | Performed by: FAMILY MEDICINE

## 2022-03-10 PROCEDURE — 1159F PR MEDICATION LIST DOCUMENTED IN MEDICAL RECORD: ICD-10-PCS | Mod: CPTII,,, | Performed by: FAMILY MEDICINE

## 2022-03-10 PROCEDURE — 99213 PR OFFICE/OUTPT VISIT, EST, LEVL III, 20-29 MIN: ICD-10-PCS | Mod: ,,, | Performed by: FAMILY MEDICINE

## 2022-03-10 PROCEDURE — 1126F AMNT PAIN NOTED NONE PRSNT: CPT | Mod: CPTII,,, | Performed by: FAMILY MEDICINE

## 2022-03-10 PROCEDURE — 1126F PR PAIN SEVERITY QUANTIFIED, NO PAIN PRESENT: ICD-10-PCS | Mod: CPTII,,, | Performed by: FAMILY MEDICINE

## 2022-03-10 PROCEDURE — 3075F PR MOST RECENT SYSTOLIC BLOOD PRESS GE 130-139MM HG: ICD-10-PCS | Mod: CPTII,,, | Performed by: FAMILY MEDICINE

## 2022-03-10 PROCEDURE — 3075F SYST BP GE 130 - 139MM HG: CPT | Mod: CPTII,,, | Performed by: FAMILY MEDICINE

## 2022-03-10 NOTE — PROGRESS NOTES
"            Wound Care and Hyperbaric Medicine                Progress Note    Subjective:       Patient ID: Marleen Samano is a 76 y.o. female.    Chief Complaint: Wound Care    HPI  Pt seen in clinic for a FU visit for BL arms open wounds. Pt stated she fell into a "sticker bush" months ago and can still pick the stickers out of the arms. The open areas have now expanded up her arm and toward the shoulder. She stated the stickers can "jump" from one area to another. Last visit she brought a jar with stickers she pulled from her arms. She feels that her arm is filled with the stickers and they can only be removed with a tweezers. She was unable to show me any sites on her arms with the foreign bodies in them. She has no other complaints at this visit  Review of Systems   Skin: Positive for wound.        BL arms open wounds   All other systems reviewed and are negative.        Objective:        Physical Exam  Vitals and nursing note reviewed.   Constitutional:       General: She is not in acute distress.     Appearance: Normal appearance. She is not ill-appearing, toxic-appearing or diaphoretic.   Skin:     General: Skin is warm and dry.      Coloration: Skin is not jaundiced or pale.      Findings: Lesion present. No bruising or erythema.      Comments: Multiple abrasions and open wounds to both arms, see wound care assessment documentation in chart review scanned under the media tab   Neurological:      General: No focal deficit present.      Mental Status: She is alert and oriented to person, place, and time.   Psychiatric:         Mood and Affect: Mood normal.         Behavior: Behavior normal.         Thought Content: Thought content normal.         Judgment: Judgment normal.         Vitals:    03/10/22 1053   BP: 136/76   Pulse: 68   Resp: 18   Temp: 98 °F (36.7 °C)       Assessment:           ICD-10-CM ICD-9-CM   1. Open wound of forearm, unspecified laterality, subsequent encounter  S51.809D V58.89     " 881.00   2. Laceration of multiple sites of right upper extremity, subsequent encounter  S41.111D V58.89     884.0   3. Abrasions of multiple sites  T07.XXXA 919.0   4. Abrasion of left upper extremity, subsequent encounter  S40.812D V58.89   5. Abrasion of right upper extremity, subsequent encounter  S40.811D V58.89   6. Laceration of multiple sites of left upper extremity, subsequent encounter  S41.112D V58.89     884.0                Plan:                  Marleen was seen today for wound care.    Diagnoses and all orders for this visit:    Open wound of forearm, unspecified laterality, subsequent encounter  -     Ambulatory referral/consult to Infectious Disease; Future    Laceration of multiple sites of right upper extremity, subsequent encounter  -     Ambulatory referral/consult to Infectious Disease; Future    Abrasions of multiple sites  -     Ambulatory referral/consult to Infectious Disease; Future    Abrasion of left upper extremity, subsequent encounter    Abrasion of right upper extremity, subsequent encounter    Laceration of multiple sites of left upper extremity, subsequent encounter      Much of the documentation for this visit was completed in wound docs system. Please see the attached documentation for further details about the patients care. Scanned under the media tab.

## 2022-03-13 ENCOUNTER — LAB VISIT (OUTPATIENT)
Dept: PRIMARY CARE CLINIC | Facility: CLINIC | Age: 77
End: 2022-03-13
Payer: MEDICARE

## 2022-03-13 DIAGNOSIS — Z01.818 PRE-OP TESTING: ICD-10-CM

## 2022-03-13 PROCEDURE — U0005 INFEC AGEN DETEC AMPLI PROBE: HCPCS | Performed by: PHYSICAL MEDICINE & REHABILITATION

## 2022-03-13 PROCEDURE — U0003 INFECTIOUS AGENT DETECTION BY NUCLEIC ACID (DNA OR RNA); SEVERE ACUTE RESPIRATORY SYNDROME CORONAVIRUS 2 (SARS-COV-2) (CORONAVIRUS DISEASE [COVID-19]), AMPLIFIED PROBE TECHNIQUE, MAKING USE OF HIGH THROUGHPUT TECHNOLOGIES AS DESCRIBED BY CMS-2020-01-R: HCPCS | Performed by: PHYSICAL MEDICINE & REHABILITATION

## 2022-03-14 ENCOUNTER — OFFICE VISIT (OUTPATIENT)
Dept: FAMILY MEDICINE | Facility: CLINIC | Age: 77
End: 2022-03-14
Payer: MEDICARE

## 2022-03-14 VITALS
HEIGHT: 69 IN | RESPIRATION RATE: 18 BRPM | HEART RATE: 85 BPM | BODY MASS INDEX: 24.39 KG/M2 | DIASTOLIC BLOOD PRESSURE: 78 MMHG | WEIGHT: 164.69 LBS | SYSTOLIC BLOOD PRESSURE: 136 MMHG | OXYGEN SATURATION: 97 %

## 2022-03-14 DIAGNOSIS — M79.89 LEG SWELLING: ICD-10-CM

## 2022-03-14 DIAGNOSIS — L29.9 ITCH: Primary | ICD-10-CM

## 2022-03-14 DIAGNOSIS — R60.0 LOCALIZED EDEMA: ICD-10-CM

## 2022-03-14 LAB
SARS-COV-2 RNA RESP QL NAA+PROBE: NOT DETECTED
SARS-COV-2- CYCLE NUMBER: NORMAL

## 2022-03-14 PROCEDURE — 1125F PR PAIN SEVERITY QUANTIFIED, PAIN PRESENT: ICD-10-PCS | Mod: CPTII,S$GLB,, | Performed by: STUDENT IN AN ORGANIZED HEALTH CARE EDUCATION/TRAINING PROGRAM

## 2022-03-14 PROCEDURE — 3078F PR MOST RECENT DIASTOLIC BLOOD PRESSURE < 80 MM HG: ICD-10-PCS | Mod: CPTII,S$GLB,, | Performed by: STUDENT IN AN ORGANIZED HEALTH CARE EDUCATION/TRAINING PROGRAM

## 2022-03-14 PROCEDURE — 3288F PR FALLS RISK ASSESSMENT DOCUMENTED: ICD-10-PCS | Mod: CPTII,S$GLB,, | Performed by: STUDENT IN AN ORGANIZED HEALTH CARE EDUCATION/TRAINING PROGRAM

## 2022-03-14 PROCEDURE — 99214 PR OFFICE/OUTPT VISIT, EST, LEVL IV, 30-39 MIN: ICD-10-PCS | Mod: S$GLB,,, | Performed by: STUDENT IN AN ORGANIZED HEALTH CARE EDUCATION/TRAINING PROGRAM

## 2022-03-14 PROCEDURE — 1160F RVW MEDS BY RX/DR IN RCRD: CPT | Mod: CPTII,S$GLB,, | Performed by: STUDENT IN AN ORGANIZED HEALTH CARE EDUCATION/TRAINING PROGRAM

## 2022-03-14 PROCEDURE — 3075F PR MOST RECENT SYSTOLIC BLOOD PRESS GE 130-139MM HG: ICD-10-PCS | Mod: CPTII,S$GLB,, | Performed by: STUDENT IN AN ORGANIZED HEALTH CARE EDUCATION/TRAINING PROGRAM

## 2022-03-14 PROCEDURE — 3078F DIAST BP <80 MM HG: CPT | Mod: CPTII,S$GLB,, | Performed by: STUDENT IN AN ORGANIZED HEALTH CARE EDUCATION/TRAINING PROGRAM

## 2022-03-14 PROCEDURE — 99999 PR PBB SHADOW E&M-EST. PATIENT-LVL V: ICD-10-PCS | Mod: PBBFAC,,, | Performed by: STUDENT IN AN ORGANIZED HEALTH CARE EDUCATION/TRAINING PROGRAM

## 2022-03-14 PROCEDURE — 99214 OFFICE O/P EST MOD 30 MIN: CPT | Mod: S$GLB,,, | Performed by: STUDENT IN AN ORGANIZED HEALTH CARE EDUCATION/TRAINING PROGRAM

## 2022-03-14 PROCEDURE — 1159F PR MEDICATION LIST DOCUMENTED IN MEDICAL RECORD: ICD-10-PCS | Mod: CPTII,S$GLB,, | Performed by: STUDENT IN AN ORGANIZED HEALTH CARE EDUCATION/TRAINING PROGRAM

## 2022-03-14 PROCEDURE — 1159F MED LIST DOCD IN RCRD: CPT | Mod: CPTII,S$GLB,, | Performed by: STUDENT IN AN ORGANIZED HEALTH CARE EDUCATION/TRAINING PROGRAM

## 2022-03-14 PROCEDURE — 99999 PR PBB SHADOW E&M-EST. PATIENT-LVL V: CPT | Mod: PBBFAC,,, | Performed by: STUDENT IN AN ORGANIZED HEALTH CARE EDUCATION/TRAINING PROGRAM

## 2022-03-14 PROCEDURE — 1101F PR PT FALLS ASSESS DOC 0-1 FALLS W/OUT INJ PAST YR: ICD-10-PCS | Mod: CPTII,S$GLB,, | Performed by: STUDENT IN AN ORGANIZED HEALTH CARE EDUCATION/TRAINING PROGRAM

## 2022-03-14 PROCEDURE — 3288F FALL RISK ASSESSMENT DOCD: CPT | Mod: CPTII,S$GLB,, | Performed by: STUDENT IN AN ORGANIZED HEALTH CARE EDUCATION/TRAINING PROGRAM

## 2022-03-14 PROCEDURE — 3075F SYST BP GE 130 - 139MM HG: CPT | Mod: CPTII,S$GLB,, | Performed by: STUDENT IN AN ORGANIZED HEALTH CARE EDUCATION/TRAINING PROGRAM

## 2022-03-14 PROCEDURE — 1101F PT FALLS ASSESS-DOCD LE1/YR: CPT | Mod: CPTII,S$GLB,, | Performed by: STUDENT IN AN ORGANIZED HEALTH CARE EDUCATION/TRAINING PROGRAM

## 2022-03-14 PROCEDURE — 1125F AMNT PAIN NOTED PAIN PRSNT: CPT | Mod: CPTII,S$GLB,, | Performed by: STUDENT IN AN ORGANIZED HEALTH CARE EDUCATION/TRAINING PROGRAM

## 2022-03-14 PROCEDURE — 1160F PR REVIEW ALL MEDS BY PRESCRIBER/CLIN PHARMACIST DOCUMENTED: ICD-10-PCS | Mod: CPTII,S$GLB,, | Performed by: STUDENT IN AN ORGANIZED HEALTH CARE EDUCATION/TRAINING PROGRAM

## 2022-03-14 RX ORDER — HYDROXYZINE HYDROCHLORIDE 25 MG/1
25 TABLET, FILM COATED ORAL 3 TIMES DAILY PRN
Qty: 60 TABLET | Refills: 1 | Status: SHIPPED | OUTPATIENT
Start: 2022-03-14 | End: 2022-04-07

## 2022-03-15 ENCOUNTER — PATIENT MESSAGE (OUTPATIENT)
Dept: WOUND CARE | Facility: HOSPITAL | Age: 77
End: 2022-03-15
Payer: MEDICARE

## 2022-03-15 NOTE — PROGRESS NOTES
[facility] MEDICINE CLINIC NOTE    Patient Name: Marleen Samano  YOB: 1945    PRESENTING HISTORY   Chief Complaint:   Chief Complaint   Patient presents with    Establish Care        History of Present Illness:  Ms. Marleen Samano is a 76 y.o. female here to establish care.     Primary issue at this time is bilateral arm wounds. Recently seen by Wound Care. Had episode of cellulitis related. This has improved. Off abx.     She got into a sticker vine a few months ago and has had persistent stickers in her skin which she has been pulling out.   They itch so she feels she must remove them.   Only affects arms, not present on rest of body.     She mentions swelling in LLE. Likely chronic, but asymmetric. A/w varicose veins. No pain, redness.                                             Review of Systems   All other systems reviewed and are negative.        PAST HISTORY:     Past Medical History:   Diagnosis Date    Abnormal CBC 2/10/2020    Anemia due to multiple mechanisms 2/10/2020    Anemia in chronic kidney disease (CKD)     Anemia, chronic disease 2/10/2020    Arthritis     Aseptic loosening of prosthetic knee, initial encounter 7/14/2020    Bell's palsy     Bladder incontinence     Blepharospasm     Bronchiectasis without complication 10/8/2019    Cervical dystonia     Concussion     COPD (chronic obstructive pulmonary disease)     Enterocolitis 2/7/2020    Fainting spell     2/7/2020    Hormone deficiency     Hypertension     Loose right total knee arthroplasty 6/15/2020    Migraine     Muscle spasm     Myofacial pain syndrome     Normocytic normochromic anemia 2/10/2020    Right ear pain 2/9/2021    Squamous cell carcinoma of skin     Syncope and collapse 2/9/2021       Past Surgical History:   Procedure Laterality Date    APPENDECTOMY      BREAST BIOPSY      BREAST LUMPECTOMY      CATARACT EXTRACTION      EYE SURGERY      HYSTERECTOMY      NECK SURGERY       REVISION OF KNEE ARTHROPLASTY Right 7/14/2020    Procedure: REVISION, ARTHROPLASTY, KNEE;  Surgeon: Pedro Tompkins MD;  Location: [facility] OR;  Service: Orthopedics;  Laterality: Right;    TONSILLECTOMY         Family History   Problem Relation Age of Onset    Cancer Mother     Diabetes Neg Hx     Melanoma Neg Hx     Psoriasis Neg Hx     Lupus Neg Hx     Eczema Neg Hx        Social History     Socioeconomic History    Marital status:    Occupational History    Occupation: Caregiver   Tobacco Use    Smoking status: Never Smoker    Smokeless tobacco: Never Used   Substance and Sexual Activity    Alcohol use: No    Drug use: No   Social History Narrative    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 Has a good support system of friends, daughter and grandchildren. Keeps herself busy w/ gardening, socializing. Facing some financial issues, supports grandson struggling w/ drug abuse who is staying with her which causes stress. Denies any concern regarding safety at home. Has not had much financial support from her family w/ caring for her grandson.       MEDICATIONS & ALLERGIES:     Current Outpatient Medications on File Prior to Visit   Medication Sig    albuterol (PROVENTIL/VENTOLIN HFA) 90 mcg/actuation inhaler Inhale 2 puffs into the lungs every 4 (four) hours as needed for Wheezing or Shortness of Breath. Rescue    albuterol-ipratropium (DUO-NEB) 2.5 mg-0.5 mg/3 mL nebulizer solution USE 1 VIAL VIA NEBULIZER  EVERY 6 HOURS AS NEEDED FOR WHEEZING RESCUE    amLODIPine (NORVASC) 2.5 MG tablet Take 1 tablet (2.5 mg total) by mouth once daily.    aspirin 81 MG Chew Take 81 mg by mouth once daily.    baclofen (LIORESAL) 20 MG tablet TAKE 1 TABLET BY MOUTH  TWICE DAILY    benazepriL (LOTENSIN) 40 MG tablet Take 1 tablet by mouth once daily    blood sugar diagnostic Strp To check BG 2 times daily, to use with insurance preferred meter     "blood-glucose meter kit To check BG 1 times daily, to use with insurance preferred meter    diclofenac sodium (VOLTAREN) 1 % Gel Apply topically.     gabapentin (NEURONTIN) 600 MG tablet TAKE 1 TABLET BY MOUTH 3  TIMES DAILY    lancets Misc To check BG 2 times daily, to use with insurance preferred meter    magnesium oxide (MAG-OX) 400 mg (241.3 mg magnesium) tablet Take 1 tablet (400 mg total) by mouth 2 (two) times daily.    metoprolol succinate (TOPROL-XL) 25 MG 24 hr tablet Take 1 tablet (25 mg total) by mouth once daily.    mupirocin (BACTROBAN) 2 % ointment Apply topically 3 (three) times daily.    omeprazole (PRILOSEC) 40 MG capsule Take 1 capsule (40 mg total) by mouth 2 (two) times daily before meals.    potassium chloride (MICRO-K) 10 MEQ CpSR Take 1 capsule (10 mEq total) by mouth daily as needed (Take one daily when you take furosemide).    simethicone (GAS-X ORAL) Take by mouth.    sumatriptan (IMITREX) 100 MG tablet TAKE 1 TABLET BY MOUTH AFTER ONSET OF MIGRAINE. MAY REPEAT AFTER 2 HOURS IF HEADACHE RETURNS NOT TO EXCEED 200MG IN 24 HOURS    trazodone (DESYREL) 100 MG tablet Take 100 mg by mouth every evening.    venlafaxine (EFFEXOR-XR) 150 MG Cp24 Take 1 capsule (150 mg total) by mouth 2 (two) times a day.    mupirocin (BACTROBAN) 2 % ointment Apply to forearm and wrap with non stick gauze and coban wrap (Patient not taking: Reported on 3/14/2022)     Current Facility-Administered Medications on File Prior to Visit   Medication    triamcinolone acetonide injection 1 mg       Review of patient's allergies indicates:   Allergen Reactions    Adhesive tape-silicones Rash     Blisters after on for several hours    Augmentin [amoxicillin-pot clavulanate]     Ciprofloxacin     Morphine Itching and Hives       OBJECTIVE:   Vital Signs:  Vitals:    03/14/22 1048   BP: 136/78   Pulse: 85   Resp: 18   SpO2: 97%   Weight: 74.7 kg (164 lb 10.9 oz)   Height: 5' 9" (1.753 m)       No results found " for this or any previous visit (from the past 24 hour(s)).      Physical Exam  Vitals and nursing note reviewed.   Constitutional:       General: She is not in acute distress.     Appearance: She is not toxic-appearing or diaphoretic.   HENT:      Head: Normocephalic and atraumatic.      Right Ear: External ear normal.      Left Ear: External ear normal.   Eyes:      General: No scleral icterus.     Conjunctiva/sclera: Conjunctivae normal.      Pupils: Pupils are equal, round, and reactive to light.   Neck:      Thyroid: No thyromegaly.      Vascular: No carotid bruit.   Cardiovascular:      Rate and Rhythm: Normal rate and regular rhythm.      Heart sounds: Normal heart sounds. No murmur heard.  Pulmonary:      Effort: Pulmonary effort is normal. No respiratory distress.      Breath sounds: Normal breath sounds. No wheezing or rales.   Musculoskeletal:         General: No tenderness or deformity. Normal range of motion.      Cervical back: Normal range of motion and neck supple.      Comments: Trace RLE swelling.   1+ LLE edema. A/w hemosiderin deposition. Mild varicose veins.    Lymphadenopathy:      Cervical: No cervical adenopathy.   Skin:     General: Skin is warm and dry.      Comments: Extensive abrasions, small ulcerations to dorsal aspects of bilateral arms to the biceps bilaterally in various stages of healing. No retained foreign bodies visualized. Webspaces are not affected. Does not affect rest of body.   Not present on palmar sides of arms.    Neurological:      Mental Status: She is alert and oriented to person, place, and time.      Gait: Gait is intact.   Psychiatric:         Mood and Affect: Mood and affect normal.         Cognition and Memory: Memory normal.         Judgment: Judgment normal.         ASSESSMENT & PLAN:     Itch  -     hydrOXYzine HCL (ATARAX) 25 MG tablet; Take 1 tablet (25 mg total) by mouth 3 (three) times daily as needed for Itching.  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 Treat underlying itch. Encouraged her strongly to stop picking stickers out of her skin if possible. She seems to be in agreement with this plan.  If hydroxyzine does not improve, will consider alternative therapy, potentially seroquel.  I discussed that we will be using this off label and she consents to this.     Low risk for VTE. Get US to be safe.       Mikie Ryan MD   Internal Medicine    This note was created using Dragon voice recognition software that occasionally misinterprets phrases or words.

## 2022-03-16 ENCOUNTER — HOSPITAL ENCOUNTER (OUTPATIENT)
Facility: HOSPITAL | Age: 77
Discharge: HOME | End: 2022-03-16
Attending: PHYSICAL MEDICINE & REHABILITATION | Admitting: PHYSICAL MEDICINE & REHABILITATION
Payer: MEDICARE

## 2022-03-16 VITALS
OXYGEN SATURATION: 95 % | SYSTOLIC BLOOD PRESSURE: 140 MMHG | HEIGHT: 66 IN | WEIGHT: 164 LBS | RESPIRATION RATE: 18 BRPM | DIASTOLIC BLOOD PRESSURE: 63 MMHG | BODY MASS INDEX: 26.36 KG/M2 | HEART RATE: 69 BPM

## 2022-03-16 DIAGNOSIS — M25.569 CHRONIC KNEE PAIN: ICD-10-CM

## 2022-03-16 DIAGNOSIS — M17.0 PRIMARY OSTEOARTHRITIS OF BOTH KNEES: Primary | ICD-10-CM

## 2022-03-16 DIAGNOSIS — G89.29 CHRONIC KNEE PAIN: ICD-10-CM

## 2022-03-16 PROCEDURE — 27201423 OPTIME MED/SURG SUP & DEVICES STERILE SUPPLY: Performed by: PHYSICAL MEDICINE & REHABILITATION

## 2022-03-16 PROCEDURE — A4216 STERILE WATER/SALINE, 10 ML: HCPCS | Performed by: PHYSICAL MEDICINE & REHABILITATION

## 2022-03-16 PROCEDURE — 71000015 HC POSTOP RECOV 1ST HR: Performed by: PHYSICAL MEDICINE & REHABILITATION

## 2022-03-16 PROCEDURE — 64624 PR DESTRUCT, NEUROLYTIC AGENT, GENICULAR NERVE, W/IMG: ICD-10-PCS | Mod: RT,,, | Performed by: PHYSICAL MEDICINE & REHABILITATION

## 2022-03-16 PROCEDURE — 99900103 DSU ONLY-NO CHARGE-INITIAL HR (STAT): Performed by: PHYSICAL MEDICINE & REHABILITATION

## 2022-03-16 PROCEDURE — 25000003 PHARM REV CODE 250: Performed by: PHYSICAL MEDICINE & REHABILITATION

## 2022-03-16 PROCEDURE — 36000705 HC OR TIME LEV I EA ADD 15 MIN: Performed by: PHYSICAL MEDICINE & REHABILITATION

## 2022-03-16 PROCEDURE — 64624 DSTRJ NULYT AGT GNCLR NRV: CPT | Mod: RT,,, | Performed by: PHYSICAL MEDICINE & REHABILITATION

## 2022-03-16 PROCEDURE — 99900104 DSU ONLY-NO CHARGE-EA ADD'L HR (STAT): Performed by: PHYSICAL MEDICINE & REHABILITATION

## 2022-03-16 PROCEDURE — 36000704 HC OR TIME LEV I 1ST 15 MIN: Performed by: PHYSICAL MEDICINE & REHABILITATION

## 2022-03-16 PROCEDURE — 63600175 PHARM REV CODE 636 W HCPCS: Performed by: PHYSICAL MEDICINE & REHABILITATION

## 2022-03-16 RX ORDER — DEXAMETHASONE SODIUM PHOSPHATE 4 MG/ML
INJECTION, SOLUTION INTRA-ARTICULAR; INTRALESIONAL; INTRAMUSCULAR; INTRAVENOUS; SOFT TISSUE
Status: DISCONTINUED | OUTPATIENT
Start: 2022-03-16 | End: 2022-03-16 | Stop reason: HOSPADM

## 2022-03-16 RX ORDER — MIDAZOLAM HYDROCHLORIDE 1 MG/ML
INJECTION INTRAMUSCULAR; INTRAVENOUS
Status: DISCONTINUED | OUTPATIENT
Start: 2022-03-16 | End: 2022-03-16 | Stop reason: HOSPADM

## 2022-03-16 RX ORDER — LIDOCAINE HYDROCHLORIDE 20 MG/ML
INJECTION, SOLUTION INFILTRATION; PERINEURAL
Status: DISCONTINUED | OUTPATIENT
Start: 2022-03-16 | End: 2022-03-16 | Stop reason: HOSPADM

## 2022-03-16 RX ORDER — SODIUM CHLORIDE 9 MG/ML
INJECTION, SOLUTION INTRAMUSCULAR; INTRAVENOUS; SUBCUTANEOUS
Status: DISCONTINUED | OUTPATIENT
Start: 2022-03-16 | End: 2022-03-16 | Stop reason: HOSPADM

## 2022-03-16 RX ORDER — LIDOCAINE HYDROCHLORIDE 10 MG/ML
1 INJECTION, SOLUTION EPIDURAL; INFILTRATION; INTRACAUDAL; PERINEURAL ONCE
Status: COMPLETED | OUTPATIENT
Start: 2022-03-16 | End: 2022-03-16

## 2022-03-16 RX ORDER — SODIUM CHLORIDE, SODIUM LACTATE, POTASSIUM CHLORIDE, CALCIUM CHLORIDE 600; 310; 30; 20 MG/100ML; MG/100ML; MG/100ML; MG/100ML
INJECTION, SOLUTION INTRAVENOUS CONTINUOUS
Status: DISCONTINUED | OUTPATIENT
Start: 2022-03-16 | End: 2022-03-16 | Stop reason: HOSPADM

## 2022-03-16 RX ORDER — BUPIVACAINE HYDROCHLORIDE 5 MG/ML
INJECTION, SOLUTION EPIDURAL; INTRACAUDAL
Status: DISCONTINUED | OUTPATIENT
Start: 2022-03-16 | End: 2022-03-16 | Stop reason: HOSPADM

## 2022-03-16 RX ORDER — FENTANYL CITRATE 50 UG/ML
INJECTION, SOLUTION INTRAMUSCULAR; INTRAVENOUS
Status: DISCONTINUED | OUTPATIENT
Start: 2022-03-16 | End: 2022-03-16 | Stop reason: HOSPADM

## 2022-03-16 RX ADMIN — LIDOCAINE HYDROCHLORIDE 10 MG: 10 INJECTION, SOLUTION EPIDURAL; INFILTRATION; INTRACAUDAL; PERINEURAL at 11:03

## 2022-03-16 RX ADMIN — SODIUM CHLORIDE, SODIUM LACTATE, POTASSIUM CHLORIDE, AND CALCIUM CHLORIDE: .6; .31; .03; .02 INJECTION, SOLUTION INTRAVENOUS at 11:03

## 2022-03-16 NOTE — INTERVAL H&P NOTE
The patient has been examined and the H&P has been reviewed:    I concur with the findings and changes have been noted since the H&P was written: Patient had blocks with >80% reduction in pain and improvement in function for the duration of the anesthetic. Will proceed with RFA to the right knee today.    Anesthesia/Surgery risks, benefits and alternative options discussed and understood by patient/family.    ASA 3, Mallampati 2      There are no hospital problems to display for this patient.

## 2022-03-16 NOTE — OP NOTE
Today's Date: 03/16/2022     Clinica History: Marleen Samano is a 76 y.o. female with chronic bilateral knee pain.  She has a history of a total knee arthroplasty on the right.  She has chronic knee pain on the left.  She has an MRI which shows meniscal tears of the medial and lateral menisci.  She has had intra-articular injections on the left without any significant improvement in lasting pain or function.  She is not interested in a total knee arthroplasty at this time. I performed genicular nerve blocks on the right and left which provided greater than 60% reduction in pain and function, we can proceed with radiofrequency ablation to the sensory articular branches of the right.       PREOPERATIVE DIAGNOSIS:  Chronic RIGHT knee pain     POSTOPERATIVE DIAGNOSIS:  same     PROCEDURE:   1. RIGHT Superolateral genicular branch from the vastus lateralis  2. RIGHT Superomedial genicular branch from the vastus medialis  3. RIGHT Inferomedial genicular branch from the saphenous nerve      ANESTHESIA:  RN IV sedation  2 mg IV Versed, 50 micro g IV fentanyl greater than 18 minutes    COMPLICATIONS:  none    CONSENT: Risks and benefits were discussed and informed consent was obtained      PROCEDURE IN DETAIL:   The patient was brought to the procedure room and placed on the exam table in a comfortable supine position. The place for needle placement was obtained by manual palpation with radiographic confirmation. The sterile field was prepared by chloroprep and sterile drapes. Local anesthesia superficial and deep was provided by local infiltration of  1 cc of 1% lidocaine using a 27 gauge 1.5 in needle . A 17g 50mm radiofrequency introducerneedle with a 2mm active tip was placed overlying the Right knee joint and using fluoroscopic guidance the needle was advanced to a bony endpoint on the superiolateral portion of the femoral condyle of the Right knee. A second needle was advanced to a bony endpoint on the superiomedial  portion of the femoral condyle. A third needle was then placed over the inferiomedial portion of the tibial condyle until a bony endpoint was met. Attempted aspiration yielded no blood. Lateral x-ray views showed all the needles at 50% depth of the femur and tibia. Motor stimulation was tested ad 2.0 volts with no leg movement. Images were saved in AP and lateral.  1 cc of 0.50% bupivacaine was slowly injected. Then a radiofrequency ablation of each of the geniculate nerves were done at 80 degrees Celsiusfor 2 minutes and 30 seconds each.  After ablation, a 1 cc solution of  9 cc of normal saline and 1 cc of 10 milligrams/milliliter of Decadron was injected.  The needles were withdrawn. POST PROCEDURE     EVALUATION: IMPRESSION:   1. Summary of procedure.   2. RTC in 3-4 week(s).   3. Estimated Blood Loss:  Trace

## 2022-03-16 NOTE — PLAN OF CARE
Patient tolerated procedure and anesthesia well.  No complaints.  No apparent distress noted or reported. Vitals stable.  Denies pain denies nausea.  Tolerates po.  Discharge instructions reviewed with patient..  Verbalized understanding.  Consents with chart.

## 2022-03-16 NOTE — DISCHARGE SUMMARY
[facility]  Discharge Note  Short Stay    Procedure(s) (LRB):  Cooled Radiofrequency Ablation of the genicular nerve branches of the Right knee. (Right)    OUTCOME: Patient tolerated treatment/procedure well without complication and is now ready for discharge.    DISPOSITION: Home or Self Care    FINAL DIAGNOSIS: Chronic bilateral knee pain.    FOLLOWUP: RFA left knee in 2 weeks    DISCHARGE INSTRUCTIONS:  No discharge procedures on file.     TIME SPENT ON DISCHARGE: 15 minutes

## 2022-03-21 ENCOUNTER — PATIENT MESSAGE (OUTPATIENT)
Dept: WOUND CARE | Facility: HOSPITAL | Age: 77
End: 2022-03-21
Payer: MEDICARE

## 2022-03-22 ENCOUNTER — PATIENT MESSAGE (OUTPATIENT)
Dept: WOUND CARE | Facility: HOSPITAL | Age: 77
End: 2022-03-22
Payer: MEDICARE

## 2022-03-22 ENCOUNTER — HOSPITAL ENCOUNTER (OUTPATIENT)
Dept: RADIOLOGY | Facility: HOSPITAL | Age: 77
Discharge: HOME | End: 2022-03-22
Attending: STUDENT IN AN ORGANIZED HEALTH CARE EDUCATION/TRAINING PROGRAM
Payer: MEDICARE

## 2022-03-22 DIAGNOSIS — R60.0 LOCALIZED EDEMA: ICD-10-CM

## 2022-03-22 DIAGNOSIS — M79.89 LEG SWELLING: ICD-10-CM

## 2022-03-22 PROCEDURE — 93971 EXTREMITY STUDY: CPT | Mod: TC,LT

## 2022-03-22 PROCEDURE — 93971 US LOWER EXTREMITY VEINS LEFT: ICD-10-PCS | Mod: 26,LT,, | Performed by: RADIOLOGY

## 2022-03-22 PROCEDURE — 93971 EXTREMITY STUDY: CPT | Mod: 26,LT,, | Performed by: RADIOLOGY

## 2022-03-24 ENCOUNTER — TELEPHONE (OUTPATIENT)
Dept: VASCULAR SURGERY | Facility: CLINIC | Age: 77
End: 2022-03-24
Payer: MEDICARE

## 2022-03-24 ENCOUNTER — OFFICE VISIT (OUTPATIENT)
Dept: WOUND CARE | Facility: HOSPITAL | Age: 77
End: 2022-03-24
Attending: FAMILY MEDICINE
Payer: MEDICARE

## 2022-03-24 ENCOUNTER — PATIENT MESSAGE (OUTPATIENT)
Dept: FAMILY MEDICINE | Facility: CLINIC | Age: 77
End: 2022-03-24
Payer: MEDICARE

## 2022-03-24 ENCOUNTER — TELEPHONE (OUTPATIENT)
Dept: FAMILY MEDICINE | Facility: CLINIC | Age: 77
End: 2022-03-24
Payer: MEDICARE

## 2022-03-24 VITALS
HEART RATE: 79 BPM | TEMPERATURE: 99 F | RESPIRATION RATE: 18 BRPM | SYSTOLIC BLOOD PRESSURE: 136 MMHG | DIASTOLIC BLOOD PRESSURE: 69 MMHG

## 2022-03-24 DIAGNOSIS — S40.812A ABRASION OF LEFT UPPER EXTREMITY, INITIAL ENCOUNTER: ICD-10-CM

## 2022-03-24 DIAGNOSIS — T07.XXXA ABRASIONS OF MULTIPLE SITES: ICD-10-CM

## 2022-03-24 DIAGNOSIS — S40.811A ABRASION OF RIGHT UPPER EXTREMITY, INITIAL ENCOUNTER: ICD-10-CM

## 2022-03-24 PROCEDURE — 3078F DIAST BP <80 MM HG: CPT | Mod: CPTII,,, | Performed by: FAMILY MEDICINE

## 2022-03-24 PROCEDURE — 1126F AMNT PAIN NOTED NONE PRSNT: CPT | Mod: CPTII,,, | Performed by: FAMILY MEDICINE

## 2022-03-24 PROCEDURE — 1160F PR REVIEW ALL MEDS BY PRESCRIBER/CLIN PHARMACIST DOCUMENTED: ICD-10-PCS | Mod: CPTII,,, | Performed by: FAMILY MEDICINE

## 2022-03-24 PROCEDURE — 1159F MED LIST DOCD IN RCRD: CPT | Mod: CPTII,,, | Performed by: FAMILY MEDICINE

## 2022-03-24 PROCEDURE — 99213 PR OFFICE/OUTPT VISIT, EST, LEVL III, 20-29 MIN: ICD-10-PCS | Mod: ,,, | Performed by: FAMILY MEDICINE

## 2022-03-24 PROCEDURE — 99213 OFFICE O/P EST LOW 20 MIN: CPT | Mod: ,,, | Performed by: FAMILY MEDICINE

## 2022-03-24 PROCEDURE — 3075F PR MOST RECENT SYSTOLIC BLOOD PRESS GE 130-139MM HG: ICD-10-PCS | Mod: CPTII,,, | Performed by: FAMILY MEDICINE

## 2022-03-24 PROCEDURE — 1126F PR PAIN SEVERITY QUANTIFIED, NO PAIN PRESENT: ICD-10-PCS | Mod: CPTII,,, | Performed by: FAMILY MEDICINE

## 2022-03-24 PROCEDURE — 1159F PR MEDICATION LIST DOCUMENTED IN MEDICAL RECORD: ICD-10-PCS | Mod: CPTII,,, | Performed by: FAMILY MEDICINE

## 2022-03-24 PROCEDURE — 3075F SYST BP GE 130 - 139MM HG: CPT | Mod: CPTII,,, | Performed by: FAMILY MEDICINE

## 2022-03-24 PROCEDURE — 3078F PR MOST RECENT DIASTOLIC BLOOD PRESSURE < 80 MM HG: ICD-10-PCS | Mod: CPTII,,, | Performed by: FAMILY MEDICINE

## 2022-03-24 PROCEDURE — 99215 OFFICE O/P EST HI 40 MIN: CPT | Performed by: FAMILY MEDICINE

## 2022-03-24 PROCEDURE — 1160F RVW MEDS BY RX/DR IN RCRD: CPT | Mod: CPTII,,, | Performed by: FAMILY MEDICINE

## 2022-03-24 NOTE — PROGRESS NOTES
"            Wound Care and Hyperbaric Medicine                Progress Note    Subjective:       Patient ID: Marleen Samano is a 76 y.o. female.    Chief Complaint: Wound Care    HPI  Pt seen in clinic for a FU visit for BL arms open wounds. Wounds are all scabbed over, but she still feels there are "stickers"  In the wounds. Pt has no other complaints today, but did not go to her ID appointment since it was not an [facility] physician.  Review of Systems   Skin: Positive for wound.        BL UE open wounds,  Eschar covered   All other systems reviewed and are negative.        Objective:        Physical Exam  Vitals and nursing note reviewed.   Constitutional:       General: She is not in acute distress.     Appearance: Normal appearance. She is not ill-appearing, toxic-appearing or diaphoretic.   Skin:     General: Skin is warm and dry.      Coloration: Skin is not jaundiced or pale.      Findings: Lesion present. No bruising, erythema or rash.      Comments: BL upper extremity open wounds with eschar, see wound care assessment documentation in chart review scanned under the media tab   Neurological:      General: No focal deficit present.      Mental Status: She is alert and oriented to person, place, and time.   Psychiatric:         Mood and Affect: Mood normal.         Behavior: Behavior normal.         Thought Content: Thought content normal.         Judgment: Judgment normal.         Vitals:    03/24/22 0948   BP: 136/69   Pulse: 79   Resp: 18   Temp: 98.8 °F (37.1 °C)       Assessment:           ICD-10-CM ICD-9-CM   1. Abrasion of left upper extremity, initial encounter  S40.812A 913.0   2. Abrasion of right upper extremity, initial encounter  S40.811A 912.0   3. Abrasions of multiple sites  T07.XXXA 919.0                Plan:                  Marleen was seen today for wound care.    Diagnoses and all orders for this visit:    Abrasion of left upper extremity, initial encounter    Abrasion of right upper " extremity, initial encounter    Abrasions of multiple sites      Much of the documentation for this visit was completed in wound docs system. Please see the attached documentation for further details about the patients care. Scanned under the media tab.

## 2022-03-24 NOTE — TELEPHONE ENCOUNTER
I spoke with pt via phone advised her that I have sent message regarding aspirin to Dr. Ryan for his recommendations. I advised her that once he responds a message will be sent via my chart or a phone call. All understanding voiced.      ----- Message from Fernanda Vasques sent at 3/24/2022  4:16 PM CDT -----  Contact: Patient  Type:  Patient Requesting Referral    Who Called: Patient     Does the patient already have the specialty appointment scheduled?:no    If yes, what is the date of that appointment?:     Referral to What Specialty: Infectious Diease     Reason for Referral: open sore on face that will not heal       Does the patient want the referral with a specific physician?: Dr diez     [facility]    Patient Requesting a Response?: yes      Would the patient rather a call back or a response via [facility]?  Call    Best Call Back Number: [phone] (home)     Additional Information:

## 2022-03-24 NOTE — TELEPHONE ENCOUNTER
----- Message from Karina Redd sent at 3/24/2022 12:58 PM CDT -----  Contact: sandi  Type: Needs Medical Advice  Who Called:  Sandi with [facility]  Best Call Back Number: [phone]  Additional Information: requesting a call back regarding a referral

## 2022-03-25 ENCOUNTER — TELEPHONE (OUTPATIENT)
Dept: PHYSICAL MEDICINE AND REHAB | Facility: CLINIC | Age: 77
End: 2022-03-25
Payer: MEDICARE

## 2022-03-25 NOTE — TELEPHONE ENCOUNTER
----- Message from Stacey M Lefort sent at 3/25/2022  1:47 PM CDT -----  Pt asked for a callback at [phone].  Thank you.

## 2022-03-25 NOTE — TELEPHONE ENCOUNTER
Spoke with patient advised her that she has the first available that we have for dr tracy because he is out of town the first week in April. Pt voiced understanding

## 2022-03-28 ENCOUNTER — TELEPHONE (OUTPATIENT)
Dept: FAMILY MEDICINE | Facility: CLINIC | Age: 77
End: 2022-03-28
Payer: MEDICARE

## 2022-03-28 ENCOUNTER — OFFICE VISIT (OUTPATIENT)
Dept: ORTHOPEDICS | Facility: CLINIC | Age: 77
End: 2022-03-28
Payer: MEDICARE

## 2022-03-28 VITALS — BODY MASS INDEX: 26.36 KG/M2 | WEIGHT: 164 LBS | RESPIRATION RATE: 18 BRPM | HEIGHT: 66 IN

## 2022-03-28 DIAGNOSIS — F32.9 MAJOR DEPRESSION, CHRONIC: Primary | ICD-10-CM

## 2022-03-28 DIAGNOSIS — S83.282A ACUTE LATERAL MENISCAL TEAR, LEFT, INITIAL ENCOUNTER: Primary | ICD-10-CM

## 2022-03-28 DIAGNOSIS — M17.12 PRIMARY OSTEOARTHRITIS OF LEFT KNEE: ICD-10-CM

## 2022-03-28 PROCEDURE — 1125F PR PAIN SEVERITY QUANTIFIED, PAIN PRESENT: ICD-10-PCS | Mod: CPTII,S$GLB,, | Performed by: ORTHOPAEDIC SURGERY

## 2022-03-28 PROCEDURE — 1159F PR MEDICATION LIST DOCUMENTED IN MEDICAL RECORD: ICD-10-PCS | Mod: CPTII,S$GLB,, | Performed by: ORTHOPAEDIC SURGERY

## 2022-03-28 PROCEDURE — 1101F PT FALLS ASSESS-DOCD LE1/YR: CPT | Mod: CPTII,S$GLB,, | Performed by: ORTHOPAEDIC SURGERY

## 2022-03-28 PROCEDURE — 99213 PR OFFICE/OUTPT VISIT, EST, LEVL III, 20-29 MIN: ICD-10-PCS | Mod: S$GLB,,, | Performed by: ORTHOPAEDIC SURGERY

## 2022-03-28 PROCEDURE — 1159F MED LIST DOCD IN RCRD: CPT | Mod: CPTII,S$GLB,, | Performed by: ORTHOPAEDIC SURGERY

## 2022-03-28 PROCEDURE — 99999 PR PBB SHADOW E&M-EST. PATIENT-LVL IV: ICD-10-PCS | Mod: PBBFAC,,, | Performed by: ORTHOPAEDIC SURGERY

## 2022-03-28 PROCEDURE — 1160F RVW MEDS BY RX/DR IN RCRD: CPT | Mod: CPTII,S$GLB,, | Performed by: ORTHOPAEDIC SURGERY

## 2022-03-28 PROCEDURE — 3288F FALL RISK ASSESSMENT DOCD: CPT | Mod: CPTII,S$GLB,, | Performed by: ORTHOPAEDIC SURGERY

## 2022-03-28 PROCEDURE — 1125F AMNT PAIN NOTED PAIN PRSNT: CPT | Mod: CPTII,S$GLB,, | Performed by: ORTHOPAEDIC SURGERY

## 2022-03-28 PROCEDURE — 3288F PR FALLS RISK ASSESSMENT DOCUMENTED: ICD-10-PCS | Mod: CPTII,S$GLB,, | Performed by: ORTHOPAEDIC SURGERY

## 2022-03-28 PROCEDURE — 99213 OFFICE O/P EST LOW 20 MIN: CPT | Mod: S$GLB,,, | Performed by: ORTHOPAEDIC SURGERY

## 2022-03-28 PROCEDURE — 99999 PR PBB SHADOW E&M-EST. PATIENT-LVL IV: CPT | Mod: PBBFAC,,, | Performed by: ORTHOPAEDIC SURGERY

## 2022-03-28 PROCEDURE — 1101F PR PT FALLS ASSESS DOC 0-1 FALLS W/OUT INJ PAST YR: ICD-10-PCS | Mod: CPTII,S$GLB,, | Performed by: ORTHOPAEDIC SURGERY

## 2022-03-28 PROCEDURE — 1160F PR REVIEW ALL MEDS BY PRESCRIBER/CLIN PHARMACIST DOCUMENTED: ICD-10-PCS | Mod: CPTII,S$GLB,, | Performed by: ORTHOPAEDIC SURGERY

## 2022-03-28 NOTE — TELEPHONE ENCOUNTER
I spoke with pt via phone, she states that she would like a referral to see Taylor Fairchild ( psychiatrist) . Thank you !

## 2022-03-28 NOTE — TELEPHONE ENCOUNTER
Left voicemail for patient to call the office back.      ----- Message from Alisia Rios sent at 3/28/2022  8:58 AM CDT -----  Type: Needs Medical Advice  Who Called:  Patient  Best Call Back Number:   Additional Information: Calling to speak with the nurse about getting a letter from Dr Ryan stating that she needs to switch her psychiatrist to Yuliet Fairchild.

## 2022-03-28 NOTE — PROGRESS NOTES
Past Medical History:   Diagnosis Date    Abnormal CBC 2/10/2020    Anemia due to multiple mechanisms 2/10/2020    Anemia in chronic kidney disease (CKD)     Anemia, chronic disease 2/10/2020    Arthritis     Aseptic loosening of prosthetic knee, initial encounter 7/14/2020    Bell's palsy     Bladder incontinence     Blepharospasm     Bronchiectasis without complication 10/8/2019    Cervical dystonia     Concussion     COPD (chronic obstructive pulmonary disease)     Enterocolitis 2/7/2020    Fainting spell     2/7/2020    Hormone deficiency     Hypertension     Loose right total knee arthroplasty 6/15/2020    Migraine     Muscle spasm     Myofacial pain syndrome     Normocytic normochromic anemia 2/10/2020    Right ear pain 2/9/2021    Squamous cell carcinoma of skin     Syncope and collapse 2/9/2021       Past Surgical History:   Procedure Laterality Date    APPENDECTOMY      BREAST BIOPSY      BREAST LUMPECTOMY      CATARACT EXTRACTION      EYE SURGERY      HYSTERECTOMY      NECK SURGERY      REVISION OF KNEE ARTHROPLASTY Right 7/14/2020    Procedure: REVISION, ARTHROPLASTY, KNEE;  Surgeon: Pedro Tompkins MD;  Location: [facility];  Service: Orthopedics;  Laterality: Right;    TONSILLECTOMY         Current Outpatient Medications   Medication Sig    albuterol (PROVENTIL/VENTOLIN HFA) 90 mcg/actuation inhaler Inhale 2 puffs into the lungs every 4 (four) hours as needed for Wheezing or Shortness of Breath. Rescue    albuterol-ipratropium (DUO-NEB) 2.5 mg-0.5 mg/3 mL nebulizer solution USE 1 VIAL VIA NEBULIZER  EVERY 6 HOURS AS NEEDED FOR WHEEZING RESCUE    amLODIPine (NORVASC) 2.5 MG tablet Take 1 tablet (2.5 mg total) by mouth once daily.    aspirin 81 MG Chew Take 81 mg by mouth once daily.    baclofen (LIORESAL) 20 MG tablet TAKE 1 TABLET BY MOUTH  TWICE DAILY    benazepriL (LOTENSIN) 40 MG tablet Take 1 tablet by mouth once daily    blood sugar diagnostic Strp  To check BG 2 times daily, to use with insurance preferred meter    blood-glucose meter kit To check BG 1 times daily, to use with insurance preferred meter    diclofenac sodium (VOLTAREN) 1 % Gel Apply topically.     gabapentin (NEURONTIN) 600 MG tablet TAKE 1 TABLET BY MOUTH 3  TIMES DAILY    hydrOXYzine HCL (ATARAX) 25 MG tablet Take 1 tablet (25 mg total) by mouth 3 (three) times daily as needed for Itching.    lancets Misc To check BG 2 times daily, to use with insurance preferred meter    magnesium oxide (MAG-OX) 400 mg (241.3 mg magnesium) tablet Take 1 tablet (400 mg total) by mouth 2 (two) times daily.    metoprolol succinate (TOPROL-XL) 25 MG 24 hr tablet Take 1 tablet (25 mg total) by mouth once daily.    mupirocin (BACTROBAN) 2 % ointment Apply to forearm and wrap with non stick gauze and coban wrap    mupirocin (BACTROBAN) 2 % ointment Apply topically 3 (three) times daily.    omeprazole (PRILOSEC) 40 MG capsule Take 1 capsule (40 mg total) by mouth 2 (two) times daily before meals.    potassium chloride (MICRO-K) 10 MEQ CpSR Take 1 capsule (10 mEq total) by mouth daily as needed (Take one daily when you take furosemide).    simethicone (GAS-X ORAL) Take by mouth.    sumatriptan (IMITREX) 100 MG tablet TAKE 1 TABLET BY MOUTH AFTER ONSET OF MIGRAINE. MAY REPEAT AFTER 2 HOURS IF HEADACHE RETURNS NOT TO EXCEED 200MG IN 24 HOURS    trazodone (DESYREL) 100 MG tablet Take 100 mg by mouth every evening.    venlafaxine (EFFEXOR-XR) 150 MG Cp24 Take 1 capsule (150 mg total) by mouth 2 (two) times a day.     Current Facility-Administered Medications   Medication    triamcinolone acetonide injection 1 mg       Review of patient's allergies indicates:   Allergen Reactions    Adhesive tape-silicones Rash     Blisters after on for several hours    Augmentin [amoxicillin-pot clavulanate]      thrush    Ciprofloxacin      thrush    Morphine Itching and Hives       Family History   Problem Relation  Age of Onset    Cancer Mother     Diabetes Neg Hx     Melanoma Neg Hx     Psoriasis Neg Hx     Lupus Neg Hx     Eczema Neg Hx        Social History     Socioeconomic History    Marital status:    Occupational History    Occupation: Caregiver   Tobacco Use    Smoking status: Never Smoker    Smokeless tobacco: Never Used   Substance and Sexual Activity    Alcohol use: No    Drug use: No   Social History Narrative    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 Has a good support system of friends, daughter and grandchildren. Keeps herself busy w/ gardening, socializing. Facing some financial issues, supports grandson struggling w/ drug abuse who is staying with her which causes stress. Denies any concern regarding safety at home. Has not had much financial support from her family w/ caring for her grandson.       Chief Complaint:   Chief Complaint   Patient presents with    Left Knee - Pain       Date of surgery:July 14, 2020 revision right total knee arthroplasty    History of present illness:  This is a 76-year-old female underwent right knee tibial revision for some aseptic loosening of her tibial component.  Patient comes in with more left knee pain.  It has been bothering her for about 9 months.  Knee feels like it wants to buckle or give way.  Pain is along the lateral aspect of her knee.  Previous treatments includes a steroid injection.  Hurts when going from sitting to standing.    MRI did not show a tear.  We did Monovisc on her left knee.  Did not really help very much.  She did a nerve ablation on the right knee which helped out a lot.  Has 1 scheduled on the left knee next week.  Pain is a 10/10.    Review of Systems:  Musculoskeletal:  See HPI    Physical Examination:    Vital Signs:    Vitals:    03/28/22 1333   Resp: 18       Body mass index is 26.47 kg/m².    This a well-developed, well nourished patient in no acute distress.  They are alert  and oriented and cooperative to examination.  Pt. walks without an antalgic gait.      Examination of the right knee shows well-healed surgical excision.  Patient lacks about 3° of full extension.  Great flexion up to about 130°.  Stable to varus and valgus stress.  Pain over the IT band.  Neurovascularly intact.    Examination of the left knee shows no rashes or erythema. There are no masses ecchymosis or effusion. Patient has full range of motion from 0-130°. Patient is moderately tender to palpation over lateral joint line and nontender to palpation over the medial joint line. Patient has a - Lachman exam, - anterior drawer exam, and - posterior drawer exam.  Positive lateral Apley exam. Knee is stable to varus and valgus stress. 5 out of 5 motor strength. Palpable distal pulses. Intact light touch sensation. Negative Patellofemoral crepitus      X-rays:  X-rays of the right knee are  reviewed which show well-aligned revision total knee arthroplasty without complication.  Left knee shows just mild arthritic change    MRI of the left knee is reviewed and interpreted today:Intrasubstance degeneration of the posterior horn of the medial meniscus and anterior and posterior horns of the lateral meniscus without a meniscal tear identified.         Assessment::  Left knee arthritic change  Painful right total knee      Plan:  I reviewed the x-ray with her today.  I do not see anything overly concerning.  I would like to see her back after she has the nerve ablation on her left.  She definitely does not have arthritis severe enough to warrant a total knee.  Repeat x-rays of her left knee at next visit.        This note was created using [email or web address] voice recognition software that occasionally misinterpreted phrases or words.

## 2022-04-01 NOTE — PRE-PROCEDURE INSTRUCTIONS
Pre-op phone call made to pt.  All medications reviewed and  pre-procedure  instructions given to pt.  Pt verbalized understanding.  Information added to My Chart Patient Portal

## 2022-04-04 ENCOUNTER — TELEPHONE (OUTPATIENT)
Dept: FAMILY MEDICINE | Facility: CLINIC | Age: 77
End: 2022-04-04
Payer: MEDICARE

## 2022-04-04 ENCOUNTER — TELEPHONE (OUTPATIENT)
Dept: PHYSICAL MEDICINE AND REHAB | Facility: CLINIC | Age: 77
End: 2022-04-04
Payer: MEDICARE

## 2022-04-04 NOTE — TELEPHONE ENCOUNTER
Called pt to reschedule RFA L knee due to Dr. Huerta being out sick this week. R/s for 4/20. No covid test needed- pt is vaccinated.

## 2022-04-05 ENCOUNTER — TELEPHONE (OUTPATIENT)
Dept: FAMILY MEDICINE | Facility: CLINIC | Age: 77
End: 2022-04-05
Payer: MEDICARE

## 2022-04-05 NOTE — TELEPHONE ENCOUNTER
I spoke with pt via phone, she states that she needs to r/s her appointment with Dr. Ryan. Will see Dr. Ryan on 06/01/2022 ( First available, pt was placed on waiting list for sooner.)  All understanding voiced.       ----- Message from Luis Vaughn sent at 4/5/2022 11:27 AM CDT -----  Type:  Sooner Apoointment Request    Caller is requesting a sooner appointment.  Caller declined first available appointment listed below.  Caller will not accept being placed on the waitlist and is requesting a message be sent to doctor.    Name of Caller: Patient  When is the first available appointment? Pt needs to reschedule and soonest is 08/02/22  Symptoms: 1 month FU  Best Call Back Number:  [phone]  Additional Information:

## 2022-04-07 ENCOUNTER — OFFICE VISIT (OUTPATIENT)
Dept: URGENT CARE | Facility: CLINIC | Age: 77
End: 2022-04-07
Payer: MEDICARE

## 2022-04-07 ENCOUNTER — PATIENT MESSAGE (OUTPATIENT)
Dept: FAMILY MEDICINE | Facility: CLINIC | Age: 77
End: 2022-04-07
Payer: MEDICARE

## 2022-04-07 VITALS
DIASTOLIC BLOOD PRESSURE: 63 MMHG | BODY MASS INDEX: 24.29 KG/M2 | OXYGEN SATURATION: 97 % | SYSTOLIC BLOOD PRESSURE: 124 MMHG | HEART RATE: 86 BPM | RESPIRATION RATE: 16 BRPM | HEIGHT: 69 IN | TEMPERATURE: 98 F | WEIGHT: 164 LBS

## 2022-04-07 DIAGNOSIS — R05.9 COUGH: Primary | ICD-10-CM

## 2022-04-07 DIAGNOSIS — J06.9 UPPER RESPIRATORY TRACT INFECTION, UNSPECIFIED TYPE: ICD-10-CM

## 2022-04-07 LAB
CTP QC/QA: YES
SARS-COV-2 AG RESP QL IA.RAPID: NEGATIVE

## 2022-04-07 PROCEDURE — 87811 SARS-COV-2 COVID19 W/OPTIC: CPT | Mod: S$GLB,,,

## 2022-04-07 PROCEDURE — 71046 X-RAY EXAM CHEST 2 VIEWS: CPT | Mod: S$GLB,,, | Performed by: RADIOLOGY

## 2022-04-07 PROCEDURE — 1159F PR MEDICATION LIST DOCUMENTED IN MEDICAL RECORD: ICD-10-PCS | Mod: CPTII,S$GLB,,

## 2022-04-07 PROCEDURE — 1159F MED LIST DOCD IN RCRD: CPT | Mod: CPTII,S$GLB,,

## 2022-04-07 PROCEDURE — 1160F RVW MEDS BY RX/DR IN RCRD: CPT | Mod: CPTII,S$GLB,,

## 2022-04-07 PROCEDURE — 3074F PR MOST RECENT SYSTOLIC BLOOD PRESSURE < 130 MM HG: ICD-10-PCS | Mod: CPTII,S$GLB,,

## 2022-04-07 PROCEDURE — 87811 SARS CORONAVIRUS 2 ANTIGEN POCT, MANUAL READ: ICD-10-PCS | Mod: S$GLB,,,

## 2022-04-07 PROCEDURE — 3074F SYST BP LT 130 MM HG: CPT | Mod: CPTII,S$GLB,,

## 2022-04-07 PROCEDURE — 3078F PR MOST RECENT DIASTOLIC BLOOD PRESSURE < 80 MM HG: ICD-10-PCS | Mod: CPTII,S$GLB,,

## 2022-04-07 PROCEDURE — 99214 OFFICE O/P EST MOD 30 MIN: CPT | Mod: S$GLB,,,

## 2022-04-07 PROCEDURE — 1160F PR REVIEW ALL MEDS BY PRESCRIBER/CLIN PHARMACIST DOCUMENTED: ICD-10-PCS | Mod: CPTII,S$GLB,,

## 2022-04-07 PROCEDURE — 71046 XR CHEST PA AND LATERAL: ICD-10-PCS | Mod: S$GLB,,, | Performed by: RADIOLOGY

## 2022-04-07 PROCEDURE — 99214 PR OFFICE/OUTPT VISIT, EST, LEVL IV, 30-39 MIN: ICD-10-PCS | Mod: S$GLB,,,

## 2022-04-07 PROCEDURE — 3078F DIAST BP <80 MM HG: CPT | Mod: CPTII,S$GLB,,

## 2022-04-07 RX ORDER — BENZONATATE 200 MG/1
200 CAPSULE ORAL 3 TIMES DAILY PRN
Qty: 15 CAPSULE | Refills: 0 | Status: SHIPPED | OUTPATIENT
Start: 2022-04-07 | End: 2022-04-12

## 2022-04-07 RX ORDER — DOXYCYCLINE HYCLATE 100 MG
100 TABLET ORAL EVERY 12 HOURS
Qty: 14 TABLET | Refills: 0 | Status: SHIPPED | OUTPATIENT
Start: 2022-04-07 | End: 2022-04-14

## 2022-04-07 RX ORDER — ALBUTEROL SULFATE 90 UG/1
2 AEROSOL, METERED RESPIRATORY (INHALATION) EVERY 6 HOURS PRN
Qty: 20.1 G | Refills: 11 | Status: SHIPPED | OUTPATIENT
Start: 2022-04-07 | End: 2023-01-16 | Stop reason: SDUPTHER

## 2022-04-07 NOTE — PATIENT INSTRUCTIONS
For the next 2-3 days do a trail of Claritin  or zrytec .   Rotate tylenol and ibuprofen as needed if no contraindications for fever and pain.  Use Flonase and Astelin for nasal congestion.   Follow up with PCP in 2-5 days if symptoms do not resolve.   Start antibiotics in 48-72 hours if symptoms worsen. Take antibiotic with food. Get over counter probiotic.       Rest as much as you can     Your symptoms will likely last 5-7 days, maybe longer depending on how it affects your body.  You are contagious 5-7, so minimize contact with others to reduce the spread to others and stay home from work or school as we discussed. Dehydration is preventable but is one of the main reasons why you will feel so badly. Drink pedialyte, gatorade or propel. Stay hydrated.  Antibiotics are not needed unless a complication(such as Otitis Media, Bacterial sinus infection or pneumonia) develops. Taking antibiotics for Flu/Cold is not supported by evidence-based medicine and can expose you to unnecessary side effects of the medication, such as anaphylaxis, yeast infection and leads to antibiotic resistance.   If you experience any:  Chest pain, shortness of breath, wheezing or difficulty breathing,  Severe headache, face, neck or ear pain,  New rash,  Fever over 101.5º F (38.6 C) for more than three days,  Confusion, behavior change or seizure,  Severe weakness or dizziness, please go to the ER immediately for further testing.

## 2022-04-07 NOTE — PROGRESS NOTES
"Subjective:       Patient ID: Marleen Samano is a 76 y.o. female.    Vitals:  height is 5' 9" (1.753 m) and weight is 74.4 kg (164 lb). Her temperature is 98.1 °F (36.7 °C). Her blood pressure is 124/63 and her pulse is 86. Her respiration is 16 and oxygen saturation is 97%.     Chief Complaint: Cough    Pt. Says symptoms started with hoarseness about a week and a half ago that has progressed to a bad cough with production. Pt. States she would like a chest x-ray along with a covid test. Pt. Also complains of wheezing in chest. Treatments tried are benadryl with no relief.     Cough  This is a new problem. The current episode started in the past 7 days. Associated symptoms include headaches, postnasal drip and a sore throat. Pertinent negatives include no chest pain, chills, ear pain, fever, shortness of breath or wheezing.       Constitution: Negative for activity change, appetite change, chills, fever and generalized weakness.   HENT: Positive for postnasal drip, sinus pressure and sore throat. Negative for ear pain.    Neck: Negative for neck pain.   Cardiovascular: Negative for chest pain and sob on exertion.   Respiratory: Positive for cough. Negative for chest tightness, shortness of breath, wheezing and asthma.    Gastrointestinal: Negative for abdominal pain, vomiting, constipation and diarrhea.   Allergic/Immunologic: Negative for asthma.   Neurological: Positive for headaches. Negative for dizziness.       Objective:      Physical Exam   Constitutional: She is oriented to person, place, and time. She appears well-developed. She is cooperative.  Non-toxic appearance. She does not appear ill. No distress.   HENT:   Head: Normocephalic and atraumatic.   Ears:   Right Ear: Hearing, tympanic membrane and ear canal normal.   Left Ear: Hearing, tympanic membrane and ear canal normal.   Nose: No mucosal edema, rhinorrhea or nasal deformity. No epistaxis. Right sinus exhibits no maxillary sinus tenderness and no " frontal sinus tenderness. Left sinus exhibits no maxillary sinus tenderness and no frontal sinus tenderness.   Mouth/Throat: Uvula is midline and mucous membranes are normal. No trismus in the jaw. Normal dentition. No uvula swelling. No posterior oropharyngeal edema or posterior oropharyngeal erythema.   Eyes: Conjunctivae and lids are normal. No scleral icterus.   Neck: Trachea normal and phonation normal. No edema present. No erythema present. No neck rigidity present.   Cardiovascular: Normal rate, normal heart sounds and normal pulses.   Pulmonary/Chest: Effort normal and breath sounds normal. No respiratory distress. She has no decreased breath sounds. She has no wheezes. She has no rhonchi.   Abdominal: Normal appearance. Soft. There is no abdominal tenderness.   Musculoskeletal: Normal range of motion.         General: No deformity. Normal range of motion.   Neurological: She is alert and oriented to person, place, and time.   Skin: Skin is warm, dry, intact and not diaphoretic.   Psychiatric: Her speech is normal and behavior is normal.   Nursing note and vitals reviewed.        Assessment:       1. Cough    2. Upper respiratory tract infection, unspecified type          Plan:         Cough  -     SARS Coronavirus 2 Antigen, POCT Manual Read  -     XR CHEST PA AND LATERAL; Future; Expected date: 04/07/2022    Upper respiratory tract infection, unspecified type  -     doxycycline (VIBRA-TABS) 100 MG tablet; Take 1 tablet (100 mg total) by mouth every 12 (twelve) hours. for 7 days  Dispense: 14 tablet; Refill: 0  -     albuterol (PROVENTIL/VENTOLIN HFA) 90 mcg/actuation inhaler; Inhale 2 puffs into the lungs every 6 (six) hours as needed for Wheezing. Rescue  Dispense: 20.1 g; Refill: 11  -     benzonatate (TESSALON) 200 MG capsule; Take 1 capsule (200 mg total) by mouth 3 (three) times daily as needed for Cough.  Dispense: 15 capsule; Refill: 0         CXR negative. Educated patient to start antibiotics in  48-72 hours if symptoms persist. VSS wnl. No fever, lungs clear. High suspicion viral etiology.

## 2022-04-14 ENCOUNTER — OFFICE VISIT (OUTPATIENT)
Dept: FAMILY MEDICINE | Facility: CLINIC | Age: 77
End: 2022-04-14
Payer: MEDICARE

## 2022-04-14 ENCOUNTER — OFFICE VISIT (OUTPATIENT)
Dept: WOUND CARE | Facility: HOSPITAL | Age: 77
End: 2022-04-14
Attending: FAMILY MEDICINE
Payer: MEDICARE

## 2022-04-14 ENCOUNTER — TELEPHONE (OUTPATIENT)
Dept: FAMILY MEDICINE | Facility: CLINIC | Age: 77
End: 2022-04-14
Payer: MEDICARE

## 2022-04-14 VITALS
HEIGHT: 69 IN | DIASTOLIC BLOOD PRESSURE: 64 MMHG | OXYGEN SATURATION: 96 % | HEART RATE: 68 BPM | TEMPERATURE: 98 F | SYSTOLIC BLOOD PRESSURE: 116 MMHG | BODY MASS INDEX: 24.65 KG/M2 | WEIGHT: 166.44 LBS

## 2022-04-14 VITALS
HEART RATE: 88 BPM | DIASTOLIC BLOOD PRESSURE: 76 MMHG | RESPIRATION RATE: 19 BRPM | SYSTOLIC BLOOD PRESSURE: 143 MMHG | TEMPERATURE: 98 F

## 2022-04-14 DIAGNOSIS — D84.9 IMMUNE DEFICIENCY DISORDER: ICD-10-CM

## 2022-04-14 DIAGNOSIS — N18.31 CHRONIC KIDNEY DISEASE, STAGE 3A: ICD-10-CM

## 2022-04-14 DIAGNOSIS — Z00.00 ENCOUNTER FOR PREVENTIVE HEALTH EXAMINATION: Primary | ICD-10-CM

## 2022-04-14 DIAGNOSIS — I77.3 FIBROMUSCULAR DYSPLASIA OF BOTH CAROTID ARTERIES: ICD-10-CM

## 2022-04-14 DIAGNOSIS — S41.111D LACERATION OF MULTIPLE SITES OF RIGHT UPPER EXTREMITY, SUBSEQUENT ENCOUNTER: ICD-10-CM

## 2022-04-14 DIAGNOSIS — I15.2 HYPERTENSION ASSOCIATED WITH TYPE 2 DIABETES MELLITUS: ICD-10-CM

## 2022-04-14 DIAGNOSIS — T07.XXXA ABRASIONS OF MULTIPLE SITES: Primary | ICD-10-CM

## 2022-04-14 DIAGNOSIS — E11.59 HYPERTENSION ASSOCIATED WITH TYPE 2 DIABETES MELLITUS: ICD-10-CM

## 2022-04-14 DIAGNOSIS — F32.9 MAJOR DEPRESSION, CHRONIC: ICD-10-CM

## 2022-04-14 DIAGNOSIS — R41.3 MEMORY IMPAIRMENT: ICD-10-CM

## 2022-04-14 DIAGNOSIS — E11.9 TYPE 2 DIABETES MELLITUS WITHOUT COMPLICATION, UNSPECIFIED WHETHER LONG TERM INSULIN USE: ICD-10-CM

## 2022-04-14 DIAGNOSIS — S41.112D LACERATION OF MULTIPLE SITES OF LEFT UPPER EXTREMITY, SUBSEQUENT ENCOUNTER: ICD-10-CM

## 2022-04-14 DIAGNOSIS — I27.20 PULMONARY HYPERTENSION: ICD-10-CM

## 2022-04-14 DIAGNOSIS — R26.9 ABNORMALITY OF GAIT AND MOBILITY: ICD-10-CM

## 2022-04-14 DIAGNOSIS — J44.9 CHRONIC OBSTRUCTIVE PULMONARY DISEASE, UNSPECIFIED COPD TYPE: ICD-10-CM

## 2022-04-14 DIAGNOSIS — I48.0 PAROXYSMAL ATRIAL FIBRILLATION: ICD-10-CM

## 2022-04-14 PROCEDURE — 3074F SYST BP LT 130 MM HG: CPT | Mod: CPTII,S$GLB,, | Performed by: NURSE PRACTITIONER

## 2022-04-14 PROCEDURE — G0439 PR MEDICARE ANNUAL WELLNESS SUBSEQUENT VISIT: ICD-10-PCS | Mod: S$GLB,,, | Performed by: NURSE PRACTITIONER

## 2022-04-14 PROCEDURE — 1125F PR PAIN SEVERITY QUANTIFIED, PAIN PRESENT: ICD-10-PCS | Mod: CPTII,S$GLB,, | Performed by: NURSE PRACTITIONER

## 2022-04-14 PROCEDURE — 99499 UNLISTED E&M SERVICE: CPT | Mod: S$GLB,,, | Performed by: NURSE PRACTITIONER

## 2022-04-14 PROCEDURE — 3077F SYST BP >= 140 MM HG: CPT | Mod: CPTII,,, | Performed by: FAMILY MEDICINE

## 2022-04-14 PROCEDURE — 99999 PR PBB SHADOW E&M-EST. PATIENT-LVL V: CPT | Mod: PBBFAC,,, | Performed by: NURSE PRACTITIONER

## 2022-04-14 PROCEDURE — 99499 RISK ADDL DX/OHS AUDIT: ICD-10-PCS | Mod: S$GLB,,, | Performed by: NURSE PRACTITIONER

## 2022-04-14 PROCEDURE — 1160F RVW MEDS BY RX/DR IN RCRD: CPT | Mod: CPTII,,, | Performed by: FAMILY MEDICINE

## 2022-04-14 PROCEDURE — 3077F PR MOST RECENT SYSTOLIC BLOOD PRESSURE >= 140 MM HG: ICD-10-PCS | Mod: CPTII,,, | Performed by: FAMILY MEDICINE

## 2022-04-14 PROCEDURE — 1160F PR REVIEW ALL MEDS BY PRESCRIBER/CLIN PHARMACIST DOCUMENTED: ICD-10-PCS | Mod: CPTII,,, | Performed by: FAMILY MEDICINE

## 2022-04-14 PROCEDURE — 1126F AMNT PAIN NOTED NONE PRSNT: CPT | Mod: CPTII,,, | Performed by: FAMILY MEDICINE

## 2022-04-14 PROCEDURE — 1159F PR MEDICATION LIST DOCUMENTED IN MEDICAL RECORD: ICD-10-PCS | Mod: CPTII,,, | Performed by: FAMILY MEDICINE

## 2022-04-14 PROCEDURE — 99213 PR OFFICE/OUTPT VISIT, EST, LEVL III, 20-29 MIN: ICD-10-PCS | Mod: ,,, | Performed by: FAMILY MEDICINE

## 2022-04-14 PROCEDURE — 1101F PT FALLS ASSESS-DOCD LE1/YR: CPT | Mod: CPTII,S$GLB,, | Performed by: NURSE PRACTITIONER

## 2022-04-14 PROCEDURE — 99213 OFFICE O/P EST LOW 20 MIN: CPT | Mod: ,,, | Performed by: FAMILY MEDICINE

## 2022-04-14 PROCEDURE — 3078F PR MOST RECENT DIASTOLIC BLOOD PRESSURE < 80 MM HG: ICD-10-PCS | Mod: CPTII,S$GLB,, | Performed by: NURSE PRACTITIONER

## 2022-04-14 PROCEDURE — 1125F AMNT PAIN NOTED PAIN PRSNT: CPT | Mod: CPTII,S$GLB,, | Performed by: NURSE PRACTITIONER

## 2022-04-14 PROCEDURE — 1101F PR PT FALLS ASSESS DOC 0-1 FALLS W/OUT INJ PAST YR: ICD-10-PCS | Mod: CPTII,S$GLB,, | Performed by: NURSE PRACTITIONER

## 2022-04-14 PROCEDURE — 1126F PR PAIN SEVERITY QUANTIFIED, NO PAIN PRESENT: ICD-10-PCS | Mod: CPTII,,, | Performed by: FAMILY MEDICINE

## 2022-04-14 PROCEDURE — 3074F PR MOST RECENT SYSTOLIC BLOOD PRESSURE < 130 MM HG: ICD-10-PCS | Mod: CPTII,S$GLB,, | Performed by: NURSE PRACTITIONER

## 2022-04-14 PROCEDURE — 3078F DIAST BP <80 MM HG: CPT | Mod: CPTII,,, | Performed by: FAMILY MEDICINE

## 2022-04-14 PROCEDURE — 3078F PR MOST RECENT DIASTOLIC BLOOD PRESSURE < 80 MM HG: ICD-10-PCS | Mod: CPTII,,, | Performed by: FAMILY MEDICINE

## 2022-04-14 PROCEDURE — 3078F DIAST BP <80 MM HG: CPT | Mod: CPTII,S$GLB,, | Performed by: NURSE PRACTITIONER

## 2022-04-14 PROCEDURE — 3288F FALL RISK ASSESSMENT DOCD: CPT | Mod: CPTII,S$GLB,, | Performed by: NURSE PRACTITIONER

## 2022-04-14 PROCEDURE — 1159F MED LIST DOCD IN RCRD: CPT | Mod: CPTII,,, | Performed by: FAMILY MEDICINE

## 2022-04-14 PROCEDURE — 99999 PR PBB SHADOW E&M-EST. PATIENT-LVL V: ICD-10-PCS | Mod: PBBFAC,,, | Performed by: NURSE PRACTITIONER

## 2022-04-14 PROCEDURE — 3288F PR FALLS RISK ASSESSMENT DOCUMENTED: ICD-10-PCS | Mod: CPTII,S$GLB,, | Performed by: NURSE PRACTITIONER

## 2022-04-14 PROCEDURE — 99214 OFFICE O/P EST MOD 30 MIN: CPT | Performed by: FAMILY MEDICINE

## 2022-04-14 PROCEDURE — G0439 PPPS, SUBSEQ VISIT: HCPCS | Mod: S$GLB,,, | Performed by: NURSE PRACTITIONER

## 2022-04-14 NOTE — TELEPHONE ENCOUNTER
Yes sir 6/1/2022, added to the wait list as well for sooner.     ----- Message from Mikie Ryan MD sent at 4/14/2022  2:37 PM CDT -----  Can she get my next available appointment?

## 2022-04-14 NOTE — PATIENT INSTRUCTIONS
Counseling and Referral of Other Preventative  (Italic type indicates deductible and co-insurance are waived)    Patient Name: Marleen Samano  Today's Date: 4/14/2022    Health Maintenance       Date Due Completion Date    Hepatitis C Screening Never done ---    Diabetes Urine Screening Never done ---    Foot Exam Never done ---    COVID-19 Vaccine (3 - Booster for Pfizer series) 07/02/2021 2/2/2021    Eye Exam 12/29/2021 12/29/2020    Lipid Panel 03/27/2022 3/27/2021    Hemoglobin A1c 08/17/2022 2/17/2022    Colonoscopy 12/13/2024 12/13/2019    DEXA Scan 01/18/2025 1/18/2022    TETANUS VACCINE 10/09/2029 10/9/2019        No orders of the defined types were placed in this encounter.    The following information is provided to all patients.  This information is to help you find resources for any of the problems found today that may be affecting your health:                Living healthy guide: www.[facility].[place].[facility]      Understanding Diabetes: www.diabetes.org      Eating healthy: www.cdc.gov/healthyweight      CDC home safety checklist: www.cdc.gov/steadi/patient.html      Agency on Aging: www.goea.[place].gov      Alcoholics anonymous (AA): www.aa.org      Physical Activity: www.remi.nih.gov/dp2cpvu      Tobacco use: www.quitwithusla.org

## 2022-04-14 NOTE — PROGRESS NOTES
"  Marleen Samano presented for a  Medicare AWV and comprehensive Health Risk Assessment today. The following components were reviewed and updated:    · Medical history  · Family History  · Social history  · Allergies and Current Medications  · Health Risk Assessment  · Health Maintenance  · Care Team         ** See Completed Assessments for Annual Wellness Visit within the encounter summary.**         The following assessments were completed:  · Living Situation  · CAGE  · Depression Screening  · Timed Get Up and Go  · Whisper Test  · Cognitive Function Screening  · Nutrition Screening  · ADL Screening  · PAQ Screening            Vitals:    04/14/22 1336   BP: 116/64   Pulse: 68   Temp: 98.4 °F (36.9 °C)   SpO2: 96%   Weight: 75.5 kg (166 lb 7.2 oz)   Height: 5' 9" (1.753 m)     Body mass index is 24.58 kg/m².  Physical Exam  Constitutional:       Appearance: She is well-developed.   HENT:      Head: Normocephalic and atraumatic.      Nose: Nose normal.   Eyes:      General: Lids are normal.      Conjunctiva/sclera: Conjunctivae normal.      Pupils: Pupils are equal, round, and reactive to light.   Cardiovascular:      Rate and Rhythm: Normal rate.   Pulmonary:      Effort: Pulmonary effort is normal.   Musculoskeletal:      Cervical back: Full passive range of motion without pain.   Skin:     General: Skin is warm and dry.   Neurological:      Mental Status: She is alert and oriented to person, place, and time.   Psychiatric:         Speech: Speech normal.         Behavior: Behavior normal.               Diagnoses and health risks identified today and associated recommendations/orders:    1. Encounter for preventive health examination  Discussed health maintenance guidelines appropriate for age.    Eye exam- scheduled for may with outside facility       2. Type 2 diabetes mellitus without complication, unspecified whether long term insulin use  Controlled, continue current medication regimen  Last HgA1c - 5.8  ADA " diet  Increase physical activity   Followed by       3. Fibromuscular dysplasia of both carotid arteries  Stable,continue to monitor    4. Chronic kidney disease, stage 3a  Stable, continue to monitor  Followed by pcp    5. Immune deficiency disorder  Stable, continue infection prevention     6. Pulmonary hypertension  Stable, continue to monitor  Followed by cardiology     7. Chronic obstructive pulmonary disease, unspecified COPD type  Stable, continue current medication regimen   Followed by pulmonology      8. Paroxysmal atrial fibrillation  Stable, continue current medication regimen  Followed by cardiology     9. Hypertension associated with type 2 diabetes mellitus  Controlled, continue current medication regimen  Low salt diet  Increase physical activity  Followed by pcp      10. Abnormality of gait and mobility  Stable, continue to monitor    11. Memory impairment  minicog 3/5  - Ambulatory referral/consult to Neurology; Future    12. Major Depression, chronic   Sub-optimal control continue current medications  Patient is interested in psych referral, discussed that [facility] providers are no accepting new patients but that we would work with her to find a provider  Patient seems to also be having some level of DOP, will discus with PCP     Provided Marleen with a 5-10 year written screening schedule and personal prevention plan. Recommendations were developed using the USPSTF age appropriate recommendations. Education, counseling, and referrals were provided as needed. After Visit Summary printed and given to patient which includes a list of additional screenings\tests needed.    Follow up for One year for Annual Wellness Visit.    Coleen Momin NP    I offered to discuss advanced care planning, including how to pick a person who would make decisions for you if you were unable to make them for yourself, called a health care power of , and what kind of decisions you might make  such as use of life sustaining treatments such as ventilators and tube feeding when faced with a life limiting illness recorded on a living will that they will need to know. (How you want to be cared for as you near the end of your natural life)     X  Patient has advanced directives written and agrees to provide copies to the institution.

## 2022-04-14 NOTE — PROGRESS NOTES
Wound Care and Hyperbaric Medicine                Progress Note    Subjective:       Patient ID: Marleen Samano is a 76 y.o. female.    Chief Complaint: Wound Care    HPI  Pt seen in clinic for a FU visit for multiple abrasions and lacerations of both arms. Wounds are showing some improvement, and the pt has no new complaints today.  Review of Systems   Skin: Positive for wound.        BL arms open wounds   All other systems reviewed and are negative.        Objective:        Physical Exam  Vitals and nursing note reviewed.   Constitutional:       General: She is not in acute distress.     Appearance: Normal appearance. She is not ill-appearing, toxic-appearing or diaphoretic.   Skin:     General: Skin is warm and dry.      Coloration: Skin is not jaundiced or pale.      Findings: Lesion present. No bruising, erythema or rash.      Comments: Bl arms open wounds, abrasions and lacerations, see wound care assessment documentation in chart review scanned under the media tab   Neurological:      General: No focal deficit present.      Mental Status: She is alert and oriented to person, place, and time.   Psychiatric:         Mood and Affect: Mood normal.         Behavior: Behavior normal.         Thought Content: Thought content normal.         Judgment: Judgment normal.         Vitals:    04/14/22 1449   BP: (!) 143/76   Pulse: 88   Resp: 19   Temp: 97.9 °F (36.6 °C)       Assessment:           ICD-10-CM ICD-9-CM   1. Abrasions of multiple sites  T07.XXXA 919.0   2. Laceration of multiple sites of right upper extremity, subsequent encounter  S41.111D V58.89     884.0   3. Laceration of multiple sites of left upper extremity, subsequent encounter  S41.112D V58.89     884.0                Plan:                  Marleen was seen today for wound care.    Diagnoses and all orders for this visit:    Abrasions of multiple sites    Laceration of multiple sites of right upper extremity, subsequent  encounter    Laceration of multiple sites of left upper extremity, subsequent encounter    Much of the documentation for this visit was completed in wound docs system. Please see the attached documentation for further details about the patients care. Scanned under the media tab.

## 2022-04-20 ENCOUNTER — HOSPITAL ENCOUNTER (OUTPATIENT)
Facility: HOSPITAL | Age: 77
Discharge: HOME | End: 2022-04-20
Attending: PHYSICAL MEDICINE & REHABILITATION | Admitting: PHYSICAL MEDICINE & REHABILITATION
Payer: MEDICARE

## 2022-04-20 VITALS
RESPIRATION RATE: 20 BRPM | DIASTOLIC BLOOD PRESSURE: 67 MMHG | HEART RATE: 71 BPM | TEMPERATURE: 98 F | WEIGHT: 162 LBS | HEIGHT: 69 IN | SYSTOLIC BLOOD PRESSURE: 138 MMHG | OXYGEN SATURATION: 96 % | BODY MASS INDEX: 23.99 KG/M2

## 2022-04-20 DIAGNOSIS — G89.29 CHRONIC PAIN OF LEFT KNEE: ICD-10-CM

## 2022-04-20 DIAGNOSIS — M17.12 PRIMARY OSTEOARTHRITIS OF LEFT KNEE: Primary | ICD-10-CM

## 2022-04-20 DIAGNOSIS — M25.562 CHRONIC PAIN OF LEFT KNEE: ICD-10-CM

## 2022-04-20 DIAGNOSIS — Z96.651 STATUS POST TOTAL RIGHT KNEE REPLACEMENT: ICD-10-CM

## 2022-04-20 PROCEDURE — 25000003 PHARM REV CODE 250: Performed by: PHYSICAL MEDICINE & REHABILITATION

## 2022-04-20 PROCEDURE — 36000705 HC OR TIME LEV I EA ADD 15 MIN: Performed by: PHYSICAL MEDICINE & REHABILITATION

## 2022-04-20 PROCEDURE — 64624 PR DESTRUCT, NEUROLYTIC AGENT, GENICULAR NERVE, W/IMG: ICD-10-PCS | Mod: LT,,, | Performed by: PHYSICAL MEDICINE & REHABILITATION

## 2022-04-20 PROCEDURE — 99900103 DSU ONLY-NO CHARGE-INITIAL HR (STAT): Performed by: PHYSICAL MEDICINE & REHABILITATION

## 2022-04-20 PROCEDURE — 63600175 PHARM REV CODE 636 W HCPCS: Performed by: PHYSICAL MEDICINE & REHABILITATION

## 2022-04-20 PROCEDURE — 71000015 HC POSTOP RECOV 1ST HR: Performed by: PHYSICAL MEDICINE & REHABILITATION

## 2022-04-20 PROCEDURE — 64624 DSTRJ NULYT AGT GNCLR NRV: CPT | Mod: LT,,, | Performed by: PHYSICAL MEDICINE & REHABILITATION

## 2022-04-20 PROCEDURE — 36000704 HC OR TIME LEV I 1ST 15 MIN: Performed by: PHYSICAL MEDICINE & REHABILITATION

## 2022-04-20 PROCEDURE — 27201423 OPTIME MED/SURG SUP & DEVICES STERILE SUPPLY: Performed by: PHYSICAL MEDICINE & REHABILITATION

## 2022-04-20 PROCEDURE — 99900104 DSU ONLY-NO CHARGE-EA ADD'L HR (STAT): Performed by: PHYSICAL MEDICINE & REHABILITATION

## 2022-04-20 RX ORDER — LIDOCAINE HYDROCHLORIDE 20 MG/ML
INJECTION, SOLUTION EPIDURAL; INFILTRATION; INTRACAUDAL; PERINEURAL
Status: DISCONTINUED | OUTPATIENT
Start: 2022-04-20 | End: 2022-04-20 | Stop reason: HOSPADM

## 2022-04-20 RX ORDER — BUPIVACAINE HYDROCHLORIDE 5 MG/ML
INJECTION, SOLUTION EPIDURAL; INTRACAUDAL
Status: DISCONTINUED | OUTPATIENT
Start: 2022-04-20 | End: 2022-04-20 | Stop reason: HOSPADM

## 2022-04-20 RX ORDER — SODIUM CHLORIDE, SODIUM LACTATE, POTASSIUM CHLORIDE, CALCIUM CHLORIDE 600; 310; 30; 20 MG/100ML; MG/100ML; MG/100ML; MG/100ML
INJECTION, SOLUTION INTRAVENOUS CONTINUOUS
Status: DISCONTINUED | OUTPATIENT
Start: 2022-04-20 | End: 2022-04-20 | Stop reason: HOSPADM

## 2022-04-20 RX ORDER — FENTANYL CITRATE 50 UG/ML
INJECTION, SOLUTION INTRAMUSCULAR; INTRAVENOUS
Status: DISCONTINUED | OUTPATIENT
Start: 2022-04-20 | End: 2022-04-20 | Stop reason: HOSPADM

## 2022-04-20 RX ORDER — MIDAZOLAM HYDROCHLORIDE 1 MG/ML
INJECTION INTRAMUSCULAR; INTRAVENOUS
Status: DISCONTINUED | OUTPATIENT
Start: 2022-04-20 | End: 2022-04-20 | Stop reason: HOSPADM

## 2022-04-20 RX ORDER — LIDOCAINE HYDROCHLORIDE 10 MG/ML
1 INJECTION, SOLUTION EPIDURAL; INFILTRATION; INTRACAUDAL; PERINEURAL ONCE
Status: DISCONTINUED | OUTPATIENT
Start: 2022-04-20 | End: 2022-04-20 | Stop reason: HOSPADM

## 2022-04-20 RX ORDER — DEXAMETHASONE SODIUM PHOSPHATE 4 MG/ML
INJECTION, SOLUTION INTRA-ARTICULAR; INTRALESIONAL; INTRAMUSCULAR; INTRAVENOUS; SOFT TISSUE
Status: DISCONTINUED | OUTPATIENT
Start: 2022-04-20 | End: 2022-04-20 | Stop reason: HOSPADM

## 2022-04-20 RX ADMIN — SODIUM CHLORIDE, SODIUM LACTATE, POTASSIUM CHLORIDE, AND CALCIUM CHLORIDE: .6; .31; .03; .02 INJECTION, SOLUTION INTRAVENOUS at 09:04

## 2022-04-20 NOTE — H&P
No chief complaint on file.        HPI: Marleen Samano is a  76 y.o. female who presents today for RFA to the left knee.        Review of Systems   Constitutional: Negative for chills and fever.   HENT: Negative for congestion and tinnitus.    Eyes: Negative for blurred vision and photophobia.   Respiratory: Negative for shortness of breath and wheezing.    Cardiovascular: Negative for chest pain and palpitations.   Gastrointestinal: Negative for nausea and vomiting.   Genitourinary: Negative for dysuria and frequency.   Musculoskeletal: Negative for joint pain and myalgias.   Skin: Negative for itching and rash.   Neurological: Negative for speech change and focal weakness.   Endo/Heme/Allergies: Negative for environmental allergies. Does not bruise/bleed easily.   Psychiatric/Behavioral: Negative for depression. The patient is not nervous/anxious.             Past Medical History:   Diagnosis Date    Anemia due to multiple mechanisms 02/10/2020    Anemia in chronic kidney disease (CKD)     Anemia, chronic disease 02/10/2020    Arthritis     Aseptic loosening of prosthetic knee, 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/2020    Bell's palsy     Bladder incontinence     Blepharospasm     Bronchiectasis without complication 10/08/2019    Cervical dystonia     Concussion     COPD (chronic obstructive pulmonary disease)     Enterocolitis 02/07/2020    Fainting spell     2/7/2020    Hormone deficiency     Hypertension     Loose right total knee arthroplasty 06/15/2020    Migraine     Muscle spasm     Myofacial pain syndrome     Normocytic normochromic anemia 02/10/2020    Right ear pain 02/09/2021    Squamous cell carcinoma of skin     Syncope and collapse 02/09/2021          No current facility-administered medications on file prior to encounter.     Current Outpatient Medications on File Prior to Encounter   Medication Sig Dispense Refill    albuterol (PROVENTIL/VENTOLIN HFA) 90 mcg/actuation inhaler Inhale 2  puffs into the lungs every 4 (four) hours as needed for Wheezing or Shortness of Breath. Rescue 54 g 0    albuterol-ipratropium (DUO-NEB) 2.5 mg-0.5 mg/3 mL nebulizer solution USE 1 VIAL VIA NEBULIZER  EVERY 6 HOURS AS NEEDED FOR WHEEZING RESCUE 180 mL 23    amLODIPine (NORVASC) 2.5 MG tablet Take 1 tablet (2.5 mg total) by mouth once daily. 90 tablet 3    baclofen (LIORESAL) 20 MG tablet TAKE 1 TABLET BY MOUTH  TWICE DAILY 180 tablet 3    benazepriL (LOTENSIN) 40 MG tablet Take 1 tablet by mouth once daily 90 tablet 3    diclofenac sodium (VOLTAREN) 1 % Gel Apply topically.       gabapentin (NEURONTIN) 600 MG tablet TAKE 1 TABLET BY MOUTH 3  TIMES DAILY 270 tablet 3    magnesium oxide (MAG-OX) 400 mg (241.3 mg magnesium) tablet Take 1 tablet (400 mg total) by mouth 2 (two) times daily. 180 tablet 3    metoprolol succinate (TOPROL-XL) 25 MG 24 hr tablet Take 1 tablet (25 mg total) by mouth once daily. 30 tablet 11    omeprazole (PRILOSEC) 40 MG capsule Take 1 capsule (40 mg total) by mouth 2 (two) times daily before meals. 180 capsule 2    simethicone (GAS-X ORAL) Take by mouth.      trazodone (DESYREL) 100 MG tablet Take 100 mg by mouth every evening.      venlafaxine (EFFEXOR-XR) 150 MG Cp24 Take 1 capsule (150 mg total) by mouth 2 (two) times a day. 14 capsule 0    aspirin 81 MG Chew Take 81 mg by mouth once daily.      blood sugar diagnostic Strp To check BG 2 times daily, to use with insurance preferred meter 200 strip 3    blood-glucose meter kit To check BG 1 times daily, to use with insurance preferred meter 1 each 0    lancets Misc To check BG 2 times daily, to use with insurance preferred meter 200 each 3    mupirocin (BACTROBAN) 2 % ointment Apply to forearm and wrap with non stick gauze and coban wrap 22 g 1    potassium chloride (MICRO-K) 10 MEQ CpSR Take 1 capsule (10 mEq total) by mouth daily as needed (Take one daily when you take furosemide). 30 capsule 3    sumatriptan (IMITREX)  100 MG tablet TAKE 1 TABLET BY MOUTH AFTER ONSET OF MIGRAINE. MAY REPEAT AFTER 2 HOURS IF HEADACHE RETURNS NOT TO EXCEED 200MG IN 24 HOURS                Physical Exam:  Vitals:    04/20/22 0920   BP: 123/70   Pulse: 65   Resp: 18   Temp: 98.4 °F (36.9 °C)     Physical Exam  Constitutional:       General: She is not in acute distress.     Appearance: Normal appearance.   HENT:      Head: Normocephalic and atraumatic.      Nose: Nose normal. No congestion.      Mouth/Throat:      Mouth: Mucous membranes are moist.      Pharynx: Oropharynx is clear.   Eyes:      Extraocular Movements: Extraocular movements intact.      Pupils: Pupils are equal, round, and reactive to light.   Neck:      Trachea: Trachea and phonation normal.   Pulmonary:      Effort: Pulmonary effort is normal. No respiratory distress.   Abdominal:      General: Abdomen is flat. There is no distension.   Skin:     General: Skin is warm and dry.   Neurological:      Mental Status: She is alert and oriented to person, place, and time. Mental status is at baseline.   Psychiatric:         Mood and Affect: Mood and affect normal.         Behavior: Behavior normal.       Ortho Exam          Assessment:  Primary osteoarthritis of left knee  -     Place in Outpatient; Standing  -     Vital signs; Standing  -     Insert peripheral IV; Standing  -     Verify informed consent; Standing  -     Notify physician ; Standing  -     Notify physician ; Standing  -     Notify physician (specify); Standing  -     Diet NPO; Standing    Status post total right knee replacement  -     Place in Outpatient; Standing  -     Vital signs; Standing  -     Insert peripheral IV; Standing  -     Verify informed consent; Standing  -     Notify physician ; Standing  -     Notify physician ; Standing  -     Notify physician (specify); Standing  -     Diet NPO; Standing    Chronic pain of left knee  -     Case Request Operating Room: Cooled Radiofrequency Ablation of the genicular nerve  branches of the Left knee    Other orders  -     LIDOcaine (PF) 10 mg/ml (1%) injection 10 mg  -     lactated ringers infusion  -     IP VTE LOW RISK PATIENT; Standing  -     SURG FL Surgery Fluoro Usage; Standing               PLAN:  Marleen Samano is a 76 y.o. female with chronic bilateral knee pain.  She has a history of a total knee arthroplasty on the right.  She has chronic knee pain on the left.  She has an MRI which shows meniscal tears of the medial and lateral menisci.  She has had intra-articular injections on the left without any significant improvement in lasting pain or function.  She is not interested in a total knee arthroplasty at this time.  I performend nerve blocks on the right and left with greater than 60% reduction in pain and function. we can proceed with radiofrequency ablation to the sensory articular branches of the right and left knees      ASA 3 Mallampati 2            Abner Huerta D.O.  Physical Medicine and Rehabilitation

## 2022-04-20 NOTE — OP NOTE
Today's Date: 04/20/2022     [facility] History: Marleen Samano is a 76 y.o. female with chronic bilateral knee pain.  She has a history of a total knee arthroplasty on the right.  She has chronic knee pain on the left.  She has an MRI which shows meniscal tears of the medial and lateral menisci.  She has had intra-articular injections on the left without any significant improvement in lasting pain or function.  She is not interested in a total knee arthroplasty at this time.  At this time, will proceed which an excellent nerve blocks on the right and left.  Should this provide her with greater than 60% reduction in pain and function, we can proceed with radiofrequency ablation to the sensory articular branches of the right and left knees     PREOPERATIVE DIAGNOSIS:  Chronic LEFT knee pain     POSTOPERATIVE DIAGNOSIS:  same     PROCEDURE:   1. LEFT Superolateral genicular branch from the vastus lateralis  2. LEFT Superomedial genicular branch from the vastus medialis  3. LEFT Inferomedial genicular branch from the saphenous nerve      ANESTHESIA:  RN IV sedation  2 mg IV Versed, 50 micro g IV fentanyl greater than 18 minutes    COMPLICATIONS:  none    CONSENT: Risks and benefits were discussed and informed consent was obtained      PROCEDURE IN DETAIL:   The patient was brought to the procedure room and placed on the exam table in a comfortable supine position. The place for needle placement was obtained by manual palpation with radiographic confirmation. The sterile field was prepared by chloroprep and sterile drapes. Local anesthesia superficial and deep was provided by local infiltration of  1 cc of 1% lidocaine using a 27 gauge 1.5 in needle . A 17g 50mm radiofrequency introducerneedle with a 2mm active tip was placed overlying the left knee joint and using fluoroscopic guidance the needle was advanced to a bony endpoint on the superiolateral portion of the femoral condyle of the left knee. A second needle was  advanced to a bony endpoint on the superiomedial portion of the femoral condyle. A third needle was then placed over the inferiomedial portion of the tibial condyle until a bony endpoint was met. Attempted aspiration yielded no blood. Lateral x-ray views showed all the needles at 50% depth of the femur and tibia. Motor stimulation was tested ad 2.0 volts with no leg movement. Images were saved in AP and lateral.  1 cc of 0.50% bupivacaine was slowly injected. Then a radiofrequency ablation of each of the geniculate nerves were done at80 degrees Celsiusfor 2 minutes and 30 seconds each.  After ablation, a 1 cc solution of  4 cc of normal saline and 1 cc of 10 milligrams/milliliter of Decadron was injected.  The needles were withdrawn. POST PROCEDURE     EVALUATION: IMPRESSION:   1. Summary of procedure.   2. RTC in 3-4 week(s).   3. Estimated Blood Loss:  Trace

## 2022-04-21 DIAGNOSIS — I10 BENIGN ESSENTIAL HTN: Primary | ICD-10-CM

## 2022-04-21 RX ORDER — BENAZEPRIL HYDROCHLORIDE 40 MG/1
40 TABLET ORAL DAILY
Qty: 90 TABLET | Refills: 3 | Status: SHIPPED | OUTPATIENT
Start: 2022-04-21 | End: 2023-05-23 | Stop reason: SDUPTHER

## 2022-04-21 RX ORDER — AMLODIPINE BESYLATE 2.5 MG/1
2.5 TABLET ORAL DAILY
Qty: 90 TABLET | Refills: 3 | Status: SHIPPED | OUTPATIENT
Start: 2022-04-21 | End: 2022-06-01 | Stop reason: SDUPTHER

## 2022-04-21 NOTE — TELEPHONE ENCOUNTER
Called patient to give her name of place that takes her PHN for therapy and med management since the one with [facility] is not taking new patients at this time.  [address] [address] [phone]    Patient states she will call them to set up her appointment-MG

## 2022-04-21 NOTE — TELEPHONE ENCOUNTER
Refill request sent to Dr. Ryan.     ----- Message from Erendira Lino MA sent at 4/21/2022 11:35 AM CDT -----  Type: RX Refill Request    Who Called: self    Have you contacted your pharmacy:no    Refill or New Rx:refill    RX Name and Strength:amLODIPine (NORVASC) 2.5 MG tablet 90 tablet   benazepriL (LOTENSIN) 40 MG tablet 90 tablet       Preferred Pharmacy with phone number:[facility] [address] [place] [address] [facility]   Phone:  [phone]  Fax:  [phone]          Local or Mail Order:local    Ordering Provider:Scar    Would the patient rather a call back or a response via [facility]? yes    Best Call Back Number:[phone] (home)

## 2022-04-21 NOTE — TELEPHONE ENCOUNTER
No new care gaps identified.  Powered by [employer]. Reference number: [id number].   4/21/2022 12:09:14 PM CDT

## 2022-04-21 NOTE — TELEPHONE ENCOUNTER
----- Message from Stephanie Stuart MA sent at 4/14/2022  2:41 PM CDT -----  Regarding: RE: insurance  We are in network with [employer]. At this time the [facility] is not accepting new adult patients for medication management. Here is our resource list.   Thank you,   Liz, MA [facility] options:   [facility] [address] in [place] [phone]  [facility] or [facility]  [phone] option 1 or 2   [facility] [phone]    Non [facility] options:  [facility] Resource List:  [facility]  [address], [address]  [place] [address]  P# [phone]    [facility]  [address]  [place] [address]  [phone]       # Multiple Locations  Accepts all 5 Medicaid ONLY    [facility] #  [address] [place] [address]  [phone]  Accepts all insurances except:  [place] [facility] Connections    [facility] #  [address] [place] [address]  [phone]  [employer] Medicaid, Medicare, some Commercial    [facility]  [address]  [place] [address]  [phone]  Accepts only AmeriHealth, Aetna, and [employer] Medicaid    [facility] #  [address] [place] [address]  [phone]  Accepts Commercial Only  Pt must call for intake  Adults: ADHD/ADD only  Children: Various disorders    [facility]  [address] [place] [address]  [phone]  Accepts Medicaid Only    [facility] #  [address] [place] [address]  [phone]  Accepts some insurances  [facility]  [address] [place] [address]  [phone]  Accepts Medicaid Only    [facility] #  [address] [address]  [phone]  Cash pay only  ----- Message -----  From: Malia Martin  Sent: 4/14/2022   2:29 PM CDT  To: [facility] Staff  Subject: insurance                                        Do  y'all take [employer]?  And if not, do you know who does for med management and therapy?    Thank you,    Malia

## 2022-04-25 ENCOUNTER — TELEPHONE (OUTPATIENT)
Dept: CARDIOLOGY | Facility: CLINIC | Age: 77
End: 2022-04-25
Payer: MEDICARE

## 2022-04-25 NOTE — TELEPHONE ENCOUNTER
----- Message from Yan Moctezuma sent at 4/25/2022  3:17 PM CDT -----  Type:  Reschedule Appointment Request    Caller is requesting to reschedule appointment.      Name of Caller:  Pt    Best Call Back Number:  [phone]     Additional Information:  Pt sts she can't afford to make it to the appt this week would like to reschedule for anytime after the 3rd when she gets her social security.  Please advise -- Thank you

## 2022-04-26 ENCOUNTER — TELEPHONE (OUTPATIENT)
Dept: FAMILY MEDICINE | Facility: CLINIC | Age: 77
End: 2022-04-26
Payer: MEDICARE

## 2022-04-26 ENCOUNTER — PATIENT OUTREACH (OUTPATIENT)
Dept: ADMINISTRATIVE | Facility: OTHER | Age: 77
End: 2022-04-26
Payer: MEDICARE

## 2022-04-26 ENCOUNTER — TELEPHONE (OUTPATIENT)
Dept: PHYSICAL MEDICINE AND REHAB | Facility: CLINIC | Age: 77
End: 2022-04-26
Payer: MEDICARE

## 2022-04-26 NOTE — TELEPHONE ENCOUNTER
Patient reports she takes baclofen 20 MG BID to help with muscle spasms due to dystonia. Was prescribed by Dr Amador. She states she had a lot of breakthrough muscle spasms on yesterday and this has been happening frequently. On yesterday her hands stayed clinched shut in spasms for most of the day.   Requesting rx for baclofen be increased to TID to control sx better. Please advise.

## 2022-04-26 NOTE — TELEPHONE ENCOUNTER
----- Message from Stacey M Lefort sent at 4/26/2022  3:21 PM CDT -----  Pt wants to talk to you about muscle spasms - she also said her pcp is no longer with [facility] and she used to write her a script for baclofen. She wants to know if you will write a script for that medication but also a bit stronger than it was. She asked for a callback at [phone].  Thank you.

## 2022-04-26 NOTE — TELEPHONE ENCOUNTER
Spoke to pt. Explained that Dr. Huerta will not be able to take over her Baclofen because he is also leaving [facility] in June. Pt voiced understanding.

## 2022-04-26 NOTE — TELEPHONE ENCOUNTER
----- Message from Micah Dallas sent at 4/26/2022  2:41 PM CDT -----  Contact: Pt  Type: Needs Medical Advice    Who Called:Pt  Best Call Back Number:[phone]    Additional Information Requesting a call back regarding Pt was calling in regards to there baclofen (LIORESAL) 20 MG tablet pt wanted to discuss getting a dosage increase pt stated to please call when available Thank you  Please Advise-Thank you

## 2022-04-27 ENCOUNTER — TELEPHONE (OUTPATIENT)
Dept: FAMILY MEDICINE | Facility: CLINIC | Age: 77
End: 2022-04-27
Payer: MEDICARE

## 2022-04-27 NOTE — TELEPHONE ENCOUNTER
Call placed to patient for notification. Patient verbalized understanding. Patient states she has an appointment with Dr. Hardwick (back and spine clinic) on 5-11-22. States she has been taking Baclofen for a while, and would like to follow up regarding this with Dr. Hardwick at upcoming appointment to see if maybe this medication can be changed to something different.

## 2022-04-27 NOTE — TELEPHONE ENCOUNTER
----- Message from Cindy Turner sent at 4/27/2022  1:11 PM CDT -----  Regarding: Return call    Type:  Patient Returning Call    Who Called: PURA TAN [[id number]]    Who Left Message for Patient: Estella Gilliam     Does the patient know what this is regarding?: Y    Can the clinic reply in [facility]: No    Best Call Back Number: [phone]    Additional Information: N/A

## 2022-05-02 ENCOUNTER — PATIENT MESSAGE (OUTPATIENT)
Dept: FAMILY MEDICINE | Facility: CLINIC | Age: 77
End: 2022-05-02
Payer: MEDICARE

## 2022-05-02 ENCOUNTER — TELEPHONE (OUTPATIENT)
Dept: FAMILY MEDICINE | Facility: CLINIC | Age: 77
End: 2022-05-02
Payer: MEDICARE

## 2022-05-02 NOTE — TELEPHONE ENCOUNTER
I spoke with pt via phone. She states that she has pressure but does not think it is dealated to urinary problems. But would like to be evaluated in clinic. Will see SAGAR Younger tomorrow, all understanding voiced.     ----- Message from Son Salazar sent at 5/2/2022 12:06 PM CDT -----  Contact: pt at [phone]  Type: Needs Medical Advice  Who Called:  pt  Pharmacy name and phone #:    [facility] [address] [place] [address] [address] [address]  [address]  [facility] [address]  Phone: [phone] Fax: [phone]  Best Call Back Number: [phone]  Additional Information: pt is calling the office to see if the Dr can call in a Rx for antibiotics as she has a UTI that began this morning. The pt would like to speak with the nurse as soon as it is called in. Please call back and advise.

## 2022-05-02 NOTE — TELEPHONE ENCOUNTER
I spoke with pt via phone, I advised her that an in office visit would be needed so that she can be evaluated. She tells me that she is just experiencing some pressure but unsure if its related to a UTI. She just want to make sure that everything is okay.     Appointment  Scheduled for tomorrow with SAGAR Younger.

## 2022-05-03 ENCOUNTER — OFFICE VISIT (OUTPATIENT)
Dept: FAMILY MEDICINE | Facility: CLINIC | Age: 77
End: 2022-05-03
Payer: MEDICARE

## 2022-05-03 ENCOUNTER — TELEPHONE (OUTPATIENT)
Dept: FAMILY MEDICINE | Facility: CLINIC | Age: 77
End: 2022-05-03
Payer: MEDICARE

## 2022-05-03 VITALS
HEART RATE: 70 BPM | SYSTOLIC BLOOD PRESSURE: 120 MMHG | OXYGEN SATURATION: 95 % | HEIGHT: 69 IN | DIASTOLIC BLOOD PRESSURE: 64 MMHG | WEIGHT: 166 LBS | TEMPERATURE: 98 F | BODY MASS INDEX: 24.59 KG/M2 | RESPIRATION RATE: 16 BRPM

## 2022-05-03 DIAGNOSIS — N39.0 URINARY TRACT INFECTION WITHOUT HEMATURIA, SITE UNSPECIFIED: Primary | ICD-10-CM

## 2022-05-03 LAB
BILIRUB SERPL-MCNC: ABNORMAL MG/DL
BLOOD URINE, POC: ABNORMAL
COLOR, POC UA: YELLOW
GLUCOSE UR QL STRIP: NORMAL
KETONES UR QL STRIP: ABNORMAL
LEUKOCYTE ESTERASE URINE, POC: ABNORMAL
NITRITE, POC UA: ABNORMAL
PH, POC UA: 8
PROTEIN, POC: ABNORMAL
SPECIFIC GRAVITY, POC UA: 1
UROBILINOGEN, POC UA: NORMAL

## 2022-05-03 PROCEDURE — 99999 PR PBB SHADOW E&M-EST. PATIENT-LVL IV: ICD-10-PCS | Mod: PBBFAC,,, | Performed by: PHYSICIAN ASSISTANT

## 2022-05-03 PROCEDURE — 1160F RVW MEDS BY RX/DR IN RCRD: CPT | Mod: CPTII,S$GLB,, | Performed by: PHYSICIAN ASSISTANT

## 2022-05-03 PROCEDURE — 99999 PR PBB SHADOW E&M-EST. PATIENT-LVL IV: CPT | Mod: PBBFAC,,, | Performed by: PHYSICIAN ASSISTANT

## 2022-05-03 PROCEDURE — 1126F AMNT PAIN NOTED NONE PRSNT: CPT | Mod: CPTII,S$GLB,, | Performed by: PHYSICIAN ASSISTANT

## 2022-05-03 PROCEDURE — 81001 POCT URINALYSIS, DIPSTICK OR TABLET REAGENT, AUTOMATED, WITH MICROSCOP: ICD-10-PCS | Mod: S$GLB,,, | Performed by: PHYSICIAN ASSISTANT

## 2022-05-03 PROCEDURE — 3078F DIAST BP <80 MM HG: CPT | Mod: CPTII,S$GLB,, | Performed by: PHYSICIAN ASSISTANT

## 2022-05-03 PROCEDURE — 1126F PR PAIN SEVERITY QUANTIFIED, NO PAIN PRESENT: ICD-10-PCS | Mod: CPTII,S$GLB,, | Performed by: PHYSICIAN ASSISTANT

## 2022-05-03 PROCEDURE — 3288F FALL RISK ASSESSMENT DOCD: CPT | Mod: CPTII,S$GLB,, | Performed by: PHYSICIAN ASSISTANT

## 2022-05-03 PROCEDURE — 3074F SYST BP LT 130 MM HG: CPT | Mod: CPTII,S$GLB,, | Performed by: PHYSICIAN ASSISTANT

## 2022-05-03 PROCEDURE — 99213 OFFICE O/P EST LOW 20 MIN: CPT | Mod: S$GLB,,, | Performed by: PHYSICIAN ASSISTANT

## 2022-05-03 PROCEDURE — 3074F PR MOST RECENT SYSTOLIC BLOOD PRESSURE < 130 MM HG: ICD-10-PCS | Mod: CPTII,S$GLB,, | Performed by: PHYSICIAN ASSISTANT

## 2022-05-03 PROCEDURE — 81001 URINALYSIS AUTO W/SCOPE: CPT | Mod: S$GLB,,, | Performed by: PHYSICIAN ASSISTANT

## 2022-05-03 PROCEDURE — 99213 PR OFFICE/OUTPT VISIT, EST, LEVL III, 20-29 MIN: ICD-10-PCS | Mod: S$GLB,,, | Performed by: PHYSICIAN ASSISTANT

## 2022-05-03 PROCEDURE — 87186 SC STD MICRODIL/AGAR DIL: CPT | Performed by: PHYSICIAN ASSISTANT

## 2022-05-03 PROCEDURE — 87086 URINE CULTURE/COLONY COUNT: CPT | Performed by: PHYSICIAN ASSISTANT

## 2022-05-03 PROCEDURE — 1101F PT FALLS ASSESS-DOCD LE1/YR: CPT | Mod: CPTII,S$GLB,, | Performed by: PHYSICIAN ASSISTANT

## 2022-05-03 PROCEDURE — 1159F PR MEDICATION LIST DOCUMENTED IN MEDICAL RECORD: ICD-10-PCS | Mod: CPTII,S$GLB,, | Performed by: PHYSICIAN ASSISTANT

## 2022-05-03 PROCEDURE — 1160F PR REVIEW ALL MEDS BY PRESCRIBER/CLIN PHARMACIST DOCUMENTED: ICD-10-PCS | Mod: CPTII,S$GLB,, | Performed by: PHYSICIAN ASSISTANT

## 2022-05-03 PROCEDURE — 87077 CULTURE AEROBIC IDENTIFY: CPT | Performed by: PHYSICIAN ASSISTANT

## 2022-05-03 PROCEDURE — 3288F PR FALLS RISK ASSESSMENT DOCUMENTED: ICD-10-PCS | Mod: CPTII,S$GLB,, | Performed by: PHYSICIAN ASSISTANT

## 2022-05-03 PROCEDURE — 1101F PR PT FALLS ASSESS DOC 0-1 FALLS W/OUT INJ PAST YR: ICD-10-PCS | Mod: CPTII,S$GLB,, | Performed by: PHYSICIAN ASSISTANT

## 2022-05-03 PROCEDURE — 87088 URINE BACTERIA CULTURE: CPT | Performed by: PHYSICIAN ASSISTANT

## 2022-05-03 PROCEDURE — 1159F MED LIST DOCD IN RCRD: CPT | Mod: CPTII,S$GLB,, | Performed by: PHYSICIAN ASSISTANT

## 2022-05-03 PROCEDURE — 3078F PR MOST RECENT DIASTOLIC BLOOD PRESSURE < 80 MM HG: ICD-10-PCS | Mod: CPTII,S$GLB,, | Performed by: PHYSICIAN ASSISTANT

## 2022-05-03 RX ORDER — SULFAMETHOXAZOLE AND TRIMETHOPRIM 800; 160 MG/1; MG/1
1 TABLET ORAL 2 TIMES DAILY
Qty: 20 TABLET | Refills: 0 | Status: SHIPPED | OUTPATIENT
Start: 2022-05-03 | End: 2022-05-09 | Stop reason: ALTCHOICE

## 2022-05-03 NOTE — TELEPHONE ENCOUNTER
----- Message from Flor  sent at 5/3/2022 12:10 PM CDT -----  Regarding: orders/advice  Type: Needs Medical Advice    Who Called:      Best Call Back Number:     Additional Information: Requesting a call back from Nurse, regarding pt wants order for urine sample to be dropped off before appt today ,please advise and call her back

## 2022-05-03 NOTE — TELEPHONE ENCOUNTER
Patient has scheduled appointment today related to urinary symptoms. Requesting to collect urine sample prior to appointment. Advised patient urine sample usually collected a time of appointment and dipped/processed by in office staff at time of collection. Patient states she is incontinent and at times unable to collect urine sample on demand. Advised patient urine sample would need to be collected in sterile container provided by office. Unable to accept urine sample collected in any other container. Patient verbalized understanding.

## 2022-05-03 NOTE — TELEPHONE ENCOUNTER
Spoke with pt via phone. She states that due to incontinence she would like to bring urine sample from home. She advises that she has sterile cup. Advised pt that this would be fine. Confirmed appt time.

## 2022-05-03 NOTE — PROGRESS NOTES
Subjective:       Patient ID: Marleen Samano is a 76 y.o. female.    Chief Complaint: Urinary Tract Infection    HPI   Pressure discomfort in bladder x 1 wk  Strong urine smell  Poor bladder control  Review of Systems   Constitutional: Negative.  Negative for activity change, appetite change, chills, diaphoresis, fatigue, fever and unexpected weight change.   HENT: Negative.    Eyes: Negative.    Respiratory: Negative.  Negative for cough and shortness of breath.    Cardiovascular: Negative.  Negative for chest pain and leg swelling.   Gastrointestinal: Negative.    Endocrine: Negative.    Genitourinary: Positive for bladder incontinence. Negative for decreased urine volume, difficulty urinating, dysuria, enuresis, flank pain, frequency, hematuria, nocturia and urgency.   Musculoskeletal: Negative.    Integumentary:  Negative for rash. Negative.   Neurological: Negative.          Objective:      Physical Exam  Vitals reviewed.   Constitutional:       General: She is not in acute distress.     Appearance: Normal appearance. She is normal weight. She is not ill-appearing, toxic-appearing or diaphoretic.   HENT:      Head: Normocephalic and atraumatic.   Eyes:      General: No scleral icterus.     Conjunctiva/sclera: Conjunctivae normal.   Abdominal:      General: Abdomen is flat. There is no distension.      Palpations: Abdomen is soft. There is no mass.      Tenderness: There is no abdominal tenderness. There is no right CVA tenderness, left CVA tenderness, guarding or rebound.      Hernia: No hernia is present.   Musculoskeletal:      Cervical back: Normal range of motion and neck supple. No rigidity or tenderness.   Lymphadenopathy:      Cervical: No cervical adenopathy.   Skin:     General: Skin is warm and dry.      Findings: No rash.   Neurological:      General: No focal deficit present.      Mental Status: She is alert and oriented to person, place, and time.         Assessment:       Problem List Items  Addressed This Visit    None     Visit Diagnoses     Urinary tract infection without hematuria, site unspecified    -  Primary    Relevant Medications    sulfamethoxazole-trimethoprim 800-160mg (BACTRIM DS) 800-160 mg Tab    Other Relevant Orders    Urine culture    POCT urinalysis, dipstick or tablet reag (Completed)          Plan:       Marleen was seen today for urinary tract infection.    Diagnoses and all orders for this visit:    Urinary tract infection without hematuria, site unspecified  -     sulfamethoxazole-trimethoprim 800-160mg (BACTRIM DS) 800-160 mg Tab; Take 1 tablet by mouth 2 (two) times daily. for 10 days  -     Urine culture  -     POCT urinalysis, dipstick or tablet reag    hydrate well  F/u if sx persist    Urine culture confirms urinary infection  Pt. On Septra that sensitivity shows resistance  DC Septra

## 2022-05-04 ENCOUNTER — TELEPHONE (OUTPATIENT)
Dept: OPTOMETRY | Facility: CLINIC | Age: 77
End: 2022-05-04
Payer: MEDICARE

## 2022-05-04 NOTE — TELEPHONE ENCOUNTER
----- Message from Carly Harris sent at 5/4/2022 12:23 PM CDT -----  Contact: Patient  Patient call in regarding appointment    Patient requesting a call back to see if appointment still stands    Please assist    Patient can be reach at  [phone]

## 2022-05-05 ENCOUNTER — OFFICE VISIT (OUTPATIENT)
Dept: OPTOMETRY | Facility: CLINIC | Age: 77
End: 2022-05-05
Payer: MEDICARE

## 2022-05-05 DIAGNOSIS — H52.7 REFRACTIVE ERROR: ICD-10-CM

## 2022-05-05 DIAGNOSIS — H04.123 DRY EYE SYNDROME, BILATERAL: ICD-10-CM

## 2022-05-05 DIAGNOSIS — E11.9 DIABETES MELLITUS TYPE 2 WITHOUT RETINOPATHY: Primary | ICD-10-CM

## 2022-05-05 DIAGNOSIS — Z96.1 PSEUDOPHAKIA: ICD-10-CM

## 2022-05-05 PROCEDURE — 3288F PR FALLS RISK ASSESSMENT DOCUMENTED: ICD-10-PCS | Mod: CPTII,S$GLB,, | Performed by: OPTOMETRIST

## 2022-05-05 PROCEDURE — 1159F MED LIST DOCD IN RCRD: CPT | Mod: CPTII,S$GLB,, | Performed by: OPTOMETRIST

## 2022-05-05 PROCEDURE — 3288F FALL RISK ASSESSMENT DOCD: CPT | Mod: CPTII,S$GLB,, | Performed by: OPTOMETRIST

## 2022-05-05 PROCEDURE — 1160F PR REVIEW ALL MEDS BY PRESCRIBER/CLIN PHARMACIST DOCUMENTED: ICD-10-PCS | Mod: CPTII,S$GLB,, | Performed by: OPTOMETRIST

## 2022-05-05 PROCEDURE — 2023F DILAT RTA XM W/O RTNOPTHY: CPT | Mod: CPTII,S$GLB,, | Performed by: OPTOMETRIST

## 2022-05-05 PROCEDURE — 1159F PR MEDICATION LIST DOCUMENTED IN MEDICAL RECORD: ICD-10-PCS | Mod: CPTII,S$GLB,, | Performed by: OPTOMETRIST

## 2022-05-05 PROCEDURE — 2023F PR DILATED RETINAL EXAM W/O EVID OF RETINOPATHY: ICD-10-PCS | Mod: CPTII,S$GLB,, | Performed by: OPTOMETRIST

## 2022-05-05 PROCEDURE — 1126F PR PAIN SEVERITY QUANTIFIED, NO PAIN PRESENT: ICD-10-PCS | Mod: CPTII,S$GLB,, | Performed by: OPTOMETRIST

## 2022-05-05 PROCEDURE — 1126F AMNT PAIN NOTED NONE PRSNT: CPT | Mod: CPTII,S$GLB,, | Performed by: OPTOMETRIST

## 2022-05-05 PROCEDURE — 1101F PR PT FALLS ASSESS DOC 0-1 FALLS W/OUT INJ PAST YR: ICD-10-PCS | Mod: CPTII,S$GLB,, | Performed by: OPTOMETRIST

## 2022-05-05 PROCEDURE — 99999 PR PBB SHADOW E&M-EST. PATIENT-LVL III: CPT | Mod: PBBFAC,,, | Performed by: OPTOMETRIST

## 2022-05-05 PROCEDURE — 92014 COMPRE OPH EXAM EST PT 1/>: CPT | Mod: S$GLB,,, | Performed by: OPTOMETRIST

## 2022-05-05 PROCEDURE — 1101F PT FALLS ASSESS-DOCD LE1/YR: CPT | Mod: CPTII,S$GLB,, | Performed by: OPTOMETRIST

## 2022-05-05 PROCEDURE — 99999 PR PBB SHADOW E&M-EST. PATIENT-LVL III: ICD-10-PCS | Mod: PBBFAC,,, | Performed by: OPTOMETRIST

## 2022-05-05 PROCEDURE — 1160F RVW MEDS BY RX/DR IN RCRD: CPT | Mod: CPTII,S$GLB,, | Performed by: OPTOMETRIST

## 2022-05-05 PROCEDURE — 92014 PR EYE EXAM, EST PATIENT,COMPREHESV: ICD-10-PCS | Mod: S$GLB,,, | Performed by: OPTOMETRIST

## 2022-05-05 NOTE — PROGRESS NOTES
HPI     Diabetic Eye Exam      Additional comments: LDE: 12/2020              Comments     75 YO female presents today for an annual diabetic eye exam. Patient   states that she is doing well, notes no problems or complaints. Denies any   eye pain.     Refresh OU PRN     Hemoglobin A1C       Date                     Value               Ref Range             Status                02/17/2022               5.8 (H)             4.0 - 5.6 %           Final                  06/07/2021               5.9 (H)             4.0 - 5.6 %           Final                  03/27/2021               5.8 (H)             4.0 - 5.6 %           Final                      Last edited by Carolynn Lopez, PCT on 5/5/2022  9:24 AM. (History)            Assessment /Plan     For exam results, see Encounter Report.    Diabetes mellitus type 2 without retinopathy    Pseudophakia    Refractive error    Dry eye syndrome, bilateral      1. Diabetes mellitus type 2 without retinopathy  No retinopathy, no CSME OU. Discussed possible ocular affects of uncontrolled blood sugar with patient. Recommended continued strong blood sugar control and continued care with PCP. Monitor yearly.     2. Pseudophakia  S/p cataract extraction, no PCO    3. Refractive error  Pt declines refraction, prefers to go in-network    4. Dry eye syndrome, bilateral  Discussed ocular affects of dry eyes. Recommend OTC artificial tears 2-4 times a day in both eyes. Discussed chronicity of BRADEN. RTC if symptoms not alleviated by continued use of artificial tears.         RTC in 1 year for comprehensive eye exam, or sooner prn.

## 2022-05-06 ENCOUNTER — PATIENT MESSAGE (OUTPATIENT)
Dept: FAMILY MEDICINE | Facility: CLINIC | Age: 77
End: 2022-05-06
Payer: MEDICARE

## 2022-05-06 LAB — BACTERIA UR CULT: ABNORMAL

## 2022-05-09 ENCOUNTER — PATIENT MESSAGE (OUTPATIENT)
Dept: FAMILY MEDICINE | Facility: CLINIC | Age: 77
End: 2022-05-09
Payer: MEDICARE

## 2022-05-09 ENCOUNTER — TELEPHONE (OUTPATIENT)
Dept: FAMILY MEDICINE | Facility: CLINIC | Age: 77
End: 2022-05-09
Payer: MEDICARE

## 2022-05-09 ENCOUNTER — TELEPHONE (OUTPATIENT)
Dept: OPHTHALMOLOGY | Facility: CLINIC | Age: 77
End: 2022-05-09
Payer: MEDICARE

## 2022-05-09 RX ORDER — NITROFURANTOIN 25; 75 MG/1; MG/1
100 CAPSULE ORAL 2 TIMES DAILY
Qty: 20 CAPSULE | Refills: 0 | Status: SHIPPED | OUTPATIENT
Start: 2022-05-09 | End: 2022-05-19

## 2022-05-09 NOTE — TELEPHONE ENCOUNTER
"I spoke with pt via phone, I advised her that per  Mr. Younger "  Urine culture confirms urine infection  Sensitivity shows infection may be resistant to the Septra you are taking  Stop the septra  A new prescription for Macrobid has been sent to your pharmacy".     She states that she will  the medication depending on the price.       ----- Message from mAanda Mehta sent at 5/9/2022  4:47 PM CDT -----  Contact: pt  Type: Needs Medical Advice  Who Called:  pt  Best Call Back Number:[phone]   Additional Information: pt called to see if dr call in an antibiotic  at   [facility] [address] [place] [address] [facility]  [address] [facility] [address]  Phone: [phone] Fax: [phone]   and ask that nurse are dr call her back please         "

## 2022-05-09 NOTE — TELEPHONE ENCOUNTER
----- Message from Livan Matson, Patient Care Assistant sent at 5/9/2022 10:12 AM CDT -----  Contact: Pt  Type: Needs Medical Advice    Who Called: Pt  Best Call Back Number: [phone]  [facility]  Phone: [phone]  Fax: [phone]  Inquiry/Question: Pt is calling to have her prescription for glasses sent to [facility]. Please call back and advise. Thank you~

## 2022-05-16 ENCOUNTER — TELEPHONE (OUTPATIENT)
Dept: NEUROLOGY | Facility: CLINIC | Age: 77
End: 2022-05-16
Payer: MEDICARE

## 2022-05-16 NOTE — TELEPHONE ENCOUNTER
Called and spoke with patient to confirm appointment but patient stated she needed to cancel appointment. Patient stated she would call back to reschedule if needed.

## 2022-05-17 ENCOUNTER — OFFICE VISIT (OUTPATIENT)
Dept: NEUROLOGY | Facility: CLINIC | Age: 77
End: 2022-05-17
Payer: MEDICARE

## 2022-05-17 ENCOUNTER — TELEPHONE (OUTPATIENT)
Dept: NEUROLOGY | Facility: CLINIC | Age: 77
End: 2022-05-17
Payer: MEDICARE

## 2022-05-17 ENCOUNTER — TELEPHONE (OUTPATIENT)
Dept: PAIN MEDICINE | Facility: CLINIC | Age: 77
End: 2022-05-17
Payer: MEDICARE

## 2022-05-17 VITALS
RESPIRATION RATE: 17 BRPM | HEART RATE: 76 BPM | SYSTOLIC BLOOD PRESSURE: 154 MMHG | HEIGHT: 69 IN | BODY MASS INDEX: 24.51 KG/M2 | DIASTOLIC BLOOD PRESSURE: 78 MMHG | TEMPERATURE: 99 F

## 2022-05-17 DIAGNOSIS — G43.709 CHRONIC MIGRAINE WITHOUT AURA WITHOUT STATUS MIGRAINOSUS, NOT INTRACTABLE: Primary | ICD-10-CM

## 2022-05-17 DIAGNOSIS — G44.40 MEDICATION OVERUSE HEADACHE: ICD-10-CM

## 2022-05-17 DIAGNOSIS — M79.18 MYOFASCIAL PAIN: ICD-10-CM

## 2022-05-17 DIAGNOSIS — G44.86 CERVICOGENIC HEADACHE: ICD-10-CM

## 2022-05-17 DIAGNOSIS — Z98.890 HISTORY OF NECK SURGERY: ICD-10-CM

## 2022-05-17 PROCEDURE — 1160F PR REVIEW ALL MEDS BY PRESCRIBER/CLIN PHARMACIST DOCUMENTED: ICD-10-PCS | Mod: CPTII,S$GLB,, | Performed by: PHYSICIAN ASSISTANT

## 2022-05-17 PROCEDURE — 1159F MED LIST DOCD IN RCRD: CPT | Mod: CPTII,S$GLB,, | Performed by: PHYSICIAN ASSISTANT

## 2022-05-17 PROCEDURE — 3077F PR MOST RECENT SYSTOLIC BLOOD PRESSURE >= 140 MM HG: ICD-10-PCS | Mod: CPTII,S$GLB,, | Performed by: PHYSICIAN ASSISTANT

## 2022-05-17 PROCEDURE — 99215 PR OFFICE/OUTPT VISIT, EST, LEVL V, 40-54 MIN: ICD-10-PCS | Mod: S$GLB,,, | Performed by: PHYSICIAN ASSISTANT

## 2022-05-17 PROCEDURE — 99215 OFFICE O/P EST HI 40 MIN: CPT | Mod: S$GLB,,, | Performed by: PHYSICIAN ASSISTANT

## 2022-05-17 PROCEDURE — 3288F PR FALLS RISK ASSESSMENT DOCUMENTED: ICD-10-PCS | Mod: CPTII,S$GLB,, | Performed by: PHYSICIAN ASSISTANT

## 2022-05-17 PROCEDURE — 99999 PR PBB SHADOW E&M-EST. PATIENT-LVL V: ICD-10-PCS | Mod: PBBFAC,,, | Performed by: PHYSICIAN ASSISTANT

## 2022-05-17 PROCEDURE — 3078F PR MOST RECENT DIASTOLIC BLOOD PRESSURE < 80 MM HG: ICD-10-PCS | Mod: CPTII,S$GLB,, | Performed by: PHYSICIAN ASSISTANT

## 2022-05-17 PROCEDURE — 3077F SYST BP >= 140 MM HG: CPT | Mod: CPTII,S$GLB,, | Performed by: PHYSICIAN ASSISTANT

## 2022-05-17 PROCEDURE — 1101F PT FALLS ASSESS-DOCD LE1/YR: CPT | Mod: CPTII,S$GLB,, | Performed by: PHYSICIAN ASSISTANT

## 2022-05-17 PROCEDURE — 3078F DIAST BP <80 MM HG: CPT | Mod: CPTII,S$GLB,, | Performed by: PHYSICIAN ASSISTANT

## 2022-05-17 PROCEDURE — 99499 UNLISTED E&M SERVICE: CPT | Mod: S$GLB,,, | Performed by: PHYSICIAN ASSISTANT

## 2022-05-17 PROCEDURE — 1101F PR PT FALLS ASSESS DOC 0-1 FALLS W/OUT INJ PAST YR: ICD-10-PCS | Mod: CPTII,S$GLB,, | Performed by: PHYSICIAN ASSISTANT

## 2022-05-17 PROCEDURE — 99499 RISK ADDL DX/OHS AUDIT: ICD-10-PCS | Mod: S$GLB,,, | Performed by: PHYSICIAN ASSISTANT

## 2022-05-17 PROCEDURE — 1159F PR MEDICATION LIST DOCUMENTED IN MEDICAL RECORD: ICD-10-PCS | Mod: CPTII,S$GLB,, | Performed by: PHYSICIAN ASSISTANT

## 2022-05-17 PROCEDURE — 1160F RVW MEDS BY RX/DR IN RCRD: CPT | Mod: CPTII,S$GLB,, | Performed by: PHYSICIAN ASSISTANT

## 2022-05-17 PROCEDURE — 99999 PR PBB SHADOW E&M-EST. PATIENT-LVL V: CPT | Mod: PBBFAC,,, | Performed by: PHYSICIAN ASSISTANT

## 2022-05-17 PROCEDURE — 3288F FALL RISK ASSESSMENT DOCD: CPT | Mod: CPTII,S$GLB,, | Performed by: PHYSICIAN ASSISTANT

## 2022-05-17 RX ORDER — GALCANEZUMAB 120 MG/ML
INJECTION, SOLUTION SUBCUTANEOUS
Qty: 2 ML | Refills: 0 | Status: SHIPPED | OUTPATIENT
Start: 2022-05-17 | End: 2022-06-01

## 2022-05-17 RX ORDER — GALCANEZUMAB 120 MG/ML
INJECTION, SOLUTION SUBCUTANEOUS
Qty: 1 ML | Refills: 11 | Status: SHIPPED | OUTPATIENT
Start: 2022-05-17 | End: 2022-06-01

## 2022-05-17 NOTE — PROGRESS NOTES
"  [facility]  [address] [facility] [address]  [place] [address]  Phone:[phone]  Fax: [phone]   New Patient Consultation  (Though saw general neuro in 2021 for syncopal episode, reviewed NP Danny's notes, she was followed by Dr. Cohen in the past, notes also reviewed)    Patient Name: Marleen Samano  : 1945  MRN:  [id number]  Today: 2022   chief complaint: Headache    PCP: Mikie Ryan MD.    Assessment:   Marleen Samano is a 76 y.o. RHD cauc female with a PMHx of: Chronic migraine, cervical dystonia, major depression, PAF-has cardiologist (on ASA-?), fibromuscular dysplasia, arthritis, HTN,  renal disease, DM2 and hx of lightheadedness and memory changes  whom presents solo  in self referral for HA. HA appear to be migrainous in time frame, however myofascial/cervicogenic nidus may likely contribute (given neck pain, Hx of cervical dystonia and history of neck surgery in the 90s, with apparent occipital/trapezius pain radiating to whole head). Medication overuse also likely contributes (I.e., rebound HA). As HA have been "the same" for >10 years, an acute process (e.g., vasculitis) is unlikely given that she has no permanent blindness or other disabling/permanent symptoms).     As she has failed conservative therapies (e.g., meds and PT) in the past, will get updated imaging and refer to interventional pain management to see if any nidus for intervention for her neck pain/dystonia and HA.     Review:    ICD-10-CM ICD-9-CM   1. Chronic migraine without aura without status migrainosus, not intractable  G43.709 346.70   2. Cervicogenic headache  G44.86 784.0   3. Medication overuse headache  G44.40 339.3   4. Myofascial pain  M79.18 729.1   5. History of neck surgery  Z98.890 V15.29         Plan:   Discussed realistic goals of care with patient at length. Discussed medication options, need for lifestyle adjustment. Discussed treatment will take " time. Goal will be to reduce frequency/intensity/quantity of HA, not to be completely HA free. Gave copy of S triggers for migraine informational sheet, and discussed clinic's non narcotic policy re: HA. Patient voiced understanding and agreement.               -will have patient work on lifestyle         For HA Prevention:  1 continue on BP and mood meds (*noted on BB despite hx of COPD?) per other providers  2 wrote for emgality, discussed adv effects/dosing, had neuro LPN give teaching, she agreed to try        For HA :  Limit OTC meds to <3 days use in week  May consider nurtec in future     To break up Headaches:  Could consider nerve blocks   Will avoid steroids d/t DM     Other:  MRI C spine ordered, she is aware I will comment electronically on report once available to me   Referral to pain management to see if any targeted treatment available for her reports of neck pain (e.g., GAY, MBB/RFA)            All test results as well as any necessary instructions were reviewed and discussed with patient.    Review:  Orders Placed This Encounter    MRI Cervical Spine Without Contrast    Ambulatory referral/consult to Pain Clinic    galcanezumab-gnlm (EMGALITY PEN) 120 mg/mL PnIj    galcanezumab-gnlm (EMGALITY PEN) 120 mg/mL PnIj         Patient to return to PCP/other specialists for all other problems  Patient to continue on all medications as Rx'd   A detailed AVS was provided to the patient with patient readback   RTO-3-4 months to check in   The patient indicates understanding of these issues and agrees to the plan.    HPI:   Marleen Samano is a 76 y.o.right handed, female with a PMHx of: Chronic migraine, cervical dystonia, major depression, PAF-has cardiologist (on ASA-?), fibromuscular dysplasia, arthritis, HTN,  renal disease, DM2 and hx of lightheadedness and memory changes  whom presents solo  in self referral for HA.       She states after being seen in HA clinic here by Dr. Marino  "(blepharospasm and cervical dystonia) then went on to movement clinic (Dr Dee for cervical dystonia) ended up leaving [facility] d/t cost and went to [facility]     Noted cancelled appt yesterday, came today anyway and was 20 mins late. I discussed I will work her in/she may have an abbreviated visit, she voiced agreement      HA HPI:  Start:HA for 10 years of near daily HA  History of trauma (2020 fainting spell, but having HA before this...), History of CNS infection (no), History of Stroke (no)  Location:whole head, starts at times in back of the LEFT (occiput)        'I really feel like its my neck that triggers these HA. I has surgery back in the 1990s, and its just never been the same"  Severity: range: 7-10/10  Duration:short lived because "I take something", unknown how long her HA lasts if she didn't take something---unclear how they do last "when she does take something"   Frequency:15-20 HD in a month  Associated factors (bolded positive) WITH HA ( or migraine): Nausea, vomiting, photophobia, phonophobia, tinnitus, scalp pain, vision loss, diplopia, scintillations, eye pain, jaw pain, weakness?    Tried:excedrin (takes every other day)   Triggers (in bold): pressure (like resting her head on pillow), physical exercise,  stress, lack of sleep, too much caffeine, too little caffeine, weather change, seasonal change, strong odours, bright lights, sunlight, food   Currently having a HA?:yesterday   Positives in bold: Hx of Kidney Stones, asthma/COPD, GI bleed, osteoporosis, CAD/MI, CVA/TIA, DM("I don't check my FBS")  Imaging on file: 2/2021 MRI brain (below)   Therapies tried in past: (failures to be marked, if known---why did it fail?)  Gabapentin  Baclofen  Trazodone  effexor  norvasc  lotensin  troprol   ASA  imitrex  botox -stopped d/t cost   norvasc  Physical therapy ("I have tried this numerous times, never helped")    When asked, no recent incontinence or falls, no sensory changes or motor changes in " any limb      Medication Reconciliation:   Current Outpatient Medications   Medication Sig Dispense Refill    albuterol (PROVENTIL/VENTOLIN HFA) 90 mcg/actuation inhaler Inhale 2 puffs into the lungs every 4 (four) hours as needed for Wheezing or Shortness of Breath. Rescue 54 g 0    albuterol (PROVENTIL/VENTOLIN HFA) 90 mcg/actuation inhaler Inhale 2 puffs into the lungs every 6 (six) hours as needed for Wheezing. Rescue 20.1 g 11    albuterol-ipratropium (DUO-NEB) 2.5 mg-0.5 mg/3 mL nebulizer solution USE 1 VIAL VIA NEBULIZER  EVERY 6 HOURS AS NEEDED FOR WHEEZING RESCUE 180 mL 23    amLODIPine (NORVASC) 2.5 MG tablet Take 1 tablet (2.5 mg total) by mouth once daily. 90 tablet 3    aspirin 81 MG Chew Take 81 mg by mouth once daily.      baclofen (LIORESAL) 20 MG tablet TAKE 1 TABLET BY MOUTH  TWICE DAILY 180 tablet 3    benazepriL (LOTENSIN) 40 MG tablet Take 1 tablet (40 mg total) by mouth once daily. 90 tablet 3    blood sugar diagnostic Strp To check BG 2 times daily, to use with insurance preferred meter 200 strip 3    blood-glucose meter kit To check BG 1 times daily, to use with insurance preferred meter 1 each 0    diclofenac sodium (VOLTAREN) 1 % Gel Apply topically.       gabapentin (NEURONTIN) 600 MG tablet TAKE 1 TABLET BY MOUTH 3  TIMES DAILY 270 tablet 3    lancets Misc To check BG 2 times daily, to use with insurance preferred meter 200 each 3    metoprolol succinate (TOPROL-XL) 25 MG 24 hr tablet Take 1 tablet (25 mg total) by mouth once daily. 30 tablet 11    nitrofurantoin, macrocrystal-monohydrate, (MACROBID) 100 MG capsule Take 1 capsule (100 mg total) by mouth 2 (two) times daily. for 10 days 20 capsule 0    omeprazole (PRILOSEC) 40 MG capsule Take 1 capsule (40 mg total) by mouth 2 (two) times daily before meals. 180 capsule 2    simethicone (GAS-X ORAL) Take by mouth.      trazodone (DESYREL) 100 MG tablet Take 100 mg by mouth every evening.      venlafaxine (EFFEXOR-XR) 150  MG Cp24 Take 1 capsule (150 mg total) by mouth 2 (two) times a day. 14 capsule 0     Current Facility-Administered Medications   Medication Dose Route Frequency Provider Last Rate Last Admin    triamcinolone acetonide injection 1 mg  1 mg Intradermal Once Hilary Finnegan MD         Review of patient's allergies indicates:   Allergen Reactions    Adhesive tape-silicones Rash     Blisters after on for several hours    Augmentin [amoxicillin-pot clavulanate]      thrush    Ciprofloxacin      thrush    Morphine Itching and Hives       PMHx:  Past Medical History:   Diagnosis Date    Anemia due to multiple mechanisms 02/10/2020    Anemia in chronic kidney disease (CKD)     Anemia, chronic disease 02/10/2020    Arthritis     Aseptic loosening of prosthetic knee, 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/2020    Bell's palsy     Bladder incontinence     Blepharospasm     Bronchiectasis without complication 10/08/2019    Cervical dystonia     Concussion     COPD (chronic obstructive pulmonary disease)     Enterocolitis 02/07/2020    Fainting spell     2/7/2020    Hormone deficiency     Hypertension     Loose right total knee arthroplasty 06/15/2020    Migraine     Muscle spasm     Myofacial pain syndrome     Normocytic normochromic anemia 02/10/2020    Right ear pain 02/09/2021    Squamous cell carcinoma of skin     Syncope and collapse 02/09/2021     Past Surgical History:   Procedure Laterality Date    APPENDECTOMY      BREAST BIOPSY      BREAST LUMPECTOMY      CATARACT EXTRACTION      EYE SURGERY      HYSTERECTOMY      NECK SURGERY      REVISION OF KNEE ARTHROPLASTY Right 7/14/2020    Procedure: REVISION, ARTHROPLASTY, KNEE;  Surgeon: Pedro Tompkins MD;  [facility];  Service: Orthopedics;  Laterality: Right;    TONSILLECTOMY         Fhx:  Family History   Problem Relation Age of Onset    Cancer Mother     Heart disease Brother     Parkinsonism Brother     Diabetes Neg Hx      Melanoma Neg Hx     Psoriasis Neg Hx     Lupus Neg Hx     Eczema Neg Hx        Shx:   Social History     Socioeconomic History    Marital status:    Occupational History    Occupation: Caregiver   Tobacco Use    Smoking status: Never Smoker    Smokeless tobacco: Never Used   Substance and Sexual Activity    Alcohol use: No    Drug use: No   Social History Narrative    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 Has a good support system of friends, daughter and grandchildren. Keeps herself busy w/ gardening, socializing. Facing some financial issues, supports grandson struggling w/ drug abuse who is staying with her which causes stress. Denies any concern regarding safety at home. Has not had much financial support from her family w/ caring for her grandson.     Social Determinants of Health     Financial Resource Strain: Medium Risk    Difficulty of Paying Living Expenses: Somewhat hard   Food Insecurity: Food Insecurity Present    Worried About Running Out of Food in the Last Year: Often true    Ran Out of Food in the Last Year: Sometimes true   Transportation Needs: No Transportation Needs    Lack of Transportation (Medical): No    Lack of Transportation (Non-Medical): No   Physical Activity: Inactive    Days of Exercise per Week: 0 days    Minutes of Exercise per Session: 0 min   Stress: Stress Concern Present    Feeling of Stress : To some extent   Social Connections: Moderately Isolated    Frequency of Communication with Friends and Family: More than three times a week    Frequency of Social Gatherings with Friends and Family: More than three times a week    Attends Catholic Services: More than 4 times per year    Active Member of Clubs or Organizations: No    Attends Club or Organization Meetings: Never    Marital Status:    Housing Stability: Low Risk     Unable to Pay for Housing in the Last Year: No    Number of Places Lived  in the Last Year: 1    Unstable Housing in the Last Year: No           Labs:   Reviewed in chart     Imagin2021 MRI Brain (Report): 1. There is no acute or significant abnormality and no definite change compared to the prior studies.  There is only mild volume loss and minimal nonspecific white matter change.  The brain is essentially normal for age.  There is no hemorrhage, mass, mass effect or acute infarction.         Other testing:  3/16/2021 EEG (Report): This is a normal ambulatory EEG during wakefulness and sleep. No clinical events of concern were captured. Please be aware that a normal EEG does not exclude the possibility of an underlying seizure disorder.    2020 EEG (Report): This is a normal EEG during wakefulness and sleep. No potentially epileptiform activity was seen.  Please be aware that a normal EEG does not exclude the possibility of an underlying seizure disorder.    Note: I have independently reviewed any/all imaging/labs/tests and agree with the report (s) as documented.  Any discrepancies will be as noted/demarcated by free text.  SHILPI BARTHOLOMEW 2022                     ROS:   Review Of Systems (questions asked, positive or additions in BOLD)  Gen: Weight change, fatigue/malaise, pyrexia   HEENT: Tinnitus, headache,  blurred vision, eye pain, diplopia, photophobia,  nose bleeds, congestion, sore throat, jaw pain, scalp pain, neck stiffness   Card: Palpitations, CP   Pulm: SOB, cough   Vas: Easy bruising, easy bleeding   GI: N/V/D/C, incontinence, hematemesis, hematochezia    : incontinence, hematuria   Endocrine: Temp intolerance, polyuria, polydipsia   M/S: Neck pain, myalgia, back pain, joint pain, falls    Neuro: PER HPI   PSY: Memory loss, confusion, depression, anxiety, trouble in sleep, hallucinations          EXAM:   BP (!) 154/78 (BP Location: Right arm, Patient Position: Sitting, BP Method: Medium (Automatic))   Pulse 76   Temp 98.6 °F (37 °C) (Temporal)   Resp 17    "Ht 5' 9" (1.753 m)   BMI 24.51 kg/m²    GEN:  NAD  HEENT: NC/AT, Frontalis was NTTP, temporalis was TTP, vertex NTTP, no crepitus in temporal artery region,  nares patent, dentition appropriate,  neck supple, trachea midline, Occiput and trapezius TTP     EXTREM:  Dilation of DIP/PIP joints bilat,  no pitting pretibial pitting oedema present.    NEURO:  Mental Status:  Awake, alert and appropriately oriented to time, place, and person.  Normal attention and concentration.  Speech is fluent and appropriate language function (I.e., comprehension)    Cranial Nerves:   Extraocular movements are intact and without nystagmus.  Visual fields are full to confrontation testing.    Facial movement is symmetric.  Facial sensation is intact.  Hearing is normal. Uvula in midline. DROM of neck in all (6) directions, shoulder shrug symmetrical. Tongue in midline without fasiculation.     Motor:  RUE:appropriate against gravity and medium force as tested               LUE: appropriate against gravity and medium force as tested                RLE:appropriate against gravity and medium force as tested                LLE: appropriate against gravity and medium force as tested    Tremor on protrusion (R>L) UE   No facial tremor noted    Sensory:  RUE  intact light touch, proprioception and temperature  LUE intact light touch, proprioception and temperature    RLE intact light touch  LLE intact light touch      DTR's:                                            R              L  biceps 1+ 1+         brachioradialis 1+ 1+   Knee jerk 1+ 1+      Coordination:  FTN-WNL.      Gait and Stance: kyphotic at baseline stance, armswing appropriate         This document has been electronically signed by Mr. Viral Ohara MPA, PA-C on 5/17/2022, I have personally typed this message using the EMR.       Dr Ned MD was available during today's visit.     Personal Protective Equipment:    Personal Protective Equipment was used during this " encounter including: KN95 and non latex gloves.          CC: Mikie Ryan MD

## 2022-05-17 NOTE — TELEPHONE ENCOUNTER
----- Message from Nitin Ames sent at 5/17/2022  2:56 PM CDT -----  Contact: pt at [phone]  Type: Needs Medical Advice  Who Called:  pt  Best Call Back Number: [phone]  Additional Information: pt is calling the office requesting to speak to the nurse. Please call back and advise.

## 2022-05-17 NOTE — TELEPHONE ENCOUNTER
----- Message from Ritika Maravilla sent at 5/17/2022 11:10 AM CDT -----  Regarding: appt cancelled  PURA TAN MRN: [id number] GM, pt is here for her appt that was cancelled yesterday, and she would like to know if she can be seen today or not??

## 2022-05-18 ENCOUNTER — PATIENT MESSAGE (OUTPATIENT)
Dept: PAIN MEDICINE | Facility: CLINIC | Age: 77
End: 2022-05-18
Payer: MEDICARE

## 2022-05-18 ENCOUNTER — TELEPHONE (OUTPATIENT)
Dept: PHARMACY | Facility: CLINIC | Age: 77
End: 2022-05-18
Payer: MEDICARE

## 2022-05-18 NOTE — TELEPHONE ENCOUNTER
Emgality prescription has been APPROVED FROM 5/17/2022 TO 12/31/2022 with copayment of $100.       [facility] [place] @[phone] will reach out to patient for further correspondence.     Due to the high copay, a referral will be sent to Pharmacy Patient Assistance.

## 2022-05-19 ENCOUNTER — PATIENT MESSAGE (OUTPATIENT)
Dept: NEUROLOGY | Facility: CLINIC | Age: 77
End: 2022-05-19
Payer: MEDICARE

## 2022-05-23 DIAGNOSIS — M17.12 PRIMARY OSTEOARTHRITIS OF LEFT KNEE: Primary | ICD-10-CM

## 2022-05-25 ENCOUNTER — PATIENT MESSAGE (OUTPATIENT)
Dept: CARDIOLOGY | Facility: CLINIC | Age: 77
End: 2022-05-25
Payer: MEDICARE

## 2022-05-25 ENCOUNTER — TELEPHONE (OUTPATIENT)
Dept: FAMILY MEDICINE | Facility: CLINIC | Age: 77
End: 2022-05-25
Payer: MEDICARE

## 2022-05-25 ENCOUNTER — OFFICE VISIT (OUTPATIENT)
Dept: FAMILY MEDICINE | Facility: CLINIC | Age: 77
End: 2022-05-25
Payer: MEDICARE

## 2022-05-25 VITALS
BODY MASS INDEX: 24.09 KG/M2 | SYSTOLIC BLOOD PRESSURE: 136 MMHG | HEART RATE: 72 BPM | RESPIRATION RATE: 19 BRPM | DIASTOLIC BLOOD PRESSURE: 70 MMHG | WEIGHT: 163.13 LBS

## 2022-05-25 DIAGNOSIS — N39.0 URINARY TRACT INFECTION WITH HEMATURIA, SITE UNSPECIFIED: ICD-10-CM

## 2022-05-25 DIAGNOSIS — R31.9 URINARY TRACT INFECTION WITH HEMATURIA, SITE UNSPECIFIED: ICD-10-CM

## 2022-05-25 DIAGNOSIS — R35.0 URINARY FREQUENCY: Primary | ICD-10-CM

## 2022-05-25 PROCEDURE — 1160F RVW MEDS BY RX/DR IN RCRD: CPT | Mod: CPTII,S$GLB,, | Performed by: NURSE PRACTITIONER

## 2022-05-25 PROCEDURE — 87086 URINE CULTURE/COLONY COUNT: CPT | Performed by: NURSE PRACTITIONER

## 2022-05-25 PROCEDURE — 99999 PR PBB SHADOW E&M-EST. PATIENT-LVL IV: CPT | Mod: PBBFAC,,, | Performed by: NURSE PRACTITIONER

## 2022-05-25 PROCEDURE — 3078F DIAST BP <80 MM HG: CPT | Mod: CPTII,S$GLB,, | Performed by: NURSE PRACTITIONER

## 2022-05-25 PROCEDURE — 99214 OFFICE O/P EST MOD 30 MIN: CPT | Mod: S$GLB,,, | Performed by: NURSE PRACTITIONER

## 2022-05-25 PROCEDURE — 99999 PR PBB SHADOW E&M-EST. PATIENT-LVL IV: ICD-10-PCS | Mod: PBBFAC,,, | Performed by: NURSE PRACTITIONER

## 2022-05-25 PROCEDURE — 1160F PR REVIEW ALL MEDS BY PRESCRIBER/CLIN PHARMACIST DOCUMENTED: ICD-10-PCS | Mod: CPTII,S$GLB,, | Performed by: NURSE PRACTITIONER

## 2022-05-25 PROCEDURE — 87088 URINE BACTERIA CULTURE: CPT | Performed by: NURSE PRACTITIONER

## 2022-05-25 PROCEDURE — 3078F PR MOST RECENT DIASTOLIC BLOOD PRESSURE < 80 MM HG: ICD-10-PCS | Mod: CPTII,S$GLB,, | Performed by: NURSE PRACTITIONER

## 2022-05-25 PROCEDURE — 1159F PR MEDICATION LIST DOCUMENTED IN MEDICAL RECORD: ICD-10-PCS | Mod: CPTII,S$GLB,, | Performed by: NURSE PRACTITIONER

## 2022-05-25 PROCEDURE — 3075F PR MOST RECENT SYSTOLIC BLOOD PRESS GE 130-139MM HG: ICD-10-PCS | Mod: CPTII,S$GLB,, | Performed by: NURSE PRACTITIONER

## 2022-05-25 PROCEDURE — 3075F SYST BP GE 130 - 139MM HG: CPT | Mod: CPTII,S$GLB,, | Performed by: NURSE PRACTITIONER

## 2022-05-25 PROCEDURE — 99214 PR OFFICE/OUTPT VISIT, EST, LEVL IV, 30-39 MIN: ICD-10-PCS | Mod: S$GLB,,, | Performed by: NURSE PRACTITIONER

## 2022-05-25 PROCEDURE — 87186 SC STD MICRODIL/AGAR DIL: CPT | Performed by: NURSE PRACTITIONER

## 2022-05-25 PROCEDURE — 87077 CULTURE AEROBIC IDENTIFY: CPT | Performed by: NURSE PRACTITIONER

## 2022-05-25 PROCEDURE — 1159F MED LIST DOCD IN RCRD: CPT | Mod: CPTII,S$GLB,, | Performed by: NURSE PRACTITIONER

## 2022-05-25 RX ORDER — AMOXICILLIN AND CLAVULANATE POTASSIUM 875; 125 MG/1; MG/1
1 TABLET, FILM COATED ORAL 2 TIMES DAILY
Qty: 14 TABLET | Refills: 0 | Status: SHIPPED | OUTPATIENT
Start: 2022-05-25 | End: 2022-06-01 | Stop reason: ALTCHOICE

## 2022-05-25 RX ORDER — FLUCONAZOLE 150 MG/1
150 TABLET ORAL DAILY
Qty: 5 TABLET | Refills: 0 | Status: SHIPPED | OUTPATIENT
Start: 2022-05-25 | End: 2022-05-30

## 2022-05-25 NOTE — PROGRESS NOTES
Subjective:       Patient ID: Marleen Samano is a 76 y.o. female.    Chief Complaint: No chief complaint on file.    76-year-old female presents to the clinic today with complaint of urinary frequency the past several days.  She thinks the Macrobid helped a little bit but never totally cleared up.  She had a culture on 05/03/2020 to which was positive for E coli which was resistant to  Bactrim which was 1st prescription she was treated with. Macrobid was called in.  She completed 10 days of Macrobid still has symptoms.  She would like to go ahead and try Augmentin with something for the thrush since she just has only adverse reaction she has to Augmentin not a allergy.  She denies any fever, chills, abdominal pain, nausea, vomiting, diarrhea, or constipation.  She denies any dysuria, hematuria, flank pain, vaginal bleeding, or vaginal discharge.  She is eating and drinking without any difficulty.    Past Medical History:   Diagnosis Date    Anemia due to multiple mechanisms 02/10/2020    Anemia in chronic kidney disease (CKD)     Anemia, chronic disease 02/10/2020    Arthritis     Aseptic loosening of prosthetic knee, 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/2020    Bell's palsy     Bladder incontinence     Blepharospasm     Bronchiectasis without complication 10/08/2019    Cervical dystonia     Concussion     COPD (chronic obstructive pulmonary disease)     Enterocolitis 02/07/2020    Fainting spell     2/7/2020    Hormone deficiency     Hypertension     Loose right total knee arthroplasty 06/15/2020    Migraine     Muscle spasm     Myofacial pain syndrome     Normocytic normochromic anemia 02/10/2020    Right ear pain 02/09/2021    Squamous cell carcinoma of skin     Syncope and collapse 02/09/2021     Past Surgical History:   Procedure Laterality Date    APPENDECTOMY      BREAST BIOPSY      BREAST LUMPECTOMY      CATARACT EXTRACTION      EYE SURGERY      HYSTERECTOMY      NECK SURGERY      REVISION  OF KNEE ARTHROPLASTY Right 7/14/2020    Procedure: REVISION, ARTHROPLASTY, KNEE;  Surgeon: Pedro Tompkins MD;  [facility]  Service: Orthopedics;  Laterality: Right;    TONSILLECTOMY        reports that she has never smoked. She has never used smokeless tobacco. She reports that she does not drink alcohol and does not use drugs.  Review of Systems   Constitutional: Negative for chills and fever.   Gastrointestinal: Negative for abdominal pain, diarrhea, nausea and vomiting.   Genitourinary: Positive for frequency. Negative for difficulty urinating, dysuria, flank pain, hematuria, vaginal bleeding and vaginal discharge.   Musculoskeletal: Negative for gait problem.   Neurological: Negative for dizziness, light-headedness and headaches.       Objective:      Physical Exam  Vitals and nursing note reviewed.   Constitutional:       General: She is not in acute distress.     Appearance: Normal appearance. She is normal weight. She is not ill-appearing, toxic-appearing or diaphoretic.   Cardiovascular:      Rate and Rhythm: Normal rate and regular rhythm.      Heart sounds: Normal heart sounds. No murmur heard.  Pulmonary:      Effort: Pulmonary effort is normal.      Breath sounds: Normal breath sounds. No wheezing or rhonchi.   Abdominal:      Tenderness: There is no abdominal tenderness. There is no right CVA tenderness, left CVA tenderness, guarding or rebound.   Neurological:      Mental Status: She is alert and oriented to person, place, and time.   Psychiatric:         Mood and Affect: Mood normal.         Behavior: Behavior normal.         Thought Content: Thought content normal.         Judgment: Judgment normal.         Assessment:       1. Urinary frequency    2. Urinary tract infection with hematuria, site unspecified        Plan:         Urinary frequency  -     POCT URINE DIPSTICK WITHOUT MICROSCOPE    Urinary tract infection with hematuria, site unspecified  -     Urine culture  -      Urinalysis  -     amoxicillin-clavulanate 875-125mg (AUGMENTIN) 875-125 mg per tablet; Take 1 tablet by mouth 2 (two) times daily. for 7 days  Dispense: 14 tablet; Refill: 0  -     fluconazole (DIFLUCAN) 150 MG Tab; Take 1 tablet (150 mg total) by mouth once daily. for 5 doses  Dispense: 5 tablet; Refill: 0        No follow-ups on file.

## 2022-05-25 NOTE — TELEPHONE ENCOUNTER
Pt came to clinic today, she states that she still feels as if she has a UTI. She states that she still has the urgency to go but she is unable to.   She will see NP Iraida today. All understanding voiced.       ----- Message from Lucy Villafana sent at 5/25/2022  9:08 AM CDT -----  Contact: pt  Type: Needs Medical Advice    Who Called: pt  Best Call Back Number: [phone]    Inquiry/Question: pt has been on antibiotics for UTI and she feels it didn't resolve. She would like to come in for another urine specimen.      Thank you~

## 2022-05-25 NOTE — PATIENT INSTRUCTIONS
Jase Hawley,     If you are due for any health screening(s) below please notify me so we can arrange them to be ordered and scheduled to maintain your health. Most healthy patients complete it. Don't lose out on improving your health.     Health Maintenance   Topic Date Due    Hepatitis C Screening  Never done    Foot Exam  Never done    Lipid Panel  03/27/2022    Hemoglobin A1c  08/17/2022    Eye Exam  05/05/2023    Colonoscopy  12/13/2024    DEXA Scan  01/18/2025    TETANUS VACCINE  10/09/2029     -  Able to tolerate Augmentin but causes thrush will also treat with Diflucan   - Urine culture pending may need to change antibiotics

## 2022-05-26 ENCOUNTER — TELEPHONE (OUTPATIENT)
Dept: PHARMACY | Facility: AMBULARY SURGERY CENTER | Age: 77
End: 2022-05-26
Payer: MEDICARE

## 2022-05-28 ENCOUNTER — PATIENT MESSAGE (OUTPATIENT)
Dept: FAMILY MEDICINE | Facility: CLINIC | Age: 77
End: 2022-05-28
Payer: MEDICARE

## 2022-05-28 DIAGNOSIS — N39.0 CHRONIC UTI: Primary | ICD-10-CM

## 2022-05-28 DIAGNOSIS — R15.9 INCONTINENCE OF FECES, UNSPECIFIED FECAL INCONTINENCE TYPE: ICD-10-CM

## 2022-05-28 LAB — BACTERIA UR CULT: ABNORMAL

## 2022-05-30 ENCOUNTER — PATIENT MESSAGE (OUTPATIENT)
Dept: FAMILY MEDICINE | Facility: CLINIC | Age: 77
End: 2022-05-30
Payer: MEDICARE

## 2022-05-30 ENCOUNTER — TELEPHONE (OUTPATIENT)
Dept: FAMILY MEDICINE | Facility: CLINIC | Age: 77
End: 2022-05-30
Payer: MEDICARE

## 2022-05-30 ENCOUNTER — OFFICE VISIT (OUTPATIENT)
Dept: ORTHOPEDICS | Facility: CLINIC | Age: 77
End: 2022-05-30
Payer: MEDICARE

## 2022-05-30 ENCOUNTER — HOSPITAL ENCOUNTER (OUTPATIENT)
Dept: RADIOLOGY | Facility: HOSPITAL | Age: 77
Discharge: HOME | End: 2022-05-30
Attending: ORTHOPAEDIC SURGERY
Payer: MEDICARE

## 2022-05-30 VITALS — BODY MASS INDEX: 24.14 KG/M2 | WEIGHT: 163 LBS | HEIGHT: 69 IN | RESPIRATION RATE: 18 BRPM

## 2022-05-30 DIAGNOSIS — M25.552 LEFT HIP PAIN: ICD-10-CM

## 2022-05-30 DIAGNOSIS — M54.50 LOW BACK PAIN, UNSPECIFIED BACK PAIN LATERALITY, UNSPECIFIED CHRONICITY, UNSPECIFIED WHETHER SCIATICA PRESENT: Primary | ICD-10-CM

## 2022-05-30 DIAGNOSIS — M25.552 LEFT HIP PAIN: Primary | ICD-10-CM

## 2022-05-30 DIAGNOSIS — M25.562 LEFT KNEE PAIN, UNSPECIFIED CHRONICITY: Primary | ICD-10-CM

## 2022-05-30 DIAGNOSIS — M17.12 PRIMARY OSTEOARTHRITIS OF LEFT KNEE: ICD-10-CM

## 2022-05-30 PROCEDURE — 73562 XR KNEE ORTHO LEFT WITH FLEXION: ICD-10-PCS | Mod: 26,RT,, | Performed by: RADIOLOGY

## 2022-05-30 PROCEDURE — 1125F PR PAIN SEVERITY QUANTIFIED, PAIN PRESENT: ICD-10-PCS | Mod: CPTII,S$GLB,, | Performed by: ORTHOPAEDIC SURGERY

## 2022-05-30 PROCEDURE — 1101F PT FALLS ASSESS-DOCD LE1/YR: CPT | Mod: CPTII,S$GLB,, | Performed by: ORTHOPAEDIC SURGERY

## 2022-05-30 PROCEDURE — 73562 X-RAY EXAM OF KNEE 3: CPT | Mod: TC,PN,RT

## 2022-05-30 PROCEDURE — 73564 XR KNEE ORTHO LEFT WITH FLEXION: ICD-10-PCS | Mod: 26,LT,, | Performed by: RADIOLOGY

## 2022-05-30 PROCEDURE — 3288F PR FALLS RISK ASSESSMENT DOCUMENTED: ICD-10-PCS | Mod: CPTII,S$GLB,, | Performed by: ORTHOPAEDIC SURGERY

## 2022-05-30 PROCEDURE — 1160F PR REVIEW ALL MEDS BY PRESCRIBER/CLIN PHARMACIST DOCUMENTED: ICD-10-PCS | Mod: CPTII,S$GLB,, | Performed by: ORTHOPAEDIC SURGERY

## 2022-05-30 PROCEDURE — 99999 PR PBB SHADOW E&M-EST. PATIENT-LVL IV: ICD-10-PCS | Mod: PBBFAC,,, | Performed by: ORTHOPAEDIC SURGERY

## 2022-05-30 PROCEDURE — 99213 PR OFFICE/OUTPT VISIT, EST, LEVL III, 20-29 MIN: ICD-10-PCS | Mod: S$GLB,,, | Performed by: ORTHOPAEDIC SURGERY

## 2022-05-30 PROCEDURE — 73502 X-RAY EXAM HIP UNI 2-3 VIEWS: CPT | Mod: 26,LT,, | Performed by: RADIOLOGY

## 2022-05-30 PROCEDURE — 73562 X-RAY EXAM OF KNEE 3: CPT | Mod: 26,RT,, | Performed by: RADIOLOGY

## 2022-05-30 PROCEDURE — 1101F PR PT FALLS ASSESS DOC 0-1 FALLS W/OUT INJ PAST YR: ICD-10-PCS | Mod: CPTII,S$GLB,, | Performed by: ORTHOPAEDIC SURGERY

## 2022-05-30 PROCEDURE — 1159F PR MEDICATION LIST DOCUMENTED IN MEDICAL RECORD: ICD-10-PCS | Mod: CPTII,S$GLB,, | Performed by: ORTHOPAEDIC SURGERY

## 2022-05-30 PROCEDURE — 99999 PR PBB SHADOW E&M-EST. PATIENT-LVL IV: CPT | Mod: PBBFAC,,, | Performed by: ORTHOPAEDIC SURGERY

## 2022-05-30 PROCEDURE — 73564 X-RAY EXAM KNEE 4 OR MORE: CPT | Mod: 26,LT,, | Performed by: RADIOLOGY

## 2022-05-30 PROCEDURE — 1125F AMNT PAIN NOTED PAIN PRSNT: CPT | Mod: CPTII,S$GLB,, | Performed by: ORTHOPAEDIC SURGERY

## 2022-05-30 PROCEDURE — 99213 OFFICE O/P EST LOW 20 MIN: CPT | Mod: S$GLB,,, | Performed by: ORTHOPAEDIC SURGERY

## 2022-05-30 PROCEDURE — 3288F FALL RISK ASSESSMENT DOCD: CPT | Mod: CPTII,S$GLB,, | Performed by: ORTHOPAEDIC SURGERY

## 2022-05-30 PROCEDURE — 1159F MED LIST DOCD IN RCRD: CPT | Mod: CPTII,S$GLB,, | Performed by: ORTHOPAEDIC SURGERY

## 2022-05-30 PROCEDURE — 73502 X-RAY EXAM HIP UNI 2-3 VIEWS: CPT | Mod: TC,PN,LT

## 2022-05-30 PROCEDURE — 1160F RVW MEDS BY RX/DR IN RCRD: CPT | Mod: CPTII,S$GLB,, | Performed by: ORTHOPAEDIC SURGERY

## 2022-05-30 PROCEDURE — 73502 XR HIP WITH PELVIS WHEN PERFORMED, 2 OR 3 VIEWS LEFT: ICD-10-PCS | Mod: 26,LT,, | Performed by: RADIOLOGY

## 2022-05-30 NOTE — TELEPHONE ENCOUNTER
I left a message on the patient's voice mail to call me back on my cell phone to discuss her concerns. I left my cell number for her to call me.

## 2022-05-30 NOTE — PROGRESS NOTES
Past Medical History:   Diagnosis Date    Anemia due to multiple mechanisms 02/10/2020    Anemia in chronic kidney disease (CKD)     Anemia, chronic disease 02/10/2020    Arthritis     Aseptic loosening of prosthetic knee, 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/2020    Bell's palsy     Bladder incontinence     Blepharospasm     Bronchiectasis without complication 10/08/2019    Cervical dystonia     Concussion     COPD (chronic obstructive pulmonary disease)     Enterocolitis 02/07/2020    Fainting spell     2/7/2020    Hormone deficiency     Hypertension     Loose right total knee arthroplasty 06/15/2020    Migraine     Muscle spasm     Myofacial pain syndrome     Normocytic normochromic anemia 02/10/2020    Right ear pain 02/09/2021    Squamous cell carcinoma of skin     Syncope and collapse 02/09/2021       Past Surgical History:   Procedure Laterality Date    APPENDECTOMY      BREAST BIOPSY      BREAST LUMPECTOMY      CATARACT EXTRACTION      EYE SURGERY      HYSTERECTOMY      NECK SURGERY      REVISION OF KNEE ARTHROPLASTY Right 7/14/2020    Procedure: REVISION, ARTHROPLASTY, KNEE;  Surgeon: Pedro Tompkins MD;  Location: [facility] beds at [facility];  Service: Orthopedics;  Laterality: Right;    TONSILLECTOMY         Current Outpatient Medications   Medication Sig    albuterol (PROVENTIL/VENTOLIN HFA) 90 mcg/actuation inhaler Inhale 2 puffs into the lungs every 4 (four) hours as needed for Wheezing or Shortness of Breath. Rescue    albuterol (PROVENTIL/VENTOLIN HFA) 90 mcg/actuation inhaler Inhale 2 puffs into the lungs every 6 (six) hours as needed for Wheezing. Rescue    albuterol-ipratropium (DUO-NEB) 2.5 mg-0.5 mg/3 mL nebulizer solution USE 1 VIAL VIA NEBULIZER  EVERY 6 HOURS AS NEEDED FOR WHEEZING RESCUE    amLODIPine (NORVASC) 2.5 MG tablet Take 1 tablet (2.5 mg total) by mouth once daily.    amoxicillin-clavulanate 875-125mg (AUGMENTIN) 875-125 mg per tablet Take 1 tablet by mouth 2 (two)  times daily. for 7 days    aspirin 81 MG Chew Take 81 mg by mouth once daily.    baclofen (LIORESAL) 20 MG tablet TAKE 1 TABLET BY MOUTH  TWICE DAILY    benazepriL (LOTENSIN) 40 MG tablet Take 1 tablet (40 mg total) by mouth once daily.    blood sugar diagnostic Strp To check BG 2 times daily, to use with insurance preferred meter    blood-glucose meter kit To check BG 1 times daily, to use with insurance preferred meter    diclofenac sodium (VOLTAREN) 1 % Gel Apply topically.     fluconazole (DIFLUCAN) 150 MG Tab Take 1 tablet (150 mg total) by mouth once daily. for 5 doses    gabapentin (NEURONTIN) 600 MG tablet TAKE 1 TABLET BY MOUTH 3  TIMES DAILY    galcanezumab-gnlm (EMGALITY PEN) 120 mg/mL PnIj Starter dose, inject both pens (240 mg total) subcutaneously as shown by neuro nurse    galcanezumab-gnlm (EMGALITY PEN) 120 mg/mL PnIj Maintenance dose, inject 1 pen (120 mg total) subcutaneously as shown by neuro nurse every 28 days    lancets Misc To check BG 2 times daily, to use with insurance preferred meter    metoprolol succinate (TOPROL-XL) 25 MG 24 hr tablet Take 1 tablet (25 mg total) by mouth once daily.    omeprazole (PRILOSEC) 40 MG capsule Take 1 capsule (40 mg total) by mouth 2 (two) times daily before meals.    simethicone (GAS-X ORAL) Take by mouth.    trazodone (DESYREL) 100 MG tablet Take 100 mg by mouth every evening.    venlafaxine (EFFEXOR-XR) 150 MG Cp24 Take 1 capsule (150 mg total) by mouth 2 (two) times a day.     Current Facility-Administered Medications   Medication    triamcinolone acetonide injection 1 mg       Review of patient's allergies indicates:   Allergen Reactions    Adhesive tape-silicones Rash     Blisters after on for several hours    Augmentin [amoxicillin-pot clavulanate]      thrush    Ciprofloxacin      thrush    Morphine Itching and Hives       Family History   Problem Relation Age of Onset    Cancer Mother     Heart disease Brother     Parkinsonism  Brother     Diabetes Neg Hx     Melanoma Neg Hx     Psoriasis Neg Hx     Lupus Neg Hx     Eczema Neg Hx        Social History     Socioeconomic History    Marital status:    Occupational History    Occupation: Caregiver   Tobacco Use    Smoking status: Never Smoker    Smokeless tobacco: Never Used   Substance and Sexual Activity    Alcohol use: No    Drug use: No   Social History Narrative    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 Has a good support system of friends, daughter and grandchildren. Keeps herself busy w/ gardening, socializing. Facing some financial issues, supports grandson struggling w/ drug abuse who is staying with her which causes stress. Denies any concern regarding safety at home. Has not had much financial support from her family w/ caring for her grandson.     Social Determinants of Health     Financial Resource Strain: Medium Risk    Difficulty of Paying Living Expenses: Somewhat hard   Food Insecurity: Food Insecurity Present    Worried About Running Out of Food in the Last Year: Often true    Ran Out of Food in the Last Year: Sometimes true   Transportation Needs: No Transportation Needs    Lack of Transportation (Medical): No    Lack of Transportation (Non-Medical): No   Physical Activity: Inactive    Days of Exercise per Week: 0 days    Minutes of Exercise per Session: 0 min   Stress: Stress Concern Present    Feeling of Stress : To some extent   Social Connections: Moderately Isolated    Frequency of Communication with Friends and Family: More than three times a week    Frequency of Social Gatherings with Friends and Family: More than three times a week    Attends [employer] Services: More than 4 times per year    Active Member of Clubs or Organizations: No    Attends Club or Organization Meetings: Never    Marital Status:    Housing Stability: Low Risk     Unable to Pay for Housing in the Last Year: No     Number of Places Lived in the Last Year: 1    Unstable Housing in the Last Year: No       Chief Complaint:   Chief Complaint   Patient presents with    Follow-up     follow up left knee        Date of surgery:July 14, 2020 revision right total knee arthroplasty    History of present illness:  This is a 76-year-old female underwent right knee tibial revision for some aseptic loosening of her tibial component.  Patient comes in with more left knee pain.  It has been bothering her for close to a year now.  Knee feels like it wants to buckle or give way.  Pain is along the lateral aspect of her knee.  Previous treatments includes a steroid injection.  MRI did not show a tear.  We did Monovisc on her left knee.  Did not really help very much.  She had a nerve ablation on the left in April that did not help very much either.  Pain is still an 8/10.    Review of Systems:  Musculoskeletal:  See HPI    Physical Examination:    Vital Signs:    Vitals:    05/30/22 0914   Resp: 18       Body mass index is 24.07 kg/m².    This a well-developed, well nourished patient in no acute distress.  They are alert and oriented and cooperative to examination.  Pt. walks without an antalgic gait.      Examination of the left knee shows no rashes or erythema. There are no masses ecchymosis or effusion. Patient has full range of motion from 0-130°. Patient is moderately tender to palpation over lateral joint line and nontender to palpation over the medial joint line.  Positive lateral Apley exam. Knee is stable to varus and valgus stress. 5 out of 5 motor strength. Palpable distal pulses. Intact light touch sensation. Negative Patellofemoral crepitus      X-rays:  X-rays of the left knee is ordered and  reviewed which show well-aligned revision total knee arthroplasty without complication.  Left knee shows just mild arthritic change  X-rays of the left hip is ordered and reviewed which shows no significant left hip arthritis.  Does have  some mild right hip arthritis.  Patient does have some scoliosis noted in her lower lumbar spine.    MRI of the left knee is reviewed and interpreted today:Intrasubstance degeneration of the posterior horn of the medial meniscus and anterior and posterior horns of the lateral meniscus without a meniscal tear identified.         Assessment::  Left knee pain.  Mild arthritis.  Possible left lumbar radiculopathy      Plan:  I reviewed the x-rays with her today.  I do not see anything overly concerning with either her hip or her knee.  I will get her in with 1 of her back and spine doctors to make sure that this is not referred pain coming from her back or possibly some neuropathy.        This note was created using [employer] voice recognition software that occasionally misinterpreted phrases or words.

## 2022-05-30 NOTE — TELEPHONE ENCOUNTER
I spoke with the patient and she is going good on the Augmentin and her symptoms are better. She has no thrush. She is going to talk to Dr. Ryan about a urology and Gi Consult and the urine culture.

## 2022-05-31 ENCOUNTER — TELEPHONE (OUTPATIENT)
Dept: GASTROENTEROLOGY | Facility: CLINIC | Age: 77
End: 2022-05-31
Payer: MEDICARE

## 2022-06-01 ENCOUNTER — HOSPITAL ENCOUNTER (OUTPATIENT)
Dept: RADIOLOGY | Facility: CLINIC | Age: 77
Discharge: HOME | End: 2022-06-01
Attending: STUDENT IN AN ORGANIZED HEALTH CARE EDUCATION/TRAINING PROGRAM
Payer: MEDICARE

## 2022-06-01 ENCOUNTER — TELEPHONE (OUTPATIENT)
Dept: GASTROENTEROLOGY | Facility: CLINIC | Age: 77
End: 2022-06-01
Payer: MEDICARE

## 2022-06-01 ENCOUNTER — OFFICE VISIT (OUTPATIENT)
Dept: FAMILY MEDICINE | Facility: CLINIC | Age: 77
End: 2022-06-01
Payer: MEDICARE

## 2022-06-01 ENCOUNTER — PATIENT MESSAGE (OUTPATIENT)
Dept: GASTROENTEROLOGY | Facility: CLINIC | Age: 77
End: 2022-06-01
Payer: MEDICARE

## 2022-06-01 VITALS
RESPIRATION RATE: 18 BRPM | HEIGHT: 69 IN | BODY MASS INDEX: 24.65 KG/M2 | OXYGEN SATURATION: 95 % | WEIGHT: 166.44 LBS | DIASTOLIC BLOOD PRESSURE: 68 MMHG | SYSTOLIC BLOOD PRESSURE: 122 MMHG | HEART RATE: 84 BPM

## 2022-06-01 DIAGNOSIS — I10 BENIGN ESSENTIAL HTN: ICD-10-CM

## 2022-06-01 DIAGNOSIS — E11.9 TYPE 2 DIABETES MELLITUS WITHOUT COMPLICATION, UNSPECIFIED WHETHER LONG TERM INSULIN USE: ICD-10-CM

## 2022-06-01 DIAGNOSIS — M40.204 KYPHOSIS OF THORACIC REGION, UNSPECIFIED KYPHOSIS TYPE: ICD-10-CM

## 2022-06-01 DIAGNOSIS — R25.2 MUSCLE CRAMPS AT NIGHT: ICD-10-CM

## 2022-06-01 DIAGNOSIS — Z00.00 ROUTINE GENERAL MEDICAL EXAMINATION AT A HEALTH CARE FACILITY: Primary | ICD-10-CM

## 2022-06-01 PROCEDURE — 3288F PR FALLS RISK ASSESSMENT DOCUMENTED: ICD-10-PCS | Mod: CPTII,S$GLB,, | Performed by: STUDENT IN AN ORGANIZED HEALTH CARE EDUCATION/TRAINING PROGRAM

## 2022-06-01 PROCEDURE — 3074F SYST BP LT 130 MM HG: CPT | Mod: CPTII,S$GLB,, | Performed by: STUDENT IN AN ORGANIZED HEALTH CARE EDUCATION/TRAINING PROGRAM

## 2022-06-01 PROCEDURE — 3078F DIAST BP <80 MM HG: CPT | Mod: CPTII,S$GLB,, | Performed by: STUDENT IN AN ORGANIZED HEALTH CARE EDUCATION/TRAINING PROGRAM

## 2022-06-01 PROCEDURE — 72070 X-RAY EXAM THORAC SPINE 2VWS: CPT | Mod: TC,FY,PO

## 2022-06-01 PROCEDURE — 99999 PR PBB SHADOW E&M-EST. PATIENT-LVL V: ICD-10-PCS | Mod: PBBFAC,,, | Performed by: STUDENT IN AN ORGANIZED HEALTH CARE EDUCATION/TRAINING PROGRAM

## 2022-06-01 PROCEDURE — 72070 XR THORACIC SPINE AP LATERAL: ICD-10-PCS | Mod: 26,,, | Performed by: RADIOLOGY

## 2022-06-01 PROCEDURE — 99214 PR OFFICE/OUTPT VISIT, EST, LEVL IV, 30-39 MIN: ICD-10-PCS | Mod: S$GLB,,, | Performed by: STUDENT IN AN ORGANIZED HEALTH CARE EDUCATION/TRAINING PROGRAM

## 2022-06-01 PROCEDURE — 1160F PR REVIEW ALL MEDS BY PRESCRIBER/CLIN PHARMACIST DOCUMENTED: ICD-10-PCS | Mod: CPTII,S$GLB,, | Performed by: STUDENT IN AN ORGANIZED HEALTH CARE EDUCATION/TRAINING PROGRAM

## 2022-06-01 PROCEDURE — 1126F AMNT PAIN NOTED NONE PRSNT: CPT | Mod: CPTII,S$GLB,, | Performed by: STUDENT IN AN ORGANIZED HEALTH CARE EDUCATION/TRAINING PROGRAM

## 2022-06-01 PROCEDURE — 3078F PR MOST RECENT DIASTOLIC BLOOD PRESSURE < 80 MM HG: ICD-10-PCS | Mod: CPTII,S$GLB,, | Performed by: STUDENT IN AN ORGANIZED HEALTH CARE EDUCATION/TRAINING PROGRAM

## 2022-06-01 PROCEDURE — 1101F PT FALLS ASSESS-DOCD LE1/YR: CPT | Mod: CPTII,S$GLB,, | Performed by: STUDENT IN AN ORGANIZED HEALTH CARE EDUCATION/TRAINING PROGRAM

## 2022-06-01 PROCEDURE — 1126F PR PAIN SEVERITY QUANTIFIED, NO PAIN PRESENT: ICD-10-PCS | Mod: CPTII,S$GLB,, | Performed by: STUDENT IN AN ORGANIZED HEALTH CARE EDUCATION/TRAINING PROGRAM

## 2022-06-01 PROCEDURE — 1160F RVW MEDS BY RX/DR IN RCRD: CPT | Mod: CPTII,S$GLB,, | Performed by: STUDENT IN AN ORGANIZED HEALTH CARE EDUCATION/TRAINING PROGRAM

## 2022-06-01 PROCEDURE — 72070 X-RAY EXAM THORAC SPINE 2VWS: CPT | Mod: 26,,, | Performed by: RADIOLOGY

## 2022-06-01 PROCEDURE — 1101F PR PT FALLS ASSESS DOC 0-1 FALLS W/OUT INJ PAST YR: ICD-10-PCS | Mod: CPTII,S$GLB,, | Performed by: STUDENT IN AN ORGANIZED HEALTH CARE EDUCATION/TRAINING PROGRAM

## 2022-06-01 PROCEDURE — 1159F MED LIST DOCD IN RCRD: CPT | Mod: CPTII,S$GLB,, | Performed by: STUDENT IN AN ORGANIZED HEALTH CARE EDUCATION/TRAINING PROGRAM

## 2022-06-01 PROCEDURE — 99999 PR PBB SHADOW E&M-EST. PATIENT-LVL V: CPT | Mod: PBBFAC,,, | Performed by: STUDENT IN AN ORGANIZED HEALTH CARE EDUCATION/TRAINING PROGRAM

## 2022-06-01 PROCEDURE — 3074F PR MOST RECENT SYSTOLIC BLOOD PRESSURE < 130 MM HG: ICD-10-PCS | Mod: CPTII,S$GLB,, | Performed by: STUDENT IN AN ORGANIZED HEALTH CARE EDUCATION/TRAINING PROGRAM

## 2022-06-01 PROCEDURE — 3288F FALL RISK ASSESSMENT DOCD: CPT | Mod: CPTII,S$GLB,, | Performed by: STUDENT IN AN ORGANIZED HEALTH CARE EDUCATION/TRAINING PROGRAM

## 2022-06-01 PROCEDURE — 1159F PR MEDICATION LIST DOCUMENTED IN MEDICAL RECORD: ICD-10-PCS | Mod: CPTII,S$GLB,, | Performed by: STUDENT IN AN ORGANIZED HEALTH CARE EDUCATION/TRAINING PROGRAM

## 2022-06-01 PROCEDURE — 99214 OFFICE O/P EST MOD 30 MIN: CPT | Mod: S$GLB,,, | Performed by: STUDENT IN AN ORGANIZED HEALTH CARE EDUCATION/TRAINING PROGRAM

## 2022-06-01 RX ORDER — AMLODIPINE BESYLATE 2.5 MG/1
2.5 TABLET ORAL DAILY
Qty: 90 TABLET | Refills: 3 | Status: SHIPPED | OUTPATIENT
Start: 2022-06-01 | End: 2023-06-24

## 2022-06-01 RX ORDER — BACLOFEN 20 MG/1
20 TABLET ORAL 2 TIMES DAILY
Qty: 180 TABLET | Refills: 3 | Status: SHIPPED | OUTPATIENT
Start: 2022-06-01 | End: 2023-03-14

## 2022-06-01 NOTE — PATIENT INSTRUCTIONS
Take fiber supplement daily. I recommend benefiber or generic (green top) equivalent. This is colorless, tasteless and dissolves well in liquid.         Jase Hawley,     If you are due for any health screening(s) below please notify me so we can arrange them to be ordered and scheduled to maintain your health. Most healthy patients complete it. Don't lose out on improving your health.     Health Maintenance   Topic Date Due    Hepatitis C Screening  Never done    Foot Exam  Never done    Lipid Panel  03/27/2022    Hemoglobin A1c  08/17/2022    Eye Exam  05/05/2023    Colonoscopy  12/13/2024    DEXA Scan  01/18/2025    TETANUS VACCINE  10/09/2029

## 2022-06-01 NOTE — TELEPHONE ENCOUNTER
GI referral scheduled with patient: 6/13/2022 [id number] Nataly Gonzalez NP [facility].

## 2022-06-01 NOTE — PROGRESS NOTES
"[facility]    Patient Name: Marleen Samano  YOB: 1945    PRESENTING HISTORY   Chief Complaint:   Chief Complaint   Patient presents with    Follow-up        History of Present Illness:  Ms. Marleen Samano is a 76 y.o. female here for scheduled f/u.     UTIs- feels "like I have to void"   No burning.    No blood.    Chronic incontinence of bowel and bladder.    Did pelvic floor PT in past.    She has already had 10 day course of macrobid and augmentin.    E.coli with some resistance.    Has urology f.u.    On review of hip xray, sig stool burden.       Chronic muscle spasms in hands bilaterally. Has been on baclofen for this for many years.                                                    Review of Systems   All other systems reviewed and are negative.        PAST HISTORY:     Past Medical History:   Diagnosis Date    Anemia due to multiple mechanisms 02/10/2020    Anemia in chronic kidney disease (CKD)     Anemia, chronic disease 02/10/2020    Arthritis     Aseptic loosening of prosthetic knee, 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/2020    Bell's palsy     Bladder incontinence     Blepharospasm     Bronchiectasis without complication 10/08/2019    Cervical dystonia     Concussion     COPD (chronic obstructive pulmonary disease)     Enterocolitis 02/07/2020    Fainting spell     2/7/2020    Hormone deficiency     Hypertension     Loose right total knee arthroplasty 06/15/2020    Migraine     Muscle spasm     Myofacial pain syndrome     Normocytic normochromic anemia 02/10/2020    Right ear pain 02/09/2021    Squamous cell carcinoma of skin     Syncope and collapse 02/09/2021       Past Surgical History:   Procedure Laterality Date    APPENDECTOMY      BREAST BIOPSY      BREAST LUMPECTOMY      CATARACT EXTRACTION      EYE SURGERY      HYSTERECTOMY      NECK SURGERY      REVISION OF KNEE ARTHROPLASTY Right 7/14/2020    Procedure: REVISION, ARTHROPLASTY, " KNEE;  Surgeon: Pedro Tompkins MD;  Location: [facility];  Service: Orthopedics;  Laterality: Right;    TONSILLECTOMY         Family History   Problem Relation Age of Onset    Cancer Mother     Heart disease Brother     Parkinsonism Brother     Diabetes Neg Hx     Melanoma Neg Hx     Psoriasis Neg Hx     Lupus Neg Hx     Eczema Neg Hx        Social History     Socioeconomic History    Marital status:    Occupational History    Occupation: Caregiver   Tobacco Use    Smoking status: Never Smoker    Smokeless tobacco: Never Used   Substance and Sexual Activity    Alcohol use: No    Drug use: No   Social History Narrative    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 Has a good support system of friends, daughter and grandchildren. Keeps herself busy w/ gardening, socializing. Facing some financial issues, supports grandson struggling w/ drug abuse who is staying with her which causes stress. Denies any concern regarding safety at home. Has not had much financial support from her family w/ caring for her grandson.     Social Determinants of Health     Financial Resource Strain: Medium Risk    Difficulty of Paying Living Expenses: Somewhat hard   Food Insecurity: Food Insecurity Present    Worried About Running Out of Food in the Last Year: Often true    Ran Out of Food in the Last Year: Sometimes true   Transportation Needs: No Transportation Needs    Lack of Transportation (Medical): No    Lack of Transportation (Non-Medical): No   Physical Activity: Inactive    Days of Exercise per Week: 0 days    Minutes of Exercise per Session: 0 min   Stress: Stress Concern Present    Feeling of Stress : To some extent   Social Connections: Moderately Isolated    Frequency of Communication with Friends and Family: More than three times a week    Frequency of Social Gatherings with Friends and Family: More than three times a week    Attends Hoahaoism  Services: More than 4 times per year    Active Member of Clubs or Organizations: No    Attends Club or Organization Meetings: Never    Marital Status:    Housing Stability: Low Risk     Unable to Pay for Housing in the Last Year: No    Number of Places Lived in the Last Year: 1    Unstable Housing in the Last Year: No       MEDICATIONS & ALLERGIES:     Current Outpatient Medications on File Prior to Visit   Medication Sig    albuterol (PROVENTIL/VENTOLIN HFA) 90 mcg/actuation inhaler Inhale 2 puffs into the lungs every 4 (four) hours as needed for Wheezing or Shortness of Breath. Rescue    albuterol (PROVENTIL/VENTOLIN HFA) 90 mcg/actuation inhaler Inhale 2 puffs into the lungs every 6 (six) hours as needed for Wheezing. Rescue    albuterol-ipratropium (DUO-NEB) 2.5 mg-0.5 mg/3 mL nebulizer solution USE 1 VIAL VIA NEBULIZER  EVERY 6 HOURS AS NEEDED FOR WHEEZING RESCUE    amLODIPine (NORVASC) 2.5 MG tablet Take 1 tablet (2.5 mg total) by mouth once daily.    aspirin 81 MG Chew Take 81 mg by mouth once daily.    baclofen (LIORESAL) 20 MG tablet TAKE 1 TABLET BY MOUTH  TWICE DAILY    benazepriL (LOTENSIN) 40 MG tablet Take 1 tablet (40 mg total) by mouth once daily.    blood sugar diagnostic Strp To check BG 2 times daily, to use with insurance preferred meter    diclofenac sodium (VOLTAREN) 1 % Gel Apply topically.     gabapentin (NEURONTIN) 600 MG tablet TAKE 1 TABLET BY MOUTH 3  TIMES DAILY    lancets Misc To check BG 2 times daily, to use with insurance preferred meter    omeprazole (PRILOSEC) 40 MG capsule Take 1 capsule (40 mg total) by mouth 2 (two) times daily before meals.    simethicone (GAS-X ORAL) Take by mouth.    trazodone (DESYREL) 100 MG tablet Take 100 mg by mouth every evening.    venlafaxine (EFFEXOR-XR) 150 MG Cp24 Take 1 capsule (150 mg total) by mouth 2 (two) times a day.    amoxicillin-clavulanate 875-125mg (AUGMENTIN) 875-125 mg per tablet Take 1 tablet by mouth 2  "(two) times daily. for 7 days (Patient not taking: No sig reported)    blood-glucose meter kit To check BG 1 times daily, to use with insurance preferred meter    galcanezumab-gnlm (EMGALITY PEN) 120 mg/mL PnIj Starter dose, inject both pens (240 mg total) subcutaneously as shown by neuro nurse (Patient not taking: Reported on 6/1/2022)    galcanezumab-gnlm (EMGALITY PEN) 120 mg/mL PnIj Maintenance dose, inject 1 pen (120 mg total) subcutaneously as shown by neuro nurse every 28 days (Patient not taking: Reported on 6/1/2022)    metoprolol succinate (TOPROL-XL) 25 MG 24 hr tablet Take 1 tablet (25 mg total) by mouth once daily.     Current Facility-Administered Medications on File Prior to Visit   Medication    triamcinolone acetonide injection 1 mg       Review of patient's allergies indicates:   Allergen Reactions    Adhesive tape-silicones Rash     Blisters after on for several hours    Augmentin [amoxicillin-pot clavulanate]      thrush    Ciprofloxacin      thrush    Morphine Itching and Hives       OBJECTIVE:   Vital Signs:  Vitals:    06/01/22 1312   BP: 122/68   Pulse: 84   Resp: 18   SpO2: 95%   Weight: 75.5 kg (166 lb 7.2 oz)   Height: 5' 9" (1.753 m)       No results found for this or any previous visit (from the past 24 hour(s)).      Physical Exam  Vitals and nursing note reviewed.   Constitutional:       General: She is not in acute distress.     Appearance: She is not toxic-appearing or diaphoretic.   HENT:      Head: Normocephalic and atraumatic.      Right Ear: External ear normal.      Left Ear: External ear normal.   Eyes:      General: No scleral icterus.     Conjunctiva/sclera: Conjunctivae normal.      Pupils: Pupils are equal, round, and reactive to light.   Neck:      Thyroid: No thyromegaly.   Cardiovascular:      Rate and Rhythm: Normal rate and regular rhythm.      Heart sounds: Normal heart sounds. No murmur heard.  Pulmonary:      Effort: Pulmonary effort is normal. No " respiratory distress.      Breath sounds: Normal breath sounds. No wheezing or rales.   Musculoskeletal:         General: No tenderness or deformity. Normal range of motion.      Cervical back: Normal range of motion and neck supple.      Right lower leg: No edema.      Left lower leg: No edema.      Comments: Thoracic kyphosis   Lymphadenopathy:      Cervical: No cervical adenopathy.   Skin:     General: Skin is warm and dry.      Findings: No erythema or rash.   Neurological:      Mental Status: She is alert and oriented to person, place, and time.      Gait: Gait is intact.   Psychiatric:         Mood and Affect: Mood and affect normal.         Cognition and Memory: Memory normal.         Judgment: Judgment normal.           Protective Sensation (w/ 10 gram monofilament):  Right: Decreased  Left: Decreased    Visual Inspection:  Normal -  Bilateral    Pedal Pulses:   Right: Present  Left: Present    Posterior tibialis:   Right:Present  Left: Present      ASSESSMENT & PLAN:     Routine general medical examination at a health care facility  -     HEPATITIS C ANTIBODY; Future; Expected date: 06/01/2022    Benign essential HTN  -     amLODIPine (NORVASC) 2.5 MG tablet; Take 1 tablet (2.5 mg total) by mouth once daily.  Dispense: 90 tablet; Refill: 3   Continue current medicines.     Muscle cramps at night  -     baclofen (LIORESAL) 20 MG tablet; Take 1 tablet (20 mg total) by mouth 2 (two) times daily.  Dispense: 180 tablet; Refill: 3    Kyphosis of thoracic region, unspecified kyphosis type  -     X-Ray Thoracic Spine AP Lateral; Future; Expected date: 06/01/2022    Type 2 diabetes mellitus without complication, unspecified whether long term insulin use  -     Microalbumin/Creatinine Ratio, Urine; Future; Expected date: 06/01/2022  -     Lipid Panel; Future; Expected date: 06/01/2022  -     Hemoglobin A1C; Future; Expected date: 06/01/2022  -     Comprehensive Metabolic Panel; Future; Expected date: 06/01/2022        Mikie Ryan MD   Internal Medicine    This note was created using [employer] voice recognition software that occasionally misinterprets phrases or words.

## 2022-06-02 ENCOUNTER — PATIENT MESSAGE (OUTPATIENT)
Dept: FAMILY MEDICINE | Facility: CLINIC | Age: 77
End: 2022-06-02
Payer: MEDICARE

## 2022-06-02 ENCOUNTER — PATIENT MESSAGE (OUTPATIENT)
Dept: CARDIOLOGY | Facility: CLINIC | Age: 77
End: 2022-06-02
Payer: MEDICARE

## 2022-06-02 ENCOUNTER — PATIENT MESSAGE (OUTPATIENT)
Dept: GASTROENTEROLOGY | Facility: CLINIC | Age: 77
End: 2022-06-02
Payer: MEDICARE

## 2022-06-03 ENCOUNTER — PATIENT MESSAGE (OUTPATIENT)
Dept: GASTROENTEROLOGY | Facility: CLINIC | Age: 77
End: 2022-06-03
Payer: MEDICARE

## 2022-06-04 ENCOUNTER — HOSPITAL ENCOUNTER (EMERGENCY)
Facility: HOSPITAL | Age: 77
Discharge: HOME | End: 2022-06-04
Attending: EMERGENCY MEDICINE
Payer: MEDICARE

## 2022-06-04 ENCOUNTER — PATIENT MESSAGE (OUTPATIENT)
Dept: FAMILY MEDICINE | Facility: CLINIC | Age: 77
End: 2022-06-04
Payer: MEDICARE

## 2022-06-04 VITALS
OXYGEN SATURATION: 95 % | HEART RATE: 64 BPM | BODY MASS INDEX: 24.14 KG/M2 | WEIGHT: 163 LBS | SYSTOLIC BLOOD PRESSURE: 141 MMHG | TEMPERATURE: 98 F | HEIGHT: 69 IN | DIASTOLIC BLOOD PRESSURE: 72 MMHG | RESPIRATION RATE: 20 BRPM

## 2022-06-04 DIAGNOSIS — K76.89 HEPATIC CYST: Primary | ICD-10-CM

## 2022-06-04 DIAGNOSIS — N30.00 ACUTE CYSTITIS WITHOUT HEMATURIA: Primary | ICD-10-CM

## 2022-06-04 LAB
ALBUMIN SERPL BCP-MCNC: 4 G/DL (ref 3.5–5.2)
ALP SERPL-CCNC: 87 U/L (ref 55–135)
ALT SERPL W/O P-5'-P-CCNC: 15 U/L (ref 10–44)
ANION GAP SERPL CALC-SCNC: 10 MMOL/L (ref 8–16)
AST SERPL-CCNC: 18 U/L (ref 10–40)
BACTERIA #/AREA URNS HPF: ABNORMAL /HPF
BASOPHILS # BLD AUTO: 0.01 K/UL (ref 0–0.2)
BASOPHILS NFR BLD: 0.2 % (ref 0–1.9)
BILIRUB SERPL-MCNC: 0.3 MG/DL (ref 0.1–1)
BILIRUB UR QL STRIP: NEGATIVE
BUN SERPL-MCNC: 18 MG/DL (ref 8–23)
CALCIUM SERPL-MCNC: 9.6 MG/DL (ref 8.7–10.5)
CHLORIDE SERPL-SCNC: 105 MMOL/L (ref 95–110)
CLARITY UR: ABNORMAL
CO2 SERPL-SCNC: 26 MMOL/L (ref 23–29)
COLOR UR: YELLOW
CREAT SERPL-MCNC: 1 MG/DL (ref 0.5–1.4)
DIFFERENTIAL METHOD: ABNORMAL
EOSINOPHIL # BLD AUTO: 0.1 K/UL (ref 0–0.5)
EOSINOPHIL NFR BLD: 1 % (ref 0–8)
ERYTHROCYTE [DISTWIDTH] IN BLOOD BY AUTOMATED COUNT: 13.7 % (ref 11.5–14.5)
EST. GFR  (AFRICAN AMERICAN): >60 ML/MIN/1.73 M^2
EST. GFR  (NON AFRICAN AMERICAN): 54 ML/MIN/1.73 M^2
GLUCOSE SERPL-MCNC: 101 MG/DL (ref 70–110)
GLUCOSE UR QL STRIP: NEGATIVE
HCT VFR BLD AUTO: 36.6 % (ref 37–48.5)
HGB BLD-MCNC: 12.2 G/DL (ref 12–16)
HGB UR QL STRIP: ABNORMAL
HYALINE CASTS #/AREA URNS LPF: 0 /LPF
IMM GRANULOCYTES # BLD AUTO: 0.01 K/UL (ref 0–0.04)
IMM GRANULOCYTES NFR BLD AUTO: 0.2 % (ref 0–0.5)
KETONES UR QL STRIP: NEGATIVE
LEUKOCYTE ESTERASE UR QL STRIP: ABNORMAL
LYMPHOCYTES # BLD AUTO: 1 K/UL (ref 1–4.8)
LYMPHOCYTES NFR BLD: 19.4 % (ref 18–48)
MCH RBC QN AUTO: 30.7 PG (ref 27–31)
MCHC RBC AUTO-ENTMCNC: 33.3 G/DL (ref 32–36)
MCV RBC AUTO: 92 FL (ref 82–98)
MICROSCOPIC COMMENT: ABNORMAL
MONOCYTES # BLD AUTO: 0.4 K/UL (ref 0.3–1)
MONOCYTES NFR BLD: 8.2 % (ref 4–15)
NEUTROPHILS # BLD AUTO: 3.5 K/UL (ref 1.8–7.7)
NEUTROPHILS NFR BLD: 71 % (ref 38–73)
NITRITE UR QL STRIP: NEGATIVE
NRBC BLD-RTO: 0 /100 WBC
PH UR STRIP: 6 [PH] (ref 5–8)
PLATELET # BLD AUTO: 149 K/UL (ref 150–450)
PMV BLD AUTO: 9.9 FL (ref 9.2–12.9)
POTASSIUM SERPL-SCNC: 4.8 MMOL/L (ref 3.5–5.1)
PROT SERPL-MCNC: 6.9 G/DL (ref 6–8.4)
PROT UR QL STRIP: ABNORMAL
RBC # BLD AUTO: 3.98 M/UL (ref 4–5.4)
RBC #/AREA URNS HPF: 3 /HPF (ref 0–4)
SODIUM SERPL-SCNC: 141 MMOL/L (ref 136–145)
SP GR UR STRIP: >=1.03 (ref 1–1.03)
URN SPEC COLLECT METH UR: ABNORMAL
UROBILINOGEN UR STRIP-ACNC: NEGATIVE EU/DL
WBC # BLD AUTO: 4.89 K/UL (ref 3.9–12.7)
WBC #/AREA URNS HPF: >100 /HPF (ref 0–5)

## 2022-06-04 PROCEDURE — 87086 URINE CULTURE/COLONY COUNT: CPT | Performed by: NURSE PRACTITIONER

## 2022-06-04 PROCEDURE — 80053 COMPREHEN METABOLIC PANEL: CPT | Performed by: NURSE PRACTITIONER

## 2022-06-04 PROCEDURE — 87186 SC STD MICRODIL/AGAR DIL: CPT | Performed by: NURSE PRACTITIONER

## 2022-06-04 PROCEDURE — 96365 THER/PROPH/DIAG IV INF INIT: CPT

## 2022-06-04 PROCEDURE — 87088 URINE BACTERIA CULTURE: CPT | Performed by: NURSE PRACTITIONER

## 2022-06-04 PROCEDURE — 99285 EMERGENCY DEPT VISIT HI MDM: CPT | Mod: 25

## 2022-06-04 PROCEDURE — 87077 CULTURE AEROBIC IDENTIFY: CPT | Performed by: NURSE PRACTITIONER

## 2022-06-04 PROCEDURE — 63600175 PHARM REV CODE 636 W HCPCS: Performed by: NURSE PRACTITIONER

## 2022-06-04 PROCEDURE — 81000 URINALYSIS NONAUTO W/SCOPE: CPT | Performed by: NURSE PRACTITIONER

## 2022-06-04 PROCEDURE — 36415 COLL VENOUS BLD VENIPUNCTURE: CPT | Performed by: NURSE PRACTITIONER

## 2022-06-04 PROCEDURE — 85025 COMPLETE CBC W/AUTO DIFF WBC: CPT | Performed by: NURSE PRACTITIONER

## 2022-06-04 RX ORDER — CIPROFLOXACIN 500 MG/1
500 TABLET ORAL 2 TIMES DAILY
Qty: 14 TABLET | Refills: 0 | Status: SHIPPED | OUTPATIENT
Start: 2022-06-04 | End: 2022-06-11

## 2022-06-04 RX ORDER — NYSTATIN 100000 [USP'U]/ML
4 SUSPENSION ORAL 4 TIMES DAILY
Qty: 160 ML | Refills: 0 | Status: SHIPPED | OUTPATIENT
Start: 2022-06-04 | End: 2022-06-14

## 2022-06-04 RX ADMIN — CEFTRIAXONE 1 G: 1 INJECTION, SOLUTION INTRAVENOUS at 10:06

## 2022-06-04 NOTE — ED PROVIDER NOTES
"Encounter Date: 6/4/2022    SCRIBE #1 NOTE: I, Argentina Iyer, am scribing for, and in the presence of, FIDENCIO Kohler.       History     Chief Complaint   Patient presents with    Urinary Frequency     Has had 2 previous UTI since May 3, has been treated with antibiotics; burning with urination starting yesterday     Time seen by provider: 9:35 AM on 06/04/2022    Marleen Samano is a 76 y.o. female with a PMHx of recurrent urinary tract infections and bladder incontinence who presents to the ED with one day of burning with urination and increased urinary frequency that started yesterday. Patient states: "My bladder feels heavy". She also reports left flank pain for the past two months. Denies fever, n/v, or any other Sx at this time. Patient states she has a follow up with urology in three days for recurring UTIs. She was last diagnosed with a UTI by TREY Maradiaga on 5/25/22 and recently finished course of Augmentin.    The history is provided by the patient.     Review of patient's allergies indicates:   Allergen Reactions    Adhesive tape-silicones Rash     Blisters after on for several hours    Augmentin [amoxicillin-pot clavulanate]      thrush    Ciprofloxacin      thrush    Morphine Itching and Hives     Past Medical History:   Diagnosis Date    Anemia due to multiple mechanisms 02/10/2020    Anemia in chronic kidney disease (CKD)     Anemia, chronic disease 02/10/2020    Arthritis     Aseptic loosening of prosthetic knee, 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/2020    Bell's palsy     Bladder incontinence     Blepharospasm     Bronchiectasis without complication 10/08/2019    Cervical dystonia     Concussion     COPD (chronic obstructive pulmonary disease)     Enterocolitis 02/07/2020    Fainting spell     2/7/2020    Hormone deficiency     Hypertension     Loose right total knee arthroplasty 06/15/2020    Migraine     Muscle spasm     Myofacial pain syndrome     Normocytic normochromic " anemia 02/10/2020    Right ear pain 02/09/2021    Squamous cell carcinoma of skin     Syncope and collapse 02/09/2021     Past Surgical History:   Procedure Laterality Date    APPENDECTOMY      BREAST BIOPSY      BREAST LUMPECTOMY      CATARACT EXTRACTION      EYE SURGERY      HYSTERECTOMY      NECK SURGERY      REVISION OF KNEE ARTHROPLASTY Right 7/14/2020    Procedure: REVISION, ARTHROPLASTY, KNEE;  Surgeon: Pedro Tompkins MD;  Location[facility];  Service: Orthopedics;  Laterality: Right;    TONSILLECTOMY       Family History   Problem Relation Age of Onset    Cancer Mother     Heart disease Brother     Parkinsonism Brother     Diabetes Neg Hx     Melanoma Neg Hx     Psoriasis Neg Hx     Lupus Neg Hx     Eczema Neg Hx      Social History     Tobacco Use    Smoking status: Never Smoker    Smokeless tobacco: Never Used   Substance Use Topics    Alcohol use: No    Drug use: No     Review of Systems   Constitutional: Negative for fever.   HENT: Negative for sore throat.    Respiratory: Negative for shortness of breath.    Cardiovascular: Negative for chest pain.   Gastrointestinal: Negative for nausea and vomiting.   Genitourinary: Positive for dysuria, flank pain (left) and frequency.   Musculoskeletal: Negative for back pain.   Skin: Negative for rash.   Neurological: Negative for weakness.   Hematological: Does not bruise/bleed easily.       Physical Exam     Initial Vitals [06/04/22 0907]   BP Pulse Resp Temp SpO2   116/65 83 18 97.6 °F (36.4 °C) 97 %      MAP       --         Physical Exam    Nursing note and vitals reviewed.  Constitutional: Vital signs are normal. She appears well-developed and well-nourished.   HENT:   Head: Normocephalic and atraumatic.   Eyes: Pupils are equal, round, and reactive to light.   Neck: Neck supple.   Cardiovascular: Normal rate, regular rhythm, normal heart sounds and intact distal pulses. Exam reveals no gallop and no friction rub.    No murmur  heard.  Pulmonary/Chest: Breath sounds normal. She has no wheezes. She has no rhonchi. She has no rales.   Abdominal: Abdomen is soft. Bowel sounds are normal. There is abdominal tenderness in the suprapubic area.   There is left CVA tenderness.   Musculoskeletal:      Cervical back: Neck supple.     Neurological: She is alert and oriented to person, place, and time. She has normal strength.   Skin: Skin is warm, dry and intact.   Psychiatric: She has a normal mood and affect. Her speech is normal and behavior is normal.         ED Course   Procedures  Labs Reviewed   URINALYSIS, REFLEX TO URINE CULTURE - Abnormal; Notable for the following components:       Result Value    Appearance, UA Cloudy (*)     Specific Gravity, UA >=1.030 (*)     Protein, UA 1+ (*)     Occult Blood UA 2+ (*)     Leukocytes, UA 2+ (*)     All other components within normal limits    Narrative:     Specimen Source->Urine   URINALYSIS MICROSCOPIC - Abnormal; Notable for the following components:    WBC, UA >100 (*)     Bacteria Many (*)     All other components within normal limits    Narrative:     Specimen Source->Urine   CBC W/ AUTO DIFFERENTIAL - Abnormal; Notable for the following components:    RBC 3.98 (*)     Hematocrit 36.6 (*)     Platelets 149 (*)     All other components within normal limits   COMPREHENSIVE METABOLIC PANEL - Abnormal; Notable for the following components:    eGFR if non  54 (*)     All other components within normal limits   CULTURE, URINE          Imaging Results          CT Renal Stone Study ABD Pelvis WO (Final result)  Result time 06/04/22 10:47:35    Final result by Yan Sahu Jr., MD (06/04/22 10:47:35)                 Impression:      Large hiatal hernia.  Two small hypodensities of the liver parenchyma, new.  While these may represent cyst ultrasound confirmation is recommended since they are not previously seen.  Diverticulosis coli without CT evidence of diverticulitis.  Lumbar  scoliosis.  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  Prior hysterectomy.      Electronically signed by: Yan Sahu MD  Date:    06/04/2022  Time:    10:47             Narrative:    EXAMINATION:  CT RENAL STONE STUDY ABD PELVIS WO    CLINICAL HISTORY:  Flank pain, kidney stone suspected;    TECHNIQUE:  Low dose axial images, sagittal and coronal reformations were obtained from the lung bases to the pubic symphysis.  Contrast was not administered.    COMPARISON:  None    FINDINGS:  There is a large hiatal hernia noted.  The liver is of normal size and general CT density.  In the anterior dome is a 7 mm hypodense mass and in the inferior right lobe segment 5 is a 1 cm hypodense mass.  Neither were visible on the prior CT of February 7, 2020 and therefore ultrasound evaluation of the liver is recommended to determine if these are cystic or solid.  The gallbladder is of normal size without CT evidence of stone.  The pancreas is of normal contour and CT density without evidence of edema or mass.  The spleen is of normal size and CT density.    The adrenal glands are not enlarged.  The kidneys are of normal size contour and CT density without stone or hydronephrosis.  The abdominal aorta and inferior vena cava are of normal caliber.    No small bowel dilatation or air-fluid levels are not seen.  The colon is of normal configuration without dilation or mass.  There is diverticulosis of the sigmoid colon without CT evidence of diverticulitis.  Neither a normal or abnormal appendix is seen.  Free fluid or free air in the peritoneum is not seen.    The bladder is of normal contour without mass or asymmetry.  There are phleboliths identified in the pelvis.  A uterus is not seen.    The patient does have lumbar scoliosis and DJD at both hips.                                 Medications   cefTRIAXone (ROCEPHIN) 1 g/50 mL D5W IVPB (0 g Intravenous Stopped 6/4/22 1056)     Medical Decision Making:   History:   Old Medical Records: I decided to  obtain old medical records.  Differential Diagnosis:   UTI  Obstructive uropathy  Pyelonephritis   Clinical Tests:   Lab Tests: Reviewed and Ordered  Radiological Study: Reviewed and Ordered       APC / Resident Notes:   Patient is a 76 y.o. female who presents to the ED 06/04/2022 who underwent emergent evaluation for dysuria and urinary frequency.  Patient does suffer from frequent UTIs.  She has not had any fever.  No leukocytosis.  Urinalysis is consistent with UTI.  Patient had left-sided CVA tenderness which is likely musculoskeletal in origin.  CT reveals no evidence of obstructive uropathy or hydronephrosis.  Renal function normal.  Patient is not appear to be septic or toxic.  Patient given dose of Rocephin sent home on Cipro based on most recent urine culture after long discussion with patient regarding risks versus benefit of ciprofloxacin.  Incidental findings on CT noted.  Patient is made aware of all these and instructed to follow-up with Gastroenterology and her primary care physician regarding this. Based on my clinical evaluation, I do not appreciate any immediate, emergent, or life threatening condition or etiology that warrants additional workup today and feel that the patient can be discharged with close follow up care. Follow up and return precautions discussed; patient verbalized understanding and is agreeable to plan of care. Patient discharged home in stable condition.            Scribe Attestation:   Scribe #1: I performed the above scribed service and the documentation accurately describes the services I performed. I attest to the accuracy of the note.    Attending Attestation:           Physician Attestation for Scribe:  Physician Attestation Statement for Scribe #1: I, Flor Langford, reviewed documentation, as scribed by in my presence, and it is both accurate and complete.     Comments: IFlor, NP-C, personally performed the services described in this documentation. All medical  record entries made by the scribe were at my direction and in my presence.  I have reviewed the chart and agree that the record reflects my personal performance and is accurate and complete. FIDENCIO Kohler.  1:08 PM 06/04/2022                 Clinical Impression:   Final diagnoses:  [N30.00] Acute cystitis without hematuria (Primary)          ED Disposition Condition    Discharge Stable        ED Prescriptions     Medication Sig Dispense Start Date End Date Auth. Provider    ciprofloxacin HCl (CIPRO) 500 MG tablet Take 1 tablet (500 mg total) by mouth 2 (two) times daily. for 7 days 14 tablet 6/4/2022 6/11/2022 Flor Langford NP    nystatin (MYCOSTATIN) 100,000 unit/mL suspension Take 4 mLs (400,000 Units total) by mouth 4 (four) times daily. for 10 days 160 mL 6/4/2022 6/14/2022 Flor Langford NP        Follow-up Information     Follow up With Specialties Details Why Contact Info    [facility] In 3 days  [address]  [address]  [facility] [id number] [phone]      [facility] Emergency Dept Emergency Medicine  As needed, If symptoms worsen [address] [address] [phone]           Flor Langford NP  06/04/22 8214

## 2022-06-04 NOTE — DISCHARGE INSTRUCTIONS
Follow up with PCP regarding liver cyst on CT. Hiatal hernia and diverticulosis on CT today should be followed up by your gastroenterologist.

## 2022-06-06 ENCOUNTER — TELEPHONE (OUTPATIENT)
Dept: PHYSICAL MEDICINE AND REHAB | Facility: CLINIC | Age: 77
End: 2022-06-06
Payer: MEDICARE

## 2022-06-06 ENCOUNTER — TELEPHONE (OUTPATIENT)
Dept: ORTHOPEDICS | Facility: CLINIC | Age: 77
End: 2022-06-06
Payer: MEDICARE

## 2022-06-06 DIAGNOSIS — G24.9 DYSTONIA: Primary | ICD-10-CM

## 2022-06-06 LAB — BACTERIA UR CULT: ABNORMAL

## 2022-06-06 NOTE — TELEPHONE ENCOUNTER
----- Message from Stacey M Lefort sent at 6/6/2022  1:24 PM CDT -----  She is currently a patient of Dr Winston but she is not sure if she should make an appt with someone else based on her symptoms. She asked for a callback to discuss at [phone].  Thank you.

## 2022-06-06 NOTE — TELEPHONE ENCOUNTER
----- Message from Pedro Tompkins MD sent at 6/6/2022  3:34 PM CDT -----  Contact: Marleen Cline neurologist  ----- Message -----  From: Kaitlyn Lopez LPN  Sent: 6/6/2022   3:22 PM CDT  To: Pedro Tompkins MD    Pt is wanting to be referred to a MD that treats Dystonia. Do you have someone you would like me to refer her to?  ----- Message -----  From: Natalio Bhatia MA  Sent: 6/6/2022   1:34 PM CDT  To: Turner Rollins Staff    Patient would like to know if Dr. Tompkins would refer her to a DrSelena  That treats Dystonia.  Please call her back at  [phone]

## 2022-06-06 NOTE — TELEPHONE ENCOUNTER
----- Message from Julia Edge LPN sent at 6/6/2022  3:21 PM CDT -----  Contact: pt at  [phone]    ----- Message -----  From: Nitin Ames  Sent: 6/6/2022   2:19 PM CDT  To: Bradley Levine Staff    Type:  Patient Returning Call  Who Called:  pt  Who Left Message for Patient:  [facility]  Does the patient know what this is regarding?:  yes  Best Call Back Number:  [phone]  Additional Information:  pt is calling the office returning a call back to [facility] that she missed. Please call back and advise.

## 2022-06-07 ENCOUNTER — OFFICE VISIT (OUTPATIENT)
Dept: UROLOGY | Facility: CLINIC | Age: 77
End: 2022-06-07
Payer: MEDICARE

## 2022-06-07 VITALS
SYSTOLIC BLOOD PRESSURE: 129 MMHG | BODY MASS INDEX: 23.41 KG/M2 | HEIGHT: 69 IN | WEIGHT: 158.06 LBS | DIASTOLIC BLOOD PRESSURE: 81 MMHG | HEART RATE: 78 BPM

## 2022-06-07 DIAGNOSIS — N39.0 CHRONIC UTI: ICD-10-CM

## 2022-06-07 PROCEDURE — 3074F SYST BP LT 130 MM HG: CPT | Mod: CPTII,S$GLB,, | Performed by: NURSE PRACTITIONER

## 2022-06-07 PROCEDURE — 1160F PR REVIEW ALL MEDS BY PRESCRIBER/CLIN PHARMACIST DOCUMENTED: ICD-10-PCS | Mod: CPTII,S$GLB,, | Performed by: NURSE PRACTITIONER

## 2022-06-07 PROCEDURE — 3079F DIAST BP 80-89 MM HG: CPT | Mod: CPTII,S$GLB,, | Performed by: NURSE PRACTITIONER

## 2022-06-07 PROCEDURE — 3074F PR MOST RECENT SYSTOLIC BLOOD PRESSURE < 130 MM HG: ICD-10-PCS | Mod: CPTII,S$GLB,, | Performed by: NURSE PRACTITIONER

## 2022-06-07 PROCEDURE — 99999 PR PBB SHADOW E&M-EST. PATIENT-LVL IV: ICD-10-PCS | Mod: PBBFAC,,, | Performed by: NURSE PRACTITIONER

## 2022-06-07 PROCEDURE — 3079F PR MOST RECENT DIASTOLIC BLOOD PRESSURE 80-89 MM HG: ICD-10-PCS | Mod: CPTII,S$GLB,, | Performed by: NURSE PRACTITIONER

## 2022-06-07 PROCEDURE — 1159F PR MEDICATION LIST DOCUMENTED IN MEDICAL RECORD: ICD-10-PCS | Mod: CPTII,S$GLB,, | Performed by: NURSE PRACTITIONER

## 2022-06-07 PROCEDURE — 1159F MED LIST DOCD IN RCRD: CPT | Mod: CPTII,S$GLB,, | Performed by: NURSE PRACTITIONER

## 2022-06-07 PROCEDURE — 99204 PR OFFICE/OUTPT VISIT, NEW, LEVL IV, 45-59 MIN: ICD-10-PCS | Mod: S$GLB,,, | Performed by: NURSE PRACTITIONER

## 2022-06-07 PROCEDURE — 99204 OFFICE O/P NEW MOD 45 MIN: CPT | Mod: S$GLB,,, | Performed by: NURSE PRACTITIONER

## 2022-06-07 PROCEDURE — 99999 PR PBB SHADOW E&M-EST. PATIENT-LVL IV: CPT | Mod: PBBFAC,,, | Performed by: NURSE PRACTITIONER

## 2022-06-07 PROCEDURE — 1160F RVW MEDS BY RX/DR IN RCRD: CPT | Mod: CPTII,S$GLB,, | Performed by: NURSE PRACTITIONER

## 2022-06-07 NOTE — PROGRESS NOTES
[facility] Note  Staff: TREY Sloan    PCP: TREY Khoury    Chief Complaint: Recurrent UTIs    Subjective:        HPI: Marleen Samano is a 76 y.o. female NEW PT presents today for evaluation of recent recurrent UTIs.    Questions asked pt during office visit today:  DTF: Yes, NTF: 2 x night  Urgency:Yes, incontinence with urgency? Yes;   Incontinence with laughing or straining: Yes;   Gross HematuriaNo  History of UTI: Yes  History of Kidney Stones?: None  Constipation issues?: Yes    Past Urine Cultures:  6/4/22:  E. Coli UTI; treated with Cipro 500 mg BID x 7 days  5/25/22:  E. Coli >100,000; treated with Augmentin BID, (which was intermediate sensitivity to UTI)  5/3/22:  E. Coli; treated with Septra, then switched to Macrobid    2004-2005-Bladder suspension was performed by Dr. Sheldon Worrell.  Pt was advised at one time that MD told her there was a neurological problem with her bladder in the past???    REVIEW OF SYSTEMS:  A comprehensive 10 system review was performed and is negative except as noted above in HPI    PMHx:  Past Medical History:   Diagnosis Date    Anemia due to multiple mechanisms 02/10/2020    Anemia in chronic kidney disease (CKD)     Anemia, chronic disease 02/10/2020    Arthritis     Aseptic loosening of prosthetic knee, 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/2020    Bell's palsy     Bladder incontinence     Blepharospasm     Bronchiectasis without complication 10/08/2019    Cervical dystonia     Concussion     COPD (chronic obstructive pulmonary disease)     Enterocolitis 02/07/2020    Fainting spell     2/7/2020    Hormone deficiency     Hypertension     Loose right total knee arthroplasty 06/15/2020    Migraine     Muscle spasm     Myofacial pain syndrome     Normocytic normochromic anemia 02/10/2020    Right ear pain 02/09/2021    Squamous cell carcinoma of skin     Syncope and collapse 02/09/2021     PSHx:  Past Surgical History:    Procedure Laterality Date    APPENDECTOMY      BREAST BIOPSY      BREAST LUMPECTOMY      CATARACT EXTRACTION      EYE SURGERY      HYSTERECTOMY      NECK SURGERY      REVISION OF KNEE ARTHROPLASTY Right 7/14/2020    Procedure: REVISION, ARTHROPLASTY, KNEE;  Surgeon: Pedro Tompkins MD;  Location: [facility];  Service: Orthopedics;  Laterality: Right;    TONSILLECTOMY       Fam Hx:   malignancies: No , gyn malignancies: No   kidney stones: No     Allergies:  Adhesive tape-silicones, Augmentin [amoxicillin-pot clavulanate], Ciprofloxacin, and Morphine    Medications: reviewed   Objective:     Vitals:    06/07/22 0952   BP: 129/81   Pulse: 78     Physical Exam  Vitals reviewed.   Constitutional:       Appearance: She is well-developed.   HENT:      Head: Normocephalic and atraumatic.   Eyes:      Conjunctiva/sclera: Conjunctivae normal.      Pupils: Pupils are equal, round, and reactive to light.   Cardiovascular:      Rate and Rhythm: Normal rate and regular rhythm.      Heart sounds: Normal heart sounds.   Pulmonary:      Effort: Pulmonary effort is normal.      Breath sounds: Normal breath sounds.   Abdominal:      General: Bowel sounds are normal.      Palpations: Abdomen is soft.   Musculoskeletal:         General: Normal range of motion.      Cervical back: Normal range of motion and neck supple.   Skin:     General: Skin is warm and dry.   Neurological:      Mental Status: She is alert and oriented to person, place, and time.      Deep Tendon Reflexes: Reflexes are normal and symmetric.   Psychiatric:         Behavior: Behavior normal.         Thought Content: Thought content normal.         Judgment: Judgment normal.     LABS REVIEW:  UA today: None, Currently on Cipro  Assessment:       1. Chronic UTI          Plan:     1. Pt was advised to continue Cipro today until completion of regimen.  2. RTC in one week for routine recheck visit by me with  examination and in and out cath collection of  urine at that time.  3. Discussed conservative measures to control urgency and frequency including avoiding bladder irritants, timed voiding, not postponing voiding, and bowel regimen (as distended bowel has extrinsic compressive effect on bladder. Discussed bladder irritants include coffe (even decaf), tea, alcohol, soda, spicy foods, acidic juices (orange, tomato), vinegar, and artificial sweeteners.    F/u with me as scheduled.    [facility]: Active    Anahy Dunlap, RENE-C

## 2022-06-08 ENCOUNTER — HOSPITAL ENCOUNTER (OUTPATIENT)
Dept: RADIOLOGY | Facility: HOSPITAL | Age: 77
Discharge: HOME | End: 2022-06-08
Attending: PHYSICIAN ASSISTANT
Payer: MEDICARE

## 2022-06-08 DIAGNOSIS — G44.40 MEDICATION OVERUSE HEADACHE: ICD-10-CM

## 2022-06-08 DIAGNOSIS — G44.86 CERVICOGENIC HEADACHE: ICD-10-CM

## 2022-06-08 DIAGNOSIS — G43.709 CHRONIC MIGRAINE WITHOUT AURA WITHOUT STATUS MIGRAINOSUS, NOT INTRACTABLE: ICD-10-CM

## 2022-06-08 PROCEDURE — 72141 MRI NECK SPINE W/O DYE: CPT | Mod: TC

## 2022-06-08 PROCEDURE — 72141 MRI NECK SPINE W/O DYE: CPT | Mod: 26,,, | Performed by: RADIOLOGY

## 2022-06-08 PROCEDURE — 72141 MRI CERVICAL SPINE WITHOUT CONTRAST: ICD-10-PCS | Mod: 26,,, | Performed by: RADIOLOGY

## 2022-06-10 ENCOUNTER — PATIENT MESSAGE (OUTPATIENT)
Dept: NEUROLOGY | Facility: CLINIC | Age: 77
End: 2022-06-10
Payer: MEDICARE

## 2022-06-10 ENCOUNTER — HOSPITAL ENCOUNTER (OUTPATIENT)
Dept: RADIOLOGY | Facility: HOSPITAL | Age: 77
Discharge: HOME | End: 2022-06-10
Attending: STUDENT IN AN ORGANIZED HEALTH CARE EDUCATION/TRAINING PROGRAM
Payer: MEDICARE

## 2022-06-10 DIAGNOSIS — K76.89 HEPATIC CYST: ICD-10-CM

## 2022-06-10 PROCEDURE — 76705 ECHO EXAM OF ABDOMEN: CPT | Mod: TC

## 2022-06-10 PROCEDURE — 76705 US ABDOMEN LIMITED: ICD-10-PCS | Mod: 26,,, | Performed by: RADIOLOGY

## 2022-06-10 PROCEDURE — 76705 ECHO EXAM OF ABDOMEN: CPT | Mod: 26,,, | Performed by: RADIOLOGY

## 2022-06-13 ENCOUNTER — TELEPHONE (OUTPATIENT)
Dept: OPHTHALMOLOGY | Facility: CLINIC | Age: 77
End: 2022-06-13
Payer: MEDICARE

## 2022-06-13 NOTE — TELEPHONE ENCOUNTER
Spoke to patient and made her aware per last chart note, she declined to be checked for glasses at her last visit and was going to go somewhere in network with her insurance.     Patient understood     -TD     ----- Message from Florina Figueroa sent at 6/13/2022  3:59 PM CDT -----  Regarding: advice  Contact: patient  Type: Needs Medical Advice  Who Called:  patient  Symptoms (please be specific):    How long has patient had these symptoms:    Pharmacy name and phone #:    Best Call Back Number: [phone] (home)   Additional Information: patient would like to  a eye glass prescription from her last visit. Please call patient to advise.Thanks!

## 2022-06-15 ENCOUNTER — TELEPHONE (OUTPATIENT)
Dept: NEUROLOGY | Facility: CLINIC | Age: 77
End: 2022-06-15
Payer: MEDICARE

## 2022-06-15 NOTE — TELEPHONE ENCOUNTER
Spoke with patient and advised referral received from Dr. Tompkins for dystonia. Informed there are no neurologists on the [facility] that treat dystonia. Patient will need to be seen at movement disorder clinic at [facility].

## 2022-06-15 NOTE — TELEPHONE ENCOUNTER
----- Message from Florina Figueroa sent at 6/15/2022  1:29 PM CDT -----  Regarding: returning call  Contact: pateint  Type:  Patient Returning Call    Who Called:  patient  Who Left Message for Patient:  unknown  Does the patient know what this is regarding?: referral  Best Call Back Number:  [phone] (home)   Additional Information:  Please call patient to advise. Thanks!

## 2022-06-16 ENCOUNTER — TELEPHONE (OUTPATIENT)
Dept: NEUROLOGY | Facility: CLINIC | Age: 77
End: 2022-06-16
Payer: MEDICARE

## 2022-06-16 NOTE — TELEPHONE ENCOUNTER
In basket message sent to Alicia Hector staff to contact the patient and schedule Neurology appointment for [facility].

## 2022-06-17 ENCOUNTER — OFFICE VISIT (OUTPATIENT)
Dept: CARDIOLOGY | Facility: CLINIC | Age: 77
End: 2022-06-17
Payer: MEDICARE

## 2022-06-17 VITALS
SYSTOLIC BLOOD PRESSURE: 130 MMHG | OXYGEN SATURATION: 97 % | HEIGHT: 69 IN | HEART RATE: 69 BPM | DIASTOLIC BLOOD PRESSURE: 78 MMHG | BODY MASS INDEX: 24.62 KG/M2 | WEIGHT: 166.25 LBS

## 2022-06-17 DIAGNOSIS — R42 VERTIGO: ICD-10-CM

## 2022-06-17 DIAGNOSIS — I10 HYPERTENSION, UNSPECIFIED TYPE: ICD-10-CM

## 2022-06-17 DIAGNOSIS — K44.9 HIATUS HERNIA SYNDROME: ICD-10-CM

## 2022-06-17 DIAGNOSIS — N30.00 ACUTE CYSTITIS WITHOUT HEMATURIA: Primary | ICD-10-CM

## 2022-06-17 PROCEDURE — 3288F FALL RISK ASSESSMENT DOCD: CPT | Mod: CPTII,S$GLB,, | Performed by: SPECIALIST

## 2022-06-17 PROCEDURE — 1126F AMNT PAIN NOTED NONE PRSNT: CPT | Mod: CPTII,S$GLB,, | Performed by: SPECIALIST

## 2022-06-17 PROCEDURE — 3078F PR MOST RECENT DIASTOLIC BLOOD PRESSURE < 80 MM HG: ICD-10-PCS | Mod: CPTII,S$GLB,, | Performed by: SPECIALIST

## 2022-06-17 PROCEDURE — 3075F PR MOST RECENT SYSTOLIC BLOOD PRESS GE 130-139MM HG: ICD-10-PCS | Mod: CPTII,S$GLB,, | Performed by: SPECIALIST

## 2022-06-17 PROCEDURE — 1160F PR REVIEW ALL MEDS BY PRESCRIBER/CLIN PHARMACIST DOCUMENTED: ICD-10-PCS | Mod: CPTII,S$GLB,, | Performed by: SPECIALIST

## 2022-06-17 PROCEDURE — 1159F MED LIST DOCD IN RCRD: CPT | Mod: CPTII,S$GLB,, | Performed by: SPECIALIST

## 2022-06-17 PROCEDURE — 3075F SYST BP GE 130 - 139MM HG: CPT | Mod: CPTII,S$GLB,, | Performed by: SPECIALIST

## 2022-06-17 PROCEDURE — 1159F PR MEDICATION LIST DOCUMENTED IN MEDICAL RECORD: ICD-10-PCS | Mod: CPTII,S$GLB,, | Performed by: SPECIALIST

## 2022-06-17 PROCEDURE — 99214 PR OFFICE/OUTPT VISIT, EST, LEVL IV, 30-39 MIN: ICD-10-PCS | Mod: S$GLB,,, | Performed by: SPECIALIST

## 2022-06-17 PROCEDURE — 1101F PR PT FALLS ASSESS DOC 0-1 FALLS W/OUT INJ PAST YR: ICD-10-PCS | Mod: CPTII,S$GLB,, | Performed by: SPECIALIST

## 2022-06-17 PROCEDURE — 1101F PT FALLS ASSESS-DOCD LE1/YR: CPT | Mod: CPTII,S$GLB,, | Performed by: SPECIALIST

## 2022-06-17 PROCEDURE — 1126F PR PAIN SEVERITY QUANTIFIED, NO PAIN PRESENT: ICD-10-PCS | Mod: CPTII,S$GLB,, | Performed by: SPECIALIST

## 2022-06-17 PROCEDURE — 3078F DIAST BP <80 MM HG: CPT | Mod: CPTII,S$GLB,, | Performed by: SPECIALIST

## 2022-06-17 PROCEDURE — 1160F RVW MEDS BY RX/DR IN RCRD: CPT | Mod: CPTII,S$GLB,, | Performed by: SPECIALIST

## 2022-06-17 PROCEDURE — 99214 OFFICE O/P EST MOD 30 MIN: CPT | Mod: S$GLB,,, | Performed by: SPECIALIST

## 2022-06-17 PROCEDURE — 3288F PR FALLS RISK ASSESSMENT DOCUMENTED: ICD-10-PCS | Mod: CPTII,S$GLB,, | Performed by: SPECIALIST

## 2022-06-17 RX ORDER — METOPROLOL SUCCINATE 25 MG/1
25 TABLET, EXTENDED RELEASE ORAL DAILY
Qty: 90 TABLET | Refills: 3 | Status: SHIPPED | OUTPATIENT
Start: 2022-06-17 | End: 2023-07-26

## 2022-06-17 NOTE — PROGRESS NOTES
Feels ok  bp   Ok   Rare use proventil  afib  1x   No cp \  Subjective:    Patient ID:  Marleen Samano is a 76 y.o. female who presents for   Atrial Fibrillation, Shortness of Breath (With exertion), and Results (labs)    Feels  Ok patient is not having chest pain normal EKG has shortness of breath  She never smoked but was exposed to cigarette smokers in the house      Review of patient's allergies indicates:   Allergen Reactions    Adhesive tape-silicones Rash     Blisters after on for several hours    Augmentin [amoxicillin-pot clavulanate]      thrush    Ciprofloxacin      thrush    Morphine Itching and Hives       Past Medical History:   Diagnosis Date    Anemia due to multiple mechanisms 02/10/2020    Anemia in chronic kidney disease (CKD)     Anemia, chronic disease 02/10/2020    Arthritis     Aseptic loosening of prosthetic knee, 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/2020    Bell's palsy     Bladder incontinence     Blepharospasm     Bronchiectasis without complication 10/08/2019    Cervical dystonia     Concussion     COPD (chronic obstructive pulmonary disease)     Enterocolitis 02/07/2020    Fainting spell     2/7/2020    Hormone deficiency     Hypertension     Loose right total knee arthroplasty 06/15/2020    Migraine     Muscle spasm     Myofacial pain syndrome     Normocytic normochromic anemia 02/10/2020    Right ear pain 02/09/2021    Squamous cell carcinoma of skin     Syncope and collapse 02/09/2021     Past Surgical History:   Procedure Laterality Date    APPENDECTOMY      BREAST BIOPSY      BREAST LUMPECTOMY      CATARACT EXTRACTION      EYE SURGERY      HYSTERECTOMY      NECK SURGERY      REVISION OF KNEE ARTHROPLASTY Right 7/14/2020    Procedure: REVISION, ARTHROPLASTY, KNEE;  Surgeon: Pedro Tompkins MD;  Location: [facility];  Service: Orthopedics;  Laterality: Right;    TONSILLECTOMY       Social History     Tobacco Use    Smoking status: Never Smoker     Smokeless tobacco: Never Used   Substance Use Topics    Alcohol use: No    Drug use: No        Review of Systems     ROS        Objective:        Vitals:    06/17/22 1406   BP: 130/78   Pulse: 69       Lab Results   Component Value Date    WBC 4.89 06/04/2022    HGB 12.2 06/04/2022    HCT 36.6 (L) 06/04/2022     (L) 06/04/2022    CHOL 195 06/01/2022    TRIG 95 06/01/2022    HDL 77 (H) 06/01/2022    ALT 15 06/04/2022    AST 18 06/04/2022     06/04/2022    K 4.8 06/04/2022     06/04/2022    CREATININE 1.0 06/04/2022    BUN 18 06/04/2022    CO2 26 06/04/2022    TSH 1.903 09/21/2021    INR 1.0 02/07/2020    HGBA1C 5.9 (H) 06/01/2022        ECHOCARDIOGRAM RESULTS  Results for orders placed during the hospital encounter of 04/16/21    Echo Color Flow Doppler? Yes    Interpretation Summary  · The left ventricle is normal in size with concentric remodeling and normal systolic function.  · The estimated ejection fraction is 67%.  · Indeterminate left ventricular diastolic function.  · Atrial fibrillation not observed.  · Normal right ventricular size with normal right ventricular systolic function.  · There is mild pulmonary hypertension.  · Mild to moderate tricuspid regurgitation.  · Normal central venous pressure (3 mmHg).  · The estimated PA systolic pressure is 48 mmHg.    Mild pulmonary hypertension      CURRENT/PREVIOUS VISIT EKG  Results for orders placed or performed during the hospital encounter of 02/22/21   EKG 12-lead    Collection Time: 02/22/21 12:33 PM    Narrative    Test Reason : Z01.818,    Vent. Rate : 078 BPM     Atrial Rate : 078 BPM     P-R Int : 122 ms          QRS Dur : 084 ms      QT Int : 390 ms       P-R-T Axes : 054 073 068 degrees     QTc Int : 444 ms    Normal sinus rhythm  Normal ECG  When compared with ECG of 01-FEB-2021 17:46,  No significant change was found  Confirmed by Osman Costello MD ([id number]) on 2/26/2021 8:23:00 PM    Referred By: PRANAV   SELF            Confirmed By:Osman Costello MD     Results for orders placed during the hospital encounter of 04/16/21    Echo Color Flow Doppler? Yes    Interpretation Summary  · The left ventricle is normal in size with concentric remodeling and normal systolic function.  · The estimated ejection fraction is 67%.  · Indeterminate left ventricular diastolic function.  · Atrial fibrillation not observed.  · Normal right ventricular size with normal right ventricular systolic function.  · There is mild pulmonary hypertension.  · Mild to moderate tricuspid regurgitation.  · Normal central venous pressure (3 mmHg).  · The estimated PA systolic pressure is 48 mmHg.    Mild pulmonary hypertension    No results found for this or any previous visit.      PHYSICAL EXAM    Physical Exam 130/80     Lungs clear cor reg  abd  o  No  Edema     Medication List with Changes/Refills   Current Medications    ALBUTEROL (PROVENTIL/VENTOLIN HFA) 90 MCG/ACTUATION INHALER    Inhale 2 puffs into the lungs every 4 (four) hours as needed for Wheezing or Shortness of Breath. Rescue    ALBUTEROL (PROVENTIL/VENTOLIN HFA) 90 MCG/ACTUATION INHALER    Inhale 2 puffs into the lungs every 6 (six) hours as needed for Wheezing. Rescue    ALBUTEROL-IPRATROPIUM (DUO-NEB) 2.5 MG-0.5 MG/3 ML NEBULIZER SOLUTION    USE 1 VIAL VIA NEBULIZER  EVERY 6 HOURS AS NEEDED FOR WHEEZING RESCUE    AMLODIPINE (NORVASC) 2.5 MG TABLET    Take 1 tablet (2.5 mg total) by mouth once daily.    ASPIRIN 81 MG CHEW    Take 81 mg by mouth once daily.    BACLOFEN (LIORESAL) 20 MG TABLET    Take 1 tablet (20 mg total) by mouth 2 (two) times daily.    BENAZEPRIL (LOTENSIN) 40 MG TABLET    Take 1 tablet (40 mg total) by mouth once daily.    BLOOD SUGAR DIAGNOSTIC STRP    To check BG 2 times daily, to use with insurance preferred meter    BLOOD-GLUCOSE METER KIT    To check BG 1 times daily, to use with insurance preferred meter    DICLOFENAC SODIUM (VOLTAREN) 1 % GEL    Apply topically.      GABAPENTIN (NEURONTIN) 600 MG TABLET    TAKE 1 TABLET BY MOUTH 3  TIMES DAILY    LANCETS MISC    To check BG 2 times daily, to use with insurance preferred meter    METOPROLOL SUCCINATE (TOPROL-XL) 25 MG 24 HR TABLET    Take 1 tablet (25 mg total) by mouth once daily.    OMEPRAZOLE (PRILOSEC) 40 MG CAPSULE    Take 1 capsule (40 mg total) by mouth 2 (two) times daily before meals.    SIMETHICONE (GAS-X ORAL)    Take by mouth.    TRAZODONE (DESYREL) 100 MG TABLET    Take 100 mg by mouth every evening.    VENLAFAXINE (EFFEXOR-XR) 150 MG CP24    Take 1 capsule (150 mg total) by mouth 2 (two) times a day.           Assessment:       1. Acute cystitis without hematuria    2. Hypertension, unspecified type    3. Vertigo    4. Hiatus hernia syndrome    chronic uti   Being evaluated       Plan:       Problem List Items Addressed This Visit    None     Visit Diagnoses     Acute cystitis without hematuria    -  Primary    Hypertension, unspecified type        Vertigo        Hiatus hernia syndrome               No follow-ups on file.

## 2022-06-23 ENCOUNTER — TELEPHONE (OUTPATIENT)
Dept: FAMILY MEDICINE | Facility: CLINIC | Age: 77
End: 2022-06-23
Payer: MEDICARE

## 2022-06-23 NOTE — TELEPHONE ENCOUNTER
----- Message from Flor  sent at 6/23/2022  3:29 PM CDT -----  Regarding: advice  Type: Needs Medical Advice    Who Called:      Best Call Back Number:   Additional Information: Requesting a call back from Nurse, regarding pt needs to know what she needs to for assessment and has question for nurse ,please advise and call back

## 2022-06-23 NOTE — TELEPHONE ENCOUNTER
Patient states she was advised by [employer] she needed an annual wellness visit. Writer confirmed patient was not requesting to schedule annual visit with Dr. Ryan. Patient states she was advised it was for insurance purposes and could possibly be performed over the phone. Writer contacted LATANYA Diallo () to assist with scheduling AWV.

## 2022-07-13 ENCOUNTER — OFFICE VISIT (OUTPATIENT)
Dept: PAIN MEDICINE | Facility: CLINIC | Age: 77
End: 2022-07-13
Payer: MEDICARE

## 2022-07-13 ENCOUNTER — TELEPHONE (OUTPATIENT)
Dept: ORTHOPEDICS | Facility: CLINIC | Age: 77
End: 2022-07-13
Payer: MEDICARE

## 2022-07-13 VITALS
WEIGHT: 166 LBS | DIASTOLIC BLOOD PRESSURE: 71 MMHG | HEIGHT: 69 IN | BODY MASS INDEX: 24.59 KG/M2 | HEART RATE: 70 BPM | SYSTOLIC BLOOD PRESSURE: 117 MMHG

## 2022-07-13 DIAGNOSIS — M17.12 OSTEOARTHRITIS OF LEFT KNEE, UNSPECIFIED OSTEOARTHRITIS TYPE: Primary | ICD-10-CM

## 2022-07-13 DIAGNOSIS — M25.562 CHRONIC PAIN OF LEFT KNEE: ICD-10-CM

## 2022-07-13 DIAGNOSIS — G89.29 CHRONIC PAIN OF LEFT KNEE: ICD-10-CM

## 2022-07-13 PROCEDURE — 1125F PR PAIN SEVERITY QUANTIFIED, PAIN PRESENT: ICD-10-PCS | Mod: CPTII,S$GLB,, | Performed by: ANESTHESIOLOGY

## 2022-07-13 PROCEDURE — 1159F PR MEDICATION LIST DOCUMENTED IN MEDICAL RECORD: ICD-10-PCS | Mod: CPTII,S$GLB,, | Performed by: ANESTHESIOLOGY

## 2022-07-13 PROCEDURE — 3288F PR FALLS RISK ASSESSMENT DOCUMENTED: ICD-10-PCS | Mod: CPTII,S$GLB,, | Performed by: ANESTHESIOLOGY

## 2022-07-13 PROCEDURE — 3288F FALL RISK ASSESSMENT DOCD: CPT | Mod: CPTII,S$GLB,, | Performed by: ANESTHESIOLOGY

## 2022-07-13 PROCEDURE — 3078F PR MOST RECENT DIASTOLIC BLOOD PRESSURE < 80 MM HG: ICD-10-PCS | Mod: CPTII,S$GLB,, | Performed by: ANESTHESIOLOGY

## 2022-07-13 PROCEDURE — 99999 PR PBB SHADOW E&M-EST. PATIENT-LVL IV: ICD-10-PCS | Mod: PBBFAC,,, | Performed by: ANESTHESIOLOGY

## 2022-07-13 PROCEDURE — 99999 PR PBB SHADOW E&M-EST. PATIENT-LVL IV: CPT | Mod: PBBFAC,,, | Performed by: ANESTHESIOLOGY

## 2022-07-13 PROCEDURE — 3078F DIAST BP <80 MM HG: CPT | Mod: CPTII,S$GLB,, | Performed by: ANESTHESIOLOGY

## 2022-07-13 PROCEDURE — 1125F AMNT PAIN NOTED PAIN PRSNT: CPT | Mod: CPTII,S$GLB,, | Performed by: ANESTHESIOLOGY

## 2022-07-13 PROCEDURE — 3074F PR MOST RECENT SYSTOLIC BLOOD PRESSURE < 130 MM HG: ICD-10-PCS | Mod: CPTII,S$GLB,, | Performed by: ANESTHESIOLOGY

## 2022-07-13 PROCEDURE — 3074F SYST BP LT 130 MM HG: CPT | Mod: CPTII,S$GLB,, | Performed by: ANESTHESIOLOGY

## 2022-07-13 PROCEDURE — 1101F PT FALLS ASSESS-DOCD LE1/YR: CPT | Mod: CPTII,S$GLB,, | Performed by: ANESTHESIOLOGY

## 2022-07-13 PROCEDURE — 99204 PR OFFICE/OUTPT VISIT, NEW, LEVL IV, 45-59 MIN: ICD-10-PCS | Mod: S$GLB,,, | Performed by: ANESTHESIOLOGY

## 2022-07-13 PROCEDURE — 1101F PR PT FALLS ASSESS DOC 0-1 FALLS W/OUT INJ PAST YR: ICD-10-PCS | Mod: CPTII,S$GLB,, | Performed by: ANESTHESIOLOGY

## 2022-07-13 PROCEDURE — 1159F MED LIST DOCD IN RCRD: CPT | Mod: CPTII,S$GLB,, | Performed by: ANESTHESIOLOGY

## 2022-07-13 PROCEDURE — 99204 OFFICE O/P NEW MOD 45 MIN: CPT | Mod: S$GLB,,, | Performed by: ANESTHESIOLOGY

## 2022-07-13 NOTE — PROGRESS NOTES
This note was completed with dictation software and grammatical errors may exist.    Referring Physician: Uli Chu    PCP: Mikie Ryan MD      CC: left knee pain    HPI:   Marleen Samano is a 76 y.o. female referred to us for left knee pain.  Patient with history of right knee replacement.  She has history of chronic left knee pain.  Knee pain has been bothersome for over 3 years.  No recent traumatic incident.  She has intermittent aching, sharp, throbbing pain over her left knee, especially the lateral aspect of her left knee.  Pain does not radiate.  Pain worsens standing, bending, walking getting up.  Pain improves with rest.  She denies any back pain or radiating leg pain.  She has been evaluated by orthopedics.  She underwent viscosupplementation injection in the left knee with minimal benefit in November 2021. She recently underwent left knee genicular RFA in April 2022 and cannot determine if the procedure was helpful.  She denies any worsening weakness.  No bowel bladder changes    ROS:  CONSTITUTIONAL: No fevers, chills, night sweats, wt. loss, appetite changes  SKIN: no rashes or itching  ENT: No headaches, head trauma, vision changes, or eye pain  LYMPH NODES: None noticed   CV: No chest pain, palpitations.   RESP: No shortness of breath, dyspnea on exertion, cough, wheezing, or hemoptysis  GI: No nausea, emesis, diarrhea, constipation, melena, hematochezia, pain.    : No dysuria, hematuria, urgency, or frequency   HEME: No easy bruising, bleeding problems  PSYCHIATRIC: No depression, anxiety, psychosis, hallucinations.  NEURO: No seizures, memory loss, dizziness or difficulty sleeping  MSK:  Positive HPI      Past Medical History:   Diagnosis Date    Anemia due to multiple mechanisms 02/10/2020    Anemia in chronic kidney disease (CKD)     Anemia, chronic disease 02/10/2020    Arthritis     Aseptic loosening of prosthetic knee, 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/2020    Bell's palsy      Bladder incontinence     Blepharospasm     Bronchiectasis without complication 10/08/2019    Cervical dystonia     Concussion     COPD (chronic obstructive pulmonary disease)     Enterocolitis 02/07/2020    Fainting spell     2/7/2020    Hormone deficiency     Hypertension     Loose right total knee arthroplasty 06/15/2020    Migraine     Muscle spasm     Myofacial pain syndrome     Normocytic normochromic anemia 02/10/2020    Right ear pain 02/09/2021    Squamous cell carcinoma of skin     Syncope and collapse 02/09/2021     Past Surgical History:   Procedure Laterality Date    APPENDECTOMY      BREAST BIOPSY      BREAST LUMPECTOMY      CATARACT EXTRACTION      EYE SURGERY      HYSTERECTOMY      NECK SURGERY      REVISION OF KNEE ARTHROPLASTY Right 7/14/2020    Procedure: REVISION, ARTHROPLASTY, KNEE;  Surgeon: Pedro Tompkins MD;  Location: [facility];  Service: Orthopedics;  Laterality: Right;    TONSILLECTOMY       Family History   Problem Relation Age of Onset    Cancer Mother     Heart disease Brother     Parkinsonism Brother     Diabetes Neg Hx     Melanoma Neg Hx     Psoriasis Neg Hx     Lupus Neg Hx     Eczema Neg Hx      Social History     Socioeconomic History    Marital status:    Occupational History    Occupation: Caregiver   Tobacco Use    Smoking status: Never Smoker    Smokeless tobacco: Never Used   Substance and Sexual Activity    Alcohol use: No    Drug use: No   Social History Narrative    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 Has a good support system of friends, daughter and grandchildren. Keeps herself busy w/ gardening, socializing. Facing some financial issues, supports grandson struggling w/ drug abuse who is staying with her which causes stress. Denies any concern regarding safety at home. Has not had much financial support from her family w/ caring for her grandson.     Social Determinants  "of Health     Financial Resource Strain: Medium Risk    Difficulty of Paying Living Expenses: Somewhat hard   Food Insecurity: Food Insecurity Present    Worried About Running Out of Food in the Last Year: Often true    Ran Out of Food in the Last Year: Sometimes true   Transportation Needs: No Transportation Needs    Lack of Transportation (Medical): No    Lack of Transportation (Non-Medical): No   Physical Activity: Inactive    Days of Exercise per Week: 0 days    Minutes of Exercise per Session: 0 min   Stress: Stress Concern Present    Feeling of Stress : To some extent   Social Connections: Moderately Isolated    Frequency of Communication with Friends and Family: More than three times a week    Frequency of Social Gatherings with Friends and Family: More than three times a week    Attends Rastafarian Services: More than 4 times per year    Active Member of Clubs or Organizations: No    Attends Club or Organization Meetings: Never    Marital Status:    Housing Stability: Low Risk     Unable to Pay for Housing in the Last Year: No    Number of Places Lived in the Last Year: 1    Unstable Housing in the Last Year: No         Medications/Allergies: See med card    Vitals:    07/13/22 0826   BP: 117/71   Pulse: 70   Weight: 75.3 kg (166 lb)   Height: 5' 9" (1.753 m)   PainSc:   6   PainLoc: Knee         Physical exam:    GENERAL: A and O x3, the patient appears well groomed and is in no acute distress.  Skin: No rashes or obvious lesions  HEENT: normocephalic, atraumatic  CARDIOVASCULAR:  Palpable peripheral pulses  LUNGS: easy work of breathing  ABDOMEN: soft, nontender   UPPER EXTREMITIES: Normal alignment, normal range of motion, no atrophy, no skin changes,  hair growth and nail growth normal and equal bilaterally. No swelling, no tenderness.    LOWER EXTREMITIES:  Normal alignment, normal range of motion, no atrophy, no skin changes,  hair growth and nail growth normal and equal " bilaterally. No swelling, no tenderness.  LUMBAR SPINE  Lumbar spine: ROM is mildly limiited with flexion extension and oblique extension with mild increased pain.    Grayson's test causes no increased pain on either side.    Supine straight leg raise is negative bilaterally.    Internal and external rotation of the hip causes no increased pain on either side.  Myofascial exam: No tenderness to palpation across lumbar paraspinous muscles.      MENTAL STATUS: normal orientation, speech, language, and fund of knowledge for social situation.  Emotional state appropriate.  MOTOR: Tone and bulk: normal bilateral upper and lower Strength: normal     SENSATION: Light touch and pinprick intact bilaterally  REFLEXES: normal, symmetric, nonbrisk.  Toes down, no clonus. No hoffmans.  GAIT: normal rise, base, steps, and arm swing.        Imaging:  Xray Knees 5/2022  Right knee total arthroplasty hardware with no periprosthetic fracture or abnormal lucency to suggest loosening or infection.  No malalignment.  Left knee degenerative change mild in the lateral and patellofemoral compartment and minimal in the medial compartment.  No left knee joint effusion.    Assessment:  Patient presents for left knee pain.  1. Osteoarthritis of left knee, unspecified osteoarthritis type    2. Chronic pain of left knee          Plan:  1. I have stressed the importance of physical activity and exercise to improve overall health  2. Reviewed pertinent imaging and records with patient  3. Patient with chronic left knee pain.  No improvement with viscosupplementation or genicular nerve RFA.  I do not suspect any lumbar radiculopathy.  Would recommend continued evaluation with orthopedics  4. Follow up as needed    Thank you for referring this interesting patient, and I look forward to continuing to collaborate in her care.

## 2022-07-18 ENCOUNTER — TELEPHONE (OUTPATIENT)
Dept: CARDIOLOGY | Facility: CLINIC | Age: 77
End: 2022-07-18
Payer: MEDICARE

## 2022-07-18 NOTE — TELEPHONE ENCOUNTER
----- Message from Asad Leonardo MA sent at 7/18/2022 12:45 PM CDT -----  Contact: PURA TAN [[id number]]  Type: Needs Medical Advice    Who Called:PURA TAN [[id number]]    Best Call Back Number:[phone]     Inquiry/Question: Please call regarding tinnitus having off and on 24 period      Thank you~

## 2022-07-21 ENCOUNTER — PATIENT MESSAGE (OUTPATIENT)
Dept: PODIATRY | Facility: CLINIC | Age: 77
End: 2022-07-21

## 2022-07-21 ENCOUNTER — OFFICE VISIT (OUTPATIENT)
Dept: PODIATRY | Facility: CLINIC | Age: 77
End: 2022-07-21
Payer: MEDICARE

## 2022-07-21 ENCOUNTER — HOSPITAL ENCOUNTER (OUTPATIENT)
Dept: RADIOLOGY | Facility: CLINIC | Age: 77
Discharge: HOME | End: 2022-07-21
Attending: PODIATRIST
Payer: MEDICARE

## 2022-07-21 VITALS — HEIGHT: 69 IN | OXYGEN SATURATION: 97 % | BODY MASS INDEX: 24.44 KG/M2 | WEIGHT: 165 LBS | HEART RATE: 83 BPM

## 2022-07-21 DIAGNOSIS — M19.072 ARTHRITIS OF MIDTARSAL JOINT OF LEFT FOOT: ICD-10-CM

## 2022-07-21 DIAGNOSIS — M20.5X2 HALLUX LIMITUS OF LEFT FOOT: ICD-10-CM

## 2022-07-21 DIAGNOSIS — M21.41 PES PLANUS OF BOTH FEET: ICD-10-CM

## 2022-07-21 DIAGNOSIS — M19.071 ARTHRITIS OF MIDTARSAL JOINT OF RIGHT FOOT: ICD-10-CM

## 2022-07-21 DIAGNOSIS — M20.41 HAMMER TOES OF BOTH FEET: Primary | ICD-10-CM

## 2022-07-21 DIAGNOSIS — M20.42 HAMMER TOES OF BOTH FEET: Primary | ICD-10-CM

## 2022-07-21 DIAGNOSIS — M21.42 PES PLANUS OF BOTH FEET: ICD-10-CM

## 2022-07-21 DIAGNOSIS — E11.9 TYPE 2 DIABETES MELLITUS WITHOUT COMPLICATION, UNSPECIFIED WHETHER LONG TERM INSULIN USE: ICD-10-CM

## 2022-07-21 PROCEDURE — 1159F MED LIST DOCD IN RCRD: CPT | Mod: CPTII,S$GLB,, | Performed by: PODIATRIST

## 2022-07-21 PROCEDURE — 1125F PR PAIN SEVERITY QUANTIFIED, PAIN PRESENT: ICD-10-PCS | Mod: CPTII,S$GLB,, | Performed by: PODIATRIST

## 2022-07-21 PROCEDURE — 1160F RVW MEDS BY RX/DR IN RCRD: CPT | Mod: CPTII,S$GLB,, | Performed by: PODIATRIST

## 2022-07-21 PROCEDURE — 3288F FALL RISK ASSESSMENT DOCD: CPT | Mod: CPTII,S$GLB,, | Performed by: PODIATRIST

## 2022-07-21 PROCEDURE — 1101F PT FALLS ASSESS-DOCD LE1/YR: CPT | Mod: CPTII,S$GLB,, | Performed by: PODIATRIST

## 2022-07-21 PROCEDURE — 73630 X-RAY EXAM OF FOOT: CPT | Mod: 50,S$GLB,, | Performed by: RADIOLOGY

## 2022-07-21 PROCEDURE — 1101F PR PT FALLS ASSESS DOC 0-1 FALLS W/OUT INJ PAST YR: ICD-10-PCS | Mod: CPTII,S$GLB,, | Performed by: PODIATRIST

## 2022-07-21 PROCEDURE — 73630 XR FOOT COMPLETE 3 VIEW BILATERAL: ICD-10-PCS | Mod: 50,S$GLB,, | Performed by: RADIOLOGY

## 2022-07-21 PROCEDURE — 99213 PR OFFICE/OUTPT VISIT, EST, LEVL III, 20-29 MIN: ICD-10-PCS | Mod: S$GLB,,, | Performed by: PODIATRIST

## 2022-07-21 PROCEDURE — 1125F AMNT PAIN NOTED PAIN PRSNT: CPT | Mod: CPTII,S$GLB,, | Performed by: PODIATRIST

## 2022-07-21 PROCEDURE — 99213 OFFICE O/P EST LOW 20 MIN: CPT | Mod: S$GLB,,, | Performed by: PODIATRIST

## 2022-07-21 PROCEDURE — 3288F PR FALLS RISK ASSESSMENT DOCUMENTED: ICD-10-PCS | Mod: CPTII,S$GLB,, | Performed by: PODIATRIST

## 2022-07-21 PROCEDURE — 1160F PR REVIEW ALL MEDS BY PRESCRIBER/CLIN PHARMACIST DOCUMENTED: ICD-10-PCS | Mod: CPTII,S$GLB,, | Performed by: PODIATRIST

## 2022-07-21 PROCEDURE — 1159F PR MEDICATION LIST DOCUMENTED IN MEDICAL RECORD: ICD-10-PCS | Mod: CPTII,S$GLB,, | Performed by: PODIATRIST

## 2022-07-21 RX ORDER — CLOTRIMAZOLE 1 %
CREAM (GRAM) TOPICAL 2 TIMES DAILY
Qty: 28 G | Refills: 6 | Status: SHIPPED | OUTPATIENT
Start: 2022-07-21

## 2022-07-21 NOTE — PROGRESS NOTES
"  [address] [facility] [address]  [place] [address]  Phone: [phone]   [phone]    Patient's PCP:Mikie Ryan MD  Referring Provider: No ref. provider found    Subjective:      Chief Complaint:: Foot Pain (Pain in both feet lateral and medial sides of heel . Bunion on  bottom left foot . Right Great toe medial pain .  )    FRANCY Samano is a 76 y.o. female who presents today with a complaint of pain in both feet lateral and medial sides of heel . Bunion on  bottom left foot . Right Great toe medial pain . Onset of symptoms started 6 months ago , and reports no trama .  Current symptoms include redness , and pain. Aggravating factors are standing . Symptoms have gotten worse . Treatment to date have included none. .        Vitals:    07/21/22 1009   Pulse: 83   SpO2: 97%   Weight: 74.8 kg (165 lb)   Height: 5' 9" (1.753 m)   PainSc:   5      Shoe Size: 10    Past Surgical History:   Procedure Laterality Date    APPENDECTOMY      BREAST BIOPSY      BREAST LUMPECTOMY      CATARACT EXTRACTION      EYE SURGERY      HYSTERECTOMY      NECK SURGERY      REVISION OF KNEE ARTHROPLASTY Right 7/14/2020    Procedure: REVISION, ARTHROPLASTY, KNEE;  Surgeon: Pedro Tompkins MD;  Location: [facility];  Service: Orthopedics;  Laterality: Right;    TONSILLECTOMY       Past Medical History:   Diagnosis Date    Anemia due to multiple mechanisms 02/10/2020    Anemia in chronic kidney disease (CKD)     Anemia, chronic disease 02/10/2020    Arthritis     Aseptic loosening of prosthetic knee, 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/2020    Bell's palsy     Bladder incontinence     Blepharospasm     Bronchiectasis without complication 10/08/2019    Cervical dystonia     Concussion     COPD (chronic obstructive pulmonary disease)     Enterocolitis 02/07/2020    Fainting spell     2/7/2020    Hormone deficiency     Hypertension     Loose right total knee arthroplasty 06/15/2020    Migraine     Muscle spasm "     Myofacial pain syndrome     Normocytic normochromic anemia 02/10/2020    Right ear pain 02/09/2021    Squamous cell carcinoma of skin     Syncope and collapse 02/09/2021     Family History   Problem Relation Age of Onset    Cancer Mother     Heart disease Brother     Parkinsonism Brother     Diabetes Neg Hx     Melanoma Neg Hx     Psoriasis Neg Hx     Lupus Neg Hx     Eczema Neg Hx         Social History:   Marital Status:   Alcohol History:  reports no history of alcohol use.  Tobacco History:  reports that she has never smoked. She has never used smokeless tobacco.  Drug History:  reports no history of drug use.    Review of patient's allergies indicates:   Allergen Reactions    Adhesive tape-silicones Rash     Blisters after on for several hours    Augmentin [amoxicillin-pot clavulanate]      thrush    Ciprofloxacin      thrush    Morphine Itching and Hives       Current Outpatient Medications   Medication Sig Dispense Refill    albuterol (PROVENTIL/VENTOLIN HFA) 90 mcg/actuation inhaler Inhale 2 puffs into the lungs every 4 (four) hours as needed for Wheezing or Shortness of Breath. Rescue 54 g 0    albuterol (PROVENTIL/VENTOLIN HFA) 90 mcg/actuation inhaler Inhale 2 puffs into the lungs every 6 (six) hours as needed for Wheezing. Rescue 20.1 g 11    albuterol-ipratropium (DUO-NEB) 2.5 mg-0.5 mg/3 mL nebulizer solution USE 1 VIAL VIA NEBULIZER  EVERY 6 HOURS AS NEEDED FOR WHEEZING RESCUE 180 mL 23    amLODIPine (NORVASC) 2.5 MG tablet Take 1 tablet (2.5 mg total) by mouth once daily. 90 tablet 3    aspirin 81 MG Chew Take 81 mg by mouth once daily.      baclofen (LIORESAL) 20 MG tablet Take 1 tablet (20 mg total) by mouth 2 (two) times daily. 180 tablet 3    benazepriL (LOTENSIN) 40 MG tablet Take 1 tablet (40 mg total) by mouth once daily. 90 tablet 3    blood sugar diagnostic Strp To check BG 2 times daily, to use with insurance preferred meter 200 strip 3    diclofenac  sodium (VOLTAREN) 1 % Gel Apply topically.       gabapentin (NEURONTIN) 600 MG tablet TAKE 1 TABLET BY MOUTH 3  TIMES DAILY 270 tablet 3    lancets Misc To check BG 2 times daily, to use with insurance preferred meter 200 each 3    metoprolol succinate (TOPROL-XL) 25 MG 24 hr tablet Take 1 tablet (25 mg total) by mouth once daily. 90 tablet 3    omeprazole (PRILOSEC) 40 MG capsule Take 1 capsule (40 mg total) by mouth 2 (two) times daily before meals. 180 capsule 2    simethicone (GAS-X ORAL) Take by mouth.      trazodone (DESYREL) 100 MG tablet Take 100 mg by mouth every evening.      venlafaxine (EFFEXOR-XR) 150 MG Cp24 Take 1 capsule (150 mg total) by mouth 2 (two) times a day. 14 capsule 0    blood-glucose meter kit To check BG 1 times daily, to use with insurance preferred meter 1 each 0    clotrimazole (LOTRIMIN) 1 % cream Apply topically 2 (two) times daily. 28 g 6     No current facility-administered medications for this visit.       Review of Systems   Constitutional: Negative for chills, fatigue, fever and unexpected weight change.   HENT: Negative for hearing loss and trouble swallowing.    Eyes: Negative for photophobia and visual disturbance.   Respiratory: Negative for cough, shortness of breath and wheezing.    Cardiovascular: Positive for leg swelling. Negative for chest pain and palpitations.   Gastrointestinal: Negative for abdominal pain and nausea.   Genitourinary: Negative for dysuria and frequency.   Musculoskeletal: Positive for joint swelling. Negative for arthralgias, back pain, gait problem and myalgias.   Skin: Positive for color change. Negative for rash and wound.   Neurological: Positive for headaches. Negative for tremors, seizures, weakness and numbness.   Hematological: Bruises/bleeds easily.         Objective:        Physical Exam:   Foot Exam    General  General Appearance: appears stated age and healthy   Orientation: alert and oriented to person, place, and time    Affect: appropriate   Gait: antalgic       Right Foot/Ankle     Inspection and Palpation  Ecchymosis: none  Tenderness: calcaneus tenderness and great toe metatarsophalangeal joint   Swelling: (Varicose veins with lower extremity edema.  Medial navicular tuberosity by bone spur)  Arch: pes planus  Hallux valgus: yes  Hallux limitus: yes  Skin Exam: dry skin and abnormal color (Slight brown in redness skin lateral calcaneus with venous incompetency and venous stasis dermatitis.  No open wounds no signs of infection or ulceration);   Neurovascular  Dorsalis pedis: 2+  Posterior tibial: 2+  Capillary Refill: 2+  Varicose veins: present  Saphenous nerve sensation: normal  Tibial nerve sensation: normal  Superficial peroneal nerve sensation: normal  Deep peroneal nerve sensation: normal  Sural nerve sensation: normal    Edema  Type of edema: pitting  Edema grade: 1+     Muscle Strength  Ankle dorsiflexion: 5  Ankle plantar flexion: 5  Ankle inversion: 5  Ankle eversion: 5  Great toe extension: 5  Great toe flexion: 5    Range of Motion    Normal right ankle ROM  Passive  1st MTP extension: 20, pain    Active  1st MTP extension: 20, pain      Left Foot/Ankle      Inspection and Palpation  Ecchymosis: none  Tenderness: great toe metatarsophalangeal joint and calcaneus tenderness   Swelling: (Varicose veins with lower extremity edema)  Arch: pes planus  Hallux valgus: yes  Hallux limitus: yes  Skin Exam: dry skin and abnormal color (Slight bluish to reddish discoloration of lower extremity secondary to venous incompetency.  Scaling slight eczema changes lateral calcaneus by varicose vein.  No open wounds);   Neurovascular  Dorsalis pedis: 2+  Posterior tibial: 2+  Capillary refill: 2+  Varicose veins: present  Saphenous nerve sensation: normal  Tibial nerve sensation: normal  Superficial peroneal nerve sensation: normal  Deep peroneal nerve sensation: normal  Sural nerve sensation: normal    Edema  Type of edema:  pitting  Edema grade: 1+    Muscle Strength  Ankle dorsiflexion: 5  Ankle plantar flexion: 5  Ankle inversion: 5  Ankle eversion: 5  Great toe extension: 5  Great toe flexion: 5    Range of Motion    Normal left ankle ROM  Passive  1st MTP extension: 30    Active  1st MTP extension: 30          Physical Exam  Cardiovascular:      Pulses:           Dorsalis pedis pulses are 2+ on the right side and 2+ on the left side.        Posterior tibial pulses are 2+ on the right side and 2+ on the left side.   Musculoskeletal:      Right foot: Bunion present.      Left foot: Bunion present.        Feet:    Feet:      Right foot:      Skin integrity: Dry skin present.      Left foot:      Skin integrity: Dry skin present.               Right Ankle/Foot Exam     Tenderness   The patient is tender to palpation of the great toe metatarsophalangeal joint.    Range of Motion   The patient has normal right ankle ROM.    Left Ankle/Foot Exam     Tenderness   The patient is tender to palpation of the great toe metatarsophalangeal joint.    Range of Motion   The patient has normal left ankle ROM.     Muscle Strength   The patient has normal left ankle strength.      Muscle Strength   Right Lower Extremity   Ankle Dorsiflexion:  5   Plantar flexion:  5/5  Left Lower Extremity   Ankle Dorsiflexion:  5   Plantar flexion:  5/5     Vascular Exam     Right Pulses  Dorsalis Pedis:      2+  Posterior Tibial:      2+        Left Pulses  Dorsalis Pedis:      2+  Posterior Tibial:      2+             Imaging:   AP, lateral, lateral oblique weight-bearing x-rays bilateral feet:  Right foot:  No acute fractures, no bone tumors, no soft tissue masses seen.  Early stage III hallux limitus present.  Exostosis dorsal 1st metatarsal cuneiform joint.  Enlarged tuberosity navicular with os tibialis externum present.  Pes planus foot structure.  Hammertoe deformity 345.     Left foot:  No acute fractures, no bone tumors, no soft tissue masses seen.  Stage  II hallux limitus present.  Pes planus.  Os tibialis externum.  Hammertoe deformity 3 4 and 5.        Assessment:       1. Hammer toes of both feet    2. Arthritis of midtarsal joint of left foot    3. Arthritis of midtarsal joint of right foot    4. Pes planus of both feet    5. Hallux limitus of left foot    6. Type 2 diabetes mellitus without complication, unspecified whether long term insulin use      Plan:   Hammer toes of both feet  -     X-Ray Foot Complete Bilateral    Arthritis of midtarsal joint of left foot  -     X-Ray Foot Complete Bilateral    Arthritis of midtarsal joint of right foot  -     X-Ray Foot Complete Bilateral    Pes planus of both feet  -     X-Ray Foot Complete Bilateral    Hallux limitus of left foot  -     X-Ray Foot Complete Bilateral    Type 2 diabetes mellitus without complication, unspecified whether long term insulin use    Other orders  -     clotrimazole (LOTRIMIN) 1 % cream; Apply topically 2 (two) times daily.  Dispense: 28 g; Refill: 6    I evaluated patient had a long discussion with her about her multiple complaints.  I explained to her that majority complaints she has is arthritis big toe joint arch extra bone in the arch of the foot right worse than left arthritis to the hammertoes and her flat feet.  Explained her that the redness and irritation side of foot is probably secondary to her venous incompetency varicose veins and dry skin.  I explained to her there is no simple remedy for her problems and that at this point time she does not feel they warrant any surgery because her symptoms are not severe enough.  I agree with her and I recommend shoe modification padding anti-inflammatories if she can take them.  I am giving her a prescription for Lotrimin cream to use on her skin to see if it helps with any dry skin and tinea pedis.  She return to see me as needed.      Procedures          Counseling:     I provided patient education verbally regarding:   Patient diagnosis,  treatment options, as well as alternatives, risks, and benefits.     I explained to the pt the 4 stages of osteoarthritic changes of the 1st MPJ starting with functional loss of range of motion and eventual destruction of the cartilage causing increased deformity, pain and loss of motion.  I discuss the treatment of the early stages 1 and 2 with shoe modification, NSAIDs, possible cortisone shots and CMOs.  I told her most stage 3 and 4 if they become painful enough may require surgical intervention.  I discussed joint preservation procedure with partial relief of pain and some increase in function and possible second surgery in the future if pain and arthritis become worse.  I discussed joint destruction procedures of fusion o 1st MPJ, Haque bunionectomy and possible joint implant if the pt. meets my criteria of older age and sedentary life style.    I discussed hammertoe deformity and conservative treatment of deep, wider shoes, padding of the sore areas, OTC NSAID, skin softners, palliative care.  I discussed surgical procedures of fusion of the PIPJ of the toes with wires or implants, the follow up and the possible complications.    posterior tibial tendon dysfuncton stage 1, 2, 3 ,4 and the long term treatment of each stage and the possible complications of each stage.  I explained the possible need of an MRI of the ankle if the pain continues.    I discussed venous incompetency and sequela of edema, skin pigmentation with hemosiderin deposits, potential ulcer formation.  I discussed compression hose, elevation and possible vascular consult.  This note was created using Dragon voice recognition software that occasionally misinterpreted phrases or words.

## 2022-07-22 ENCOUNTER — PATIENT MESSAGE (OUTPATIENT)
Dept: PODIATRY | Facility: CLINIC | Age: 77
End: 2022-07-22
Payer: MEDICARE

## 2022-07-25 NOTE — TELEPHONE ENCOUNTER
Could you possibly see this patient for a second opinion for knee pain? Would there be anything you could do? Ortho sent message. Please advise.

## 2022-07-26 ENCOUNTER — TELEPHONE (OUTPATIENT)
Dept: NEUROLOGY | Facility: CLINIC | Age: 77
End: 2022-07-26
Payer: MEDICARE

## 2022-07-27 ENCOUNTER — TELEPHONE (OUTPATIENT)
Dept: NEUROLOGY | Facility: CLINIC | Age: 77
End: 2022-07-27
Payer: MEDICARE

## 2022-07-28 ENCOUNTER — TELEPHONE (OUTPATIENT)
Dept: NEUROLOGY | Facility: CLINIC | Age: 77
End: 2022-07-28
Payer: MEDICARE

## 2022-07-28 NOTE — TELEPHONE ENCOUNTER
Called patient to schedule Neurology appointment. Patient stated she will call back to schedule when ready.

## 2022-08-01 NOTE — TELEPHONE ENCOUNTER
Attempted to call patient back no voicemail and no answer.    Patient notified that a new prescription was entered this morning electronically to [facility]

## 2022-08-08 ENCOUNTER — TELEPHONE (OUTPATIENT)
Dept: UROLOGY | Facility: CLINIC | Age: 77
End: 2022-08-08
Payer: MEDICARE

## 2022-08-09 ENCOUNTER — PATIENT MESSAGE (OUTPATIENT)
Dept: FAMILY MEDICINE | Facility: CLINIC | Age: 77
End: 2022-08-09
Payer: MEDICARE

## 2022-08-10 ENCOUNTER — TELEPHONE (OUTPATIENT)
Dept: UROLOGY | Facility: CLINIC | Age: 77
End: 2022-08-10

## 2022-08-10 ENCOUNTER — TELEPHONE (OUTPATIENT)
Dept: GASTROENTEROLOGY | Facility: CLINIC | Age: 77
End: 2022-08-10
Payer: MEDICARE

## 2022-08-10 ENCOUNTER — OFFICE VISIT (OUTPATIENT)
Dept: UROLOGY | Facility: CLINIC | Age: 77
End: 2022-08-10
Payer: MEDICARE

## 2022-08-10 VITALS
HEIGHT: 69 IN | WEIGHT: 165.56 LBS | HEART RATE: 79 BPM | DIASTOLIC BLOOD PRESSURE: 69 MMHG | SYSTOLIC BLOOD PRESSURE: 118 MMHG | BODY MASS INDEX: 24.52 KG/M2

## 2022-08-10 DIAGNOSIS — N39.0 CHRONIC UTI: Primary | ICD-10-CM

## 2022-08-10 DIAGNOSIS — R39.89 BLADDER PAIN: ICD-10-CM

## 2022-08-10 DIAGNOSIS — R15.9 INCONTINENCE OF FECES, UNSPECIFIED FECAL INCONTINENCE TYPE: ICD-10-CM

## 2022-08-10 DIAGNOSIS — N95.2 ATROPHIC VAGINITIS: ICD-10-CM

## 2022-08-10 LAB
BACTERIA #/AREA URNS HPF: ABNORMAL /HPF
BILIRUB SERPL-MCNC: ABNORMAL MG/DL
BLOOD URINE, POC: ABNORMAL
CLARITY, POC UA: CLEAR
COLOR, POC UA: YELLOW
GLUCOSE UR QL STRIP: ABNORMAL
KETONES UR QL STRIP: ABNORMAL
LEUKOCYTE ESTERASE URINE, POC: ABNORMAL
MICROSCOPIC COMMENT: ABNORMAL
NITRITE, POC UA: ABNORMAL
PH, POC UA: 5.5
PROTEIN, POC: 30
RBC #/AREA URNS HPF: 4 /HPF (ref 0–4)
SPECIFIC GRAVITY, POC UA: 1.03
UROBILINOGEN, POC UA: ABNORMAL
WBC #/AREA URNS HPF: 35 /HPF (ref 0–5)

## 2022-08-10 PROCEDURE — 99214 PR OFFICE/OUTPT VISIT, EST, LEVL IV, 30-39 MIN: ICD-10-PCS | Mod: 25,S$GLB,, | Performed by: NURSE PRACTITIONER

## 2022-08-10 PROCEDURE — 87186 SC STD MICRODIL/AGAR DIL: CPT | Performed by: NURSE PRACTITIONER

## 2022-08-10 PROCEDURE — 99214 OFFICE O/P EST MOD 30 MIN: CPT | Mod: 25,S$GLB,, | Performed by: NURSE PRACTITIONER

## 2022-08-10 PROCEDURE — 1160F PR REVIEW ALL MEDS BY PRESCRIBER/CLIN PHARMACIST DOCUMENTED: ICD-10-PCS | Mod: CPTII,S$GLB,, | Performed by: NURSE PRACTITIONER

## 2022-08-10 PROCEDURE — 1160F RVW MEDS BY RX/DR IN RCRD: CPT | Mod: CPTII,S$GLB,, | Performed by: NURSE PRACTITIONER

## 2022-08-10 PROCEDURE — 87088 URINE BACTERIA CULTURE: CPT | Performed by: NURSE PRACTITIONER

## 2022-08-10 PROCEDURE — 87077 CULTURE AEROBIC IDENTIFY: CPT | Performed by: NURSE PRACTITIONER

## 2022-08-10 PROCEDURE — 3078F DIAST BP <80 MM HG: CPT | Mod: CPTII,S$GLB,, | Performed by: NURSE PRACTITIONER

## 2022-08-10 PROCEDURE — 1159F PR MEDICATION LIST DOCUMENTED IN MEDICAL RECORD: ICD-10-PCS | Mod: CPTII,S$GLB,, | Performed by: NURSE PRACTITIONER

## 2022-08-10 PROCEDURE — 99999 PR PBB SHADOW E&M-EST. PATIENT-LVL IV: CPT | Mod: PBBFAC,,, | Performed by: NURSE PRACTITIONER

## 2022-08-10 PROCEDURE — 51701 INSERT BLADDER CATHETER: CPT | Mod: S$GLB,,, | Performed by: NURSE PRACTITIONER

## 2022-08-10 PROCEDURE — 81002 POCT URINE DIPSTICK WITHOUT MICROSCOPE: ICD-10-PCS | Mod: S$GLB,,, | Performed by: NURSE PRACTITIONER

## 2022-08-10 PROCEDURE — 81000 URINALYSIS NONAUTO W/SCOPE: CPT | Performed by: NURSE PRACTITIONER

## 2022-08-10 PROCEDURE — 51701 INSERT,NON-INDWELLING BLADDER CATHETER: ICD-10-PCS | Mod: S$GLB,,, | Performed by: NURSE PRACTITIONER

## 2022-08-10 PROCEDURE — 3074F SYST BP LT 130 MM HG: CPT | Mod: CPTII,S$GLB,, | Performed by: NURSE PRACTITIONER

## 2022-08-10 PROCEDURE — 99999 PR PBB SHADOW E&M-EST. PATIENT-LVL IV: ICD-10-PCS | Mod: PBBFAC,,, | Performed by: NURSE PRACTITIONER

## 2022-08-10 PROCEDURE — 1159F MED LIST DOCD IN RCRD: CPT | Mod: CPTII,S$GLB,, | Performed by: NURSE PRACTITIONER

## 2022-08-10 PROCEDURE — 3078F PR MOST RECENT DIASTOLIC BLOOD PRESSURE < 80 MM HG: ICD-10-PCS | Mod: CPTII,S$GLB,, | Performed by: NURSE PRACTITIONER

## 2022-08-10 PROCEDURE — 81002 URINALYSIS NONAUTO W/O SCOPE: CPT | Mod: S$GLB,,, | Performed by: NURSE PRACTITIONER

## 2022-08-10 PROCEDURE — 3074F PR MOST RECENT SYSTOLIC BLOOD PRESSURE < 130 MM HG: ICD-10-PCS | Mod: CPTII,S$GLB,, | Performed by: NURSE PRACTITIONER

## 2022-08-10 PROCEDURE — 87086 URINE CULTURE/COLONY COUNT: CPT | Performed by: NURSE PRACTITIONER

## 2022-08-10 RX ORDER — ESTRADIOL 0.1 MG/G
1 CREAM VAGINAL
Qty: 42.5 G | Refills: 6 | Status: SHIPPED | OUTPATIENT
Start: 2022-08-10 | End: 2023-01-13

## 2022-08-10 RX ORDER — FLUCONAZOLE 150 MG/1
150 TABLET ORAL DAILY
Qty: 3 TABLET | Refills: 0 | Status: SHIPPED | OUTPATIENT
Start: 2022-08-10 | End: 2022-08-13

## 2022-08-10 RX ORDER — NITROFURANTOIN 25; 75 MG/1; MG/1
100 CAPSULE ORAL 2 TIMES DAILY
Qty: 14 CAPSULE | Refills: 0 | Status: SHIPPED | OUTPATIENT
Start: 2022-08-10 | End: 2022-08-17

## 2022-08-10 NOTE — TELEPHONE ENCOUNTER
----- Message from Florina Figueroa sent at 8/10/2022  2:25 PM CDT -----  Regarding: returning call  Contact: patient  Type:  Patient Returning Call    Who Called:  patient  Who Left Message for Patient:  unknown  Does the patient know what this is regarding?:  unknown  Best Call Back Number:  [phone] (home)   Additional Information:  Patient missed call. Please call patient. Thanks!

## 2022-08-10 NOTE — TELEPHONE ENCOUNTER
Patient was scheduled with Dr Ca and canceled appointment, called patient x 3 to reschedule, unable to contact.

## 2022-08-10 NOTE — PROGRESS NOTES
[facility] Note  Staff: RENE Sloan-C    PCP: MD Connor    Chief Complaint: F/UP-Recurrent UTIs    Subjective:        HPI: Marleen Samano is a 76 y.o. female presents today in office for routine f/up visit at this time.  Pt with hx of recurrent urinary tract infections.    Pt was evaluated as NP by me on 6/7/22 for Recurrent UTIs.  OV on 6/7/22:  DTF: Yes, NTF: 2 x night  Urgency:Yes, incontinence with urgency? Yes;   Incontinence with laughing or straining: Yes;   Gross HematuriaNo  History of UTI: Yes  History of Kidney Stones?: None  Constipation issues?: Yes, +Fecal Incontinence at this time.    2004-2005-Bladder suspension was performed by Dr. Sheldon Weston.  Pt was advised at one time that MD told her there was a neurological problem with her bladder in the past???    Recent Imaging:  CT RSS done 6/4/22:  IMPRESSION:  Large hiatal hernia.  Two small hypodensities of the liver parenchyma, new.  While these may represent cyst ultrasound confirmation is recommended since they are not previously seen.  Diverticulosis coli without CT evidence of diverticulitis.  Lumbar scoliosis.  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  Prior hysterectomy.     Past Urine Cultures:  6/4/22:  E. Coli UTI; treated with Cipro 500 mg BID x 7 days  5/25/22:  E. Coli >100,000; treated with Augmentin BID, (which was intermediate sensitivity to UTI)  5/3/22:  E. Coli; treated with Septra, then switched to Macrobid     TODAY:  UA in office today showed abnormal findings.  Pt denies gross hematuria.  +Dysuria, bladder pain complaints during ov today.    REVIEW OF SYSTEMS:  A comprehensive 10 system review was performed and is negative except as noted above in HPI    PMHx:  Past Medical History:   Diagnosis Date    Anemia due to multiple mechanisms 02/10/2020    Anemia in chronic kidney disease (CKD)     Anemia, chronic disease 02/10/2020    Arthritis     Aseptic loosening of prosthetic knee, 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/2020     Bell's palsy     Bladder incontinence     Blepharospasm     Bronchiectasis without complication 10/08/2019    Cervical dystonia     Concussion     COPD (chronic obstructive pulmonary disease)     Enterocolitis 02/07/2020    Fainting spell     2/7/2020    Hormone deficiency     Hypertension     Loose right total knee arthroplasty 06/15/2020    Migraine     Muscle spasm     Myofacial pain syndrome     Normocytic normochromic anemia 02/10/2020    Right ear pain 02/09/2021    Squamous cell carcinoma of skin     Syncope and collapse 02/09/2021     PSHx:  Past Surgical History:   Procedure Laterality Date    APPENDECTOMY      BREAST BIOPSY      BREAST LUMPECTOMY      CATARACT EXTRACTION      EYE SURGERY      HYSTERECTOMY      NECK SURGERY      REVISION OF KNEE ARTHROPLASTY Right 7/14/2020    Procedure: REVISION, ARTHROPLASTY, KNEE;  Surgeon: Pedro Tompkins MD;  Location: [facility];  Service: Orthopedics;  Laterality: Right;    TONSILLECTOMY       Allergies:  Adhesive tape-silicones, Augmentin [amoxicillin-pot clavulanate], Ciprofloxacin, and Morphine    Medications: reviewed   Objective:     Vitals:    08/10/22 1334   BP: 118/69   Pulse: 79     Physical Exam  Vitals reviewed.   Constitutional:       Appearance: She is well-developed.   HENT:      Head: Normocephalic and atraumatic.   Eyes:      Conjunctiva/sclera: Conjunctivae normal.      Pupils: Pupils are equal, round, and reactive to light.   Cardiovascular:      Rate and Rhythm: Normal rate and regular rhythm.      Heart sounds: Normal heart sounds.   Pulmonary:      Effort: Pulmonary effort is normal.      Breath sounds: Normal breath sounds.   Abdominal:      General: Bowel sounds are normal.      Palpations: Abdomen is soft.   Musculoskeletal:         General: Normal range of motion.      Cervical back: Normal range of motion and neck supple.   Skin:     General: Skin is warm and dry.   Neurological:      Mental Status: She is  alert and oriented to person, place, and time.      Deep Tendon Reflexes: Reflexes are normal and symmetric.   Psychiatric:         Behavior: Behavior normal.         Thought Content: Thought content normal.         Judgment: Judgment normal.      EXAM performed by me in office today:  External Genitalia: normal hair distribution, no lesions  Urethral meatus: normal without prolapse or caruncle  Urethra: without tenderness or mass  Bladder: without fulness or tenderness  +Atrophic Vaginitis observed on exam.  14 FR in and out cath performed by me with 20 mL of cloudy urine residual    LABS REVIEW:  UA today:   Color:Clear, Yellow  Spec. Grav.  >=1.030  PH  5.5  Small Leukocytes  30 Protein  Moderate blood  Assessment:       1. Chronic UTI    2. Incontinence of feces, unspecified fecal incontinence type    3. Bladder pain    4. Atrophic vaginitis          Plan:   Recurrent UTIs most likely caused by fecal incontinence at this time:    1. UA Micro and Urine Culture to be processed from cath specimen  2. In the meantime will prescribe Macrobid and Diflucan for possible UTI at this time.  3. Estrace Cream-APPLY as Directed  4. Ambulatory referral to [facility]. for fecal incontinence evaluation at this time.    F/u-Our office will contact this pt after we receive and review ALL urine results to determine best POC for this patient.  Pt verbalized understanding at conclusion of appointment today.      [facility]: Active    TREY Allen

## 2022-08-11 ENCOUNTER — PATIENT MESSAGE (OUTPATIENT)
Dept: UROLOGY | Facility: CLINIC | Age: 77
End: 2022-08-11
Payer: MEDICARE

## 2022-08-11 NOTE — TELEPHONE ENCOUNTER
Call pt and advise of the following:    We can offer her an appointment with one of our Urologists to see if she is a candidate for Botox injections for her incontinence at this time.  She would have to meet with the MD and be examined to see what options are available to her at this time.    Please schedule her with one of the Urologists that do Botox/Coaptite injections, thanks.

## 2022-08-13 LAB — BACTERIA UR CULT: ABNORMAL

## 2022-08-15 ENCOUNTER — TELEPHONE (OUTPATIENT)
Dept: UROLOGY | Facility: CLINIC | Age: 77
End: 2022-08-15
Payer: MEDICARE

## 2022-08-15 NOTE — TELEPHONE ENCOUNTER
----- Message from Fernanda Vasques sent at 8/15/2022  1:58 PM CDT -----  Contact: Patient  Type:  Patient Returning Call    Who Called: Patient      Who Left Message for Patient: Lexis    Does the patient know what this is regarding?:Appointment     Would the patient rather a call back or a response via [facility]? Call    Best Call Back Number: [phone] (home)     Additional Information:

## 2022-08-17 ENCOUNTER — TELEPHONE (OUTPATIENT)
Dept: NEUROLOGY | Facility: CLINIC | Age: 77
End: 2022-08-17
Payer: MEDICARE

## 2022-08-17 NOTE — TELEPHONE ENCOUNTER
Started years ago and started in the eye with  muscle spasm. Now will have muscle spasms so strong in hands that it won't allow her to open her hands and is having muscle spasms in the groin area that goes down her legs to her feet. Patient is scheduled for September 29 at 10 am with Noemy Delgado NP

## 2022-08-17 NOTE — TELEPHONE ENCOUNTER
----- Message from Florina Figueroa sent at 8/17/2022  2:35 PM CDT -----  Regarding: appointment  Contact: patient  Type:  Sooner Appointment Request    Caller is requesting a sooner appointment.  Caller declined first available appointment listed below.  Caller will not accept being placed on the waitlist and is requesting a message be sent to doctor.    Name of Caller:  patient  When is the first available appointment?    Symptoms:  dystonia  Best Call Back Number:  [phone] (home)   Additional Information:  Please call patient to schedule. Thanks!

## 2022-08-22 ENCOUNTER — PATIENT MESSAGE (OUTPATIENT)
Dept: UROLOGY | Facility: CLINIC | Age: 77
End: 2022-08-22
Payer: MEDICARE

## 2022-08-23 ENCOUNTER — PATIENT MESSAGE (OUTPATIENT)
Dept: UROLOGY | Facility: CLINIC | Age: 77
End: 2022-08-23
Payer: MEDICARE

## 2022-08-25 ENCOUNTER — PATIENT MESSAGE (OUTPATIENT)
Dept: FAMILY MEDICINE | Facility: CLINIC | Age: 77
End: 2022-08-25
Payer: MEDICARE

## 2022-08-26 ENCOUNTER — OFFICE VISIT (OUTPATIENT)
Dept: URGENT CARE | Facility: CLINIC | Age: 77
End: 2022-08-26
Payer: MEDICARE

## 2022-08-26 VITALS
HEIGHT: 70 IN | DIASTOLIC BLOOD PRESSURE: 59 MMHG | OXYGEN SATURATION: 97 % | RESPIRATION RATE: 16 BRPM | BODY MASS INDEX: 22.85 KG/M2 | HEART RATE: 77 BPM | WEIGHT: 159.63 LBS | TEMPERATURE: 98 F | SYSTOLIC BLOOD PRESSURE: 100 MMHG

## 2022-08-26 DIAGNOSIS — Z20.822 COVID-19 VIRUS NOT DETECTED: ICD-10-CM

## 2022-08-26 DIAGNOSIS — J40 BRONCHITIS: ICD-10-CM

## 2022-08-26 DIAGNOSIS — I48.0 PAROXYSMAL ATRIAL FIBRILLATION: ICD-10-CM

## 2022-08-26 DIAGNOSIS — N18.30 STAGE 3 CHRONIC KIDNEY DISEASE, UNSPECIFIED WHETHER STAGE 3A OR 3B CKD: ICD-10-CM

## 2022-08-26 DIAGNOSIS — Z87.09 HISTORY OF COPD: ICD-10-CM

## 2022-08-26 DIAGNOSIS — J44.1 COPD EXACERBATION: ICD-10-CM

## 2022-08-26 DIAGNOSIS — Z86.79 HISTORY OF HYPERTENSION: ICD-10-CM

## 2022-08-26 DIAGNOSIS — R05.9 COUGH: Primary | ICD-10-CM

## 2022-08-26 DIAGNOSIS — I27.20 PULMONARY HYPERTENSION: ICD-10-CM

## 2022-08-26 LAB
CTP QC/QA: YES
SARS-COV-2 AG RESP QL IA.RAPID: NEGATIVE

## 2022-08-26 PROCEDURE — 99214 OFFICE O/P EST MOD 30 MIN: CPT | Mod: S$GLB,,, | Performed by: NURSE PRACTITIONER

## 2022-08-26 PROCEDURE — 1160F PR REVIEW ALL MEDS BY PRESCRIBER/CLIN PHARMACIST DOCUMENTED: ICD-10-PCS | Mod: CPTII,S$GLB,, | Performed by: NURSE PRACTITIONER

## 2022-08-26 PROCEDURE — 99214 PR OFFICE/OUTPT VISIT, EST, LEVL IV, 30-39 MIN: ICD-10-PCS | Mod: S$GLB,,, | Performed by: NURSE PRACTITIONER

## 2022-08-26 PROCEDURE — 1159F MED LIST DOCD IN RCRD: CPT | Mod: CPTII,S$GLB,, | Performed by: NURSE PRACTITIONER

## 2022-08-26 PROCEDURE — 3078F DIAST BP <80 MM HG: CPT | Mod: CPTII,S$GLB,, | Performed by: NURSE PRACTITIONER

## 2022-08-26 PROCEDURE — 3078F PR MOST RECENT DIASTOLIC BLOOD PRESSURE < 80 MM HG: ICD-10-PCS | Mod: CPTII,S$GLB,, | Performed by: NURSE PRACTITIONER

## 2022-08-26 PROCEDURE — 1160F RVW MEDS BY RX/DR IN RCRD: CPT | Mod: CPTII,S$GLB,, | Performed by: NURSE PRACTITIONER

## 2022-08-26 PROCEDURE — 3074F SYST BP LT 130 MM HG: CPT | Mod: CPTII,S$GLB,, | Performed by: NURSE PRACTITIONER

## 2022-08-26 PROCEDURE — 1159F PR MEDICATION LIST DOCUMENTED IN MEDICAL RECORD: ICD-10-PCS | Mod: CPTII,S$GLB,, | Performed by: NURSE PRACTITIONER

## 2022-08-26 PROCEDURE — 87811 SARS-COV-2 COVID19 W/OPTIC: CPT | Mod: QW,S$GLB,, | Performed by: NURSE PRACTITIONER

## 2022-08-26 PROCEDURE — 3074F PR MOST RECENT SYSTOLIC BLOOD PRESSURE < 130 MM HG: ICD-10-PCS | Mod: CPTII,S$GLB,, | Performed by: NURSE PRACTITIONER

## 2022-08-26 PROCEDURE — 87811 SARS CORONAVIRUS 2 ANTIGEN POCT, MANUAL READ: ICD-10-PCS | Mod: QW,S$GLB,, | Performed by: NURSE PRACTITIONER

## 2022-08-26 RX ORDER — PREDNISONE 20 MG/1
20 TABLET ORAL 2 TIMES DAILY
Qty: 8 TABLET | Refills: 0 | Status: SHIPPED | OUTPATIENT
Start: 2022-08-26 | End: 2022-08-30

## 2022-08-26 RX ORDER — PROMETHAZINE HYDROCHLORIDE AND DEXTROMETHORPHAN HYDROBROMIDE 6.25; 15 MG/5ML; MG/5ML
5 SYRUP ORAL EVERY 4 HOURS PRN
Qty: 118 ML | Refills: 0 | Status: SHIPPED | OUTPATIENT
Start: 2022-08-26 | End: 2022-09-05

## 2022-08-26 RX ORDER — GUAIFENESIN 600 MG/1
1200 TABLET, EXTENDED RELEASE ORAL 2 TIMES DAILY
Qty: 40 TABLET | Refills: 0 | Status: SHIPPED | OUTPATIENT
Start: 2022-08-26 | End: 2022-09-05

## 2022-08-26 RX ORDER — AZITHROMYCIN 250 MG/1
TABLET, FILM COATED ORAL
Qty: 6 TABLET | Refills: 0 | Status: SHIPPED | OUTPATIENT
Start: 2022-08-26 | End: 2023-01-13

## 2022-08-26 NOTE — TELEPHONE ENCOUNTER
I spoke with pt via phone, I advised her that Per   Dr. Ryan she should go to urgent care today. All understanding voiced.

## 2022-08-26 NOTE — PATIENT INSTRUCTIONS
Z-Genaro as prescribed.  Secretions.    Prednisone twice a day for 4 days  Guaifenesin twice a day for 10 days.    Use your albuterol nebulizer every 6 hours while awake for the next 3 days then as needed for cough, shortness of breath, congestion.    Increase fluid intake significantly to help thin  Refrain from caffeine and alcohol as this will cause you to become dehydrated and thickened mucus.    Coughing and deep breathing exercises every few hours to help move mucus up  Phenergan cough syrup at night only    Personal protection against illness for 1-2 weeks due to lowered immune system from steroid therapy.  Please wear a mask and eye protection around others and wash hands often

## 2022-08-26 NOTE — TELEPHONE ENCOUNTER
Call placed to patient who explained she is experiencing cough producing yellowish green colored mucous. Denies fever. Voice is noticeably chris at time of call. States symptoms have been present x's 2 days. Unable to locate an appointment today. Patient is worried symptoms will worsen through the weekend. Requesting to know if something can be prescribed for symptoms. Please advise. Thank you.

## 2022-08-26 NOTE — PROGRESS NOTES
"Subjective:       Patient ID: Marleen Samano is a 76 y.o. female.    Vitals:  height is 5' 10" (1.778 m) and weight is 72.4 kg (159 lb 9.6 oz). Her temperature is 97.7 °F (36.5 °C). Her blood pressure is 100/59 (abnormal) and her pulse is 77. Her respiration is 16 and oxygen saturation is 97%.     Chief Complaint: Cough    Pt also has a blistering rash on R side of her forehead x's 2 months ago    Cough  This is a new problem. The current episode started in the past 7 days (x's 3 days). The problem has been unchanged. The cough is productive of sputum and productive of brown sputum. Associated symptoms include nasal congestion and a sore throat. Pertinent negatives include no chills, ear pain, fever, headaches, rash or shortness of breath. pulmonary HTN       Constitution: Negative for chills, fatigue and fever.   HENT: Positive for sore throat and voice change (this am). Negative for ear pain, congestion, sinus pain and sinus pressure.    Respiratory: Positive for cough, sputum production and COPD. Negative for chest tightness and shortness of breath.    Gastrointestinal: Negative for abdominal pain, nausea, vomiting and diarrhea.   Skin: Negative for rash.   Neurological: Negative for headaches.       Objective:      Physical Exam   Constitutional: She is oriented to person, place, and time. She appears well-developed.  Non-toxic appearance. She does not appear ill. No distress.   HENT:   Head: Normocephalic and atraumatic.   Ears:   Right Ear: Tympanic membrane, external ear and ear canal normal.   Left Ear: Tympanic membrane, external ear and ear canal normal.   Nose: Nose normal.   Mouth/Throat: Oropharynx is clear and moist. Mucous membranes are moist.   Eyes: Conjunctivae and EOM are normal. Right eye exhibits no discharge. Left eye exhibits no discharge.   Neck: Neck supple.   Cardiovascular: Normal rate, regular rhythm and normal heart sounds.   Pulmonary/Chest: Effort normal. No accessory muscle usage. No " tachypnea. No respiratory distress. She has no decreased breath sounds. She has wheezes (Scattered). She has rhonchi (Coarse with cough throughout). She has no rales.   Abdominal: Normal appearance.   Neurological: no focal deficit. She is alert and oriented to person, place, and time.   Skin: Skin is warm, dry and not diaphoretic. Capillary refill takes 2 to 3 seconds.   Psychiatric: Her behavior is normal. Mood normal.   Nursing note and vitals reviewed.        Assessment:       1. Cough    2. COVID-19 virus not detected    3. History of COPD    4. History of hypertension    5. Stage 3 chronic kidney disease, unspecified whether stage 3a or 3b CKD    6. Paroxysmal atrial fibrillation    7. Pulmonary hypertension    8. COPD exacerbation    9. Bronchitis          Plan:         Cough  -     SARS Coronavirus 2 Antigen, POCT Manual Read    COVID-19 virus not detected    History of COPD    History of hypertension    Stage 3 chronic kidney disease, unspecified whether stage 3a or 3b CKD    Paroxysmal atrial fibrillation    Pulmonary hypertension    COPD exacerbation  -     azithromycin (Z-FRANSISCO) 250 MG tablet; Take 2 tablets by mouth on day 1; Take 1 tablet by mouth on days 2-5  Dispense: 6 tablet; Refill: 0  -     predniSONE (DELTASONE) 20 MG tablet; Take 1 tablet (20 mg total) by mouth 2 (two) times daily. for 4 days  Dispense: 8 tablet; Refill: 0  -     guaiFENesin (MUCINEX) 600 mg 12 hr tablet; Take 2 tablets (1,200 mg total) by mouth 2 (two) times daily. for 10 days  Dispense: 40 tablet; Refill: 0    Bronchitis  -     azithromycin (Z-FRANSISCO) 250 MG tablet; Take 2 tablets by mouth on day 1; Take 1 tablet by mouth on days 2-5  Dispense: 6 tablet; Refill: 0  -     predniSONE (DELTASONE) 20 MG tablet; Take 1 tablet (20 mg total) by mouth 2 (two) times daily. for 4 days  Dispense: 8 tablet; Refill: 0  -     guaiFENesin (MUCINEX) 600 mg 12 hr tablet; Take 2 tablets (1,200 mg total) by mouth 2 (two) times daily. for 10 days   Dispense: 40 tablet; Refill: 0  -     promethazine-dextromethorphan (PROMETHAZINE-DM) 6.25-15 mg/5 mL Syrp; Take 5 mLs by mouth every 4 (four) hours as needed (cough).  Dispense: 118 mL; Refill: 0    Z-Genaro as prescribed.  Secretions.    Prednisone twice a day for 4 days  Guaifenesin twice a day for 10 days.    Use your albuterol nebulizer every 6 hours while awake for the next 3 days then as needed for cough, shortness of breath, congestion.    Increase fluid intake significantly to help thin  Refrain from caffeine and alcohol as this will cause you to become dehydrated and thickened mucus.    Coughing and deep breathing exercises every few hours to help move mucus up  Phenergan cough syrup at night only    Personal protection against illness for 1-2 weeks due to lowered immune system from steroid therapy.  Please wear a mask and eye protection around others and wash hands often           Additional MDM:     Heart Failure Score:   COPD = Yes

## 2022-08-30 ENCOUNTER — PATIENT MESSAGE (OUTPATIENT)
Dept: FAMILY MEDICINE | Facility: CLINIC | Age: 77
End: 2022-08-30
Payer: MEDICARE

## 2022-09-01 ENCOUNTER — TELEPHONE (OUTPATIENT)
Dept: FAMILY MEDICINE | Facility: CLINIC | Age: 77
End: 2022-09-01
Payer: MEDICARE

## 2022-09-01 DIAGNOSIS — R25.2 MUSCLE CRAMPS AT NIGHT: Primary | ICD-10-CM

## 2022-09-01 RX ORDER — DICLOFENAC SODIUM 10 MG/G
GEL TOPICAL DAILY
Qty: 100 G | Refills: 2 | Status: SHIPPED | OUTPATIENT
Start: 2022-09-01 | End: 2023-01-13

## 2022-09-01 NOTE — TELEPHONE ENCOUNTER
I spoke with pt via phone. I advised her that Dr. Ryan has prescribed baclofen and sent it to [facility]. Will call and request refill.    I advised her that the voltaren gel only comes in one size will have him refill.     Call placed to pharmacy medication ordered, address confirmed.

## 2022-09-01 NOTE — TELEPHONE ENCOUNTER
Left voicemail for patient to call the office back.        ----- Message from Halima Pina sent at 9/1/2022  9:42 AM CDT -----  .Type:  Patient Call Back    Who Called: PT       Does the patient know what this is regarding?: MUSCLE SPASMS IN WRIST AND GROIN AREA SHE WANTS TO DISCUSS THE MEDICATION baclofen (LIORESAL) 20 MG tablet 2X A DAY REFILL IS NEEDED AS WELL     SHE WANTS TO SEE IF THE DOSE CAN BE STRONGER diclofenac sodium (VOLTAREN) 1 % Gel    Would the patient rather a call back YES     Best Call Back Number: [phone]    Additional Information: Thank You

## 2022-09-09 RX ORDER — OMEPRAZOLE 40 MG/1
40 CAPSULE, DELAYED RELEASE ORAL
Qty: 180 CAPSULE | Refills: 2 | Status: SHIPPED | OUTPATIENT
Start: 2022-09-09 | End: 2022-09-21

## 2022-09-09 NOTE — TELEPHONE ENCOUNTER
LOV: 06/01/2022  
No new care gaps identified.  [facility] Embedded Care Gaps. Reference number: [id number]. 9/09/2022   3:02:00 PM CDT  
3

## 2022-09-20 ENCOUNTER — PATIENT MESSAGE (OUTPATIENT)
Dept: FAMILY MEDICINE | Facility: CLINIC | Age: 77
End: 2022-09-20
Payer: MEDICARE

## 2022-09-20 NOTE — TELEPHONE ENCOUNTER
I spoke with pt via phone. I advised her that an appointment is needed so that she can be examined. She states that she does not currently have a working car. I advised her that if the pains get worse she should be evaluated in the UC or the ED. Will call back for an appointment with a provider. No further questions at this time.

## 2022-09-29 ENCOUNTER — TELEPHONE (OUTPATIENT)
Dept: NEUROLOGY | Facility: CLINIC | Age: 77
End: 2022-09-29
Payer: MEDICARE

## 2022-09-29 NOTE — TELEPHONE ENCOUNTER
----- Message from Brooke Stacy sent at 9/29/2022  2:04 PM CDT -----  Regarding: appt soon  Contact: PT @ [phone]  Pt is calling to speak to someone in the office to schedule an appt; she will be new with Dr. Morin but is asking to see him. Pt's dx: Dystonia..ongoing for years..getting worse. No available appts in Epic. Pt states that she is fine with seeing an NP, if Dr. Morin is unavailable. Please call. Thanks.

## 2022-09-29 NOTE — TELEPHONE ENCOUNTER
----- Message from Brooke Stacy sent at 9/29/2022  2:04 PM CDT -----  Regarding: appt soon  Contact: PT @ [phone]  Pt is calling to speak to someone in the office to schedule an appt; she will be new with Dr. Morin but is asking to see him. Pt's dx: Dystonia..ongoing for years..getting worse. No available appts in Epic. Pt states that she is fine with seeing an NP, if Dr. Morin is unavailable. Please call. Thanks.

## 2022-10-20 ENCOUNTER — TELEPHONE (OUTPATIENT)
Dept: ORTHOPEDICS | Facility: CLINIC | Age: 77
End: 2022-10-20
Payer: MEDICARE

## 2022-10-20 NOTE — TELEPHONE ENCOUNTER
Called and discussed pre dental procedure abx.  She is 2 years post op so she does not need abx.  Note faxed to ESPERANZA Hwang.     Asa

## 2022-10-20 NOTE — TELEPHONE ENCOUNTER
----- Message from Rhonda Still MA sent at 10/20/2022  9:13 AM CDT -----  Contact: pt  Questions on pre medicating  Call back  [phone]

## 2022-12-05 RX ORDER — GABAPENTIN 600 MG/1
600 TABLET ORAL 3 TIMES DAILY
Qty: 270 TABLET | Refills: 3 | Status: SHIPPED | OUTPATIENT
Start: 2022-12-05 | End: 2023-05-17

## 2022-12-05 NOTE — TELEPHONE ENCOUNTER
No new care gaps identified.  [facility] Embedded Care Gaps. Reference number: [id number]. 12/05/2022   3:05:36 PM CST

## 2022-12-06 ENCOUNTER — HOSPITAL ENCOUNTER (EMERGENCY)
Facility: HOSPITAL | Age: 77
Discharge: HOME | End: 2022-12-06
Attending: EMERGENCY MEDICINE
Payer: MEDICARE

## 2022-12-06 VITALS
BODY MASS INDEX: 22.51 KG/M2 | DIASTOLIC BLOOD PRESSURE: 73 MMHG | WEIGHT: 152 LBS | RESPIRATION RATE: 18 BRPM | SYSTOLIC BLOOD PRESSURE: 124 MMHG | OXYGEN SATURATION: 97 % | TEMPERATURE: 98 F | HEART RATE: 92 BPM | HEIGHT: 69 IN

## 2022-12-06 DIAGNOSIS — I83.899 BLEEDING FROM VARICOSE VEIN: Primary | ICD-10-CM

## 2022-12-06 PROCEDURE — 99281 EMR DPT VST MAYX REQ PHY/QHP: CPT

## 2022-12-06 NOTE — ED NOTES
Patient has open wound to right leg. Wound was dress with 4x4 fluffed, 4x4 on top, and covered with placido wrap and coban.  Advised patient to keep it clean and dry and only change it if soiled or wet. Patient verbalized understanding of instructions.

## 2022-12-07 NOTE — ED PROVIDER NOTES
Encounter Date: 12/6/2022       History     Chief Complaint   Patient presents with    Wound Check     Cut varicose vein while shaving legs today, no active bleeding noted in triage      Marleen Samano is a 77 y.o. female presenting for evaluation of bleeding from left anteromedial lower leg that occurred prior to arrival while shaving her legs.  She thinks she accidentally nicked a varicose vein.      The history is provided by the patient.   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/2020    Bell's palsy     Bladder incontinence     Blepharospasm     Bronchiectasis without complication 10/08/2019    Cervical dystonia     Concussion     COPD (chronic obstructive pulmonary disease)     Enterocolitis 02/07/2020    Fainting spell     2/7/2020    Hormone deficiency     Hypertension     Loose right total knee arthroplasty 06/15/2020    Migraine     Muscle spasm     Myofacial pain syndrome     Normocytic normochromic anemia 02/10/2020    Right ear pain 02/09/2021    Squamous cell carcinoma of skin     Syncope and collapse 02/09/2021     Past Surgical History:   Procedure Laterality Date    APPENDECTOMY      BREAST BIOPSY      BREAST LUMPECTOMY      CATARACT EXTRACTION      EYE SURGERY      HYSTERECTOMY      NECK SURGERY      REVISION OF KNEE ARTHROPLASTY Right 7/14/2020    Procedure: REVISION, ARTHROPLASTY, KNEE;  Surgeon: Pedro Tompkins MD;  Location: [facility];  Service: Orthopedics;  Laterality: Right;    TONSILLECTOMY       Family History   Problem Relation Age of  Onset    Cancer Mother     Heart disease Brother     Parkinsonism Brother     Diabetes Neg Hx     Melanoma Neg Hx     Psoriasis Neg Hx     Lupus Neg Hx     Eczema Neg Hx      Social History     Tobacco Use    Smoking status: Never    Smokeless tobacco: Never   Substance Use Topics    Alcohol use: No    Drug use: No     Review of Systems   Constitutional:  Negative for chills and fever.   Musculoskeletal:  Negative for arthralgias, back pain, joint swelling, myalgias, neck pain and neck stiffness.   Skin:  Positive for wound. Negative for color change, pallor and rash.   Neurological:  Negative for weakness and numbness.   Hematological:  Does not bruise/bleed easily.     Physical Exam     Initial Vitals [12/06/22 1324]   BP Pulse Resp Temp SpO2   121/70 100 20 98.4 °F (36.9 °C) 97 %      MAP       --         Physical Exam    Nursing note and vitals reviewed.  Constitutional: She appears well-developed and well-nourished. She is not diaphoretic. No distress.   HENT:   Head: Normocephalic and atraumatic.   Cardiovascular:  Intact distal pulses.           Musculoskeletal:         General: No tenderness. Normal range of motion.     Neurological: She is alert and oriented to person, place, and time. She has normal strength. No sensory deficit.   Skin: Skin is warm and dry. No rash and no abscess noted. No erythema.   Multiple varicose veins noted to left lower extremity with one small area of minimal bleeding.  No TTP.    Psychiatric: She has a normal mood and affect.       ED Course   Procedures  Labs Reviewed - No data to display       Imaging Results    None          Medications - No data to display  Medical Decision Making:   ED Management:  Symptoms consistent with bleeding varicose vein, which is not currently bleeding at this time.  Bandage is applied and she is discharged home to follow-up with her PCP for re-evaluation as needed.  She voices understanding and is agreeable to the plan.  She is given specific  return precautions.                            Clinical Impression:   Final diagnoses:  [I83.899] Bleeding from varicose vein (Primary)        ED Disposition Condition    Discharge Stable          ED Prescriptions    None       Follow-up Information       Follow up With Specialties Details Why Contact [facility] Emergency Medicine  As needed, If symptoms worsen [address] [place] [id number] [phone]             Mily Leyva PA-C  12/06/22 2017

## 2023-01-06 ENCOUNTER — LAB VISIT (OUTPATIENT)
Dept: LAB | Facility: HOSPITAL | Age: 78
End: 2023-01-06
Attending: PSYCHIATRY & NEUROLOGY
Payer: MEDICARE

## 2023-01-06 DIAGNOSIS — Z79.899 ENCOUNTER FOR LONG-TERM (CURRENT) USE OF OTHER MEDICATIONS: ICD-10-CM

## 2023-01-06 DIAGNOSIS — E55.9 AVITAMINOSIS D: ICD-10-CM

## 2023-01-06 DIAGNOSIS — E78.49 FAMILIAL COMBINED HYPERLIPIDEMIA: Primary | ICD-10-CM

## 2023-01-06 LAB
25(OH)D3+25(OH)D2 SERPL-MCNC: 18 NG/ML (ref 30–96)
ERYTHROCYTE [SEDIMENTATION RATE] IN BLOOD BY WESTERGREN METHOD: 10 MM/HR (ref 0–20)
FOLATE SERPL-MCNC: 6.7 NG/ML (ref 4–24)
MAGNESIUM SERPL-MCNC: 2.1 MG/DL (ref 1.6–2.6)
TSH SERPL DL<=0.005 MIU/L-ACNC: 1.12 UIU/ML (ref 0.4–4)
VIT B12 SERPL-MCNC: 435 PG/ML (ref 210–950)

## 2023-01-06 PROCEDURE — 82607 VITAMIN B-12: CPT | Performed by: PSYCHIATRY & NEUROLOGY

## 2023-01-06 PROCEDURE — 86235 NUCLEAR ANTIGEN ANTIBODY: CPT | Performed by: PSYCHIATRY & NEUROLOGY

## 2023-01-06 PROCEDURE — 82306 VITAMIN D 25 HYDROXY: CPT | Performed by: PSYCHIATRY & NEUROLOGY

## 2023-01-06 PROCEDURE — 82746 ASSAY OF FOLIC ACID SERUM: CPT | Performed by: PSYCHIATRY & NEUROLOGY

## 2023-01-06 PROCEDURE — 83735 ASSAY OF MAGNESIUM: CPT | Performed by: PSYCHIATRY & NEUROLOGY

## 2023-01-06 PROCEDURE — 86038 ANTINUCLEAR ANTIBODIES: CPT | Performed by: PSYCHIATRY & NEUROLOGY

## 2023-01-06 PROCEDURE — 86039 ANTINUCLEAR ANTIBODIES (ANA): CPT | Performed by: PSYCHIATRY & NEUROLOGY

## 2023-01-06 PROCEDURE — 36415 COLL VENOUS BLD VENIPUNCTURE: CPT | Performed by: PSYCHIATRY & NEUROLOGY

## 2023-01-06 PROCEDURE — 84443 ASSAY THYROID STIM HORMONE: CPT | Performed by: PSYCHIATRY & NEUROLOGY

## 2023-01-06 PROCEDURE — 85651 RBC SED RATE NONAUTOMATED: CPT | Performed by: PSYCHIATRY & NEUROLOGY

## 2023-01-09 LAB
ANA PATTERN 1: NORMAL
ANA SER QL IF: POSITIVE
ANA TITR SER IF: NORMAL {TITER}

## 2023-01-12 LAB
ANTI SM ANTIBODY: 0.09 RATIO (ref 0–0.99)
ANTI SM/RNP ANTIBODY: 0.1 RATIO (ref 0–0.99)
ANTI-SM INTERPRETATION: NEGATIVE
ANTI-SM/RNP INTERPRETATION: NEGATIVE
ANTI-SSA ANTIBODY: 0.06 RATIO (ref 0–0.99)
ANTI-SSA INTERPRETATION: NEGATIVE
ANTI-SSB ANTIBODY: 0.07 RATIO (ref 0–0.99)
ANTI-SSB INTERPRETATION: NEGATIVE
DSDNA AB SER-ACNC: NORMAL [IU]/ML

## 2023-01-13 ENCOUNTER — OFFICE VISIT (OUTPATIENT)
Dept: URGENT CARE | Facility: CLINIC | Age: 78
End: 2023-01-13
Payer: MEDICARE

## 2023-01-13 ENCOUNTER — TELEPHONE (OUTPATIENT)
Dept: FAMILY MEDICINE | Facility: CLINIC | Age: 78
End: 2023-01-13
Payer: MEDICARE

## 2023-01-13 VITALS
HEART RATE: 72 BPM | TEMPERATURE: 98 F | RESPIRATION RATE: 20 BRPM | WEIGHT: 157.63 LBS | HEIGHT: 69 IN | SYSTOLIC BLOOD PRESSURE: 134 MMHG | DIASTOLIC BLOOD PRESSURE: 78 MMHG | BODY MASS INDEX: 23.35 KG/M2 | OXYGEN SATURATION: 97 %

## 2023-01-13 DIAGNOSIS — J02.9 SORE THROAT: Primary | ICD-10-CM

## 2023-01-13 DIAGNOSIS — N18.31 STAGE 3A CHRONIC KIDNEY DISEASE: ICD-10-CM

## 2023-01-13 DIAGNOSIS — J06.9 VIRAL UPPER RESPIRATORY TRACT INFECTION WITH COUGH: ICD-10-CM

## 2023-01-13 DIAGNOSIS — Z86.79 HISTORY OF HYPERTENSION: ICD-10-CM

## 2023-01-13 DIAGNOSIS — Z87.09 HISTORY OF COPD: ICD-10-CM

## 2023-01-13 DIAGNOSIS — Z29.9 UNSPECIFIED PROPHYLACTIC OR TREATMENT MEASURE: ICD-10-CM

## 2023-01-13 DIAGNOSIS — Z20.822 COVID-19 VIRUS NOT DETECTED: ICD-10-CM

## 2023-01-13 DIAGNOSIS — R73.03 PRE-DIABETES: ICD-10-CM

## 2023-01-13 LAB
CTP QC/QA: YES
CTP QC/QA: YES
FLUAV AG NPH QL: NEGATIVE
FLUBV AG NPH QL: NEGATIVE
SARS-COV-2 AG RESP QL IA.RAPID: NEGATIVE

## 2023-01-13 PROCEDURE — 87804 POCT INFLUENZA A/B: ICD-10-PCS | Mod: QW,,, | Performed by: NURSE PRACTITIONER

## 2023-01-13 PROCEDURE — 1160F RVW MEDS BY RX/DR IN RCRD: CPT | Mod: CPTII,S$GLB,, | Performed by: NURSE PRACTITIONER

## 2023-01-13 PROCEDURE — 99499 RISK ADDL DX/OHS AUDIT: ICD-10-PCS | Mod: S$GLB,,, | Performed by: NURSE PRACTITIONER

## 2023-01-13 PROCEDURE — 99214 PR OFFICE/OUTPT VISIT, EST, LEVL IV, 30-39 MIN: ICD-10-PCS | Mod: S$GLB,,, | Performed by: NURSE PRACTITIONER

## 2023-01-13 PROCEDURE — 99214 OFFICE O/P EST MOD 30 MIN: CPT | Mod: S$GLB,,, | Performed by: NURSE PRACTITIONER

## 2023-01-13 PROCEDURE — 87811 SARS CORONAVIRUS 2 ANTIGEN POCT, MANUAL READ: ICD-10-PCS | Mod: QW,S$GLB,, | Performed by: NURSE PRACTITIONER

## 2023-01-13 PROCEDURE — 3078F PR MOST RECENT DIASTOLIC BLOOD PRESSURE < 80 MM HG: ICD-10-PCS | Mod: CPTII,S$GLB,, | Performed by: NURSE PRACTITIONER

## 2023-01-13 PROCEDURE — 87811 SARS-COV-2 COVID19 W/OPTIC: CPT | Mod: QW,S$GLB,, | Performed by: NURSE PRACTITIONER

## 2023-01-13 PROCEDURE — 3072F PR LOW RISK FOR RETINOPATHY: ICD-10-PCS | Mod: CPTII,S$GLB,, | Performed by: NURSE PRACTITIONER

## 2023-01-13 PROCEDURE — 3072F LOW RISK FOR RETINOPATHY: CPT | Mod: CPTII,S$GLB,, | Performed by: NURSE PRACTITIONER

## 2023-01-13 PROCEDURE — 1159F PR MEDICATION LIST DOCUMENTED IN MEDICAL RECORD: ICD-10-PCS | Mod: CPTII,S$GLB,, | Performed by: NURSE PRACTITIONER

## 2023-01-13 PROCEDURE — 3075F PR MOST RECENT SYSTOLIC BLOOD PRESS GE 130-139MM HG: ICD-10-PCS | Mod: CPTII,S$GLB,, | Performed by: NURSE PRACTITIONER

## 2023-01-13 PROCEDURE — 99499 UNLISTED E&M SERVICE: CPT | Mod: S$GLB,,, | Performed by: NURSE PRACTITIONER

## 2023-01-13 PROCEDURE — 87804 INFLUENZA ASSAY W/OPTIC: CPT | Mod: QW,,, | Performed by: NURSE PRACTITIONER

## 2023-01-13 PROCEDURE — 1159F MED LIST DOCD IN RCRD: CPT | Mod: CPTII,S$GLB,, | Performed by: NURSE PRACTITIONER

## 2023-01-13 PROCEDURE — 1160F PR REVIEW ALL MEDS BY PRESCRIBER/CLIN PHARMACIST DOCUMENTED: ICD-10-PCS | Mod: CPTII,S$GLB,, | Performed by: NURSE PRACTITIONER

## 2023-01-13 PROCEDURE — 3075F SYST BP GE 130 - 139MM HG: CPT | Mod: CPTII,S$GLB,, | Performed by: NURSE PRACTITIONER

## 2023-01-13 PROCEDURE — 3078F DIAST BP <80 MM HG: CPT | Mod: CPTII,S$GLB,, | Performed by: NURSE PRACTITIONER

## 2023-01-13 RX ORDER — FLUCONAZOLE 150 MG/1
150 TABLET ORAL DAILY
Qty: 2 TABLET | Refills: 0 | Status: SHIPPED | OUTPATIENT
Start: 2023-01-13 | End: 2023-01-15

## 2023-01-13 RX ORDER — AZITHROMYCIN 250 MG/1
TABLET, FILM COATED ORAL
Qty: 6 TABLET | Refills: 0 | Status: SHIPPED | OUTPATIENT
Start: 2023-01-13 | End: 2023-03-14 | Stop reason: ALTCHOICE

## 2023-01-13 RX ORDER — PROMETHAZINE HYDROCHLORIDE AND DEXTROMETHORPHAN HYDROBROMIDE 6.25; 15 MG/5ML; MG/5ML
5 SYRUP ORAL EVERY 4 HOURS PRN
Qty: 118 ML | Refills: 0 | Status: SHIPPED | OUTPATIENT
Start: 2023-01-13 | End: 2023-01-23

## 2023-01-13 RX ORDER — FLUTICASONE PROPIONATE 50 MCG
1 SPRAY, SUSPENSION (ML) NASAL DAILY
Qty: 15.8 ML | Refills: 0 | Status: ON HOLD | OUTPATIENT
Start: 2023-01-13 | End: 2023-04-06 | Stop reason: HOSPADM

## 2023-01-13 RX ORDER — PREDNISONE 20 MG/1
20 TABLET ORAL 2 TIMES DAILY
Qty: 6 TABLET | Refills: 0 | Status: SHIPPED | OUTPATIENT
Start: 2023-01-13 | End: 2023-01-16

## 2023-01-13 RX ORDER — BENZONATATE 100 MG/1
100 CAPSULE ORAL 3 TIMES DAILY PRN
Qty: 30 CAPSULE | Refills: 0 | Status: SHIPPED | OUTPATIENT
Start: 2023-01-13 | End: 2023-01-23

## 2023-01-13 NOTE — PROGRESS NOTES
"Subjective:       Patient ID: Marleen Samano is a 77 y.o. female.    Vitals:  height is 5' 9" (1.753 m) and weight is 71.5 kg (157 lb 9.6 oz). Her temperature is 98.3 °F (36.8 °C). Her blood pressure is 134/78 and her pulse is 72. Her respiration is 20 and oxygen saturation is 97%.     Chief Complaint: Sore Throat    Pt states" c/o sore throat, nasal congestion, productive cough with white flem, sinus pressure that has been going on for 3 days. Took benadryl."     Sore Throat   Associated symptoms include congestion, coughing and headaches. Pertinent negatives include no abdominal pain, diarrhea, shortness of breath or vomiting.   Constitution: Positive for fatigue. Negative for fever.   HENT:  Positive for congestion, postnasal drip, sinus pressure and sore throat.    Cardiovascular:  Negative for chest pain.   Respiratory:  Positive for chest tightness, cough and COPD. Negative for shortness of breath and wheezing.    Gastrointestinal:  Negative for abdominal pain, nausea, vomiting and diarrhea.   Musculoskeletal:  Positive for muscle ache.   Neurological:  Positive for headaches.     Objective:      Physical Exam   Constitutional: She is oriented to person, place, and time. She appears well-developed.  Non-toxic appearance. She does not appear ill. No distress.   HENT:   Head: Normocephalic and atraumatic.   Ears:   Right Ear: Tympanic membrane, external ear and ear canal normal.   Left Ear: Tympanic membrane, external ear and ear canal normal.   Nose: Rhinorrhea and congestion present.   Mouth/Throat: Mucous membranes are moist. Posterior oropharyngeal erythema present. No oropharyngeal exudate.   Eyes: Conjunctivae and EOM are normal.   Neck: Neck supple. No neck rigidity present.   Cardiovascular: Normal rate, regular rhythm and normal heart sounds.   Pulmonary/Chest: Effort normal and breath sounds normal. No respiratory distress. She has no wheezes. She has no rhonchi. She has no rales.   Abdominal: Normal " appearance.   Musculoskeletal:      Cervical back: She exhibits no tenderness.   Lymphadenopathy:     She has no cervical adenopathy.   Neurological: no focal deficit. She is alert and oriented to person, place, and time.   Skin: Skin is warm and dry. Capillary refill takes 2 to 3 seconds.   Psychiatric: Her behavior is normal. Mood normal.   Nursing note and vitals reviewed.      Assessment:       1. Sore throat    2. History of COPD    3. History of hypertension    4. Stage 3a chronic kidney disease    5. Pre-diabetes    6. COVID-19 virus not detected    7. Viral upper respiratory tract infection with cough    8. Unspecified prophylactic or treatment measure        Flu a/b neg    Plan:         Sore throat  -     SARS Coronavirus 2 Antigen, POCT Manual Read  -     POCT Influenza A/B    History of COPD  -     azithromycin (Z-FRANSISCO) 250 MG tablet; Take 2 tablets by mouth on day 1; Take 1 tablet by mouth on days 2-5  Dispense: 6 tablet; Refill: 0  -     predniSONE (DELTASONE) 20 MG tablet; Take 1 tablet (20 mg total) by mouth 2 (two) times daily. for 3 days  Dispense: 6 tablet; Refill: 0    History of hypertension    Stage 3a chronic kidney disease    Pre-diabetes    COVID-19 virus not detected    Viral upper respiratory tract infection with cough  -     azithromycin (Z-FRANSISCO) 250 MG tablet; Take 2 tablets by mouth on day 1; Take 1 tablet by mouth on days 2-5  Dispense: 6 tablet; Refill: 0  -     predniSONE (DELTASONE) 20 MG tablet; Take 1 tablet (20 mg total) by mouth 2 (two) times daily. for 3 days  Dispense: 6 tablet; Refill: 0  -     fluticasone propionate (FLONASE) 50 mcg/actuation nasal spray; 1 spray (50 mcg total) by Each Nostril route once daily.  Dispense: 15.8 mL; Refill: 0  -     benzonatate (TESSALON) 100 MG capsule; Take 1 capsule (100 mg total) by mouth 3 (three) times daily as needed for Cough.  Dispense: 30 capsule; Refill: 0  -     promethazine-dextromethorphan (PROMETHAZINE-DM) 6.25-15 mg/5 mL Syrp;  Take 5 mLs by mouth every 4 (four) hours as needed (cough).  Dispense: 118 mL; Refill: 0    Unspecified prophylactic or treatment measure  -     fluconazole (DIFLUCAN) 150 MG Tab; Take 1 tablet (150 mg total) by mouth once daily. for 2 days  Dispense: 2 tablet; Refill: 0    Symptomatic treatment to include:    Rest, increase fluid intake to include 50 % water, 50% electrolyte replacement  Tylenol as directed for fever, sore throat, headache, body aches.    Zrytec, claritin or allegra daily for 3-4 weeks  flonase 2 sprays each nostril daily until bottle is empty.   Phenergan cough syrup at night for cough.  Will cause drowsiness  Tessalon perles cough pills as needed day or night.  Can be taken together with cough syrup if desired.  Helps best for dry, throat irritation cough.  Warm, salt water gargles, over the counter throat lozenges or sprays as desires.   Liquid benadryl and maalox 1 to 1 concentration, gargle and spit for temporary relief for sore throat.    ER for difficulty breathing not relieved by rest, excessive lethargy and/or change in mental status  Continue Albuterol inhaler every 4-6 hours while awake until symptoms are improving then just as needed for persistent cough, chest heaviness, chest tightness, wheezing, shortness of breath    Azithromycin as prescribed   Prednisone as prescribed    Personal protection against illness for 1-2 weeks due to lowered immune system from steroid therapy.  Please wear a mask and eye protection around others and wash hands often           Additional MDM:     Heart Failure Score:   COPD = Yes

## 2023-01-13 NOTE — TELEPHONE ENCOUNTER
Patient reports experiencing sinus pressure, congestion, and watery eyes x's 2 days. States taking Acetaminophen, also took 1 dose of Benadryl last night. First available appointment noted on 1-23-23. Patient requesting to send message to Dr. Ryan requesting to know if a prescription can be sent to pharmacy related to symptoms. Please advise. Thank you.

## 2023-01-13 NOTE — LETTER
January 13, 2023      [facility]  [address]  [facility] [address]  Phone: [phone]       Patient: Marleen Samano   YOB: 1945  Date of Visit: 01/13/2023    To Whom It May Concern:    Amber Samano  was at [facility] on 01/13/2023. The patient may return to work/school on 01/16/2023  with no restrictions. If you have any questions or concerns, or if I can be of further assistance, please do not hesitate to contact me.    Sincerely,    Nancy Perez, NP

## 2023-01-13 NOTE — PATIENT INSTRUCTIONS
Symptomatic treatment to include:    Rest, increase fluid intake to include 50 % water, 50% electrolyte replacement  Tylenol as directed for fever, sore throat, headache, body aches.    Zrytec, claritin or allegra daily for 3-4 weeks  flonase 2 sprays each nostril daily until bottle is empty.   Phenergan cough syrup at night for cough.  Will cause drowsiness  Tessalon perles cough pills as needed day or night.  Can be taken together with cough syrup if desired.  Helps best for dry, throat irritation cough.  Warm, salt water gargles, over the counter throat lozenges or sprays as desires.   Liquid benadryl and maalox 1 to 1 concentration, gargle and spit for temporary relief for sore throat.    ER for difficulty breathing not relieved by rest, excessive lethargy and/or change in mental status  Continue Albuterol inhaler every 4-6 hours while awake until symptoms are improving then just as needed for persistent cough, chest heaviness, chest tightness, wheezing, shortness of breath    Azithromycin as prescribed   Prednisone as prescribed    Personal protection against illness for 1-2 weeks due to lowered immune system from steroid therapy.  Please wear a mask and eye protection around others and wash hands often

## 2023-01-13 NOTE — TELEPHONE ENCOUNTER
----- Message from Angel Yap sent at 1/13/2023  9:42 AM CST -----  Contact: Self  Type: Needs Medical Advice  Who Called:  Patient  Symptoms (please be specific):  sinus pressure, congestion, watery eyes  How long has patient had these symptoms:  2d  Pharmacy name and phone #:    [facility] [address] [place] [address] [address]  [address]  [facility] [address]  Phone: [phone] Fax: [phone]    Best Call Back Number: [phone]   Additional Information:  Would like something called in for symptoms

## 2023-01-13 NOTE — TELEPHONE ENCOUNTER
Call placed to patient for notification. Patient verbalized understanding.          Mikie Ryan MD  You 3 minutes ago (11:18 AM)     Have her go to . High likelihood of COVID and/or flu.

## 2023-01-16 DIAGNOSIS — J06.9 UPPER RESPIRATORY TRACT INFECTION, UNSPECIFIED TYPE: ICD-10-CM

## 2023-01-16 DIAGNOSIS — J44.9 CHRONIC OBSTRUCTIVE PULMONARY DISEASE, UNSPECIFIED COPD TYPE: Primary | ICD-10-CM

## 2023-01-17 RX ORDER — IPRATROPIUM BROMIDE AND ALBUTEROL SULFATE 2.5; .5 MG/3ML; MG/3ML
SOLUTION RESPIRATORY (INHALATION)
Qty: 180 ML | Refills: 23 | Status: SHIPPED | OUTPATIENT
Start: 2023-01-17 | End: 2023-05-17 | Stop reason: SDUPTHER

## 2023-01-17 RX ORDER — ALBUTEROL SULFATE 90 UG/1
2 AEROSOL, METERED RESPIRATORY (INHALATION) EVERY 6 HOURS PRN
Qty: 20.1 G | Refills: 11 | Status: SHIPPED | OUTPATIENT
Start: 2023-01-17 | End: 2023-12-28

## 2023-01-17 NOTE — TELEPHONE ENCOUNTER
Son Deluna Staff  Caller: pt at  [phone] (2 days ago,  8:26 AM)  Type:  RX Refill Request  ----- Message from Son Salazar sent at 1/14/2023  8:26 AM CST -----  Contact: pt at  [phone]  Type:  RX Refill Request    Who Called:  pt  Refill or New Rx:  REFILL  RX Name and Strength:  albuterol (PROVENTIL/VENTOLIN HFA) 90 mcg/actuation inhaler and albuterol-ipratropium (DUO-NEB) 2.5 mg-0.5 mg/3 mL nebulizer solution  How is the patient currently taking it? (ex. 1XDay):  as directed  Is this a 30 day or 90 day RX:  90  Preferred Pharmacy with phone number:    [facility] - [address] [address]  Phone: [phone] Fax: [phone]  Local or Mail Order:  mail  Ordering Provider:  Connor Daugherty Call Back Number:  [phone]  Additional Information:  Please call back to advise.

## 2023-01-17 NOTE — TELEPHONE ENCOUNTER
No new care gaps identified.  [facility] Embedded Care Gaps. Reference number: [id number]. 1/16/2023   11:29:04 PM CST

## 2023-01-19 RX ORDER — OMEPRAZOLE 40 MG/1
CAPSULE, DELAYED RELEASE ORAL
Qty: 90 CAPSULE | Refills: 7 | OUTPATIENT
Start: 2023-01-19

## 2023-01-19 NOTE — TELEPHONE ENCOUNTER
No new care gaps identified.  [facility] Embedded Care Gaps. Reference number: [id number]. 1/19/2023   5:14:41 AM CST

## 2023-01-26 ENCOUNTER — TELEPHONE (OUTPATIENT)
Dept: FAMILY MEDICINE | Facility: CLINIC | Age: 78
End: 2023-01-26
Payer: MEDICARE

## 2023-01-26 NOTE — TELEPHONE ENCOUNTER
Return call placed to patient. No answer received. Left message requesting return call to office.

## 2023-01-26 NOTE — TELEPHONE ENCOUNTER
----- Message from Alba Nelson sent at 1/26/2023 12:09 PM CST -----  Contact: [phone]  Type: Needs Medical Advice  Who Called:  Pt     Best Call Back Number: [phone] (home)     Additional Information: Pt stated she did not receive her bp cuff from [employer]. Pls call back and advise

## 2023-01-26 NOTE — TELEPHONE ENCOUNTER
Patient returned call to office. Call was transferred directly to writer per patient access representative Karina Rehman. Patient explained diabetic testing supplies were ordered for her.  Patient states she received diabetic testing supplies from [employer]. States she thought a wrist blood pressure cuff would also be included but it was not. Writer advised when diabetic testing supplies are ordered it includes glucose meter, test strips for the glucose meter, and lancets to stick finger. Patient confirmed she received these items. Writer advised wrist blood pressure monitor is not ordered standard with glucose testing supplies. Patient verbalized understanding.

## 2023-01-31 ENCOUNTER — OFFICE VISIT (OUTPATIENT)
Dept: URGENT CARE | Facility: CLINIC | Age: 78
End: 2023-01-31
Payer: MEDICARE

## 2023-01-31 ENCOUNTER — TELEPHONE (OUTPATIENT)
Dept: NEUROLOGY | Facility: CLINIC | Age: 78
End: 2023-01-31
Payer: MEDICARE

## 2023-01-31 VITALS
OXYGEN SATURATION: 98 % | WEIGHT: 154 LBS | RESPIRATION RATE: 16 BRPM | HEIGHT: 69 IN | SYSTOLIC BLOOD PRESSURE: 126 MMHG | HEART RATE: 71 BPM | BODY MASS INDEX: 22.81 KG/M2 | TEMPERATURE: 98 F | DIASTOLIC BLOOD PRESSURE: 72 MMHG

## 2023-01-31 DIAGNOSIS — R19.7 DIARRHEA, UNSPECIFIED TYPE: Primary | ICD-10-CM

## 2023-01-31 DIAGNOSIS — Z02.89 ENCOUNTER TO OBTAIN EXCUSE FROM WORK: ICD-10-CM

## 2023-01-31 PROCEDURE — 1159F PR MEDICATION LIST DOCUMENTED IN MEDICAL RECORD: ICD-10-PCS | Mod: CPTII,S$GLB,, | Performed by: PHYSICIAN ASSISTANT

## 2023-01-31 PROCEDURE — 3078F DIAST BP <80 MM HG: CPT | Mod: CPTII,S$GLB,, | Performed by: PHYSICIAN ASSISTANT

## 2023-01-31 PROCEDURE — 3072F PR LOW RISK FOR RETINOPATHY: ICD-10-PCS | Mod: CPTII,S$GLB,, | Performed by: PHYSICIAN ASSISTANT

## 2023-01-31 PROCEDURE — 99213 PR OFFICE/OUTPT VISIT, EST, LEVL III, 20-29 MIN: ICD-10-PCS | Mod: S$GLB,,, | Performed by: PHYSICIAN ASSISTANT

## 2023-01-31 PROCEDURE — 99213 OFFICE O/P EST LOW 20 MIN: CPT | Mod: S$GLB,,, | Performed by: PHYSICIAN ASSISTANT

## 2023-01-31 PROCEDURE — 3078F PR MOST RECENT DIASTOLIC BLOOD PRESSURE < 80 MM HG: ICD-10-PCS | Mod: CPTII,S$GLB,, | Performed by: PHYSICIAN ASSISTANT

## 2023-01-31 PROCEDURE — 3072F LOW RISK FOR RETINOPATHY: CPT | Mod: CPTII,S$GLB,, | Performed by: PHYSICIAN ASSISTANT

## 2023-01-31 PROCEDURE — 1159F MED LIST DOCD IN RCRD: CPT | Mod: CPTII,S$GLB,, | Performed by: PHYSICIAN ASSISTANT

## 2023-01-31 PROCEDURE — 1160F PR REVIEW ALL MEDS BY PRESCRIBER/CLIN PHARMACIST DOCUMENTED: ICD-10-PCS | Mod: CPTII,S$GLB,, | Performed by: PHYSICIAN ASSISTANT

## 2023-01-31 PROCEDURE — 3074F SYST BP LT 130 MM HG: CPT | Mod: CPTII,S$GLB,, | Performed by: PHYSICIAN ASSISTANT

## 2023-01-31 PROCEDURE — 3074F PR MOST RECENT SYSTOLIC BLOOD PRESSURE < 130 MM HG: ICD-10-PCS | Mod: CPTII,S$GLB,, | Performed by: PHYSICIAN ASSISTANT

## 2023-01-31 PROCEDURE — 1160F RVW MEDS BY RX/DR IN RCRD: CPT | Mod: CPTII,S$GLB,, | Performed by: PHYSICIAN ASSISTANT

## 2023-01-31 NOTE — PROGRESS NOTES
"Subjective:       Patient ID: Marleen Samano is a 77 y.o. female.    Vitals:  height is 5' 9" (1.753 m) and weight is 69.9 kg (154 lb). Her oral temperature is 98 °F (36.7 °C). Her blood pressure is 126/72 and her pulse is 71. Her respiration is 16 and oxygen saturation is 98%.     Chief Complaint: Letter for School/Work    Pt states "Here for a work note; has been experiencing diarrhea off and on since she started meds that were prescribed on 1-13-23."    ROS    Objective:      Physical Exam      Assessment:       No diagnosis found.      Plan:         There are no diagnoses linked to this encounter.                 "

## 2023-01-31 NOTE — PROGRESS NOTES
"Subjective:       Patient ID: Marleen Samano is a 77 y.o. female.    Vitals:  height is 5' 9" (1.753 m) and weight is 69.9 kg (154 lb). Her oral temperature is 98 °F (36.7 °C). Her blood pressure is 126/72 and her pulse is 71. Her respiration is 16 and oxygen saturation is 98%.     Chief Complaint: Letter for School/Work    Patient is a 77-year-old female who presents for a work note.  She has past medical history significant for depression, hypertension, paroxysmal AFib, diabetes and arthritis.  She states she started new job and has missed 2 days recently secondary to intermittent diarrhea.  She is currently on probation at the job can not miss any more work.  She denies nausea, vomiting or abdominal pain.  She is requesting a work note to return today.  She denies any chest pain, trouble breathing or fevers.  She states her primary care doctor recommend she come to the emergency room for evaluation.      Constitution: Negative for chills and fever.   HENT:  Negative for ear discharge and sore throat.    Cardiovascular:  Negative for chest pain.   Eyes:  Negative for eye pain and eye redness.   Respiratory:  Negative for cough.    Gastrointestinal:  Positive for diarrhea (intermittent). Negative for abdominal pain, nausea, vomiting, rectal pain and bowel incontinence.   Genitourinary:  Negative for dysuria and frequency.     Objective:      Physical Exam   Constitutional: She is oriented to person, place, and time. She appears well-developed. She is cooperative. She does not appear ill. No distress.   HENT:   Head: Normocephalic and atraumatic.   Ears:   Right Ear: Hearing, tympanic membrane, external ear and ear canal normal.   Left Ear: Hearing, tympanic membrane, external ear and ear canal normal.   Nose: Nose normal. No rhinorrhea. Right sinus exhibits no maxillary sinus tenderness and no frontal sinus tenderness. Left sinus exhibits no maxillary sinus tenderness and no frontal sinus tenderness. "   Mouth/Throat: Uvula is midline, oropharynx is clear and moist and mucous membranes are normal. No trismus in the jaw. No oropharyngeal exudate, posterior oropharyngeal edema or posterior oropharyngeal erythema.   Eyes: Conjunctivae and lids are normal.   Neck: Phonation normal. No neck rigidity present.   Cardiovascular: Normal rate, regular rhythm and normal heart sounds.   Pulmonary/Chest: Effort normal and breath sounds normal. No respiratory distress. She has no decreased breath sounds. She has no rhonchi.   Abdominal: Normal appearance. Soft. There is no abdominal tenderness. There is no rebound and no guarding.   Musculoskeletal: Normal range of motion.         General: No deformity. Normal range of motion.   Neurological: She is alert and oriented to person, place, and time. She exhibits normal muscle tone. Coordination normal.   Skin: Skin is warm, dry, intact and not pale.   Psychiatric: Her speech is normal and behavior is normal. Judgment and thought content normal.   Nursing note and vitals reviewed.      Assessment:       No diagnosis found.      Plan:         There are no diagnoses linked to this encounter.       Medical Decision Making:   History:   Old Medical Records: I decided to obtain old medical records.  Urgent Care Management:  Urgent evaluation of a well-appearing 77-year-old female who presents for a work note.  She states she started a new job and is on probation for them secondary to missing work.  She had an episode of diarrhea recently and missed work.  She states no nausea, vomiting or abdominal pain.  She is well appearing.  Vital signs are stable.  She has a soft and nontender abdomen.  She is tolerating oral intake.  I doubt acute intra-abdominal process.  Discussed good oral intake, bland diet close follow up primary care.  Return precautions given.

## 2023-01-31 NOTE — LETTER
January 31, 2023      [facility]  [address]  [facility] [address]  Phone: [phone]       Patient: Marleen Samano   YOB: 1945  Date of Visit: 01/31/2023    To Whom It May Concern:    Amber Samano  was at [facility] on 01/31/2023. The patient may return to work/school on 1/31/23 with no restrictions. If you have any questions or concerns, or if I can be of further assistance, please do not hesitate to contact me.    Sincerely,    Magali Escobedo PA-C

## 2023-02-01 NOTE — TELEPHONE ENCOUNTER
----- Message from Isamar Kitchen sent at 1/31/2023 12:03 PM CST -----  Regarding: Pt called to schedule a new pt appt for muscle spasms and pt would like a call back today  Appointment Access Request:    Pt called to schedule a new pt appt for muscle spasms and pt would like a call back today. Pt states that this problem is affecting her work.    Pt can be reached at [phone]

## 2023-03-07 NOTE — TELEPHONE ENCOUNTER
LOV: 12/7/22    FOV: 6/12/23    True Metrix Test strips last prescribed 11/29/21 - more than a year needs to be approved by MD.     Last hemoglobin A1C completed 12/7/22 - Result 7.7%   Pt states that she had a bad experience today with Dr Tyler. Regarding her knee pain. He told her there is nothing he can do for her and she will have to live with the knee pain. She was very upset when she left there. She wanted to let you know she is very unhappy. She would like to know if there are any other options for her knee pain. Please advise?   Quality 431: Preventive Care And Screening: Unhealthy Alcohol Use - Screening: Patient screened for unhealthy alcohol use using a single question and scores less than 2 times per year Detail Level: Detailed Quality 130: Documentation Of Current Medications In The Medical Record: Current Medications Documented Quality 111:Pneumonia Vaccination Status For Older Adults: Pneumococcal Vaccination not Administered or Previously Received, Reason not Otherwise Specified Quality 226: Preventive Care And Screening: Tobacco Use: Screening And Cessation Intervention: Patient screened for tobacco use and is an ex/non-smoker Quality 110: Preventive Care And Screening: Influenza Immunization: Influenza Immunization Administered during Influenza season

## 2023-03-13 ENCOUNTER — TELEPHONE (OUTPATIENT)
Dept: FAMILY MEDICINE | Facility: CLINIC | Age: 78
End: 2023-03-13
Payer: MEDICARE

## 2023-03-13 NOTE — TELEPHONE ENCOUNTER
Pt comes to clinic today and states that she needed her blood sugar checked. She states that she spoke with a friend who is a nurse and she thinks her sugar maybe low.   Will see Dr. Ryan in clinic tomorrow for 11. She states that she will go home to check her sugar with the new machine that she just picked up from the store. She is unable to tell me exactly what sx she is having.     I advised her that if she gets worse over night she should go to the ED.

## 2023-03-14 ENCOUNTER — TELEPHONE (OUTPATIENT)
Dept: FAMILY MEDICINE | Facility: CLINIC | Age: 78
End: 2023-03-14
Payer: MEDICARE

## 2023-03-14 ENCOUNTER — TELEPHONE (OUTPATIENT)
Dept: FAMILY MEDICINE | Facility: CLINIC | Age: 78
End: 2023-03-14

## 2023-03-14 ENCOUNTER — OFFICE VISIT (OUTPATIENT)
Dept: FAMILY MEDICINE | Facility: CLINIC | Age: 78
End: 2023-03-14
Payer: MEDICARE

## 2023-03-14 VITALS
SYSTOLIC BLOOD PRESSURE: 110 MMHG | RESPIRATION RATE: 18 BRPM | OXYGEN SATURATION: 97 % | DIASTOLIC BLOOD PRESSURE: 70 MMHG | HEART RATE: 90 BPM | WEIGHT: 145.31 LBS | HEIGHT: 69 IN | BODY MASS INDEX: 21.52 KG/M2

## 2023-03-14 DIAGNOSIS — R27.0 ATAXIA, UNSPECIFIED: ICD-10-CM

## 2023-03-14 DIAGNOSIS — R25.2 MUSCLE CRAMPS AT NIGHT: ICD-10-CM

## 2023-03-14 DIAGNOSIS — R25.2 CRAMPS, MUSCLE, GENERAL: Primary | ICD-10-CM

## 2023-03-14 DIAGNOSIS — G24.9 DYSTONIA: ICD-10-CM

## 2023-03-14 DIAGNOSIS — R93.89 ABNORMAL FINDINGS ON DIAGNOSTIC IMAGING OF OTHER SPECIFIED BODY STRUCTURES: ICD-10-CM

## 2023-03-14 DIAGNOSIS — E11.59 HYPERTENSION ASSOCIATED WITH TYPE 2 DIABETES MELLITUS: ICD-10-CM

## 2023-03-14 DIAGNOSIS — R25.1 TREMORS OF NERVOUS SYSTEM: ICD-10-CM

## 2023-03-14 DIAGNOSIS — I15.2 HYPERTENSION ASSOCIATED WITH TYPE 2 DIABETES MELLITUS: ICD-10-CM

## 2023-03-14 PROCEDURE — 99999 PR PBB SHADOW E&M-EST. PATIENT-LVL V: CPT | Mod: PBBFAC,,, | Performed by: STUDENT IN AN ORGANIZED HEALTH CARE EDUCATION/TRAINING PROGRAM

## 2023-03-14 PROCEDURE — 99214 PR OFFICE/OUTPT VISIT, EST, LEVL IV, 30-39 MIN: ICD-10-PCS | Mod: S$GLB,,, | Performed by: STUDENT IN AN ORGANIZED HEALTH CARE EDUCATION/TRAINING PROGRAM

## 2023-03-14 PROCEDURE — 1160F RVW MEDS BY RX/DR IN RCRD: CPT | Mod: CPTII,S$GLB,, | Performed by: STUDENT IN AN ORGANIZED HEALTH CARE EDUCATION/TRAINING PROGRAM

## 2023-03-14 PROCEDURE — 1160F PR REVIEW ALL MEDS BY PRESCRIBER/CLIN PHARMACIST DOCUMENTED: ICD-10-PCS | Mod: CPTII,S$GLB,, | Performed by: STUDENT IN AN ORGANIZED HEALTH CARE EDUCATION/TRAINING PROGRAM

## 2023-03-14 PROCEDURE — 3078F DIAST BP <80 MM HG: CPT | Mod: CPTII,S$GLB,, | Performed by: STUDENT IN AN ORGANIZED HEALTH CARE EDUCATION/TRAINING PROGRAM

## 2023-03-14 PROCEDURE — 3288F PR FALLS RISK ASSESSMENT DOCUMENTED: ICD-10-PCS | Mod: CPTII,S$GLB,, | Performed by: STUDENT IN AN ORGANIZED HEALTH CARE EDUCATION/TRAINING PROGRAM

## 2023-03-14 PROCEDURE — 1159F MED LIST DOCD IN RCRD: CPT | Mod: CPTII,S$GLB,, | Performed by: STUDENT IN AN ORGANIZED HEALTH CARE EDUCATION/TRAINING PROGRAM

## 2023-03-14 PROCEDURE — 1125F AMNT PAIN NOTED PAIN PRSNT: CPT | Mod: CPTII,S$GLB,, | Performed by: STUDENT IN AN ORGANIZED HEALTH CARE EDUCATION/TRAINING PROGRAM

## 2023-03-14 PROCEDURE — 1101F PR PT FALLS ASSESS DOC 0-1 FALLS W/OUT INJ PAST YR: ICD-10-PCS | Mod: CPTII,S$GLB,, | Performed by: STUDENT IN AN ORGANIZED HEALTH CARE EDUCATION/TRAINING PROGRAM

## 2023-03-14 PROCEDURE — 3074F PR MOST RECENT SYSTOLIC BLOOD PRESSURE < 130 MM HG: ICD-10-PCS | Mod: CPTII,S$GLB,, | Performed by: STUDENT IN AN ORGANIZED HEALTH CARE EDUCATION/TRAINING PROGRAM

## 2023-03-14 PROCEDURE — 3078F PR MOST RECENT DIASTOLIC BLOOD PRESSURE < 80 MM HG: ICD-10-PCS | Mod: CPTII,S$GLB,, | Performed by: STUDENT IN AN ORGANIZED HEALTH CARE EDUCATION/TRAINING PROGRAM

## 2023-03-14 PROCEDURE — 3074F SYST BP LT 130 MM HG: CPT | Mod: CPTII,S$GLB,, | Performed by: STUDENT IN AN ORGANIZED HEALTH CARE EDUCATION/TRAINING PROGRAM

## 2023-03-14 PROCEDURE — 99999 PR PBB SHADOW E&M-EST. PATIENT-LVL V: ICD-10-PCS | Mod: PBBFAC,,, | Performed by: STUDENT IN AN ORGANIZED HEALTH CARE EDUCATION/TRAINING PROGRAM

## 2023-03-14 PROCEDURE — 99214 OFFICE O/P EST MOD 30 MIN: CPT | Mod: S$GLB,,, | Performed by: STUDENT IN AN ORGANIZED HEALTH CARE EDUCATION/TRAINING PROGRAM

## 2023-03-14 PROCEDURE — 3288F FALL RISK ASSESSMENT DOCD: CPT | Mod: CPTII,S$GLB,, | Performed by: STUDENT IN AN ORGANIZED HEALTH CARE EDUCATION/TRAINING PROGRAM

## 2023-03-14 PROCEDURE — 1125F PR PAIN SEVERITY QUANTIFIED, PAIN PRESENT: ICD-10-PCS | Mod: CPTII,S$GLB,, | Performed by: STUDENT IN AN ORGANIZED HEALTH CARE EDUCATION/TRAINING PROGRAM

## 2023-03-14 PROCEDURE — 3072F PR LOW RISK FOR RETINOPATHY: ICD-10-PCS | Mod: CPTII,S$GLB,, | Performed by: STUDENT IN AN ORGANIZED HEALTH CARE EDUCATION/TRAINING PROGRAM

## 2023-03-14 PROCEDURE — 3072F LOW RISK FOR RETINOPATHY: CPT | Mod: CPTII,S$GLB,, | Performed by: STUDENT IN AN ORGANIZED HEALTH CARE EDUCATION/TRAINING PROGRAM

## 2023-03-14 PROCEDURE — 1159F PR MEDICATION LIST DOCUMENTED IN MEDICAL RECORD: ICD-10-PCS | Mod: CPTII,S$GLB,, | Performed by: STUDENT IN AN ORGANIZED HEALTH CARE EDUCATION/TRAINING PROGRAM

## 2023-03-14 PROCEDURE — 1101F PT FALLS ASSESS-DOCD LE1/YR: CPT | Mod: CPTII,S$GLB,, | Performed by: STUDENT IN AN ORGANIZED HEALTH CARE EDUCATION/TRAINING PROGRAM

## 2023-03-14 RX ORDER — TRAZODONE HYDROCHLORIDE 50 MG/1
50 TABLET ORAL NIGHTLY
Qty: 30 TABLET | Refills: 0 | Status: SHIPPED | OUTPATIENT
Start: 2023-03-14 | End: 2023-07-26 | Stop reason: DRUGHIGH

## 2023-03-14 RX ORDER — BACLOFEN 10 MG/1
TABLET ORAL
Qty: 90 TABLET | Refills: 0 | Status: SHIPPED | OUTPATIENT
Start: 2023-03-14 | End: 2023-07-26

## 2023-03-14 NOTE — TELEPHONE ENCOUNTER
Kyle Deluna Staff  Caller: Unspecified (Today,  2:15 PM)    ----- Message from Kyle Dunlap sent at 3/14/2023  2:15 PM CDT -----  Regarding: pt call bk  Name of Who is Calling:PURA TAN [[id number]]        What is the request in detail: pt inquiry about labs and is her Glucose test including            Can the clinic reply by [facility]: no         What Number to Call Back if not in [facility]: Telephone Information:  Mobile          [phone]

## 2023-03-14 NOTE — TELEPHONE ENCOUNTER
I spoke with pt via phone, she states that she forgot to go down and get blood work done. Will come back tomorrow at 10:30.

## 2023-03-14 NOTE — PROGRESS NOTES
"[facility] CLINIC NOTE    Patient Name: Marleen Samano  YOB: 1945    PRESENTING HISTORY     History of Present Illness:  Ms. Marleen Samano is a 77 y.o. female     Painful hand spasms on right hand  For about 1 year.   Occurs while driving.     Also occ gets spasms in groin, radiating down inside of leg.   Has been applying topical therapy. Probably 1% lidocaine. This helps.       Stomach feels jittery under the breast bone. She was told that it was her blood sugar level.   No diaphoresis or syncope.   She is not on agents to lower blood glucose.       She is very tremulous today.   She attributes this to a diagnosis of dystonia.   She was seen in Neurology clinic last year, but the scope of this was confined to HAs. MRI c-spine was largely unrevealing.   Used to regularly follow with a neurologist but he passed away.     She falls over to either side sometimes when walking.   No incontinence.         ROS      OBJECTIVE:   Vital Signs:  Vitals:    03/14/23 1100   BP: 110/70   Pulse: 90   Resp: 18   SpO2: 97%   Weight: 65.9 kg (145 lb 4.5 oz)   Height: 5' 9" (1.753 m)         Physical Exam  Vitals and nursing note reviewed.   Constitutional:       Appearance: She is not diaphoretic.   HENT:      Head: Normocephalic and atraumatic.      Comments: Negative Chvostek sign     Right Ear: External ear normal.      Left Ear: External ear normal.   Eyes:      General: No scleral icterus.     Conjunctiva/sclera: Conjunctivae normal.      Pupils: Pupils are equal, round, and reactive to light.   Neck:      Thyroid: No thyromegaly.   Cardiovascular:      Rate and Rhythm: Normal rate and regular rhythm.      Heart sounds: Normal heart sounds. No murmur heard.  Pulmonary:      Effort: Pulmonary effort is normal. No respiratory distress.      Breath sounds: Normal breath sounds. No wheezing or rales.   Abdominal:      General: Abdomen is flat. There is no distension.      Palpations: Abdomen is soft. "      Tenderness: There is no abdominal tenderness. There is no guarding.   Musculoskeletal:         General: No tenderness or deformity. Normal range of motion.      Cervical back: Normal range of motion and neck supple.   Lymphadenopathy:      Cervical: No cervical adenopathy.   Skin:     General: Skin is warm and dry.      Findings: No erythema or rash.   Neurological:      Mental Status: She is alert and oriented to person, place, and time.      Gait: Gait is intact.      Comments: Coarse tremor, both resting and with intention.   Increased tone throughout.   Nl DTRs.   Grossly normal strength.   Nl rhomberg.   CN2-12 grossly WNL.    Psychiatric:         Mood and Affect: Mood and affect normal.         Cognition and Memory: Memory normal.         Judgment: Judgment normal.       ASSESSMENT & PLAN:     Cramps, muscle, general  -     CBC Auto Differential; Future; Expected date: 03/14/2023  -     Comprehensive Metabolic Panel; Future; Expected date: 03/14/2023  -     Magnesium; Future; Expected date: 03/14/2023    Dystonia  -     CT Head Without Contrast; Future; Expected date: 03/14/2023  -     TSH; Future; Expected date: 03/14/2023  -     RPR; Future; Expected date: 03/14/2023  -     VITAMIN B1; Future; Expected date: 03/14/2023  -     VITAMIN B12; Future; Expected date: 03/14/2023  -     traZODone (DESYREL) 50 MG tablet; Take 1 tablet (50 mg total) by mouth every evening.  Dispense: 30 tablet; Refill: 0    Abnormal findings on diagnostic imaging of other specified body structures  -     TSH; Future; Expected date: 03/14/2023    Ataxia, unspecified  -     VITAMIN B12; Future; Expected date: 03/14/2023    Muscle cramps at night  -     baclofen (LIORESAL) 10 MG tablet; Take 1 tablet (10 mg total) by mouth 2 (two) times daily for 30 days, THEN 1 tablet (10 mg total) every evening.  Dispense: 90 tablet; Refill: 0    Tremors of nervous system  -     Ambulatory referral/consult to Neurology; Future; Expected date:  03/21/2023    Hypertension associated with type 2 diabetes mellitus  Continue current medications               Mikie Ryan MD   Internal Medicine

## 2023-03-15 ENCOUNTER — PATIENT MESSAGE (OUTPATIENT)
Dept: FAMILY MEDICINE | Facility: CLINIC | Age: 78
End: 2023-03-15
Payer: MEDICARE

## 2023-03-15 ENCOUNTER — TELEPHONE (OUTPATIENT)
Dept: FAMILY MEDICINE | Facility: CLINIC | Age: 78
End: 2023-03-15
Payer: MEDICARE

## 2023-03-15 ENCOUNTER — LAB VISIT (OUTPATIENT)
Dept: LAB | Facility: HOSPITAL | Age: 78
End: 2023-03-15
Attending: STUDENT IN AN ORGANIZED HEALTH CARE EDUCATION/TRAINING PROGRAM
Payer: MEDICARE

## 2023-03-15 DIAGNOSIS — R93.89 ABNORMAL FINDINGS ON DIAGNOSTIC IMAGING OF OTHER SPECIFIED BODY STRUCTURES: ICD-10-CM

## 2023-03-15 DIAGNOSIS — R27.0 ATAXIA, UNSPECIFIED: ICD-10-CM

## 2023-03-15 DIAGNOSIS — G24.9 DYSTONIA: ICD-10-CM

## 2023-03-15 DIAGNOSIS — R25.2 CRAMPS, MUSCLE, GENERAL: ICD-10-CM

## 2023-03-15 LAB
ALBUMIN SERPL BCP-MCNC: 4 G/DL (ref 3.5–5.2)
ALP SERPL-CCNC: 103 U/L (ref 55–135)
ALT SERPL W/O P-5'-P-CCNC: 14 U/L (ref 10–44)
ANION GAP SERPL CALC-SCNC: 9 MMOL/L (ref 8–16)
AST SERPL-CCNC: 24 U/L (ref 10–40)
BASOPHILS # BLD AUTO: 0.01 K/UL (ref 0–0.2)
BASOPHILS NFR BLD: 0.2 % (ref 0–1.9)
BILIRUB SERPL-MCNC: 0.2 MG/DL (ref 0.1–1)
BUN SERPL-MCNC: 26 MG/DL (ref 8–23)
CALCIUM SERPL-MCNC: 9.4 MG/DL (ref 8.7–10.5)
CHLORIDE SERPL-SCNC: 105 MMOL/L (ref 95–110)
CO2 SERPL-SCNC: 27 MMOL/L (ref 23–29)
CREAT SERPL-MCNC: 0.9 MG/DL (ref 0.5–1.4)
DIFFERENTIAL METHOD: ABNORMAL
EOSINOPHIL # BLD AUTO: 0 K/UL (ref 0–0.5)
EOSINOPHIL NFR BLD: 0.6 % (ref 0–8)
ERYTHROCYTE [DISTWIDTH] IN BLOOD BY AUTOMATED COUNT: 13.8 % (ref 11.5–14.5)
EST. GFR  (NO RACE VARIABLE): >60 ML/MIN/1.73 M^2
GLUCOSE SERPL-MCNC: 87 MG/DL (ref 70–110)
HCT VFR BLD AUTO: 39.2 % (ref 37–48.5)
HGB BLD-MCNC: 12.2 G/DL (ref 12–16)
IMM GRANULOCYTES # BLD AUTO: 0 K/UL (ref 0–0.04)
IMM GRANULOCYTES NFR BLD AUTO: 0 % (ref 0–0.5)
LYMPHOCYTES # BLD AUTO: 1.6 K/UL (ref 1–4.8)
LYMPHOCYTES NFR BLD: 31.9 % (ref 18–48)
MAGNESIUM SERPL-MCNC: 2.1 MG/DL (ref 1.6–2.6)
MCH RBC QN AUTO: 29.5 PG (ref 27–31)
MCHC RBC AUTO-ENTMCNC: 31.1 G/DL (ref 32–36)
MCV RBC AUTO: 95 FL (ref 82–98)
MONOCYTES # BLD AUTO: 0.5 K/UL (ref 0.3–1)
MONOCYTES NFR BLD: 10 % (ref 4–15)
NEUTROPHILS # BLD AUTO: 2.8 K/UL (ref 1.8–7.7)
NEUTROPHILS NFR BLD: 57.3 % (ref 38–73)
NRBC BLD-RTO: 0 /100 WBC
PLATELET # BLD AUTO: 192 K/UL (ref 150–450)
PMV BLD AUTO: 10.8 FL (ref 9.2–12.9)
POTASSIUM SERPL-SCNC: 4.3 MMOL/L (ref 3.5–5.1)
PROT SERPL-MCNC: 6.8 G/DL (ref 6–8.4)
RBC # BLD AUTO: 4.14 M/UL (ref 4–5.4)
SODIUM SERPL-SCNC: 141 MMOL/L (ref 136–145)
TSH SERPL DL<=0.005 MIU/L-ACNC: 2.33 UIU/ML (ref 0.4–4)
VIT B12 SERPL-MCNC: 457 PG/ML (ref 210–950)
WBC # BLD AUTO: 4.92 K/UL (ref 3.9–12.7)

## 2023-03-15 PROCEDURE — 86592 SYPHILIS TEST NON-TREP QUAL: CPT | Performed by: STUDENT IN AN ORGANIZED HEALTH CARE EDUCATION/TRAINING PROGRAM

## 2023-03-15 PROCEDURE — 82607 VITAMIN B-12: CPT | Performed by: STUDENT IN AN ORGANIZED HEALTH CARE EDUCATION/TRAINING PROGRAM

## 2023-03-15 PROCEDURE — 80053 COMPREHEN METABOLIC PANEL: CPT | Performed by: STUDENT IN AN ORGANIZED HEALTH CARE EDUCATION/TRAINING PROGRAM

## 2023-03-15 PROCEDURE — 84443 ASSAY THYROID STIM HORMONE: CPT | Performed by: STUDENT IN AN ORGANIZED HEALTH CARE EDUCATION/TRAINING PROGRAM

## 2023-03-15 PROCEDURE — 84425 ASSAY OF VITAMIN B-1: CPT | Performed by: STUDENT IN AN ORGANIZED HEALTH CARE EDUCATION/TRAINING PROGRAM

## 2023-03-15 PROCEDURE — 85025 COMPLETE CBC W/AUTO DIFF WBC: CPT | Performed by: STUDENT IN AN ORGANIZED HEALTH CARE EDUCATION/TRAINING PROGRAM

## 2023-03-15 PROCEDURE — 83735 ASSAY OF MAGNESIUM: CPT | Performed by: STUDENT IN AN ORGANIZED HEALTH CARE EDUCATION/TRAINING PROGRAM

## 2023-03-15 PROCEDURE — 36415 COLL VENOUS BLD VENIPUNCTURE: CPT | Mod: PO | Performed by: STUDENT IN AN ORGANIZED HEALTH CARE EDUCATION/TRAINING PROGRAM

## 2023-03-16 LAB — RPR SER QL: NORMAL

## 2023-03-16 NOTE — TELEPHONE ENCOUNTER
Please see attached portal message from patient. Patient also sent second portal message regarding results. Will attach 2nd message below. Please advise. Thank you.       Question regarding COMPREHENSIVE METABOLIC PANEL  (Newest Message First)  Marleen Deluna Staff (supporting Mikie Ryan MD) 11 hours ago (9:27 PM)       Doctor Connor Gordon:.   Would you please comment on this group?  I truly appreciate it.  GOD BLESS YOU, .

## 2023-03-16 NOTE — TELEPHONE ENCOUNTER
2 portal message received from patient regarding lab results. Both messages have been combined into one message, and has been forwarded to Dr. Ryan.

## 2023-03-17 ENCOUNTER — TELEPHONE (OUTPATIENT)
Dept: NEUROLOGY | Facility: CLINIC | Age: 78
End: 2023-03-17
Payer: MEDICARE

## 2023-03-17 NOTE — TELEPHONE ENCOUNTER
----- Message from Aletha Starks sent at 3/17/2023 12:56 PM CDT -----  Regarding: sooner aptt  Contact: patient  Type:  Sooner Appointment Request    Caller is requesting a sooner appointment.  Caller declined first available appointment listed below.  Caller will not accept being placed on the waitlist and is requesting a message be sent to doctor.    Name of Caller:  Patient  When is the first available appointment?    Symptoms:  muscle spams  Best Call Back Number:  [phone]     Additional Information:  Please call pt to schedule thanks!

## 2023-03-17 NOTE — TELEPHONE ENCOUNTER
----- Message from Rai Moreno sent at 3/17/2023  1:27 PM CDT -----  Regarding: schedule from referral  Type:  Sooner Appointment Request    Caller is requesting a sooner appointment.      Name of Caller:  Marleen    When is the first available appointment?  Dept book    Symptoms:  see referral    Best Call Back Number:  [phone]    Additional Information:  ok to first avail provider

## 2023-03-18 ENCOUNTER — HOSPITAL ENCOUNTER (OUTPATIENT)
Dept: RADIOLOGY | Facility: HOSPITAL | Age: 78
Discharge: HOME | End: 2023-03-18
Attending: STUDENT IN AN ORGANIZED HEALTH CARE EDUCATION/TRAINING PROGRAM
Payer: MEDICARE

## 2023-03-18 DIAGNOSIS — G24.9 DYSTONIA: ICD-10-CM

## 2023-03-18 PROCEDURE — 70450 CT HEAD/BRAIN W/O DYE: CPT | Mod: TC

## 2023-03-18 PROCEDURE — 70450 CT HEAD WITHOUT CONTRAST: ICD-10-PCS | Mod: 26,,, | Performed by: RADIOLOGY

## 2023-03-18 PROCEDURE — 70450 CT HEAD/BRAIN W/O DYE: CPT | Mod: 26,,, | Performed by: RADIOLOGY

## 2023-03-21 ENCOUNTER — TELEPHONE (OUTPATIENT)
Dept: NEUROLOGY | Facility: CLINIC | Age: 78
End: 2023-03-21
Payer: MEDICARE

## 2023-03-21 LAB — VIT B1 BLD-MCNC: 63 UG/L (ref 38–122)

## 2023-03-21 NOTE — TELEPHONE ENCOUNTER
----- Message from Miladys Dodson sent at 3/21/2023  2:36 PM CDT -----  Regarding: Appt Access  Contact: [phone]  EP is requesting to see Dr Morin for dystonia. Pt hands lock up while driving.  Next avail is 08/23/23.  Pt would like to be seen sooner.  Please call.

## 2023-03-22 ENCOUNTER — PATIENT MESSAGE (OUTPATIENT)
Dept: FAMILY MEDICINE | Facility: CLINIC | Age: 78
End: 2023-03-22
Payer: MEDICARE

## 2023-03-22 PROBLEM — E11.59 HYPERTENSION ASSOCIATED WITH TYPE 2 DIABETES MELLITUS: Status: ACTIVE | Noted: 2023-03-22

## 2023-03-22 PROBLEM — I15.2 HYPERTENSION ASSOCIATED WITH TYPE 2 DIABETES MELLITUS: Status: ACTIVE | Noted: 2023-03-22

## 2023-03-22 PROBLEM — I27.20 PULMONARY HYPERTENSION: Status: RESOLVED | Noted: 2021-05-12 | Resolved: 2023-03-22

## 2023-03-22 PROBLEM — I77.3 FIBROMUSCULAR DYSPLASIA OF BOTH CAROTID ARTERIES: Status: RESOLVED | Noted: 2022-04-14 | Resolved: 2023-03-22

## 2023-03-22 PROBLEM — J44.9 CHRONIC OBSTRUCTIVE LUNG DISEASE: Status: RESOLVED | Noted: 2017-10-18 | Resolved: 2023-03-22

## 2023-03-22 PROBLEM — D84.9 IMMUNE DEFICIENCY DISORDER: Status: RESOLVED | Noted: 2019-10-08 | Resolved: 2023-03-22

## 2023-03-23 ENCOUNTER — TELEPHONE (OUTPATIENT)
Dept: NEUROLOGY | Facility: CLINIC | Age: 78
End: 2023-03-23
Payer: MEDICARE

## 2023-03-23 NOTE — TELEPHONE ENCOUNTER
----- Message from Salina Atwood MA sent at 3/22/2023  5:21 PM CDT -----  Regarding: FW: appt  Contact: @[phone]    ----- Message -----  From: Hernando Pace  Sent: 3/22/2023   1:47 PM CDT  To: Salina Atwood MA, Mikel Heredia Staff  Subject: appt                                             EP is requesting to see Dr Morin for dystonia. Pt hands lock up while driving.  Next avail is 08/23/23.  Pt would like to be seen sooner.  Please call and adv@[phone]

## 2023-03-23 NOTE — TELEPHONE ENCOUNTER
----- Message from aYs Barrios sent at 3/23/2023 10:02 AM CDT -----  Regarding: returing call  Contact: [phone]  Pt call returning a phone call from Alyson del real appt pt would like 10am appt in May please call to discuss further

## 2023-03-23 NOTE — TELEPHONE ENCOUNTER
Returned call to pt and booked appointment in Neurology for CD. Does not want botox as can not afford. Looking for other options.

## 2023-03-24 ENCOUNTER — TELEPHONE (OUTPATIENT)
Dept: HEMATOLOGY/ONCOLOGY | Facility: CLINIC | Age: 78
End: 2023-03-24
Payer: MEDICARE

## 2023-03-24 NOTE — NURSING
"Spoke with pt.  She went to see her dermatologist and she was told to see a gynon physician for the "bead like" spot on her labia.  She has not seen a gyn in over 10 years.  She would like to see Dr. Tucker.  She requested an appt in May.  Scheduled her for 5/3/23.   Explained to her that if her derm was recommending a gynon doctor, that maybe she should be seen sooner.  She said she would rather wait.  She said the area is not painful or red and she is not having any drainage.  I asked her to call back if it became painful, bigger, red or if she noticed any drainage.  She said she would.  Location and contact information reviewed.  Encouraged her to call back with any questions or concerns.  Pt thanked me and verbalized understanding.   "

## 2023-03-24 NOTE — TELEPHONE ENCOUNTER
----- Message from Bushra Weller sent at 3/21/2023  2:20 PM CDT -----  Regarding: Dr Tucker  Type:Needs Medical Advice    Who Called:PT  Best call back number:[phone]  Additional Info: Requesting a call back regarding PT would like a new PT appt with Dr Tucker. Please call top schedule.  Please Advise- Thank you

## 2023-03-29 ENCOUNTER — TELEPHONE (OUTPATIENT)
Dept: OBSTETRICS AND GYNECOLOGY | Facility: CLINIC | Age: 78
End: 2023-03-29
Payer: MEDICARE

## 2023-03-29 NOTE — TELEPHONE ENCOUNTER
----- Message from Jannie Sequeira sent at 3/24/2023  4:43 PM CDT -----  Contact: PT  Type:  Patient Returning Call    Who Called:  PT/ Marleen  Who Left Message for Patient: : ??  Does the patient know what this is regarding?:  no  Best Call Back Number:  [phone]  Additional Information:  Pt had a missed call said no vmail was left

## 2023-03-30 ENCOUNTER — TELEPHONE (OUTPATIENT)
Dept: FAMILY MEDICINE | Facility: CLINIC | Age: 78
End: 2023-03-30
Payer: MEDICARE

## 2023-03-30 NOTE — TELEPHONE ENCOUNTER
Unable to reach, Left voicemail for patient to call the office back.        ----- Message from Keiko Jay sent at 3/30/2023  1:08 PM CDT -----  Regarding: returning call  Contact: patient  Type:  Patient Returning Call    Who Called:  patient   Who Left Message for Patient: Lan Mary  Does the patient know what this is regarding?:  no  Best Call Back Number:  [phone] (home)

## 2023-03-30 NOTE — TELEPHONE ENCOUNTER
Scheduled with Dr Ochoa today at 1:30pm-aware to arrive by 115pm to check in. Reports back pain is still present but has slightly improved.   noted

## 2023-03-30 NOTE — TELEPHONE ENCOUNTER
I have attempted to contact pt via phone, unable to reach. Pt was attempting to contact Mrs. Mary to book AWV. Will send portal message.

## 2023-03-31 ENCOUNTER — TELEPHONE (OUTPATIENT)
Dept: FAMILY MEDICINE | Facility: CLINIC | Age: 78
End: 2023-03-31
Payer: MEDICARE

## 2023-03-31 NOTE — TELEPHONE ENCOUNTER
Writer reached out to V  Tyra Diallo for notification patient is returning her call. eSlena Tyra advised she returned call to patient, and everything has been taken care of.

## 2023-03-31 NOTE — TELEPHONE ENCOUNTER
Kerry Hernandez, Patient Care Assistant  HARLEY Deluna Staff  Caller: self (Today,  1:06 PM)    ----- Message from Kerry Hernandez Patient Care Assistant sent at 3/31/2023  1:06 PM CDT -----  Contact: self  Type:  Patient Returning Call    Who Called:  self  Who Left Message for Patient:  gilberto  Does the patient know what this is regarding?:  [facility]  Best Call Back Number:  [phone]  Additional Information:  thanks

## 2023-04-03 ENCOUNTER — HOSPITAL ENCOUNTER (OUTPATIENT)
Facility: HOSPITAL | Age: 78
LOS: 1 days | Discharge: HOME | End: 2023-04-06
Attending: EMERGENCY MEDICINE | Admitting: HOSPITALIST
Payer: MEDICARE

## 2023-04-03 DIAGNOSIS — R55 SYNCOPE, UNSPECIFIED SYNCOPE TYPE: Primary | ICD-10-CM

## 2023-04-03 DIAGNOSIS — K92.2 GI BLEED: ICD-10-CM

## 2023-04-03 DIAGNOSIS — R55 SYNCOPAL EPISODES: ICD-10-CM

## 2023-04-03 LAB
ALBUMIN SERPL BCP-MCNC: 4.1 G/DL (ref 3.5–5.2)
ALP SERPL-CCNC: 93 U/L (ref 55–135)
ALT SERPL W/O P-5'-P-CCNC: 12 U/L (ref 10–44)
ANION GAP SERPL CALC-SCNC: 14 MMOL/L (ref 8–16)
AST SERPL-CCNC: 22 U/L (ref 10–40)
BASOPHILS # BLD AUTO: 0.01 K/UL (ref 0–0.2)
BASOPHILS NFR BLD: 0.1 % (ref 0–1.9)
BILIRUB SERPL-MCNC: 0.5 MG/DL (ref 0.1–1)
BUN SERPL-MCNC: 38 MG/DL (ref 8–23)
CALCIUM SERPL-MCNC: 8.7 MG/DL (ref 8.7–10.5)
CHLORIDE SERPL-SCNC: 104 MMOL/L (ref 95–110)
CO2 SERPL-SCNC: 21 MMOL/L (ref 23–29)
CREAT SERPL-MCNC: 2.2 MG/DL (ref 0.5–1.4)
DIFFERENTIAL METHOD: ABNORMAL
EOSINOPHIL # BLD AUTO: 0 K/UL (ref 0–0.5)
EOSINOPHIL NFR BLD: 0.1 % (ref 0–8)
ERYTHROCYTE [DISTWIDTH] IN BLOOD BY AUTOMATED COUNT: 13.3 % (ref 11.5–14.5)
EST. GFR  (NO RACE VARIABLE): 22.4 ML/MIN/1.73 M^2
GLUCOSE SERPL-MCNC: 138 MG/DL (ref 70–110)
GLUCOSE SERPL-MCNC: 142 MG/DL (ref 70–110)
HCT VFR BLD AUTO: 37 % (ref 37–48.5)
HGB BLD-MCNC: 12.1 G/DL (ref 12–16)
IMM GRANULOCYTES # BLD AUTO: 0.05 K/UL (ref 0–0.04)
IMM GRANULOCYTES NFR BLD AUTO: 0.5 % (ref 0–0.5)
LYMPHOCYTES # BLD AUTO: 0.7 K/UL (ref 1–4.8)
LYMPHOCYTES NFR BLD: 7.5 % (ref 18–48)
MCH RBC QN AUTO: 30 PG (ref 27–31)
MCHC RBC AUTO-ENTMCNC: 32.7 G/DL (ref 32–36)
MCV RBC AUTO: 92 FL (ref 82–98)
MONOCYTES # BLD AUTO: 0.8 K/UL (ref 0.3–1)
MONOCYTES NFR BLD: 7.8 % (ref 4–15)
NEUTROPHILS # BLD AUTO: 8.1 K/UL (ref 1.8–7.7)
NEUTROPHILS NFR BLD: 84 % (ref 38–73)
NRBC BLD-RTO: 0 /100 WBC
PLATELET # BLD AUTO: 212 K/UL (ref 150–450)
PMV BLD AUTO: 10.1 FL (ref 9.2–12.9)
POTASSIUM SERPL-SCNC: 3.8 MMOL/L (ref 3.5–5.1)
PROT SERPL-MCNC: 6.8 G/DL (ref 6–8.4)
RBC # BLD AUTO: 4.03 M/UL (ref 4–5.4)
SODIUM SERPL-SCNC: 139 MMOL/L (ref 136–145)
TROPONIN I SERPL HS-MCNC: 9.9 PG/ML (ref 0–14.9)
WBC # BLD AUTO: 9.62 K/UL (ref 3.9–12.7)

## 2023-04-03 PROCEDURE — 87427 SHIGA-LIKE TOXIN AG IA: CPT | Mod: 59 | Performed by: EMERGENCY MEDICINE

## 2023-04-03 PROCEDURE — 96361 HYDRATE IV INFUSION ADD-ON: CPT

## 2023-04-03 PROCEDURE — 89055 LEUKOCYTE ASSESSMENT FECAL: CPT | Performed by: EMERGENCY MEDICINE

## 2023-04-03 PROCEDURE — 87449 NOS EACH ORGANISM AG IA: CPT | Performed by: EMERGENCY MEDICINE

## 2023-04-03 PROCEDURE — 93010 EKG 12-LEAD: ICD-10-PCS | Mod: ,,, | Performed by: INTERNAL MEDICINE

## 2023-04-03 PROCEDURE — 87177 OVA AND PARASITES SMEARS: CPT | Performed by: EMERGENCY MEDICINE

## 2023-04-03 PROCEDURE — 84484 ASSAY OF TROPONIN QUANT: CPT | Performed by: EMERGENCY MEDICINE

## 2023-04-03 PROCEDURE — 99285 EMERGENCY DEPT VISIT HI MDM: CPT | Mod: 25

## 2023-04-03 PROCEDURE — 25000003 PHARM REV CODE 250: Performed by: EMERGENCY MEDICINE

## 2023-04-03 PROCEDURE — 87046 STOOL CULTR AEROBIC BACT EA: CPT | Performed by: EMERGENCY MEDICINE

## 2023-04-03 PROCEDURE — 87045 FECES CULTURE AEROBIC BACT: CPT | Performed by: EMERGENCY MEDICINE

## 2023-04-03 PROCEDURE — G0378 HOSPITAL OBSERVATION PER HR: HCPCS

## 2023-04-03 PROCEDURE — 87209 SMEAR COMPLEX STAIN: CPT | Performed by: EMERGENCY MEDICINE

## 2023-04-03 PROCEDURE — 82272 OCCULT BLD FECES 1-3 TESTS: CPT | Mod: 91 | Performed by: EMERGENCY MEDICINE

## 2023-04-03 PROCEDURE — 93010 ELECTROCARDIOGRAM REPORT: CPT | Mod: ,,, | Performed by: INTERNAL MEDICINE

## 2023-04-03 PROCEDURE — 87427 SHIGA-LIKE TOXIN AG IA: CPT | Performed by: EMERGENCY MEDICINE

## 2023-04-03 PROCEDURE — 80053 COMPREHEN METABOLIC PANEL: CPT | Performed by: NURSE PRACTITIONER

## 2023-04-03 PROCEDURE — 93005 ELECTROCARDIOGRAM TRACING: CPT | Performed by: INTERNAL MEDICINE

## 2023-04-03 PROCEDURE — 12000002 HC ACUTE/MED SURGE SEMI-PRIVATE ROOM

## 2023-04-03 PROCEDURE — 85025 COMPLETE CBC W/AUTO DIFF WBC: CPT | Performed by: NURSE PRACTITIONER

## 2023-04-03 RX ORDER — LOPERAMIDE HYDROCHLORIDE 2 MG/1
2 CAPSULE ORAL
Status: COMPLETED | OUTPATIENT
Start: 2023-04-03 | End: 2023-04-03

## 2023-04-03 RX ORDER — PANTOPRAZOLE SODIUM 40 MG/10ML
40 INJECTION, POWDER, LYOPHILIZED, FOR SOLUTION INTRAVENOUS EVERY 12 HOURS
Status: DISCONTINUED | OUTPATIENT
Start: 2023-04-04 | End: 2023-04-06 | Stop reason: HOSPADM

## 2023-04-03 RX ORDER — SODIUM CHLORIDE 9 MG/ML
1000 INJECTION, SOLUTION INTRAVENOUS
Status: COMPLETED | OUTPATIENT
Start: 2023-04-03 | End: 2023-04-04

## 2023-04-03 RX ADMIN — SODIUM CHLORIDE 1000 ML: 0.9 INJECTION, SOLUTION INTRAVENOUS at 11:04

## 2023-04-03 RX ADMIN — LOPERAMIDE HYDROCHLORIDE 2 MG: 2 CAPSULE ORAL at 11:04

## 2023-04-03 NOTE — Clinical Note
Diagnosis: Syncope, unspecified syncope type [8803855]   Future Attending Provider: JAZZ AREVALO [[id number]]   Admitting Provider:: JAZZ AREVALO [[id number]]

## 2023-04-04 PROBLEM — N17.9 AKI (ACUTE KIDNEY INJURY): Status: ACTIVE | Noted: 2023-04-04

## 2023-04-04 PROBLEM — R55 SYNCOPE: Status: ACTIVE | Noted: 2023-04-04

## 2023-04-04 LAB
ANION GAP SERPL CALC-SCNC: 8 MMOL/L (ref 8–16)
BACTERIA #/AREA URNS HPF: NEGATIVE /HPF
BASOPHILS # BLD AUTO: 0 K/UL (ref 0–0.2)
BASOPHILS NFR BLD: 0 % (ref 0–1.9)
BILIRUB UR QL STRIP: NEGATIVE
BUN SERPL-MCNC: 39 MG/DL (ref 8–23)
CALCIUM SERPL-MCNC: 8.5 MG/DL (ref 8.7–10.5)
CHLORIDE SERPL-SCNC: 108 MMOL/L (ref 95–110)
CLARITY UR: CLEAR
CO2 SERPL-SCNC: 21 MMOL/L (ref 23–29)
COLOR UR: YELLOW
CREAT SERPL-MCNC: 1.5 MG/DL (ref 0.5–1.4)
DIFFERENTIAL METHOD: ABNORMAL
EOSINOPHIL # BLD AUTO: 0 K/UL (ref 0–0.5)
EOSINOPHIL NFR BLD: 0 % (ref 0–8)
ERYTHROCYTE [DISTWIDTH] IN BLOOD BY AUTOMATED COUNT: 13.5 % (ref 11.5–14.5)
EST. GFR  (NO RACE VARIABLE): 35.4 ML/MIN/1.73 M^2
GLUCOSE SERPL-MCNC: 148 MG/DL (ref 70–110)
GLUCOSE UR QL STRIP: NEGATIVE
HCT VFR BLD AUTO: 34.6 % (ref 37–48.5)
HCT VFR BLD AUTO: 35.2 % (ref 37–48.5)
HCT VFR BLD AUTO: 35.5 % (ref 37–48.5)
HCT VFR BLD AUTO: 36.1 % (ref 37–48.5)
HGB BLD-MCNC: 11 G/DL (ref 12–16)
HGB BLD-MCNC: 11.3 G/DL (ref 12–16)
HGB BLD-MCNC: 11.4 G/DL (ref 12–16)
HGB BLD-MCNC: 11.8 G/DL (ref 12–16)
HGB UR QL STRIP: ABNORMAL
HYALINE CASTS #/AREA URNS LPF: 17 /LPF
IMM GRANULOCYTES # BLD AUTO: 0.02 K/UL (ref 0–0.04)
IMM GRANULOCYTES NFR BLD AUTO: 0.3 % (ref 0–0.5)
KETONES UR QL STRIP: NEGATIVE
LEUKOCYTE ESTERASE UR QL STRIP: NEGATIVE
LYMPHOCYTES # BLD AUTO: 0.6 K/UL (ref 1–4.8)
LYMPHOCYTES NFR BLD: 7.3 % (ref 18–48)
MCH RBC QN AUTO: 29.5 PG (ref 27–31)
MCHC RBC AUTO-ENTMCNC: 32.4 G/DL (ref 32–36)
MCV RBC AUTO: 91 FL (ref 82–98)
MICROSCOPIC COMMENT: ABNORMAL
MONOCYTES # BLD AUTO: 0.6 K/UL (ref 0.3–1)
MONOCYTES NFR BLD: 8.1 % (ref 4–15)
NEUTROPHILS # BLD AUTO: 6.3 K/UL (ref 1.8–7.7)
NEUTROPHILS NFR BLD: 84.3 % (ref 38–73)
NITRITE UR QL STRIP: NEGATIVE
NRBC BLD-RTO: 0 /100 WBC
OB PNL STL: POSITIVE
OB PNL STL: POSITIVE
PH UR STRIP: 6 [PH] (ref 5–8)
PLATELET # BLD AUTO: 155 K/UL (ref 150–450)
PMV BLD AUTO: 10.1 FL (ref 9.2–12.9)
POTASSIUM SERPL-SCNC: 3.7 MMOL/L (ref 3.5–5.1)
PROT UR QL STRIP: ABNORMAL
RBC # BLD AUTO: 3.86 M/UL (ref 4–5.4)
RBC #/AREA URNS HPF: 3 /HPF (ref 0–4)
SODIUM SERPL-SCNC: 137 MMOL/L (ref 136–145)
SP GR UR STRIP: 1.01 (ref 1–1.03)
SQUAMOUS #/AREA URNS HPF: 3 /HPF
URN SPEC COLLECT METH UR: ABNORMAL
UROBILINOGEN UR STRIP-ACNC: NEGATIVE EU/DL
WBC # BLD AUTO: 7.49 K/UL (ref 3.9–12.7)
WBC #/AREA STL HPF: ABNORMAL /[HPF]
WBC #/AREA URNS HPF: 6 /HPF (ref 0–5)

## 2023-04-04 PROCEDURE — 63600175 PHARM REV CODE 636 W HCPCS: Performed by: HOSPITALIST

## 2023-04-04 PROCEDURE — 85018 HEMOGLOBIN: CPT | Mod: 91 | Performed by: HOSPITALIST

## 2023-04-04 PROCEDURE — G0378 HOSPITAL OBSERVATION PER HR: HCPCS

## 2023-04-04 PROCEDURE — 96361 HYDRATE IV INFUSION ADD-ON: CPT

## 2023-04-04 PROCEDURE — 85014 HEMATOCRIT: CPT | Mod: 91 | Performed by: HOSPITALIST

## 2023-04-04 PROCEDURE — 36415 COLL VENOUS BLD VENIPUNCTURE: CPT | Performed by: HOSPITALIST

## 2023-04-04 PROCEDURE — 85025 COMPLETE CBC W/AUTO DIFF WBC: CPT | Performed by: HOSPITALIST

## 2023-04-04 PROCEDURE — 94761 N-INVAS EAR/PLS OXIMETRY MLT: CPT

## 2023-04-04 PROCEDURE — 25000003 PHARM REV CODE 250: Performed by: HOSPITALIST

## 2023-04-04 PROCEDURE — C9113 INJ PANTOPRAZOLE SODIUM, VIA: HCPCS | Performed by: HOSPITALIST

## 2023-04-04 PROCEDURE — 80048 BASIC METABOLIC PNL TOTAL CA: CPT | Performed by: HOSPITALIST

## 2023-04-04 PROCEDURE — 81001 URINALYSIS AUTO W/SCOPE: CPT | Performed by: EMERGENCY MEDICINE

## 2023-04-04 PROCEDURE — 96374 THER/PROPH/DIAG INJ IV PUSH: CPT

## 2023-04-04 RX ORDER — SODIUM CHLORIDE 9 MG/ML
INJECTION, SOLUTION INTRAVENOUS CONTINUOUS
Status: DISCONTINUED | OUTPATIENT
Start: 2023-04-04 | End: 2023-04-05

## 2023-04-04 RX ORDER — TALC
6 POWDER (GRAM) TOPICAL NIGHTLY PRN
Status: DISCONTINUED | OUTPATIENT
Start: 2023-04-04 | End: 2023-04-06 | Stop reason: HOSPADM

## 2023-04-04 RX ORDER — ALBUTEROL SULFATE 0.83 MG/ML
2.5 SOLUTION RESPIRATORY (INHALATION) EVERY 6 HOURS PRN
Status: DISCONTINUED | OUTPATIENT
Start: 2023-04-04 | End: 2023-04-06 | Stop reason: HOSPADM

## 2023-04-04 RX ORDER — METOPROLOL SUCCINATE 25 MG/1
25 TABLET, EXTENDED RELEASE ORAL DAILY
Status: DISCONTINUED | OUTPATIENT
Start: 2023-04-04 | End: 2023-04-06 | Stop reason: HOSPADM

## 2023-04-04 RX ORDER — TRAZODONE HYDROCHLORIDE 50 MG/1
50 TABLET ORAL NIGHTLY PRN
Status: DISCONTINUED | OUTPATIENT
Start: 2023-04-04 | End: 2023-04-06 | Stop reason: HOSPADM

## 2023-04-04 RX ORDER — FLUTICASONE PROPIONATE 50 MCG
1 SPRAY, SUSPENSION (ML) NASAL DAILY
Status: DISCONTINUED | OUTPATIENT
Start: 2023-04-04 | End: 2023-04-06 | Stop reason: HOSPADM

## 2023-04-04 RX ORDER — ONDANSETRON 2 MG/ML
4 INJECTION INTRAMUSCULAR; INTRAVENOUS EVERY 6 HOURS PRN
Status: DISCONTINUED | OUTPATIENT
Start: 2023-04-04 | End: 2023-04-06 | Stop reason: HOSPADM

## 2023-04-04 RX ORDER — ACETAMINOPHEN 325 MG/1
650 TABLET ORAL EVERY 8 HOURS PRN
Status: DISCONTINUED | OUTPATIENT
Start: 2023-04-04 | End: 2023-04-06 | Stop reason: HOSPADM

## 2023-04-04 RX ORDER — SODIUM CHLORIDE 0.9 % (FLUSH) 0.9 %
10 SYRINGE (ML) INJECTION
Status: DISCONTINUED | OUTPATIENT
Start: 2023-04-04 | End: 2023-04-06 | Stop reason: HOSPADM

## 2023-04-04 RX ORDER — HYDROCODONE BITARTRATE AND ACETAMINOPHEN 5; 325 MG/1; MG/1
1 TABLET ORAL ONCE
Status: COMPLETED | OUTPATIENT
Start: 2023-04-04 | End: 2023-04-04

## 2023-04-04 RX ORDER — HYDROCODONE BITARTRATE AND ACETAMINOPHEN 5; 325 MG/1; MG/1
1 TABLET ORAL EVERY 6 HOURS PRN
Status: DISCONTINUED | OUTPATIENT
Start: 2023-04-04 | End: 2023-04-06 | Stop reason: HOSPADM

## 2023-04-04 RX ORDER — PROCHLORPERAZINE EDISYLATE 5 MG/ML
5 INJECTION INTRAMUSCULAR; INTRAVENOUS EVERY 6 HOURS PRN
Status: DISCONTINUED | OUTPATIENT
Start: 2023-04-04 | End: 2023-04-06 | Stop reason: HOSPADM

## 2023-04-04 RX ORDER — ALBUTEROL SULFATE 90 UG/1
2 AEROSOL, METERED RESPIRATORY (INHALATION) EVERY 6 HOURS PRN
Status: DISCONTINUED | OUTPATIENT
Start: 2023-04-04 | End: 2023-04-04

## 2023-04-04 RX ADMIN — HYDROCODONE BITARTRATE AND ACETAMINOPHEN 1 TABLET: 5; 325 TABLET ORAL at 09:04

## 2023-04-04 RX ADMIN — HYDROCODONE BITARTRATE AND ACETAMINOPHEN 1 TABLET: 5; 325 TABLET ORAL at 01:04

## 2023-04-04 RX ADMIN — HYDROCODONE BITARTRATE AND ACETAMINOPHEN 1 TABLET: 5; 325 TABLET ORAL at 03:04

## 2023-04-04 RX ADMIN — PANTOPRAZOLE SODIUM 40 MG: 40 INJECTION, POWDER, FOR SOLUTION INTRAVENOUS at 12:04

## 2023-04-04 RX ADMIN — PANTOPRAZOLE SODIUM 40 MG: 40 INJECTION, POWDER, FOR SOLUTION INTRAVENOUS at 08:04

## 2023-04-04 RX ADMIN — PANTOPRAZOLE SODIUM 40 MG: 40 INJECTION, POWDER, FOR SOLUTION INTRAVENOUS at 09:04

## 2023-04-04 RX ADMIN — SODIUM CHLORIDE: 0.9 INJECTION, SOLUTION INTRAVENOUS at 03:04

## 2023-04-04 RX ADMIN — METOPROLOL SUCCINATE 25 MG: 25 TABLET, EXTENDED RELEASE ORAL at 08:04

## 2023-04-04 RX ADMIN — ONDANSETRON 4 MG: 2 INJECTION INTRAMUSCULAR; INTRAVENOUS at 09:04

## 2023-04-04 RX ADMIN — HYDROCODONE BITARTRATE AND ACETAMINOPHEN 1 TABLET: 5; 325 TABLET ORAL at 04:04

## 2023-04-04 NOTE — CONSULTS
GASTROENTEROLOGY INPATIENT CONSULT NOTE  Patient Name: Marleen Samano  Patient MRN: [id number]  Patient : 1945    Admit Date: 4/3/2023  Service date: 2023    Reason for Consult: rectal bleeding    PCP: Mikie Ryan MD    Chief Complaint   Patient presents with    Loss of Consciousness     Pt arrived by ems, syncopal episode, falling to the ground, + blood thinners, complaint of abdominal pain, nausea and diarrhea since the syncopal, c collar placed by ems       HPI: Patient is a 77 y.o. female with PMHx appy, hysterectomy, HTN, GERD/HH, chronic anemia, diverticulosis, pAfib (ASA) presents for evalaution of weakness. Acute onset, intermittent, progressive on admission. States was at work and feeling dizzy w/ feeling she was gonna pass out. Happened intermittently then developed some abd cramping, rectal bleeding with syncopal episode. States cramping in LLQ w/ mild bleeding that has since tapered off. Today feels much better and rare dark stools. No sig NSAID use or bleeding prior to weakness episode.     CHART REVIEW:   CT Abd  - Nml liver, GB, panc; tics;abd wall hernia  Colon ' - tics; roids; small polyps  EGD ' - gastric inlet patch; 4cmHH; GEJ ring s/p 54; Gastritis; small duod tic    Past Medical History:  Past Medical History:   Diagnosis Date    Anemia due to multiple mechanisms 02/10/2020    Anemia in chronic kidney disease (CKD)     Anemia, chronic disease 02/10/2020    Arthritis     Aseptic loosening of prosthetic knee, initial encounter 2020    Bell's palsy     Bladder incontinence     Blepharospasm     Bronchiectasis without complication 10/08/2019    Cervical dystonia     Concussion     COPD (chronic obstructive pulmonary disease)     Enterocolitis 2020    Fainting spell     2020    Hormone deficiency     Hypertension     Loose right total knee arthroplasty 06/15/2020    Migraine     Muscle spasm     Myofacial pain syndrome     Normocytic normochromic anemia  02/10/2020    Right ear pain 02/09/2021    Squamous cell carcinoma of skin     Syncope and collapse 02/09/2021        Past Surgical History:  Past Surgical History:   Procedure Laterality Date    APPENDECTOMY      BREAST BIOPSY      BREAST LUMPECTOMY      CATARACT EXTRACTION      EYE SURGERY      HYSTERECTOMY      NECK SURGERY      REVISION OF KNEE ARTHROPLASTY Right 7/14/2020    Procedure: REVISION, ARTHROPLASTY, KNEE;  Surgeon: Pedro Tompkins MD;  Location: [facility];  Service: Orthopedics;  Laterality: Right;    TONSILLECTOMY          Home Medications:  Medications Prior to Admission   Medication Sig Dispense Refill Last Dose    albuterol (PROVENTIL/VENTOLIN HFA) 90 mcg/actuation inhaler Inhale 2 puffs into the lungs every 6 (six) hours as needed for Wheezing. Rescue 20.1 g 11 4/3/2023    amLODIPine (NORVASC) 2.5 MG tablet Take 1 tablet (2.5 mg total) by mouth once daily. 90 tablet 3 4/3/2023 at 10:00    aspirin 81 MG Chew Take 81 mg by mouth once daily.   4/3/2023 at 10:00    baclofen (LIORESAL) 10 MG tablet Take 1 tablet (10 mg total) by mouth 2 (two) times daily for 30 days, THEN 1 tablet (10 mg total) every evening. 90 tablet 0 Past Week    clotrimazole (LOTRIMIN) 1 % cream Apply topically 2 (two) times daily. (Patient taking differently: Apply 1 application topically 2 (two) times daily.) 28 g 6 Past Week    omeprazole (PRILOSEC) 40 MG capsule Take 1 capsule (40 mg total) by mouth 2 (two) times daily before meals. 90 capsule 3 4/3/2023 at 10:00    venlafaxine (EFFEXOR-XR) 150 MG Cp24 Take 1 capsule (150 mg total) by mouth 2 (two) times a day. 14 capsule 0 4/3/2023 at 10:00    albuterol-ipratropium (DUO-NEB) 2.5 mg-0.5 mg/3 mL nebulizer solution USE 1 VIAL VIA NEBULIZER  EVERY 6 HOURS AS NEEDED FOR WHEEZING RESCUE 180 mL 23 More than a month    benazepriL (LOTENSIN) 40 MG tablet Take 1 tablet (40 mg total) by mouth once daily. 90 tablet 3 4/2/2023    blood-glucose meter kit To check BG 1 times daily, to  use with insurance preferred meter 1 each 0     fluticasone propionate (FLONASE) 50 mcg/actuation nasal spray 1 spray (50 mcg total) by Each Nostril route once daily. (Patient not taking: Reported on 4/4/2023) 15.8 mL 0 Not Taking    gabapentin (NEURONTIN) 600 MG tablet Take 1 tablet (600 mg total) by mouth 3 (three) times daily. 270 tablet 3 More than a month    metoprolol succinate (TOPROL-XL) 25 MG 24 hr tablet Take 1 tablet (25 mg total) by mouth once daily. 90 tablet 3 Unknown    simethicone (GAS-X ORAL) Take by mouth.   More than a month    traZODone (DESYREL) 50 MG tablet Take 1 tablet (50 mg total) by mouth every evening. 30 tablet 0 4/2/2023       Inpatient Medications:   fluticasone propionate  1 spray Each Nostril Daily    metoprolol succinate  25 mg Oral Daily    pantoprazole  40 mg Intravenous Q12H     acetaminophen, albuterol sulfate, HYDROcodone-acetaminophen, melatonin, ondansetron, prochlorperazine, sodium chloride 0.9%, traZODone    Review of patient's allergies indicates:   Allergen Reactions    Adhesive tape-silicones Rash     Blisters after on for several hours    Augmentin [amoxicillin-pot clavulanate]      thrush    Ciprofloxacin      thrush    Morphine Itching and Hives       Social History:   Social History     Occupational History    Occupation: Caregiver   Tobacco Use    Smoking status: Never    Smokeless tobacco: Never   Substance and Sexual Activity    Alcohol use: No    Drug use: No    Sexual activity: Not on file       Family History:   Family History   Problem Relation Age of Onset    Cancer Mother     Heart disease Brother     Parkinsonism Brother     Diabetes Neg Hx     Melanoma Neg Hx     Psoriasis Neg Hx     Lupus Neg Hx     Eczema Neg Hx        Review of Systems:  A 10 point review of systems was performed and was normal, except as mentioned in the HPI, including constitutional, HEENT, heme, lymph, cardiovascular, respiratory, gastrointestinal, genitourinary, neurologic,  "endocrine, psychiatric and musculoskeletal.      OBJECTIVE:    Physical Exam:  24 Hour Vital Sign Ranges: Temp:  [97.9 °F (36.6 °C)-98 °F (36.7 °C)] 98 °F (36.7 °C)  Pulse:  [68-93] 71  Resp:  [16-19] 18  SpO2:  [94 %-99 %] 96 %  BP: (114-176)/(60-84) 167/77  Most recent vitals: BP (!) 167/77   Pulse 71   Temp 98 °F (36.7 °C) (Oral)   Resp 18   Ht 5' 9.5" (1.765 m)   Wt 63.2 kg (139 lb 5.3 oz)   SpO2 96%   BMI 20.28 kg/m²    GEN: well-developed, well-nourished, awake and alert, non-toxic appearing adult  HEENT: PERRL, sclera anicteric, oral mucosa pink and moist without lesion  NECK: trachea midline; Good ROM  CV: regular rate and rhythm, no murmurs or gallops  RESP: clear to auscultation bilaterally, no wheezes, rhonci or rales  ABD: soft, non-tender, non-distended, normal bowel sounds  EXT: no swelling or edema, 2+ pulses distally  SKIN: no rashes or jaundice  PSYCH: normal affect    Labs:   Recent Labs     04/03/23  2151 04/04/23  0544   WBC 9.62 7.49   MCV 92 91    155     Recent Labs     04/03/23  2151 04/04/23  0544    137   K 3.8 3.7    108   CO2 21* 21*   BUN 38* 39*   * 148*     No results for input(s): ALB in the last 72 hours.    Invalid input(s): ALKP, SGOT, SGPT, TBIL, DBIL, TPRO  No results for input(s): PT, INR, PTT in the last 72 hours.      Radiology Review:  CT Abdomen Pelvis  Without Contrast   Final Result      CT Cervical Spine Without Contrast   Final Result      CT Head Without Contrast   Final Result            IMPRESSION / RECOMMENDATIONS:  77 y.o. female with PMHx appy, hysterectomy, HTN, GERD/HH, chronic anemia, diverticulosis, pAfib (ASA) presents for evalaution of weakness. Based on clinical history suspect hypotension likely caused syncope, lebron and likely mild ischemic colitis nataly based on LLQ cramping. Fortunately improved symptoms, minimal drop in Hg and bleeding improving. Offered EGD / colon and after discussions, wants to wait and see how she does " o/n.     -If bleeding or any concerns, EGD / colon Thursday.   -Start clear liquids.   -Ok to continue ASA    Thank you for this consult.    Hilario Katz III  4/4/2023  3:35 PM

## 2023-04-04 NOTE — PLAN OF CARE
Met with patient at bedside to complete initial assessment. Patient / family reports patient DOES  have a living will and Lexis Hermosillo (daughter) [phone] is medical POA.     [facility]  Initial Discharge Assessment       Primary Care Provider: Mikie Ryan MD    Admission Diagnosis: Syncope, unspecified syncope type [R55]  GI bleed [K92.2]    Admission Date: 4/3/2023  Expected Discharge Date:     Discharge Barriers Identified: (P) None    Payor: [employer] MEDICARE / Plan: [employer] 65 / Product Type: Medicare Advantage /     Extended Emergency Contact Information  Primary Emergency Contact: Lexis Hermosillo  Address: [id number] [place] [address] [address]  Home Phone: [phone]  Mobile Phone: [phone]  Relation: Daughter  Preferred language: English   needed? No    Discharge Plan A: (P) Home  Discharge Plan B: (P) Home      [facility] [address] [place] [address] [address]  [address] [facility] [address]  Phone: [phone] Fax: [phone]    [facility] ([facility]) - [address]  [address] [address]  Phone: [phone] Fax: [phone]    [facility] ([facility] ) - [place], [place] - [address]  [address]  [address]  [facility] [address]  Phone: [phone] Fax: [phone]      Initial Assessment (most recent)       Adult Discharge Assessment - 04/04/23 1242          Discharge Assessment    Assessment Type Discharge Planning Assessment     Confirmed/corrected address, phone number and insurance Yes     Confirmed Demographics Correct on Facesheet     Source of Information patient     Does patient/caregiver understand observation status Yes     Communicated CECE with patient/caregiver No (P)      Reason For Admission syncope (P)      People in Home grandchild(jemma) (P)      Facility  Arrived From: work / home (P)      Do you expect to return to your current living situation? Yes (P)      Do you have help at home or someone to help you manage your care at home? Yes (P)      Who are your caregiver(s) and their phone number(s)? Richardson Chavesmadhavi) [phone] (P)      Current cognitive status: Alert/Oriented (P)      Equipment Currently Used at Home none (P)      Readmission within 30 days? No (P)      Patient currently being followed by outpatient case management? No (P)      Do you currently have service(s) that help you manage your care at home? No (P)      Do you take prescription medications? Yes (P)      Do you have prescription coverage? Yes (P)      Coverage [facility] (P)      Who is going to help you get home at discharge? Richardson Chavesmadhavi) [phone] (P)      How do you get to doctors appointments? car, drives self (P)      Are you on dialysis? No (P)      Do you take coumadin? No (P)      Discharge Plan A Home (P)      Discharge Plan B Home (P)      DME Needed Upon Discharge  none (P)      Discharge Plan discussed with: Patient (P)      Discharge Barriers Identified None (P)         OTHER    Name(s) of People in Home Richardson medinadarron) [phone] (P)

## 2023-04-04 NOTE — NURSING
Patient arrived on unit in stable condition. Call light within reach.   Nurses Note -- 4 Eyes      4/4/2023   12:25 PM      Skin assessed during: Admit      [] No Pressure Injuries Present    []Prevention Measures Documented      [x] Yes- Altered Skin Integrity Present or Discovered   [] LDA Added if Not in Epic (Describe Wound)   [x] New Altered Skin Integrity was Present on Admit and Documented in LDA   [x] Wound Image Taken    Wound Care Consulted? Yes    Attending Nurse:  Bbobi Means RN     Second RN/Staff Member:  AKBAR Higginbotham LPN

## 2023-04-04 NOTE — PLAN OF CARE
Met with patient at bedside, explained Medicare Outpatient Observation Notice (MOON), and left Q&A information sheet at bedside. MOON form scanned into media manager.       04/04/23 1242   ESPOSITO Message   Medicare Outpatient and Observation Notification regarding financial responsibility Given to patient/caregiver;Explained to patient/caregiver;Signed/date by patient/caregiver   Date EPSOSITO was signed 04/04/23   Time ESPOSITO was signed 1725

## 2023-04-04 NOTE — ED NOTES
PT REQUESTING IMMODIUM FOR HER DIARRHEA. PT HAS BEEN UP TO [facility] WITH LOOSE STOOLS FREQUENTLY. STOOL NOTED TO BE RED MARLENI COLORED AT THIS TIME. MD NOTIFIED

## 2023-04-04 NOTE — PLAN OF CARE
Problem: Adult Inpatient Plan of Care  Goal: Plan of Care Review  Outcome: Ongoing, Progressing  Goal: Patient-Specific Goal (Individualized)  Outcome: Ongoing, Progressing  Goal: Absence of Hospital-Acquired Illness or Injury  Outcome: Ongoing, Progressing  Goal: Optimal Comfort and Wellbeing  Outcome: Ongoing, Progressing  Goal: Readiness for Transition of Care  Outcome: Ongoing, Progressing     Problem: Diabetes Comorbidity  Goal: Blood Glucose Level Within Targeted Range  Outcome: Ongoing, Progressing     Problem: Fluid and Electrolyte Imbalance (Acute Kidney Injury/Impairment)  Goal: Fluid and Electrolyte Balance  Outcome: Ongoing, Progressing     Problem: Oral Intake Inadequate (Acute Kidney Injury/Impairment)  Goal: Optimal Nutrition Intake  Outcome: Ongoing, Progressing     Problem: Renal Function Impairment (Acute Kidney Injury/Impairment)  Goal: Effective Renal Function  Outcome: Ongoing, Progressing     Problem: Impaired Wound Healing  Goal: Optimal Wound Healing  Outcome: Ongoing, Progressing

## 2023-04-04 NOTE — PROGRESS NOTES
Automatic Inhaler to Nebulizer Interchange    albuterol (Ventolin, ProAir, or Proventil)  mcg given multiple times per day changed to albuterol 2.5 mg Q6H PRN Wheezing  per [facility] Automatic Therapeutic Substitutions Protocol.    Please contact pharmacy at extension [phone] with any questions.     Thank you,   Mando Fair

## 2023-04-04 NOTE — H&P
[facility] - Emergency Dept  Hospital Medicine  History & Physical    Patient Name: Marleen Samano  MRN: [id number]  Patient Class: IP- Inpatient  Admission Date: 4/3/2023  Attending Physician: Josephine Washington MD  Primary Care Provider: Mikie Ryan MD       Patient seen at 12:05 a.m. on 04/04/2023  Patient information was obtained from patient and ER records.     Subjective:     Principal Problem:Syncope    Chief Complaint:   Chief Complaint   Patient presents with    Loss of Consciousness     Pt arrived by ems, syncopal episode, falling to the ground, + blood thinners, complaint of abdominal pain, nausea and diarrhea since the syncopal, c collar placed by ems        HPI: 77-year-old woman with hypertension, depression, paroxysmal AFib, diabetes, arthritis, presenting with syncope.  Patient was standing at work today when she felt dizzy, lightheaded, and next thing she remembers she woke up on the floor.  She had witnessed syncope with possible head trauma.  She was brought to the ED where workup showed no intracranial hemorrhage, no spinal pathology, and evidence of dehydration and SHARITA.  After presenting to the ED, she began to have multiple episodes of stool which became progressively more bloody.  She denies any abdominal pain, midepigastric pain, history of hemorrhoids, constipation, nausea, vomiting, diarrhea prior to today, fever, chills, chest pain, headache.  She takes aspirin 81 mg daily and is not on anticoagulation.  Reports she had colonoscopy last 5 years ago with a few benign polyps removed.  She has never had a GI bleed before.      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/2020    Bell's palsy     Bladder incontinence     Blepharospasm     Bronchiectasis without complication 10/08/2019     Cervical dystonia     Concussion     COPD (chronic obstructive pulmonary disease)     Enterocolitis 02/07/2020    Fainting spell     2/7/2020    Hormone deficiency     Hypertension     Loose right total knee arthroplasty 06/15/2020    Migraine     Muscle spasm     Myofacial pain syndrome     Normocytic normochromic anemia 02/10/2020    Right ear pain 02/09/2021    Squamous cell carcinoma of skin     Syncope and collapse 02/09/2021       Past Surgical History:   Procedure Laterality Date    APPENDECTOMY      BREAST BIOPSY      BREAST LUMPECTOMY      CATARACT EXTRACTION      EYE SURGERY      HYSTERECTOMY      NECK SURGERY      REVISION OF KNEE ARTHROPLASTY Right 7/14/2020    Procedure: REVISION, ARTHROPLASTY, KNEE;  Surgeon: Pedro Tompkins MD;  Location: [facility];  Service: Orthopedics;  Laterality: Right;    TONSILLECTOMY         Review of patient's allergies indicates:   Allergen Reactions    Adhesive tape-silicones Rash     Blisters after on for several hours    Augmentin [amoxicillin-pot clavulanate]      thrush    Ciprofloxacin      thrush    Morphine Itching and Hives       No current facility-administered medications on file prior to encounter.     Current Outpatient Medications on File Prior to Encounter   Medication Sig    albuterol (PROVENTIL/VENTOLIN HFA) 90 mcg/actuation inhaler Inhale 2 puffs into the lungs every 6 (six) hours as needed for Wheezing. Rescue    albuterol-ipratropium (DUO-NEB) 2.5 mg-0.5 mg/3 mL nebulizer solution USE 1 VIAL VIA NEBULIZER  EVERY 6 HOURS AS NEEDED FOR WHEEZING RESCUE    amLODIPine (NORVASC) 2.5 MG tablet Take 1 tablet (2.5 mg total) by mouth once daily.    aspirin 81 MG Chew Take 81 mg by mouth once daily.    baclofen (LIORESAL) 10 MG tablet Take 1 tablet (10 mg total) by mouth 2 (two) times daily for 30 days, THEN 1 tablet (10 mg total) every evening.    benazepriL (LOTENSIN) 40 MG tablet Take 1 tablet (40 mg total) by mouth once daily.    blood-glucose meter kit  To check BG 1 times daily, to use with insurance preferred meter    clotrimazole (LOTRIMIN) 1 % cream Apply topically 2 (two) times daily.    fluticasone propionate (FLONASE) 50 mcg/actuation nasal spray 1 spray (50 mcg total) by Each Nostril route once daily.    gabapentin (NEURONTIN) 600 MG tablet Take 1 tablet (600 mg total) by mouth 3 (three) times daily.    metoprolol succinate (TOPROL-XL) 25 MG 24 hr tablet Take 1 tablet (25 mg total) by mouth once daily.    omeprazole (PRILOSEC) 40 MG capsule Take 1 capsule (40 mg total) by mouth 2 (two) times daily before meals.    simethicone (GAS-X ORAL) Take by mouth.    traZODone (DESYREL) 50 MG tablet Take 1 tablet (50 mg total) by mouth every evening.    venlafaxine (EFFEXOR-XR) 150 MG Cp24 Take 1 capsule (150 mg total) by mouth 2 (two) times a day.     Family History       Problem Relation (Age of Onset)    Cancer Mother    Heart disease Brother    Parkinsonism Brother          Tobacco Use    Smoking status: Never    Smokeless tobacco: Never   Substance and Sexual Activity    Alcohol use: No    Drug use: No    Sexual activity: Not on file     Review of Systems Complete ROS otherwise negative other than stated in HPI.   Objective:     Vital Signs (Most Recent):  Temp: 98 °F (36.7 °C) (04/03/23 2216)  Pulse: 93 (04/03/23 2257)  Resp: 18 (04/03/23 2117)  BP: 114/63 (04/03/23 2257)  SpO2: 98 % (04/03/23 2117) Vital Signs (24h Range):  Temp:  [98 °F (36.7 °C)] 98 °F (36.7 °C)  Pulse:  [74-93] 93  Resp:  [18] 18  SpO2:  [98 %] 98 %  BP: (114-132)/(60-69) 114/63     Weight: 65.8 kg (145 lb)  Body mass index is 24.13 kg/m².    Physical Exam  GENERAL:  No apparent distress. Alert and oriented x 3  HEENT:  EOMI. Conjunctivae intact. Posterior pharynx clear  NECK:  Supple   LUNGS:  No respiratory distress. Clear to auscultation bilaterally with good air movement  CARDIAC:  Tachycardic and regular, systolic murmur present  ABDOMEN:  Positive hypoactive bowel sounds. Nontender and  nondistended.  No rebound or guarding.  EXTREMITIES:  Peripheral pulses are 2+. Hands and feet are warm. Good capillary refill in fingers (< 2 seconds). No clubbing, cyanosis or edema  SKIN:  Skin color, texture and turgor normal. No rashes, ulcerations or nodules noted          Significant Labs: All pertinent labs within the past 24 hours have been reviewed.  BMP:   Recent Labs   Lab 04/03/23  2151   *      K 3.8      CO2 21*   BUN 38*   CREATININE 2.2*   CALCIUM 8.7     CBC:   Recent Labs   Lab 04/03/23 2151   WBC 9.62   HGB 12.1   HCT 37.0        CMP:   Recent Labs   Lab 04/03/23  2151      K 3.8      CO2 21*   *   BUN 38*   CREATININE 2.2*   CALCIUM 8.7   PROT 6.8   ALBUMIN 4.1   BILITOT 0.5   ALKPHOS 93   AST 22   ALT 12   ANIONGAP 14     Cardiac Markers: No results for input(s): CKMB, MYOGLOBIN, BNP, TROPISTAT in the last 48 hours.  Coagulation: No results for input(s): PT, INR, APTT in the last 48 hours.    Significant Imaging: I have reviewed all pertinent imaging results/findings within the past 24 hours.  CT: I have reviewed all pertinent results/findings within the past 24 hours and my personal findings are:  CT head without hemorrhage, spine CT without subluxation or fracture.    Assessment/Plan:     * Syncope  Likely related to GI bleed.  Orthostatic vital signs positive.  CT head negative.  Monitor telemetry, trend hemoglobin and hematocrit, IV fluids, IV ppi, keep NPO, hold home aspirin, GI evaluation      SHARITA (acute kidney injury)  Likely related to GI bleed.  Monitor BMP with IVF resuscitation and treatment bleed      Essential hypertension  Hold home oral anti hypertensives, monitor and resume if needed        VTE SCDs            Josephine Washington MD  Department of Hospital Medicine  [facility]

## 2023-04-04 NOTE — HPI
77-year-old woman with hypertension, depression, paroxysmal AFib, diabetes, arthritis, presenting with syncope.  Patient was standing at work today when she felt dizzy, lightheaded, and next thing she remembers she woke up on the floor.  She had witnessed syncope with possible head trauma.  She was brought to the ED where workup showed no intracranial hemorrhage, no spinal pathology, and evidence of dehydration and SHARITA.  After presenting to the ED, she began to have multiple episodes of stool which became progressively more bloody.  She denies any abdominal pain, midepigastric pain, history of hemorrhoids, constipation, nausea, vomiting, diarrhea prior to today, fever, chills, chest pain, headache.  She takes aspirin 81 mg daily and is not on anticoagulation.  Reports she had colonoscopy last 5 years ago with a few benign polyps removed.  She has never had a GI bleed before.

## 2023-04-04 NOTE — ED PROVIDER NOTES
Encounter Date: 4/3/2023       History     Chief Complaint   Patient presents with    Loss of Consciousness     Pt arrived by ems, syncopal episode, falling to the ground, + blood thinners, complaint of abdominal pain, nausea and diarrhea since the syncopal, c collar placed by ems     Chief complaint is episode of syncope.  The patient does not remember falling but became very cool and clammy and weak while standing at work.  She remembers waking up on the floor.  She is not sure if she is on blood thinners.  She did have some diarrhea after or she had the urge to have a bowel movement thereafter and the stool was normal per the nurse.  She has arrived with a C-collar in place.  She is alert oriented extremities and is very cooperative.  She does have a history of COPD hypertension myofascial pain syndrome among others.  She is not sure if she has any cardiac disease.      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/2020    Bell's palsy     Bladder incontinence     Blepharospasm     Bronchiectasis without complication 10/08/2019    Cervical dystonia     Concussion     COPD (chronic obstructive pulmonary disease)     Enterocolitis 02/07/2020    Fainting spell     2/7/2020    Hormone deficiency     Hypertension     Loose right total knee arthroplasty 06/15/2020    Migraine     Muscle spasm     Myofacial pain syndrome     Normocytic normochromic anemia 02/10/2020    Right ear pain 02/09/2021    Squamous cell carcinoma of skin     Syncope and collapse 02/09/2021     Past  Surgical History:   Procedure Laterality Date    APPENDECTOMY      BREAST BIOPSY      BREAST LUMPECTOMY      CATARACT EXTRACTION      EYE SURGERY      HYSTERECTOMY      NECK SURGERY      REVISION OF KNEE ARTHROPLASTY Right 7/14/2020    Procedure: REVISION, ARTHROPLASTY, KNEE;  Surgeon: Pedro Tompkins MD;  Location: [facility];  Service: Orthopedics;  Laterality: Right;    TONSILLECTOMY       Family History   Problem Relation Age of Onset    Cancer Mother     Heart disease Brother     Parkinsonism Brother     Diabetes Neg Hx     Melanoma Neg Hx     Psoriasis Neg Hx     Lupus Neg Hx     Eczema Neg Hx      Social History     Tobacco Use    Smoking status: Never    Smokeless tobacco: Never   Substance Use Topics    Alcohol use: No    Drug use: No     Review of Systems   Constitutional:  Negative for chills and fever.   HENT:  Negative for ear pain, rhinorrhea and sore throat.    Eyes:  Negative for pain and visual disturbance.   Respiratory:  Negative for cough and shortness of breath.    Cardiovascular:  Negative for chest pain and palpitations.   Gastrointestinal:  Negative for abdominal pain, constipation, diarrhea, nausea and vomiting.   Genitourinary:  Negative for dysuria, frequency, hematuria and urgency.   Musculoskeletal:  Negative for back pain, joint swelling and myalgias.   Skin:  Negative for rash.   Neurological:  Positive for syncope. Negative for dizziness, seizures, weakness and headaches.   Psychiatric/Behavioral:  Negative for dysphoric mood. The patient is not nervous/anxious.      Physical Exam     Initial Vitals   BP Pulse Resp Temp SpO2   04/03/23 2117 04/03/23 2117 04/03/23 2117 04/03/23 2216 04/03/23 2117   116/60 78 18 98 °F (36.7 °C) 98 %      MAP       --                Physical Exam    Nursing note and vitals reviewed.  Constitutional: She appears well-developed and well-nourished.   HENT:   Head: Normocephalic and atraumatic.   Eyes: Conjunctivae, EOM and lids are normal. Pupils are  equal, round, and reactive to light.   Neck: Trachea normal. Neck supple. No thyroid mass and no thyromegaly present.   Normal range of motion.  Cardiovascular:  Normal rate, regular rhythm and normal heart sounds.           Pulmonary/Chest: Effort normal and breath sounds normal.   Abdominal: Abdomen is soft. There is no abdominal tenderness.   Musculoskeletal:         General: Normal range of motion.      Cervical back: Normal range of motion and neck supple.     Neurological: She is alert and oriented to person, place, and time. She has normal strength and normal reflexes. No cranial nerve deficit or sensory deficit.   Skin: Skin is warm and dry.   Psychiatric: She has a normal mood and affect. Her speech is normal and behavior is normal. Judgment and thought content normal.       ED Course   Procedures  Labs Reviewed   CBC W/ AUTO DIFFERENTIAL - Abnormal; Notable for the following components:       Result Value    Gran # (ANC) 8.1 (*)     Immature Grans (Abs) 0.05 (*)     Lymph # 0.7 (*)     Gran % 84.0 (*)     Lymph % 7.5 (*)     All other components within normal limits   COMPREHENSIVE METABOLIC PANEL - Abnormal; Notable for the following components:    CO2 21 (*)     Glucose 138 (*)     BUN 38 (*)     Creatinine 2.2 (*)     eGFR 22.4 (*)     All other components within normal limits   POCT GLUCOSE - Abnormal; Notable for the following components:    POC Glucose 142 (*)     All other components within normal limits   CULTURE, STOOL   TROPONIN I HIGH SENSITIVITY   URINALYSIS, REFLEX TO URINE CULTURE   STOOL EXAM-OVA,CYSTS,PARASITES   WBC, STOOL   OCCULT BLOOD X 1, STOOL   OCCULT BLOOD X 1, STOOL   POCT GLUCOSE MONITORING CONTINUOUS     EKG Readings: (Independently Interpreted)   EKG reveals normal sinus rhythm heart rate 68 HI interval normal no ST elevation     Imaging Results              CT Abdomen Pelvis  Without Contrast (In process)                      CT Cervical Spine Without Contrast (Final result)   Result time 04/03/23 22:57:06      Final result by Marino Perez MD (04/03/23 22:57:06)                   Narrative:    CT CERVICAL SPINE WITHOUT IV CONTRAST    COMPARISONS: None.    ADDITIONAL PERTINENT HISTORY: Fall    TECHNIQUE:  Multiple axial images were obtained from the skull base through the upper thoracic spine with coronal and sagittal reformatted images obtained without IV contrast. One of the following dose optimization techniques was utilized in the performance of this exam: Automated exposure control; adjustment of the mA and/or kV according to the patient's size; or use of an iterative  reconstruction technique.  Specific details can be referenced in the facility's radiology CT exam operational policy.    FINDINGS.    Postoperative changes: Patient status post previous anterior interbody fusion of C4-C7.    Vertebral body heights and alignment: Negative.    Vertebral bodies: Anteriorly directed osteophytes at the level of C3-C4.    Disc spaces: Solid bony fusion across the disc spaces of C4-C5, C5-C6 and C6-C7.    Cranial cervical junction: Negative.    Cervical thoracic junction: Negative.    Surrounding soft tissues: Negative.    Lung apices: Background emphysematous changes involving both lung apices.    IMPRESSION:  1. Postoperative and spondylitic change as discussed above.  2. No acute appearing bony abnormalities.      Electronically signed by:  Marino Perez MD  4/3/2023 10:57 PM CDT Workstation: [id number]                                     CT Head Without Contrast (Final result)  Result time 04/03/23 22:56:40      Final result by Ricky Zaman DO (04/03/23 22:56:40)                   Narrative:    EXAM: CT HEAD WITHOUT IV CONTRAST    HISTORY: fall    COMPARISON: 3/18/2023    TECHNIQUE: CT acquisition of the head without intravenous contrast. Multiplanar reformats were obtained.    RADIATION DOSE REDUCTION: All CT scans are performed using radiation dose reduction techniques, when  applicable. Technical factors are evaluated and adjusted to ensure appropriate moderation of exposure.    FINDINGS:  Limitations: None.    Brain parenchyma: No acute infarct, hemorrhage, or mass. Patchy white matter hypoattenuating areas consistent with chronic small vessel ischemic changes. Atherosclerotic calcifications in bilateral cavernous internal carotid arteries.    Ventricles and extra-axial spaces: Appropriate for age. No extra-axial fluid collection.    Orbits: Bilateral lens surgery. Otherwise, unremarkable.    Visualized paranasal sinuses: Clear.    Mastoids and middle ear cavities: Clear.    Bones: No acute or suspicious osseous abnormality. Degenerative changes of bilateral TMJs, right worse on left.    Other: None.    IMPRESSION:  No acute intracranial abnormality.        Electronically signed by:  Ricky Zaman DO  4/3/2023 10:56 PM CDT Workstation: [id number]                                     Medications   0.9%  NaCl infusion (1,000 mLs Intravenous New Bag 4/3/23 2314)   loperamide capsule 2 mg (2 mg Oral Given 4/3/23 2314)     Medical Decision Making:   ED Management:  Patient has a diagnosis of syncope and diarrhea differential diagnosis includes cardiac versus pulmonary causes.  An arrhythmia could cause syncope anemic and cough syncope hypoxemic and cough syncope among others.  Case discussed with the hospitalist the patient is to be admitted                        Clinical Impression:   Final diagnoses:  [R55] Syncope, unspecified syncope type (Primary)        ED Disposition Condition    Observation Stable                Dc Alvarez MD  04/03/23 [id number]       Dc Alvarez MD  04/04/23 0005

## 2023-04-04 NOTE — SUBJECTIVE & OBJECTIVE
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/2020    Bell's palsy     Bladder incontinence     Blepharospasm     Bronchiectasis without complication 10/08/2019    Cervical dystonia     Concussion     COPD (chronic obstructive pulmonary disease)     Enterocolitis 02/07/2020    Fainting spell     2/7/2020    Hormone deficiency     Hypertension     Loose right total knee arthroplasty 06/15/2020    Migraine     Muscle spasm     Myofacial pain syndrome     Normocytic normochromic anemia 02/10/2020    Right ear pain 02/09/2021    Squamous cell carcinoma of skin     Syncope and collapse 02/09/2021       Past Surgical History:   Procedure Laterality Date    APPENDECTOMY      BREAST BIOPSY      BREAST LUMPECTOMY      CATARACT EXTRACTION      EYE SURGERY      HYSTERECTOMY      NECK SURGERY      REVISION OF KNEE ARTHROPLASTY Right 7/14/2020    Procedure: REVISION, ARTHROPLASTY, KNEE;  Surgeon: Pedro Tompkins MD;  Location: [facility];  Service: Orthopedics;  Laterality: Right;    TONSILLECTOMY         Review of patient's allergies indicates:   Allergen Reactions    Adhesive tape-silicones Rash     Blisters after on for several hours    Augmentin [amoxicillin-pot clavulanate]      thrush    Ciprofloxacin      thrush    Morphine Itching and Hives       No current facility-administered medications on file prior to encounter.     Current Outpatient Medications on File Prior to Encounter   Medication Sig    albuterol (PROVENTIL/VENTOLIN HFA) 90 mcg/actuation inhaler Inhale 2 puffs into the lungs every 6 (six) hours as needed for Wheezing. Rescue    albuterol-ipratropium (DUO-NEB) 2.5 mg-0.5 mg/3 mL nebulizer solution USE 1 VIAL VIA NEBULIZER  EVERY 6 HOURS AS NEEDED FOR WHEEZING RESCUE    amLODIPine (NORVASC) 2.5 MG tablet Take  1 tablet (2.5 mg total) by mouth once daily.    aspirin 81 MG Chew Take 81 mg by mouth once daily.    baclofen (LIORESAL) 10 MG tablet Take 1 tablet (10 mg total) by mouth 2 (two) times daily for 30 days, THEN 1 tablet (10 mg total) every evening.    benazepriL (LOTENSIN) 40 MG tablet Take 1 tablet (40 mg total) by mouth once daily.    blood-glucose meter kit To check BG 1 times daily, to use with insurance preferred meter    clotrimazole (LOTRIMIN) 1 % cream Apply topically 2 (two) times daily.    fluticasone propionate (FLONASE) 50 mcg/actuation nasal spray 1 spray (50 mcg total) by Each Nostril route once daily.    gabapentin (NEURONTIN) 600 MG tablet Take 1 tablet (600 mg total) by mouth 3 (three) times daily.    metoprolol succinate (TOPROL-XL) 25 MG 24 hr tablet Take 1 tablet (25 mg total) by mouth once daily.    omeprazole (PRILOSEC) 40 MG capsule Take 1 capsule (40 mg total) by mouth 2 (two) times daily before meals.    simethicone (GAS-X ORAL) Take by mouth.    traZODone (DESYREL) 50 MG tablet Take 1 tablet (50 mg total) by mouth every evening.    venlafaxine (EFFEXOR-XR) 150 MG Cp24 Take 1 capsule (150 mg total) by mouth 2 (two) times a day.     Family History       Problem Relation (Age of Onset)    Cancer Mother    Heart disease Brother    Parkinsonism Brother          Tobacco Use    Smoking status: Never    Smokeless tobacco: Never   Substance and Sexual Activity    Alcohol use: No    Drug use: No    Sexual activity: Not on file     Review of Systems Complete ROS otherwise negative other than stated in HPI.   Objective:     Vital Signs (Most Recent):  Temp: 98 °F (36.7 °C) (04/03/23 2216)  Pulse: 93 (04/03/23 2257)  Resp: 18 (04/03/23 2117)  BP: 114/63 (04/03/23 2257)  SpO2: 98 % (04/03/23 2117) Vital Signs (24h Range):  Temp:  [98 °F (36.7 °C)] 98 °F (36.7 °C)  Pulse:  [74-93] 93  Resp:  [18] 18  SpO2:  [98 %] 98 %  BP: (114-132)/(60-69) 114/63     Weight: 65.8 kg (145 lb)  Body mass index is 24.13  kg/m².    Physical Exam  GENERAL:  No apparent distress. Alert and oriented x 3  HEENT:  EOMI. Conjunctivae intact. Posterior pharynx clear  NECK:  Supple   LUNGS:  No respiratory distress. Clear to auscultation bilaterally with good air movement  CARDIAC:  Tachycardic and regular, systolic murmur present  ABDOMEN:  Positive hypoactive bowel sounds. Nontender and nondistended.  No rebound or guarding.  EXTREMITIES:  Peripheral pulses are 2+. Hands and feet are warm. Good capillary refill in fingers (< 2 seconds). No clubbing, cyanosis or edema  SKIN:  Skin color, texture and turgor normal. No rashes, ulcerations or nodules noted          Significant Labs: All pertinent labs within the past 24 hours have been reviewed.  BMP:   Recent Labs   Lab 04/03/23  2151   *      K 3.8      CO2 21*   BUN 38*   CREATININE 2.2*   CALCIUM 8.7     CBC:   Recent Labs   Lab 04/03/23 2151   WBC 9.62   HGB 12.1   HCT 37.0        CMP:   Recent Labs   Lab 04/03/23 2151      K 3.8      CO2 21*   *   BUN 38*   CREATININE 2.2*   CALCIUM 8.7   PROT 6.8   ALBUMIN 4.1   BILITOT 0.5   ALKPHOS 93   AST 22   ALT 12   ANIONGAP 14     Cardiac Markers: No results for input(s): CKMB, MYOGLOBIN, BNP, TROPISTAT in the last 48 hours.  Coagulation: No results for input(s): PT, INR, APTT in the last 48 hours.    Significant Imaging: I have reviewed all pertinent imaging results/findings within the past 24 hours.  CT: I have reviewed all pertinent results/findings within the past 24 hours and my personal findings are:  CT head without hemorrhage, spine CT without subluxation or fracture.

## 2023-04-04 NOTE — ASSESSMENT & PLAN NOTE
Likely related to GI bleed.  Orthostatic vital signs positive.  CT head negative.  Monitor telemetry, trend hemoglobin and hematocrit, IV fluids, IV ppi, keep NPO, hold home aspirin, GI evaluation

## 2023-04-05 PROBLEM — K92.2 GI BLEED: Status: ACTIVE | Noted: 2023-04-05

## 2023-04-05 LAB
ANION GAP SERPL CALC-SCNC: 7 MMOL/L (ref 8–16)
BASOPHILS # BLD AUTO: 0.01 K/UL (ref 0–0.2)
BASOPHILS NFR BLD: 0.2 % (ref 0–1.9)
BUN SERPL-MCNC: 17 MG/DL (ref 8–23)
CALCIUM SERPL-MCNC: 8.3 MG/DL (ref 8.7–10.5)
CHLORIDE SERPL-SCNC: 112 MMOL/L (ref 95–110)
CO2 SERPL-SCNC: 20 MMOL/L (ref 23–29)
CREAT SERPL-MCNC: 0.9 MG/DL (ref 0.5–1.4)
DIFFERENTIAL METHOD: ABNORMAL
EOSINOPHIL # BLD AUTO: 0 K/UL (ref 0–0.5)
EOSINOPHIL NFR BLD: 0.5 % (ref 0–8)
ERYTHROCYTE [DISTWIDTH] IN BLOOD BY AUTOMATED COUNT: 13.4 % (ref 11.5–14.5)
EST. GFR  (NO RACE VARIABLE): >60 ML/MIN/1.73 M^2
GLUCOSE SERPL-MCNC: 95 MG/DL (ref 70–110)
HCT VFR BLD AUTO: 34.5 % (ref 37–48.5)
HGB BLD-MCNC: 10.8 G/DL (ref 12–16)
IMM GRANULOCYTES # BLD AUTO: 0.01 K/UL (ref 0–0.04)
IMM GRANULOCYTES NFR BLD AUTO: 0.2 % (ref 0–0.5)
LYMPHOCYTES # BLD AUTO: 0.7 K/UL (ref 1–4.8)
LYMPHOCYTES NFR BLD: 12.9 % (ref 18–48)
MCH RBC QN AUTO: 29.8 PG (ref 27–31)
MCHC RBC AUTO-ENTMCNC: 31.3 G/DL (ref 32–36)
MCV RBC AUTO: 95 FL (ref 82–98)
MONOCYTES # BLD AUTO: 0.6 K/UL (ref 0.3–1)
MONOCYTES NFR BLD: 10.5 % (ref 4–15)
NEUTROPHILS # BLD AUTO: 4.3 K/UL (ref 1.8–7.7)
NEUTROPHILS NFR BLD: 75.7 % (ref 38–73)
NRBC BLD-RTO: 0 /100 WBC
PLATELET # BLD AUTO: 115 K/UL (ref 150–450)
PMV BLD AUTO: 9.8 FL (ref 9.2–12.9)
POTASSIUM SERPL-SCNC: 3.6 MMOL/L (ref 3.5–5.1)
RBC # BLD AUTO: 3.63 M/UL (ref 4–5.4)
SODIUM SERPL-SCNC: 139 MMOL/L (ref 136–145)
WBC # BLD AUTO: 5.73 K/UL (ref 3.9–12.7)

## 2023-04-05 PROCEDURE — 94761 N-INVAS EAR/PLS OXIMETRY MLT: CPT

## 2023-04-05 PROCEDURE — 25000003 PHARM REV CODE 250: Performed by: HOSPITALIST

## 2023-04-05 PROCEDURE — 96376 TX/PRO/DX INJ SAME DRUG ADON: CPT

## 2023-04-05 PROCEDURE — C9113 INJ PANTOPRAZOLE SODIUM, VIA: HCPCS | Performed by: HOSPITALIST

## 2023-04-05 PROCEDURE — 80048 BASIC METABOLIC PNL TOTAL CA: CPT | Performed by: HOSPITALIST

## 2023-04-05 PROCEDURE — 99900035 HC TECH TIME PER 15 MIN (STAT)

## 2023-04-05 PROCEDURE — 94799 UNLISTED PULMONARY SVC/PX: CPT

## 2023-04-05 PROCEDURE — 25000242 PHARM REV CODE 250 ALT 637 W/ HCPCS: Performed by: HOSPITALIST

## 2023-04-05 PROCEDURE — 63600175 PHARM REV CODE 636 W HCPCS: Performed by: HOSPITALIST

## 2023-04-05 PROCEDURE — G0378 HOSPITAL OBSERVATION PER HR: HCPCS

## 2023-04-05 PROCEDURE — 85025 COMPLETE CBC W/AUTO DIFF WBC: CPT | Performed by: HOSPITALIST

## 2023-04-05 PROCEDURE — 36415 COLL VENOUS BLD VENIPUNCTURE: CPT | Performed by: HOSPITALIST

## 2023-04-05 PROCEDURE — 25000003 PHARM REV CODE 250: Performed by: INTERNAL MEDICINE

## 2023-04-05 RX ORDER — POLYETHYLENE GLYCOL 3350 17 G/17G
340 POWDER, FOR SOLUTION ORAL ONCE
Status: COMPLETED | OUTPATIENT
Start: 2023-04-05 | End: 2023-04-05

## 2023-04-05 RX ADMIN — SODIUM CHLORIDE: 0.9 INJECTION, SOLUTION INTRAVENOUS at 09:04

## 2023-04-05 RX ADMIN — PANTOPRAZOLE SODIUM 40 MG: 40 INJECTION, POWDER, FOR SOLUTION INTRAVENOUS at 09:04

## 2023-04-05 RX ADMIN — HYDROCODONE BITARTRATE AND ACETAMINOPHEN 1 TABLET: 5; 325 TABLET ORAL at 07:04

## 2023-04-05 RX ADMIN — METOPROLOL SUCCINATE 25 MG: 25 TABLET, EXTENDED RELEASE ORAL at 09:04

## 2023-04-05 RX ADMIN — POLYETHYLENE GLYCOL 3350 340 G: 17 POWDER, FOR SOLUTION ORAL at 12:04

## 2023-04-05 RX ADMIN — FLUTICASONE PROPIONATE 50 MCG: 50 SPRAY, METERED NASAL at 09:04

## 2023-04-05 NOTE — PROGRESS NOTES
Count includes the [facility] Medicine  Progress Note    Patient Name: Marleen Samano  MRN: [id number]  Patient Class: OP- Observation   Admission Date: 4/3/2023  Length of Stay: 1 days  Attending Physician: Vadim Bass MD  Primary Care Provider: Mikie Ryan MD        Subjective:     Principal Problem:Syncope        HPI:  77-year-old woman with hypertension, depression, paroxysmal AFib, diabetes, arthritis, presenting with syncope.  Patient was standing at work today when she felt dizzy, lightheaded, and next thing she remembers she woke up on the floor.  She had witnessed syncope with possible head trauma.  She was brought to the ED where workup showed no intracranial hemorrhage, no spinal pathology, and evidence of dehydration and SHARITA.  After presenting to the ED, she began to have multiple episodes of stool which became progressively more bloody.  She denies any abdominal pain, midepigastric pain, history of hemorrhoids, constipation, nausea, vomiting, diarrhea prior to today, fever, chills, chest pain, headache.  She takes aspirin 81 mg daily and is not on anticoagulation.  Reports she had colonoscopy last 5 years ago with a few benign polyps removed.  She has never had a GI bleed before.      Overview/Hospital Course:  04/05  Syncope resolved  EGD/colonoscopy tmrw AM       Interval History:     Review of Systems   Constitutional:  Positive for fatigue. Negative for activity change and appetite change.   HENT:  Negative for congestion and dental problem.    Eyes:  Negative for discharge and itching.   Respiratory:  Negative for shortness of breath.    Cardiovascular:  Negative for chest pain.   Gastrointestinal:  Negative for abdominal distention and abdominal pain.   Endocrine: Negative for cold intolerance.   Genitourinary:  Negative for difficulty urinating and dysuria.   Musculoskeletal:  Negative for arthralgias and back pain.   Skin:  Negative for color change.   Neurological:  Negative for  dizziness and facial asymmetry.   Hematological:  Negative for adenopathy.   Psychiatric/Behavioral:  Negative for agitation and behavioral problems.    Objective:     Vital Signs (Most Recent):  Temp: 98.2 °F (36.8 °C) (04/05/23 1201)  Pulse: 61 (04/05/23 1201)  Resp: 18 (04/05/23 1201)  BP: 123/73 (04/05/23 1201)  SpO2: 98 % (04/05/23 1201) Vital Signs (24h Range):  Temp:  [97.8 °F (36.6 °C)-98.6 °F (37 °C)] 98.2 °F (36.8 °C)  Pulse:  [61-78] 61  Resp:  [18-20] 18  SpO2:  [96 %-100 %] 98 %  BP: (123-159)/(62-74) 123/73     Weight: 63.2 kg (139 lb 5.3 oz)  Body mass index is 20.28 kg/m².    Intake/Output Summary (Last 24 hours) at 4/5/2023 1605  Last data filed at 4/5/2023 1540  Gross per 24 hour   Intake 1927.5 ml   Output --   Net 1927.5 ml      Physical Exam  Vitals and nursing note reviewed.   Constitutional:       General: She is not in acute distress.  HENT:      Head: Atraumatic.      Right Ear: External ear normal.      Left Ear: External ear normal.      Nose: Nose normal.      Mouth/Throat:      Mouth: Mucous membranes are dry.   Cardiovascular:      Rate and Rhythm: Normal rate.   Pulmonary:      Effort: Pulmonary effort is normal.   Musculoskeletal:         General: Normal range of motion.      Cervical back: Normal range of motion.   Skin:     General: Skin is dry.   Neurological:      General: No focal deficit present.      Mental Status: She is alert.   Psychiatric:         Behavior: Behavior normal.       Significant Labs: All pertinent labs within the past 24 hours have been reviewed.  CBC:   Recent Labs   Lab 04/03/23  2151 04/04/23  0106 04/04/23  0544 04/04/23  1237 04/04/23  1810 04/05/23  0513   WBC 9.62  --  7.49  --   --  5.73   HGB 12.1   < > 11.4* 11.3* 11.0* 10.8*   HCT 37.0   < > 35.2* 35.5* 34.6* 34.5*     --  155  --   --  115*    < > = values in this interval not displayed.     CMP:   Recent Labs   Lab 04/03/23  2151 04/04/23  0544 04/05/23  0513    137 139   K 3.8 3.7  3.6    108 112*   CO2 21* 21* 20*   * 148* 95   BUN 38* 39* 17   CREATININE 2.2* 1.5* 0.9   CALCIUM 8.7 8.5* 8.3*   PROT 6.8  --   --    ALBUMIN 4.1  --   --    BILITOT 0.5  --   --    ALKPHOS 93  --   --    AST 22  --   --    ALT 12  --   --    ANIONGAP 14 8 7*       Significant Imaging: I have reviewed all pertinent imaging results/findings within the past 24 hours.      Assessment/Plan:      * Syncope  Mostly from GI bleed  Resolved  Pt will have EGD/colonoscopy Tmrw AM       GI bleed  As above       SHARITA (acute kidney injury)  Stable       Essential hypertension  Stable         VTE Risk Mitigation (From admission, onward)         Ordered     IP VTE HIGH RISK PATIENT  Once         04/04/23 0419     Place sequential compression device  Until discontinued         04/04/23 0419                Discharge Planning   CECE: 4/6/2023     Code Status: Full Code   Is the patient medically ready for discharge?:     Reason for patient still in hospital (select all that apply): Treatment  Discharge Plan A: Home                  Vadim Bass MD  Department of Hospital Medicine   [facility]

## 2023-04-05 NOTE — HOSPITAL COURSE
Patient got admitted with syncope and suspected GI bleed  Pt had EGD/colonoscopy  Colonoscopy showed ischemic colitis  Pt was treated adequately and her condition improved and was later discharged back to home

## 2023-04-05 NOTE — CARE UPDATE
04/05/23 0957   Patient Assessment/Suction   Level of Consciousness (AVPU) alert   All Lung Fields Breath Sounds diminished   PRE-TX-O2   Device (Oxygen Therapy) room air   SpO2 100 %   Pulse Oximetry Type Intermittent   $ Pulse Oximetry - Multiple Charge Pulse Oximetry - Multiple   Pulse 78   Resp 18   Aerosol Therapy   $ Aerosol Therapy Charges PRN treatment not required

## 2023-04-05 NOTE — CONSULTS
Small dry pink lesion right sie of temple. No needs at this time.  Small healing skin tear on left hand.  Cleaned and applied new bandaid to hand.

## 2023-04-05 NOTE — PROGRESS NOTES
"Patient Name: Marleen Samano  Patient MRN: [id number]  Patient : 1945    Admit Date: 4/3/2023  Service date: 2023    Reason for Consult: rectal bleeding.     PCP: Mikie Ryan MD    S: Paxton leo. Still intermittent cramping. No f/c. Bleeding resumed and slight fresher looking overnight per patient.     Inpatient Medications:   fluticasone propionate  1 spray Each Nostril Daily    metoprolol succinate  25 mg Oral Daily    pantoprazole  40 mg Intravenous Q12H     acetaminophen, albuterol sulfate, HYDROcodone-acetaminophen, melatonin, ondansetron, prochlorperazine, sodium chloride 0.9%, traZODone    Review of Systems:  A 10 point review of systems was performed and was normal, except as mentioned in the HPI, including constitutional, HEENT, heme, lymph, cardiovascular, respiratory, gastrointestinal, genitourinary, neurologic, endocrine, psychiatric and musculoskeletal.      OBJECTIVE:    Physical Exam:  24 Hour Vital Sign Ranges: Temp:  [97.8 °F (36.6 °C)-98.6 °F (37 °C)] 97.8 °F (36.6 °C)  Pulse:  [68-86] 77  Resp:  [18-20] 18  SpO2:  [94 %-99 %] 98 %  BP: (132-176)/(62-84) 134/63  Most recent vitals: /63   Pulse 77   Temp 97.8 °F (36.6 °C) (Oral)   Resp 18   Ht 5' 9.5" (1.765 m)   Wt 63.2 kg (139 lb 5.3 oz)   SpO2 98%   BMI 20.28 kg/m²    GEN: well-developed, well-nourished, awake and alert, non-toxic appearing adult  HEENT: PERRL, sclera anicteric, oral mucosa pink and moist without lesion  NECK: trachea midline; Good ROM  CV: regular rate and rhythm, no murmurs or gallops  RESP: clear to auscultation bilaterally, no wheezes, rhonci or rales  ABD: soft, non-tender, non-distended, normal bowel sounds  EXT: no swelling or edema, 2+ pulses distally  SKIN: no rashes or jaundice  PSYCH: normal affect    Labs:   Recent Labs     04/0323   WBC 9.62 7.49 5.73   MCV 92 91 95    155 115*     Recent Labs     23   NA " 139 137 139   K 3.8 3.7 3.6    108 112*   CO2 21* 21* 20*   BUN 38* 39* 17   * 148* 95     No results for input(s): ALB in the last 72 hours.    Invalid input(s): ALKP, SGOT, SGPT, TBIL, DBIL, TPRO  No results for input(s): PT, INR, PTT in the last 72 hours.      Radiology Review:  CT Abdomen Pelvis  Without Contrast   Final Result      CT Cervical Spine Without Contrast   Final Result      CT Head Without Contrast   Final Result            IMPRESSION / RECOMMENDATIONS:  77 y.o. female with PMHx appy, hysterectomy, HTN, GERD/HH, chronic anemia, diverticulosis, pAfib (ASA) presents for evalaution of weakness. Based on clinical history suspect hypotension likely caused syncope, lebron and likely mild ischemic colitis nataly based on LLQ cramping. Continues to have bleeding and after discussions, now agreeable to EGD / Colon. RIsks, benefits, alternatives discussed in detail regarding upcoming procedures and sedation and possible complications. Some of the more common endoscopic complications include but not limited to immediate or delayed perforation, bleeding, infections, pain, inadvertent injury to surrounding tissue / organs and possible need for surgical evaluation. All questions answered and will proceed with procedure as planned.        -Start bowel prep today; EGD / colon Thursday.   -Continue clear liquids. NPO after midnight  -Ok to continue ASA      Hilario FRANCOIS Dauterive III  4/5/2023  8:27 AM

## 2023-04-05 NOTE — SUBJECTIVE & OBJECTIVE
Interval History:     Review of Systems   Constitutional:  Positive for fatigue. Negative for activity change and appetite change.   HENT:  Negative for congestion and dental problem.    Eyes:  Negative for discharge and itching.   Respiratory:  Negative for shortness of breath.    Cardiovascular:  Negative for chest pain.   Gastrointestinal:  Negative for abdominal distention and abdominal pain.   Endocrine: Negative for cold intolerance.   Genitourinary:  Negative for difficulty urinating and dysuria.   Musculoskeletal:  Negative for arthralgias and back pain.   Skin:  Negative for color change.   Neurological:  Negative for dizziness and facial asymmetry.   Hematological:  Negative for adenopathy.   Psychiatric/Behavioral:  Negative for agitation and behavioral problems.    Objective:     Vital Signs (Most Recent):  Temp: 98.2 °F (36.8 °C) (04/05/23 1201)  Pulse: 61 (04/05/23 1201)  Resp: 18 (04/05/23 1201)  BP: 123/73 (04/05/23 1201)  SpO2: 98 % (04/05/23 1201) Vital Signs (24h Range):  Temp:  [97.8 °F (36.6 °C)-98.6 °F (37 °C)] 98.2 °F (36.8 °C)  Pulse:  [61-78] 61  Resp:  [18-20] 18  SpO2:  [96 %-100 %] 98 %  BP: (123-159)/(62-74) 123/73     Weight: 63.2 kg (139 lb 5.3 oz)  Body mass index is 20.28 kg/m².    Intake/Output Summary (Last 24 hours) at 4/5/2023 1605  Last data filed at 4/5/2023 1540  Gross per 24 hour   Intake 1927.5 ml   Output --   Net 1927.5 ml      Physical Exam  Vitals and nursing note reviewed.   Constitutional:       General: She is not in acute distress.  HENT:      Head: Atraumatic.      Right Ear: External ear normal.      Left Ear: External ear normal.      Nose: Nose normal.      Mouth/Throat:      Mouth: Mucous membranes are dry.   Cardiovascular:      Rate and Rhythm: Normal rate.   Pulmonary:      Effort: Pulmonary effort is normal.   Musculoskeletal:         General: Normal range of motion.      Cervical back: Normal range of motion.   Skin:     General: Skin is dry.    Neurological:      General: No focal deficit present.      Mental Status: She is alert.   Psychiatric:         Behavior: Behavior normal.       Significant Labs: All pertinent labs within the past 24 hours have been reviewed.  CBC:   Recent Labs   Lab 04/03/23 2151 04/04/23  0106 04/04/23  0544 04/04/23  1237 04/04/23  1810 04/05/23  0513   WBC 9.62  --  7.49  --   --  5.73   HGB 12.1   < > 11.4* 11.3* 11.0* 10.8*   HCT 37.0   < > 35.2* 35.5* 34.6* 34.5*     --  155  --   --  115*    < > = values in this interval not displayed.     CMP:   Recent Labs   Lab 04/03/23 2151 04/04/23 0544 04/05/23  0513    137 139   K 3.8 3.7 3.6    108 112*   CO2 21* 21* 20*   * 148* 95   BUN 38* 39* 17   CREATININE 2.2* 1.5* 0.9   CALCIUM 8.7 8.5* 8.3*   PROT 6.8  --   --    ALBUMIN 4.1  --   --    BILITOT 0.5  --   --    ALKPHOS 93  --   --    AST 22  --   --    ALT 12  --   --    ANIONGAP 14 8 7*       Significant Imaging: I have reviewed all pertinent imaging results/findings within the past 24 hours.

## 2023-04-06 ENCOUNTER — ANESTHESIA EVENT (OUTPATIENT)
Dept: SURGERY | Facility: HOSPITAL | Age: 78
End: 2023-04-06
Payer: MEDICARE

## 2023-04-06 ENCOUNTER — ANESTHESIA (OUTPATIENT)
Dept: SURGERY | Facility: HOSPITAL | Age: 78
End: 2023-04-06
Payer: MEDICARE

## 2023-04-06 VITALS
BODY MASS INDEX: 20.63 KG/M2 | HEIGHT: 69 IN | DIASTOLIC BLOOD PRESSURE: 76 MMHG | HEART RATE: 59 BPM | TEMPERATURE: 99 F | OXYGEN SATURATION: 99 % | WEIGHT: 139.31 LBS | RESPIRATION RATE: 18 BRPM | SYSTOLIC BLOOD PRESSURE: 157 MMHG

## 2023-04-06 PROBLEM — R55 SYNCOPE: Status: RESOLVED | Noted: 2023-04-04 | Resolved: 2023-04-06

## 2023-04-06 PROBLEM — K92.2 GI BLEED: Status: RESOLVED | Noted: 2023-04-05 | Resolved: 2023-04-06

## 2023-04-06 LAB
ANION GAP SERPL CALC-SCNC: 9 MMOL/L (ref 8–16)
BACTERIA STL CULT: NORMAL
BASOPHILS # BLD AUTO: 0.01 K/UL (ref 0–0.2)
BASOPHILS NFR BLD: 0.2 % (ref 0–1.9)
BUN SERPL-MCNC: 8 MG/DL (ref 8–23)
CALCIUM SERPL-MCNC: 8.5 MG/DL (ref 8.7–10.5)
CHLORIDE SERPL-SCNC: 111 MMOL/L (ref 95–110)
CO2 SERPL-SCNC: 22 MMOL/L (ref 23–29)
CREAT SERPL-MCNC: 0.9 MG/DL (ref 0.5–1.4)
DIFFERENTIAL METHOD: ABNORMAL
E COLI SXT1 STL QL IA: NEGATIVE
E COLI SXT2 STL QL IA: NEGATIVE
EOSINOPHIL # BLD AUTO: 0.1 K/UL (ref 0–0.5)
EOSINOPHIL NFR BLD: 1.4 % (ref 0–8)
ERYTHROCYTE [DISTWIDTH] IN BLOOD BY AUTOMATED COUNT: 13.4 % (ref 11.5–14.5)
EST. GFR  (NO RACE VARIABLE): >60 ML/MIN/1.73 M^2
GLUCOSE SERPL-MCNC: 106 MG/DL (ref 70–110)
HCT VFR BLD AUTO: 36.2 % (ref 37–48.5)
HGB BLD-MCNC: 11.1 G/DL (ref 12–16)
IMM GRANULOCYTES # BLD AUTO: 0.01 K/UL (ref 0–0.04)
IMM GRANULOCYTES NFR BLD AUTO: 0.2 % (ref 0–0.5)
LYMPHOCYTES # BLD AUTO: 0.8 K/UL (ref 1–4.8)
LYMPHOCYTES NFR BLD: 15.9 % (ref 18–48)
MCH RBC QN AUTO: 29.2 PG (ref 27–31)
MCHC RBC AUTO-ENTMCNC: 30.7 G/DL (ref 32–36)
MCV RBC AUTO: 95 FL (ref 82–98)
MONOCYTES # BLD AUTO: 0.6 K/UL (ref 0.3–1)
MONOCYTES NFR BLD: 10.9 % (ref 4–15)
NEUTROPHILS # BLD AUTO: 3.7 K/UL (ref 1.8–7.7)
NEUTROPHILS NFR BLD: 71.4 % (ref 38–73)
NRBC BLD-RTO: 0 /100 WBC
PLATELET # BLD AUTO: 138 K/UL (ref 150–450)
PMV BLD AUTO: 10.1 FL (ref 9.2–12.9)
POTASSIUM SERPL-SCNC: 3.2 MMOL/L (ref 3.5–5.1)
RBC # BLD AUTO: 3.8 M/UL (ref 4–5.4)
SODIUM SERPL-SCNC: 142 MMOL/L (ref 136–145)
WBC # BLD AUTO: 5.15 K/UL (ref 3.9–12.7)

## 2023-04-06 PROCEDURE — D9220A PRA ANESTHESIA: Mod: ANES,,, | Performed by: ANESTHESIOLOGY

## 2023-04-06 PROCEDURE — D9220A PRA ANESTHESIA: ICD-10-PCS | Mod: CRNA,,, | Performed by: NURSE ANESTHETIST, CERTIFIED REGISTERED

## 2023-04-06 PROCEDURE — 43235 EGD DIAGNOSTIC BRUSH WASH: CPT | Performed by: INTERNAL MEDICINE

## 2023-04-06 PROCEDURE — D9220A PRA ANESTHESIA: ICD-10-PCS | Mod: ANES,,, | Performed by: ANESTHESIOLOGY

## 2023-04-06 PROCEDURE — 37000009 HC ANESTHESIA EA ADD 15 MINS: Performed by: INTERNAL MEDICINE

## 2023-04-06 PROCEDURE — 88305 TISSUE EXAM BY PATHOLOGIST: CPT | Mod: TC

## 2023-04-06 PROCEDURE — 80048 BASIC METABOLIC PNL TOTAL CA: CPT | Performed by: HOSPITALIST

## 2023-04-06 PROCEDURE — 27200043 HC FORCEPS, BIOPSY: Performed by: INTERNAL MEDICINE

## 2023-04-06 PROCEDURE — D9220A PRA ANESTHESIA: Mod: CRNA,,, | Performed by: NURSE ANESTHETIST, CERTIFIED REGISTERED

## 2023-04-06 PROCEDURE — 85025 COMPLETE CBC W/AUTO DIFF WBC: CPT | Performed by: HOSPITALIST

## 2023-04-06 PROCEDURE — 45380 COLONOSCOPY AND BIOPSY: CPT | Performed by: INTERNAL MEDICINE

## 2023-04-06 PROCEDURE — 96376 TX/PRO/DX INJ SAME DRUG ADON: CPT | Mod: 59

## 2023-04-06 PROCEDURE — 25000003 PHARM REV CODE 250: Performed by: HOSPITALIST

## 2023-04-06 PROCEDURE — 36415 COLL VENOUS BLD VENIPUNCTURE: CPT | Performed by: HOSPITALIST

## 2023-04-06 PROCEDURE — 25000003 PHARM REV CODE 250: Performed by: INTERNAL MEDICINE

## 2023-04-06 PROCEDURE — 63600175 PHARM REV CODE 636 W HCPCS: Performed by: NURSE ANESTHETIST, CERTIFIED REGISTERED

## 2023-04-06 PROCEDURE — 63600175 PHARM REV CODE 636 W HCPCS: Performed by: HOSPITALIST

## 2023-04-06 PROCEDURE — C9113 INJ PANTOPRAZOLE SODIUM, VIA: HCPCS | Performed by: HOSPITALIST

## 2023-04-06 PROCEDURE — G0378 HOSPITAL OBSERVATION PER HR: HCPCS

## 2023-04-06 PROCEDURE — 37000008 HC ANESTHESIA 1ST 15 MINUTES: Performed by: INTERNAL MEDICINE

## 2023-04-06 RX ORDER — SODIUM,POTASSIUM PHOSPHATES 280-250MG
2 POWDER IN PACKET (EA) ORAL
Status: DISCONTINUED | OUTPATIENT
Start: 2023-04-06 | End: 2023-04-06 | Stop reason: HOSPADM

## 2023-04-06 RX ORDER — LANOLIN ALCOHOL/MO/W.PET/CERES
800 CREAM (GRAM) TOPICAL
Status: DISCONTINUED | OUTPATIENT
Start: 2023-04-06 | End: 2023-04-06 | Stop reason: HOSPADM

## 2023-04-06 RX ORDER — METRONIDAZOLE 500 MG/1
500 TABLET ORAL 3 TIMES DAILY
Qty: 21 TABLET | Refills: 0 | Status: SHIPPED | OUTPATIENT
Start: 2023-04-06 | End: 2023-04-13

## 2023-04-06 RX ORDER — PROPOFOL 10 MG/ML
VIAL (ML) INTRAVENOUS
Status: DISCONTINUED | OUTPATIENT
Start: 2023-04-06 | End: 2023-04-06

## 2023-04-06 RX ORDER — LEVOFLOXACIN 500 MG/1
500 TABLET, FILM COATED ORAL DAILY
Qty: 7 TABLET | Refills: 0 | Status: SHIPPED | OUTPATIENT
Start: 2023-04-06 | End: 2023-04-13

## 2023-04-06 RX ADMIN — PANTOPRAZOLE SODIUM 40 MG: 40 INJECTION, POWDER, FOR SOLUTION INTRAVENOUS at 08:04

## 2023-04-06 RX ADMIN — PROPOFOL 50 MG: 10 INJECTION, EMULSION INTRAVENOUS at 10:04

## 2023-04-06 RX ADMIN — PROPOFOL 50 MG: 10 INJECTION, EMULSION INTRAVENOUS at 11:04

## 2023-04-06 RX ADMIN — FLUTICASONE PROPIONATE 50 MCG: 50 SPRAY, METERED NASAL at 08:04

## 2023-04-06 RX ADMIN — METOPROLOL SUCCINATE 25 MG: 25 TABLET, EXTENDED RELEASE ORAL at 08:04

## 2023-04-06 RX ADMIN — SODIUM CHLORIDE, SODIUM LACTATE, POTASSIUM CHLORIDE, AND CALCIUM CHLORIDE: .6; .31; .03; .02 INJECTION, SOLUTION INTRAVENOUS at 10:04

## 2023-04-06 RX ADMIN — PROPOFOL 100 MG: 10 INJECTION, EMULSION INTRAVENOUS at 10:04

## 2023-04-06 RX ADMIN — POTASSIUM BICARBONATE 35 MEQ: 391 TABLET, EFFERVESCENT ORAL at 08:04

## 2023-04-06 NOTE — PROGRESS NOTES
"[facility]  Adult Nutrition   Consult Note (Initial Assessment)     SUMMARY     Recommendations  Recommendation/Intervention:   1. Add diabetic diet when medically appropritate.   2. Add José BID unflavored to increase protein in diet and to assist in meeting nutritonal needs.   3. RD will continue to monitor PO days, labs, and plan of care.    Goals: 1. Pt to continue to meet >75% of EEN and EPN. 2. Diet resumption when appropriate  Nutrition Goal Status: new    Dietitian Rounds Brief  Pt is a 76 y/o F presented with syncope. She has a history of HTN, diabetes, paroxymal Afib, and arthritis. Pt had an endoscopy done today. Pt was NPO, Diet advanced to full liquid today per MD. Pt is at a 18 lbs (11%) weight loss in 3 months per patient and chart review. RD add José BID with full liquid to better meet energy and protein needs. PO intake ~50% per nurse chart. Will add diabetic diet when medically appropriate. RD will continue to monitor pt's weight throughout stay and make recommendations as needed. LBM noted 4/6/23.    Diet order:   Current Diet Order: NPO     Evaluation of Received Nutrient/Fluid Intake      % Intake of Estimated Energy Needs: 25 - 50 %  % Meal Intake: 25 - 50 %  Intake/Output Summary (Last 24 hours) at 4/6/2023 1452  Last data filed at 4/6/2023 1109  Gross per 24 hour   Intake 1565 ml   Output --   Net 1565 ml     Anthropometrics  Temp: 98.6 °F (37 °C)  Height Method: Stated  Height: 5' 9" (175.3 cm)  Height (inches): 69 in  Weight Method: Bed Scale  Weight: 63.2 kg (139 lb 5.3 oz)  Weight (lb): 139.33 lb  Ideal Body Weight (IBW), Female: 145 lb  % Ideal Body Weight, Female (lb): 96.09 %  BMI (Calculated): 20.6  Estimated/Assessed Needs  Weight Used For Calorie Calculations: 63.2 kg (139 lb 5.3 oz)  Energy Calorie Requirements (kcal): 1580 - 1896 (25 - 30 kcal/kg)  Energy Need Method: Kcal/kg  Protein Requirements: 76 - 95 (1.2 - 1.5 g/kg)  Weight Used For Protein Calculations: " 63.2 kg (139 lb 5.3 oz)     Estimated Fluid Requirement Method: RDA Method  RDA Method (mL): 1580  Reason for Assessment  Reason For Assessment: identified at risk by screening criteria  Diagnosis: other (see comments) (Syncope)  Relevant Medical History: HTN, Diabetes, SHARITA, GI bleed, Arthritis,  Nutrition Risk Screen  Nutrition Risk Screen: no indicators present       Altered Skin Integrity 04/04/23 1110 Left Hand Skin Tear-Wound Image: Images linked       Altered Skin Integrity 04/04/23 1110 Right Cheek -Wound Image: Images linked  MST Score: 3  Have you recently lost weight without trying?: Yes: 24-33 lbs (patient states has lost around 30 pounds in the last 6 months)  Weight loss score: 3  Have you been eating poorly because of a decreased appetite?: No  Appetite score: 0  Weight History:  Wt Readings from Last 5 Encounters:   04/06/23 63.2 kg (139 lb 5.3 oz)   03/14/23 65.9 kg (145 lb 4.5 oz)   01/31/23 69.9 kg (154 lb)   01/13/23 71.5 kg (157 lb 9.6 oz)   12/06/22 68.9 kg (152 lb)     Medications:Pertinent Medications Reviewed  Scheduled Meds:   fluticasone propionate  1 spray Each Nostril Daily    metoprolol succinate  25 mg Oral Daily    pantoprazole  40 mg Intravenous Q12H     Continuous Infusions:  PRN Meds:.acetaminophen, albuterol sulfate, HYDROcodone-acetaminophen, magnesium oxide, magnesium oxide, melatonin, ondansetron, potassium bicarbonate, potassium bicarbonate, potassium bicarbonate, potassium, sodium phosphates, potassium, sodium phosphates, potassium, sodium phosphates, prochlorperazine, sodium chloride 0.9%, traZODone    Labs: Pertinent Labs Reviewed  Clinical Chemistry:  Recent Labs   Lab 04/03/23  2151 04/06/23  0504    142   K 3.8 3.2*    111*   CO2 21* 22*   * 106   BUN 38* 8   CREATININE 2.2* 0.9   CALCIUM 8.7 8.5*   PROT 6.8  --    ALBUMIN 4.1  --    BILITOT 0.5  --    ALKPHOS 93  --    AST 22  --    ALT 12  --    ANIONGAP 14 9    < > = values in this interval not  displayed.     CBC:   Recent Labs   Lab 04/06/23  0504   WBC 5.15   RBC 3.80*   HGB 11.1*   HCT 36.2*   *   MCV 95   MCH 29.2   MCHC 30.7*     Monitor and Evaluation  Food and Nutrient Intake: energy intake     Nutrition Risk  Level of Risk/Frequency of Follow-up: moderate - high     Nutrition Follow-Up  RD Follow-up?: Yes      Tarsha Santillan, Student Dietitian 04/06/2023 11:16 AM     I certify that I directed the dietetic intern in service delivery and guided them using my skilled judgment. As the cosigning dietitian, I have reviewed the dietetic interns documentation and am responsible for the treatment, assessment, and plan.    DEYANIRA Valdovinos  04/06/2023 2:53 PM

## 2023-04-06 NOTE — ANESTHESIA PREPROCEDURE EVALUATION
04/06/2023  Marleen Samano is a 77 y.o., female.    Patient Active Problem List   Diagnosis    Osteoarthritis of knee    S/P total knee arthroplasty    Blepharospasm    Cervical dystonia    Essential hypertension    Genital herpes simplex    Anemia, chronic disease    Normocytic normochromic anemia    Anemia due to multiple mechanisms    Pain of both hip joints    Major depression, chronic    Gait abnormality    Dizziness and giddiness    Tinnitus of right ear    Chronic migraine    Financial difficulties    Prediabetes    Pulmonary scarring    Squamous cell carcinoma in situ of skin of right cheek    Blister of right thumb    Bilateral carotid artery stenosis    Hyperhidrosis    Abrasions of multiple sites    Hyperglycemia    Open wnd of lower arm    Lacerations of multiple sites of left arm    Lacerations of multiple sites of right arm    Abrasion of left arm    Abrasion of right arm    Type 2 diabetes mellitus without complication, unspecified whether long term insulin use    Chronic kidney disease, stage 3a    Hypertension associated with type 2 diabetes mellitus    Syncope    SHARITA (acute kidney injury)    GI bleed       Past Surgical History:   Procedure Laterality Date    APPENDECTOMY      BREAST BIOPSY      BREAST LUMPECTOMY      CATARACT EXTRACTION      EYE SURGERY      HYSTERECTOMY      NECK SURGERY      REVISION OF KNEE ARTHROPLASTY Right 7/14/2020    Procedure: REVISION, ARTHROPLASTY, KNEE;  Surgeon: Pedro Tompkins MD;  Location: [facility];  Service: Orthopedics;  Laterality: Right;    TONSILLECTOMY          Tobacco Use:  The patient  reports that she has never smoked. She has never used smokeless tobacco.     Results for orders placed or performed during the hospital encounter of 04/03/23   EKG and show to ED MD    Collection Time: 04/03/23 11:10 PM  Narrative    Test Reason : R55,    Vent. Rate : 068 BPM     Atrial Rate : 068 BPM     P-R Int : 164 ms          QRS Dur : 092 ms      QT Int : 450 ms       P-R-T Axes : 070 080 054 degrees     QTc Int : 478 ms    Normal sinus rhythm  Normal ECG  When compared with ECG of 22-FEB-2021 12:33,  No significant change was found    Referred By: [email or web address]   SELF           Confirmed By:         Imaging Results          CT Abdomen Pelvis  Without Contrast (Final result)  Result time 04/04/23 00:26:36    Final result by Roshan Spears DO (04/04/23 00:26:36)                 Narrative:    EXAM DESCRIPTION:  CT ABDOMEN PELVIS WITHOUT CONTRAST  RadLex: CT ABDOMEN PELVIS WITHOUT IV CONTRAST    CLINICAL HISTORY:  abdominal pain, weakness, dizziness, fall, bloody diarrhea.    TECHNIQUE:  CT of the abdomen and pelvis without contrast.  All CT scans at this facility use dose modulation, iterative reconstruction, and/or weight based dosing when appropriate to reduce radiation dose to as low as reasonably achievable.    COMPARISON: CT abdomen and pelvis 6/4/2022 from [facility].    FINDINGS:    The visualized lower thoracic structures demonstrate a moderate-sized hiatal hernia.    The liver demonstrates a couple subcentimeter cysts. The gallbladder, spleen, pancreas and adrenal glands appear grossly unremarkable. No renal stones are identified. There is no hydronephrosis bilaterally. No abdominal aortic aneurysm is seen. No bowel obstruction is seen. There is no free intraperitoneal air. The appendix is not well delineated. There is sigmoid diverticulosis without definite CT evidence for acute diverticulitis. No free fluid is identified. Urinary bladder is decompressed. There is levoscoliosis along the thoracolumbar junction. There are mild degenerative changes at the right hip. No acute fracture is seen.    IMPRESSION:  1.  Sigmoid diverticulosis without definite CT evidence for acute diverticulitis.  2.   Moderate-sized abdominal hernia.    Electronically signed by:  Roshan Spears DO  4/4/2023 12:26 AM CDT Workstation: [id number]                             CT Cervical Spine Without Contrast (Final result)  Result time 04/03/23 22:57:06    Final result by Marino Perez MD (04/03/23 22:57:06)                 Narrative:    CT CERVICAL SPINE WITHOUT IV CONTRAST    COMPARISONS: None.    ADDITIONAL PERTINENT HISTORY: Fall    TECHNIQUE:  Multiple axial images were obtained from the skull base through the upper thoracic spine with coronal and sagittal reformatted images obtained without IV contrast. One of the following dose optimization techniques was utilized in the performance of this exam: Automated exposure control; adjustment of the mA and/or kV according to the patient's size; or use of an iterative  reconstruction technique.  Specific details can be referenced in the facility's radiology CT exam operational policy.    FINDINGS.    Postoperative changes: Patient status post previous anterior interbody fusion of C4-C7.    Vertebral body heights and alignment: Negative.    Vertebral bodies: Anteriorly directed osteophytes at the level of C3-C4.    Disc spaces: Solid bony fusion across the disc spaces of C4-C5, C5-C6 and C6-C7.    Cranial cervical junction: Negative.    Cervical thoracic junction: Negative.    Surrounding soft tissues: Negative.    Lung apices: Background emphysematous changes involving both lung apices.    IMPRESSION:  1. Postoperative and spondylitic change as discussed above.  2. No acute appearing bony abnormalities.      Electronically signed by:  Marino Perez MD  4/3/2023 10:57 PM CDT Workstation: [id number]                             CT Head Without Contrast (Final result)  Result time 04/03/23 22:56:40    Final result by Ricky Zaman DO (04/03/23 22:56:40)                 Narrative:    EXAM: CT HEAD WITHOUT IV CONTRAST    HISTORY: fall    COMPARISON: 3/18/2023    TECHNIQUE: CT acquisition of  the head without intravenous contrast. Multiplanar reformats were obtained.    RADIATION DOSE REDUCTION: All CT scans are performed using radiation dose reduction techniques, when applicable. Technical factors are evaluated and adjusted to ensure appropriate moderation of exposure.    FINDINGS:  Limitations: None.    Brain parenchyma: No acute infarct, hemorrhage, or mass. Patchy white matter hypoattenuating areas consistent with chronic small vessel ischemic changes. Atherosclerotic calcifications in bilateral cavernous internal carotid arteries.    Ventricles and extra-axial spaces: Appropriate for age. No extra-axial fluid collection.    Orbits: Bilateral lens surgery. Otherwise, unremarkable.    Visualized paranasal sinuses: Clear.    Mastoids and middle ear cavities: Clear.    Bones: No acute or suspicious osseous abnormality. Degenerative changes of bilateral TMJs, right worse on left.    Other: None.    IMPRESSION:  No acute intracranial abnormality.        Electronically signed by:  Ricky Zaman DO  4/3/2023 10:56 PM CDT Workstation: [id number]                               Lab Results   Component Value Date    WBC 5.15 04/06/2023    HGB 11.1 (L) 04/06/2023    HCT 36.2 (L) 04/06/2023    MCV 95 04/06/2023     (L) 04/06/2023     BMP  Lab Results   Component Value Date     04/06/2023    K 3.2 (L) 04/06/2023     (H) 04/06/2023    CO2 22 (L) 04/06/2023    BUN 8 04/06/2023    CREATININE 0.9 04/06/2023    CALCIUM 8.5 (L) 04/06/2023    ANIONGAP 9 04/06/2023     04/06/2023    GLU 95 04/05/2023     (H) 04/04/2023       Results for orders placed during the hospital encounter of 04/16/21    Echo Color Flow Doppler? Yes    Interpretation Summary  · The left ventricle is normal in size with concentric remodeling and normal systolic function.  · The estimated ejection fraction is 67%.  · Indeterminate left ventricular diastolic function.  · Atrial fibrillation not observed.  · Normal  right ventricular size with normal right ventricular systolic function.  · There is mild pulmonary hypertension.  · Mild to moderate tricuspid regurgitation.  · Normal central venous pressure (3 mmHg).  · The estimated PA systolic pressure is 48 mmHg.    Mild pulmonary hypertension            Pre-op Assessment    I have reviewed the Patient Summary Reports.     I have reviewed the Nursing Notes. I have reviewed the NPO Status.   I have reviewed the Medications.     Review of Systems  Anesthesia Hx:  No problems with previous Anesthesia  Denies Family Hx of Anesthesia complications.   Denies Personal Hx of Anesthesia complications.   Social:  Non-Smoker    Hematology/Oncology:         -- Anemia: Hematology Comments: Thrombocytopenia   EENT/Dental:EENT/Dental Normal   Cardiovascular:   Hypertension    Pulmonary:   COPD    Renal/:   Chronic Renal Disease, CKD    Hepatic/GI:  Hepatic/GI Normal    Musculoskeletal:   Arthritis     Neurological:   Neuromuscular Disease, (bell's palsy) Headaches    Endocrine:   Diabetes    Psych:   depression      Hypokalemia-treated    Physical Exam  General: Well nourished    Airway:  Mallampati: II   Mouth Opening: Normal  TM Distance: Normal  Tongue: Normal  Neck ROM: Normal ROM    Dental:  Intact, Caps / Implants  Top caps  Chest/Lungs:  Clear to auscultation, Normal Respiratory Rate    Heart:  Rate: Normal  Rhythm: Regular Rhythm        Anesthesia Plan  Type of Anesthesia, risks & benefits discussed:    Anesthesia Type: Gen Natural Airway  Intra-op Monitoring Plan: Standard ASA Monitors  Induction:  IV  Informed Consent: Informed consent signed with the Patient and all parties understand the risks and agree with anesthesia plan.  All questions answered.   ASA Score: 3  Anesthesia Plan Notes: Propofol    Ready For Surgery From Anesthesia Perspective.     .

## 2023-04-06 NOTE — ANESTHESIA POSTPROCEDURE EVALUATION
Anesthesia Post Evaluation    Patient: Marleen Samano    Procedure(s) Performed: Procedure(s) (LRB):  EGD (ESOPHAGOGASTRODUODENOSCOPY) (N/A)  COLONOSCOPY (N/A)    Final Anesthesia Type: general      Patient location during evaluation: GI PACU  Patient participation: Yes- Able to Participate  Level of consciousness: awake and alert  Post-procedure vital signs: reviewed and stable  Pain management: adequate  Airway patency: patent    PONV status at discharge: No PONV  Anesthetic complications: no      Cardiovascular status: blood pressure returned to baseline and stable  Respiratory status: unassisted and room air  Hydration status: euvolemic  Follow-up not needed.          Vitals Value Taken Time   /76 04/06/23 1126   Temp 98.6 04/06/23 1130   Pulse 54 04/06/23 1128   Resp 17 04/06/23 1130   SpO2 99 % 04/06/23 1128   Vitals shown include unvalidated device data.      No case tracking events are documented in the log.      Pain/Percy Score: Pain Rating Prior to Med Admin: 6 (4/5/2023  7:08 AM)  Pain Rating Post Med Admin: 3 (4/5/2023  8:00 AM)

## 2023-04-06 NOTE — PLAN OF CARE
Discharge orders and chart reviewed with no further post-acute discharge needs identified at this time.  At this time, patient is cleared for discharge from Case Management standpoint.        04/06/23 1351   Final Note   Assessment Type Final Discharge Note   Anticipated Discharge Disposition Home   Hospital Resources/Appts/Education Provided   (Patient instructed to make post hospital follow up appointment with their PCP in 7 to 10 days from discharge)   Post-Acute Status   Coverage [facility]   Discharge Delays None known at this time

## 2023-04-06 NOTE — PROVATION PATIENT INSTRUCTIONS
Discharge Summary/Instructions after an Endoscopic Procedure  Patient Name: Marleen Samano  Patient MRN: [id number]  Patient YOB: 1945 Thursday, April 6, 2023  Marino Munson MD  RESTRICTIONS:  During your procedure today, you received medications for sedation.  These   medications may affect your judgment, balance and coordination.  Therefore,   for 24 hours, you have the following restrictions:   - DO NOT drive a car, operate machinery, make legal/financial decisions,   sign important papers or drink alcohol.    ACTIVITY:  Today: no heavy lifting, straining or running due to procedural   sedation/anesthesia.  The following day: return to full activity including work.  DIET:  Eat and drink normally unless instructed otherwise.     TREATMENT FOR COMMON SIDE EFFECTS:  - Mild abdominal pain, nausea, belching, bloating or excessive gas:  rest,   eat lightly and use a heating pad.  - Sore Throat: treat with throat lozenges and/or gargle with warm salt   water.  - Because air was used during the procedure, expelling large amounts of air   from your rectum or belching is normal.  - If a bowel prep was taken, you may not have a bowel movement for 1-3 days.    This is normal.  SYMPTOMS TO WATCH FOR AND REPORT TO YOUR PHYSICIAN:  1. Abdominal pain or bloating, other than gas cramps.  2. Chest pain.  3. Back pain.  4. Signs of infection such as: chills or fever occurring within 24 hours   after the procedure.  5. Rectal bleeding, which would show as bright red, maroon, or black stools.   (A tablespoon of blood from the rectum is not serious, especially if   hemorrhoids are present.)  6. Vomiting.  7. Weakness or dizziness.  GO DIRECTLY TO THE NEAREST EMERGENCY ROOM IF YOU HAVE ANY OF THE FOLLOWING:      Difficulty breathing              Chills and/or fever over 101 F   Persistent vomiting and/or vomiting blood   Severe abdominal pain   Severe chest pain   Black, tarry stools   Bleeding- more than one  tablespoon   Any other symptom or condition that you feel may need urgent attention  Your doctor recommends these additional instructions:  If any biopsies were taken, your doctors clinic will contact you in 1 to 2   weeks with any results.  - [facility].   - Full liquid diet for 3 days then soft diet x 1 week then as tolerated  - Follow up with Dr Dowling as outpatient.  - Continue present medications.   - No repeat colonoscopy due to age.  For questions, problems or results please call your physician - Marino Munson MD at Work:  [phone].  [facility], EMERGENCY ROOM PHONE NUMBER: [phone]  IF A COMPLICATION OR EMERGENCY SITUATION ARISES AND YOU ARE UNABLE TO REACH   YOUR PHYSICIAN - GO DIRECTLY TO THE EMERGENCY ROOM.  MD Marino Echavarria MD  4/6/2023 11:12:32 AM  This report has been verified and signed electronically.  Dear patient,  As a result of recent federal legislation (The Federal Cures Act), you may   receive lab or pathology results from your procedure in your [facility]   account before your physician is able to contact you. Your physician or   their representative will relay the results to you with their   recommendations at their soonest availability.  Thank you,  PROVATION

## 2023-04-06 NOTE — PROVATION PATIENT INSTRUCTIONS
Discharge Summary/Instructions after an Endoscopic Procedure  Patient Name: Marleen Samano  Patient MRN: [id number]  Patient YOB: 1945 Thursday, April 6, 2023  Marino Munson MD  RESTRICTIONS:  During your procedure today, you received medications for sedation.  These   medications may affect your judgment, balance and coordination.  Therefore,   for 24 hours, you have the following restrictions:   - DO NOT drive a car, operate machinery, make legal/financial decisions,   sign important papers or drink alcohol.    ACTIVITY:  Today: no heavy lifting, straining or running due to procedural   sedation/anesthesia.  The following day: return to full activity including work.  DIET:  Eat and drink normally unless instructed otherwise.     TREATMENT FOR COMMON SIDE EFFECTS:  - Mild abdominal pain, nausea, belching, bloating or excessive gas:  rest,   eat lightly and use a heating pad.  - Sore Throat: treat with throat lozenges and/or gargle with warm salt   water.  - Because air was used during the procedure, expelling large amounts of air   from your rectum or belching is normal.  - If a bowel prep was taken, you may not have a bowel movement for 1-3 days.    This is normal.  SYMPTOMS TO WATCH FOR AND REPORT TO YOUR PHYSICIAN:  1. Abdominal pain or bloating, other than gas cramps.  2. Chest pain.  3. Back pain.  4. Signs of infection such as: chills or fever occurring within 24 hours   after the procedure.  5. Rectal bleeding, which would show as bright red, maroon, or black stools.   (A tablespoon of blood from the rectum is not serious, especially if   hemorrhoids are present.)  6. Vomiting.  7. Weakness or dizziness.  GO DIRECTLY TO THE NEAREST EMERGENCY ROOM IF YOU HAVE ANY OF THE FOLLOWING:      Difficulty breathing              Chills and/or fever over 101 F   Persistent vomiting and/or vomiting blood   Severe abdominal pain   Severe chest pain   Black, tarry stools   Bleeding- more than one  tablespoon   Any other symptom or condition that you feel may need urgent attention  Your doctor recommends these additional instructions:  If any biopsies were taken, your doctors clinic will contact you in 1 to 2   weeks with any results.  - Patient has a contact number available for emergencies.  The signs and   symptoms of potential delayed complications were discussed with the   patient.  Return to normal activities tomorrow.  Written discharge   instructions were provided to the patient.   - Resume previous diet.   - Continue present medications.   - Discharge patient to home (with escort).  For questions, problems or results please call your physician - Marino Munson MD at Work:  [phone].  [facility], EMERGENCY ROOM PHONE NUMBER: [phone]  IF A COMPLICATION OR EMERGENCY SITUATION ARISES AND YOU ARE UNABLE TO REACH   YOUR PHYSICIAN - GO DIRECTLY TO THE EMERGENCY ROOM.  MD Marino Echavarria MD  4/6/2023 11:08:50 AM  This report has been verified and signed electronically.  Dear patient,  As a result of recent federal legislation (The Federal Cures Act), you may   receive lab or pathology results from your procedure in your [facility]   account before your physician is able to contact you. Your physician or   their representative will relay the results to you with their   recommendations at their soonest availability.  Thank you,  PROVATION

## 2023-04-06 NOTE — TRANSFER OF CARE
"Anesthesia Transfer of Care Note    Patient: Marleen Samano    Procedure(s) Performed: Procedure(s) (LRB):  EGD (ESOPHAGOGASTRODUODENOSCOPY) (N/A)  COLONOSCOPY (N/A)    Patient location: PACU    Anesthesia Type: general and MAC    Transport from OR: Transported from OR on room air with adequate spontaneous ventilation    Post pain: adequate analgesia    Post assessment: no apparent anesthetic complications    Post vital signs: stable    Level of consciousness: awake    Nausea/Vomiting: no nausea/vomiting    Complications: none    Transfer of care protocol was followed      Last vitals:   Visit Vitals  BP (!) 159/74 (BP Location: Left arm, Patient Position: Lying)   Pulse 60   Temp 37 °C (98.6 °F) (Oral)   Resp 18   Ht 5' 9" (1.753 m)   Wt 63.2 kg (139 lb 5.3 oz)   SpO2 99%   BMI 20.58 kg/m²     "

## 2023-04-08 NOTE — DISCHARGE SUMMARY
[facility] Medicine  Discharge Summary      Patient Name: Marleen Samano  MRN: [id number]  URMILA: [id number]  Patient Class: OP- Observation  Admission Date: 4/3/2023  Hospital Length of Stay: 1 days  Discharge Date and Time: 4/6/2023  3:34 PM  Attending Physician: No att. providers found   Discharging Provider: Vadim Bass MD  Primary Care Provider: Mikie Ryan MD    Primary Care Team: Networked reference to record PCT     HPI:   77-year-old woman with hypertension, depression, paroxysmal AFib, diabetes, arthritis, presenting with syncope.  Patient was standing at work today when she felt dizzy, lightheaded, and next thing she remembers she woke up on the floor.  She had witnessed syncope with possible head trauma.  She was brought to the ED where workup showed no intracranial hemorrhage, no spinal pathology, and evidence of dehydration and SHARITA.  After presenting to the ED, she began to have multiple episodes of stool which became progressively more bloody.  She denies any abdominal pain, midepigastric pain, history of hemorrhoids, constipation, nausea, vomiting, diarrhea prior to today, fever, chills, chest pain, headache.  She takes aspirin 81 mg daily and is not on anticoagulation.  Reports she had colonoscopy last 5 years ago with a few benign polyps removed.  She has never had a GI bleed before.      Procedure(s) (LRB):  EGD (ESOPHAGOGASTRODUODENOSCOPY) (N/A)  COLONOSCOPY (N/A)      Hospital Course:   Patient got admitted with syncope and suspected GI bleed  Pt had EGD/colonoscopy  Colonoscopy showed ischemic colitis  Pt was treated adequately and her condition improved and was later discharged back to home         Goals of Care Treatment Preferences:  Code Status: Full Code      Consults:   Consults (From admission, onward)        Status Ordering Provider     Inpatient consult to Gastroenterology  Once        Provider:  ROBERTA Jones MD    Completed JAZZ AREVALO  Assessment & Plan notes have been filed under this hospital service since the last note was generated.  Service: Hospital Medicine    Final Active Diagnoses:    Diagnosis Date Noted POA    SHARITA (acute kidney injury) [N17.9] 04/04/2023 Unknown    Essential hypertension [I10] 10/18/2017 Yes      Problems Resolved During this Admission:    Diagnosis Date Noted Date Resolved POA    PRINCIPAL PROBLEM:  Syncope [R55] 04/04/2023 04/06/2023 Unknown    GI bleed [K92.2] 04/05/2023 04/06/2023 Unknown       Discharged Condition: good    Disposition: Home or Self Care      Patient Instructions:   No discharge procedures on file.      Pending Diagnostic Studies:     Procedure Component Value Units Date/Time    Stool Exam-Ova,Cysts,Parasites [[id number]] Collected: 04/03/23 2219    Order Status: Sent Lab Status: In process Updated: 04/03/23 2224    Specimen: Stool          Medications:  Reconciled Home Medications:      Medication List      START taking these medications    levoFLOXacin 500 MG tablet  Commonly known as: LEVAQUIN  Take 1 tablet (500 mg total) by mouth once daily. for 7 days     metroNIDAZOLE 500 MG tablet  Commonly known as: FLAGYL  Take 1 tablet (500 mg total) by mouth 3 (three) times daily. for 7 days        CHANGE how you take these medications    clotrimazole 1 % cream  Commonly known as: LOTRIMIN  Apply topically 2 (two) times daily.  What changed: how much to take        CONTINUE taking these medications    albuterol 90 mcg/actuation inhaler  Commonly known as: PROVENTIL/VENTOLIN HFA  Inhale 2 puffs into the lungs every 6 (six) hours as needed for Wheezing. Rescue     albuterol-ipratropium 2.5 mg-0.5 mg/3 mL nebulizer solution  Commonly known as: DUO-NEB  USE 1 VIAL VIA NEBULIZER  EVERY 6 HOURS AS NEEDED FOR WHEEZING RESCUE     amLODIPine 2.5 MG tablet  Commonly known as: NORVASC  Take 1 tablet (2.5 mg total) by mouth once daily.     aspirin 81 MG Chew  Take 81 mg by mouth once daily.     baclofen 10 MG  tablet  Commonly known as: LIORESAL  Take 1 tablet (10 mg total) by mouth 2 (two) times daily for 30 days, THEN 1 tablet (10 mg total) every evening.  Start taking on: March 14, 2023     benazepriL 40 MG tablet  Commonly known as: LOTENSIN  Take 1 tablet (40 mg total) by mouth once daily.     blood-glucose meter kit  To check BG 1 times daily, to use with insurance preferred meter     gabapentin 600 MG tablet  Commonly known as: NEURONTIN  Take 1 tablet (600 mg total) by mouth 3 (three) times daily.     GAS-X ORAL  Take by mouth.     metoprolol succinate 25 MG 24 hr tablet  Commonly known as: TOPROL-XL  Take 1 tablet (25 mg total) by mouth once daily.     omeprazole 40 MG capsule  Commonly known as: PRILOSEC  Take 1 capsule (40 mg total) by mouth 2 (two) times daily before meals.     traZODone 50 MG tablet  Commonly known as: DESYREL  Take 1 tablet (50 mg total) by mouth every evening.     venlafaxine 150 MG Cp24  Commonly known as: EFFEXOR-XR  Take 1 capsule (150 mg total) by mouth 2 (two) times a day.        STOP taking these medications    fluticasone propionate 50 mcg/actuation nasal spray  Commonly known as: FLONASE            Indwelling Lines/Drains at time of discharge:   Lines/Drains/Airways     None               Physical Exam   Cardiovascular: Normal rate.   Neurological: She is alert.     Time spent on the discharge of patient: 23 minutes         Vadim Bass MD  Department of Hospital Medicine  [facility]

## 2023-04-18 ENCOUNTER — HOSPITAL ENCOUNTER (EMERGENCY)
Facility: HOSPITAL | Age: 78
Discharge: HOME | End: 2023-04-18
Attending: EMERGENCY MEDICINE
Payer: MEDICARE

## 2023-04-18 VITALS
HEIGHT: 69 IN | BODY MASS INDEX: 20.73 KG/M2 | OXYGEN SATURATION: 100 % | DIASTOLIC BLOOD PRESSURE: 74 MMHG | HEART RATE: 96 BPM | RESPIRATION RATE: 18 BRPM | SYSTOLIC BLOOD PRESSURE: 120 MMHG | WEIGHT: 140 LBS | TEMPERATURE: 97 F

## 2023-04-18 DIAGNOSIS — Z00.8 ENCOUNTER FOR PSYCHOLOGICAL EVALUATION: Primary | ICD-10-CM

## 2023-04-18 PROCEDURE — 99283 EMERGENCY DEPT VISIT LOW MDM: CPT

## 2023-04-18 NOTE — ED PROVIDER NOTES
"Encounter Date: 4/18/2023       History     Chief Complaint   Patient presents with    Psychiatric Evaluation     Pt brought to ed via pd for psychiatric evaluation.       77-year-old female presents to the emergency room under an order for protective custody from her grandson.  She states that her grandson lives with her.  She reports that he simply showed up several months ago and moved in.  She did not ask him to move in with her.  He has a history of drug abuse although she does not think he is currently using.  He is unemployed according to the patient.  The patient states she works 5-7 days a week at [employer].  She acknowledges a history of depression since childhood but sees a regular therapist and denies suicidal ideation.  The order for protective custody states that the grandson thinks the patient is suicidal because she is watching a [employer] channel.  Patient denies feeling suicidal denies hallucinations.  States her grandson has a history of meth abuse.  Patient reports history of COPD but she does not smoke.  She is compliant with all of her chronic medications.  States her grandson has basically "taken over the house." Also on the order for protective custody grandson wrote down that the patient states that she "can not live like this anymore." She elaborates that she means living with her grandson.    The history is provided by the patient.   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/2020    Bell's palsy     Bladder incontinence     " Blepharospasm     Bronchiectasis without complication 10/08/2019    Cervical dystonia     Concussion     COPD (chronic obstructive pulmonary disease)     Enterocolitis 02/07/2020    Fainting spell     2/7/2020    Hormone deficiency     Hypertension     Loose right total knee arthroplasty 06/15/2020    Migraine     Muscle spasm     Myofacial pain syndrome     Normocytic normochromic anemia 02/10/2020    Right ear pain 02/09/2021    Squamous cell carcinoma of skin     Syncope and collapse 02/09/2021     Past Surgical History:   Procedure Laterality Date    APPENDECTOMY      BREAST BIOPSY      BREAST LUMPECTOMY      CATARACT EXTRACTION      COLONOSCOPY N/A 4/6/2023    Procedure: COLONOSCOPY;  Surgeon: Marino Munson MD;  Location: [facility] ENDO;  Service: Endoscopy;  Laterality: N/A;    ESOPHAGOGASTRODUODENOSCOPY N/A 4/6/2023    Procedure: EGD (ESOPHAGOGASTRODUODENOSCOPY);  Surgeon: Marino Munson MD;  Location: [facility] ENDO;  Service: Endoscopy;  Laterality: N/A;    EYE SURGERY      HYSTERECTOMY      NECK SURGERY      REVISION OF KNEE ARTHROPLASTY Right 7/14/2020    Procedure: REVISION, ARTHROPLASTY, KNEE;  Surgeon: Pedro Tompkins MD;  Location: [facility];  Service: Orthopedics;  Laterality: Right;    TONSILLECTOMY       Family History   Problem Relation Age of Onset    Cancer Mother     Heart disease Brother     Parkinsonism Brother     Diabetes Neg Hx     Melanoma Neg Hx     Psoriasis Neg Hx     Lupus Neg Hx     Eczema Neg Hx      Social History     Tobacco Use    Smoking status: Never    Smokeless tobacco: Never   Substance Use Topics    Alcohol use: No    Drug use: No     Review of Systems   Constitutional:  Negative for fever.   HENT:  Negative for sore throat.    Respiratory:  Negative for shortness of breath.    Cardiovascular:  Negative for chest pain.   Gastrointestinal:  Negative for nausea.   Genitourinary:  Negative for dysuria.   Musculoskeletal:  Negative for back pain.   Skin:  Negative for rash.    Neurological:  Negative for weakness.   Hematological:  Does not bruise/bleed easily.     Physical Exam     Initial Vitals [04/18/23 1546]   BP Pulse Resp Temp SpO2   139/78 (!) 120 18 97.1 °F (36.2 °C) 99 %      MAP       --         Physical Exam    Nursing note and vitals reviewed.  Constitutional: She appears well-developed and well-nourished. She is not diaphoretic. No distress.   HENT:   Head: Normocephalic and atraumatic.   Eyes: EOM are normal.   Neck: Neck supple.   Normal range of motion.  Cardiovascular:  Normal rate, regular rhythm and normal heart sounds.     Exam reveals no gallop and no friction rub.       No murmur heard.  Pulmonary/Chest: Breath sounds normal. No respiratory distress. She has no wheezes. She has no rhonchi. She has no rales.   Musculoskeletal:         General: Normal range of motion.      Cervical back: Normal range of motion and neck supple.     Neurological: She is alert and oriented to person, place, and time.   Skin: Skin is warm and dry.   Psychiatric: She has a normal mood and affect. Her behavior is normal. Judgment and thought content normal.   Denies feeling suicidal       ED Course   Procedures  Labs Reviewed - No data to display       Imaging Results    None          Medications - No data to display              ED Course as of 04/18/23 1743   Tue Apr 18, 2023   1605 BP: 139/78 [EF]   1605 Temp: 97.1 °F (36.2 °C) [EF]   1605 Temp Source: Oral [EF]   1605 Pulse(!): 120 [EF]   1605 Resp: 18 [EF]   1605 SpO2: 99 % [EF]   1623 Phone number that is listed on the order for protective custody rolls over to voicemail. [EF]   2588 77-year-old female presents under order for protective custody from her grandson who lives with her.  She states that he simply showed up 1 day literally on her doorstep and moved in.  Patient reports that her grandson's parents will not let him live with them.  She reports he is unemployed and has a history of drug abuse.  Patient denies feeling  "suicidal.  He is not present in the emergency room and the phone number that is listed on the order for protective custody simply goes to voicemail.  The order for protective custody filled out by the granddarron states that he thinks the patient is suicidal because "she watches a [employer] TV show." This does not make any sense frankly.  She seems pleasant and cooperative and certainly does not seem suicidal or danger to herself or others.  I see no reason to hold her any longer or consult psychiatry.  Patient will be discharged. Pa seems like the problem here. [EF]      ED Course User Index  [EF] Salvador Edmonds MD                 Clinical Impression:   Final diagnoses:  [Z00.8] Encounter for psychological evaluation (Primary)        ED Disposition Condition    Discharge Stable          ED Prescriptions    None       Follow-up Information       Follow up With Specialties Details Why Contact [facility] Emergency Dept Emergency Medicine  As needed, If symptoms worsen [address] [place] [id number] [phone]             Salvador Edmonds MD  04/18/23 2134    "

## 2023-04-18 NOTE — ED NOTES
"Assumed care:  Marleen Samano is awake, alert and oriented x 3, skin warm and dry, in NAD.  Patient arrived for OPC via [place] [employer].  Per officer, grandson put an OPC because patient is watching [employer] channel and stated that she "cant live like this".  Patient states that she was referring to her grandson living with her.  Denies SI or HI at this time.    Patient identifiers for Marleen Samano checked and correct.  LOC:  Marleen Samano is awake, alert, and aware of environment with an appropriate affect. She is oriented x 3 and speaking appropriately.  APPEARANCE:  She is resting comfortably and in no acute distress. She is clean and well groomed, patient's clothing is properly fastened.  SKIN:  The skin is warm and dry. She has normal skin turgor and moist mucus membranes. Skin is intact; no bruising or breakdown noted.  MUSCULOSKELETAL:  She is moving all extremities well, no obvious deformities noted. Pulses intact.   RESPIRATORY:  Airway is open and patent. Respirations are spontaneous and non-labored with normal effort and rate.  CARDIAC:  She has a normal rate and rhythm. No peripheral edema noted. Capillary refill < 3 seconds.  ABDOMEN:  No distention noted.  Soft and non-tender upon palpation.  NEUROLOGICAL:  PERRL. Facial expression is symmetrical. Hand grasps are equal bilaterally. Normal sensation in all extremities when touched with finger.  Allergies reported:    Review of patient's allergies indicates:   Allergen Reactions    Adhesive tape-silicones Rash     Blisters after on for several hours    Augmentin [amoxicillin-pot clavulanate]      thrush    Ciprofloxacin      thrush    Morphine Itching and Hives         "

## 2023-04-25 ENCOUNTER — TELEPHONE (OUTPATIENT)
Dept: CARDIOLOGY | Facility: CLINIC | Age: 78
End: 2023-04-25
Payer: MEDICARE

## 2023-04-25 NOTE — TELEPHONE ENCOUNTER
----- Message from Susan Low sent at 4/25/2023  9:01 AM CDT -----  Contact: self  Type: Sooner Appointment Request        Caller is requesting a sooner appointment. Caller declined first available appointment listed below. Caller will not accept being placed on the waitlist and is requesting a message be sent to doctor.        Name of Caller: Patient    Best Call Back Number: [phone]  Additional Information: Pt states she needs to get r/s next week preferably 5/3/2023 if possible. Pt doesn't want to wait until June to be seen. Pz call to get her r/s for next week. Pt states she doesn't get paid until 5/1/23 so she wont have the money until that week.Thanks

## 2023-04-26 NOTE — TELEPHONE ENCOUNTER
----- Message from Susan Low sent at 4/26/2023  1:59 PM CDT -----  Contact: self  Type: Sooner Appointment Request        Caller is requesting a sooner appointment. Caller declined first available appointment listed below. Caller will not accept being placed on the waitlist and is requesting a message be sent to doctor.        Name of Caller: patient   Best Call Back Number: [phone]  Additional Information: Pt states she had diarrhea and the shakes and  cant make appt today. Pt states she wants to get in before June. Plz call to r/s. Thanks

## 2023-04-27 ENCOUNTER — TELEPHONE (OUTPATIENT)
Dept: FAMILY MEDICINE | Facility: CLINIC | Age: 78
End: 2023-04-27
Payer: MEDICARE

## 2023-04-27 NOTE — TELEPHONE ENCOUNTER
"Patient states she is currently employed at [employer]. 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 States there is new management who is requiring for everyone to work in the role of . 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 States as times her hands "lock up" during the spasms. Patient states she has difficulty performing task that require fine motor function of her hands. Patient states "I'm 77 years old and I just can't do it."  Patient is requesting to send a message to Dr. Ryan to see if he will provide a letter excusing her from working as a . Patient fears if she has to work in this role it will cause increase episodes of muscle spasms and pain. Please advise. Thank you.   "

## 2023-04-28 ENCOUNTER — PATIENT MESSAGE (OUTPATIENT)
Dept: FAMILY MEDICINE | Facility: CLINIC | Age: 78
End: 2023-04-28
Payer: MEDICARE

## 2023-04-30 ENCOUNTER — PATIENT MESSAGE (OUTPATIENT)
Dept: FAMILY MEDICINE | Facility: CLINIC | Age: 78
End: 2023-04-30
Payer: MEDICARE

## 2023-05-01 ENCOUNTER — PATIENT MESSAGE (OUTPATIENT)
Dept: FAMILY MEDICINE | Facility: CLINIC | Age: 78
End: 2023-05-01
Payer: MEDICARE

## 2023-05-17 ENCOUNTER — OFFICE VISIT (OUTPATIENT)
Dept: CARDIOLOGY | Facility: CLINIC | Age: 78
End: 2023-05-17
Payer: MEDICARE

## 2023-05-17 VITALS
HEIGHT: 69 IN | DIASTOLIC BLOOD PRESSURE: 78 MMHG | SYSTOLIC BLOOD PRESSURE: 130 MMHG | OXYGEN SATURATION: 97 % | WEIGHT: 148.06 LBS | HEART RATE: 83 BPM | BODY MASS INDEX: 21.93 KG/M2

## 2023-05-17 DIAGNOSIS — G45.9 TIA (TRANSIENT ISCHEMIC ATTACK): ICD-10-CM

## 2023-05-17 DIAGNOSIS — I10 HYPERTENSION, UNSPECIFIED TYPE: Primary | ICD-10-CM

## 2023-05-17 DIAGNOSIS — I48.0 PAROXYSMAL ATRIAL FIBRILLATION: ICD-10-CM

## 2023-05-17 DIAGNOSIS — E83.51 HYPOCALCEMIA: ICD-10-CM

## 2023-05-17 DIAGNOSIS — R25.2 CRAMPING OF HANDS: ICD-10-CM

## 2023-05-17 DIAGNOSIS — G24.9 DYSTONIA: ICD-10-CM

## 2023-05-17 DIAGNOSIS — J06.9 UPPER RESPIRATORY TRACT INFECTION, UNSPECIFIED TYPE: ICD-10-CM

## 2023-05-17 DIAGNOSIS — J44.9 CHRONIC OBSTRUCTIVE PULMONARY DISEASE, UNSPECIFIED COPD TYPE: ICD-10-CM

## 2023-05-17 DIAGNOSIS — E87.6 HYPOKALEMIA: ICD-10-CM

## 2023-05-17 PROCEDURE — 99215 OFFICE O/P EST HI 40 MIN: CPT | Mod: PN | Performed by: SPECIALIST

## 2023-05-17 PROCEDURE — 99999 PR PBB SHADOW E&M-EST. PATIENT-LVL V: CPT | Mod: PBBFAC,,, | Performed by: SPECIALIST

## 2023-05-17 PROCEDURE — 1159F PR MEDICATION LIST DOCUMENTED IN MEDICAL RECORD: ICD-10-PCS | Mod: CPTII,,, | Performed by: SPECIALIST

## 2023-05-17 PROCEDURE — 1126F PR PAIN SEVERITY QUANTIFIED, NO PAIN PRESENT: ICD-10-PCS | Mod: CPTII,,, | Performed by: SPECIALIST

## 2023-05-17 PROCEDURE — 3078F PR MOST RECENT DIASTOLIC BLOOD PRESSURE < 80 MM HG: ICD-10-PCS | Mod: CPTII,,, | Performed by: SPECIALIST

## 2023-05-17 PROCEDURE — 99214 PR OFFICE/OUTPT VISIT, EST, LEVL IV, 30-39 MIN: ICD-10-PCS | Mod: ,,, | Performed by: SPECIALIST

## 2023-05-17 PROCEDURE — 1160F PR REVIEW ALL MEDS BY PRESCRIBER/CLIN PHARMACIST DOCUMENTED: ICD-10-PCS | Mod: CPTII,,, | Performed by: SPECIALIST

## 2023-05-17 PROCEDURE — 1160F RVW MEDS BY RX/DR IN RCRD: CPT | Mod: CPTII,,, | Performed by: SPECIALIST

## 2023-05-17 PROCEDURE — 3288F PR FALLS RISK ASSESSMENT DOCUMENTED: ICD-10-PCS | Mod: CPTII,,, | Performed by: SPECIALIST

## 2023-05-17 PROCEDURE — 3072F LOW RISK FOR RETINOPATHY: CPT | Mod: CPTII,,, | Performed by: SPECIALIST

## 2023-05-17 PROCEDURE — 99214 OFFICE O/P EST MOD 30 MIN: CPT | Mod: ,,, | Performed by: SPECIALIST

## 2023-05-17 PROCEDURE — 3075F PR MOST RECENT SYSTOLIC BLOOD PRESS GE 130-139MM HG: ICD-10-PCS | Mod: CPTII,,, | Performed by: SPECIALIST

## 2023-05-17 PROCEDURE — 99999 PR PBB SHADOW E&M-EST. PATIENT-LVL V: ICD-10-PCS | Mod: PBBFAC,,, | Performed by: SPECIALIST

## 2023-05-17 PROCEDURE — 3078F DIAST BP <80 MM HG: CPT | Mod: CPTII,,, | Performed by: SPECIALIST

## 2023-05-17 PROCEDURE — 1101F PT FALLS ASSESS-DOCD LE1/YR: CPT | Mod: CPTII,,, | Performed by: SPECIALIST

## 2023-05-17 PROCEDURE — 1101F PR PT FALLS ASSESS DOC 0-1 FALLS W/OUT INJ PAST YR: ICD-10-PCS | Mod: CPTII,,, | Performed by: SPECIALIST

## 2023-05-17 PROCEDURE — 1126F AMNT PAIN NOTED NONE PRSNT: CPT | Mod: CPTII,,, | Performed by: SPECIALIST

## 2023-05-17 PROCEDURE — 3075F SYST BP GE 130 - 139MM HG: CPT | Mod: CPTII,,, | Performed by: SPECIALIST

## 2023-05-17 PROCEDURE — 3072F PR LOW RISK FOR RETINOPATHY: ICD-10-PCS | Mod: CPTII,,, | Performed by: SPECIALIST

## 2023-05-17 PROCEDURE — 3288F FALL RISK ASSESSMENT DOCD: CPT | Mod: CPTII,,, | Performed by: SPECIALIST

## 2023-05-17 PROCEDURE — 1159F MED LIST DOCD IN RCRD: CPT | Mod: CPTII,,, | Performed by: SPECIALIST

## 2023-05-17 RX ORDER — GABAPENTIN 600 MG/1
600 TABLET ORAL DAILY PRN
Qty: 270 TABLET | Refills: 3 | Status: SHIPPED | OUTPATIENT
Start: 2023-05-17

## 2023-05-17 RX ORDER — IPRATROPIUM BROMIDE AND ALBUTEROL SULFATE 2.5; .5 MG/3ML; MG/3ML
SOLUTION RESPIRATORY (INHALATION)
Qty: 180 ML | Refills: 23 | Status: SHIPPED | OUTPATIENT
Start: 2023-05-17

## 2023-05-17 RX ORDER — POTASSIUM CHLORIDE 750 MG/1
10 TABLET, EXTENDED RELEASE ORAL DAILY
Qty: 90 TABLET | Refills: 3 | Status: SHIPPED | OUTPATIENT
Start: 2023-05-17

## 2023-05-18 NOTE — PROGRESS NOTES
Subjective:    Patient ID:  Marleen Samano is a 77 y.o. female who presents for   Hypertension and face spasm per pt    HPI problem 1 patient had facial twitching on the left; she has a history of dystonia and also has significant cramping in the hands she Dr.  She is found to have hypocalcemia and hypokalemia on recent labs  She is not on diuretics  She is under stress  She is not previously had TIA stroke; blood pressure is under control and she is on aspirin    Review of patient's allergies indicates:   Allergen Reactions    Adhesive tape-silicones Rash     Blisters after on for several hours    Augmentin [amoxicillin-pot clavulanate]      thrush    Ciprofloxacin      thrush    Morphine Itching and Hives       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/2020    Bell's palsy     Bladder incontinence     Blepharospasm     Bronchiectasis without complication 10/08/2019    Cervical dystonia     Concussion     COPD (chronic obstructive pulmonary disease)     Enterocolitis 02/07/2020    Fainting spell     2/7/2020    Hormone deficiency     Hypertension     Loose right total knee arthroplasty 06/15/2020    Migraine     Muscle spasm     Myofacial pain syndrome     Normocytic normochromic anemia 02/10/2020    Right ear pain 02/09/2021    Squamous cell carcinoma of skin     Syncope and collapse 02/09/2021     Past Surgical History:   Procedure Laterality Date    APPENDECTOMY      BREAST BIOPSY      BREAST LUMPECTOMY      CATARACT EXTRACTION      COLONOSCOPY N/A 4/6/2023    Procedure: COLONOSCOPY;  Surgeon: Marino Munson MD;  Location: [facility];  Service: Endoscopy;  Laterality: N/A;    ESOPHAGOGASTRODUODENOSCOPY N/A 4/6/2023    Procedure: EGD (ESOPHAGOGASTRODUODENOSCOPY);  Surgeon: Marino Munson MD;   Location: [facility];  Service: Endoscopy;  Laterality: N/A;    EYE SURGERY      HYSTERECTOMY      NECK SURGERY      REVISION OF KNEE ARTHROPLASTY Right 7/14/2020    Procedure: REVISION, ARTHROPLASTY, KNEE;  Surgeon: Pedro Tompkins MD;  Location: [facility];  Service: Orthopedics;  Laterality: Right;    TONSILLECTOMY       Social History     Tobacco Use    Smoking status: Never    Smokeless tobacco: Never   Substance Use Topics    Alcohol use: No    Drug use: No        Review of Systems     Review of Systems   Constitutional: Positive for malaise/fatigue.   HENT: Negative.     Eyes: Negative.    Cardiovascular: Negative.    Respiratory: Negative.     Neurological:  Positive for focal weakness, numbness and paresthesias.        Dystonia; cramping and hands; facial twitch   Psychiatric/Behavioral:  Positive for depression. The patient is nervous/anxious.          Objective:        Vitals:    05/17/23 1519   BP: 130/78   Pulse: 83       Lab Results   Component Value Date    WBC 5.15 04/06/2023    HGB 11.1 (L) 04/06/2023    HCT 36.2 (L) 04/06/2023     (L) 04/06/2023    CHOL 195 06/01/2022    TRIG 95 06/01/2022    HDL 77 (H) 06/01/2022    ALT 12 04/03/2023    AST 22 04/03/2023     04/06/2023    K 3.2 (L) 04/06/2023     (H) 04/06/2023    CREATININE 0.9 04/06/2023    BUN 8 04/06/2023    CO2 22 (L) 04/06/2023    TSH 2.335 03/15/2023    INR 1.0 02/07/2020    HGBA1C 5.9 (H) 06/01/2022        ECHOCARDIOGRAM RESULTS  Results for orders placed during the hospital encounter of 04/16/21    Echo Color Flow Doppler? Yes    Interpretation Summary  · The left ventricle is normal in size with concentric remodeling and normal systolic function.  · The estimated ejection fraction is 67%.  · Indeterminate left ventricular diastolic function.  · Atrial fibrillation not observed.  · Normal right ventricular size with normal right ventricular systolic function.  · There is mild pulmonary hypertension.  · Mild to  moderate tricuspid regurgitation.  · Normal central venous pressure (3 mmHg).  · The estimated PA systolic pressure is 48 mmHg.    Mild pulmonary hypertension      CURRENT/PREVIOUS VISIT EKG  Results for orders placed or performed during the hospital encounter of 04/03/23   EKG and show to ED MD    Collection Time: 04/03/23 11:10 PM    Narrative    Test Reason : R55,    Vent. Rate : 068 BPM     Atrial Rate : 068 BPM     P-R Int : 164 ms          QRS Dur : 092 ms      QT Int : 450 ms       P-R-T Axes : 070 080 054 degrees     QTc Int : 478 ms    Normal sinus rhythm  Normal ECG  When compared with ECG of 22-FEB-2021 12:33,  No significant change was found  Confirmed by Juancarlos Andre MD ([id number]) on 4/9/2023 6:46:23 PM    Referred By: PRANAV   SELF           Confirmed By:Juancarlos Andre MD     Results for orders placed during the hospital encounter of 04/16/21    Echo Color Flow Doppler? Yes    Interpretation Summary  · The left ventricle is normal in size with concentric remodeling and normal systolic function.  · The estimated ejection fraction is 67%.  · Indeterminate left ventricular diastolic function.  · Atrial fibrillation not observed.  · Normal right ventricular size with normal right ventricular systolic function.  · There is mild pulmonary hypertension.  · Mild to moderate tricuspid regurgitation.  · Normal central venous pressure (3 mmHg).  · The estimated PA systolic pressure is 48 mmHg.    Mild pulmonary hypertension    No results found for this or any previous visit.      PHYSICAL EXAM    Physical Exam blood pressure is 120/80  No carotid bruits  Tongue is midline  Lungs clear  Cardiac regular  Abdomen no masses  Extremities no edema    Medication List with Changes/Refills   New Medications    POTASSIUM CHLORIDE (KLOR-CON) 10 MEQ TBSR    Take 1 tablet (10 mEq total) by mouth once daily.   Current Medications    ALBUTEROL (PROVENTIL/VENTOLIN HFA) 90 MCG/ACTUATION INHALER    Inhale 2 puffs into the  lungs every 6 (six) hours as needed for Wheezing. Rescue    AMLODIPINE (NORVASC) 2.5 MG TABLET    Take 1 tablet (2.5 mg total) by mouth once daily.    ASPIRIN 81 MG CHEW    Take 81 mg by mouth once daily.    BACLOFEN (LIORESAL) 10 MG TABLET    Take 1 tablet (10 mg total) by mouth 2 (two) times daily for 30 days, THEN 1 tablet (10 mg total) every evening.    BENAZEPRIL (LOTENSIN) 40 MG TABLET    Take 1 tablet (40 mg total) by mouth once daily.    BLOOD-GLUCOSE METER KIT    To check BG 1 times daily, to use with insurance preferred meter    CLOTRIMAZOLE (LOTRIMIN) 1 % CREAM    Apply topically 2 (two) times daily.    METOPROLOL SUCCINATE (TOPROL-XL) 25 MG 24 HR TABLET    Take 1 tablet (25 mg total) by mouth once daily.    OMEPRAZOLE (PRILOSEC) 40 MG CAPSULE    Take 1 capsule (40 mg total) by mouth 2 (two) times daily before meals.    SIMETHICONE (GAS-X ORAL)    Take by mouth.    TRAZODONE (DESYREL) 50 MG TABLET    Take 1 tablet (50 mg total) by mouth every evening.    VENLAFAXINE (EFFEXOR-XR) 150 MG CP24    Take 1 capsule (150 mg total) by mouth 2 (two) times a day.   Changed and/or Refilled Medications    Modified Medication Previous Medication    ALBUTEROL-IPRATROPIUM (DUO-NEB) 2.5 MG-0.5 MG/3 ML NEBULIZER SOLUTION albuterol-ipratropium (DUO-NEB) 2.5 mg-0.5 mg/3 mL nebulizer solution       USE 1 VIAL VIA NEBULIZER  EVERY 6 HOURS AS NEEDED FOR WHEEZING RESCUE    USE 1 VIAL VIA NEBULIZER  EVERY 6 HOURS AS NEEDED FOR WHEEZING RESCUE    GABAPENTIN (NEURONTIN) 600 MG TABLET gabapentin (NEURONTIN) 600 MG tablet       Take 1 tablet (600 mg total) by mouth daily as needed.    Take 1 tablet (600 mg total) by mouth 3 (three) times daily.           Assessment:       1. Hypertension, unspecified type    2. Dystonia    3. Cramping of hands    4. Paroxysmal atrial fibrillation    5. Hypokalemia    6. Hypocalcemia    7. TIA (transient ischemic attack)    8. Upper respiratory tract infection, unspecified type    9. Chronic  obstructive pulmonary disease, unspecified COPD type    I substantially and  personally reviewed old and new ecg's, lab reports,, xray reports  and  other cardiovascular studies including  echo's, stress tests, angiogram reports, holters,and vascular studies .  In addition I evaluated original cardiac cath  ___echo  ____cxr ______ct ____scan on [employer] or [employer] or other viewing platforms and  ___x _EKG's .   I reviewed  office and hospital notes Yes ____ of  referring providers and other providers.  I reviewed personally old hospital and office notes   Time spent evaluating and managing this patient:______25__min.         Plan:   Go on to calcium tablets a day and potassium once a day  Refer to neurology  Check carotid ultrasound to rule out TIA  Stay on blood pressure medicine and aspirin    Problem List Items Addressed This Visit    None  Visit Diagnoses       Hypertension, unspecified type    -  Primary    Dystonia        Relevant Orders    Ambulatory referral/consult to Neurology    Cramping of hands        Relevant Orders    Ambulatory referral/consult to Neurology    Paroxysmal atrial fibrillation        Hypokalemia        Hypocalcemia        TIA (transient ischemic attack)        Relevant Orders    US Carotid Bilateral    Upper respiratory tract infection, unspecified type        Chronic obstructive pulmonary disease, unspecified COPD type        Relevant Medications    albuterol-ipratropium (DUO-NEB) 2.5 mg-0.5 mg/3 mL nebulizer solution             Follow up in about 10 months (around 3/17/2024).

## 2023-05-23 ENCOUNTER — OFFICE VISIT (OUTPATIENT)
Dept: FAMILY MEDICINE | Facility: CLINIC | Age: 78
End: 2023-05-23
Payer: MEDICARE

## 2023-05-23 ENCOUNTER — LAB VISIT (OUTPATIENT)
Dept: LAB | Facility: HOSPITAL | Age: 78
End: 2023-05-23
Attending: STUDENT IN AN ORGANIZED HEALTH CARE EDUCATION/TRAINING PROGRAM
Payer: MEDICARE

## 2023-05-23 VITALS
OXYGEN SATURATION: 97 % | WEIGHT: 143.75 LBS | BODY MASS INDEX: 21.29 KG/M2 | SYSTOLIC BLOOD PRESSURE: 110 MMHG | DIASTOLIC BLOOD PRESSURE: 70 MMHG | HEART RATE: 99 BPM | HEIGHT: 69 IN | RESPIRATION RATE: 18 BRPM

## 2023-05-23 DIAGNOSIS — I10 BENIGN ESSENTIAL HTN: ICD-10-CM

## 2023-05-23 DIAGNOSIS — E83.51 HYPOCALCEMIA: ICD-10-CM

## 2023-05-23 DIAGNOSIS — R25.2 MUSCLE CRAMPS AT NIGHT: ICD-10-CM

## 2023-05-23 DIAGNOSIS — E87.6 HYPOKALEMIA: ICD-10-CM

## 2023-05-23 DIAGNOSIS — E87.6 HYPOKALEMIA: Primary | ICD-10-CM

## 2023-05-23 PROCEDURE — 1160F PR REVIEW ALL MEDS BY PRESCRIBER/CLIN PHARMACIST DOCUMENTED: ICD-10-PCS | Mod: CPTII,S$GLB,, | Performed by: STUDENT IN AN ORGANIZED HEALTH CARE EDUCATION/TRAINING PROGRAM

## 2023-05-23 PROCEDURE — 1159F PR MEDICATION LIST DOCUMENTED IN MEDICAL RECORD: ICD-10-PCS | Mod: CPTII,S$GLB,, | Performed by: STUDENT IN AN ORGANIZED HEALTH CARE EDUCATION/TRAINING PROGRAM

## 2023-05-23 PROCEDURE — 3078F PR MOST RECENT DIASTOLIC BLOOD PRESSURE < 80 MM HG: ICD-10-PCS | Mod: CPTII,S$GLB,, | Performed by: STUDENT IN AN ORGANIZED HEALTH CARE EDUCATION/TRAINING PROGRAM

## 2023-05-23 PROCEDURE — 3074F PR MOST RECENT SYSTOLIC BLOOD PRESSURE < 130 MM HG: ICD-10-PCS | Mod: CPTII,S$GLB,, | Performed by: STUDENT IN AN ORGANIZED HEALTH CARE EDUCATION/TRAINING PROGRAM

## 2023-05-23 PROCEDURE — 1101F PT FALLS ASSESS-DOCD LE1/YR: CPT | Mod: CPTII,S$GLB,, | Performed by: STUDENT IN AN ORGANIZED HEALTH CARE EDUCATION/TRAINING PROGRAM

## 2023-05-23 PROCEDURE — 99999 PR PBB SHADOW E&M-EST. PATIENT-LVL IV: ICD-10-PCS | Mod: PBBFAC,,, | Performed by: STUDENT IN AN ORGANIZED HEALTH CARE EDUCATION/TRAINING PROGRAM

## 2023-05-23 PROCEDURE — 3288F PR FALLS RISK ASSESSMENT DOCUMENTED: ICD-10-PCS | Mod: CPTII,S$GLB,, | Performed by: STUDENT IN AN ORGANIZED HEALTH CARE EDUCATION/TRAINING PROGRAM

## 2023-05-23 PROCEDURE — 99999 PR PBB SHADOW E&M-EST. PATIENT-LVL IV: CPT | Mod: PBBFAC,,, | Performed by: STUDENT IN AN ORGANIZED HEALTH CARE EDUCATION/TRAINING PROGRAM

## 2023-05-23 PROCEDURE — 3074F SYST BP LT 130 MM HG: CPT | Mod: CPTII,S$GLB,, | Performed by: STUDENT IN AN ORGANIZED HEALTH CARE EDUCATION/TRAINING PROGRAM

## 2023-05-23 PROCEDURE — 1126F AMNT PAIN NOTED NONE PRSNT: CPT | Mod: CPTII,S$GLB,, | Performed by: STUDENT IN AN ORGANIZED HEALTH CARE EDUCATION/TRAINING PROGRAM

## 2023-05-23 PROCEDURE — 1160F RVW MEDS BY RX/DR IN RCRD: CPT | Mod: CPTII,S$GLB,, | Performed by: STUDENT IN AN ORGANIZED HEALTH CARE EDUCATION/TRAINING PROGRAM

## 2023-05-23 PROCEDURE — 80053 COMPREHEN METABOLIC PANEL: CPT | Performed by: STUDENT IN AN ORGANIZED HEALTH CARE EDUCATION/TRAINING PROGRAM

## 2023-05-23 PROCEDURE — 3288F FALL RISK ASSESSMENT DOCD: CPT | Mod: CPTII,S$GLB,, | Performed by: STUDENT IN AN ORGANIZED HEALTH CARE EDUCATION/TRAINING PROGRAM

## 2023-05-23 PROCEDURE — 1126F PR PAIN SEVERITY QUANTIFIED, NO PAIN PRESENT: ICD-10-PCS | Mod: CPTII,S$GLB,, | Performed by: STUDENT IN AN ORGANIZED HEALTH CARE EDUCATION/TRAINING PROGRAM

## 2023-05-23 PROCEDURE — 1159F MED LIST DOCD IN RCRD: CPT | Mod: CPTII,S$GLB,, | Performed by: STUDENT IN AN ORGANIZED HEALTH CARE EDUCATION/TRAINING PROGRAM

## 2023-05-23 PROCEDURE — 36415 COLL VENOUS BLD VENIPUNCTURE: CPT | Mod: PO | Performed by: STUDENT IN AN ORGANIZED HEALTH CARE EDUCATION/TRAINING PROGRAM

## 2023-05-23 PROCEDURE — 1101F PR PT FALLS ASSESS DOC 0-1 FALLS W/OUT INJ PAST YR: ICD-10-PCS | Mod: CPTII,S$GLB,, | Performed by: STUDENT IN AN ORGANIZED HEALTH CARE EDUCATION/TRAINING PROGRAM

## 2023-05-23 PROCEDURE — 99214 OFFICE O/P EST MOD 30 MIN: CPT | Mod: S$GLB,,, | Performed by: STUDENT IN AN ORGANIZED HEALTH CARE EDUCATION/TRAINING PROGRAM

## 2023-05-23 PROCEDURE — 3072F LOW RISK FOR RETINOPATHY: CPT | Mod: CPTII,S$GLB,, | Performed by: STUDENT IN AN ORGANIZED HEALTH CARE EDUCATION/TRAINING PROGRAM

## 2023-05-23 PROCEDURE — 3072F PR LOW RISK FOR RETINOPATHY: ICD-10-PCS | Mod: CPTII,S$GLB,, | Performed by: STUDENT IN AN ORGANIZED HEALTH CARE EDUCATION/TRAINING PROGRAM

## 2023-05-23 PROCEDURE — 99214 PR OFFICE/OUTPT VISIT, EST, LEVL IV, 30-39 MIN: ICD-10-PCS | Mod: S$GLB,,, | Performed by: STUDENT IN AN ORGANIZED HEALTH CARE EDUCATION/TRAINING PROGRAM

## 2023-05-23 PROCEDURE — 3078F DIAST BP <80 MM HG: CPT | Mod: CPTII,S$GLB,, | Performed by: STUDENT IN AN ORGANIZED HEALTH CARE EDUCATION/TRAINING PROGRAM

## 2023-05-23 RX ORDER — BENAZEPRIL HYDROCHLORIDE 40 MG/1
40 TABLET ORAL DAILY
Qty: 90 TABLET | Refills: 4 | Status: SHIPPED | OUTPATIENT
Start: 2023-05-23

## 2023-05-23 NOTE — PROGRESS NOTES
"[facility] CLINIC NOTE    Patient Name: Marleen Samano  YOB: 1945    PRESENTING HISTORY     History of Present Illness:  Ms. Marleen Samano is a 77 y.o. female here for scheduled f/u.     She has some questions:  1.  Wondering about potassium, calcium. Low in hospital.   Started on replacement by Dr. Costello.     This was in the setting of hospitalization. I suspect sig volume resusictation with high chloride, improvement in creatinine.   May be a dilutional element.     2.  Sometimnes I feel like I am going to pass out.   At work felt like she was going to pass out. Coworker said she looked pale. Never happened again  Has upcoming carotid US.   Recent Echo.     3.  She has been referred to neruology. Asks about this. Given some instructions on how to schedule.     4.  On baclofen. Was recommended by another physician that she stop. We discussed that muscle relaxants are relatively contraindicated in geriatric population. She will consider this and use sparingly. Will also discuss with her neurologist.     ROS      OBJECTIVE:   Vital Signs:  Vitals:    05/23/23 1326   BP: 110/70   Pulse: 99   Resp: 18   SpO2: 97%   Weight: 65.2 kg (143 lb 11.8 oz)   Height: 5' 9" (1.753 m)          Physical Exam: Normal, no change.     Physical Exam    ASSESSMENT & PLAN:     Hypokalemia  -     Comprehensive Metabolic Panel; Future; Expected date: 05/23/2023    Hypocalcemia  -     Comprehensive Metabolic Panel; Future; Expected date: 05/23/2023  See my thoughts above.   Recheck prior to replacing.     Muscle cramps at night  See discussion above    Benign essential HTN  -     benazepriL (LOTENSIN) 40 MG tablet; Take 1 tablet (40 mg total) by mouth once daily.  Dispense: 90 tablet; Refill: 4  Well controlled. Continue current medications               Mikie Ryan MD   Internal Medicine      I spent a total of 36 minutes on the day of the visit.  This includes face-to-face time and non face-to-face time " preparing to see the patient (e.g., review of tests) , obtaining and/or reviewing separately obtain history, documenting clinical information in the electronic or other health record, independently interpreting results and communicating results to the patient/family/caregiver, or care coordinator.

## 2023-05-24 ENCOUNTER — PATIENT MESSAGE (OUTPATIENT)
Dept: FAMILY MEDICINE | Facility: CLINIC | Age: 78
End: 2023-05-24
Payer: MEDICARE

## 2023-05-24 LAB
ALBUMIN SERPL BCP-MCNC: 4 G/DL (ref 3.5–5.2)
ALP SERPL-CCNC: 101 U/L (ref 55–135)
ALT SERPL W/O P-5'-P-CCNC: 11 U/L (ref 10–44)
ANION GAP SERPL CALC-SCNC: 10 MMOL/L (ref 8–16)
AST SERPL-CCNC: 16 U/L (ref 10–40)
BILIRUB SERPL-MCNC: 0.2 MG/DL (ref 0.1–1)
BUN SERPL-MCNC: 22 MG/DL (ref 8–23)
CALCIUM SERPL-MCNC: 9.7 MG/DL (ref 8.7–10.5)
CHLORIDE SERPL-SCNC: 105 MMOL/L (ref 95–110)
CO2 SERPL-SCNC: 27 MMOL/L (ref 23–29)
CREAT SERPL-MCNC: 0.9 MG/DL (ref 0.5–1.4)
EST. GFR  (NO RACE VARIABLE): >60 ML/MIN/1.73 M^2
GLUCOSE SERPL-MCNC: 97 MG/DL (ref 70–110)
POTASSIUM SERPL-SCNC: 4 MMOL/L (ref 3.5–5.1)
PROT SERPL-MCNC: 7.1 G/DL (ref 6–8.4)
SODIUM SERPL-SCNC: 142 MMOL/L (ref 136–145)

## 2023-06-06 ENCOUNTER — PATIENT MESSAGE (OUTPATIENT)
Dept: ADMINISTRATIVE | Facility: HOSPITAL | Age: 78
End: 2023-06-06
Payer: MEDICARE

## 2023-06-07 ENCOUNTER — OFFICE VISIT (OUTPATIENT)
Dept: OPTOMETRY | Facility: CLINIC | Age: 78
End: 2023-06-07
Payer: MEDICARE

## 2023-06-07 DIAGNOSIS — D23.121 PAPILLOMA OF LEFT UPPER EYELID: ICD-10-CM

## 2023-06-07 DIAGNOSIS — H16.142 PUNCTATE KERATITIS OF LEFT EYE: Primary | ICD-10-CM

## 2023-06-07 DIAGNOSIS — D23.111 PAPILLOMA OF RIGHT UPPER EYELID: ICD-10-CM

## 2023-06-07 DIAGNOSIS — H04.123 DRY EYE SYNDROME OF BILATERAL LACRIMAL GLANDS: ICD-10-CM

## 2023-06-07 PROCEDURE — 1101F PR PT FALLS ASSESS DOC 0-1 FALLS W/OUT INJ PAST YR: ICD-10-PCS | Mod: CPTII,S$GLB,,

## 2023-06-07 PROCEDURE — 1159F MED LIST DOCD IN RCRD: CPT | Mod: CPTII,S$GLB,,

## 2023-06-07 PROCEDURE — 99213 OFFICE O/P EST LOW 20 MIN: CPT | Mod: S$GLB,,,

## 2023-06-07 PROCEDURE — 1126F AMNT PAIN NOTED NONE PRSNT: CPT | Mod: CPTII,S$GLB,,

## 2023-06-07 PROCEDURE — 99999 PR PBB SHADOW E&M-EST. PATIENT-LVL III: CPT | Mod: PBBFAC,,,

## 2023-06-07 PROCEDURE — 1101F PT FALLS ASSESS-DOCD LE1/YR: CPT | Mod: CPTII,S$GLB,,

## 2023-06-07 PROCEDURE — 3288F PR FALLS RISK ASSESSMENT DOCUMENTED: ICD-10-PCS | Mod: CPTII,S$GLB,,

## 2023-06-07 PROCEDURE — 99213 PR OFFICE/OUTPT VISIT, EST, LEVL III, 20-29 MIN: ICD-10-PCS | Mod: S$GLB,,,

## 2023-06-07 PROCEDURE — 1159F PR MEDICATION LIST DOCUMENTED IN MEDICAL RECORD: ICD-10-PCS | Mod: CPTII,S$GLB,,

## 2023-06-07 PROCEDURE — 1126F PR PAIN SEVERITY QUANTIFIED, NO PAIN PRESENT: ICD-10-PCS | Mod: CPTII,S$GLB,,

## 2023-06-07 PROCEDURE — 99999 PR PBB SHADOW E&M-EST. PATIENT-LVL III: ICD-10-PCS | Mod: PBBFAC,,,

## 2023-06-07 PROCEDURE — 3288F FALL RISK ASSESSMENT DOCD: CPT | Mod: CPTII,S$GLB,,

## 2023-06-07 RX ORDER — TOBRAMYCIN AND DEXAMETHASONE 3; 1 MG/ML; MG/ML
1 SUSPENSION/ DROPS OPHTHALMIC 4 TIMES DAILY
Qty: 5 ML | Refills: 0 | Status: SHIPPED | OUTPATIENT
Start: 2023-06-07 | End: 2023-06-17

## 2023-06-07 NOTE — PROGRESS NOTES
"HPI    C/o bump PRESLEY x 2 weeks.     Pt sts it is irritating. Pt denies compresses, uses Systane PRN. Pt thinks   it is the start of a stye.   Last edited by Soraida Hoang, OD on 6/7/2023  5:01 PM.            Assessment /Plan     For exam results, see Encounter Report.    Punctate keratitis of left eye  -     tobramycin-dexAMETHasone 0.3-0.1% (TOBRADEX) 0.3-0.1 % DrpS; Place 1 drop into the left eye 4 (four) times daily. for 10 days  Dispense: 5 mL; Refill: 0    Dry eye syndrome of bilateral lacrimal glands    Papilloma of right upper eyelid    Papilloma of left upper eyelid      1-2. FBS and irritation of left eye. No obvious hordeolum or chalazion evident of the upper or lower lid. No FB on lid eversion. Ed pt on all findings and Rx'd Tobradex to use QID OS x 10 days. Recommended warm compresses for additional comfort. Discussed importance of good lid hygiene - pt currently uses [employer] lid scrubs but states that they burn - recommended pt switch to a spray version of a lid cleanser and to use daily with a cotton pad. Ed pt not to continue applying any products to the eyes that burn or cause discomfort. Advised pt use artificial tears QID daily. Ed pt to call or message if no improvement or worsening of symptoms.     3-4. Ed pt that "white bumps" she's seeing on her upper eyelids bilaterally are papillomas. Discussed benign nature of papillomas and that no treatment is necessary unless they start to become bothersome. Monitor.    RTC: pt to call to schedule annual exam, sooner prn.                 "

## 2023-06-09 NOTE — PATIENT INSTRUCTIONS
Conjuntivae and eyelids appear normal, Sclerae : White without injection Nystatin swish and swallow ordered to treat oral thrush medication    Stop Augmentin, this will make diarrhea improve    Continue Bronchiectasis therapy

## 2023-06-14 ENCOUNTER — PES CALL (OUTPATIENT)
Dept: ADMINISTRATIVE | Facility: CLINIC | Age: 78
End: 2023-06-14
Payer: MEDICARE

## 2023-06-14 ENCOUNTER — PATIENT MESSAGE (OUTPATIENT)
Dept: FAMILY MEDICINE | Facility: CLINIC | Age: 78
End: 2023-06-14
Payer: MEDICARE

## 2023-06-15 DIAGNOSIS — E11.9 TYPE 2 DIABETES MELLITUS WITHOUT COMPLICATION, UNSPECIFIED WHETHER LONG TERM INSULIN USE: ICD-10-CM

## 2023-06-15 NOTE — TELEPHONE ENCOUNTER
2 messages received related to this matter. Both messages have been combined into one, and forwarded to Dr. Ryan for review.

## 2023-06-15 NOTE — TELEPHONE ENCOUNTER
Please see attached portal message from patient.   Patient sent 2 portal messages related to this matter. Will combine both messages into one for provider to review. Please advise. Thank you.       Message 2 of 2:    Marleen Deluna Staff (supporting Mikie Ryan MD) 12 hours ago (7:25 PM)       I also took magnesium capsules w/silica, 300 mg.  Brand [employer].  WHAT ELSE CAN I DO TO GET RELIEF.  THANK YOU, DOCTOR DARLENE

## 2023-06-16 ENCOUNTER — TELEPHONE (OUTPATIENT)
Dept: HEMATOLOGY/ONCOLOGY | Facility: CLINIC | Age: 78
End: 2023-06-16
Payer: MEDICARE

## 2023-06-16 DIAGNOSIS — N90.89 LESION OF LABIA: Primary | ICD-10-CM

## 2023-06-16 NOTE — NURSING
"Spoke with pt.  She was seen by Dr. Fofana (Dermatology) last week and was told she needed to see GYNONC for a "spot" on her labia.  She has had this for awhile.  She is requesting a female.  Scheduled her to see Dr. Tucker on Wednesday 6/21/23.  Will request records.  Location and contact information reviewed.  Asked her to call with any questions or concerns.  She thanked me and verbalized understanding.   "

## 2023-06-16 NOTE — TELEPHONE ENCOUNTER
----- Message from Kena Barber Patient Care Assistant sent at 6/16/2023 10:30 AM CDT -----  Type: Needs Medical Advice  Who Called:  jaspreet Daugherty Call Back Number: [phone]    Additional Information: patient is wanting to schedule on  Tuesday, wed or thursday before noon if possible  with brakamar  and leave a message with date and time of new appointment ,  please call to further discuss thanks

## 2023-06-21 ENCOUNTER — TELEPHONE (OUTPATIENT)
Dept: HEMATOLOGY/ONCOLOGY | Facility: CLINIC | Age: 78
End: 2023-06-21
Payer: MEDICARE

## 2023-06-21 NOTE — NURSING
Pt returned call.  She said she has been up all night with diarrhea.  Rescheduled her appt to 7/5/23.  Reminded her that she could also be scheduled at Dr. Tucker's private office if she wanted to be seen sooner.   Encouraged her to call with any questions or concerns.  She thanked me and verbalized understanding.

## 2023-06-21 NOTE — NURSING
Pt left message to cancel appt today.  I called to see what was going on and to see if she wanted to reschedule.  No answer.  No voicemail set up to leave message.

## 2023-06-21 NOTE — TELEPHONE ENCOUNTER
----- Message from Yumiko Cueva sent at 6/21/2023  8:07 AM CDT -----  Type: Needs Medical Advice  Who Called:  Patient   Symptoms (please be specific):    How long has patient had these symptoms:    Pharmacy name and phone #:    [facility] Call Back Number:[phone]   Additional Information: Patient called to cancel her appt. on 6/21 at 3:00 and will call back to reschedule when she is feeling better.

## 2023-06-23 DIAGNOSIS — I10 BENIGN ESSENTIAL HTN: ICD-10-CM

## 2023-06-24 RX ORDER — AMLODIPINE BESYLATE 2.5 MG/1
TABLET ORAL
Qty: 90 TABLET | Refills: 3 | Status: SHIPPED | OUTPATIENT
Start: 2023-06-24

## 2023-06-24 NOTE — TELEPHONE ENCOUNTER
Refill Decision Note   Marleen Samano  is requesting a refill authorization.  Brief Assessment and Rationale for Refill:  Approve     Medication Therapy Plan:         Comments:     Note composed:2:13 AM 06/24/2023

## 2023-06-24 NOTE — TELEPHONE ENCOUNTER
No care due was identified.  [facility] Embedded Care Due Messages. Reference number: [id number].   6/23/2023 10:32:07 PM CDT

## 2023-06-28 ENCOUNTER — TELEPHONE (OUTPATIENT)
Dept: HEMATOLOGY/ONCOLOGY | Facility: CLINIC | Age: 78
End: 2023-06-28
Payer: MEDICARE

## 2023-06-28 ENCOUNTER — PES CALL (OUTPATIENT)
Dept: ADMINISTRATIVE | Facility: CLINIC | Age: 78
End: 2023-06-28
Payer: MEDICARE

## 2023-06-28 NOTE — NURSING
Called pt to confirm appt for 7/5/23.  Encouraged her to call me if something changes.  She verbalized understanding.

## 2023-07-05 ENCOUNTER — TELEPHONE (OUTPATIENT)
Dept: HEMATOLOGY/ONCOLOGY | Facility: CLINIC | Age: 78
End: 2023-07-05
Payer: MEDICARE

## 2023-07-05 ENCOUNTER — OFFICE VISIT (OUTPATIENT)
Dept: GYNECOLOGIC ONCOLOGY | Facility: CLINIC | Age: 78
End: 2023-07-05
Payer: MEDICARE

## 2023-07-05 ENCOUNTER — DOCUMENTATION ONLY (OUTPATIENT)
Dept: INFUSION THERAPY | Facility: HOSPITAL | Age: 78
End: 2023-07-05
Payer: MEDICARE

## 2023-07-05 VITALS
WEIGHT: 148.81 LBS | RESPIRATION RATE: 16 BRPM | SYSTOLIC BLOOD PRESSURE: 148 MMHG | OXYGEN SATURATION: 97 % | HEART RATE: 79 BPM | TEMPERATURE: 98 F | BODY MASS INDEX: 22.04 KG/M2 | DIASTOLIC BLOOD PRESSURE: 60 MMHG | HEIGHT: 69 IN

## 2023-07-05 DIAGNOSIS — N90.89 LESION OF LABIA: ICD-10-CM

## 2023-07-05 DIAGNOSIS — Z91.89 DOES NOT FEEL SAFE AT HOME: ICD-10-CM

## 2023-07-05 DIAGNOSIS — Z12.31 SCREENING MAMMOGRAM FOR BREAST CANCER: Primary | ICD-10-CM

## 2023-07-05 PROCEDURE — 3078F PR MOST RECENT DIASTOLIC BLOOD PRESSURE < 80 MM HG: ICD-10-PCS | Mod: CPTII,S$GLB,, | Performed by: OBSTETRICS & GYNECOLOGY

## 2023-07-05 PROCEDURE — 99204 PR OFFICE/OUTPT VISIT, NEW, LEVL IV, 45-59 MIN: ICD-10-PCS | Mod: S$GLB,,, | Performed by: OBSTETRICS & GYNECOLOGY

## 2023-07-05 PROCEDURE — 99999 PR PBB SHADOW E&M-EST. PATIENT-LVL V: CPT | Mod: PBBFAC,,, | Performed by: OBSTETRICS & GYNECOLOGY

## 2023-07-05 PROCEDURE — 1101F PT FALLS ASSESS-DOCD LE1/YR: CPT | Mod: CPTII,S$GLB,, | Performed by: OBSTETRICS & GYNECOLOGY

## 2023-07-05 PROCEDURE — 3078F DIAST BP <80 MM HG: CPT | Mod: CPTII,S$GLB,, | Performed by: OBSTETRICS & GYNECOLOGY

## 2023-07-05 PROCEDURE — 3077F SYST BP >= 140 MM HG: CPT | Mod: CPTII,S$GLB,, | Performed by: OBSTETRICS & GYNECOLOGY

## 2023-07-05 PROCEDURE — 3072F LOW RISK FOR RETINOPATHY: CPT | Mod: CPTII,S$GLB,, | Performed by: OBSTETRICS & GYNECOLOGY

## 2023-07-05 PROCEDURE — 1159F PR MEDICATION LIST DOCUMENTED IN MEDICAL RECORD: ICD-10-PCS | Mod: CPTII,S$GLB,, | Performed by: OBSTETRICS & GYNECOLOGY

## 2023-07-05 PROCEDURE — 3288F FALL RISK ASSESSMENT DOCD: CPT | Mod: CPTII,S$GLB,, | Performed by: OBSTETRICS & GYNECOLOGY

## 2023-07-05 PROCEDURE — 99999 PR PBB SHADOW E&M-EST. PATIENT-LVL V: ICD-10-PCS | Mod: PBBFAC,,, | Performed by: OBSTETRICS & GYNECOLOGY

## 2023-07-05 PROCEDURE — 1126F PR PAIN SEVERITY QUANTIFIED, NO PAIN PRESENT: ICD-10-PCS | Mod: CPTII,S$GLB,, | Performed by: OBSTETRICS & GYNECOLOGY

## 2023-07-05 PROCEDURE — 3072F PR LOW RISK FOR RETINOPATHY: ICD-10-PCS | Mod: CPTII,S$GLB,, | Performed by: OBSTETRICS & GYNECOLOGY

## 2023-07-05 PROCEDURE — 3077F PR MOST RECENT SYSTOLIC BLOOD PRESSURE >= 140 MM HG: ICD-10-PCS | Mod: CPTII,S$GLB,, | Performed by: OBSTETRICS & GYNECOLOGY

## 2023-07-05 PROCEDURE — 99204 OFFICE O/P NEW MOD 45 MIN: CPT | Mod: S$GLB,,, | Performed by: OBSTETRICS & GYNECOLOGY

## 2023-07-05 PROCEDURE — 3288F PR FALLS RISK ASSESSMENT DOCUMENTED: ICD-10-PCS | Mod: CPTII,S$GLB,, | Performed by: OBSTETRICS & GYNECOLOGY

## 2023-07-05 PROCEDURE — 1159F MED LIST DOCD IN RCRD: CPT | Mod: CPTII,S$GLB,, | Performed by: OBSTETRICS & GYNECOLOGY

## 2023-07-05 PROCEDURE — 1126F AMNT PAIN NOTED NONE PRSNT: CPT | Mod: CPTII,S$GLB,, | Performed by: OBSTETRICS & GYNECOLOGY

## 2023-07-05 PROCEDURE — 1101F PR PT FALLS ASSESS DOC 0-1 FALLS W/OUT INJ PAST YR: ICD-10-PCS | Mod: CPTII,S$GLB,, | Performed by: OBSTETRICS & GYNECOLOGY

## 2023-07-05 NOTE — NURSING
Met with patient in clinic today.  Referral for SW placed.  Lucy met with pt in clinic today.  My contact information was provided and pt was encouraged to call with any questions or concerns.  She thanked me and verbalized understanding.
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
## 2023-07-06 ENCOUNTER — DOCUMENTATION ONLY (OUTPATIENT)
Dept: INFUSION THERAPY | Facility: HOSPITAL | Age: 78
End: 2023-07-06
Payer: MEDICARE
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
## 2023-07-10 PROBLEM — N17.9 AKI (ACUTE KIDNEY INJURY): Status: RESOLVED | Noted: 2023-04-04 | Resolved: 2023-07-10

## 2023-07-11 ENCOUNTER — PATIENT MESSAGE (OUTPATIENT)
Dept: CARDIOLOGY | Facility: CLINIC | Age: 78
End: 2023-07-11
Payer: MEDICARE

## 2023-07-17 NOTE — TELEPHONE ENCOUNTER
----- Message from Alba Nelson sent at 8/8/2022  9:04 AM CDT -----  Contact: [phone]  Type:  Same Day Appointment Request    Caller is requesting a same day appointment.  Caller declined first available appointment listed below.      Name of Caller:  Pt  When is the first available appointment?  Wed  Symptoms:  UTI(reoccuring)  Best Call Back Number:  [phone]  Additional Information:   Pls call back and advise         No

## 2023-07-18 RX ORDER — OMEPRAZOLE 40 MG/1
CAPSULE, DELAYED RELEASE ORAL
Qty: 180 CAPSULE | Refills: 3 | Status: SHIPPED | OUTPATIENT
Start: 2023-07-18

## 2023-07-18 NOTE — TELEPHONE ENCOUNTER
Refill Routing Note     Refill Routing Note   Medication(s) are not appropriate for processing by [facility] for the following reason(s):      Medication outside of protocol    [facility] action(s):  Route Care Due:  None identified     Medication Therapy Plan: 40 mg bid dose outside protocol      Appointments  past 12m or future 3m with PCP    Date Provider   Last Visit   5/23/2023 Mikie Ryan MD   Next Visit   Visit date not found Mikie Ryan MD   ED visits in past 90 days: 0        Note composed:8:19 AM 07/18/2023

## 2023-07-19 ENCOUNTER — HOSPITAL ENCOUNTER (OUTPATIENT)
Dept: RADIOLOGY | Facility: CLINIC | Age: 78
Discharge: HOME | End: 2023-07-19
Attending: OBSTETRICS & GYNECOLOGY
Payer: MEDICARE

## 2023-07-19 DIAGNOSIS — Z12.31 SCREENING MAMMOGRAM FOR BREAST CANCER: ICD-10-CM

## 2023-07-19 PROCEDURE — 77067 SCR MAMMO BI INCL CAD: CPT | Mod: 26,,, | Performed by: RADIOLOGY

## 2023-07-19 PROCEDURE — 77063 MAMMO DIGITAL SCREENING BILAT WITH TOMO: ICD-10-PCS | Mod: 26,,, | Performed by: RADIOLOGY

## 2023-07-19 PROCEDURE — 77063 BREAST TOMOSYNTHESIS BI: CPT | Mod: 26,,, | Performed by: RADIOLOGY

## 2023-07-19 PROCEDURE — 77067 MAMMO DIGITAL SCREENING BILAT WITH TOMO: ICD-10-PCS | Mod: 26,,, | Performed by: RADIOLOGY

## 2023-07-19 PROCEDURE — 77067 SCR MAMMO BI INCL CAD: CPT | Mod: TC,PO

## 2023-07-25 ENCOUNTER — TELEPHONE (OUTPATIENT)
Dept: FAMILY MEDICINE | Facility: CLINIC | Age: 78
End: 2023-07-25
Payer: MEDICARE

## 2023-07-25 NOTE — TELEPHONE ENCOUNTER
----- Message from Rosie Mayo sent at 7/25/2023 10:51 AM CDT -----  Contact: pt  Type: Needs Medical Advice  Who Called:  pt  Best Call Back Number: [phone]    Additional Information: Pt is calling the office needing to speak with someone in the office she is asking to call before 11:30 or as soon as you can. She has real bad diarrhea all morning she also needs someone to cure and needs meds Liza and if  Can give her the generic on both.Please call back and advise.

## 2023-07-25 NOTE — TELEPHONE ENCOUNTER
Patient returned call to office, was notified Dr. Ryan would like for her to be seen in clinic for evaluation. Appointment scheduled for the date of 7-26-23. Patient agreed to appointment date, time, and location.

## 2023-07-25 NOTE — TELEPHONE ENCOUNTER
Call placed to patient. No answer received. Left message requesting return call to office.       Mikie Ryan MD  You 58 minutes ago (1:06 PM)       Have her come in to see someone.

## 2023-07-25 NOTE — TELEPHONE ENCOUNTER
Patient reports experiencing diarrhea. States symptoms started this morning around 3:30 am. Requesting to know if Dr. Ryan will send a prescription to the pharmacy related to symptoms. States if prescription can be sent she would also like a prescription for Diflucan to ensure she does not get a yeast infection. Please advise. Thank you.

## 2023-07-26 ENCOUNTER — TELEPHONE (OUTPATIENT)
Dept: FAMILY MEDICINE | Facility: CLINIC | Age: 78
End: 2023-07-26

## 2023-07-26 ENCOUNTER — OFFICE VISIT (OUTPATIENT)
Dept: FAMILY MEDICINE | Facility: CLINIC | Age: 78
End: 2023-07-26
Payer: MEDICARE

## 2023-07-26 VITALS
BODY MASS INDEX: 21.98 KG/M2 | HEART RATE: 85 BPM | TEMPERATURE: 98 F | SYSTOLIC BLOOD PRESSURE: 134 MMHG | OXYGEN SATURATION: 97 % | WEIGHT: 148.38 LBS | DIASTOLIC BLOOD PRESSURE: 80 MMHG | HEIGHT: 69 IN

## 2023-07-26 DIAGNOSIS — R19.7 DIARRHEA, UNSPECIFIED TYPE: Primary | ICD-10-CM

## 2023-07-26 DIAGNOSIS — L29.9 ITCHING: Primary | ICD-10-CM

## 2023-07-26 LAB
COMMENTS: ABNORMAL
EST. AVERAGE GLUCOSE BLD GHB EST-MCNC: 126 MG/DL
HBA1C MFR BLD: 6 % (ref 4.8–5.6)

## 2023-07-26 PROCEDURE — 99999 PR PBB SHADOW E&M-EST. PATIENT-LVL IV: ICD-10-PCS | Mod: PBBFAC,,, | Performed by: NURSE PRACTITIONER

## 2023-07-26 PROCEDURE — 99214 PR OFFICE/OUTPT VISIT, EST, LEVL IV, 30-39 MIN: ICD-10-PCS | Mod: S$GLB,,, | Performed by: NURSE PRACTITIONER

## 2023-07-26 PROCEDURE — 3288F PR FALLS RISK ASSESSMENT DOCUMENTED: ICD-10-PCS | Mod: CPTII,S$GLB,, | Performed by: NURSE PRACTITIONER

## 2023-07-26 PROCEDURE — 1126F PR PAIN SEVERITY QUANTIFIED, NO PAIN PRESENT: ICD-10-PCS | Mod: CPTII,S$GLB,, | Performed by: NURSE PRACTITIONER

## 2023-07-26 PROCEDURE — 3075F SYST BP GE 130 - 139MM HG: CPT | Mod: CPTII,S$GLB,, | Performed by: NURSE PRACTITIONER

## 2023-07-26 PROCEDURE — 1126F AMNT PAIN NOTED NONE PRSNT: CPT | Mod: CPTII,S$GLB,, | Performed by: NURSE PRACTITIONER

## 2023-07-26 PROCEDURE — 3288F FALL RISK ASSESSMENT DOCD: CPT | Mod: CPTII,S$GLB,, | Performed by: NURSE PRACTITIONER

## 2023-07-26 PROCEDURE — 99999 PR PBB SHADOW E&M-EST. PATIENT-LVL IV: CPT | Mod: PBBFAC,,, | Performed by: NURSE PRACTITIONER

## 2023-07-26 PROCEDURE — 3075F PR MOST RECENT SYSTOLIC BLOOD PRESS GE 130-139MM HG: ICD-10-PCS | Mod: CPTII,S$GLB,, | Performed by: NURSE PRACTITIONER

## 2023-07-26 PROCEDURE — 3079F PR MOST RECENT DIASTOLIC BLOOD PRESSURE 80-89 MM HG: ICD-10-PCS | Mod: CPTII,S$GLB,, | Performed by: NURSE PRACTITIONER

## 2023-07-26 PROCEDURE — 3072F LOW RISK FOR RETINOPATHY: CPT | Mod: CPTII,S$GLB,, | Performed by: NURSE PRACTITIONER

## 2023-07-26 PROCEDURE — 3072F PR LOW RISK FOR RETINOPATHY: ICD-10-PCS | Mod: CPTII,S$GLB,, | Performed by: NURSE PRACTITIONER

## 2023-07-26 PROCEDURE — 1101F PR PT FALLS ASSESS DOC 0-1 FALLS W/OUT INJ PAST YR: ICD-10-PCS | Mod: CPTII,S$GLB,, | Performed by: NURSE PRACTITIONER

## 2023-07-26 PROCEDURE — 99214 OFFICE O/P EST MOD 30 MIN: CPT | Mod: S$GLB,,, | Performed by: NURSE PRACTITIONER

## 2023-07-26 PROCEDURE — 1101F PT FALLS ASSESS-DOCD LE1/YR: CPT | Mod: CPTII,S$GLB,, | Performed by: NURSE PRACTITIONER

## 2023-07-26 PROCEDURE — 1159F PR MEDICATION LIST DOCUMENTED IN MEDICAL RECORD: ICD-10-PCS | Mod: CPTII,S$GLB,, | Performed by: NURSE PRACTITIONER

## 2023-07-26 PROCEDURE — 3079F DIAST BP 80-89 MM HG: CPT | Mod: CPTII,S$GLB,, | Performed by: NURSE PRACTITIONER

## 2023-07-26 PROCEDURE — 1159F MED LIST DOCD IN RCRD: CPT | Mod: CPTII,S$GLB,, | Performed by: NURSE PRACTITIONER

## 2023-07-26 NOTE — TELEPHONE ENCOUNTER
Barbara Deluna Staff    ----- Message from Barbara Atwood sent at 2023  3:30 PM CDT -----  Contact: patient  Type:  Needs Medical Advice    Who Called: patient     Would the patient rather a call back or a response via [facility]? Call     Best Call Back Number: [phone] (home)      Additional Information: Patient would like to speak with the nurse in regards to medication that has been  and she needs it.     Please call to advise

## 2023-07-26 NOTE — PATIENT INSTRUCTIONS
Avoid heavy foods, nothing greasy for spicy for a week.   Ok to use imodium for one to two days. If diarrhea persist return to clinic or seek care at urgent care or the ER. Seek care for fever or increasing pain.

## 2023-07-26 NOTE — PROGRESS NOTES
This dictation has been generated using Modal Fluency Dictation some phonetic errors may occur. Please contact author for clarification if needed.     Problem List Items Addressed This Visit    None  Visit Diagnoses       Diarrhea, unspecified type    -  Primary    imodium x 4 to help and pepto                 Diarrhea doubtful of infectious etiology such as bacterial.  Can not rule out viral gastroenteritis.  Symptoms seem to have improved monitor.  Patient Instructions   Avoid heavy foods, nothing greasy for spicy for a week.   Ok to use imodium for one to two days. If diarrhea persist return to clinic or seek care at urgent care or the ER. Seek care for fever or increasing pain.    Follow up if symptoms worsen or fail to improve.    ________________________________________________________________  ________________________________________________________________      Chief Complaint   Patient presents with    Diarrhea     1 day. Took pepto and anti diarrhea     History of present illness  This 77 y.o. presents today for complaint of diarrhea.  Onset of symptoms 1 day ago.  It is improved today.  She did have to take for Lomotil and Pepto-Bismol.  No recent antibiotic use.  No recent travel.  Patient denies fever chills.  She had some abdominal cramping.  No blood or mucus in stool.  Describes watery diarrhea.  No known sick contact.    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/2020    Bell's palsy     Bladder incontinence     Blepharospasm     Bronchiectasis without complication 10/08/2019    Cervical dystonia     Concussion     COPD (chronic obstructive pulmonary disease)     Enterocolitis 02/07/2020    Fainting spell     2/7/2020    Hormone deficiency     Hypertension     Loose right total knee  arthroplasty 06/15/2020    Migraine     Muscle spasm     Myofacial pain syndrome     Normocytic normochromic anemia 02/10/2020    Right ear pain 02/09/2021    Squamous cell carcinoma of skin     Syncope and collapse 02/09/2021       Past Surgical History:   Procedure Laterality Date    APPENDECTOMY      BREAST BIOPSY      BREAST LUMPECTOMY      CATARACT EXTRACTION      COLONOSCOPY N/A 04/06/2023    Procedure: COLONOSCOPY;  Surgeon: Marino Munson MD;  Location: [facility] [place] ENDO;  Service: Endoscopy;  Laterality: N/A;    ESOPHAGOGASTRODUODENOSCOPY N/A 04/06/2023    Procedure: EGD (ESOPHAGOGASTRODUODENOSCOPY);  Surgeon: Marino Munson MD;  Location: [facility] [place] ENDO;  Service: Endoscopy;  Laterality: N/A;    EYE SURGERY      HYSTERECTOMY      NECK SURGERY      REVISION OF KNEE ARTHROPLASTY Right 07/14/2020    Procedure: REVISION, ARTHROPLASTY, KNEE;  Surgeon: Pedro Tompkins MD;  Location: [facility] OR;  Service: Orthopedics;  Laterality: Right;    TONSILLECTOMY         Family History   Problem Relation Age of Onset    Cancer Mother     Heart disease Brother     Parkinsonism Brother     Diabetes Neg Hx     Melanoma Neg Hx     Psoriasis Neg Hx     Lupus Neg Hx     Eczema Neg Hx     Glaucoma Neg Hx     Macular degeneration Neg Hx        Social History     Socioeconomic History    Marital status:    Occupational History    Occupation: Caregiver   Tobacco Use    Smoking status: Never    Smokeless tobacco: Never   Substance and Sexual Activity    Alcohol use: No    Drug use: No   Social History Narrative    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 Has a good support system of friends, daughter and grandchildren. Keeps herself busy w/ gardening, socializing. Facing some financial issues, supports grandson struggling w/ drug abuse who is staying with her which causes stress. Denies any concern regarding safety at home. Has not had much financial support from her family w/ caring for  her grandson.       Current Outpatient Medications   Medication Sig Dispense Refill    albuterol (PROVENTIL/VENTOLIN HFA) 90 mcg/actuation inhaler Inhale 2 puffs into the lungs every 6 (six) hours as needed for Wheezing. Rescue 20.1 g 11    albuterol-ipratropium (DUO-NEB) 2.5 mg-0.5 mg/3 mL nebulizer solution USE 1 VIAL VIA NEBULIZER  EVERY 6 HOURS AS NEEDED FOR WHEEZING RESCUE 180 mL 23    amLODIPine (NORVASC) 2.5 MG tablet TAKE 1 TABLET BY MOUTH ONCE DAILY 90 tablet 3    aspirin 81 MG Chew Take 81 mg by mouth once daily.      baclofen (LIORESAL) 10 MG tablet Take 1 tablet (10 mg total) by mouth 2 (two) times daily for 30 days, THEN 1 tablet (10 mg total) every evening. 90 tablet 0    benazepriL (LOTENSIN) 40 MG tablet Take 1 tablet (40 mg total) by mouth once daily. 90 tablet 4    clotrimazole (LOTRIMIN) 1 % cream Apply topically 2 (two) times daily. 28 g 6    gabapentin (NEURONTIN) 600 MG tablet Take 1 tablet (600 mg total) by mouth daily as needed. 270 tablet 3    metoprolol succinate (TOPROL-XL) 25 MG 24 hr tablet Take 1 tablet (25 mg total) by mouth once daily. 90 tablet 3    omeprazole (PRILOSEC) 40 MG capsule TAKE 1 CAPSULE BY MOUTH  TWICE DAILY BEFORE MEALS 180 capsule 3    potassium chloride (KLOR-CON) 10 MEQ TbSR Take 1 tablet (10 mEq total) by mouth once daily. 90 tablet 3    simethicone (GAS-X ORAL) Take by mouth.      venlafaxine (EFFEXOR-XR) 150 MG Cp24 Take 1 capsule (150 mg total) by mouth 2 (two) times a day. 14 capsule 0    blood-glucose meter kit To check BG 1 times daily, to use with insurance preferred meter 1 each 0     No current facility-administered medications for this visit.       Review of patient's allergies indicates:   Allergen Reactions    Adhesive tape-silicones Rash     Blisters after on for several hours    Augmentin [amoxicillin-pot clavulanate]      thrush    Ciprofloxacin      thrush    Morphine Itching and Hives       Physical examination  Vitals Reviewed\  Vitals:    07/26/23  0834   BP: 134/80   Pulse: 85   Temp: 98.3 °F (36.8 °C)     Body mass index is 21.91 kg/m².   Gen. Well-dressed well-nourished   Skin warm dry and intact.  No rashes noted.  Chest.  Respirations are even unlabored.  Lungs are clear to auscultation.  Cardiac regular rate and rhythm.  No chest wall adenopathy noted.  Abdomen is soft and not distended.  Bowel sounds are present.  No tenderness during palpation of the abdomen.  No Hepatosplenomegaly noted.  No hernia noted.  No CVA tenderness to percussion.    Neuro. Awake alert oriented x4.  Normal judgment and cognition noted.  Extremities no clubbing cyanosis or edema noted.     Call or return to clinic prn if these symptoms worsen or fail to improve as anticipated.

## 2023-07-26 NOTE — TELEPHONE ENCOUNTER
Patient requesting refill of Hydroxyzine 25 mg. Upon further assessment it was noted this medication was discontinued by BENJY Joya on 4-7-22 at an urgent care visit related to cough.     This Order Has Been Discontinued    Order Status Reason By On   Discontinued None Stef Joya, BENJY 4/7/22 1710            hydrOXYzine HCL (ATARAX) 25 MG tablet [[id number]      Patient is requesting to reinstate prescription. States it was prescribed for itching prn. Preferred pharmacy is [facility]. Please advise. Thank you.

## 2023-07-28 RX ORDER — HYDROXYZINE HYDROCHLORIDE 25 MG/1
25 TABLET, FILM COATED ORAL 3 TIMES DAILY PRN
Qty: 90 TABLET | Refills: 4 | Status: SHIPPED | OUTPATIENT
Start: 2023-07-28

## 2023-07-28 NOTE — TELEPHONE ENCOUNTER
Call placed to patient for notification. Patient verbalized understanding.      Mikie Ryan MD  You 2 hours ago (1:22 PM)       Rx sent

## 2023-08-09 ENCOUNTER — TELEPHONE (OUTPATIENT)
Dept: HEMATOLOGY/ONCOLOGY | Facility: CLINIC | Age: 78
End: 2023-08-09
Payer: MEDICARE

## 2023-08-16 ENCOUNTER — TELEPHONE (OUTPATIENT)
Dept: ADMINISTRATIVE | Facility: CLINIC | Age: 78
End: 2023-08-16
Payer: MEDICARE

## 2023-08-18 NOTE — ED NOTES
Family are at the bedside and have been updated on plan of care. Daughter reports that patient talked to one of her nurse friends last night. Patient's place of employment called daughter to report that patient did not show up for work today.    No

## 2023-08-22 ENCOUNTER — TELEPHONE (OUTPATIENT)
Dept: FAMILY MEDICINE | Facility: CLINIC | Age: 78
End: 2023-08-22
Payer: MEDICARE

## 2023-08-22 NOTE — TELEPHONE ENCOUNTER
I spoke with pt via phone. Regarding a letter so that she can step outside perifocally to pull her mask down. She states that she has COPD so it is very hard for her to keep the mask on all day. Please advise if this can be done.     Pt does not have any symptoms of covid at this time and will call back if needed.     ----- Message from Aletha Starks sent at 8/22/2023  1:16 PM CDT -----  Regarding: advise  Contact: Patient  Type: Needs Medical Advice  Who Called:  Patient  Symptoms (please be specific):  covid test letter for copd  How long has patient had these symptoms:    Pharmacy name and phone #:      Best Call Back Number: [phone]    Additional Information: Needs a letter for work can not wear mask for a long period of time due to copd and may have to leave early due to mask restrictions. And needs a covid test done. Please call to advise thanks!

## 2023-09-20 ENCOUNTER — PATIENT MESSAGE (OUTPATIENT)
Dept: FAMILY MEDICINE | Facility: CLINIC | Age: 78
End: 2023-09-20
Payer: MEDICARE

## 2023-09-21 NOTE — TELEPHONE ENCOUNTER
Call placed to patient. Appointment scheduled for tomorrow's date 9-22-23. Patient agreed to appointment date, time, and location.

## 2023-09-22 ENCOUNTER — OFFICE VISIT (OUTPATIENT)
Dept: FAMILY MEDICINE | Facility: CLINIC | Age: 78
End: 2023-09-22
Payer: MEDICARE

## 2023-09-22 VITALS
SYSTOLIC BLOOD PRESSURE: 144 MMHG | OXYGEN SATURATION: 96 % | HEIGHT: 69 IN | WEIGHT: 150.13 LBS | DIASTOLIC BLOOD PRESSURE: 76 MMHG | BODY MASS INDEX: 22.24 KG/M2 | HEART RATE: 66 BPM | RESPIRATION RATE: 16 BRPM | TEMPERATURE: 98 F

## 2023-09-22 DIAGNOSIS — Z23 FLU VACCINE NEED: ICD-10-CM

## 2023-09-22 DIAGNOSIS — L29.0 RECTAL ITCHING: Primary | ICD-10-CM

## 2023-09-22 PROCEDURE — 3077F PR MOST RECENT SYSTOLIC BLOOD PRESSURE >= 140 MM HG: ICD-10-PCS | Mod: CPTII,S$GLB,, | Performed by: NURSE PRACTITIONER

## 2023-09-22 PROCEDURE — 1159F PR MEDICATION LIST DOCUMENTED IN MEDICAL RECORD: ICD-10-PCS | Mod: CPTII,S$GLB,, | Performed by: NURSE PRACTITIONER

## 2023-09-22 PROCEDURE — 3077F SYST BP >= 140 MM HG: CPT | Mod: CPTII,S$GLB,, | Performed by: NURSE PRACTITIONER

## 2023-09-22 PROCEDURE — 99213 OFFICE O/P EST LOW 20 MIN: CPT | Mod: 25,S$GLB,, | Performed by: NURSE PRACTITIONER

## 2023-09-22 PROCEDURE — 1160F PR REVIEW ALL MEDS BY PRESCRIBER/CLIN PHARMACIST DOCUMENTED: ICD-10-PCS | Mod: CPTII,S$GLB,, | Performed by: NURSE PRACTITIONER

## 2023-09-22 PROCEDURE — 1125F AMNT PAIN NOTED PAIN PRSNT: CPT | Mod: CPTII,S$GLB,, | Performed by: NURSE PRACTITIONER

## 2023-09-22 PROCEDURE — 90694 VACC AIIV4 NO PRSRV 0.5ML IM: CPT | Mod: S$GLB,,, | Performed by: NURSE PRACTITIONER

## 2023-09-22 PROCEDURE — 1101F PT FALLS ASSESS-DOCD LE1/YR: CPT | Mod: CPTII,S$GLB,, | Performed by: NURSE PRACTITIONER

## 2023-09-22 PROCEDURE — 1101F PR PT FALLS ASSESS DOC 0-1 FALLS W/OUT INJ PAST YR: ICD-10-PCS | Mod: CPTII,S$GLB,, | Performed by: NURSE PRACTITIONER

## 2023-09-22 PROCEDURE — 3078F DIAST BP <80 MM HG: CPT | Mod: CPTII,S$GLB,, | Performed by: NURSE PRACTITIONER

## 2023-09-22 PROCEDURE — 3288F FALL RISK ASSESSMENT DOCD: CPT | Mod: CPTII,S$GLB,, | Performed by: NURSE PRACTITIONER

## 2023-09-22 PROCEDURE — 1160F RVW MEDS BY RX/DR IN RCRD: CPT | Mod: CPTII,S$GLB,, | Performed by: NURSE PRACTITIONER

## 2023-09-22 PROCEDURE — 1125F PR PAIN SEVERITY QUANTIFIED, PAIN PRESENT: ICD-10-PCS | Mod: CPTII,S$GLB,, | Performed by: NURSE PRACTITIONER

## 2023-09-22 PROCEDURE — G0008 FLU VACCINE - QUADRIVALENT - ADJUVANTED: ICD-10-PCS | Mod: S$GLB,,, | Performed by: NURSE PRACTITIONER

## 2023-09-22 PROCEDURE — 90694 FLU VACCINE - QUADRIVALENT - ADJUVANTED: ICD-10-PCS | Mod: S$GLB,,, | Performed by: NURSE PRACTITIONER

## 2023-09-22 PROCEDURE — G0008 ADMIN INFLUENZA VIRUS VAC: HCPCS | Mod: S$GLB,,, | Performed by: NURSE PRACTITIONER

## 2023-09-22 PROCEDURE — 99999 PR PBB SHADOW E&M-EST. PATIENT-LVL V: CPT | Mod: PBBFAC,,, | Performed by: NURSE PRACTITIONER

## 2023-09-22 PROCEDURE — 3078F PR MOST RECENT DIASTOLIC BLOOD PRESSURE < 80 MM HG: ICD-10-PCS | Mod: CPTII,S$GLB,, | Performed by: NURSE PRACTITIONER

## 2023-09-22 PROCEDURE — 99213 PR OFFICE/OUTPT VISIT, EST, LEVL III, 20-29 MIN: ICD-10-PCS | Mod: 25,S$GLB,, | Performed by: NURSE PRACTITIONER

## 2023-09-22 PROCEDURE — 1159F MED LIST DOCD IN RCRD: CPT | Mod: CPTII,S$GLB,, | Performed by: NURSE PRACTITIONER

## 2023-09-22 PROCEDURE — 99999 PR PBB SHADOW E&M-EST. PATIENT-LVL V: ICD-10-PCS | Mod: PBBFAC,,, | Performed by: NURSE PRACTITIONER

## 2023-09-22 PROCEDURE — 3288F PR FALLS RISK ASSESSMENT DOCUMENTED: ICD-10-PCS | Mod: CPTII,S$GLB,, | Performed by: NURSE PRACTITIONER

## 2023-09-22 RX ORDER — HYDROCORTISONE 1 %
CREAM (GRAM) TOPICAL 2 TIMES DAILY
Qty: 30 G | Refills: 2 | Status: SHIPPED | OUTPATIENT
Start: 2023-09-22 | End: 2023-10-06

## 2023-09-22 RX ORDER — ZINC OXIDE 16 %-40 %
1 OINTMENT (GRAM) TOPICAL DAILY
Qty: 57 G | Refills: 2 | Status: SHIPPED | OUTPATIENT
Start: 2023-09-22 | End: 2023-12-21

## 2023-09-22 NOTE — PROGRESS NOTES
Subjective:       Patient ID: Marleen Samano is a 77 y.o. female.    Chief Complaint: Vaginal Itching    HPI    Patient presents today with c/o rectal itching, hemorrhoids and bowel incontinence..  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/2020    Bell's palsy     Bladder incontinence     Blepharospasm     Bronchiectasis without complication 10/08/2019    Cervical dystonia     Concussion     COPD (chronic obstructive pulmonary disease)     Enterocolitis 02/07/2020    Fainting spell     2/7/2020    Hormone deficiency     Hypertension     Loose right total knee arthroplasty 06/15/2020    Migraine     Muscle spasm     Myofacial pain syndrome     Normocytic normochromic anemia 02/10/2020    Right ear pain 02/09/2021    Squamous cell carcinoma of skin     Syncope and collapse 02/09/2021       Review of patient's allergies indicates:   Allergen Reactions    Adhesive tape-silicones Rash     Blisters after on for several hours    Augmentin [amoxicillin-pot clavulanate]      thrush    Ciprofloxacin      thrush    Morphine Itching and Hives         Current Outpatient Medications:     albuterol (PROVENTIL/VENTOLIN HFA) 90 mcg/actuation inhaler, Inhale 2 puffs into the lungs every 6 (six) hours as needed for Wheezing. Rescue, Disp: 20.1 g, Rfl: 11    albuterol-ipratropium (DUO-NEB) 2.5 mg-0.5 mg/3 mL nebulizer solution, USE 1 VIAL VIA NEBULIZER  EVERY 6 HOURS AS NEEDED FOR WHEEZING RESCUE, Disp: 180 mL, Rfl: 23    amLODIPine (NORVASC) 2.5 MG tablet, TAKE 1 TABLET BY MOUTH ONCE DAILY, Disp: 90 tablet, Rfl: 3    aspirin 81 MG Chew, Take 81 mg by mouth once daily., Disp: , Rfl:     benazepriL (LOTENSIN) 40 MG tablet, Take 1 tablet (40 mg total) by mouth once daily., Disp: 90 tablet, Rfl: 4    clotrimazole (LOTRIMIN) 1 %  cream, Apply topically 2 (two) times daily., Disp: 28 g, Rfl: 6    gabapentin (NEURONTIN) 600 MG tablet, Take 1 tablet (600 mg total) by mouth daily as needed., Disp: 270 tablet, Rfl: 3    hydrOXYzine HCL (ATARAX) 25 MG tablet, Take 1 tablet (25 mg total) by mouth 3 (three) times daily as needed for Itching., Disp: 90 tablet, Rfl: 4    omeprazole (PRILOSEC) 40 MG capsule, TAKE 1 CAPSULE BY MOUTH  TWICE DAILY BEFORE MEALS, Disp: 180 capsule, Rfl: 3    potassium chloride (KLOR-CON) 10 MEQ TbSR, Take 1 tablet (10 mEq total) by mouth once daily., Disp: 90 tablet, Rfl: 3    simethicone (GAS-X ORAL), Take by mouth., Disp: , Rfl:     venlafaxine (EFFEXOR-XR) 150 MG Cp24, Take 1 capsule (150 mg total) by mouth 2 (two) times a day., Disp: 14 capsule, Rfl: 0    baclofen (LIORESAL) 10 MG tablet, Take 1 tablet (10 mg total) by mouth 2 (two) times daily for 30 days, THEN 1 tablet (10 mg total) every evening., Disp: 90 tablet, Rfl: 0    blood-glucose meter kit, To check BG 1 times daily, to use with insurance preferred meter, Disp: 1 each, Rfl: 0    hydrocortisone 1 % cream, Apply topically 2 (two) times daily. for 14 days, Disp: 30 g, Rfl: 2    metoprolol succinate (TOPROL-XL) 25 MG 24 hr tablet, Take 1 tablet (25 mg total) by mouth once daily., Disp: 90 tablet, Rfl: 3    zinc oxide (BOUDREAUXS BUTT PASTE) 16 % Oint ointment, Apply 1 Application topically once daily., Disp: 57 g, Rfl: 2    Review of Systems   Constitutional:  Negative for unexpected weight change.   HENT:  Negative for trouble swallowing.    Eyes:  Negative for visual disturbance.   Respiratory:  Negative for shortness of breath.    Cardiovascular:  Negative for chest pain, palpitations and leg swelling.   Gastrointestinal:  Negative for blood in stool.   Genitourinary:  Negative for hematuria.   Skin:  Negative for rash.   Allergic/Immunologic: Negative for immunocompromised state.   Neurological:  Negative for headaches.   Hematological:  Does not bruise/bleed  "easily.   Psychiatric/Behavioral:  Negative for agitation. The patient is not nervous/anxious.        Objective:      BP (!) 144/76 (BP Location: Right arm, Patient Position: Sitting, BP Method: Large (Manual))   Pulse 66   Temp 98.4 °F (36.9 °C) (Oral)   Resp 16   Ht 5' 9" (1.753 m)   Wt 68.1 kg (150 lb 2.1 oz)   SpO2 96%   BMI 22.17 kg/m²   Physical Exam  Constitutional:       Appearance: She is well-developed.   Eyes:      Pupils: Pupils are equal, round, and reactive to light.   Cardiovascular:      Rate and Rhythm: Normal rate and regular rhythm.   Musculoskeletal:         General: Normal range of motion.      Cervical back: Normal range of motion.   Skin:     General: Skin is warm and dry.   Neurological:      Mental Status: She is alert and oriented to person, place, and time.   Psychiatric:         Behavior: Behavior normal.         Thought Content: Thought content normal.         Judgment: Judgment normal.         Assessment:       1. Rectal itching    2. Flu vaccine need        Plan:       Rectal itching  -     hydrocortisone 1 % cream; Apply topically 2 (two) times daily. for 14 days  Dispense: 30 g; Refill: 2  -     zinc oxide (BOUDREAUXS BUTT PASTE) 16 % Oint ointment; Apply 1 Application topically once daily.  Dispense: 57 g; Refill: 2  Declines rectal exam at this time  Flu vaccine need  -     Influenza - High Dose (65+) (PF) (IM)          Time spent with patient: 20 minutes    Patient with be reevaluated in 4 weeks or sooner prn    Greater than 50% of this visit was spent counseling as described in above documentation:Yes   "

## 2023-10-12 ENCOUNTER — PATIENT MESSAGE (OUTPATIENT)
Dept: FAMILY MEDICINE | Facility: CLINIC | Age: 78
End: 2023-10-12
Payer: MEDICARE

## 2023-10-12 ENCOUNTER — TELEPHONE (OUTPATIENT)
Dept: FAMILY MEDICINE | Facility: CLINIC | Age: 78
End: 2023-10-12
Payer: MEDICARE

## 2023-10-12 ENCOUNTER — LAB VISIT (OUTPATIENT)
Dept: LAB | Facility: HOSPITAL | Age: 78
End: 2023-10-12
Attending: STUDENT IN AN ORGANIZED HEALTH CARE EDUCATION/TRAINING PROGRAM
Payer: MEDICARE

## 2023-10-12 DIAGNOSIS — N30.01 ACUTE CYSTITIS WITH HEMATURIA: Primary | ICD-10-CM

## 2023-10-12 DIAGNOSIS — R30.0 DYSURIA: ICD-10-CM

## 2023-10-12 DIAGNOSIS — R30.0 DYSURIA: Primary | ICD-10-CM

## 2023-10-12 PROCEDURE — 87077 CULTURE AEROBIC IDENTIFY: CPT | Performed by: STUDENT IN AN ORGANIZED HEALTH CARE EDUCATION/TRAINING PROGRAM

## 2023-10-12 PROCEDURE — 87086 URINE CULTURE/COLONY COUNT: CPT | Performed by: STUDENT IN AN ORGANIZED HEALTH CARE EDUCATION/TRAINING PROGRAM

## 2023-10-12 PROCEDURE — 87186 SC STD MICRODIL/AGAR DIL: CPT | Performed by: STUDENT IN AN ORGANIZED HEALTH CARE EDUCATION/TRAINING PROGRAM

## 2023-10-12 PROCEDURE — 87088 URINE BACTERIA CULTURE: CPT | Performed by: STUDENT IN AN ORGANIZED HEALTH CARE EDUCATION/TRAINING PROGRAM

## 2023-10-12 PROCEDURE — 81001 URINALYSIS AUTO W/SCOPE: CPT | Performed by: STUDENT IN AN ORGANIZED HEALTH CARE EDUCATION/TRAINING PROGRAM

## 2023-10-12 NOTE — TELEPHONE ENCOUNTER
Pt presents to the clinic today stating that she has a UTI. She states that she called and spoke with some one yesterday. I see no documentation that she has called. She states that she has burning and urgency for several days.    Pharmacy: [facility].     Orders placed by Dr. Ryan. Urine brought to the lab.

## 2023-10-13 ENCOUNTER — PATIENT MESSAGE (OUTPATIENT)
Dept: FAMILY MEDICINE | Facility: CLINIC | Age: 78
End: 2023-10-13
Payer: MEDICARE

## 2023-10-13 LAB
BACTERIA #/AREA URNS AUTO: ABNORMAL /HPF
BILIRUB UR QL STRIP: NEGATIVE
CLARITY UR REFRACT.AUTO: CLEAR
COLOR UR AUTO: YELLOW
GLUCOSE UR QL STRIP: NEGATIVE
HGB UR QL STRIP: ABNORMAL
KETONES UR QL STRIP: NEGATIVE
LEUKOCYTE ESTERASE UR QL STRIP: ABNORMAL
MICROSCOPIC COMMENT: ABNORMAL
NITRITE UR QL STRIP: POSITIVE
PH UR STRIP: 5 [PH] (ref 5–8)
PROT UR QL STRIP: ABNORMAL
RBC #/AREA URNS AUTO: 47 /HPF (ref 0–4)
SP GR UR STRIP: 1.02 (ref 1–1.03)
SQUAMOUS #/AREA URNS AUTO: 0 /HPF
URN SPEC COLLECT METH UR: ABNORMAL
WBC #/AREA URNS AUTO: >100 /HPF (ref 0–5)
WBC CLUMPS UR QL AUTO: ABNORMAL

## 2023-10-13 RX ORDER — NITROFURANTOIN 25; 75 MG/1; MG/1
100 CAPSULE ORAL 2 TIMES DAILY
Qty: 10 CAPSULE | Refills: 0 | Status: SHIPPED | OUTPATIENT
Start: 2023-10-13 | End: 2023-10-18

## 2023-10-15 LAB — BACTERIA UR CULT: ABNORMAL

## 2023-10-15 NOTE — TELEPHONE ENCOUNTER
Patient sent 3 messages regarding this matter. Writer has combined the messages into one for clarity and ease of provider response.  Please see attached portal message from patient, and also message attached below.  Thank you.       Marleen Deluna Staff (supporting Mikie Ryan MD) 2 days ago       I think she said after I've finished the meds        Marleen Deluna Staff (supporting Mikie Ryan MD) 2 days ago       My nurse friend said I should ask if you would do a CULTURE AND SENSITIVITY test.

## 2023-10-15 NOTE — TELEPHONE ENCOUNTER
This matter was handled & documented in a separate encounter.  3 messages received related to this matter.  Messages have been combined into one and forwarded to provider for clarity and ease of provider response.

## 2023-10-16 NOTE — H&P (VIEW-ONLY)
Past Medical History:   Diagnosis Date    Abnormal CBC 2/10/2020    Anemia due to multiple mechanisms 2/10/2020    Anemia, chronic disease 2/10/2020    Anemia, chronic renal failure, stage 4 (severe) 2/10/2020    Arthritis     Bell's palsy     Bladder incontinence     Blepharospasm     Cervical dystonia     Depression     Hormone deficiency     Myofacial pain syndrome     Normocytic normochromic anemia 2/10/2020       Past Surgical History:   Procedure Laterality Date    APPENDECTOMY      BREAST BIOPSY      BREAST LUMPECTOMY      EYE SURGERY      HYSTERECTOMY      NECK SURGERY      TONSILLECTOMY         Current Outpatient Medications   Medication Sig    amLODIPine (NORVASC) 2.5 MG tablet every evening.     benazepril (LOTENSIN) 40 MG tablet Take 40 mg by mouth once daily.    diclofenac sodium (VOLTAREN) 1 % Gel Apply topically.     ferrous gluconate 324 mg (37.5 mg iron) Tab tablet Take 1 tablet (324 mg total) by mouth 2 (two) times daily with meals.    levalbuterol (XOPENEX HFA) 45 mcg/actuation inhaler Inhale 2 puffs into the lungs every 6 (six) hours as needed for Wheezing. Rescue    magnesium oxide (MAG-OX) 400 mg tablet Take 1 tablet (400 mg total) by mouth 2 (two) times daily.    montelukast (SINGULAIR) 10 mg tablet Take 1 tablet (10 mg total) by mouth every evening.    omeprazole (PRILOSEC) 40 MG capsule Take 40 mg by mouth 2 (two) times daily before meals.    trazodone (DESYREL) 100 MG tablet Take 100 mg by mouth every evening.      venlafaxine (EFFEXOR-XR) 150 MG Cp24 Take 1 capsule (150 mg total) by mouth once daily.    ciprofloxacin HCl (CIPRO) 500 MG tablet Take 1 tablet (500 mg total) by mouth 2 (two) times daily.     Current Facility-Administered Medications   Medication    onabotulinumtoxina injection 200 Units       Review of patient's allergies indicates:   Allergen Reactions    Artane Other (See Comments)     Swelling    Ned-1 Swelling     Oragel caused tongue  to swell    Adhesive tape-silicones Rash     Blisters after on for several hours    Latex, natural rubber Hives and Rash     Localized    Latex     Morphine Itching and Hives       Family History   Problem Relation Age of Onset    Diabetes Neg Hx     Melanoma Neg Hx     Psoriasis Neg Hx     Lupus Neg Hx     Eczema Neg Hx        Social History     Socioeconomic History    Marital status:      Spouse name: Not on file    Number of children: Not on file    Years of education: Not on file    Highest education level: Not on file   Occupational History    Not on file   Social Needs    Financial resource strain: Not on file    Food insecurity     Worry: Not on file     Inability: Not on file    Transportation needs     Medical: Not on file     Non-medical: Not on file   Tobacco Use    Smoking status: Never Smoker    Smokeless tobacco: Never Used   Substance and Sexual Activity    Alcohol use: No    Drug use: No    Sexual activity: Not on file   Lifestyle    Physical activity     Days per week: Not on file     Minutes per session: Not on file    Stress: Not on file   Relationships    Social connections     Talks on phone: Not on file     Gets together: Not on file     Attends Nondenominational service: Not on file     Active member of club or organization: Not on file     Attends meetings of clubs or organizations: Not on file     Relationship status: Not on file   Other Topics Concern    Are you pregnant or think you may be? Not Asked    Breast-feeding Not Asked   Social History Narrative    Not on file       Chief Complaint:   Chief Complaint   Patient presents with    Knee Pain     right knee-bone scan results        Date of surgery:  July 15, 2014 right Biomet vanbenard PS total knee arthroplasty    History of present illness:  This is a 74-year-old female who is almost 6 years out from right total knee arthroplasty.  She started having pain in the right knee about 6 months ago.  No injury or  trauma.  Pain is worse when she 1st gets up in the morning or when getting out of a chair.  Pain after being on her feet at work.  Knee just feels like it is loose or needs to be put back into place.  Pain is a 3/10.  Bone scan showed findings suspicious for possible aseptic loosening.      Review of Systems:    Musculoskeletal:  See HPI        Physical Examination:    Vital Signs:    Vitals:    06/15/20 1006   Resp: 16       Body mass index is 27.46 kg/m².    This a well-developed, well nourished patient in no acute distress.  They are alert and oriented and cooperative to examination.  Pt. walks without an antalgic gait.      Examination of the right knee shows no rashes or erythema. There are no masses ecchymosis or effusion.  Healed surgical incision.  Patient has full range of motion from 0-130°. Patient is nontender to palpation over lateral joint line and nontender to palpation over the medial joint line. Patient has a - anterior drawer exam, and - posterior drawer exam.  Knee is stable to varus and valgus stress. 5 out of 5 motor strength. Palpable distal pulses. Intact light touch sensation. Negative Patellofemoral crepitus    Examination of the left knee shows no rashes or erythema. There is a Baker cyst palpable. Patient has full range of motion from 0-130°. Patient is nontender to palpation over lateral joint line and nontender to palpation over the medial joint line. Patient has a - Lachman exam, - anterior drawer exam, and - posterior drawer exam. - Luis Felipe's exam. Knee is stable to varus and valgus stress. 5 out of 5 motor strength. Palpable distal pulses. Intact light touch sensation. Negative Patellofemoral crepitus    Heart is regular rate without obvious murmurs   Normal respiratory effort without audible wheezing  Abdomen is soft and nontender         X-rays:  X-rays of both knees are reviewed which show well-aligned right PS total knee arthroplasty without complication or signs of loosening.   Patient has moderate lateral narrowing of the left knee.    Bone scan:Abnormal study suggesting loosening right TKA.  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  We talked about concern for possible aseptic loosening given the time frame in her current symptoms.  We talked about the only real treatment would be for revision total knee arthroplasty.  She states that the pain is bad enough and affects her quality of life enough to justify doing it.  We will check a CRP and sed rate 1st to make sure there is no concern for possible infection.  Plan would be for revision right total knee arthroplasty and left knee Baker cyst aspiration.  Risks, benefits, and alternatives to the procedure were explained to the patient including but not limited to damage to nerves, arteries, blood vessels, bones, tendons, ligaments, stiffness, instability, infection, DVT, PE, as well as general anesthetic complications including seizure, stroke, heart attack and even death. The patient understood these risks and wished to proceed and signed the informed consent.       This note was created using [employer] voice recognition software that occasionally misinterpreted phrases or words.       good balance

## 2023-11-06 ENCOUNTER — TELEPHONE (OUTPATIENT)
Dept: FAMILY MEDICINE | Facility: CLINIC | Age: 78
End: 2023-11-06
Payer: MEDICARE

## 2023-11-06 ENCOUNTER — PATIENT MESSAGE (OUTPATIENT)
Dept: FAMILY MEDICINE | Facility: CLINIC | Age: 78
End: 2023-11-06
Payer: MEDICARE

## 2023-11-06 NOTE — TELEPHONE ENCOUNTER
I spoke with pt via phone. I advised her that a letter was written in April. She would like for Dr. Ryan to add that her hands lock up. I advised her that a message would be sent to Dr. Ryan for him to advise.

## 2023-11-06 NOTE — TELEPHONE ENCOUNTER
This message was handled in a different encounter.     ----- Message from Kathya Cat sent at 11/6/2023  8:30 AM CST -----  Regarding: needs a letter  Type:  Needs Medical Advice    Who Called: pt    Symptoms (please be specific): needs a letter sent to employer     Would the patient rather a call back or a response via [facility]? Call back    Best Call Back Number: [phone]    Additional Information: Sts she sent a request on Sunday via the Portal asking for a letter stating that she gets hand muscle spasms and is unable to do her current job functions as a .  She is asking when the letter will be ready and she will come pick it up.  Please advise.  Thank you.

## 2023-11-07 ENCOUNTER — PATIENT MESSAGE (OUTPATIENT)
Dept: FAMILY MEDICINE | Facility: CLINIC | Age: 78
End: 2023-11-07
Payer: MEDICARE

## 2023-11-07 NOTE — TELEPHONE ENCOUNTER
2 messages received regarding this matter.  Will combine messages into one for clarity and ease of provider response.  Please see attached portal message from patient. Please also see message attached below.  Thank you.       Marleen Samano  to HARLEY Deluna Staff (supporting Mikie Ryan MD)          11/7/23 11:29 AM  I think that the previous letter you wrote for me was when I had a stomach bug.

## 2023-11-07 NOTE — TELEPHONE ENCOUNTER
2 message received regarding this matter.  Will combine messages into one for clarify, and ease of provider response. This matter was handled & documented in a separate encounter.

## 2023-11-09 ENCOUNTER — OFFICE VISIT (OUTPATIENT)
Dept: FAMILY MEDICINE | Facility: CLINIC | Age: 78
End: 2023-11-09
Payer: MEDICARE

## 2023-11-09 VITALS
BODY MASS INDEX: 23.15 KG/M2 | SYSTOLIC BLOOD PRESSURE: 124 MMHG | HEIGHT: 69 IN | WEIGHT: 156.31 LBS | HEART RATE: 91 BPM | DIASTOLIC BLOOD PRESSURE: 76 MMHG | OXYGEN SATURATION: 97 % | RESPIRATION RATE: 18 BRPM

## 2023-11-09 DIAGNOSIS — G24.3 CERVICAL DYSTONIA: Primary | ICD-10-CM

## 2023-11-09 PROCEDURE — 3078F PR MOST RECENT DIASTOLIC BLOOD PRESSURE < 80 MM HG: ICD-10-PCS | Mod: CPTII,S$GLB,, | Performed by: STUDENT IN AN ORGANIZED HEALTH CARE EDUCATION/TRAINING PROGRAM

## 2023-11-09 PROCEDURE — 1101F PT FALLS ASSESS-DOCD LE1/YR: CPT | Mod: CPTII,S$GLB,, | Performed by: STUDENT IN AN ORGANIZED HEALTH CARE EDUCATION/TRAINING PROGRAM

## 2023-11-09 PROCEDURE — 1101F PR PT FALLS ASSESS DOC 0-1 FALLS W/OUT INJ PAST YR: ICD-10-PCS | Mod: CPTII,S$GLB,, | Performed by: STUDENT IN AN ORGANIZED HEALTH CARE EDUCATION/TRAINING PROGRAM

## 2023-11-09 PROCEDURE — 99999 PR PBB SHADOW E&M-EST. PATIENT-LVL IV: ICD-10-PCS | Mod: PBBFAC,,, | Performed by: STUDENT IN AN ORGANIZED HEALTH CARE EDUCATION/TRAINING PROGRAM

## 2023-11-09 PROCEDURE — 1159F PR MEDICATION LIST DOCUMENTED IN MEDICAL RECORD: ICD-10-PCS | Mod: CPTII,S$GLB,, | Performed by: STUDENT IN AN ORGANIZED HEALTH CARE EDUCATION/TRAINING PROGRAM

## 2023-11-09 PROCEDURE — 1125F PR PAIN SEVERITY QUANTIFIED, PAIN PRESENT: ICD-10-PCS | Mod: CPTII,S$GLB,, | Performed by: STUDENT IN AN ORGANIZED HEALTH CARE EDUCATION/TRAINING PROGRAM

## 2023-11-09 PROCEDURE — 1160F RVW MEDS BY RX/DR IN RCRD: CPT | Mod: CPTII,S$GLB,, | Performed by: STUDENT IN AN ORGANIZED HEALTH CARE EDUCATION/TRAINING PROGRAM

## 2023-11-09 PROCEDURE — 3074F PR MOST RECENT SYSTOLIC BLOOD PRESSURE < 130 MM HG: ICD-10-PCS | Mod: CPTII,S$GLB,, | Performed by: STUDENT IN AN ORGANIZED HEALTH CARE EDUCATION/TRAINING PROGRAM

## 2023-11-09 PROCEDURE — 3288F PR FALLS RISK ASSESSMENT DOCUMENTED: ICD-10-PCS | Mod: CPTII,S$GLB,, | Performed by: STUDENT IN AN ORGANIZED HEALTH CARE EDUCATION/TRAINING PROGRAM

## 2023-11-09 PROCEDURE — 1160F PR REVIEW ALL MEDS BY PRESCRIBER/CLIN PHARMACIST DOCUMENTED: ICD-10-PCS | Mod: CPTII,S$GLB,, | Performed by: STUDENT IN AN ORGANIZED HEALTH CARE EDUCATION/TRAINING PROGRAM

## 2023-11-09 PROCEDURE — 1159F MED LIST DOCD IN RCRD: CPT | Mod: CPTII,S$GLB,, | Performed by: STUDENT IN AN ORGANIZED HEALTH CARE EDUCATION/TRAINING PROGRAM

## 2023-11-09 PROCEDURE — 99999 PR PBB SHADOW E&M-EST. PATIENT-LVL IV: CPT | Mod: PBBFAC,,, | Performed by: STUDENT IN AN ORGANIZED HEALTH CARE EDUCATION/TRAINING PROGRAM

## 2023-11-09 PROCEDURE — 3078F DIAST BP <80 MM HG: CPT | Mod: CPTII,S$GLB,, | Performed by: STUDENT IN AN ORGANIZED HEALTH CARE EDUCATION/TRAINING PROGRAM

## 2023-11-09 PROCEDURE — 3288F FALL RISK ASSESSMENT DOCD: CPT | Mod: CPTII,S$GLB,, | Performed by: STUDENT IN AN ORGANIZED HEALTH CARE EDUCATION/TRAINING PROGRAM

## 2023-11-09 PROCEDURE — 3074F SYST BP LT 130 MM HG: CPT | Mod: CPTII,S$GLB,, | Performed by: STUDENT IN AN ORGANIZED HEALTH CARE EDUCATION/TRAINING PROGRAM

## 2023-11-09 PROCEDURE — 1125F AMNT PAIN NOTED PAIN PRSNT: CPT | Mod: CPTII,S$GLB,, | Performed by: STUDENT IN AN ORGANIZED HEALTH CARE EDUCATION/TRAINING PROGRAM

## 2023-11-09 PROCEDURE — 99213 OFFICE O/P EST LOW 20 MIN: CPT | Mod: S$GLB,,, | Performed by: STUDENT IN AN ORGANIZED HEALTH CARE EDUCATION/TRAINING PROGRAM

## 2023-11-09 PROCEDURE — 99213 PR OFFICE/OUTPT VISIT, EST, LEVL III, 20-29 MIN: ICD-10-PCS | Mod: S$GLB,,, | Performed by: STUDENT IN AN ORGANIZED HEALTH CARE EDUCATION/TRAINING PROGRAM

## 2023-11-09 NOTE — PROGRESS NOTES
"[facility] CLINIC NOTE    Patient Name: Marleen Samano  YOB: 1945    PRESENTING HISTORY     History of Present Illness:  Ms. Marleen Samano is a 77 y.o. female    Here with hand spasms.     Hx cervical dystonia and blepharospasm s/p cervical surgery in 2017.   Used to see neurology. Hasn't seen in several years. Got botox in the past.    Cost was too high so this was stopped.     Gets spasms of hand, will close up, nataly with use.     Already on baclofen but can't take when working due to sedation.       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     ROS      OBJECTIVE:   Vital Signs:  Vitals:    11/09/23 1529   BP: 124/76   Pulse: 91   Resp: 18   SpO2: 97%   Weight: 70.9 kg (156 lb 4.9 oz)   Height: 5' 9" (1.753 m)          Physical Exam: no change.     Physical Exam    ASSESSMENT & PLAN:     Cervical dystonia     We discussed various treatment options including specialty referral. She doesn't want to pursue any of that, she just asks for work accomodations to reduce repetitive small motor movements which aggravate. I provided a letter to that effect.         Mikie Ryan  Internal Medicine    I spent a total of 25 minutes on the day of the visit.  This includes face-to-face time and non face-to-face time preparing to see the patient (e.g., review of tests) , obtaining and/or reviewing separately obtain history, documenting clinical information in the electronic or other health record, independently interpreting results and communicating results to the patient/family/caregiver, or care coordinator. Much of today's time spent reviewing previous neurology records, treatment.           "

## 2023-12-27 DIAGNOSIS — J06.9 UPPER RESPIRATORY TRACT INFECTION, UNSPECIFIED TYPE: ICD-10-CM

## 2023-12-28 ENCOUNTER — PATIENT MESSAGE (OUTPATIENT)
Dept: FAMILY MEDICINE | Facility: CLINIC | Age: 78
End: 2023-12-28
Payer: MEDICARE

## 2023-12-28 DIAGNOSIS — R25.2 MUSCLE CRAMPS AT NIGHT: ICD-10-CM

## 2023-12-28 RX ORDER — ALBUTEROL SULFATE 90 UG/1
AEROSOL, METERED RESPIRATORY (INHALATION)
Qty: 20.1 G | Refills: 3 | Status: SHIPPED | OUTPATIENT
Start: 2023-12-28

## 2023-12-28 RX ORDER — BACLOFEN 20 MG/1
20 TABLET ORAL 2 TIMES DAILY
Qty: 180 TABLET | Refills: 3 | Status: SHIPPED | OUTPATIENT
Start: 2023-12-28

## 2023-12-28 NOTE — TELEPHONE ENCOUNTER
Refill Decision Note   Marleen Samano  is requesting a refill authorization.  Brief Assessment and Rationale for Refill:  Approve     Medication Therapy Plan:         Alert overridden per protocol: Yes   Comments:     Note composed:10:59 AM 12/28/2023

## 2023-12-28 NOTE — TELEPHONE ENCOUNTER
No care due was identified.  [facility] Embedded Care Due Messages. Reference number: [id number].   12/27/2023 9:56:44 PM CST

## 2024-01-05 ENCOUNTER — PATIENT MESSAGE (OUTPATIENT)
Dept: FAMILY MEDICINE | Facility: CLINIC | Age: 79
End: 2024-01-05
Payer: MEDICARE

## 2024-01-08 ENCOUNTER — TELEPHONE (OUTPATIENT)
Dept: FAMILY MEDICINE | Facility: CLINIC | Age: 79
End: 2024-01-08

## 2024-01-08 ENCOUNTER — OFFICE VISIT (OUTPATIENT)
Dept: FAMILY MEDICINE | Facility: CLINIC | Age: 79
End: 2024-01-08
Payer: MEDICARE

## 2024-01-08 ENCOUNTER — LAB VISIT (OUTPATIENT)
Dept: LAB | Facility: HOSPITAL | Age: 79
End: 2024-01-08
Attending: STUDENT IN AN ORGANIZED HEALTH CARE EDUCATION/TRAINING PROGRAM
Payer: MEDICARE

## 2024-01-08 VITALS
RESPIRATION RATE: 17 BRPM | DIASTOLIC BLOOD PRESSURE: 82 MMHG | BODY MASS INDEX: 23.05 KG/M2 | HEART RATE: 68 BPM | SYSTOLIC BLOOD PRESSURE: 132 MMHG | WEIGHT: 156.06 LBS

## 2024-01-08 DIAGNOSIS — K92.1 MELENA: Primary | ICD-10-CM

## 2024-01-08 DIAGNOSIS — K92.1 MELENA: ICD-10-CM

## 2024-01-08 LAB
ANION GAP SERPL CALC-SCNC: 9 MMOL/L (ref 8–16)
BASOPHILS # BLD AUTO: 0.01 K/UL (ref 0–0.2)
BASOPHILS NFR BLD: 0.3 % (ref 0–1.9)
BUN SERPL-MCNC: 20 MG/DL (ref 8–23)
CALCIUM SERPL-MCNC: 9.1 MG/DL (ref 8.7–10.5)
CHLORIDE SERPL-SCNC: 108 MMOL/L (ref 95–110)
CO2 SERPL-SCNC: 25 MMOL/L (ref 23–29)
CREAT SERPL-MCNC: 0.9 MG/DL (ref 0.5–1.4)
DIFFERENTIAL METHOD BLD: ABNORMAL
EOSINOPHIL # BLD AUTO: 0.1 K/UL (ref 0–0.5)
EOSINOPHIL NFR BLD: 1.3 % (ref 0–8)
ERYTHROCYTE [DISTWIDTH] IN BLOOD BY AUTOMATED COUNT: 14.5 % (ref 11.5–14.5)
EST. GFR  (NO RACE VARIABLE): >60 ML/MIN/1.73 M^2
GLUCOSE SERPL-MCNC: 104 MG/DL (ref 70–110)
HCT VFR BLD AUTO: 33 % (ref 37–48.5)
HGB BLD-MCNC: 10 G/DL (ref 12–16)
IMM GRANULOCYTES # BLD AUTO: 0.01 K/UL (ref 0–0.04)
IMM GRANULOCYTES NFR BLD AUTO: 0.3 % (ref 0–0.5)
LYMPHOCYTES # BLD AUTO: 1 K/UL (ref 1–4.8)
LYMPHOCYTES NFR BLD: 26.3 % (ref 18–48)
MCH RBC QN AUTO: 25.8 PG (ref 27–31)
MCHC RBC AUTO-ENTMCNC: 30.3 G/DL (ref 32–36)
MCV RBC AUTO: 85 FL (ref 82–98)
MONOCYTES # BLD AUTO: 0.4 K/UL (ref 0.3–1)
MONOCYTES NFR BLD: 9.4 % (ref 4–15)
NEUTROPHILS # BLD AUTO: 2.4 K/UL (ref 1.8–7.7)
NEUTROPHILS NFR BLD: 62.4 % (ref 38–73)
NRBC BLD-RTO: 0 /100 WBC
PLATELET # BLD AUTO: 222 K/UL (ref 150–450)
PMV BLD AUTO: 9.6 FL (ref 9.2–12.9)
POTASSIUM SERPL-SCNC: 4.2 MMOL/L (ref 3.5–5.1)
RBC # BLD AUTO: 3.88 M/UL (ref 4–5.4)
SODIUM SERPL-SCNC: 142 MMOL/L (ref 136–145)
WBC # BLD AUTO: 3.84 K/UL (ref 3.9–12.7)

## 2024-01-08 PROCEDURE — 85025 COMPLETE CBC W/AUTO DIFF WBC: CPT | Performed by: STUDENT IN AN ORGANIZED HEALTH CARE EDUCATION/TRAINING PROGRAM

## 2024-01-08 PROCEDURE — 99999 PR PBB SHADOW E&M-EST. PATIENT-LVL V: CPT | Mod: PBBFAC,,, | Performed by: STUDENT IN AN ORGANIZED HEALTH CARE EDUCATION/TRAINING PROGRAM

## 2024-01-08 PROCEDURE — 99213 OFFICE O/P EST LOW 20 MIN: CPT | Mod: S$GLB,,, | Performed by: STUDENT IN AN ORGANIZED HEALTH CARE EDUCATION/TRAINING PROGRAM

## 2024-01-08 PROCEDURE — 36415 COLL VENOUS BLD VENIPUNCTURE: CPT | Performed by: STUDENT IN AN ORGANIZED HEALTH CARE EDUCATION/TRAINING PROGRAM

## 2024-01-08 PROCEDURE — 80048 BASIC METABOLIC PNL TOTAL CA: CPT | Performed by: STUDENT IN AN ORGANIZED HEALTH CARE EDUCATION/TRAINING PROGRAM

## 2024-01-08 RX ORDER — SUCRALFATE 1 G/1
1 TABLET ORAL 4 TIMES DAILY
Qty: 40 TABLET | Refills: 0 | Status: SHIPPED | OUTPATIENT
Start: 2024-01-08 | End: 2024-01-18

## 2024-01-08 NOTE — PROGRESS NOTES
[facility] CLINIC NOTE    Patient Name: Marleen Samano  YOB: 1945    PRESENTING HISTORY     History of Present Illness:  Ms. Marleen Samano is a 78 y.o. female here with black stools.     C/o indigestion, occurring after eatrintg late meals. Takes bismuth for this, about 3 times weekly.   Noticed black stool about 1.5 months ago. Loose, soft. Somewhat sticky.   Has been taking bismuth for 8-9 months.   No dsyphagia/odynophagia. No early satiety. No rectal bleeding.     Remote hx of duodenal ulcer.     No NSAIDs.   Already on omeprazole BID.     ROS      OBJECTIVE:   Vital Signs:  Vitals:    01/08/24 0915   BP: 132/82   Pulse: 68   Resp: 17   Weight: 70.8 kg (156 lb 1.4 oz)          Physical Exam  Vitals and nursing note reviewed.   Constitutional:       Appearance: She is not ill-appearing, toxic-appearing or diaphoretic.   HENT:      Head: Normocephalic and atraumatic.      Right Ear: External ear normal.      Left Ear: External ear normal.   Eyes:      General: No scleral icterus.     Conjunctiva/sclera: Conjunctivae normal.   Neck:      Thyroid: No thyromegaly.   Cardiovascular:      Rate and Rhythm: Normal rate and regular rhythm.      Heart sounds: Normal heart sounds. No murmur heard.  Pulmonary:      Effort: Pulmonary effort is normal. No respiratory distress.      Breath sounds: Normal breath sounds. No wheezing or rales.   Musculoskeletal:         General: No tenderness or deformity. Normal range of motion.      Cervical back: Normal range of motion and neck supple.      Right lower leg: No edema.   Lymphadenopathy:      Cervical: No cervical adenopathy.   Skin:     General: Skin is warm and dry.      Findings: No erythema or rash.   Neurological:      Mental Status: She is alert and oriented to person, place, and time.      Gait: Gait is intact.   Psychiatric:         Mood and Affect: Mood and affect normal.         Cognition and Memory: Memory normal.         Judgment:  Judgment normal.         ASSESSMENT & PLAN:     Melena  -     CBC W/ AUTO DIFFERENTIAL; Future; Expected date: 01/08/2024  -     sucralfate (CARAFATE) 1 gram tablet; Take 1 tablet (1 g total) by mouth 4 (four) times daily. for 10 days  Dispense: 40 tablet; Refill: 0  -     Ambulatory referral/consult to Gastroenterology; Future; Expected date: 01/15/2024  -     BASIC METABOLIC PANEL; Future; Expected date: 01/08/2024    Description does sound like melena. Bismuth could produce black stool, but we have not ruled out GIB.   Clinically well, does not look anemic. Not tachycardic. Safe for outpatient workup.   Treatment above.            Mikie Ryan  Internal Medicine

## 2024-01-08 NOTE — TELEPHONE ENCOUNTER
Chung Logan Staff     ----- Message from Chung Logan sent at 1/8/2024 10:11 AM CST -----  Type: Needs Medical Advice  Who Called:  pt  Best Call Back Number: [phone]  Additional Information: pt is requesting to speak with the nurse, states she needs the name of the dr who will do scope, pl call bk to advise thanks

## 2024-01-08 NOTE — TELEPHONE ENCOUNTER
Call placed to patient. Appointment scheduled for today's date (01/08/2024). Patient agreed to appointment date, time, and location.        Mikie Ryan MD Naccari Craig Staff1 hour ago (6:39 AM)       Urgent OV with someone

## 2024-01-17 ENCOUNTER — PATIENT MESSAGE (OUTPATIENT)
Dept: FAMILY MEDICINE | Facility: CLINIC | Age: 79
End: 2024-01-17
Payer: MEDICARE

## 2024-01-18 ENCOUNTER — OFFICE VISIT (OUTPATIENT)
Dept: FAMILY MEDICINE | Facility: CLINIC | Age: 79
End: 2024-01-18
Payer: MEDICARE

## 2024-01-18 ENCOUNTER — TELEPHONE (OUTPATIENT)
Dept: FAMILY MEDICINE | Facility: CLINIC | Age: 79
End: 2024-01-18

## 2024-01-18 VITALS
SYSTOLIC BLOOD PRESSURE: 136 MMHG | TEMPERATURE: 98 F | WEIGHT: 157.44 LBS | BODY MASS INDEX: 23.32 KG/M2 | OXYGEN SATURATION: 98 % | HEART RATE: 90 BPM | HEIGHT: 69 IN | DIASTOLIC BLOOD PRESSURE: 74 MMHG | RESPIRATION RATE: 16 BRPM

## 2024-01-18 DIAGNOSIS — B37.0 THRUSH: ICD-10-CM

## 2024-01-18 DIAGNOSIS — M27.3 INFECTION OF TOOTH SOCKET: Primary | ICD-10-CM

## 2024-01-18 PROCEDURE — 99214 OFFICE O/P EST MOD 30 MIN: CPT | Mod: S$GLB,,,

## 2024-01-18 PROCEDURE — 99999 PR PBB SHADOW E&M-EST. PATIENT-LVL V: CPT | Mod: PBBFAC,,,

## 2024-01-18 RX ORDER — TRAZODONE HYDROCHLORIDE 100 MG/1
100 TABLET ORAL NIGHTLY
COMMUNITY
Start: 2023-12-12

## 2024-01-18 RX ORDER — AMOXICILLIN AND CLAVULANATE POTASSIUM 875; 125 MG/1; MG/1
1 TABLET, FILM COATED ORAL 2 TIMES DAILY
Qty: 20 TABLET | Refills: 0 | Status: SHIPPED | OUTPATIENT
Start: 2024-01-18 | End: 2024-01-28

## 2024-01-18 RX ORDER — CLINDAMYCIN HYDROCHLORIDE 300 MG/1
300 CAPSULE ORAL 3 TIMES DAILY
Qty: 30 CAPSULE | Refills: 0 | Status: CANCELLED | OUTPATIENT
Start: 2024-01-18 | End: 2024-01-28

## 2024-01-18 RX ORDER — CLINDAMYCIN HYDROCHLORIDE 300 MG/1
300 CAPSULE ORAL 3 TIMES DAILY
COMMUNITY

## 2024-01-18 RX ORDER — NYSTATIN 100000 [USP'U]/ML
4 SUSPENSION ORAL 4 TIMES DAILY
Qty: 160 ML | Refills: 0 | Status: SHIPPED | OUTPATIENT
Start: 2024-01-18 | End: 2024-01-28

## 2024-01-18 NOTE — TELEPHONE ENCOUNTER
Dr. Ryan is out of the office this week.  Call placed to patient, advised best practice is to schedule an appointment related to this matter.  Appointment scheduled for today's date (01/18/2024). Patient agreed to appointment date, time, and location.

## 2024-01-18 NOTE — PATIENT INSTRUCTIONS

## 2024-01-18 NOTE — TELEPHONE ENCOUNTER
----- Message from Halima Pina sent at 1/18/2024 12:06 PM CST -----  .Type:  Patient Call Back    Who Called: PT       Does the patient know what this is regarding?: PT CALLED TO GET A COPY OF HER AFTER VISIT SUMMARY  PRINTED AND LEFT AT THE      Would the patient rather a call back YES     Best Call Back Number: [phone]    Additional Information: Thank You

## 2024-01-18 NOTE — PROGRESS NOTES
Subjective:       Patient ID: Marleen Samano is a 78 y.o. female.    Chief Complaint: Dental Pain    Patient presents to the clinic with complaint of dental problem.     States she had a dental infection in November on a tooth that had previously had a root canal and was placed on Clindamycin on 9/6/24 and Amoxicillin on 11/27/23. She completed these back in November. She states the tooth continued to give her trouble and she ultimately had her upper right tooth removed on 1/9/24 by Dr. Dumont. She states the area has still been draining purulent drainage. She was told at some point that she had a bone infection.           Dental Pain   This is a recurrent problem. The current episode started more than 1 month ago. Pertinent negatives include no fever or sinus pressure.     Review of Systems   Constitutional:  Negative for activity change, appetite change, chills, diaphoresis and fever.   HENT:  Negative for congestion, ear pain, postnasal drip, sinus pressure, sneezing and sore throat.    Eyes:  Negative for pain, discharge, redness and itching.   Respiratory:  Negative for apnea, cough, chest tightness, shortness of breath and wheezing.    Cardiovascular:  Negative for chest pain and leg swelling.   Gastrointestinal:  Negative for abdominal distention, abdominal pain, constipation, diarrhea, nausea and vomiting.   Genitourinary:  Negative for difficulty urinating, dysuria, flank pain and frequency.   Skin:  Negative for color change, rash and wound.   Neurological:  Negative for dizziness.   All other systems reviewed and are negative.      Patient Active Problem List   Diagnosis    Osteoarthritis of knee    S/P total knee arthroplasty    Blepharospasm    Cervical dystonia    Essential hypertension    Genital herpes simplex    Anemia, chronic disease    Normocytic normochromic anemia    Anemia due to multiple mechanisms    Pain of both hip joints    Major depression, chronic    Gait abnormality    Dizziness and  giddiness    Tinnitus of right ear    Chronic migraine    Financial difficulties    Prediabetes    Pulmonary scarring    Squamous cell carcinoma in situ of skin of right cheek    Blister of right thumb    Bilateral carotid artery stenosis    Hyperhidrosis    Abrasions of multiple sites    Hyperglycemia    Open wnd of lower arm    Lacerations of multiple sites of left arm    Lacerations of multiple sites of right arm    Abrasion of left arm    Abrasion of right arm    Type 2 diabetes mellitus without complication, unspecified whether long term insulin use    Chronic kidney disease, stage 3a    Hypertension associated with type 2 diabetes mellitus       Objective:      Physical Exam  Vitals and nursing note reviewed.   Constitutional:       General: She is not in acute distress.     Appearance: Normal appearance. She is well-developed.   HENT:      Head: Normocephalic.      Comments: No swelling of jaw noted. No redness or warmth noted.      Nose: Nose normal.      Mouth/Throat:     Eyes:      Conjunctiva/sclera: Conjunctivae normal.      Pupils: Pupils are equal, round, and reactive to light.   Cardiovascular:      Rate and Rhythm: Normal rate.   Pulmonary:      Effort: Pulmonary effort is normal. No respiratory distress.   Musculoskeletal:      Cervical back: Normal range of motion.   Skin:     General: Skin is warm and dry.      Findings: No rash.   Neurological:      Mental Status: She is alert and oriented to person, place, and time.   Psychiatric:         Behavior: Behavior normal.         Lab Results   Component Value Date    WBC 3.84 (L) 01/08/2024    HGB 10.0 (L) 01/08/2024    HCT 33.0 (L) 01/08/2024     01/08/2024    CHOL 195 06/01/2022    TRIG 95 06/01/2022    HDL 77 (H) 06/01/2022    ALT 11 05/23/2023    AST 16 05/23/2023     01/08/2024    K 4.2 01/08/2024     01/08/2024    CREATININE 0.9 01/08/2024    BUN 20 01/08/2024    CO2 25 01/08/2024    TSH 2.335 03/15/2023    INR 1.0 02/07/2020  "   HGBA1C 5.9 (H) 06/01/2022     The 10-year ASCVD risk score (Christy LOWERY, et al., 2019) is: 58%    Values used to calculate the score:      Age: 78 years      Sex: Female      Is Non- : No      Diabetic: Yes      Tobacco smoker: No      Systolic Blood Pressure: 136 mmHg      Is BP treated: Yes      HDL Cholesterol: 77 mg/dL      Total Cholesterol: 195 mg/dL  Visit Vitals  /74 (BP Location: Right arm, Patient Position: Sitting, BP Method: Small (Manual))   Pulse 90   Temp 97.9 °F (36.6 °C) (Oral)   Resp 16   Ht 5' 9" (1.753 m)   Wt 71.4 kg (157 lb 6.5 oz)   SpO2 98%   BMI 23.25 kg/m²      Assessment:       1. Infection of tooth socket    2. Thrush        Plan:       1. Infection of tooth socket  -     amoxicillin-clavulanate 875-125mg (AUGMENTIN) 875-125 mg per tablet; Take 1 tablet by mouth 2 (two) times daily. for 10 days  Dispense: 20 tablet; Refill: 0   - Follow up with Dentist   - The diagnosis, treatment plan, instructions for follow-up and reevaluation as well as ED precautions were discussed and understanding was verbalized. All questions or concerns have been addressed.     2. Thrush  -     nystatin (MYCOSTATIN) 100,000 unit/mL suspension; Take 4 mLs (400,000 Units total) by mouth 4 (four) times daily. for 10 days  Dispense: 160 mL; Refill: 0       Follow up if symptoms worsen or fail to improve.      Future Appointments       Date Provider Specialty Appt Notes    1/23/2024 Deborah Bertrand NP Gastroenterology LM TO RS/Ulcer?    5/24/2024 Mikie Ryan MD Family Medicine annual     6/10/2024 Soraida Hoang OD Optometry Annual             "

## 2024-01-22 ENCOUNTER — TELEPHONE (OUTPATIENT)
Dept: GASTROENTEROLOGY | Facility: CLINIC | Age: 79
End: 2024-01-22
Payer: MEDICARE

## 2024-01-22 ENCOUNTER — PATIENT MESSAGE (OUTPATIENT)
Dept: FAMILY MEDICINE | Facility: CLINIC | Age: 79
End: 2024-01-22
Payer: MEDICARE

## 2024-01-22 NOTE — TELEPHONE ENCOUNTER
Noted pt had an EGD and colonoscopy performed by Dr. Munson back in 4/23. Per Dr. Munson's colonoscopy note pt had rectal bleeding and it was recommended she follow up with Dr. Dowling. Attempted to contact pt but had to leave [facility] requesting call back. Made LATANYA Santiago aware to follow up.

## 2024-01-22 NOTE — TELEPHONE ENCOUNTER
Call placed to Ms. Samano in regards to message received. Made her aware that she has an appt tomorrow with NP NADYA Bertrand at 1030 but we are not affiliated with Dr. Munson's office. She stated that she was coming in tomorrow for an endoscope with the NP. I advised her that our NP's do not do procedure, and confirmed she was scheduled for an appt at 1030 with Deborah Bertrand NP. She stated the person who sent me a message confirmed she was scheduled for a procedure tomorrow. I apologized to her for the confusion, but we have her scheduled for a consult with the NP and then if a procedure is needed we will scheduled for a later date. She verbalized understanding. No further issues noted.

## 2024-01-22 NOTE — TELEPHONE ENCOUNTER
----- Message from Santa Gonzales sent at 1/22/2024  4:31 PM CST -----  Type:  Patient Returning Call    Who Called: the patient  Who Left Message for Patient:ANASTACIO WATKINS  Does the patient know what this is regarding?:yes  Would the patient rather a call back or a response via [facility]? call back   Best Call Back Number:[phone]   Additional Information: Thanks

## 2024-01-29 ENCOUNTER — PATIENT MESSAGE (OUTPATIENT)
Dept: FAMILY MEDICINE | Facility: CLINIC | Age: 79
End: 2024-01-29
Payer: MEDICARE

## 2024-02-19 ENCOUNTER — PATIENT MESSAGE (OUTPATIENT)
Dept: FAMILY MEDICINE | Facility: CLINIC | Age: 79
End: 2024-02-19
Payer: MEDICARE

## 2024-02-19 DIAGNOSIS — Z00.00 ROUTINE GENERAL MEDICAL EXAMINATION AT A HEALTH CARE FACILITY: Primary | ICD-10-CM

## 2024-02-19 DIAGNOSIS — E11.9 TYPE 2 DIABETES MELLITUS WITHOUT COMPLICATION, UNSPECIFIED WHETHER LONG TERM INSULIN USE: ICD-10-CM

## 2024-02-20 NOTE — TELEPHONE ENCOUNTER
Please see attached portal message from patient.    [employer] maintenance lists A1C, Lipid Panel, and Microalbumin Creatinine Ratio as over due (CBC & CMP recently performed on 1-8-24).  Order pended in this encounter.  Patient has annual visit scheduled for the date of 5-4-24.  Please advise if labs are to be performed now or closer to annual visit.  Thank you.

## 2024-02-27 ENCOUNTER — PATIENT MESSAGE (OUTPATIENT)
Dept: FAMILY MEDICINE | Facility: CLINIC | Age: 79
End: 2024-02-27
Payer: MEDICARE

## 2024-02-29 ENCOUNTER — TELEPHONE (OUTPATIENT)
Dept: FAMILY MEDICINE | Facility: CLINIC | Age: 79
End: 2024-02-29

## 2024-02-29 ENCOUNTER — OFFICE VISIT (OUTPATIENT)
Dept: FAMILY MEDICINE | Facility: CLINIC | Age: 79
End: 2024-02-29
Payer: MEDICARE

## 2024-02-29 VITALS
HEART RATE: 85 BPM | TEMPERATURE: 98 F | HEIGHT: 69 IN | SYSTOLIC BLOOD PRESSURE: 140 MMHG | BODY MASS INDEX: 24.59 KG/M2 | WEIGHT: 166 LBS | DIASTOLIC BLOOD PRESSURE: 70 MMHG | RESPIRATION RATE: 17 BRPM | OXYGEN SATURATION: 97 %

## 2024-02-29 DIAGNOSIS — N89.8 VAGINAL ITCHING: Primary | ICD-10-CM

## 2024-02-29 LAB
BILIRUBIN, UA POC OHS: NEGATIVE
BLOOD, UA POC OHS: NEGATIVE
CLARITY, UA POC OHS: CLEAR
COLOR, UA POC OHS: YELLOW
GLUCOSE, UA POC OHS: NEGATIVE
KETONES, UA POC OHS: NEGATIVE
LEUKOCYTES, UA POC OHS: NEGATIVE
NITRITE, UA POC OHS: NEGATIVE
PH, UA POC OHS: 6
PROTEIN, UA POC OHS: NEGATIVE
SPECIFIC GRAVITY, UA POC OHS: 1.01
UROBILINOGEN, UA POC OHS: 0.2

## 2024-02-29 PROCEDURE — 99999 PR PBB SHADOW E&M-EST. PATIENT-LVL V: CPT | Mod: PBBFAC,,,

## 2024-02-29 PROCEDURE — 99213 OFFICE O/P EST LOW 20 MIN: CPT | Mod: S$GLB,,,

## 2024-02-29 PROCEDURE — 81003 URINALYSIS AUTO W/O SCOPE: CPT | Mod: QW,S$GLB,,

## 2024-02-29 PROCEDURE — 87086 URINE CULTURE/COLONY COUNT: CPT

## 2024-02-29 PROCEDURE — 81514 NFCT DS BV&VAGINITIS DNA ALG: CPT

## 2024-02-29 RX ORDER — ESTRADIOL 0.1 MG/G
CREAM VAGINAL
Qty: 42.5 G | Refills: 6 | Status: SHIPPED | OUTPATIENT
Start: 2024-02-29

## 2024-02-29 RX ORDER — FLUCONAZOLE 150 MG/1
150 TABLET ORAL DAILY
Qty: 1 TABLET | Refills: 0 | Status: SHIPPED | OUTPATIENT
Start: 2024-02-29 | End: 2024-03-01

## 2024-02-29 RX ORDER — NITROFURANTOIN 25; 75 MG/1; MG/1
100 CAPSULE ORAL 2 TIMES DAILY
Qty: 10 CAPSULE | Refills: 0 | Status: SHIPPED | OUTPATIENT
Start: 2024-02-29

## 2024-02-29 NOTE — TELEPHONE ENCOUNTER
----- Message from Christy Herron sent at 2/29/2024 11:29 AM CST -----  Contact: pt  Type: Needs Medical Advice         Who Called: pt  Best Call Back Number:[phone]  Additional Information: Requesting a call back regarding pt is needing the office to call pharm. There is a issue with the rx     fluconazole (DIFLUCAN) 150 MG Tab  Rx stated take 1 and another 72hrs but it's for only 1 tablet        Please Advise- Thank you

## 2024-02-29 NOTE — TELEPHONE ENCOUNTER
----- Message from Ivett Flood sent at 2/29/2024 11:13 AM CST -----  Type:  Pharmacy Calling to Clarify an RX    Name of Caller:  Germ[facility]  Pharmacy Name:    [facility]  [address]  [address]  [place] [address]  Phone: [phone] Fax: [phone]  Prescription Name:  fluconazole (DIFLUCAN) 150 MG Tab  What do they need to clarify?:  what is the pt supposed to repeat  Best Call Back Number:  [phone]  Additional Information:  Germán is calling in regards to the Rx if for one tablet and the pharmacy needs clear instructions on what the pt should repeat. Please call back and advise. Thanks!

## 2024-02-29 NOTE — PATIENT INSTRUCTIONS
Jase Hawley,     If you are due for any health screening(s) below please notify me so we can arrange them to be ordered and scheduled. Most healthy patients at your age complete them, but you are free to accept or refuse.     If you can't do it, I'll definitely understand. If you can, I'd certainly appreciate it!    Tests to Keep You Healthy    Eye Exam: ORDERED BUT NOT SCHEDULED  Colon Cancer Screening: Met on 4/6/2023  Last Blood Pressure <= 139/89 (2/29/2024): Yes      Lets manage your high blood pressure     Your blood pressure was above 140/90 today during your visit. We recommend that you schedule a nurse visit in two weeks to check your blood pressure and discuss ways to support your health goals.     You can also manage your health and record your blood pressure from the comfort of home by keeping a daily blood pressure log. These results are shared with and reviewed by your provider. Please print this form (Daily Blood Pressure Log) to assist you in keeping track of your blood pressure at home.     Schedule your nurse visit in two weeks to learn more about how to track and manage high blood pressure.    Daily Blood Pressure Log    Name:__________________________________                  Date of Birth:_________    Average Blood Pressure:  __________      Date: Time  (a.m.) Blood  Pressure: Pulse  Rate: Time  (p.m.) Blood  Pressure : Pulse  Rate:   Sample 8:37 127/83 84                                                                                                                                                                                 Your diabetic retinal eye exam is due     Diabetes is the #1 cause of blindness in the [place] - early detection before signs or symptoms develop can prevent debilitating blindness.     Our records indicate that you may be overdue for your annual diabetic eye exam. Eye screening can help identify patients at risk for developing vision loss which is common in diabetes. This  simple screening is an important step to keeping you healthy and preventing complications from diabetes.     This recommended diabetic eye exam should take place once per year and can prevent and treat diabetes complications in the eye before developing symptoms. This can be done with a special camera is used to take photographs of the back of your eye without having to dilate them, or you can see an eye doctor for a full dilated exam.     If you recently had your yearly diabetic eye exam performed outside of [facility], please let your Health care team know so that they can update your health record.

## 2024-02-29 NOTE — PROGRESS NOTES
Subjective:       Patient ID: Marleen Samano is a 78 y.o. female.    Chief Complaint: vaginal itching      Marleen Samano is a 78 y.o. female who presents to clinic for evaluation of vaginal itching ongoing for 3 days. Reports associated burning of vulva for 3 days as well. Denies dysuria, urinary urgency/ frequency, hematuria, fever or chills. No vaginal discharge or abdominal pain. No concern for STIs. Denies having this issue previously. Per chart review, has been on oral estrogen in the past, but this was discontinued. Reports estrogen cream was too expensive to try in the past.         Review of Systems   Constitutional:  Negative for chills, fatigue and fever.   Respiratory:  Negative for cough and shortness of breath.    Cardiovascular:  Negative for chest pain and palpitations.   Gastrointestinal:  Negative for abdominal pain, diarrhea, nausea and vomiting.   Genitourinary:  Positive for vaginal pain (itching and burning of labia). Negative for dysuria, frequency, hematuria, urgency and vaginal discharge.   Skin:  Negative for rash.   Neurological:  Negative for dizziness.         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/2020    Bell's palsy     Bladder incontinence     Blepharospasm     Bronchiectasis without complication 10/08/2019    Cervical dystonia     Concussion     COPD (chronic obstructive pulmonary disease)     Enterocolitis 02/07/2020    Fainting spell     2/7/2020    Hormone deficiency     Hypertension     Loose right total knee arthroplasty 06/15/2020    Migraine     Muscle spasm     Myofacial pain syndrome     Normocytic normochromic anemia 02/10/2020    Right ear pain 02/09/2021    Squamous cell carcinoma of skin     Syncope and collapse 02/09/2021       Review of patient's  allergies indicates:   Allergen Reactions    Adhesive tape-silicones Rash     Blisters after on for several hours    Augmentin [amoxicillin-pot clavulanate]      thrush    Ciprofloxacin      thrush    Morphine Itching and Hives         Current Outpatient Medications:     albuterol (PROVENTIL/VENTOLIN HFA) 90 mcg/actuation inhaler, USE 2 INHALATIONS BY MOUTH EVERY 6 HOURS AS NEEDED FOR WHEEZING, Disp: 20.1 g, Rfl: 3    albuterol-ipratropium (DUO-NEB) 2.5 mg-0.5 mg/3 mL nebulizer solution, USE 1 VIAL VIA NEBULIZER  EVERY 6 HOURS AS NEEDED FOR WHEEZING RESCUE, Disp: 180 mL, Rfl: 23    amLODIPine (NORVASC) 2.5 MG tablet, TAKE 1 TABLET BY MOUTH ONCE DAILY, Disp: 90 tablet, Rfl: 3    aspirin 81 MG Chew, Take 81 mg by mouth once daily., Disp: , Rfl:     baclofen (LIORESAL) 20 MG tablet, TAKE 1 TABLET BY MOUTH  TWICE DAILY, Disp: 180 tablet, Rfl: 3    benazepriL (LOTENSIN) 40 MG tablet, Take 1 tablet (40 mg total) by mouth once daily., Disp: 90 tablet, Rfl: 4    clindamycin (CLEOCIN) 300 MG capsule, Take 300 mg by mouth 3 (three) times daily., Disp: , Rfl:     clotrimazole (LOTRIMIN) 1 % cream, Apply topically 2 (two) times daily., Disp: 28 g, Rfl: 6    gabapentin (NEURONTIN) 600 MG tablet, Take 1 tablet (600 mg total) by mouth daily as needed., Disp: 270 tablet, Rfl: 3    hydrOXYzine HCL (ATARAX) 25 MG tablet, Take 1 tablet (25 mg total) by mouth 3 (three) times daily as needed for Itching., Disp: 90 tablet, Rfl: 4    omeprazole (PRILOSEC) 40 MG capsule, TAKE 1 CAPSULE BY MOUTH  TWICE DAILY BEFORE MEALS, Disp: 180 capsule, Rfl: 3    potassium chloride (KLOR-CON) 10 MEQ TbSR, Take 1 tablet (10 mEq total) by mouth once daily., Disp: 90 tablet, Rfl: 3    simethicone (GAS-X ORAL), Take by mouth., Disp: , Rfl:     traZODone (DESYREL) 100 MG tablet, Take 100 mg by mouth every evening., Disp: , Rfl:     venlafaxine (EFFEXOR-XR) 150 MG Cp24, Take 1 capsule (150 mg total) by mouth 2 (two) times a day., Disp: 14 capsule, Rfl: 0     "blood-glucose meter kit, To check BG 1 times daily, to use with insurance preferred meter, Disp: 1 each, Rfl: 0    estradioL (ESTRACE) 0.01 % (0.1 mg/gram) vaginal cream, Place a pea-sized amount inside vagina every night for 2 weeks, and then 2-3 times a week., Disp: 42.5 g, Rfl: 6    fluconazole (DIFLUCAN) 150 MG Tab, Take 1 tablet (150 mg total) by mouth once daily. May repeat in 72 hours if symptoms fail to improve. for 1 day, Disp: 1 tablet, Rfl: 0    hydrocortisone 1 % cream, Apply topically 2 (two) times daily. for 14 days, Disp: 30 g, Rfl: 2    metoprolol succinate (TOPROL-XL) 25 MG 24 hr tablet, Take 1 tablet (25 mg total) by mouth once daily., Disp: 90 tablet, Rfl: 3    nitrofurantoin, macrocrystal-monohydrate, (MACROBID) 100 MG capsule, Take 1 capsule (100 mg total) by mouth 2 (two) times daily., Disp: 10 capsule, Rfl: 0    Objective:        Physical Exam  Vitals reviewed. Exam conducted with a chaperone present.   Constitutional:       Appearance: Normal appearance.   HENT:      Head: Normocephalic and atraumatic.   Eyes:      Conjunctiva/sclera: Conjunctivae normal.   Cardiovascular:      Rate and Rhythm: Normal rate.   Pulmonary:      Effort: Pulmonary effort is normal.   Genitourinary:     Labia:         Right: No rash.         Left: No rash.       Cervix: Normal.      Comments: Vaginal atrophy present. No vaginal discharge.   Musculoskeletal:         General: Normal range of motion.      Cervical back: Normal range of motion and neck supple.   Skin:     General: Skin is warm and dry.   Neurological:      Mental Status: She is alert and oriented to person, place, and time.           Visit Vitals  BP (!) 140/70 (BP Location: Right arm, Patient Position: Sitting, BP Method: Large (Manual))   Pulse 85   Temp 98.4 °F (36.9 °C) (Oral)   Resp 17   Ht 5' 9" (1.753 m)   Wt 75.3 kg (166 lb 0.1 oz)   SpO2 97%   BMI 24.51 kg/m²      Assessment:         1. Vaginal itching        Plan:         Marleen was seen " today for urinary tract infection.    Diagnoses and all orders for this visit:    Vaginal itching  -     POCT Urinalysis(Instrument)  -     CULTURE, URINE  -     VAGINOSIS SCREEN BY DNA PROBE  -     nitrofurantoin, macrocrystal-monohydrate, (MACROBID) 100 MG capsule; Take 1 capsule (100 mg total) by mouth 2 (two) times daily.  -     fluconazole (DIFLUCAN) 150 MG Tab; Take 1 tablet (150 mg total) by mouth once daily. May repeat in 72 hours if symptoms fail to improve. for 1 day  -     estradioL (ESTRACE) 0.01 % (0.1 mg/gram) vaginal cream; Place a pea-sized amount inside vagina every night for 2 weeks, and then 2-3 times a week.  -     Will treat for possible UTI or yeast, although this is likely due to vaginal atrophy. Coupon printed and given to patient for vaginal estrogen.       Follow up if symptoms worsen or fail to improve as anticipated.          Family Medicine Physician Assistant     Future Appointments       Date Provider Specialty Appt Notes    5/14/2024  Lab fasting    5/14/2024  Lab urine    5/24/2024 Mikie Ryan MD Family Medicine annual     6/10/2024 Soraida Hoang OD Optometry Annual             Tests to Keep You Healthy    Eye Exam: ORDERED BUT NOT SCHEDULED  Colon Cancer Screening: Met on 4/6/2023  Last Blood Pressure <= 139/89 (2/29/2024): Yes       I spent a total of 20 minutes on the day of the visit.This includes face to face time and non-face to face time preparing to see the patient (eg, review of tests), obtaining and/or reviewing separately obtained history, documenting clinical information in the electronic or other health record, independently interpreting results and communicating results to the patient/family/caregiver, or care coordinator.    We have addressed [3] Low: 2 or more self-limited or minor problems / 1 stable chronic illness / 1 acute, uncomplicated illness or injury  The complexity of the data reviewed and analyzed for this visit was [3] Limited (Reviewed prior  external note, ordered unique testing or reviewed the results of each unique test)   The risk of complications and/or morbidity or mortality are [3] Low risk   The level of Medical Decision Making for this visit is [3] Low

## 2024-03-01 LAB
BACTERIA UR CULT: NORMAL
BACTERIA UR CULT: NORMAL
BACTERIAL VAGINOSIS DNA: NEGATIVE
CANDIDA GLABRATA DNA: NEGATIVE
CANDIDA KRUSEI DNA: NEGATIVE
CANDIDA RRNA VAG QL PROBE: NEGATIVE
T VAGINALIS RRNA GENITAL QL PROBE: NEGATIVE

## 2024-03-05 ENCOUNTER — PATIENT MESSAGE (OUTPATIENT)
Dept: FAMILY MEDICINE | Facility: CLINIC | Age: 79
End: 2024-03-05
Payer: MEDICARE

## 2024-03-05 DIAGNOSIS — N89.8 VAGINAL ITCHING: Primary | ICD-10-CM

## 2024-03-06 ENCOUNTER — PATIENT MESSAGE (OUTPATIENT)
Dept: FAMILY MEDICINE | Facility: CLINIC | Age: 79
End: 2024-03-06
Payer: MEDICARE

## 2024-03-06 NOTE — TELEPHONE ENCOUNTER
The vaginal estrogen should help with her symptoms. Please try it as prescribed and let us know how she is doing. If she believes she may have another UTI, the urine order is there.

## 2024-03-06 NOTE — TELEPHONE ENCOUNTER
If she is still having symptoms, recommend she return for repeat urine and culture. The first sample may have been contaminated. Recommend she use wipe provided, begin urinating then start to collect sample mid-stream. Was she able to  the vaginal estrogen to try? Vaginal itching is not typically a symptom of urinary tract infection.

## 2024-03-15 ENCOUNTER — PATIENT MESSAGE (OUTPATIENT)
Dept: FAMILY MEDICINE | Facility: CLINIC | Age: 79
End: 2024-03-15
Payer: MEDICARE

## 2024-03-15 NOTE — TELEPHONE ENCOUNTER
No care due was identified.  [facility] Embedded Care Due Messages. Reference number: [id number].   7/18/2023 7:58:00 AM CDT   Voided 50 ml and bladder scanned for 146ml. .patient was assisted back to bed prior. 02 is maintained via n/c till resp tech, places cpap back on. No distress. No complaints

## 2024-04-08 ENCOUNTER — PATIENT MESSAGE (OUTPATIENT)
Dept: FAMILY MEDICINE | Facility: CLINIC | Age: 79
End: 2024-04-08
Payer: MEDICARE

## 2024-04-11 ENCOUNTER — PATIENT MESSAGE (OUTPATIENT)
Dept: FAMILY MEDICINE | Facility: CLINIC | Age: 79
End: 2024-04-11
Payer: MEDICARE

## 2024-05-10 DIAGNOSIS — J06.9 UPPER RESPIRATORY TRACT INFECTION, UNSPECIFIED TYPE: ICD-10-CM

## 2024-05-10 RX ORDER — ALBUTEROL SULFATE 90 UG/1
AEROSOL, METERED RESPIRATORY (INHALATION)
Qty: 51 G | Refills: 2 | Status: SHIPPED | OUTPATIENT
Start: 2024-05-10 | End: 2024-05-24

## 2024-05-11 NOTE — TELEPHONE ENCOUNTER
Refill Decision Note   Marleen Samano  is requesting a refill authorization.  Brief Assessment and Rationale for Refill:  Approve     Medication Therapy Plan:  Albuterol DDI previously overridden by provider      Alert overridden per protocol: Yes   Comments:     Note composed:10:52 PM 05/10/2024

## 2024-05-11 NOTE — TELEPHONE ENCOUNTER
No care due was identified.  [facility] Embedded Care Due Messages. Reference number: [id number].   5/10/2024 9:57:55 PM CDT

## 2024-05-15 DIAGNOSIS — J06.9 UPPER RESPIRATORY TRACT INFECTION, UNSPECIFIED TYPE: ICD-10-CM

## 2024-05-15 RX ORDER — ALBUTEROL SULFATE 90 UG/1
AEROSOL, METERED RESPIRATORY (INHALATION)
Qty: 17 G | Refills: 6 | OUTPATIENT
Start: 2024-05-15

## 2024-05-15 NOTE — TELEPHONE ENCOUNTER
No care due was identified.  [facility] Embedded Care Due Messages. Reference number: [id number].   5/15/2024 4:01:29 AM CDT

## 2024-05-15 NOTE — TELEPHONE ENCOUNTER
Refill Decision Note   Marleen Samano  is requesting a refill authorization.  Brief Assessment and Rationale for Refill:  Quick Discontinue     Medication Therapy Plan:         Comments:     Note composed:6:45 AM 05/15/2024

## 2024-05-21 DIAGNOSIS — I10 BENIGN ESSENTIAL HTN: ICD-10-CM

## 2024-05-22 NOTE — TELEPHONE ENCOUNTER
Refill Routing Note   Medication(s) are not appropriate for processing by [facility] for the following reason(s):        Required vitals abnormal  Outside of protocol    [facility] action(s):  Defer  Route        Medication Therapy Plan: Omeprazole total daily dose greater than 40 mg daily; ROUTE      Appointments  past 12m or future 3m with PCP    Date Provider   Last Visit   1/8/2024 Mikie Ryan MD   Next Visit   7/22/2024 Mikie Ryan MD   ED visits in past 90 days: 0        Note composed:8:51 AM 05/22/2024

## 2024-05-22 NOTE — TELEPHONE ENCOUNTER
No care due was identified.  [facility] Embedded Care Due Messages. Reference number: [id number].   5/21/2024 9:23:46 PM CDT

## 2024-05-23 RX ORDER — AMLODIPINE BESYLATE 2.5 MG/1
TABLET ORAL
Qty: 90 TABLET | Refills: 2 | Status: SHIPPED | OUTPATIENT
Start: 2024-05-23

## 2024-05-23 RX ORDER — BENAZEPRIL HYDROCHLORIDE 40 MG/1
40 TABLET ORAL
Qty: 90 TABLET | Refills: 2 | Status: SHIPPED | OUTPATIENT
Start: 2024-05-23

## 2024-05-23 RX ORDER — OMEPRAZOLE 40 MG/1
CAPSULE, DELAYED RELEASE ORAL
Qty: 180 CAPSULE | Refills: 2 | Status: SHIPPED | OUTPATIENT
Start: 2024-05-23

## 2024-05-24 DIAGNOSIS — J06.9 UPPER RESPIRATORY TRACT INFECTION, UNSPECIFIED TYPE: ICD-10-CM

## 2024-05-24 RX ORDER — ALBUTEROL SULFATE 90 UG/1
AEROSOL, METERED RESPIRATORY (INHALATION)
Qty: 51 G | Refills: 2 | Status: SHIPPED | OUTPATIENT
Start: 2024-05-24

## 2024-05-25 NOTE — TELEPHONE ENCOUNTER
No care due was identified.  [facility] Embedded Care Due Messages. Reference number: [id number].   5/24/2024 9:45:50 PM CDT

## 2024-05-25 NOTE — TELEPHONE ENCOUNTER
Refill Decision Note   Shaileshdunia Jazmyne  is requesting a refill authorization.  Brief Assessment and Rationale for Refill:  Approve     Medication Therapy Plan:  Albuterol DDI previously overidden by participating provider      Alert overridden per protocol: Yes   Comments:     Note composed:9:53 PM 05/24/2024

## 2024-06-28 ENCOUNTER — PATIENT MESSAGE (OUTPATIENT)
Dept: FAMILY MEDICINE | Facility: CLINIC | Age: 79
End: 2024-06-28
Payer: MEDICARE

## 2024-06-29 ENCOUNTER — PATIENT MESSAGE (OUTPATIENT)
Dept: FAMILY MEDICINE | Facility: CLINIC | Age: 79
End: 2024-06-29
Payer: MEDICARE

## 2024-06-29 ENCOUNTER — OFFICE VISIT (OUTPATIENT)
Dept: URGENT CARE | Facility: CLINIC | Age: 79
End: 2024-06-29
Payer: MEDICARE

## 2024-06-29 VITALS
WEIGHT: 160 LBS | BODY MASS INDEX: 23.7 KG/M2 | DIASTOLIC BLOOD PRESSURE: 82 MMHG | SYSTOLIC BLOOD PRESSURE: 144 MMHG | HEIGHT: 69 IN | HEART RATE: 89 BPM | RESPIRATION RATE: 16 BRPM | TEMPERATURE: 98 F | OXYGEN SATURATION: 97 %

## 2024-06-29 DIAGNOSIS — Z86.79 HISTORY OF CHRONIC HYPERTENSION: ICD-10-CM

## 2024-06-29 DIAGNOSIS — Z86.39 HISTORY OF TYPE 2 DIABETES MELLITUS: ICD-10-CM

## 2024-06-29 DIAGNOSIS — N18.31 CHRONIC KIDNEY DISEASE, STAGE 3A: ICD-10-CM

## 2024-06-29 DIAGNOSIS — R30.0 DYSURIA: Primary | ICD-10-CM

## 2024-06-29 DIAGNOSIS — N30.00 ACUTE CYSTITIS WITHOUT HEMATURIA: ICD-10-CM

## 2024-06-29 LAB
BILIRUB UR QL STRIP: NEGATIVE
GLUCOSE UR QL STRIP: NEGATIVE
KETONES UR QL STRIP: NEGATIVE
LEUKOCYTE ESTERASE UR QL STRIP: POSITIVE
PH, POC UA: 6
POC BLOOD, URINE: POSITIVE
POC NITRATES, URINE: NEGATIVE
PROT UR QL STRIP: NEGATIVE
SP GR UR STRIP: 1.01 (ref 1–1.03)
UROBILINOGEN UR STRIP-ACNC: 0.2 (ref 0.1–1.1)

## 2024-06-29 PROCEDURE — 81003 URINALYSIS AUTO W/O SCOPE: CPT | Mod: QW,S$GLB,, | Performed by: NURSE PRACTITIONER

## 2024-06-29 PROCEDURE — 99214 OFFICE O/P EST MOD 30 MIN: CPT | Mod: S$GLB,,, | Performed by: NURSE PRACTITIONER

## 2024-06-29 RX ORDER — NITROFURANTOIN 25; 75 MG/1; MG/1
100 CAPSULE ORAL 2 TIMES DAILY
Qty: 14 CAPSULE | Refills: 0 | Status: SHIPPED | OUTPATIENT
Start: 2024-06-29 | End: 2024-07-06

## 2024-06-29 RX ORDER — FLUCONAZOLE 150 MG/1
150 TABLET ORAL DAILY
Qty: 1 TABLET | Refills: 0 | Status: SHIPPED | OUTPATIENT
Start: 2024-06-29 | End: 2024-06-30

## 2024-06-29 NOTE — PROGRESS NOTES
"Subjective:      Patient ID: Marleen Samano is a 78 y.o. female.    Vitals:  height is 5' 9" (1.753 m) and weight is 72.6 kg (160 lb). Her oral temperature is 97.7 °F (36.5 °C). Her blood pressure is 144/82 (abnormal) and her pulse is 89. Her respiration is 16 and oxygen saturation is 97%.     Chief Complaint: Dysuria    Dysuria   This is a new problem. The current episode started yesterday. The quality of the pain is described as burning. Associated symptoms include frequency and urgency. Pertinent negatives include no chills, flank pain, hematuria, nausea or vomiting. She has tried nothing for the symptoms.       Constitution: Negative for activity change, appetite change, chills and fever.   Gastrointestinal:  Negative for abdominal pain, nausea, vomiting and diarrhea.   Genitourinary:  Positive for dysuria, frequency and urgency. Negative for flank pain, hematuria and pelvic pain.   Musculoskeletal:  Negative for back pain.   Neurological:  Negative for disorientation and altered mental status.   Psychiatric/Behavioral:  Negative for altered mental status and disorientation.       Objective:     Physical Exam   Constitutional: She is oriented to person, place, and time.  Non-toxic appearance. She does not appear ill. No distress.   HENT:   Head: Normocephalic and atraumatic.   Nose: Nose normal.   Mouth/Throat: Mucous membranes are moist.   Eyes: Conjunctivae are normal.   Cardiovascular: Normal rate.   Pulmonary/Chest: Effort normal. No respiratory distress.   Abdominal: Normal appearance. There is no left CVA tenderness and no right CVA tenderness.   Neurological: no focal deficit. She is alert and oriented to person, place, and time.   Skin: Skin is warm and dry. Capillary refill takes 2 to 3 seconds.   Psychiatric: Her behavior is normal. Mood normal.   Nursing note and vitals reviewed.      Assessment:     1. Dysuria    2. Chronic kidney disease, stage 3a    3. History of type 2 diabetes mellitus    4. " History of chronic hypertension    5. Acute cystitis without hematuria      UA:  10 RBCs, 70 WBCs, otherwise negative  Urine culture pending    Plan:       Dysuria  -     POCT Urinalysis, Dipstick, Automated, W/O Scope    Chronic kidney disease, stage 3a    History of type 2 diabetes mellitus    History of chronic hypertension    Acute cystitis without hematuria  -     Urine culture  -     nitrofurantoin, macrocrystal-monohydrate, (MACROBID) 100 MG capsule; Take 1 capsule (100 mg total) by mouth 2 (two) times daily. for 7 days  Dispense: 14 capsule; Refill: 0  -     fluconazole (DIFLUCAN) 150 MG Tab; Take 1 tablet (150 mg total) by mouth once daily. for 1 day  Dispense: 1 tablet; Refill: 0

## 2024-06-29 NOTE — PATIENT INSTRUCTIONS
Macrobid twice a day for 7 days  Diflucan as prescribed as needed for onset of yeast infection symptoms    Urine collected today will be sent to the lab for culture expect results in 3-7 days.  You can be looking on the [facility] for results or contact your primary care provider regarding results or someone from the clinic will contact you    For your recurrent UTIs:  Behavioral changes to help decrease UTI recurrences   -increase water intake (6 to 8 bottles water per day)   -don't hold urine, void every 2 to 3 hours   -prevent constipation (miralax capful daily if necessary) to have a bowel movement daily and keep stools soft  -cut down on caffeine,drink mainly water  -no douching   -void immediately after sexual intercourse  -wipe front to back      OTC meds to try (go to the pharmacist and ask where they are, they are over the counter)  -Use lactobacillus probiotic in capsule form in vagina once nightly for 10 days if you feel like you have an infection coming on   -cranberry pills 500mg tabs TID, can buy over the counter  -take a probiotic daily

## 2024-07-01 ENCOUNTER — PATIENT MESSAGE (OUTPATIENT)
Dept: FAMILY MEDICINE | Facility: CLINIC | Age: 79
End: 2024-07-01
Payer: MEDICARE

## 2024-07-02 ENCOUNTER — PATIENT MESSAGE (OUTPATIENT)
Dept: FAMILY MEDICINE | Facility: CLINIC | Age: 79
End: 2024-07-02
Payer: MEDICARE

## 2024-07-04 LAB
BACTERIA UR CULT: ABNORMAL
BACTERIA UR CULT: ABNORMAL
OTHER ANTIBIOTIC SUSC ISLT: ABNORMAL

## 2024-07-05 PROBLEM — N18.31 CHRONIC KIDNEY DISEASE, STAGE 3A: Status: RESOLVED | Noted: 2022-04-14 | Resolved: 2024-07-05

## 2024-07-15 ENCOUNTER — PATIENT MESSAGE (OUTPATIENT)
Dept: FAMILY MEDICINE | Facility: CLINIC | Age: 79
End: 2024-07-15
Payer: MEDICARE

## 2024-07-16 NOTE — TELEPHONE ENCOUNTER
Appt scheduled    Notification Instructions: Patient will be notified of biopsy results. However, patient instructed to call the office if not contacted within 2 weeks.

## 2024-07-17 ENCOUNTER — LAB VISIT (OUTPATIENT)
Dept: LAB | Facility: HOSPITAL | Age: 79
End: 2024-07-17
Attending: STUDENT IN AN ORGANIZED HEALTH CARE EDUCATION/TRAINING PROGRAM
Payer: MEDICARE

## 2024-07-17 DIAGNOSIS — E11.9 TYPE 2 DIABETES MELLITUS WITHOUT COMPLICATION, UNSPECIFIED WHETHER LONG TERM INSULIN USE: ICD-10-CM

## 2024-07-17 LAB
CHOLEST SERPL-MCNC: 204 MG/DL (ref 120–199)
CHOLEST/HDLC SERPL: 2.3 {RATIO} (ref 2–5)
ESTIMATED AVG GLUCOSE: 134 MG/DL (ref 68–131)
HBA1C MFR BLD: 6.3 % (ref 4–5.6)
HDLC SERPL-MCNC: 88 MG/DL (ref 40–75)
HDLC SERPL: 43.1 % (ref 20–50)
LDLC SERPL CALC-MCNC: 99.2 MG/DL (ref 63–159)
NONHDLC SERPL-MCNC: 116 MG/DL
TRIGL SERPL-MCNC: 84 MG/DL (ref 30–150)

## 2024-07-17 PROCEDURE — 83036 HEMOGLOBIN GLYCOSYLATED A1C: CPT | Performed by: STUDENT IN AN ORGANIZED HEALTH CARE EDUCATION/TRAINING PROGRAM

## 2024-07-17 PROCEDURE — 80061 LIPID PANEL: CPT | Performed by: STUDENT IN AN ORGANIZED HEALTH CARE EDUCATION/TRAINING PROGRAM

## 2024-07-22 ENCOUNTER — OFFICE VISIT (OUTPATIENT)
Dept: FAMILY MEDICINE | Facility: CLINIC | Age: 79
End: 2024-07-22
Payer: MEDICARE

## 2024-07-22 VITALS
WEIGHT: 165.38 LBS | OXYGEN SATURATION: 96 % | RESPIRATION RATE: 16 BRPM | HEART RATE: 79 BPM | DIASTOLIC BLOOD PRESSURE: 82 MMHG | HEIGHT: 69 IN | BODY MASS INDEX: 24.5 KG/M2 | SYSTOLIC BLOOD PRESSURE: 118 MMHG

## 2024-07-22 DIAGNOSIS — Z00.00 ROUTINE GENERAL MEDICAL EXAMINATION AT A HEALTH CARE FACILITY: Primary | ICD-10-CM

## 2024-07-22 DIAGNOSIS — I10 PRIMARY HYPERTENSION: ICD-10-CM

## 2024-07-22 DIAGNOSIS — F33.42 RECURRENT MAJOR DEPRESSIVE DISORDER, IN FULL REMISSION: ICD-10-CM

## 2024-07-22 PROCEDURE — 1126F AMNT PAIN NOTED NONE PRSNT: CPT | Mod: CPTII,S$GLB,, | Performed by: STUDENT IN AN ORGANIZED HEALTH CARE EDUCATION/TRAINING PROGRAM

## 2024-07-22 PROCEDURE — 3288F FALL RISK ASSESSMENT DOCD: CPT | Mod: CPTII,S$GLB,, | Performed by: STUDENT IN AN ORGANIZED HEALTH CARE EDUCATION/TRAINING PROGRAM

## 2024-07-22 PROCEDURE — 1160F RVW MEDS BY RX/DR IN RCRD: CPT | Mod: CPTII,S$GLB,, | Performed by: STUDENT IN AN ORGANIZED HEALTH CARE EDUCATION/TRAINING PROGRAM

## 2024-07-22 PROCEDURE — 99999 PR PBB SHADOW E&M-EST. PATIENT-LVL IV: CPT | Mod: PBBFAC,,, | Performed by: STUDENT IN AN ORGANIZED HEALTH CARE EDUCATION/TRAINING PROGRAM

## 2024-07-22 PROCEDURE — 99214 OFFICE O/P EST MOD 30 MIN: CPT | Mod: S$GLB,,, | Performed by: STUDENT IN AN ORGANIZED HEALTH CARE EDUCATION/TRAINING PROGRAM

## 2024-07-22 PROCEDURE — 1101F PT FALLS ASSESS-DOCD LE1/YR: CPT | Mod: CPTII,S$GLB,, | Performed by: STUDENT IN AN ORGANIZED HEALTH CARE EDUCATION/TRAINING PROGRAM

## 2024-07-22 PROCEDURE — 3079F DIAST BP 80-89 MM HG: CPT | Mod: CPTII,S$GLB,, | Performed by: STUDENT IN AN ORGANIZED HEALTH CARE EDUCATION/TRAINING PROGRAM

## 2024-07-22 PROCEDURE — 1159F MED LIST DOCD IN RCRD: CPT | Mod: CPTII,S$GLB,, | Performed by: STUDENT IN AN ORGANIZED HEALTH CARE EDUCATION/TRAINING PROGRAM

## 2024-07-22 PROCEDURE — G2211 COMPLEX E/M VISIT ADD ON: HCPCS | Mod: S$GLB,,, | Performed by: STUDENT IN AN ORGANIZED HEALTH CARE EDUCATION/TRAINING PROGRAM

## 2024-07-22 PROCEDURE — 3074F SYST BP LT 130 MM HG: CPT | Mod: CPTII,S$GLB,, | Performed by: STUDENT IN AN ORGANIZED HEALTH CARE EDUCATION/TRAINING PROGRAM

## 2024-07-22 NOTE — PROGRESS NOTES
"[facility] CLINIC NOTE    Patient Name: Marleen Samano  YOB: 1945    PRESENTING HISTORY     History of Present Illness:  Ms. Marleen Samano is a 78 y.o. female here for routine checkup.     Feeling fine.     HTN- well controlled.     Pre-diabetes- stable.     Depression is well controlled on effexor and trazodone. Sleep is fine, no lisa.     On muscle relaxants for cervical dystonia.     UTD on appropriate cancer screenings.     ROS      OBJECTIVE:   Vital Signs:  Vitals:    07/22/24 0756   BP: 118/82   Pulse: 79   Resp: 16   SpO2: 96%   Weight: 75 kg (165 lb 5.5 oz)   Height: 5' 9" (1.753 m)          Physical Exam: Normal, no change.     Physical Exam    ASSESSMENT & PLAN:     Routine general medical examination at a health care facility    Primary hypertension  Stable, continue current medications    Recurrent major depressive disorder, in full remission  Stable, continue current medications        Mikie Ryan  Internal Medicine      Visit complexity inherent to evaluation and management associated with medical care services that serve as the continuing focal point for all needed health care services and/or with medical care services that are part of ongoing care related to a patient's single, serious condition or a complex condition provided today        "

## 2024-08-02 ENCOUNTER — PATIENT MESSAGE (OUTPATIENT)
Dept: FAMILY MEDICINE | Facility: CLINIC | Age: 79
End: 2024-08-02
Payer: MEDICARE

## 2024-08-18 ENCOUNTER — PATIENT MESSAGE (OUTPATIENT)
Dept: FAMILY MEDICINE | Facility: CLINIC | Age: 79
End: 2024-08-18
Payer: MEDICARE

## 2024-08-18 ENCOUNTER — OFFICE VISIT (OUTPATIENT)
Dept: URGENT CARE | Facility: CLINIC | Age: 79
End: 2024-08-18
Payer: MEDICARE

## 2024-08-18 VITALS
TEMPERATURE: 99 F | SYSTOLIC BLOOD PRESSURE: 146 MMHG | WEIGHT: 162 LBS | OXYGEN SATURATION: 96 % | HEIGHT: 69 IN | RESPIRATION RATE: 18 BRPM | BODY MASS INDEX: 23.99 KG/M2 | DIASTOLIC BLOOD PRESSURE: 75 MMHG | HEART RATE: 84 BPM

## 2024-08-18 DIAGNOSIS — R30.0 DYSURIA: Primary | ICD-10-CM

## 2024-08-18 DIAGNOSIS — N30.01 ACUTE CYSTITIS WITH HEMATURIA: ICD-10-CM

## 2024-08-18 LAB
BILIRUB UR QL STRIP: POSITIVE
GLUCOSE UR QL STRIP: NEGATIVE
KETONES UR QL STRIP: NEGATIVE
LEUKOCYTE ESTERASE UR QL STRIP: POSITIVE
PH, POC UA: 6
POC BLOOD, URINE: POSITIVE
POC NITRATES, URINE: POSITIVE
PROT UR QL STRIP: POSITIVE
SP GR UR STRIP: 1.01 (ref 1–1.03)
UROBILINOGEN UR STRIP-ACNC: 0.2 (ref 0.1–1.1)

## 2024-08-18 PROCEDURE — 81003 URINALYSIS AUTO W/O SCOPE: CPT | Mod: QW,S$GLB,, | Performed by: NURSE PRACTITIONER

## 2024-08-18 PROCEDURE — 99214 OFFICE O/P EST MOD 30 MIN: CPT | Mod: S$GLB,,, | Performed by: NURSE PRACTITIONER

## 2024-08-18 RX ORDER — FLUCONAZOLE 150 MG/1
150 TABLET ORAL DAILY
Qty: 1 TABLET | Refills: 0 | Status: SHIPPED | OUTPATIENT
Start: 2024-08-18 | End: 2024-08-19

## 2024-08-18 RX ORDER — NITROFURANTOIN 25; 75 MG/1; MG/1
100 CAPSULE ORAL 2 TIMES DAILY
Qty: 14 CAPSULE | Refills: 0 | Status: SHIPPED | OUTPATIENT
Start: 2024-08-18 | End: 2024-08-25

## 2024-08-20 ENCOUNTER — TELEPHONE (OUTPATIENT)
Dept: FAMILY MEDICINE | Facility: CLINIC | Age: 79
End: 2024-08-20
Payer: MEDICARE

## 2024-08-20 NOTE — TELEPHONE ENCOUNTER
Appointment scheduled for the date of 8-23-24. Patient agreed to appointment date, time, and location.

## 2024-08-20 NOTE — TELEPHONE ENCOUNTER
Shakir Vasques [place] Staff     ----- Message from Shakir Vasques sent at 8/20/2024  9:34 AM CDT -----  Contact: Self  Type:  Sooner Appointment Request    Caller is requesting a sooner appointment.  Caller declined first available appointment listed below.  Caller will not accept being placed on the waitlist and is requesting a message be sent to doctor.    Name of Caller:  Patient  When is the first available appointment?  Dec  Symptoms:  NA  Would the patient rather a call back or a response via [facility]? Call Back  Best Call Back Number:  [phone]  Additional Information:  Patient was told by Dr Ryan to follow up with her  on Friday to go over results from some tests that she had done.

## 2024-08-21 ENCOUNTER — TELEPHONE (OUTPATIENT)
Dept: FAMILY MEDICINE | Facility: CLINIC | Age: 79
End: 2024-08-21
Payer: MEDICARE

## 2024-08-21 NOTE — TELEPHONE ENCOUNTER
----- Message from Adriana Mcleod sent at 8/21/2024  2:18 PM CDT -----  Contact: [phone]  Type:  Patient Returning Call    Who Called:Marleen   Who Left Message for Patient:HANNA  Does the patient know what this is regarding?:AWV  Would the patient rather a call back or a response via [facility]? Call back   Best Call Back Number:[phone]  Additional Information: pt states she can come tues Aug 27th

## 2024-08-21 NOTE — TELEPHONE ENCOUNTER
Spoke with patient. Appointment rescheduled from 8/30 at 2:00 to 8/27 at 3:00 with SAGAR Martinez.

## 2024-08-21 NOTE — TELEPHONE ENCOUNTER
Left message for patient to return call to reschedule appointment on 8/30 with SAGAR Martinez due to him being out of the office.

## 2024-08-23 ENCOUNTER — OFFICE VISIT (OUTPATIENT)
Dept: FAMILY MEDICINE | Facility: CLINIC | Age: 79
End: 2024-08-23
Payer: MEDICARE

## 2024-08-23 VITALS
WEIGHT: 166.44 LBS | SYSTOLIC BLOOD PRESSURE: 122 MMHG | HEART RATE: 86 BPM | RESPIRATION RATE: 18 BRPM | OXYGEN SATURATION: 98 % | HEIGHT: 69 IN | BODY MASS INDEX: 24.65 KG/M2 | DIASTOLIC BLOOD PRESSURE: 60 MMHG

## 2024-08-23 DIAGNOSIS — S76.302A LEFT HAMSTRING INJURY, INITIAL ENCOUNTER: ICD-10-CM

## 2024-08-23 DIAGNOSIS — Z87.440 HISTORY OF RECURRENT UTIS: Primary | ICD-10-CM

## 2024-08-23 DIAGNOSIS — M67.979 TIBIALIS POSTERIOR TENDINOPATHY: ICD-10-CM

## 2024-08-23 DIAGNOSIS — F33.42 RECURRENT MAJOR DEPRESSIVE DISORDER, IN FULL REMISSION: ICD-10-CM

## 2024-08-23 LAB
BACTERIA UR CULT: ABNORMAL
BACTERIA UR CULT: ABNORMAL
OTHER ANTIBIOTIC SUSC ISLT: ABNORMAL

## 2024-08-23 PROCEDURE — 99999 PR PBB SHADOW E&M-EST. PATIENT-LVL V: CPT | Mod: PBBFAC,,, | Performed by: STUDENT IN AN ORGANIZED HEALTH CARE EDUCATION/TRAINING PROGRAM

## 2024-08-23 NOTE — PROGRESS NOTES
"[facility] MEDICINE CLINIC NOTE    Patient Name: Marleen Samano  YOB: 1945    PRESENTING HISTORY     History of Present Illness:  Ms. Marleen Samano is a 78 y.o. female here for recurrent UTIs.     She has a list of numerous things to discuss, most are listed below.     1.  C/o 3 months of bilateral ankle and heel pain extending up about 6 inchest on lower leg.   Hurts when laying in bed.   No pain with walking.     2. Concerned about CKD  Had SHARITA last April, has now resolved.   She does no thave CKD.     3. Worried about acute cystitis without hematuria   Discussed, on macrobid for this.     Has known spinal pathology which interferes with bladder function.   Causes incontinence of bowel and bladder.   UTIs about 3-4 times/year.   Saw Urology about 2 years ago.     4. Gets pain in her thigh a/w stiffness.   Only on left.   Posterior aspect.     5. Cramps in hands.   Known cervical dystonia. ALread on muscle relaxant.   Discussed could see PMR vs Neurology and consider interventional technqiues.     6. Hair always wet.   She is on effexor that might be a culprit. She tried coming off in past and got crying spells.       ROS      OBJECTIVE:   Vital Signs:  Vitals:    08/23/24 1059   BP: 122/60   Pulse: 86   Resp: 18   SpO2: 98%   Weight: 75.5 kg (166 lb 7.2 oz)   Height: 5' 9" (1.753 m)          Physical Exam: No palpable abnormality left posterior thigh. Pain is located over medial aspect.     :Mild ttp medial aspect lower leg posterior to medial malleolus and extending proximally.     Physical Exam    ASSESSMENT & PLAN:     History of recurrent UTIs  -     Cancel: Ambulatory referral/consult to Urology; Future; Expected date: 08/30/2024  -     Ambulatory referral/consult to Urogynecology; Future; Expected date: 08/30/2024  I would like to start with a vaginal exam and urogyn evaluation. Appreciate assistance.     Tibialis posterior tendinopathy  -     Ambulatory referral/consult to " Physical/Occupational Therapy; Future; Expected date: 08/30/2024  Supportive shirin.   Rest    Left hamstring injury, initial encounter  -     Ambulatory referral/consult to Physical/Occupational Therapy; Future; Expected date: 08/30/2024  Trial of PT.   If not helpful, will ask for MSK evaluation by Physical Medicine.     Recurrent major depressive disorder, in full remission  Leave effexor as is for now.         Mikie Ryan  Internal Medicine      Visit complexity inherent to evaluation and management associated with medical care services that serve as the continuing focal point for all needed health care services and/or with medical care services that are part of ongoing care related to a patient's single, serious condition or a complex condition provided today

## 2024-08-25 ENCOUNTER — PATIENT MESSAGE (OUTPATIENT)
Dept: FAMILY MEDICINE | Facility: CLINIC | Age: 79
End: 2024-08-25
Payer: MEDICARE

## 2024-08-28 ENCOUNTER — PATIENT MESSAGE (OUTPATIENT)
Dept: UROLOGY | Facility: CLINIC | Age: 79
End: 2024-08-28
Payer: MEDICARE

## 2024-08-28 ENCOUNTER — CLINICAL SUPPORT (OUTPATIENT)
Dept: REHABILITATION | Facility: HOSPITAL | Age: 79
End: 2024-08-28
Attending: STUDENT IN AN ORGANIZED HEALTH CARE EDUCATION/TRAINING PROGRAM
Payer: MEDICARE

## 2024-08-28 ENCOUNTER — TELEPHONE (OUTPATIENT)
Dept: UROLOGY | Facility: CLINIC | Age: 79
End: 2024-08-28
Payer: MEDICARE

## 2024-08-28 DIAGNOSIS — S76.302A LEFT HAMSTRING INJURY, INITIAL ENCOUNTER: ICD-10-CM

## 2024-08-28 DIAGNOSIS — M62.838 LEG MUSCLE SPASM: ICD-10-CM

## 2024-08-28 DIAGNOSIS — M67.979 TIBIALIS POSTERIOR TENDINOPATHY: ICD-10-CM

## 2024-08-28 DIAGNOSIS — R20.3 HYPERESTHESIA: Primary | ICD-10-CM

## 2024-08-28 PROCEDURE — 97162 PT EVAL MOD COMPLEX 30 MIN: CPT | Mod: PO

## 2024-08-28 PROCEDURE — 97530 THERAPEUTIC ACTIVITIES: CPT | Mod: PO

## 2024-08-29 NOTE — PLAN OF CARE
[facility]   Physical Therapy Initial Evaluation     Date: 8/28/2024   Name: Marleen SHINE [facility] Number: [id number]    Therapy Diagnosis:   Encounter Diagnoses   Name Primary?    Tibialis posterior tendinopathy     Left hamstring injury, initial encounter     Hyperesthesia Yes    Leg muscle spasm      Physician: Mikie Ryan MD    Physician Orders: PT Eval and Treat   Medical Diagnosis from Referral:   Tibialis posterior tendinopathy [M67.979]   Left hamstring injury, initial encounter [S76.302A]   Evaluation Date: 8/28/2024  Authorization Period Expiration: 08-  Plan of Care Expiration: 11-  Progress Note Due: v 10 or 30 days  Visit # / Visits authorized: 1/ 1   FOTO: 1/ 3     Precautions: Standard, Diabetes, and ?tape allergy    Time In: 1300  Time Out: 1350  Total Appointment Time (timed & untimed codes): 45 minutes      SUBJECTIVE   Date of onset: x with [place] for medial left HS,  x several months for bilateral feet and ankle sxs    History of current condition - Marleen reports: A week ago, she had a muscle cramp in her medial left thigh which caused discomfort.  There is no obvious mechanism of injury or aggravating factor that patient is able to identify.   She states that she dis not attempt to actively manage with heat or ice, she applied topical Theraworx.  She also takes Baclofen for dysautonomia.  She has concurrent complaint of hypersensitivity, described as burning sensation to touch,  to bilateral distal legs, ankles and feet.   She states that it is worse at night after she has been on her feet all day at work and that she places pillows under her calves in bed at night so that her heels do not touch the sheets.      Falls: none    Imaging, none    Prior Therapy: none  Social History: single story home,  lives alone  Occupation: works at [email or web address], on her feet all day  Prior Level of Function: indep  Current Level of Function: indep, with discomfort at  night 2' hypersensitivity to touch.  Complaint of generalized soreness after working.   Pain:  Current 0/10, worst 7/10, best 0/10   Location: bilateral ankles , left medial HS   Description: muscle pull at HS,  sensitive to touch to bilateral feet  Aggravating Factors: Standing and Walking  Easing Factors: elevates bilateral feet, she is on Baclofen for dystonia and takes in the evening and at night, [employer]    Patients goals: symptoms relief     Medical History:   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/2020    Bell's palsy     Bladder incontinence     Blepharospasm     Bronchiectasis without complication 10/08/2019    Cervical dystonia     Concussion     COPD (chronic obstructive pulmonary disease)     Enterocolitis 02/07/2020    Fainting spell     2/7/2020    Hormone deficiency     Hypertension     Loose right total knee arthroplasty 06/15/2020    Migraine     Muscle spasm     Myofacial pain syndrome     Normocytic normochromic anemia 02/10/2020    Right ear pain 02/09/2021    Squamous cell carcinoma of skin     Syncope and collapse 02/09/2021       Surgical History:   Marleen Samano  has a past surgical history that includes Neck surgery; Hysterectomy; Appendectomy; Tonsillectomy; Eye surgery; Breast lumpectomy; Breast biopsy; Revision of knee arthroplasty (Right, 07/14/2020); Cataract extraction; Esophagogastroduodenoscopy (N/A, 04/06/2023); Colonoscopy (N/A, 04/06/2023); and Root canal (01/09/2024).    Medications:   Marleen has a current medication list which includes the following prescription(s): albuterol, albuterol-ipratropium, amlodipine, aspirin, baclofen, benazepril, blood-glucose meter, clindamycin, gabapentin, metoprolol succinate, omeprazole, trazodone, and  venlafaxine.    Allergies:   Review of patient's allergies indicates:   Allergen Reactions    Adhesive tape-silicones Rash     Blisters after on for several hours    Augmentin [amoxicillin-pot clavulanate]      thrush    Ciprofloxacin      thrush    Morphine Itching and Hives          OBJECTIVE     Observation: thin, older female in NAD, casually dressed in shorts and slide tennis shoes.    Posture: increased upper thoracic kyphosis with hinge at CT junction, forward head     Active Range of Motion:   Ankle Right Left   DF (knee extended) 9' 7'   Plantarflexion 59' 68'   Inversion 38' 32'   Eversion 11' 11'      Strength:  Ankle Right Left   Dorsiflexion 5/5 5/5   Plantarflexion 5/5 5/5   Inversion 5/5 5/5   Eversion 5/5 5/5   Toe flexion 5/5 5/5   Toe extension 5/5 5/5   EHL 5/5 5/5     Joint Mobility: joint mobility in the ankle and foot is age appropriate for active, passive and accessory mobility    Palpation: no tenderness to palpation to distal leg, ankle or foot, specifically tested ATFL, insertion of plantar fascia and posterior tibialis tendons.    Skin was warm, (+) capillary refill, dry with light peeling about ankle.  Nails were trimmed, slightly thickened  HS and adductor muscles were palpated to the right and left thighs, patient reported that sensation was same left and right, de3per palpation was unable to reproduce her symptoms.  MM tone, definition and bulk were age appropriate    Sensation: did not display hypersensitivity to touch, intact to light touch with localization, intact to protective sensation    Functional Tests:   Patient was able to SLS with and without heel raise without provocation of her symptoms.    Patient was able to stand with toes elevated without provocation of her symptoms  Patient was able to tolerate lower extremities and foot/toes MMT without provocation of her sxs    Lower Extremity Strength  Right LE  Left LE    Hip flexion: 5/5 Hip flexion: 5/5   Hip extension:  5/5  Hip extension: 5/5   Hip abduction: 5/5 Hip abduction: 5/5   Hip adduction:  5/5 Hip adduction:  5/5   Hip External Rotation 5/5 Hip External Rotation 5/5   Hip Internal rotation   5/5 Hip Internal rotation 5/5   Knee Flexion 5/5 Knee Flexion 5/5   Knee Extension 5/5 Knee Extension 5/5                 GAIGait Assessment:   Patient ambulated: slowly and cautiously without ad or imbalance  200' from lobby to treatment area   Patient required: no assist  Patient used: no AD  Gait Pattern observed: full weightbearing, reciprocal gait, and swing through  Gait Speed:  Normal gait speed for average healthy adult  Gait Deviation(s): decreased ike  Impairments due to: unfamiliar environment    Balance:  Sitting Unsupported:  Static: NORMAL: No deviations seen in posture held statically  Dynamic: GOOD: Maintains balance through MODERATE excursions of active trunk movement  Standing:  Static: GOOD: Takes MODERATE challenges from all directions  Dynamic: GOOD+: Independent gait (with or without assistive device)      Limitation/Restriction for FOTO initial Survey    Therapist reviewed FOTO scores for Marleen Samano on 8/28/2024.   FOTO documents entered into EPIC - see Media section.    Limitation Score:  46%           TREATMENT     Total Treatment time (time-based codes) separate from Evaluation: 25 minutes      Marleen received the treatments listed below:      therapeutic activities to improve functional performance for 25  minutes, including:  Education as noted below      PATIENT EDUCATION AND HOME EXERCISES     Education provided:   Results of evaluation were discussed with patient, noting that her symptoms were unable to be reproduced during assessment.  Recommended that her symptoms be addressed empirically, using heat/ice for cramping to her HS, with increased attention to potential causative factors.     Recommended that patient initiate a dry brushing program for desensitization to her bilateral lower legs and  feet, application of moisturizer for improvement of dryness  Patient was issued written copy of recommendations (see wrap -up) with review.    Recommended follow up in 1-2 weeks to assess efficacy of home exercise program and to update and troubleshoot as indicated.  Patient was agreeable to recommended plan of care and stated understanding of educational provisions.     Written Home Exercises Provided: yes. Exercises were reviewed and Marleen was able to demonstrate them prior to the end of the session.  Marleen demonstrated good  understanding of the education provided. See EMR under Patient Instructions for exercises provided during therapy sessions.    ASSESSMENT     Marleen is a 78 y.o. female referred to outpatient Physical Therapy with a medical diagnosis of:   Tibialis posterior tendinopathy [M67.979]   Left hamstring injury, initial encounter [S76.301A] .     Patient presents with 1.  Complaint of HS muscle spasm, intermittent, causing discomfort 1 week ago, and 2.  Complaint of sensitivity to bilateral feet at the end of the day, interfering with her sleep.   At initial evaluation, neither of her complaint of's were able to be reproduced via palpation, resisted muscle testing, functional mobility testing. Her strength, range of motion, and lower extremities sensorium were age appropriate and within normal limits.  Her symptoms can be potentially attributed to her dysautonomia.  It was discussed with patient and she was agreeable to treat her symptoms empirically.   She will follow up in 1-2 weeks.   Other options should her HS symptoms persist include gentle stretching program and local compression to her left thigh, use of arnica or other topical for muscle soreness, consideration of supplements for muscle spasms such as calcium/magnesium/zinc - all with her provider's approval.   For hypersensitivity can consider adding use of cooling gel socks at night, enhanced desensitization techniques or supplements  targeted towards peripheral neuropathy, all with her provider's approval.   Ms. Samano appeared to understand all instructions and recommendations.     Patient prognosis is Good.   Patientt will benefit from skilled outpatient Physical Therapy to address the deficits stated above and in the chart below, provide patient /family education, and to maximize patientt's level of independence.     Plan of care discussed with patient: Yes  Patient's spiritual, cultural and educational needs considered and patient is agreeable to the plan of care and goals as stated below:     Anticipated Barriers for therapy: dysautonomia    Medical Necessity is demonstrated by the following  History  Co-morbidities and personal factors that may impact the plan of care Co-morbidities:   advanced age, diabetes, difficulty sleeping, HTN, and dysautonomia, arthralgias and OA    Personal Factors:   age  coping style  Lacks effective home exercise program for local symptoms management      high   Examination  Body Structures and Functions, activity limitations and participation restrictions that may impact the plan of care Body Regions:   lower extremities    Body Systems:    gross symmetry  strength  transitions  skin integrity    Participation Restrictions:   none    Activity limitations:   Learning and applying knowledge  no deficits    General Tasks and Commands  no deficits    Communication  no deficits    Mobility  no deficits    Self care  no deficits    Domestic Life  no deficits    Interactions/Relationships  no deficits    Life Areas  no deficits    Community and Social Life  no deficits         high   Clinical Presentation evolving clinical presentation with changing clinical characteristics moderate   Decision Making/ Complexity Score: moderate     Goals:  Short Term Goals: x 4 visits:    Patient to  be independent with home exercise program for management of her muscle spasms  Patient to report decreased hypersensitivity to bilateral  ankles and feet  Patient to report decreased disruption to sleep 2' discomfort to HS and lower legs/ feet    Long Term Goals: deferred at present      PLAN   Plan of care Certification: 8/28/2024 to 11-.    Outpatient Physical Therapy x 4 more visits to include the following interventions: Manual Therapy, Moist Heat/ Ice, Patient Education, Self Care, Therapeutic Activities, and Therapeutic Exercise.     This patient may be seen by a PTA    Mary Ann Corodva, PT CLT      I CERTIFY THE NEED FOR THESE SERVICES FURNISHED UNDER THIS PLAN OF TREATMENT AND WHILE UNDER MY CARE   Physician's comments:     Physician's Signature: ___________________________________________________

## 2024-08-29 NOTE — PATIENT INSTRUCTIONS
Home Program for Marleen Samano  08-    HAMSTRING:   For 3 days apply ice 5-10 minutes, 2-3 times a day  For 3 days apply warmth to area, 10-15 minutes, 2-3 times a day    FEET - desensitization  Dry brushing from toes to knees, around entire leg, gentle touch x 3-5 mins each    Follow-up in 1week john JEONG

## 2024-09-13 ENCOUNTER — TELEPHONE (OUTPATIENT)
Dept: FAMILY MEDICINE | Facility: CLINIC | Age: 79
End: 2024-09-13
Payer: MEDICARE

## 2024-09-13 ENCOUNTER — E-VISIT (OUTPATIENT)
Dept: FAMILY MEDICINE | Facility: CLINIC | Age: 79
End: 2024-09-13
Payer: MEDICARE

## 2024-09-13 ENCOUNTER — PATIENT MESSAGE (OUTPATIENT)
Dept: FAMILY MEDICINE | Facility: CLINIC | Age: 79
End: 2024-09-13
Payer: MEDICARE

## 2024-09-13 DIAGNOSIS — N39.0 RECURRENT UTI: ICD-10-CM

## 2024-09-13 DIAGNOSIS — N30.00 ACUTE CYSTITIS WITHOUT HEMATURIA: Primary | ICD-10-CM

## 2024-09-13 RX ORDER — CONJUGATED ESTROGENS 0.62 MG/G
0.5 CREAM VAGINAL
Qty: 1 APPLICATOR | Refills: 1 | Status: SHIPPED | OUTPATIENT
Start: 2024-09-16 | End: 2025-09-16

## 2024-09-13 RX ORDER — NITROFURANTOIN 25; 75 MG/1; MG/1
100 CAPSULE ORAL 2 TIMES DAILY
Qty: 14 CAPSULE | Refills: 0 | Status: SHIPPED | OUTPATIENT
Start: 2024-09-13

## 2024-09-13 NOTE — TELEPHONE ENCOUNTER
Aura Walker Leo Staff     ----- Message from Aura Walker sent at 9/13/2024 10:41 AM CDT -----  Contact: self  Type:  Needs Medical Advice    Who Called: self  Symptoms (please be specific): pt is needing to reschedule her AWV. Please call pt.   Would the patient rather a call back or a response via [facility]? call  Best Call Back Number: [phone] (home)     Additional Information: please advise and thank you.

## 2024-09-13 NOTE — PROGRESS NOTES
Patient ID: Marleen Samano is a 78 y.o. female.    Chief Complaint: Urinary Tract Infection (Entered automatically based on patient selection in [employer].)    The patient initiated a request through [employer] on 9/13/2024 for evaluation and management with a chief complaint of Urinary Tract Infection (Entered automatically based on patient selection in [employer].)     I evaluated the questionnaire submission on 9/13/2024.    [facility] Evisit Uti Questionnaire    9/13/2024 12:20 PM CDT - Filed by Patient   Do you agree to participate in an E-Visit? Yes   If you have any of the following symptoms, please present to your local emergency room or call 911:  I acknowledge   Choose the state of your primary residence [place]   What is the main issue you would like addressed today? UTI with no blood   What symptoms do you currently have? Pain while passing urine   When did your symptoms first appear? 9/12/2024   List what you have done or taken to help your symptoms. Azo this morning   Please indicate whether you have had the following symptoms during the past 24 hours     Urgent urination (a sudden and uncontrollable urge to urinate) Severe   Frequent urination of small amounts of urine (going to the toilet very often) Severe   Burning pain when urinating Severe   Incomplete bladder emptying (still feel like you need to urinate again after urination) Severe   Pain not associated with urination in the lower abdomen below the belly button) None   What does your urine look like? Clear   Blood seen in the urine None   Flank pain (pain in one or both sides of the lower back) None   Abnormal Vaginal Discharge (abnormal amount, color and/or odor) None   Discharge from the urethra (urinary opening) without urination None   High body temperature/fever? None-<99.5   Please rate how much discomfort you have experience because of the symptoms in the past 24 hours: Severe   Please indicate how the symptoms have interfered with your every  day activities/work in the past 24 hours: Severe   Please indicate how these symptoms have interfered with your social activities (visiting people, meeting with friends, etc.) in the past 24 hours? Severe   Are you a diabetic? No   Please indicate whether you have the following at the time of completion of this questionnaire: None of the above   Provide any additional information you feel is important.    Please attach any relevant images or files (if you have performed a home test for UTI, please submit a photo of results)    Are you able to take your vital signs? No         Encounter Diagnoses   Name Primary?    Acute cystitis without hematuria Yes    Recurrent UTI         Orders Placed This Encounter   Procedures    CULTURE, URINE     Standing Status:   Future     Standing Expiration Date:   12/12/2025    [facility] Complete     Standing Status:   Future     Standing Expiration Date:   9/13/2025     Order Specific Question:   May the Radiologist modify the order per protocol to meet the clinical needs of the patient?     Answer:   Yes     Order Specific Question:   Release to patient     Answer:   Immediate      Medications Ordered This Encounter   Medications    conjugated estrogens (PREMARIN) vaginal cream     Sig: Place 0.5 g vaginally twice a week.     Dispense:  1 applicator     Refill:  1    nitrofurantoin, macrocrystal-monohydrate, (MACROBID) 100 MG capsule     Sig: Take 1 capsule (100 mg total) by mouth 2 (two) times daily.     Dispense:  14 capsule     Refill:  0        No follow-ups on file.      Recurrent UTIs appear to be reinfection rather than relapse based on timeframe.   Still want to pursue structural eval.     Trial of estrogen to try and prevent. She has had 3 utis this summer.     E-Visit Time Tracking:    Day 1 Time (in minutes): 12    Total Time (in minutes): 12

## 2024-09-15 ENCOUNTER — HOSPITAL ENCOUNTER (EMERGENCY)
Facility: HOSPITAL | Age: 79
Discharge: HOME | End: 2024-09-15
Attending: EMERGENCY MEDICINE
Payer: MEDICARE

## 2024-09-15 VITALS
BODY MASS INDEX: 24.59 KG/M2 | WEIGHT: 166 LBS | SYSTOLIC BLOOD PRESSURE: 132 MMHG | RESPIRATION RATE: 20 BRPM | OXYGEN SATURATION: 95 % | DIASTOLIC BLOOD PRESSURE: 77 MMHG | HEIGHT: 69 IN | TEMPERATURE: 97 F | HEART RATE: 83 BPM

## 2024-09-15 DIAGNOSIS — R06.02 SOB (SHORTNESS OF BREATH): ICD-10-CM

## 2024-09-15 DIAGNOSIS — M54.9 UPPER BACK PAIN: ICD-10-CM

## 2024-09-15 DIAGNOSIS — R07.9 CHEST PAIN, UNSPECIFIED TYPE: Primary | ICD-10-CM

## 2024-09-15 DIAGNOSIS — R07.9 CHEST PAIN: ICD-10-CM

## 2024-09-15 LAB
ALBUMIN SERPL BCP-MCNC: 3.8 G/DL (ref 3.5–5.2)
ALP SERPL-CCNC: 122 U/L (ref 55–135)
ALT SERPL W/O P-5'-P-CCNC: 13 U/L (ref 10–44)
ANION GAP SERPL CALC-SCNC: 10 MMOL/L (ref 8–16)
AST SERPL-CCNC: 16 U/L (ref 10–40)
BASOPHILS # BLD AUTO: 0.01 K/UL (ref 0–0.2)
BASOPHILS NFR BLD: 0.1 % (ref 0–1.9)
BILIRUB SERPL-MCNC: 0.3 MG/DL (ref 0.1–1)
BNP SERPL-MCNC: 161 PG/ML (ref 0–99)
BUN SERPL-MCNC: 26 MG/DL (ref 8–23)
CALCIUM SERPL-MCNC: 8.8 MG/DL (ref 8.7–10.5)
CHLORIDE SERPL-SCNC: 107 MMOL/L (ref 95–110)
CO2 SERPL-SCNC: 22 MMOL/L (ref 23–29)
CREAT SERPL-MCNC: 0.9 MG/DL (ref 0.5–1.4)
D DIMER PPP IA.FEU-MCNC: 2.09 MG/L FEU
DIFFERENTIAL METHOD BLD: ABNORMAL
EOSINOPHIL # BLD AUTO: 0 K/UL (ref 0–0.5)
EOSINOPHIL NFR BLD: 0.2 % (ref 0–8)
ERYTHROCYTE [DISTWIDTH] IN BLOOD BY AUTOMATED COUNT: 15.3 % (ref 11.5–14.5)
EST. GFR  (NO RACE VARIABLE): >60 ML/MIN/1.73 M^2
GLUCOSE SERPL-MCNC: 158 MG/DL (ref 70–110)
HCT VFR BLD AUTO: 30.6 % (ref 37–48.5)
HGB BLD-MCNC: 9.1 G/DL (ref 12–16)
IMM GRANULOCYTES # BLD AUTO: 0.04 K/UL (ref 0–0.04)
IMM GRANULOCYTES NFR BLD AUTO: 0.5 % (ref 0–0.5)
LYMPHOCYTES # BLD AUTO: 0.6 K/UL (ref 1–4.8)
LYMPHOCYTES NFR BLD: 7.5 % (ref 18–48)
MCH RBC QN AUTO: 25.1 PG (ref 27–31)
MCHC RBC AUTO-ENTMCNC: 29.7 G/DL (ref 32–36)
MCV RBC AUTO: 84 FL (ref 82–98)
MONOCYTES # BLD AUTO: 1 K/UL (ref 0.3–1)
MONOCYTES NFR BLD: 11.4 % (ref 4–15)
NEUTROPHILS # BLD AUTO: 6.9 K/UL (ref 1.8–7.7)
NEUTROPHILS NFR BLD: 80.3 % (ref 38–73)
NRBC BLD-RTO: 0 /100 WBC
PLATELET # BLD AUTO: 167 K/UL (ref 150–450)
PMV BLD AUTO: 9.8 FL (ref 9.2–12.9)
POTASSIUM SERPL-SCNC: 4 MMOL/L (ref 3.5–5.1)
PROT SERPL-MCNC: 6.6 G/DL (ref 6–8.4)
RBC # BLD AUTO: 3.63 M/UL (ref 4–5.4)
SODIUM SERPL-SCNC: 139 MMOL/L (ref 136–145)
TROPONIN I SERPL DL<=0.01 NG/ML-MCNC: 0.01 NG/ML (ref 0–0.03)
TROPONIN I SERPL DL<=0.01 NG/ML-MCNC: <0.006 NG/ML (ref 0–0.03)
WBC # BLD AUTO: 8.54 K/UL (ref 3.9–12.7)

## 2024-09-15 PROCEDURE — 80053 COMPREHEN METABOLIC PANEL: CPT | Performed by: EMERGENCY MEDICINE

## 2024-09-15 PROCEDURE — 84484 ASSAY OF TROPONIN QUANT: CPT | Performed by: EMERGENCY MEDICINE

## 2024-09-15 PROCEDURE — 83880 ASSAY OF NATRIURETIC PEPTIDE: CPT | Performed by: EMERGENCY MEDICINE

## 2024-09-15 PROCEDURE — 93005 ELECTROCARDIOGRAM TRACING: CPT

## 2024-09-15 PROCEDURE — 36415 COLL VENOUS BLD VENIPUNCTURE: CPT | Performed by: EMERGENCY MEDICINE

## 2024-09-15 PROCEDURE — 25000003 PHARM REV CODE 250: Performed by: EMERGENCY MEDICINE

## 2024-09-15 PROCEDURE — 85379 FIBRIN DEGRADATION QUANT: CPT | Performed by: EMERGENCY MEDICINE

## 2024-09-15 PROCEDURE — 25500020 PHARM REV CODE 255

## 2024-09-15 PROCEDURE — 99285 EMERGENCY DEPT VISIT HI MDM: CPT | Mod: 25

## 2024-09-15 PROCEDURE — 85025 COMPLETE CBC W/AUTO DIFF WBC: CPT | Performed by: EMERGENCY MEDICINE

## 2024-09-15 PROCEDURE — 93010 ELECTROCARDIOGRAM REPORT: CPT | Mod: ,,, | Performed by: GENERAL PRACTICE

## 2024-09-15 RX ORDER — ACETAMINOPHEN 500 MG
1000 TABLET ORAL
Status: COMPLETED | OUTPATIENT
Start: 2024-09-15 | End: 2024-09-15

## 2024-09-15 RX ADMIN — IOHEXOL 100 ML: 350 INJECTION, SOLUTION INTRAVENOUS at 12:09

## 2024-09-15 RX ADMIN — ACETAMINOPHEN 1000 MG: 500 TABLET ORAL at 01:09

## 2024-09-15 NOTE — ED PROVIDER NOTES
"Encounter Date: 9/15/2024       History     Chief Complaint   Patient presents with    Shoulder Pain     Left shoulder     Chest Pain     Started last night      78-year-old female history of atrial fibrillation, COPD hypertension presents with diffuse upper back pain.  Woke her from sleep at around 3:00 a.m..  Took some Tylenol.  Also some shortness of breath that is chronic for her.  Used her rescue inhaler.  She describes the upper back pain as an ache or muscle tightness.  Feels like she has "knots" in her upper back.  Also some occasional sharp chest pain no heaviness or pressure.  No radiation to the arms.  No history of similar complaints.  No abdominal pain.  No extremity pain or numbness or weakness.        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/2020    Bell's palsy     Bladder incontinence     Blepharospasm     Bronchiectasis without complication 10/08/2019    Cervical dystonia     Concussion     COPD (chronic obstructive pulmonary disease)     Enterocolitis 02/07/2020    Fainting spell     2/7/2020    Hormone deficiency     Hypertension     Loose right total knee arthroplasty 06/15/2020    Migraine     Muscle spasm     Myofacial pain syndrome     Normocytic normochromic anemia 02/10/2020    Right ear pain 02/09/2021    Squamous cell carcinoma of skin     Syncope and collapse 02/09/2021     Past Surgical History:   Procedure Laterality Date    APPENDECTOMY      BREAST BIOPSY      BREAST LUMPECTOMY      CATARACT EXTRACTION      COLONOSCOPY N/A " 04/06/2023    Procedure: COLONOSCOPY;  Surgeon: Marino Munson MD;  Location: [facility] ENDO;  Service: Endoscopy;  Laterality: N/A;    ESOPHAGOGASTRODUODENOSCOPY N/A 04/06/2023    Procedure: EGD (ESOPHAGOGASTRODUODENOSCOPY);  Surgeon: Marino Munson MD;  Location: [facility] ENDO;  Service: Endoscopy;  Laterality: N/A;    EYE SURGERY      HYSTERECTOMY      NECK SURGERY      REVISION OF KNEE ARTHROPLASTY Right 07/14/2020    Procedure: REVISION, ARTHROPLASTY, KNEE;  Surgeon: Pedro Tompkins MD;  Location: [facility] OR;  Service: Orthopedics;  Laterality: Right;    ROOT CANAL  01/09/2024    TONSILLECTOMY       Family History   Problem Relation Name Age of Onset    Cancer Mother      Heart disease Brother      Parkinsonism Brother      Diabetes Neg Hx      Melanoma Neg Hx      Psoriasis Neg Hx      Lupus Neg Hx      Eczema Neg Hx      Glaucoma Neg Hx      Macular degeneration Neg Hx       Social History     Tobacco Use    Smoking status: Never    Smokeless tobacco: Never   Substance Use Topics    Alcohol use: No    Drug use: No     Review of Systems   Respiratory:  Positive for shortness of breath (Chronic, unchanged).    Cardiovascular:  Positive for chest pain.   Musculoskeletal:  Positive for back pain.       Physical Exam     Initial Vitals [09/15/24 1033]   BP Pulse Resp Temp SpO2   127/69 98 20 96.9 °F (36.1 °C) 96 %      MAP       --         Physical Exam    Nursing note and vitals reviewed.  Constitutional: She appears well-developed and well-nourished. She is not diaphoretic. No distress.   HENT:   Head: Normocephalic and atraumatic.   Eyes: EOM are normal.   Neck: Neck supple.   Normal range of motion.  Cardiovascular:  Normal rate, regular rhythm, normal heart sounds and intact distal pulses.     Exam reveals no gallop and no friction rub.       No murmur heard.  Equal radial pulses   Pulmonary/Chest: Breath sounds normal. No respiratory distress. She has no wheezes. She has no rhonchi. She has no rales.    Abdominal: She exhibits no distension. There is no rebound.   Musculoskeletal:         General: Normal range of motion.      Cervical back: Normal range of motion and neck supple.      Comments: Thoracic kyphosis no spinous process tenderness     Neurological: She is alert and oriented to person, place, and time.   Skin: Skin is warm and dry.   Psychiatric: She has a normal mood and affect. Her behavior is normal. Judgment and thought content normal.         ED Course   Procedures  Labs Reviewed   CBC W/ AUTO DIFFERENTIAL - Abnormal       Result Value    WBC 8.54      RBC 3.63 (*)     Hemoglobin 9.1 (*)     Hematocrit 30.6 (*)     MCV 84      MCH 25.1 (*)     MCHC 29.7 (*)     RDW 15.3 (*)     Platelets 167      MPV 9.8      Immature Granulocytes 0.5      Gran # (ANC) 6.9      Immature Grans (Abs) 0.04      Lymph # 0.6 (*)     Mono # 1.0      Eos # 0.0      Baso # 0.01      nRBC 0      Gran % 80.3 (*)     Lymph % 7.5 (*)     Mono % 11.4      Eosinophil % 0.2      Basophil % 0.1      Differential Method Automated     COMPREHENSIVE METABOLIC PANEL - Abnormal    Sodium 139      Potassium 4.0      Chloride 107      CO2 22 (*)     Glucose 158 (*)     BUN 26 (*)     Creatinine 0.9      Calcium 8.8      Total Protein 6.6      Albumin 3.8      Total Bilirubin 0.3      Alkaline Phosphatase 122      AST 16      ALT 13      eGFR >60      Anion Gap 10     B-TYPE NATRIURETIC PEPTIDE - Abnormal     (*)    D DIMER, QUANTITATIVE - Abnormal    D-Dimer 2.09 (*)     Narrative:     ddimr  critical result(s) called and verbal readback obtained from   shivani bowen by KFA1 09/15/2024 11:42   TROPONIN I    Troponin I 0.010     TROPONIN I    Troponin I <0.006            Imaging Results              CTA Chest Abdomen Pelvis (Final result)  Result time 09/15/24 13:25:17   Procedure changed from CT Chest Abdomen Pelvis With IV Contrast (XPD) Routine Oral Contrast     Final result by Aj Palma MD (09/15/24 13:25:17)                    Impression:      1. No evidence of aortic dissection, aneurysm or pulmonary embolism.  No other acute abnormality identified in the chest, abdomen, or pelvis.  2. Large hiatal hernia containing the majority of the stomach.  3. Stable bandlike opacity in the right upper lobe suggestive of scarring versus subsegmental atelectasis.  4. Diverticulosis coli without evidence of diverticulitis.  5. Additional stable findings, as above.      Electronically signed by: Aj Palma  Date:    09/15/2024  Time:    13:25               Narrative:    EXAMINATION:  CTA CHEST ABDOMEN PELVIS    CLINICAL HISTORY:  Aortic dissection suspected;    TECHNIQUE:  Low dose axial images, sagittal and coronal reformations were obtained from the neck base to the pubic symphysis after the administration of 100 cc Omnipaque 350 intravenous contrast.  Oral contrast was not administered.    COMPARISON:  CT abdomen and pelvis 04/03/2023    FINDINGS:  Base of neck/thoracic soft tissues: No significant abnormality.    Thoracic aorta: Left-sided aortic arch. No significant atherosclerosis or aneurysm.  No evidence of aortic dissection.    Pulmonary arteries: No focal filling defects to suggest pulmonary embolism to the level of the segmental arteries.    Heart: Normal in size.  No pericardial effusion.    Nae/Mediastinum: No pathologically enlarged lymph nodes.    Lungs/Airways: Airways are patent.  Chronic bandlike opacity in the lateral right upper lobe suggestive of scarring versus subsegmental liked assist, partially imaged on prior CTA head neck from 02/12/2021.  Stable subcentimeter calcified nodule in the adjacent right upper lobe suggestive of prior granulomatous disease.  Mild bibasilar atelectasis.  No pleural effusion or pneumothorax.    Esophagus/stomach: Redemonstration of a large hiatal hernia containing the majority of the stomach.    Liver: Normal in size and attenuation.  Stable 1.1 cm ovoid fluid density in the anterior right  hepatic lobe most compatible with a cyst.  Additional stable-appearing 9 mm hypodensity in the inferior right hepatic lobe, also most compatible with a cyst.    Gallbladder: No calcified gallstones or evidence of pericholecystic inflammation.    Bile Ducts: No evidence of dilated ducts.    Pancreas: No definite mass or peripancreatic fat stranding.    Spleen: Within normal limits.    Adrenals: No significant abnormality.    Retroperitoneum:  No significant adenopathy.    Kidneys/ Ureters: Kidneys normal in size and location. Normal enhancement. No hydronephrosis or nephrolithiasis. No ureteral dilatation.    Bladder: Incompletely distended, limiting its evaluation.    Reproductive organs: Hysterectomy.    GI tract/Mesentery: Scattered colonic diverticula without evidence of diverticulitis.  Moderate stool burden throughout the colon which could indicate constipation.  No evidence of bowel obstruction or inflammation. Appendix is surgically absent.    Peritoneal Space: No ascites. No free air. No pathologically enlarged lymph nodes.    Soft tissues: No significant abnormality.    Vasculature: Mild scattered calcific atherosclerosis. No aortic aneurysm dissection.  The celiac artery, superior mesenteric artery, bilateral renal, common iliac, external iliac and internal iliac arteries are all patent.  No evidence of hemodynamically significant stenosis or occlusion.    Bones: Sclerosis and cortical irregularity at the pubic symphysis suggestive of osteitis pubis.  Dextroconvex curvature of the thoracic spine and levoconvex curvature of the lumbar spine.  Multilevel thoracolumbar spondylosis.  Anterior cervical discectomy and fusion changes in the lower cervical spine.  Age-appropriate degenerative changes. No acute fracture or aggressive-appearing lytic or blastic lesion.                                       X-Ray Chest PA And Lateral (Final result)  Result time 09/15/24 11:06:52      Final result by jA Palma MD  "(09/15/24 11:06:52)                   Impression:      No acute radiographic abnormality.  Minimal right basilar subsegmental atelectasis versus scarring.    Large hiatal hernia.      Electronically signed by: Aj Palma  Date:    09/15/2024  Time:    11:06               Narrative:    EXAMINATION:  XR CHEST PA AND LATERAL    CLINICAL HISTORY:  Chest Pain;    TECHNIQUE:  PA and lateral views of the chest were performed.    COMPARISON:  Chest radiographs 04/07/2022, CT abdomen and pelvis 04/03/2023    FINDINGS:  Cardiomediastinal silhouette is within normal limits for size.  Complex retro mediastinal opacity again demonstrated compatible with known large hiatal hernia.  Biapical nonspecific fibronodular pleuroparenchymal scarring.  Thin bandlike opacities at the right lung base suggestive of subsegmental atelectasis versus scarring.  No definite consolidation, pleural effusion, or pneumothorax. No acute osseous abnormality.  ACDF hardware in the visualized lower cervical spine.                                       Medications   iohexoL (OMNIPAQUE 350) 350 mg iodine/mL injection (100 mLs Intravenous Given 9/15/24 1231)   acetaminophen tablet 1,000 mg (1,000 mg Oral Given 9/15/24 1303)     Medical Decision Making  1513 78-year-old female relatively healthy history of hypertension presents with some upper back pain that she states feels like a muscle ache or "knots" in her muscles.  Also some sharp chest pain across the upper chest.  Some radiation to the back but no heaviness or pressure no radiation to the arms.  Troponin negative x2.  EKG unremarkable.  CTA dissection protocol unremarkable.  No sign of any life-threatening condition at this time.  Patient will be discharged home.  Return for any worsening symptoms or concerns. [EF]      Amount and/or Complexity of Data Reviewed  Labs: ordered. Decision-making details documented in ED Course.  Radiology: ordered. Decision-making details documented in ED " "Course.  ECG/medicine tests: ordered and independent interpretation performed. Decision-making details documented in ED Course.    Risk  OTC drugs.               ED Course as of 09/15/24 1519   Sun Sep 15, 2024   1038 BP: 127/69 [EF]   1038 Temp: 96.9 °F (36.1 °C) [EF]   1038 Temp Source: Oral [EF]   1038 Pulse: 98 [EF]   1038 SpO2: 96 % [EF]   1038 Resp: 20 [EF]   1038 SpO2: 96 % [EF]   1052 Sinus rhythm 90 beats per minute normal axis no ST elevation or depression or T-wave inversion independently interpreted [EF]   1119 X-Ray Chest PA And Lateral [EF]   1144 WBC: 8.54 [EF]   1145 Hemoglobin(!): 9.1 [EF]   1145 Platelet Count: 167 [EF]   1145 D-Dimer(!!): 2.09 [EF]   1232 Troponin I: 0.010 [EF]   1327 CTA Chest Abdomen Pelvis [EF]   1428 Troponin I: <0.006 [EF]   1513 78-year-old female relatively healthy history of hypertension presents with some upper back pain that she states feels like a muscle ache or "knots" in her muscles.  Also some sharp chest pain across the upper chest.  Some radiation to the back but no heaviness or pressure no radiation to the arms.  Troponin negative x2.  EKG unremarkable.  CTA dissection protocol unremarkable.  No sign of any life-threatening condition at this time.  Patient will be discharged home.  Return for any worsening symptoms or concerns. [EF]      ED Course User Index  [EF] Salvador Edmonds MD                           Clinical Impression:  Final diagnoses:  [R06.02] SOB (shortness of breath)  [R07.9] Chest pain  [R07.9] Chest pain, unspecified type (Primary)  [M54.9] Upper back pain          ED Disposition Condition    Discharge Stable          ED Prescriptions    None       Follow-up Information       Follow up With Specialties Details Why Contact Info Additional Information    Mikie Ryan MD Family Medicine Schedule an appointment as soon as possible for a visit  As needed [address] [facility] [address]  [phone]       On license of [facility] -  Emergency " Medicine  As needed [address] [place] [address] Dr Rita Alberts [id number] 1st floor             Grey, Salvador BORDEN MD  09/15/24 8918

## 2024-09-16 ENCOUNTER — HOSPITAL ENCOUNTER (OUTPATIENT)
Dept: RADIOLOGY | Facility: HOSPITAL | Age: 79
Discharge: HOME | End: 2024-09-16
Attending: STUDENT IN AN ORGANIZED HEALTH CARE EDUCATION/TRAINING PROGRAM
Payer: MEDICARE

## 2024-09-16 ENCOUNTER — PATIENT OUTREACH (OUTPATIENT)
Dept: EMERGENCY MEDICINE | Facility: HOSPITAL | Age: 79
End: 2024-09-16

## 2024-09-16 DIAGNOSIS — N30.00 ACUTE CYSTITIS WITHOUT HEMATURIA: ICD-10-CM

## 2024-09-16 PROCEDURE — 76770 US EXAM ABDO BACK WALL COMP: CPT | Mod: 26,,, | Performed by: RADIOLOGY

## 2024-09-16 PROCEDURE — 76770 US EXAM ABDO BACK WALL COMP: CPT | Mod: TC,PO

## 2024-09-17 ENCOUNTER — HOSPITAL ENCOUNTER (EMERGENCY)
Facility: HOSPITAL | Age: 79
Discharge: HOME | End: 2024-09-17
Attending: EMERGENCY MEDICINE
Payer: MEDICARE

## 2024-09-17 VITALS
OXYGEN SATURATION: 100 % | WEIGHT: 162 LBS | HEIGHT: 69 IN | RESPIRATION RATE: 18 BRPM | BODY MASS INDEX: 23.99 KG/M2 | HEART RATE: 74 BPM | DIASTOLIC BLOOD PRESSURE: 82 MMHG | TEMPERATURE: 98 F | SYSTOLIC BLOOD PRESSURE: 167 MMHG

## 2024-09-17 DIAGNOSIS — Z86.79 HISTORY OF ATRIAL FIBRILLATION: Primary | ICD-10-CM

## 2024-09-17 LAB
ALBUMIN SERPL BCP-MCNC: 3.9 G/DL (ref 3.5–5.2)
ALP SERPL-CCNC: 119 U/L (ref 55–135)
ALT SERPL W/O P-5'-P-CCNC: 11 U/L (ref 10–44)
ANION GAP SERPL CALC-SCNC: 12 MMOL/L (ref 8–16)
AST SERPL-CCNC: 16 U/L (ref 10–40)
BASOPHILS # BLD AUTO: 0.01 K/UL (ref 0–0.2)
BASOPHILS NFR BLD: 0.2 % (ref 0–1.9)
BILIRUB SERPL-MCNC: 0.2 MG/DL (ref 0.1–1)
BUN SERPL-MCNC: 23 MG/DL (ref 8–23)
CALCIUM SERPL-MCNC: 9.5 MG/DL (ref 8.7–10.5)
CHLORIDE SERPL-SCNC: 107 MMOL/L (ref 95–110)
CO2 SERPL-SCNC: 24 MMOL/L (ref 23–29)
CREAT SERPL-MCNC: 1 MG/DL (ref 0.5–1.4)
D DIMER PPP IA.FEU-MCNC: 2.95 MG/L FEU
DIFFERENTIAL METHOD BLD: ABNORMAL
EOSINOPHIL # BLD AUTO: 0.2 K/UL (ref 0–0.5)
EOSINOPHIL NFR BLD: 2.6 % (ref 0–8)
ERYTHROCYTE [DISTWIDTH] IN BLOOD BY AUTOMATED COUNT: 15.5 % (ref 11.5–14.5)
EST. GFR  (NO RACE VARIABLE): 57 ML/MIN/1.73 M^2
GLUCOSE SERPL-MCNC: 99 MG/DL (ref 70–110)
HCT VFR BLD AUTO: 34.6 % (ref 37–48.5)
HGB BLD-MCNC: 10.4 G/DL (ref 12–16)
IMM GRANULOCYTES # BLD AUTO: 0.02 K/UL (ref 0–0.04)
IMM GRANULOCYTES NFR BLD AUTO: 0.3 % (ref 0–0.5)
LYMPHOCYTES # BLD AUTO: 0.9 K/UL (ref 1–4.8)
LYMPHOCYTES NFR BLD: 15.5 % (ref 18–48)
MCH RBC QN AUTO: 25.4 PG (ref 27–31)
MCHC RBC AUTO-ENTMCNC: 30.1 G/DL (ref 32–36)
MCV RBC AUTO: 84 FL (ref 82–98)
MONOCYTES # BLD AUTO: 0.5 K/UL (ref 0.3–1)
MONOCYTES NFR BLD: 8.5 % (ref 4–15)
NEUTROPHILS # BLD AUTO: 4.2 K/UL (ref 1.8–7.7)
NEUTROPHILS NFR BLD: 72.9 % (ref 38–73)
NRBC BLD-RTO: 0 /100 WBC
OHS QRS DURATION: 88 MS
OHS QTC CALCULATION: 440 MS
PLATELET # BLD AUTO: 255 K/UL (ref 150–450)
PLATELET BLD QL SMEAR: ABNORMAL
PMV BLD AUTO: 9.4 FL (ref 9.2–12.9)
POTASSIUM SERPL-SCNC: 3.9 MMOL/L (ref 3.5–5.1)
PROT SERPL-MCNC: 7.6 G/DL (ref 6–8.4)
RBC # BLD AUTO: 4.1 M/UL (ref 4–5.4)
SODIUM SERPL-SCNC: 143 MMOL/L (ref 136–145)
WBC # BLD AUTO: 5.79 K/UL (ref 3.9–12.7)

## 2024-09-17 PROCEDURE — 85379 FIBRIN DEGRADATION QUANT: CPT | Performed by: NURSE PRACTITIONER

## 2024-09-17 PROCEDURE — 85025 COMPLETE CBC W/AUTO DIFF WBC: CPT | Performed by: NURSE PRACTITIONER

## 2024-09-17 PROCEDURE — 36415 COLL VENOUS BLD VENIPUNCTURE: CPT | Performed by: NURSE PRACTITIONER

## 2024-09-17 PROCEDURE — 93010 ELECTROCARDIOGRAM REPORT: CPT | Mod: ,,, | Performed by: INTERNAL MEDICINE

## 2024-09-17 PROCEDURE — 99284 EMERGENCY DEPT VISIT MOD MDM: CPT | Mod: 25

## 2024-09-17 PROCEDURE — 80053 COMPREHEN METABOLIC PANEL: CPT | Performed by: NURSE PRACTITIONER

## 2024-09-17 PROCEDURE — 93005 ELECTROCARDIOGRAM TRACING: CPT

## 2024-09-17 NOTE — FIRST PROVIDER EVALUATION
" Emergency Department TeleTriage Encounter Note      CHIEF COMPLAINT    Chief Complaint   Patient presents with    Abnormal Lab     D-Dimer 2.09       VITAL SIGNS   Initial Vitals [09/17/24 1843]   BP Pulse Resp Temp SpO2   (!) 156/74 86 18 97.7 °F (36.5 °C) 98 %      MAP       --            ALLERGIES    Review of patient's allergies indicates:   Allergen Reactions    Adhesive tape-silicones Rash     Blisters after on for several hours    Augmentin [amoxicillin-pot clavulanate]      thrush    Ciprofloxacin      thrush    Morphine Itching and Hives       PROVIDER TRIAGE NOTE  This is a teletriage evaluation of a 78 y.o. female presenting to the ED complaining of elevated d-dimer on labs 9/15. Denies worse than usual SOB or CP. No leg pain or swelling. States that her psychologist did a "complete workup" and told her that she has a blood clot and referred to the ED. Pt denies complaints.     Alert, no distress.     Initial orders will be placed and care will be transferred to an alternate provider when patient is roomed for a full evaluation. Any additional orders and the final disposition will be determined by that provider.         ORDERS  Labs Reviewed - No data to display    ED Orders (720h ago, onward)      None              Virtual Visit Note: The provider triage portion of this emergency department evaluation and documentation was performed via [employer], a HIPAA-compliant telemedicine application, in concert with a tele-presenter in the room. A face to face patient evaluation with one of my colleagues will occur once the patient is placed in an emergency department room.      DISCLAIMER: This note was prepared with [employer] voice recognition transcription software. Garbled syntax, mangled pronouns, and other bizarre constructions may be attributed to that software system.    "

## 2024-09-18 NOTE — ED PROVIDER NOTES
Encounter Date: 9/17/2024       History     Chief Complaint   Patient presents with    Abnormal Lab     D-Dimer 2.09     Chief complaint: Elevated D-dimer    HPI: 78-year-old female presents to the emergency department due to concerns in regard to an elevated D-dimer.  She was seen by her psychiatrist who ordered a D-dimer which was found to be elevated.  He was unaware that she was seen here 2 days ago for abrupt onset of chest pain which was evaluated with a CTA of the chest which demonstrated no evidence of pulmonary embolism.  She currently denies any chest pain, shortness of breath or palpitations.  She has no lower extremity swelling.  She has no known history of DVT or pulmonary emboli.  She has a history of atrial fibrillation and is not anticoagulated.      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/2020    Bell's palsy     Bladder incontinence     Blepharospasm     Bronchiectasis without complication 10/08/2019    Cervical dystonia     Concussion     COPD (chronic obstructive pulmonary disease)     Enterocolitis 02/07/2020    Fainting spell     2/7/2020    Hormone deficiency     Hypertension     Loose right total knee arthroplasty 06/15/2020    Migraine     Muscle spasm     Myofacial pain syndrome     Normocytic normochromic anemia 02/10/2020    Right ear pain 02/09/2021    Squamous cell carcinoma of skin     Syncope and collapse 02/09/2021     Past Surgical History:   Procedure Laterality Date    APPENDECTOMY      BREAST BIOPSY      BREAST  LUMPECTOMY      CATARACT EXTRACTION      COLONOSCOPY N/A 04/06/2023    Procedure: COLONOSCOPY;  Surgeon: Marino Munson MD;  Location: [facility] ENDO;  Service: Endoscopy;  Laterality: N/A;    ESOPHAGOGASTRODUODENOSCOPY N/A 04/06/2023    Procedure: EGD (ESOPHAGOGASTRODUODENOSCOPY);  Surgeon: Marino Munson MD;  Location: [facility] ENDO;  Service: Endoscopy;  Laterality: N/A;    EYE SURGERY      HYSTERECTOMY      NECK SURGERY      REVISION OF KNEE ARTHROPLASTY Right 07/14/2020    Procedure: REVISION, ARTHROPLASTY, KNEE;  Surgeon: Pedro Tompkins MD;  Location: [facility] OR;  Service: Orthopedics;  Laterality: Right;    ROOT CANAL  01/09/2024    TONSILLECTOMY       Family History   Problem Relation Name Age of Onset    Cancer Mother      Heart disease Brother      Parkinsonism Brother      Diabetes Neg Hx      Melanoma Neg Hx      Psoriasis Neg Hx      Lupus Neg Hx      Eczema Neg Hx      Glaucoma Neg Hx      Macular degeneration Neg Hx       Social History     Tobacco Use    Smoking status: Never    Smokeless tobacco: Never   Substance Use Topics    Alcohol use: No    Drug use: No     Review of Systems   Constitutional:  Negative for fever.   Eyes:  Negative for visual disturbance.   Respiratory:  Negative for apnea, chest tightness and shortness of breath.    Cardiovascular:  Negative for chest pain, palpitations and leg swelling.   Gastrointestinal:  Negative for abdominal distention and abdominal pain.   Genitourinary:  Negative for difficulty urinating.   Musculoskeletal:  Negative for neck pain.   Skin:  Negative for pallor and rash.   Neurological:  Negative for headaches.   Hematological:  Does not bruise/bleed easily.   Psychiatric/Behavioral:  Negative for agitation.        Physical Exam     Initial Vitals [09/17/24 1843]   BP Pulse Resp Temp SpO2   (!) 156/74 86 18 97.7 °F (36.5 °C) 98 %      MAP       --         Physical Exam    Nursing note and vitals reviewed.  Constitutional: She appears well-developed and  well-nourished.   HENT:   Head: Normocephalic and atraumatic.   Eyes: Conjunctivae are normal.   Neck: Neck supple.   Normal range of motion.  Cardiovascular:  Normal rate, regular rhythm and normal heart sounds.     Exam reveals no gallop and no friction rub.       No murmur heard.  Pulmonary/Chest: Effort normal and breath sounds normal. No respiratory distress. She has no wheezes. She has no rhonchi. She has no rales.   Abdominal: Abdomen is soft. She exhibits no distension. There is no abdominal tenderness.   Musculoskeletal:         General: No tenderness or edema. Normal range of motion.      Cervical back: Normal range of motion and neck supple.     Neurological: She is alert and oriented to person, place, and time.   Skin: Skin is warm and dry. No erythema.   Psychiatric: She has a normal mood and affect.         ED Course   Procedures  Labs Reviewed   CBC W/ AUTO DIFFERENTIAL - Abnormal       Result Value    WBC 5.79      RBC 4.10      Hemoglobin 10.4 (*)     Hematocrit 34.6 (*)     MCV 84      MCH 25.4 (*)     MCHC 30.1 (*)     RDW 15.5 (*)     Platelets 255      MPV 9.4      Immature Granulocytes 0.3      Gran # (ANC) 4.2      Immature Grans (Abs) 0.02      Lymph # 0.9 (*)     Mono # 0.5      Eos # 0.2      Baso # 0.01      nRBC 0      Gran % 72.9      Lymph % 15.5 (*)     Mono % 8.5      Eosinophil % 2.6      Basophil % 0.2      Platelet Estimate Appears normal      Differential Method Automated     COMPREHENSIVE METABOLIC PANEL - Abnormal    Sodium 143      Potassium 3.9      Chloride 107      CO2 24      Glucose 99      BUN 23      Creatinine 1.0      Calcium 9.5      Total Protein 7.6      Albumin 3.9      Total Bilirubin 0.2      Alkaline Phosphatase 119      AST 16      ALT 11      eGFR 57 (*)     Anion Gap 12     D DIMER, QUANTITATIVE - Abnormal    D-Dimer 2.95 (*)     Narrative:     DDimer    critical result(s) called and verbal readback obtained from   Allie Ruiz RN.  by TH1 09/17/2024  19:41     EKG Readings: (Independently Interpreted)   Rhythm: Normal Sinus Rhythm. Heart Rate: 74. Ectopy: No Ectopy. Conduction: Normal. ST Segments: Normal ST Segments. T Waves: Normal. Axis: Normal. Clinical Impression: Normal Sinus Rhythm       Imaging Results    None          Medications - No data to display  Medical Decision Making  78-year-old female with a history of atrial fibrillation is referred to the emergency department over concerns of an elevated D-dimer.  She has no symptoms at present with no chest pain, shortness of breath, palpitations or lower extremity swelling.  CTA of the chest 2 days ago failed to demonstrate any evidence of pulmonary emboli.  I have no suspicion of acute thromboembolic process.  EKG demonstrates a normal sinus rhythm.  She is currently not a candidate for anticoagulation.  She is encouraged to follow up with primary care.                                      Clinical Impression:  Final diagnoses:  [I48.91] Atrial fibrillation          ED Disposition Condition    Discharge Stable          ED Prescriptions    None       Follow-up Information       Follow up With Specialties Details Why Contact Info    Mikie Ryan MD Family Medicine In 1 week  [address] [facility] [address]  [phone]               Casa Arroyo III, MD  09/17/24 2804

## 2024-09-19 ENCOUNTER — PATIENT MESSAGE (OUTPATIENT)
Dept: FAMILY MEDICINE | Facility: CLINIC | Age: 79
End: 2024-09-19
Payer: MEDICARE

## 2024-09-20 ENCOUNTER — TELEPHONE (OUTPATIENT)
Dept: FAMILY MEDICINE | Facility: CLINIC | Age: 79
End: 2024-09-20
Payer: MEDICARE

## 2024-09-20 LAB
OHS QRS DURATION: 92 MS
OHS QTC CALCULATION: 455 MS

## 2024-09-20 NOTE — TELEPHONE ENCOUNTER
Flor Zaman Staff     ----- Message from Flor Zaman sent at 9/20/2024 12:46 PM CDT -----  Regarding: missed call  Name of caller: Marleen       What is the requesting detail: pt is returning a call. Please advise       Can the clinic reply by [facility]:       What number to call back: [phone]

## 2024-09-24 ENCOUNTER — PATIENT MESSAGE (OUTPATIENT)
Dept: FAMILY MEDICINE | Facility: CLINIC | Age: 79
End: 2024-09-24
Payer: MEDICARE

## 2024-09-25 ENCOUNTER — PATIENT MESSAGE (OUTPATIENT)
Dept: FAMILY MEDICINE | Facility: CLINIC | Age: 79
End: 2024-09-25
Payer: MEDICARE

## 2024-09-27 ENCOUNTER — PATIENT MESSAGE (OUTPATIENT)
Dept: FAMILY MEDICINE | Facility: CLINIC | Age: 79
End: 2024-09-27
Payer: MEDICARE

## 2024-09-27 ENCOUNTER — HOSPITAL ENCOUNTER (OUTPATIENT)
Dept: RADIOLOGY | Facility: HOSPITAL | Age: 79
Discharge: HOME | End: 2024-09-27
Attending: STUDENT IN AN ORGANIZED HEALTH CARE EDUCATION/TRAINING PROGRAM
Payer: MEDICARE

## 2024-09-27 ENCOUNTER — OFFICE VISIT (OUTPATIENT)
Dept: FAMILY MEDICINE | Facility: CLINIC | Age: 79
End: 2024-09-27
Payer: MEDICARE

## 2024-09-27 VITALS
HEIGHT: 69 IN | BODY MASS INDEX: 24.88 KG/M2 | DIASTOLIC BLOOD PRESSURE: 64 MMHG | WEIGHT: 168 LBS | OXYGEN SATURATION: 100 % | SYSTOLIC BLOOD PRESSURE: 122 MMHG | HEART RATE: 102 BPM

## 2024-09-27 DIAGNOSIS — R53.1 WEAKNESS: Primary | ICD-10-CM

## 2024-09-27 DIAGNOSIS — R79.9 ABNORMAL FINDING OF BLOOD CHEMISTRY, UNSPECIFIED: ICD-10-CM

## 2024-09-27 DIAGNOSIS — R53.1 WEAKNESS: ICD-10-CM

## 2024-09-27 LAB
INFLUENZA A, MOLECULAR: NOT DETECTED
INFLUENZA B, MOLECULAR: NOT DETECTED
OHS QRS DURATION: 80 MS
OHS QTC CALCULATION: 427 MS
RSV AG BY MOLECULAR METHOD: NOT DETECTED
SARS-COV-2 RNA RESP QL NAA+PROBE: NOT DETECTED

## 2024-09-27 PROCEDURE — 71046 X-RAY EXAM CHEST 2 VIEWS: CPT | Mod: TC

## 2024-09-27 PROCEDURE — 93010 ELECTROCARDIOGRAM REPORT: CPT | Mod: S$GLB,,, | Performed by: INTERNAL MEDICINE

## 2024-09-27 PROCEDURE — 0241U SARS-COV2 (COVID) WITH FLU/RSV BY PCR: CPT | Performed by: STUDENT IN AN ORGANIZED HEALTH CARE EDUCATION/TRAINING PROGRAM

## 2024-09-27 PROCEDURE — 71046 X-RAY EXAM CHEST 2 VIEWS: CPT | Mod: 26,,, | Performed by: RADIOLOGY

## 2024-09-27 PROCEDURE — 99999 PR PBB SHADOW E&M-EST. PATIENT-LVL IV: CPT | Mod: PBBFAC,,, | Performed by: STUDENT IN AN ORGANIZED HEALTH CARE EDUCATION/TRAINING PROGRAM

## 2024-09-27 PROCEDURE — 93005 ELECTROCARDIOGRAM TRACING: CPT | Mod: S$GLB,,, | Performed by: STUDENT IN AN ORGANIZED HEALTH CARE EDUCATION/TRAINING PROGRAM

## 2024-09-27 NOTE — PROGRESS NOTES
"[facility] CLINIC NOTE    Patient Name: Marleen Samano  YOB: 1945    PRESENTING HISTORY     History of Present Illness:  Ms. Marleen Samano is a 78 y.o. female here with weakness.     Timeline is difficult to elucidate.     Had evisit for dysuria. Treated with macrobid.   Then presented to ED for back pain.   Then sent to ED by her psychiatrist for elevated D-dimer.   Then developed diarrhea.   Then dizziness  Then syncopal episode x 1.   Since then feeling unwell.   Dates of above are uncertain but this is the most likely timeline.     Wed 24th woke with dizziness. Fell on the floor, persisted all day.   Fell onto left arm  No injuries reported. No head trauma.   Has now resolved completely.     C/o feeling lightheaded.     C/o "passing out"  For "short spurts."  Probably started on Wednesday.   Gets spots in front of her eyes.   Happened while sitting in recliner.   Prob not out very long.   No tongue biting or incontinence.   This apparently happened only once.      C/o diarrhea.   Started on 22nd and 25th.   A few episodes of watery and loose stools one ach of these two dates and no other abnormal stools on other days.   No melena symptoms.   No abdominal pain.   No fever.       Currenty feeling weak diffusely.     ROS      OBJECTIVE:   Vital Signs:  Vitals:    09/27/24 1105   BP: 122/64   Pulse: 102   SpO2: 100%   Weight: 76.2 kg (167 lb 15.9 oz)   Height: 5' 9" (1.753 m)            Physical Exam  Vitals and nursing note reviewed.   Constitutional:       Appearance: She is ill-appearing. She is not toxic-appearing or diaphoretic.   HENT:      Head: Normocephalic and atraumatic.      Right Ear: External ear normal.      Left Ear: External ear normal.   Eyes:      General: No scleral icterus.     Conjunctiva/sclera: Conjunctivae normal.      Pupils: Pupils are equal, round, and reactive to light.   Neck:      Thyroid: No thyromegaly.   Cardiovascular:      Rate and Rhythm: Normal " rate and regular rhythm.      Heart sounds: Normal heart sounds. No murmur heard.  Pulmonary:      Effort: Pulmonary effort is normal. No respiratory distress.      Breath sounds: Normal breath sounds. No wheezing or rales.   Abdominal:      General: Abdomen is flat. Bowel sounds are normal. There is no distension.      Palpations: Abdomen is soft.      Tenderness: There is no abdominal tenderness. There is no guarding or rebound.   Musculoskeletal:         General: No tenderness or deformity. Normal range of motion.      Cervical back: Normal range of motion and neck supple.      Right lower leg: No edema.      Left lower leg: No edema.   Lymphadenopathy:      Cervical: No cervical adenopathy.   Skin:     General: Skin is warm and dry.      Findings: No erythema or rash.   Neurological:      General: No focal deficit present.      Mental Status: She is alert and oriented to person, place, and time.      Cranial Nerves: No cranial nerve deficit.      Motor: No weakness.      Gait: Gait is intact. Gait normal.      Deep Tendon Reflexes: Reflexes normal.   Psychiatric:         Mood and Affect: Mood and affect normal.         Cognition and Memory: Memory normal.         Judgment: Judgment normal.         ASSESSMENT & PLAN:     Weakness  -     Urine culture; Future; Expected date: 09/27/2024  -     SARS-Cov2 (COVID) with FLU/RSV by PCR  -     Clostridium difficile EIA; Future; Expected date: 09/27/2024  -     TSH; Future; Expected date: 09/27/2024  -     FERRITIN; Future; Expected date: 09/27/2024  -     IRON AND TIBC; Future; Expected date: 09/27/2024  -     CBC W/ AUTO DIFFERENTIAL; Future; Expected date: 09/27/2024  -     COMPREHENSIVE METABOLIC PANEL; Future; Expected date: 09/27/2024  -     CULTURE, BLOOD; Future; Expected date: 09/27/2024  -     X-Ray Chest PA And Lateral; Future; Expected date: 09/27/2024  -     IN OFFICE EKG 12-LEAD (to Muse)    Abnormal finding of blood chemistry, unspecified  -     FERRITIN;  Future; Expected date: 09/27/2024  -     IRON AND TIBC; Future; Expected date: 09/27/2024      Unclear etiology of symptoms but she looks unwell.   Start with above workup.          Mikie Ryan  Internal Medicine

## 2024-09-30 ENCOUNTER — PATIENT OUTREACH (OUTPATIENT)
Dept: EMERGENCY MEDICINE | Facility: HOSPITAL | Age: 79
End: 2024-09-30

## 2024-10-02 ENCOUNTER — TELEPHONE (OUTPATIENT)
Dept: FAMILY MEDICINE | Facility: CLINIC | Age: 79
End: 2024-10-02
Payer: MEDICARE

## 2024-10-02 ENCOUNTER — PATIENT MESSAGE (OUTPATIENT)
Dept: FAMILY MEDICINE | Facility: CLINIC | Age: 79
End: 2024-10-02
Payer: MEDICARE

## 2024-10-02 NOTE — TELEPHONE ENCOUNTER
----- Message from Julia sent at 10/1/2024  4:24 PM CDT -----  Fanny is calling with [employer] in regards to the pt needing a Kidney Health Eval before the end of the year. They are calling to see if they can get the orders put in for that. They need a Urine specimen and lab for EGFW and Microalbumin Creatinine ratio test. Please call back to advise, thank you      Fanny - [phone]  Fax # [phone] (only use for results)

## 2024-10-03 ENCOUNTER — TELEPHONE (OUTPATIENT)
Dept: FAMILY MEDICINE | Facility: CLINIC | Age: 79
End: 2024-10-03
Payer: MEDICARE

## 2024-10-03 NOTE — TELEPHONE ENCOUNTER
Katty pt via phone. Requesting letter be printed and place at  for . Letter printed and put in black box for  at her convenience.

## 2024-10-07 ENCOUNTER — TELEPHONE (OUTPATIENT)
Dept: SURGERY | Facility: CLINIC | Age: 79
End: 2024-10-07
Payer: MEDICARE

## 2024-10-07 ENCOUNTER — PATIENT MESSAGE (OUTPATIENT)
Dept: FAMILY MEDICINE | Facility: CLINIC | Age: 79
End: 2024-10-07
Payer: MEDICARE

## 2024-10-07 DIAGNOSIS — R32 URINARY INCONTINENCE, UNSPECIFIED TYPE: Primary | ICD-10-CM

## 2024-10-07 NOTE — TELEPHONE ENCOUNTER
----- Message from mSnap sent at 10/7/2024  7:59 AM CDT -----  Type: Needs Medical Advice  Who Called:  Marleen  Best Call Back Number: [phone]    Additional Information: Marleen  states she would like call back before she goes to work today at 12:30 , please call to further assist with scheduling needs . Thank you

## 2024-10-07 NOTE — TELEPHONE ENCOUNTER
----- Message from Alberta sent at 10/7/2024  2:45 PM CDT -----  Type: Needs Medical Advice  Who Called:  Patient   Symptoms (please be specific):    How long has patient had these symptoms:    Pharmacy name and phone #:    Best Call Back Number: [phone]  Additional Information: Patient is requesting a call back to schedule an appt..

## 2024-10-07 NOTE — TELEPHONE ENCOUNTER
I called patient back and scheduled her to see Dr. Hahn on Tuesday, 11/19/24 @ 2pm in [place]. [place]

## 2024-10-07 NOTE — TELEPHONE ENCOUNTER
I called patient back and she stated that she'd like to be seen next week for fecal incontinence and hemorrhoids.  I informed her that my next available in [place] is on 11/19/24 and 11/20/24 in [place].  Patient asked if I knew of another CRS and I told her Dr. Marshall comes to the [facility] on Wednesday afternoons.  She can call [phone] for an appointment.  Patient understood.  [place]

## 2024-10-07 NOTE — TELEPHONE ENCOUNTER
I called patient back and scheduled her to see Dr. Hahn on Tuesday, 11/19/24 @ 2pm in [place]. [place]

## 2024-10-07 NOTE — TELEPHONE ENCOUNTER
----- Message from Jannie sent at 10/7/2024  4:22 PM CDT -----  Contact: PT  Type:  Patient Returning Call    Who Called:  PT  Who Left Message for Patient:  [place]  Does the patient know what this is regarding?:  yes  Best Call Back Number:  [phone]  Additional Information:

## 2024-10-13 ENCOUNTER — PATIENT MESSAGE (OUTPATIENT)
Dept: FAMILY MEDICINE | Facility: CLINIC | Age: 79
End: 2024-10-13
Payer: MEDICARE

## 2024-10-18 PROBLEM — M62.838 LEG MUSCLE SPASM: Status: RESOLVED | Noted: 2024-08-28 | Resolved: 2024-10-18

## 2024-10-18 PROBLEM — R20.3 HYPERESTHESIA: Status: RESOLVED | Noted: 2024-08-28 | Resolved: 2024-10-18

## 2024-10-23 ENCOUNTER — TELEPHONE (OUTPATIENT)
Dept: UROGYNECOLOGY | Facility: CLINIC | Age: 79
End: 2024-10-23
Payer: MEDICARE

## 2024-10-23 NOTE — TELEPHONE ENCOUNTER
----- Message from Angel sent at 10/23/2024  3:49 PM CDT -----  Contact: Self  Type:  Sooner Appointment Request    Caller is requesting a sooner appointment.  Caller declined first available appointment listed below.  Caller will not accept being placed on the waitlist and is requesting a message be sent to doctor.    Name of Caller:  Patient  When is the first available appointment?  N/a  Symptoms:  r/s  Would the patient rather a call back or a response via [facility]? Call  Best Call Back Number:  [phone]   Additional Information:  Called to speak with office about getting an earlier date. Please call

## 2024-10-28 ENCOUNTER — TELEPHONE (OUTPATIENT)
Dept: SURGERY | Facility: CLINIC | Age: 79
End: 2024-10-28
Payer: MEDICARE

## 2024-10-30 ENCOUNTER — PATIENT MESSAGE (OUTPATIENT)
Dept: FAMILY MEDICINE | Facility: CLINIC | Age: 79
End: 2024-10-30
Payer: MEDICARE

## 2024-10-30 ENCOUNTER — TELEPHONE (OUTPATIENT)
Dept: FAMILY MEDICINE | Facility: CLINIC | Age: 79
End: 2024-10-30
Payer: MEDICARE

## 2024-11-01 ENCOUNTER — TELEPHONE (OUTPATIENT)
Dept: FAMILY MEDICINE | Facility: CLINIC | Age: 79
End: 2024-11-01

## 2024-11-01 ENCOUNTER — OFFICE VISIT (OUTPATIENT)
Dept: FAMILY MEDICINE | Facility: CLINIC | Age: 79
End: 2024-11-01
Payer: MEDICARE

## 2024-11-01 VITALS
SYSTOLIC BLOOD PRESSURE: 134 MMHG | DIASTOLIC BLOOD PRESSURE: 64 MMHG | OXYGEN SATURATION: 98 % | HEART RATE: 96 BPM | WEIGHT: 164.44 LBS | BODY MASS INDEX: 24.36 KG/M2 | HEIGHT: 69 IN

## 2024-11-01 DIAGNOSIS — Z23 IMMUNIZATION DUE: Primary | ICD-10-CM

## 2024-11-01 DIAGNOSIS — R42 VERTIGO: ICD-10-CM

## 2024-11-01 DIAGNOSIS — R42 DIZZINESS AND GIDDINESS: ICD-10-CM

## 2024-11-01 DIAGNOSIS — R93.89 ABNORMAL COMPUTED TOMOGRAPHY ANGIOGRAPHY (CTA) OF NECK: ICD-10-CM

## 2024-11-01 PROCEDURE — 99999 PR PBB SHADOW E&M-EST. PATIENT-LVL IV: CPT | Mod: PBBFAC,,, | Performed by: STUDENT IN AN ORGANIZED HEALTH CARE EDUCATION/TRAINING PROGRAM

## 2024-11-01 RX ORDER — MECLIZINE HCL 12.5 MG 12.5 MG/1
12.5 TABLET ORAL 3 TIMES DAILY PRN
Qty: 90 TABLET | Refills: 3 | Status: SHIPPED | OUTPATIENT
Start: 2024-11-01

## 2024-11-01 NOTE — TELEPHONE ENCOUNTER
Olivia Lloyd Staff     ----- Message from Olivia sent at 11/1/2024 10:31 AM CDT -----  Contact: Fanny/[employer]  Type: Needs Medical Advice  Who Called:  Fanny/[facility]     Best Call Back Number: [phone]  Additional Information: Fanny is saying that [facility] has to have lab work done before the end of the year   Micro albumine creatinine ratio urine test  Please call pt to schedule ([phone] care coordinator nurse)

## 2024-11-02 ENCOUNTER — TELEPHONE (OUTPATIENT)
Dept: RADIOLOGY | Facility: HOSPITAL | Age: 79
End: 2024-11-02

## 2024-11-02 ENCOUNTER — HOSPITAL ENCOUNTER (OUTPATIENT)
Dept: RADIOLOGY | Facility: HOSPITAL | Age: 79
Discharge: HOME | End: 2024-11-02
Attending: STUDENT IN AN ORGANIZED HEALTH CARE EDUCATION/TRAINING PROGRAM
Payer: MEDICARE

## 2024-11-02 DIAGNOSIS — R42 DIZZINESS AND GIDDINESS: ICD-10-CM

## 2024-11-02 LAB
CREAT SERPL-MCNC: 1 MG/DL (ref 0.5–1.4)
SAMPLE: NORMAL

## 2024-11-02 PROCEDURE — 25500020 PHARM REV CODE 255

## 2024-11-02 PROCEDURE — 70496 CT ANGIOGRAPHY HEAD: CPT | Mod: 26,,, | Performed by: RADIOLOGY

## 2024-11-02 PROCEDURE — 70498 CT ANGIOGRAPHY NECK: CPT | Mod: TC

## 2024-11-02 PROCEDURE — 70498 CT ANGIOGRAPHY NECK: CPT | Mod: 26,,, | Performed by: RADIOLOGY

## 2024-11-02 RX ADMIN — IOHEXOL 75 ML: 350 INJECTION, SOLUTION INTRAVENOUS at 08:11

## 2024-11-03 ENCOUNTER — PATIENT MESSAGE (OUTPATIENT)
Dept: FAMILY MEDICINE | Facility: CLINIC | Age: 79
End: 2024-11-03
Payer: MEDICARE

## 2024-11-04 ENCOUNTER — TELEPHONE (OUTPATIENT)
Dept: VASCULAR SURGERY | Facility: CLINIC | Age: 79
End: 2024-11-04
Payer: MEDICARE

## 2024-11-04 DIAGNOSIS — I77.3 FIBROMUSCULAR DYSPLASIA: Primary | ICD-10-CM

## 2024-11-04 NOTE — TELEPHONE ENCOUNTER
----- Message from Aura sent at 11/4/2024  7:14 AM CST -----  Contact: self  Type:  Needs Medical Advice    Who Called: self  Symptoms (please be specific): pt would like to see dr for thicker arteries in her neck, pt states another dr wanted her to se him asap.    Would the patient rather a call back or a response via [facility]? call  Best Call Back Number: [phone] (home)     Additional Information: please advise and thank you.

## 2024-11-11 ENCOUNTER — TELEPHONE (OUTPATIENT)
Dept: SURGERY | Facility: CLINIC | Age: 79
End: 2024-11-11
Payer: MEDICARE

## 2024-11-11 NOTE — TELEPHONE ENCOUNTER
I called patient to reschedule her to Thursday, 12/12/24 @ 3pm with Dr. Hahn in [place] instead of Tuesday, 12/17/24 @ 2:45pm in [place].  [place]

## 2024-11-11 NOTE — TELEPHONE ENCOUNTER
----- Message from Airec sent at 11/11/2024  7:31 AM CST -----  Type: Needs Medical Advice  Who Called:  Marleen  Best Call Back Number: [phone]    Additional Information: Ruelneisha wants to know is there is a sooner appointment  than 12/17 , please call top further discuss thank you .

## 2024-11-12 ENCOUNTER — PATIENT OUTREACH (OUTPATIENT)
Dept: EMERGENCY MEDICINE | Facility: HOSPITAL | Age: 79
End: 2024-11-12

## 2024-11-15 ENCOUNTER — OFFICE VISIT (OUTPATIENT)
Dept: FAMILY MEDICINE | Facility: CLINIC | Age: 79
End: 2024-11-15
Payer: MEDICARE

## 2024-11-15 VITALS
SYSTOLIC BLOOD PRESSURE: 118 MMHG | OXYGEN SATURATION: 97 % | WEIGHT: 160.5 LBS | DIASTOLIC BLOOD PRESSURE: 64 MMHG | HEIGHT: 69 IN | RESPIRATION RATE: 17 BRPM | HEART RATE: 93 BPM | TEMPERATURE: 98 F | BODY MASS INDEX: 23.77 KG/M2

## 2024-11-15 DIAGNOSIS — N18.31 STAGE 3A CHRONIC KIDNEY DISEASE: ICD-10-CM

## 2024-11-15 DIAGNOSIS — J98.4 CALCIFIED GRANULOMA OF LUNG: ICD-10-CM

## 2024-11-15 DIAGNOSIS — M85.80 OSTEOPENIA, UNSPECIFIED LOCATION: ICD-10-CM

## 2024-11-15 DIAGNOSIS — Z00.00 ENCOUNTER FOR PREVENTIVE HEALTH EXAMINATION: Primary | ICD-10-CM

## 2024-11-15 DIAGNOSIS — Z78.0 POSTMENOPAUSAL: ICD-10-CM

## 2024-11-15 DIAGNOSIS — I70.0 CALCIFICATION OF ABDOMINAL AORTA: ICD-10-CM

## 2024-11-15 PROBLEM — E11.9 TYPE 2 DIABETES MELLITUS WITHOUT COMPLICATION, UNSPECIFIED WHETHER LONG TERM INSULIN USE: Status: RESOLVED | Noted: 2022-04-14 | Resolved: 2024-11-15

## 2024-11-15 PROCEDURE — G0439 PPPS, SUBSEQ VISIT: HCPCS | Mod: S$GLB,,,

## 2024-11-15 PROCEDURE — 1101F PT FALLS ASSESS-DOCD LE1/YR: CPT | Mod: CPTII,S$GLB,,

## 2024-11-15 PROCEDURE — 1159F MED LIST DOCD IN RCRD: CPT | Mod: CPTII,S$GLB,,

## 2024-11-15 PROCEDURE — 1126F AMNT PAIN NOTED NONE PRSNT: CPT | Mod: CPTII,S$GLB,,

## 2024-11-15 PROCEDURE — 3288F FALL RISK ASSESSMENT DOCD: CPT | Mod: CPTII,S$GLB,,

## 2024-11-15 PROCEDURE — 1158F ADVNC CARE PLAN TLK DOCD: CPT | Mod: CPTII,S$GLB,,

## 2024-11-15 PROCEDURE — 1170F FXNL STATUS ASSESSED: CPT | Mod: CPTII,S$GLB,,

## 2024-11-15 PROCEDURE — 3074F SYST BP LT 130 MM HG: CPT | Mod: CPTII,S$GLB,,

## 2024-11-15 PROCEDURE — 99999 PR PBB SHADOW E&M-EST. PATIENT-LVL V: CPT | Mod: PBBFAC,,,

## 2024-11-15 PROCEDURE — 3078F DIAST BP <80 MM HG: CPT | Mod: CPTII,S$GLB,,

## 2024-11-15 NOTE — PATIENT INSTRUCTIONS
Hi Shaileshdunia,     If you are due for any health screening(s) below please notify me so we can arrange them to be ordered and scheduled to maintain your health. Most healthy patients complete it. Don't lose out on improving your health.     All of your core healthy metrics are met.                             Counseling and Referral of Other Preventative  (Italic type indicates deductible and co-insurance are waived)    Patient Name: Marleen Samano  Today's Date: 11/16/2024    Health Maintenance       Date Due Completion Date    RSV Vaccine (Age 60+ and Pregnant patients) (1 - 1-dose 75+ series) Never done ---    COVID-19 Vaccine (5 - 2024-25 season) 09/01/2024 1/1/2022    DEXA Scan 01/18/2025 1/18/2022    Hemoglobin A1c (Prediabetes) 07/17/2025 7/17/2024    Colonoscopy 04/06/2028 4/6/2023    Lipid Panel 07/17/2029 7/17/2024    TETANUS VACCINE 10/09/2029 10/9/2019        Orders Placed This Encounter   Procedures    DXA Bone Density Axial Skeleton 1 or more sites     The following information is provided to all patients.  This information is to help you find resources for any of the problems found today that may be affecting your health:                  Living healthy guide: www.[facility].[place].gov      Understanding Diabetes: www.diabetes.org      Eating healthy: www.cdc.gov/healthyweight      [facility] home safety checklist: www.cdc.gov/steadi/patient.html      Agency on Aging: www.goea.[place].gov      Alcoholics anonymous (AA): www.aa.org      Physical Activity: www.remi.nih.gov/di9ifcq      Tobacco use: www.quitwithusla.org

## 2024-11-16 NOTE — PROGRESS NOTES
"  Marleen Samano presented for a  Medicare AWV and comprehensive Health Risk Assessment today. The following components were reviewed and updated:    Medical history  Family History  Social history  Allergies and Current Medications  Health Risk Assessment  Health Maintenance  Care Team         ** See Completed Assessments for Annual Wellness Visit within the encounter summary.**         The following assessments were completed:  Living Situation  CAGE  Depression Screening  Timed Get Up and Go  Whisper Test  Cognitive Function Screening  Nutrition Screening  ADL Screening  PAQ Screening      Opioid documentation:      Patient does not have a current opioid prescription.        Vitals:    11/15/24 1053   BP: 118/64   BP Location: Right arm   Patient Position: Sitting   Pulse: 93   Resp: 17   Temp: 98.4 °F (36.9 °C)   TempSrc: Oral   SpO2: 97%   Weight: 72.8 kg (160 lb 7.9 oz)   Height: 5' 9" (1.753 m)     Body mass index is 23.7 kg/m².  Physical Exam  Vitals reviewed.   Constitutional:       General: She is not in acute distress.     Appearance: Normal appearance. She is normal weight. She is not ill-appearing, toxic-appearing or diaphoretic.   HENT:      Head: Normocephalic.      Right Ear: External ear normal.      Left Ear: External ear normal.      Nose: Nose normal. No congestion or rhinorrhea.      Mouth/Throat:      Mouth: Mucous membranes are moist.      Pharynx: Oropharynx is clear.   Eyes:      General: No scleral icterus.        Right eye: No discharge.         Left eye: No discharge.      Extraocular Movements: Extraocular movements intact.      Conjunctiva/sclera: Conjunctivae normal.   Cardiovascular:      Rate and Rhythm: Normal rate and regular rhythm.      Pulses: Normal pulses.      Heart sounds: Normal heart sounds. No murmur heard.     No friction rub. No gallop.   Pulmonary:      Effort: Pulmonary effort is normal. No respiratory distress.      Breath sounds: Normal breath sounds. No wheezing, " rhonchi or rales.   Chest:      Chest wall: No tenderness.   Musculoskeletal:         General: No swelling, tenderness or deformity. Normal range of motion.      Cervical back: Normal range of motion.      Right lower leg: No edema.      Left lower leg: No edema.   Skin:     General: Skin is warm and dry.      Capillary Refill: Capillary refill takes less than 2 seconds.      Coloration: Skin is not jaundiced.      Findings: No bruising, erythema, lesion or rash.   Neurological:      Mental Status: She is alert and oriented to person, place, and time.      Gait: Gait normal.   Psychiatric:         Mood and Affect: Mood normal.         Behavior: Behavior normal.         Thought Content: Thought content normal.         Judgment: Judgment normal.           Diagnoses and health risks identified today and associated recommendations/orders:    1. Encounter for preventive health examination  Discussed health maintenance guidelines appropriate for age.      2. Osteopenia, unspecified location    - DXA Bone Density Axial Skeleton 1 or more sites; Future    3. Postmenopausal    - DXA Bone Density Axial Skeleton 1 or more sites; Future    4. Calcified granuloma of lung  - No concerning features. Seen on previous imaging. Will continue to monitor in future imaging.     5. Calcification of abdominal aorta  -  Stable. No concerning features currently. Continue appropriate BP control, ASA. Not currently o nstatin therapy. Reviewed previous lipid panel.     6. Stage 3a chronic kidney disease  - She has been hydrating better now. Will continue to monitor. Avoid nephrotoxic agents       Provided Marleen with a 5-10 year written screening schedule and personal prevention plan. Recommendations were developed using the USPSTF age appropriate recommendations. Education, counseling, and referrals were provided as needed. After Visit Summary printed and given to patient which includes a list of additional screenings\tests needed.    No  follow-ups on file.    Leo Martinez PA-C      I offered to discuss advanced care planning, including how to pick a person who would make decisions for you if you were unable to make them for yourself, called a health care power of , and what kind of decisions you might make such as use of life sustaining treatments such as ventilators and tube feeding when faced with a life limiting illness recorded on a living will that they will need to know. (How you want to be cared for as you near the end of your natural life)     X  Patient has advanced directives on file, which we reviewed, and they do not wish to make changes.

## 2024-11-20 ENCOUNTER — PATIENT MESSAGE (OUTPATIENT)
Dept: FAMILY MEDICINE | Facility: CLINIC | Age: 79
End: 2024-11-20
Payer: MEDICARE

## 2024-11-20 ENCOUNTER — OFFICE VISIT (OUTPATIENT)
Dept: VASCULAR SURGERY | Facility: CLINIC | Age: 79
End: 2024-11-20
Payer: MEDICARE

## 2024-11-20 ENCOUNTER — TELEPHONE (OUTPATIENT)
Dept: VASCULAR SURGERY | Facility: CLINIC | Age: 79
End: 2024-11-20
Payer: MEDICARE

## 2024-11-20 VITALS — HEART RATE: 90 BPM | DIASTOLIC BLOOD PRESSURE: 81 MMHG | SYSTOLIC BLOOD PRESSURE: 153 MMHG

## 2024-11-20 DIAGNOSIS — I65.23 BILATERAL CAROTID ARTERY STENOSIS: Primary | ICD-10-CM

## 2024-11-20 PROCEDURE — 99999 PR PBB SHADOW E&M-EST. PATIENT-LVL III: CPT | Mod: PBBFAC,,, | Performed by: THORACIC SURGERY (CARDIOTHORACIC VASCULAR SURGERY)

## 2024-11-20 PROCEDURE — 3077F SYST BP >= 140 MM HG: CPT | Mod: CPTII,S$GLB,, | Performed by: THORACIC SURGERY (CARDIOTHORACIC VASCULAR SURGERY)

## 2024-11-20 PROCEDURE — 3288F FALL RISK ASSESSMENT DOCD: CPT | Mod: CPTII,S$GLB,, | Performed by: THORACIC SURGERY (CARDIOTHORACIC VASCULAR SURGERY)

## 2024-11-20 PROCEDURE — 99204 OFFICE O/P NEW MOD 45 MIN: CPT | Mod: S$GLB,,, | Performed by: THORACIC SURGERY (CARDIOTHORACIC VASCULAR SURGERY)

## 2024-11-20 PROCEDURE — 3079F DIAST BP 80-89 MM HG: CPT | Mod: CPTII,S$GLB,, | Performed by: THORACIC SURGERY (CARDIOTHORACIC VASCULAR SURGERY)

## 2024-11-20 PROCEDURE — 1101F PT FALLS ASSESS-DOCD LE1/YR: CPT | Mod: CPTII,S$GLB,, | Performed by: THORACIC SURGERY (CARDIOTHORACIC VASCULAR SURGERY)

## 2024-11-20 PROCEDURE — 1126F AMNT PAIN NOTED NONE PRSNT: CPT | Mod: CPTII,S$GLB,, | Performed by: THORACIC SURGERY (CARDIOTHORACIC VASCULAR SURGERY)

## 2024-11-20 NOTE — PROGRESS NOTES
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  It was not clear whether she is symptomatic from this.  She does have dizziness which has been improve with medical management.  She also has headaches which were persistent.  Medicines are noted and part of the epic record.  Her problem list was reviewed.  She has had previous neck surgery.    She is not diabetic and she is not a smoker.    On exam vital signs are stable.  Pupils are equal and round reactive to light.  Neck is supple.  Chest is equal breath sounds.  Heart is in a regular rate and rhythm.  Abdomen is benign.  Perfusion to the legs and feet seems to be satisfactory.    The recent studies were reviewed.    I think she needs to see a neurologist prior to further interventions.  If there is no good explanation for her headaches and dizziness then consideration can be made for formal angiography.  It is not clear from the CTA whether or not she has any significant lesions related to the fibromuscular dysplasia.

## 2024-11-20 NOTE — TELEPHONE ENCOUNTER
----- Message from Micheline sent at 11/20/2024  3:16 PM CST -----  Regarding: Needs return call  Type: Needs Medical Advice  Who Called:  Pt    Best Call Back Number: [phone]    Additional Information: Pt needs to speak to the office regarding the test she was told they want her to get for her headaches, please prateek

## 2024-11-20 NOTE — TELEPHONE ENCOUNTER
Unfortunately she does not qualify for weight loss treatment from a medical perspective. Her BMI does not meet criteria. Cosmetic surgery would likely be the best option to get the results she desires at this point.

## 2024-11-21 ENCOUNTER — TELEPHONE (OUTPATIENT)
Dept: FAMILY MEDICINE | Facility: CLINIC | Age: 79
End: 2024-11-21
Payer: MEDICARE

## 2024-11-21 ENCOUNTER — OFFICE VISIT (OUTPATIENT)
Dept: UROGYNECOLOGY | Facility: CLINIC | Age: 79
End: 2024-11-21
Payer: MEDICARE

## 2024-11-21 ENCOUNTER — PATIENT MESSAGE (OUTPATIENT)
Dept: FAMILY MEDICINE | Facility: CLINIC | Age: 79
End: 2024-11-21
Payer: MEDICARE

## 2024-11-21 ENCOUNTER — HOSPITAL ENCOUNTER (OUTPATIENT)
Dept: RADIOLOGY | Facility: CLINIC | Age: 79
Discharge: HOME | End: 2024-11-21
Payer: MEDICARE

## 2024-11-21 ENCOUNTER — PATIENT MESSAGE (OUTPATIENT)
Dept: VASCULAR SURGERY | Facility: CLINIC | Age: 79
End: 2024-11-21
Payer: MEDICARE

## 2024-11-21 VITALS — WEIGHT: 160.5 LBS | BODY MASS INDEX: 23.7 KG/M2

## 2024-11-21 DIAGNOSIS — N95.2 ATROPHIC VAGINITIS: ICD-10-CM

## 2024-11-21 DIAGNOSIS — Z87.440 HISTORY OF RECURRENT UTI (URINARY TRACT INFECTION): ICD-10-CM

## 2024-11-21 DIAGNOSIS — R15.9 INCONTINENCE OF FECES, UNSPECIFIED FECAL INCONTINENCE TYPE: ICD-10-CM

## 2024-11-21 DIAGNOSIS — R42 DIZZINESS: ICD-10-CM

## 2024-11-21 DIAGNOSIS — Z78.0 POSTMENOPAUSAL: ICD-10-CM

## 2024-11-21 DIAGNOSIS — R35.0 URINARY FREQUENCY: Primary | ICD-10-CM

## 2024-11-21 DIAGNOSIS — N39.46 MIXED INCONTINENCE URGE AND STRESS: ICD-10-CM

## 2024-11-21 DIAGNOSIS — R51.9 FREQUENT HEADACHES: Primary | ICD-10-CM

## 2024-11-21 DIAGNOSIS — M85.80 OSTEOPENIA, UNSPECIFIED LOCATION: ICD-10-CM

## 2024-11-21 PROCEDURE — 77080 DXA BONE DENSITY AXIAL: CPT | Mod: TC,PO

## 2024-11-21 PROCEDURE — 77080 DXA BONE DENSITY AXIAL: CPT | Mod: 26,,, | Performed by: RADIOLOGY

## 2024-11-21 PROCEDURE — 99999 PR PBB SHADOW E&M-EST. PATIENT-LVL III: CPT | Mod: PBBFAC,,, | Performed by: NURSE PRACTITIONER

## 2024-11-21 RX ORDER — ESTRADIOL 0.1 MG/G
CREAM VAGINAL
Qty: 42.5 G | Refills: 3 | Status: SHIPPED | OUTPATIENT
Start: 2024-11-21

## 2024-11-21 NOTE — TELEPHONE ENCOUNTER
Aletha Starks Staff     ----- Message from Aletha sent at 11/21/2024 12:23 PM CST -----  Regarding: call back  Contact: patient  Type: Needs Medical Advice  Who Called:  patient   Symptoms (please be specific):    How long has patient had these symptoms:    Pharmacy name and phone #:    Best Call Back Number: [phone]    Additional Information: hi pt is returning ur call are u available to assist MRN: [id number] PURA TAN

## 2024-11-21 NOTE — PROGRESS NOTES
Subjective:       Patient ID: Marleen Samano is a 78 y.o. female.    Chief Complaint: incontience      Marleen Samano is a 78 y.o. female.  Who is new to me, presents today for consult in regards to incontinence.  She has been having problems with fecal and urinary incontinence since about 2007.  She has been starting to get more recurrent UTIs recently because of the fecal incontinence.  She was urinary frequency about every hour during the day.  She has nocturia x2.  She has some PVF at times.  She does do Crede maneuvers to try to make sure her bladder is empty all the way.  She has some enuresis.  She has positive UUI as well as some BRIAN.  She drinks about 25 oz of tea a day and about 16 oz of water a day.  She has had a bladder lift in the past with Dr. Palma.  She denies any vaginal discharge bleeding.  She denies any bulging sensation.  She has had a hysterectomy in the past for abnormal bleeding.  She was not sexually active but is interested in becoming involved in a relationship so is wanting to get some options.  She has not seen a GI doctor in the past.  She states that she has tried multiple bladder medications in the past that did not seem to work very well for her.    Review of Systems   Constitutional:  Negative for activity change, fever and unexpected weight change.   HENT:  Negative for hearing loss.    Eyes:  Negative for visual disturbance.   Respiratory:  Negative for shortness of breath and wheezing.    Cardiovascular:  Negative for chest pain, palpitations and leg swelling.   Gastrointestinal:  Negative for abdominal pain, constipation and diarrhea.        Fecal incontinence   Genitourinary:  Positive for frequency and urgency. Negative for dyspareunia, dysuria, vaginal bleeding and vaginal discharge.   Musculoskeletal:  Negative for gait problem and neck pain.   Skin:  Negative for rash and wound.   Allergic/Immunologic: Negative for immunocompromised state.   Neurological:  Negative for  tremors, speech difficulty and weakness.   Hematological:  Does not bruise/bleed easily.   Psychiatric/Behavioral:  Negative for agitation and confusion.        Objective:      Physical Exam  Vitals reviewed. Exam conducted with a chaperone present.   Constitutional:       General: She is not in acute distress.     Appearance: She is well-developed.   HENT:      Head: Normocephalic and atraumatic.   Neck:      Thyroid: No thyromegaly.   Pulmonary:      Effort: Pulmonary effort is normal. No respiratory distress.   Abdominal:      Palpations: Abdomen is soft.      Tenderness: There is no abdominal tenderness.      Hernia: No hernia is present.   Musculoskeletal:         General: Normal range of motion.      Cervical back: Normal range of motion.   Skin:     General: Skin is warm and dry.      Findings: No rash.   Neurological:      Mental Status: She is alert and oriented to person, place, and time.   Psychiatric:         Mood and Affect: Mood normal.         Behavior: Behavior normal.         Thought Content: Thought content normal.       Pelvic Exam:  V: No lesions. No palpable nodes.   Va:  No discharge or bleeding.  Tissue is atrophic.  Good length and support.  Meatus:No caruncle or stenosis  Urethra: Non tender. No suburethral masses.  Cx/Cuff: Normal   Uterus:  Surgically absent  Ad: No mass or tenderness.  Levators :Symmetrical. Normal tone. Non tender.  BL: Non tender  RV: No hemorrhoids.      Assessment:       1. Urinary frequency    2. Mixed incontinence urge and stress    3. History of recurrent UTI (urinary tract infection)    4. Incontinence of feces, unspecified fecal incontinence type    5. Atrophic vaginitis        Plan:       Urinary frequency we will have the patient try gemtesa 1 tablet by mouth daily.    Mixed incontinence urge and stress trial of gemtesa.  NP did review with patient trying to decrease her tea intake and increase her water intake.  Patient verbalized understanding.  NP also  reviewed doing pelvic floor physical therapy not only for the urinary incontinence but for the fecal incontinence.  Patient was not interested at this time    History of recurrent UTI (urinary tract infection) monitor    Incontinence of feces, unspecified fecal incontinence type NP suggested local GI doctors for the patient to make an appointment with.  NP suggested increasing her fiber intake in the meantime to see if this helps.    Atrophic vaginitis patient will consider doing Estrace cream if the cost is not prohibitive  -     estradioL (ESTRACE) 0.01 % (0.1 mg/gram) vaginal cream; Apply 1 fingertipful to vaginal area nightly x 2 weeks then use on MWF  Dispense: 42.5 g; Refill: 3          RTC 1 month

## 2024-12-01 ENCOUNTER — PATIENT MESSAGE (OUTPATIENT)
Dept: NEUROLOGY | Facility: CLINIC | Age: 79
End: 2024-12-01
Payer: MEDICARE

## 2024-12-01 ENCOUNTER — PATIENT MESSAGE (OUTPATIENT)
Dept: UROGYNECOLOGY | Facility: CLINIC | Age: 79
End: 2024-12-01
Payer: MEDICARE

## 2024-12-08 ENCOUNTER — PATIENT MESSAGE (OUTPATIENT)
Dept: FAMILY MEDICINE | Facility: CLINIC | Age: 79
End: 2024-12-08
Payer: MEDICARE

## 2024-12-09 ENCOUNTER — PATIENT MESSAGE (OUTPATIENT)
Dept: FAMILY MEDICINE | Facility: CLINIC | Age: 79
End: 2024-12-09
Payer: MEDICARE

## 2024-12-09 NOTE — TELEPHONE ENCOUNTER
I spoke with pt via phone, I advised her that she would need to be seen for an appt. Will see Dr. Ryan Wednesday at 1530. No further questions.

## 2024-12-10 ENCOUNTER — PATIENT MESSAGE (OUTPATIENT)
Dept: FAMILY MEDICINE | Facility: CLINIC | Age: 79
End: 2024-12-10
Payer: MEDICARE

## 2024-12-10 ENCOUNTER — PATIENT MESSAGE (OUTPATIENT)
Dept: UROGYNECOLOGY | Facility: CLINIC | Age: 79
End: 2024-12-10
Payer: MEDICARE

## 2024-12-10 DIAGNOSIS — R30.0 DYSURIA: Primary | ICD-10-CM

## 2024-12-10 DIAGNOSIS — N39.41 URGE INCONTINENCE: ICD-10-CM

## 2024-12-10 DIAGNOSIS — R35.0 URINARY FREQUENCY: Primary | ICD-10-CM

## 2024-12-10 DIAGNOSIS — N30.00 ACUTE CYSTITIS WITHOUT HEMATURIA: ICD-10-CM

## 2024-12-11 RX ORDER — NITROFURANTOIN 25; 75 MG/1; MG/1
100 CAPSULE ORAL 2 TIMES DAILY
Qty: 10 CAPSULE | Refills: 0 | Status: SHIPPED | OUTPATIENT
Start: 2024-12-11 | End: 2024-12-16

## 2024-12-12 ENCOUNTER — PATIENT MESSAGE (OUTPATIENT)
Dept: UROGYNECOLOGY | Facility: CLINIC | Age: 79
End: 2024-12-12
Payer: MEDICARE

## 2024-12-16 ENCOUNTER — PATIENT MESSAGE (OUTPATIENT)
Dept: UROGYNECOLOGY | Facility: CLINIC | Age: 79
End: 2024-12-16
Payer: MEDICARE

## 2024-12-16 DIAGNOSIS — N95.2 ATROPHIC VAGINITIS: ICD-10-CM

## 2024-12-16 NOTE — TELEPHONE ENCOUNTER
Would you please order my prescription through [facility].  Would your office please let me know when it is ordered?    Thank you.

## 2024-12-17 RX ORDER — ESTRADIOL 0.1 MG/G
CREAM VAGINAL
Qty: 85 G | Refills: 3 | Status: SHIPPED | OUTPATIENT
Start: 2024-12-17

## 2024-12-20 ENCOUNTER — PATIENT MESSAGE (OUTPATIENT)
Dept: UROGYNECOLOGY | Facility: CLINIC | Age: 79
End: 2024-12-20
Payer: MEDICARE

## 2025-01-04 ENCOUNTER — PATIENT MESSAGE (OUTPATIENT)
Dept: FAMILY MEDICINE | Facility: CLINIC | Age: 80
End: 2025-01-04
Payer: MEDICARE

## 2025-01-06 ENCOUNTER — E-VISIT (OUTPATIENT)
Dept: FAMILY MEDICINE | Facility: CLINIC | Age: 80
End: 2025-01-06
Payer: MEDICARE

## 2025-01-06 ENCOUNTER — TELEPHONE (OUTPATIENT)
Dept: FAMILY MEDICINE | Facility: CLINIC | Age: 80
End: 2025-01-06

## 2025-01-06 ENCOUNTER — LAB VISIT (OUTPATIENT)
Dept: LAB | Facility: HOSPITAL | Age: 80
End: 2025-01-06
Attending: STUDENT IN AN ORGANIZED HEALTH CARE EDUCATION/TRAINING PROGRAM
Payer: MEDICARE

## 2025-01-06 ENCOUNTER — PATIENT MESSAGE (OUTPATIENT)
Dept: FAMILY MEDICINE | Facility: CLINIC | Age: 80
End: 2025-01-06

## 2025-01-06 DIAGNOSIS — N30.00 ACUTE CYSTITIS WITHOUT HEMATURIA: ICD-10-CM

## 2025-01-06 DIAGNOSIS — R30.0 DYSURIA: ICD-10-CM

## 2025-01-06 DIAGNOSIS — R30.0 DYSURIA: Primary | ICD-10-CM

## 2025-01-06 PROCEDURE — 87186 SC STD MICRODIL/AGAR DIL: CPT | Performed by: STUDENT IN AN ORGANIZED HEALTH CARE EDUCATION/TRAINING PROGRAM

## 2025-01-06 PROCEDURE — 87086 URINE CULTURE/COLONY COUNT: CPT | Performed by: STUDENT IN AN ORGANIZED HEALTH CARE EDUCATION/TRAINING PROGRAM

## 2025-01-06 PROCEDURE — 87088 URINE BACTERIA CULTURE: CPT | Performed by: STUDENT IN AN ORGANIZED HEALTH CARE EDUCATION/TRAINING PROGRAM

## 2025-01-06 PROCEDURE — 81001 URINALYSIS AUTO W/SCOPE: CPT | Performed by: STUDENT IN AN ORGANIZED HEALTH CARE EDUCATION/TRAINING PROGRAM

## 2025-01-06 NOTE — TELEPHONE ENCOUNTER
----- Message from July sent at 1/6/2025 11:23 AM CST -----  Contact: self  Type: Needs Medical Advice  Who Called:  pt  Symptoms (please be specific):  uti  How long has patient had these symptoms:  6 days  Pharmacy name and phone #:    [id number] [place] [place] [facility] [address]  [address]  [facility] [address]  Phone: [phone] Fax: [phone]       Best Call Back Number: [phone]   Additional Information: would like an antibiotic called in.please call

## 2025-01-07 ENCOUNTER — PATIENT MESSAGE (OUTPATIENT)
Dept: NEUROLOGY | Facility: CLINIC | Age: 80
End: 2025-01-07
Payer: MEDICARE

## 2025-01-07 ENCOUNTER — PATIENT MESSAGE (OUTPATIENT)
Dept: FAMILY MEDICINE | Facility: CLINIC | Age: 80
End: 2025-01-07
Payer: MEDICARE

## 2025-01-07 ENCOUNTER — TELEPHONE (OUTPATIENT)
Dept: FAMILY MEDICINE | Facility: CLINIC | Age: 80
End: 2025-01-07
Payer: MEDICARE

## 2025-01-07 DIAGNOSIS — R30.0 DYSURIA: Primary | ICD-10-CM

## 2025-01-07 LAB
BACTERIA #/AREA URNS AUTO: ABNORMAL /HPF
BILIRUB UR QL STRIP: NEGATIVE
CLARITY UR REFRACT.AUTO: ABNORMAL
COLOR UR AUTO: ABNORMAL
GLUCOSE UR QL STRIP: NEGATIVE
HGB UR QL STRIP: ABNORMAL
KETONES UR QL STRIP: NEGATIVE
LEUKOCYTE ESTERASE UR QL STRIP: ABNORMAL
MICROSCOPIC COMMENT: ABNORMAL
NITRITE UR QL STRIP: POSITIVE
PH UR STRIP: 6 [PH] (ref 5–8)
PROT UR QL STRIP: NEGATIVE
RBC #/AREA URNS AUTO: 49 /HPF (ref 0–4)
SP GR UR STRIP: 1.01 (ref 1–1.03)
SQUAMOUS #/AREA URNS AUTO: 10 /HPF
URN SPEC COLLECT METH UR: ABNORMAL
WBC #/AREA URNS AUTO: >100 /HPF (ref 0–5)

## 2025-01-07 RX ORDER — NITROFURANTOIN 25; 75 MG/1; MG/1
100 CAPSULE ORAL 2 TIMES DAILY
Qty: 10 CAPSULE | Refills: 0 | Status: SHIPPED | OUTPATIENT
Start: 2025-01-07

## 2025-01-07 NOTE — PROGRESS NOTES
Patient ID: Marleen Samano is a 79 y.o. female.    Chief Complaint: General Illness (Entered automatically based on patient selection in Meet You.)    The patient initiated a request through Meet You on 1/6/2025 for evaluation and management with a chief complaint of General Illness (Entered automatically based on patient selection in Meet You.)     I evaluated the questionnaire submission on 1/6/2025.    [facility], [place]    1/6/2025  3:56 PM CST - Filed by Patient   What do you need help with? Urinary Symptoms   Do you agree to participate in an E-Visit? Yes   If you have any of the following symptoms, please present to your local emergency room or call 911:  I acknowledge   What is the main issue you would like addressed today? UTI   What symptoms do you currently have? Pain while passing urine   When did your symptoms first appear? 1/5/2025   List what you have done or taken to help your symptoms. AZO   Please indicate whether you have had the following symptoms during the past 24 hours     Urgent urination (a sudden and uncontrollable urge to urinate) Severe   Frequent urination of small amounts of urine (going to the toilet very often) Severe   Burning pain when urinating Severe   Incomplete bladder emptying (still feel like you need to urinate again after urination) Severe   Pain not associated with urination in the lower abdomen below the belly button) Severe   What does your urine look like? Cloudy   Blood seen in the urine None   Flank pain (pain in one or both sides of the lower back) None   Abnormal Vaginal Discharge (abnormal amount, color and/or odor) None   Discharge from the urethra (urinary opening) without urination None   High body temperature/fever? None-<99.5   Please rate how much discomfort you have experience because of the symptoms in the past 24 hours: Severe   Please indicate how the symptoms have interfered with your every day activities/work in the past 24 hours:  Severe   Please indicate how these symptoms have interfered with your social activities (visiting people, meeting with friends, etc.) in the past 24 hours? Severe   Are you a diabetic? No   Please indicate whether you have the following at the time of completion of this questionnaire: None of the above   Provide any additional information you feel is important.    Please attach any relevant images or files (if you have performed a home test for UTI, please submit a photo of results)    Are you able to take your vital signs? No         Encounter Diagnosis   Name Primary?    Dysuria Yes        Orders Placed This Encounter   Procedures    Urinalysis, Reflex to Urine Culture Urine, Clean Catch     Standing Status:   Future     Standing Expiration Date:   3/8/2026     Order Specific Question:   Preferred Collection Type     Answer:   Urine, Clean Catch     Order Specific Question:   Specimen Source     Answer:   Urine     Order Specific Question:   Send normal result to authorizing provider's In Basket if patient is active on MyChart:     Answer:   Yes      Medications Ordered This Encounter   Medications    nitrofurantoin, macrocrystal-monohydrate, (MACROBID) 100 MG capsule     Sig: Take 1 capsule (100 mg total) by mouth 2 (two) times daily.     Dispense:  10 capsule     Refill:  0        No follow-ups on file.      E-Visit Time Tracking:    Day 2 Time (in minutes): 7  Day 3 Time (in minutes): 5    Total Time (in minutes): 12

## 2025-01-07 NOTE — TELEPHONE ENCOUNTER
Spoke to pt regarding urine sample still being in process-- informed pt we will contact her as soon as we get the test results in.       ----- Message from Rina sent at 1/7/2025 12:31 PM CST -----  Regarding: Meds  Type:  Needs Medical Advice    Who Called: Pt    Would the patient rather a call back or a response via [facility]? Call    Best Call Back Number: [phone]    Additional Information: Pt would like to know if meds was order with [facility]. Thanks

## 2025-01-09 DIAGNOSIS — I10 BENIGN ESSENTIAL HTN: ICD-10-CM

## 2025-01-09 LAB — BACTERIA UR CULT: ABNORMAL

## 2025-01-10 DIAGNOSIS — I10 BENIGN ESSENTIAL HTN: ICD-10-CM

## 2025-01-10 RX ORDER — AMLODIPINE BESYLATE 2.5 MG/1
TABLET ORAL
Qty: 90 TABLET | Refills: 3 | OUTPATIENT
Start: 2025-01-10

## 2025-01-10 RX ORDER — OMEPRAZOLE 40 MG/1
CAPSULE, DELAYED RELEASE ORAL
Qty: 180 CAPSULE | Refills: 3 | Status: SHIPPED | OUTPATIENT
Start: 2025-01-10

## 2025-01-10 RX ORDER — BENAZEPRIL HYDROCHLORIDE 40 MG/1
40 TABLET ORAL
Qty: 90 TABLET | Refills: 2 | Status: SHIPPED | OUTPATIENT
Start: 2025-01-10

## 2025-01-10 NOTE — TELEPHONE ENCOUNTER
No care due was identified.  Health [facility] Embedded Care Due Messages. Reference number: [id number].   1/09/2025 9:31:32 PM CST

## 2025-01-10 NOTE — TELEPHONE ENCOUNTER
No care due was identified.  Health [facility] Embedded Care Due Messages. Reference number: [id number].   1/10/2025 12:35:31 AM CST

## 2025-01-10 NOTE — TELEPHONE ENCOUNTER
Refill Routing Note   Medication(s) are not appropriate for processing by [facility] for the following reason(s):        Outside of protocol  Prilosec total daily dose greater than 40 mg daily; ROUTE    [facility] action(s):  Route  Quick Discontinue        Medication Therapy Plan: Norvasc- med d/c on 09/27/24 by PCP; QDC      Appointments  past 12m or future 3m with PCP    Date Provider   Last Visit   1/6/2025 Mikie Ryan MD   Next Visit   Visit date not found Mikie Ryan MD   ED visits in past 90 days: 0        Note composed:5:06 AM 01/10/2025

## 2025-01-10 NOTE — TELEPHONE ENCOUNTER
Refill Routing Note   Medication(s) are not appropriate for processing by [facility] for the following reason(s):        Required vitals abnormal    [facility] action(s):  Defer             Appointments  past 12m or future 3m with PCP    Date Provider   Last Visit   1/6/2025 Mikie Ryan MD   Next Visit   Visit date not found Mikie Ryan MD   ED visits in past 90 days: 0        Note composed:10:39 PM 01/09/2025

## 2025-01-15 ENCOUNTER — PATIENT MESSAGE (OUTPATIENT)
Dept: UROGYNECOLOGY | Facility: CLINIC | Age: 80
End: 2025-01-15
Payer: MEDICARE

## 2025-01-17 ENCOUNTER — TELEPHONE (OUTPATIENT)
Dept: UROGYNECOLOGY | Facility: CLINIC | Age: 80
End: 2025-01-17
Payer: MEDICARE

## 2025-01-17 NOTE — TELEPHONE ENCOUNTER
----- Message from Meghann sent at 1/17/2025  9:22 AM CST -----  Type:  Pharmacy Calling to Clarify an RX    Name of Caller:trent     Pharmacy Name:  [facility]  [place] - [address]  [address] [facility] [address]  Phone: [phone] Fax: [phone]      Prescription Name:hydrocortisone (PROCTOCORT) 10 % (80 mg) rectal foam    What do they need to clarify?:trent says it dosent come the way it was sent over     Best Call Back Number:[phone]      Additional Information: trent wants to know did you mean cortform 90mg      Please call back to advise. Thank you.

## 2025-01-23 NOTE — TELEPHONE ENCOUNTER
Her was sent to [facility] on [address] for practical port rectal foam.  It looks as if they received it I am not sure why they say they do not have it

## 2025-02-17 ENCOUNTER — PATIENT MESSAGE (OUTPATIENT)
Dept: FAMILY MEDICINE | Facility: CLINIC | Age: 80
End: 2025-02-17
Payer: MEDICARE

## 2025-02-17 DIAGNOSIS — J06.9 UPPER RESPIRATORY TRACT INFECTION, UNSPECIFIED TYPE: ICD-10-CM

## 2025-02-17 NOTE — TELEPHONE ENCOUNTER
No care due was identified.  [facility] Embedded Care Due Messages. Reference number: [id number].   2/17/2025 2:59:34 PM CST

## 2025-02-18 RX ORDER — ALBUTEROL SULFATE 90 UG/1
1 INHALANT RESPIRATORY (INHALATION) EVERY 6 HOURS PRN
Qty: 51 G | Refills: 2 | Status: SHIPPED | OUTPATIENT
Start: 2025-02-18

## 2025-02-20 ENCOUNTER — PATIENT MESSAGE (OUTPATIENT)
Dept: FAMILY MEDICINE | Facility: CLINIC | Age: 80
End: 2025-02-20
Payer: MEDICARE

## 2025-02-20 DIAGNOSIS — M17.9 OSTEOARTHRITIS OF KNEE, UNSPECIFIED LATERALITY, UNSPECIFIED OSTEOARTHRITIS TYPE: Primary | ICD-10-CM

## 2025-02-21 ENCOUNTER — PATIENT MESSAGE (OUTPATIENT)
Dept: FAMILY MEDICINE | Facility: CLINIC | Age: 80
End: 2025-02-21
Payer: MEDICARE

## 2025-02-21 RX ORDER — DICLOFENAC SODIUM 10 MG/G
GEL TOPICAL
Qty: 100 G | Refills: 4 | Status: SHIPPED | OUTPATIENT
Start: 2025-02-21

## 2025-03-07 ENCOUNTER — PATIENT MESSAGE (OUTPATIENT)
Dept: FAMILY MEDICINE | Facility: CLINIC | Age: 80
End: 2025-03-07
Payer: MEDICARE

## 2025-03-10 ENCOUNTER — PATIENT MESSAGE (OUTPATIENT)
Dept: FAMILY MEDICINE | Facility: CLINIC | Age: 80
End: 2025-03-10
Payer: MEDICARE

## 2025-03-10 DIAGNOSIS — R25.2 MUSCLE CRAMPS AT NIGHT: ICD-10-CM

## 2025-03-10 RX ORDER — GABAPENTIN 600 MG/1
600 TABLET ORAL DAILY PRN
Qty: 100 TABLET | Refills: 2 | Status: SHIPPED | OUTPATIENT
Start: 2025-03-10

## 2025-03-10 RX ORDER — BACLOFEN 20 MG/1
20 TABLET ORAL 2 TIMES DAILY
Qty: 180 TABLET | Refills: 3 | Status: SHIPPED | OUTPATIENT
Start: 2025-03-10

## 2025-03-10 NOTE — TELEPHONE ENCOUNTER
No care due was identified.  [facility] Embedded Care Due Messages. Reference number: [id number].   3/10/2025 7:47:47 AM CDT

## 2025-03-11 ENCOUNTER — TELEPHONE (OUTPATIENT)
Dept: SURGERY | Facility: CLINIC | Age: 80
End: 2025-03-11
Payer: MEDICARE

## 2025-03-11 NOTE — TELEPHONE ENCOUNTER
----- Message from Michelle sent at 3/11/2025 10:20 AM CDT -----  Type: Needs Medical AdviceWho Called:  pt Symptoms (please be specific):  How long has patient had these symptoms:  Pharmacy name and phone #:  Best Call Back Number: [phone] Information: pt is requesting a call back regarding canceling her appt on today 3/11

## 2025-03-11 NOTE — TELEPHONE ENCOUNTER
I called patient to inform her that she's not scheduled today and that her appointment is on Tuesday, 3/18/25 @ 2:30pm with Dr. Hahn in [place].  Patient stated that she's sick today and will be there on 3/18/25.  [place]

## 2025-03-15 ENCOUNTER — PATIENT MESSAGE (OUTPATIENT)
Dept: FAMILY MEDICINE | Facility: CLINIC | Age: 80
End: 2025-03-15
Payer: MEDICARE

## 2025-03-17 ENCOUNTER — PATIENT MESSAGE (OUTPATIENT)
Dept: FAMILY MEDICINE | Facility: CLINIC | Age: 80
End: 2025-03-17
Payer: MEDICARE

## 2025-03-17 DIAGNOSIS — R30.0 DYSURIA: Primary | ICD-10-CM

## 2025-03-18 ENCOUNTER — PATIENT MESSAGE (OUTPATIENT)
Dept: FAMILY MEDICINE | Facility: CLINIC | Age: 80
End: 2025-03-18
Payer: MEDICARE

## 2025-03-18 DIAGNOSIS — I10 BENIGN ESSENTIAL HTN: ICD-10-CM

## 2025-03-18 RX ORDER — NITROFURANTOIN 25; 75 MG/1; MG/1
100 CAPSULE ORAL 2 TIMES DAILY
Qty: 10 CAPSULE | Refills: 0 | Status: SHIPPED | OUTPATIENT
Start: 2025-03-18

## 2025-03-18 NOTE — TELEPHONE ENCOUNTER
Patient is requesting an antibiotic for reoccurring UTI. Evisit was sent to patient but she is asking for an antibiotic. Please advise

## 2025-03-19 RX ORDER — BENAZEPRIL HYDROCHLORIDE 40 MG/1
40 TABLET ORAL DAILY
Qty: 90 TABLET | Refills: 2 | Status: SHIPPED | OUTPATIENT
Start: 2025-03-19

## 2025-03-31 ENCOUNTER — TELEPHONE (OUTPATIENT)
Dept: SURGERY | Facility: CLINIC | Age: 80
End: 2025-03-31
Payer: MEDICARE

## 2025-03-31 NOTE — TELEPHONE ENCOUNTER
----- Message from Alberta sent at 3/31/2025 11:54 AM CDT -----  Type: Needs Medical AdviceWho Called:  Patient Symptoms (please be specific):  How long has patient had these symptoms:  Pharmacy name and phone #:  Best Call Back Number: [phone] Information: Patient is requesting a call back to schedule an appt..

## 2025-04-11 ENCOUNTER — PATIENT MESSAGE (OUTPATIENT)
Dept: FAMILY MEDICINE | Facility: CLINIC | Age: 80
End: 2025-04-11
Payer: MEDICARE

## 2025-04-13 ENCOUNTER — PATIENT MESSAGE (OUTPATIENT)
Dept: FAMILY MEDICINE | Facility: CLINIC | Age: 80
End: 2025-04-13
Payer: MEDICARE

## 2025-04-13 DIAGNOSIS — L29.9 ITCHING: Primary | ICD-10-CM

## 2025-04-13 NOTE — HPI
Problem: Chronic Conditions and Co-morbidities  Goal: Patient's chronic conditions and co-morbidity symptoms are monitored and maintained or improved  Outcome: Progressing     Problem: Discharge Planning  Goal: Discharge to home or other facility with appropriate resources  Outcome: Progressing     Problem: Safety - Adult  Goal: Free from fall injury  Outcome: Progressing     Problem: Respiratory - Adult  Goal: Achieves optimal ventilation and oxygenation  Outcome: Progressing     Problem: Skin/Tissue Integrity - Adult  Goal: Skin integrity remains intact  Outcome: Progressing      Marleen Samano is a 74-year-old female with a past medical history significant for anemia, migraine, and COPD who presented to the emergency department with a 3 day history of diarrhea.  Patient was discharged from this facility on 07/16/2020 after undergoing a right TKA revision.  Patient denies fever, chest pain, shortness of breath, abdominal pain, or dysuria.  ED workup is significant for mild hyperkalemia and an SHARITA.  She will be placed in observation for continued monitoring and treatment.

## 2025-04-14 ENCOUNTER — PATIENT MESSAGE (OUTPATIENT)
Dept: FAMILY MEDICINE | Facility: CLINIC | Age: 80
End: 2025-04-14
Payer: MEDICARE

## 2025-04-14 DIAGNOSIS — J06.9 UPPER RESPIRATORY TRACT INFECTION, UNSPECIFIED TYPE: ICD-10-CM

## 2025-04-14 RX ORDER — HYDROXYZINE HYDROCHLORIDE 25 MG/1
25 TABLET, FILM COATED ORAL 2 TIMES DAILY PRN
Qty: 90 TABLET | Refills: 3 | Status: SHIPPED | OUTPATIENT
Start: 2025-04-14

## 2025-04-14 NOTE — TELEPHONE ENCOUNTER
No care due was identified.  Health [facility] Embedded Care Due Messages. Reference number: [id number].   4/14/2025 9:00:34 AM CDT

## 2025-04-14 NOTE — TELEPHONE ENCOUNTER
Refill Routing Note   Medication(s) are not appropriate for processing by [facility] for the following reason(s):        New or recently adjusted medication    [facility] action(s):  Defer               Appointments  past 12m or future 3m with PCP    Date Provider   Last Visit   1/6/2025 Mikie Ryan MD   Next Visit   7/22/2025 Mikie Ryan MD   ED visits in past 90 days: 0        Note composed:2:01 PM 04/14/2025

## 2025-04-16 RX ORDER — ALBUTEROL SULFATE 90 UG/1
1 INHALANT RESPIRATORY (INHALATION) EVERY 6 HOURS PRN
Qty: 54 G | Refills: 1 | Status: SHIPPED | OUTPATIENT
Start: 2025-04-16

## 2025-04-28 ENCOUNTER — PATIENT MESSAGE (OUTPATIENT)
Dept: FAMILY MEDICINE | Facility: CLINIC | Age: 80
End: 2025-04-28
Payer: MEDICARE

## 2025-04-28 DIAGNOSIS — E65 CENTRAL ADIPOSITY: Primary | ICD-10-CM

## 2025-05-03 ENCOUNTER — PATIENT MESSAGE (OUTPATIENT)
Dept: OPTOMETRY | Facility: CLINIC | Age: 80
End: 2025-05-03
Payer: MEDICARE

## 2025-05-06 ENCOUNTER — TELEPHONE (OUTPATIENT)
Dept: SURGERY | Facility: CLINIC | Age: 80
End: 2025-05-06
Payer: MEDICARE

## 2025-05-06 ENCOUNTER — OFFICE VISIT (OUTPATIENT)
Dept: FAMILY MEDICINE | Facility: CLINIC | Age: 80
End: 2025-05-06
Payer: MEDICARE

## 2025-05-06 VITALS
WEIGHT: 165.38 LBS | OXYGEN SATURATION: 95 % | HEIGHT: 69 IN | RESPIRATION RATE: 17 BRPM | SYSTOLIC BLOOD PRESSURE: 142 MMHG | HEART RATE: 88 BPM | DIASTOLIC BLOOD PRESSURE: 70 MMHG | BODY MASS INDEX: 24.5 KG/M2

## 2025-05-06 DIAGNOSIS — M26.621 ARTHRALGIA OF RIGHT TEMPOROMANDIBULAR JOINT: ICD-10-CM

## 2025-05-06 DIAGNOSIS — H92.01 RIGHT EAR PAIN: Primary | ICD-10-CM

## 2025-05-06 PROCEDURE — 3077F SYST BP >= 140 MM HG: CPT | Mod: CPTII,S$GLB,, | Performed by: STUDENT IN AN ORGANIZED HEALTH CARE EDUCATION/TRAINING PROGRAM

## 2025-05-06 PROCEDURE — 99214 OFFICE O/P EST MOD 30 MIN: CPT | Mod: S$GLB,,, | Performed by: STUDENT IN AN ORGANIZED HEALTH CARE EDUCATION/TRAINING PROGRAM

## 2025-05-06 PROCEDURE — 1101F PT FALLS ASSESS-DOCD LE1/YR: CPT | Mod: CPTII,S$GLB,, | Performed by: STUDENT IN AN ORGANIZED HEALTH CARE EDUCATION/TRAINING PROGRAM

## 2025-05-06 PROCEDURE — 99999 PR PBB SHADOW E&M-EST. PATIENT-LVL III: CPT | Mod: PBBFAC,,, | Performed by: STUDENT IN AN ORGANIZED HEALTH CARE EDUCATION/TRAINING PROGRAM

## 2025-05-06 PROCEDURE — 3078F DIAST BP <80 MM HG: CPT | Mod: CPTII,S$GLB,, | Performed by: STUDENT IN AN ORGANIZED HEALTH CARE EDUCATION/TRAINING PROGRAM

## 2025-05-06 PROCEDURE — 1159F MED LIST DOCD IN RCRD: CPT | Mod: CPTII,S$GLB,, | Performed by: STUDENT IN AN ORGANIZED HEALTH CARE EDUCATION/TRAINING PROGRAM

## 2025-05-06 PROCEDURE — G2211 COMPLEX E/M VISIT ADD ON: HCPCS | Mod: S$GLB,,, | Performed by: STUDENT IN AN ORGANIZED HEALTH CARE EDUCATION/TRAINING PROGRAM

## 2025-05-06 PROCEDURE — 3288F FALL RISK ASSESSMENT DOCD: CPT | Mod: CPTII,S$GLB,, | Performed by: STUDENT IN AN ORGANIZED HEALTH CARE EDUCATION/TRAINING PROGRAM

## 2025-05-06 PROCEDURE — 1125F AMNT PAIN NOTED PAIN PRSNT: CPT | Mod: CPTII,S$GLB,, | Performed by: STUDENT IN AN ORGANIZED HEALTH CARE EDUCATION/TRAINING PROGRAM

## 2025-05-06 RX ORDER — AZELASTINE 1 MG/ML
1 SPRAY, METERED NASAL 2 TIMES DAILY
Qty: 30 ML | Refills: 1 | Status: SHIPPED | OUTPATIENT
Start: 2025-05-06 | End: 2026-05-06

## 2025-05-06 RX ORDER — MELOXICAM 15 MG/1
15 TABLET ORAL DAILY
Qty: 14 TABLET | Refills: 0 | Status: SHIPPED | OUTPATIENT
Start: 2025-05-06

## 2025-05-06 NOTE — TELEPHONE ENCOUNTER
----- Message from [place] sent at 5/6/2025 12:17 PM CDT -----  Type: Needs Medical AdviceWho Called:  PatientSymptoms (please be specific):  How long has patient had these symptoms:  Pharmacy name and phone #:  Best Call Back Number: [phone] Information: Patient is requesting a call back to reschedule her appt. on 5/6 at 1:30.

## 2025-05-07 NOTE — PROGRESS NOTES
Patient ID: Marleen Samano is a 79 y.o. female.    Chief Complaint: Otalgia (L side X about 1 month)    History of Present Illness    CHIEF COMPLAINT:  Patient presents today for ear pain.    HISTORY OF PRESENT ILLNESS:  She reports ear pain for approximately one month, which worsens when lying down, particularly with head down position. A wellness check yesterday revealed fluid in the ears. She takes Tylenol before sleep for pain management.    CURRENT MEDICATIONS:  She is currently taking Baclofen.      ROS:  General: -fever, -chills, -fatigue, -weight gain, -weight loss  Eyes: -vision changes, -redness, -discharge  ENT: +ear pain, -nasal congestion, -sore throat  Cardiovascular: -chest pain, -palpitations, -lower extremity edema  Respiratory: -cough, -shortness of breath  Gastrointestinal: -abdominal pain, -nausea, -vomiting, -diarrhea, -constipation, -blood in stool  Genitourinary: -dysuria, -hematuria, -frequency  Musculoskeletal: -joint pain, -muscle pain  Skin: -rash, -lesion  Neurological: -headache, -dizziness, -numbness, -tingling  Psychiatric: -anxiety, -depression, -sleep difficulty         Physical Exam    Vitals: Blood pressure: 142/70.  General: No acute distress. Well-developed. Well-nourished.  Eyes: EOMI. Sclerae anicteric.  HENT: Normocephalic. Atraumatic. Nares patent. Moist oral mucosa.  Ears: Bilateral TMs clear. Bilateral EACs clear.      Assessment & Plan    H92.01 Right ear pain  M26.621 Arthralgia of right temporomandibular joint  I10 Essential (primary) hypertension      IMPRESSION:  - Considered ear pain potentially related to fluid in ears or TMJ issues.  - Evaluated BP, noted it was slightly elevated but decided to monitor without changes to current regimen.    RIGHT EAR PAIN:  - Explained the connection between the ear and throat via the Eustachian tube and its role in ear drainage.  - Patient reports pain in the ear, especially when recumbent, which could be related to  temporomandibular joint dysfunction or ear drainage issues.  - Physical exam revealed pain upon manipulation of the ear, but no obvious pathological signs.  - Prescribed Astelin nasal spray to improve ear drainage and anti-inflammatory medication to address potential contributing factors.  - Consider ENT referral and CT if initial treatments are ineffective.  - Follow up if symptoms do not improve with current treatment plan.    ARTHRALGIA OF RIGHT TEMPOROMANDIBULAR JOINT:  - Discussed the possibility of temporomandibular joint involvement contributing to ear pain due to nocturnal bruxism.  - The anti-inflammatory medication prescribed will also address this joint-related pain.  - Follow up if symptoms do not improve with current treatment plan.    ESSENTIAL (PRIMARY) HYPERTENSION:  - Blood pressure is 142/70 today, slightly elevated.  - No immediate medication changes necessary; will continue current antihypertensive regimen with watchful waiting approach.         Marleen was seen today for otalgia.    Diagnoses and all orders for this visit:    Right ear pain  -     azelastine (ASTELIN) 137 mcg (0.1 %) nasal spray; 1 spray (137 mcg total) by Nasal route 2 (two) times daily.    Arthralgia of right temporomandibular joint  -     meloxicam (MOBIC) 15 MG tablet; Take 1 tablet (15 mg total) by mouth once daily.          No follow-ups on file.    This note was generated with the assistance of ambient listening technology. Verbal consent was obtained by the patient and accompanying visitor(s) for the recording of patient appointment to facilitate this note. I attest to having reviewed and edited the generated note for accuracy, though some syntax or spelling errors may persist. Please contact the author of this note for any clarification.

## 2025-05-20 ENCOUNTER — TELEPHONE (OUTPATIENT)
Dept: NEUROLOGY | Facility: CLINIC | Age: 80
End: 2025-05-20
Payer: MEDICARE

## 2025-05-20 NOTE — TELEPHONE ENCOUNTER
LVM for pt to clarify reason for visit and to see if she can come in at 10 am if for headaches since she was last seen 5/2022, she is a new pt. Currently scheduled incorrectly in 30 minute follow up appt spot.

## 2025-05-27 ENCOUNTER — OFFICE VISIT (OUTPATIENT)
Dept: SURGERY | Facility: CLINIC | Age: 80
End: 2025-05-27
Payer: MEDICARE

## 2025-05-27 VITALS
WEIGHT: 165.56 LBS | TEMPERATURE: 99 F | HEART RATE: 94 BPM | HEIGHT: 69 IN | BODY MASS INDEX: 24.52 KG/M2 | RESPIRATION RATE: 16 BRPM | SYSTOLIC BLOOD PRESSURE: 134 MMHG | DIASTOLIC BLOOD PRESSURE: 88 MMHG

## 2025-05-27 DIAGNOSIS — K64.8 HEMORRHOID PROLAPSE: Primary | ICD-10-CM

## 2025-05-27 PROCEDURE — 99999 PR PBB SHADOW E&M-EST. PATIENT-LVL III: CPT | Mod: PBBFAC,,, | Performed by: SURGERY

## 2025-05-27 NOTE — TELEPHONE ENCOUNTER
Rai Deluna Staff  Caller: da at [phone] (Today, 11:04 AM)    ----- Message from Rai Moreno sent at 4/27/2023 11:04 AM CDT -----  Regarding: letter question  Contact: da at [phone]  Type: Needs Medical Advice    Who Called:  da    Best Call Back Number: [phone]    Additional Information: please call pt to discuss letter needed for work.          DC instructions

## 2025-05-29 NOTE — PROGRESS NOTES
Subjective     Patient ID: Marleen Samano is a 79 y.o. female.    Chief Complaint: Consult (Hemorrhoids/Fecal and urinary incontinence)    HPI  this is a pleasant 79 year female who was referred to me for evaluation of her hemorrhoids as well as fecal incontinence.  Patient notes that she suffers with a both fecal as well as urinary incontinence.  Notes she may have 1-2 events weekly of fecal incontinence.  Typically this is with looser softer stools.  Does wear pads because of this.  Does note occasional light bleeding.  States she does also have frequent mucus leakage.  Denies any significant fevers chills.  Does have a history of ischemic colitis.  This is documented on colonoscopy in 2023.  Review of Systems   Constitutional:  Negative for activity change and appetite change.   Respiratory:  Negative for apnea.    Gastrointestinal:  Positive for fecal incontinence. Negative for abdominal distention, abdominal pain, nausea and vomiting.   Genitourinary:  Positive for bladder incontinence.   Musculoskeletal:  Negative for arthralgias and back pain.        Objective     Physical Exam  Vitals reviewed.   Cardiovascular:      Rate and Rhythm: Normal rate.      Pulses: Normal pulses.   Pulmonary:      Effort: Pulmonary effort is normal.   Abdominal:      General: There is no distension.      Hernia: No hernia is present.   Genitourinary:     Comments: Patient evaluated in prone kaykay-knife position.  Externally she does have prolapsing internal hemorrhoids in all 3 columns.  No significant external hemorrhoids.  No fissures or fistula or abscess noted.  Digital rectal exam with very weak resting tone.  Poor squeeze pressure noted.  No palpable masses.  Anoscopy demonstrates moderate internal hemorrhoids.  Neurological:      Mental Status: She is alert.        Assessment and Plan     Hemorrhoids and incontinence.        Lengthy discussion with the patient.  Difficult scenario given constellation of symptoms.  I suspect  the hemorrhoid prolapses leading to some of the mucus leakage and discharge this is experiencing and likely contributing to his fecal incontinence.  Could consider banding although I would be reluctant to proceed with banding at present time.  Recommended proceeding with bulking agents have recommended fiber daily.  Would also recommend pelvic floor therapy.  If no significant improvement with this would then consider banding.  Patient expresses understanding.  She will follow up with me in 3-4 months         No follow-ups on file.

## 2025-06-09 ENCOUNTER — PATIENT MESSAGE (OUTPATIENT)
Dept: FAMILY MEDICINE | Facility: CLINIC | Age: 80
End: 2025-06-09
Payer: MEDICARE

## 2025-07-04 ENCOUNTER — PATIENT MESSAGE (OUTPATIENT)
Dept: FAMILY MEDICINE | Facility: CLINIC | Age: 80
End: 2025-07-04
Payer: MEDICARE

## 2025-07-14 ENCOUNTER — PATIENT MESSAGE (OUTPATIENT)
Dept: FAMILY MEDICINE | Facility: CLINIC | Age: 80
End: 2025-07-14
Payer: MEDICARE

## 2025-07-25 ENCOUNTER — PATIENT MESSAGE (OUTPATIENT)
Dept: ADMINISTRATIVE | Facility: HOSPITAL | Age: 80
End: 2025-07-25
Payer: MEDICARE

## 2025-07-25 ENCOUNTER — PATIENT OUTREACH (OUTPATIENT)
Dept: ADMINISTRATIVE | Facility: HOSPITAL | Age: 80
End: 2025-07-25
Payer: MEDICARE

## 2025-07-25 NOTE — PROGRESS NOTES
Population Health Chart Review & Patient Outreach Details      Additional [facility] Health Notes:      BP Readings from Last 3 Encounters:   05/27/25 134/88   05/06/25 (!) 142/70   11/20/24 (!) 153/81         Non-compliant report chart audits for HYPERTENSION MANAGEMENT     Outreach to patient in reference to hypertension management           NEED REMOTE HOME BP READING DOCUMENTED   OR  BP FOLLOW UP WITH NURSE VISIT OR CARE TEAM MEMBER    BLOOD PRESSURE FOLLOW UP             Updates Requested / Reviewed:        Health Maintenance Topics Overdue:        VB Score: 2     Hemoglobin A1c    RSV Vaccine                      Health Maintenance Topic(s) Outreach Outcomes & Actions Taken:    Blood Pressure - Outreach Outcomes & Actions Taken  : msg    Primary Care Appt - Outreach Outcomes & Actions Taken  : msg

## 2025-08-06 ENCOUNTER — PATIENT MESSAGE (OUTPATIENT)
Dept: OPTOMETRY | Facility: CLINIC | Age: 80
End: 2025-08-06
Payer: MEDICARE

## 2025-08-09 DIAGNOSIS — J06.9 UPPER RESPIRATORY TRACT INFECTION, UNSPECIFIED TYPE: ICD-10-CM

## 2025-08-10 ENCOUNTER — PATIENT MESSAGE (OUTPATIENT)
Dept: FAMILY MEDICINE | Facility: CLINIC | Age: 80
End: 2025-08-10
Payer: MEDICARE

## 2025-08-12 RX ORDER — ALBUTEROL SULFATE 90 UG/1
1 INHALANT RESPIRATORY (INHALATION) EVERY 6 HOURS PRN
Qty: 54 G | Refills: 1 | Status: SHIPPED | OUTPATIENT
Start: 2025-08-12

## 2025-08-13 ENCOUNTER — PATIENT MESSAGE (OUTPATIENT)
Dept: FAMILY MEDICINE | Facility: CLINIC | Age: 80
End: 2025-08-13
Payer: MEDICARE

## 2025-08-13 DIAGNOSIS — R25.2 MUSCLE CRAMPS AT NIGHT: ICD-10-CM

## 2025-08-15 RX ORDER — BACLOFEN 20 MG/1
20 TABLET ORAL 2 TIMES DAILY
Qty: 180 TABLET | Refills: 3 | Status: SHIPPED | OUTPATIENT
Start: 2025-08-15

## (undated) DEVICE — SEE MEDLINE ITEM 146231

## (undated) DEVICE — SEE MEDLINE ITEM 157131

## (undated) DEVICE — TOWEL OR DISP STRL BLUE 4/PK

## (undated) DEVICE — SOL 9P NACL IRR PIC IL

## (undated) DEVICE — NDL SPINAL 18GX3.5 SPINOCAN

## (undated) DEVICE — SUT STRATAFIX PDS 1 CTX 18IN

## (undated) DEVICE — KIT RF COOLED 17G 50MM

## (undated) DEVICE — DRAPE STERI U-SHAPED 47X51IN

## (undated) DEVICE — SOL SOD CHLORIDE 0.9% 10ML

## (undated) DEVICE — BLADE SAG DUAL 18MMX1.27MMX90M

## (undated) DEVICE — DRESSING AQUACEL AG ADV 3.5X12

## (undated) DEVICE — CONTAINER SPECIMEN OR STER 4OZ

## (undated) DEVICE — TOGA FLYTE PEEL AWAY XLARGE

## (undated) DEVICE — SOL IRR NACL .9% 3000ML

## (undated) DEVICE — PACK BASIC

## (undated) DEVICE — SUT STRATAFIX PDS 2 CT-1 14IN

## (undated) DEVICE — PRESSURIZER HI VAC HIP KIT

## (undated) DEVICE — STRAP OR TABLE 5IN X 72IN

## (undated) DEVICE — APPLICATOR CHLORAPREP ORN 26ML

## (undated) DEVICE — MANIFOLD 4 PORT

## (undated) DEVICE — NDL HYPODERMIC BLUNT 18G 1.5IN

## (undated) DEVICE — SEE MEDLINE ITEM 152530

## (undated) DEVICE — PADDING CAST SPECIALIST 6X4YD

## (undated) DEVICE — NDL 27G X 1 1/4

## (undated) DEVICE — SCRUB 10% POVIDONE IODINE 4OZ

## (undated) DEVICE — SYS LABEL CORRECT MED

## (undated) DEVICE — INTERPULSE SET

## (undated) DEVICE — SLEEVE SCD EXPRESS CALF MEDIUM

## (undated) DEVICE — DRESSING AQUACEL AG 3.5X10IN

## (undated) DEVICE — DRAPE INCISE IOBAN 2 23X17IN

## (undated) DEVICE — PAD CAST SPECIALIST STRL 6

## (undated) DEVICE — PACK CUSTOM UNIV BASIN SLI

## (undated) DEVICE — WRAP PROTECTIVE LEG POS STRL

## (undated) DEVICE — SYS CLSR DERMABOND PRINEO 22CM

## (undated) DEVICE — PACK LOWER EXTREMITY

## (undated) DEVICE — BLADE SAG DUAL 25MM

## (undated) DEVICE — SPONGE BULKEE II ABSRB 6X6.75

## (undated) DEVICE — SYR 50CC LL

## (undated) DEVICE — SEE MEDLINE ITEM 157166

## (undated) DEVICE — PAD GROUNDING DISPER ELECTRODE

## (undated) DEVICE — TUBE SUCTION YANKAUER HI CAP

## (undated) DEVICE — SEE MEDLINE ITEM 107746

## (undated) DEVICE — SYR DISP LL 5CC

## (undated) DEVICE — BRACE KNEE POST OP HNG PD 17IN

## (undated) DEVICE — GLOVE SURG ULTRA TOUCH 7.5

## (undated) DEVICE — BLADE SURG CARBON STEEL #10

## (undated) DEVICE — CUBE COLD THERAPY POLAR CARE

## (undated) DEVICE — GLOVE SURG ULTRA TOUCH 8

## (undated) DEVICE — BANDAGE ESMARK 6X12

## (undated) DEVICE — ALCOHOL 70% ISOP RUBBING 4OZ

## (undated) DEVICE — UNDERGLOVES BIOGEL PI SIZE 7.5

## (undated) DEVICE — SUT 2/0 18IN COATED VICRYL

## (undated) DEVICE — SEE MEDLINE ITEM 152622

## (undated) DEVICE — TOURNIQUET SB QC DP 34X4IN

## (undated) DEVICE — COVER TRNSDUC CIV-FLX 8.9X91.5

## (undated) DEVICE — JUGGERKNOT DRILL SHORT 2.9MM

## (undated) DEVICE — SEE MEDLINE ITEM 146292

## (undated) DEVICE — SPONGE SUPER KERLIX 6X6.75IN

## (undated) DEVICE — DRAPE STERI INSTRUMENT 1018

## (undated) DEVICE — PAD ABD 8X10 STERILE

## (undated) DEVICE — GLOVE SURG ULTRA TOUCH 6

## (undated) DEVICE — ELECTRODE REM PLYHSV RETURN 9

## (undated) DEVICE — LINER SUCTION 3000CC

## (undated) DEVICE — GOWN TOGA SYS PEELWY ZIP 2 XL